# Patient Record
Sex: MALE | Race: WHITE | Employment: OTHER | URBAN - METROPOLITAN AREA
[De-identification: names, ages, dates, MRNs, and addresses within clinical notes are randomized per-mention and may not be internally consistent; named-entity substitution may affect disease eponyms.]

---

## 2018-11-02 LAB
CREATININE, URINE: NORMAL
MICROALBUMIN/CREAT 24H UR: 0.7 MG/G{CREAT}
MICROALBUMIN/CREAT UR-RTO: NORMAL

## 2018-12-31 ENCOUNTER — HOSPITAL ENCOUNTER (OUTPATIENT)
Dept: NUCLEAR MEDICINE | Age: 68
Discharge: HOME | End: 2019-01-02
Payer: COMMERCIAL

## 2018-12-31 DIAGNOSIS — R91.8 MULTIPLE LUNG NODULES ON CT: ICD-10-CM

## 2018-12-31 PROCEDURE — 78815 PET IMAGE W/CT SKULL-THIGH: CPT

## 2018-12-31 PROCEDURE — 3430000000 HC RX DIAGNOSTIC RADIOPHARMACEUTICAL: Performed by: FAMILY MEDICINE

## 2018-12-31 PROCEDURE — A9552 F18 FDG: HCPCS | Performed by: FAMILY MEDICINE

## 2018-12-31 RX ADMIN — FLUDEOXYGLUCOSE F 18 16.43 MILLICURIE: 200 INJECTION, SOLUTION INTRAVENOUS at 15:50

## 2019-01-02 RX ORDER — FLUDEOXYGLUCOSE F 18 200 MCI/ML
16.43 INJECTION, SOLUTION INTRAVENOUS
Status: COMPLETED | OUTPATIENT
Start: 2019-01-02 | End: 2018-12-31

## 2019-01-25 ENCOUNTER — TELEPHONE (OUTPATIENT)
Dept: ONCOLOGY | Age: 69
End: 2019-01-25

## 2019-01-29 ENCOUNTER — TELEPHONE (OUTPATIENT)
Dept: ONCOLOGY | Age: 69
End: 2019-01-29

## 2019-02-15 ENCOUNTER — INITIAL CONSULT (OUTPATIENT)
Dept: ONCOLOGY | Age: 69
End: 2019-02-15
Payer: COMMERCIAL

## 2019-02-15 VITALS
HEIGHT: 71 IN | HEART RATE: 64 BPM | WEIGHT: 227.25 LBS | TEMPERATURE: 97.8 F | DIASTOLIC BLOOD PRESSURE: 83 MMHG | SYSTOLIC BLOOD PRESSURE: 139 MMHG | BODY MASS INDEX: 31.81 KG/M2

## 2019-02-15 DIAGNOSIS — C34.91 SMALL CELL CARCINOMA OF LUNG, RIGHT (HCC): Primary | ICD-10-CM

## 2019-02-15 PROCEDURE — 99245 OFF/OP CONSLTJ NEW/EST HI 55: CPT | Performed by: INTERNAL MEDICINE

## 2019-02-15 PROCEDURE — 99201 HC NEW PT, E/M LEVEL 1: CPT | Performed by: INTERNAL MEDICINE

## 2019-02-15 RX ORDER — M-VIT,TX,IRON,MINS/CALC/FOLIC 27MG-0.4MG
1 TABLET ORAL DAILY
COMMUNITY
End: 2021-11-10 | Stop reason: ALTCHOICE

## 2019-02-15 RX ORDER — LISINOPRIL AND HYDROCHLOROTHIAZIDE 25; 20 MG/1; MG/1
1 TABLET ORAL DAILY
COMMUNITY
Start: 2018-11-27 | End: 2019-09-10 | Stop reason: ALTCHOICE

## 2019-02-15 RX ORDER — SIMVASTATIN 40 MG
40 TABLET ORAL DAILY
Status: ON HOLD | COMMUNITY
Start: 2018-11-27 | End: 2022-05-07 | Stop reason: HOSPADM

## 2019-02-15 RX ORDER — CELECOXIB 200 MG/1
200 CAPSULE ORAL 2 TIMES DAILY
Refills: 3 | COMMUNITY
Start: 2018-11-27 | End: 2020-07-01 | Stop reason: ALTCHOICE

## 2019-02-15 RX ORDER — ALLOPURINOL 100 MG/1
100 TABLET ORAL DAILY
COMMUNITY
Start: 2018-12-04

## 2019-02-20 ENCOUNTER — HOSPITAL ENCOUNTER (OUTPATIENT)
Dept: NUCLEAR MEDICINE | Age: 69
Discharge: HOME | End: 2019-02-22
Payer: COMMERCIAL

## 2019-02-20 DIAGNOSIS — C34.91 SMALL CELL CARCINOMA OF LUNG, RIGHT (HCC): ICD-10-CM

## 2019-02-20 PROCEDURE — 78306 BONE IMAGING WHOLE BODY: CPT

## 2019-02-20 PROCEDURE — A9503 TC99M MEDRONATE: HCPCS | Performed by: INTERNAL MEDICINE

## 2019-02-20 PROCEDURE — 3430000000 HC RX DIAGNOSTIC RADIOPHARMACEUTICAL: Performed by: INTERNAL MEDICINE

## 2019-02-20 RX ORDER — SODIUM CHLORIDE 0.9 % (FLUSH) 0.9 %
10 SYRINGE (ML) INJECTION PRN
Status: DISCONTINUED | OUTPATIENT
Start: 2019-02-20 | End: 2019-02-23 | Stop reason: HOSPADM

## 2019-02-20 RX ORDER — TC 99M MEDRONATE 20 MG/10ML
25 INJECTION, POWDER, LYOPHILIZED, FOR SOLUTION INTRAVENOUS
Status: COMPLETED | OUTPATIENT
Start: 2019-02-20 | End: 2019-02-20

## 2019-02-20 RX ADMIN — Medication 23.1 MILLICURIE: at 08:05

## 2019-02-22 ENCOUNTER — HOSPITAL ENCOUNTER (OUTPATIENT)
Dept: RADIATION ONCOLOGY | Age: 69
Discharge: HOME | End: 2019-02-22
Payer: COMMERCIAL

## 2019-02-22 ENCOUNTER — TELEPHONE (OUTPATIENT)
Dept: ONCOLOGY | Age: 69
End: 2019-02-22

## 2019-02-22 ENCOUNTER — OFFICE VISIT (OUTPATIENT)
Dept: ONCOLOGY | Age: 69
End: 2019-02-22
Payer: COMMERCIAL

## 2019-02-22 VITALS
BODY MASS INDEX: 32.04 KG/M2 | WEIGHT: 229.7 LBS | DIASTOLIC BLOOD PRESSURE: 83 MMHG | SYSTOLIC BLOOD PRESSURE: 131 MMHG | TEMPERATURE: 97.7 F | HEART RATE: 68 BPM

## 2019-02-22 DIAGNOSIS — C34.91 PRIMARY LUNG CANCER WITH METASTASIS FROM LUNG TO OTHER SITE, RIGHT (HCC): Primary | ICD-10-CM

## 2019-02-22 DIAGNOSIS — C34.91 PRIMARY LUNG CANCER WITH METASTASIS FROM LUNG TO OTHER SITE, RIGHT (HCC): ICD-10-CM

## 2019-02-22 PROCEDURE — 99211 OFF/OP EST MAY X REQ PHY/QHP: CPT | Performed by: INTERNAL MEDICINE

## 2019-02-22 PROCEDURE — 99213 OFFICE O/P EST LOW 20 MIN: CPT | Performed by: RADIOLOGY

## 2019-02-22 PROCEDURE — 99214 OFFICE O/P EST MOD 30 MIN: CPT | Performed by: INTERNAL MEDICINE

## 2019-02-22 RX ORDER — SODIUM CHLORIDE 9 MG/ML
INJECTION, SOLUTION INTRAVENOUS CONTINUOUS
Status: CANCELLED | OUTPATIENT
Start: 2019-02-22

## 2019-02-25 ENCOUNTER — TELEPHONE (OUTPATIENT)
Dept: ONCOLOGY | Age: 69
End: 2019-02-25

## 2019-02-25 ENCOUNTER — HOSPITAL ENCOUNTER (OUTPATIENT)
Dept: INTERVENTIONAL RADIOLOGY/VASCULAR | Age: 69
Discharge: HOME | End: 2019-02-27
Payer: COMMERCIAL

## 2019-02-25 VITALS
TEMPERATURE: 98.1 F | HEIGHT: 72 IN | HEART RATE: 81 BPM | RESPIRATION RATE: 18 BRPM | SYSTOLIC BLOOD PRESSURE: 174 MMHG | BODY MASS INDEX: 42.66 KG/M2 | WEIGHT: 315 LBS | OXYGEN SATURATION: 99 % | DIASTOLIC BLOOD PRESSURE: 92 MMHG

## 2019-02-25 DIAGNOSIS — C34.91 SQUAMOUS CELL CARCINOMA OF LUNG, STAGE II, RIGHT (HCC): ICD-10-CM

## 2019-02-25 LAB
INR BLD: 1.1
PARTIAL THROMBOPLASTIN TIME: 32.5 SEC (ref 24–36)
PLATELET # BLD: 227 K/UL (ref 150–450)
PROTHROMBIN TIME: 14 SEC (ref 11.8–14.6)

## 2019-02-25 PROCEDURE — 36561 INSERT TUNNELED CV CATH: CPT | Performed by: RADIOLOGY

## 2019-02-25 PROCEDURE — 77001 FLUOROGUIDE FOR VEIN DEVICE: CPT | Performed by: RADIOLOGY

## 2019-02-25 PROCEDURE — 7100000031 HC ASPR PHASE II RECOVERY - ADDTL 15 MIN

## 2019-02-25 PROCEDURE — 2709999900 IR PORT PLACEMENT > 5 YEARS

## 2019-02-25 PROCEDURE — 36415 COLL VENOUS BLD VENIPUNCTURE: CPT

## 2019-02-25 PROCEDURE — 7100000030 HC ASPR PHASE II RECOVERY - FIRST 15 MIN

## 2019-02-25 PROCEDURE — 6360000002 HC RX W HCPCS: Performed by: RADIOLOGY

## 2019-02-25 PROCEDURE — 85049 AUTOMATED PLATELET COUNT: CPT

## 2019-02-25 PROCEDURE — 2500000003 HC RX 250 WO HCPCS: Performed by: RADIOLOGY

## 2019-02-25 PROCEDURE — 2580000003 HC RX 258: Performed by: RADIOLOGY

## 2019-02-25 PROCEDURE — 76937 US GUIDE VASCULAR ACCESS: CPT | Performed by: RADIOLOGY

## 2019-02-25 PROCEDURE — 85730 THROMBOPLASTIN TIME PARTIAL: CPT

## 2019-02-25 PROCEDURE — 85610 PROTHROMBIN TIME: CPT

## 2019-02-25 RX ORDER — ACETAMINOPHEN 325 MG/1
650 TABLET ORAL EVERY 6 HOURS PRN
Status: ON HOLD | COMMUNITY
End: 2022-05-07 | Stop reason: HOSPADM

## 2019-02-25 RX ORDER — LIDOCAINE HYDROCHLORIDE 10 MG/ML
INJECTION, SOLUTION INFILTRATION; PERINEURAL
Status: COMPLETED | OUTPATIENT
Start: 2019-02-25 | End: 2019-02-25

## 2019-02-25 RX ORDER — ACETAMINOPHEN 325 MG/1
650 TABLET ORAL EVERY 4 HOURS PRN
Status: DISCONTINUED | OUTPATIENT
Start: 2019-02-25 | End: 2019-02-28 | Stop reason: HOSPADM

## 2019-02-25 RX ORDER — LIDOCAINE HYDROCHLORIDE AND EPINEPHRINE 10; 10 MG/ML; UG/ML
INJECTION, SOLUTION INFILTRATION; PERINEURAL
Status: COMPLETED | OUTPATIENT
Start: 2019-02-25 | End: 2019-02-25

## 2019-02-25 RX ORDER — SODIUM CHLORIDE 9 MG/ML
INJECTION, SOLUTION INTRAVENOUS CONTINUOUS
Status: DISCONTINUED | OUTPATIENT
Start: 2019-02-25 | End: 2019-02-28 | Stop reason: HOSPADM

## 2019-02-25 RX ADMIN — SODIUM CHLORIDE: 9 INJECTION, SOLUTION INTRAVENOUS at 09:52

## 2019-02-25 RX ADMIN — SODIUM CHLORIDE: 9 INJECTION, SOLUTION INTRAVENOUS at 09:12

## 2019-02-25 RX ADMIN — SODIUM CHLORIDE, PRESERVATIVE FREE 5 ML: 5 INJECTION INTRAVENOUS at 10:32

## 2019-02-25 RX ADMIN — LIDOCAINE HYDROCHLORIDE 2 ML: 10 INJECTION, SOLUTION INFILTRATION; PERINEURAL at 10:06

## 2019-02-25 RX ADMIN — LIDOCAINE HYDROCHLORIDE 2 ML: 10 INJECTION, SOLUTION INFILTRATION; PERINEURAL at 09:59

## 2019-02-25 RX ADMIN — LIDOCAINE HYDROCHLORIDE AND EPINEPHRINE 2 ML: 10; 10 INJECTION, SOLUTION INFILTRATION; PERINEURAL at 10:08

## 2019-02-25 RX ADMIN — CEFAZOLIN 1 G: 1 INJECTION, POWDER, FOR SOLUTION INTRAMUSCULAR; INTRAVENOUS at 10:15

## 2019-02-25 RX ADMIN — CEFAZOLIN 1 G: 1 INJECTION, POWDER, FOR SOLUTION INTRAMUSCULAR; INTRAVENOUS at 09:52

## 2019-02-25 ASSESSMENT — PAIN SCALES - GENERAL: PAINLEVEL_OUTOF10: 0

## 2019-02-27 ENCOUNTER — TELEPHONE (OUTPATIENT)
Dept: INFUSION THERAPY | Age: 69
End: 2019-02-27

## 2019-02-27 DIAGNOSIS — C34.91 PRIMARY LUNG CANCER WITH METASTASIS FROM LUNG TO OTHER SITE, RIGHT (HCC): Primary | ICD-10-CM

## 2019-02-28 DIAGNOSIS — C34.91 PRIMARY LUNG CANCER WITH METASTASIS FROM LUNG TO OTHER SITE, RIGHT (HCC): Primary | ICD-10-CM

## 2019-03-01 ENCOUNTER — HOSPITAL ENCOUNTER (OUTPATIENT)
Dept: RADIATION ONCOLOGY | Age: 69
Discharge: HOME | End: 2019-03-01
Attending: RADIOLOGY
Payer: COMMERCIAL

## 2019-03-01 ENCOUNTER — HOSPITAL ENCOUNTER (OUTPATIENT)
Age: 69
Discharge: HOME | End: 2019-03-01
Attending: RADIOLOGY
Payer: COMMERCIAL

## 2019-03-01 VITALS — HEIGHT: 71 IN | BODY MASS INDEX: 32.2 KG/M2 | WEIGHT: 230 LBS

## 2019-03-01 DIAGNOSIS — C34.91 PRIMARY LUNG CANCER WITH METASTASIS FROM LUNG TO OTHER SITE, RIGHT (HCC): Primary | ICD-10-CM

## 2019-03-01 LAB
BUN BLDV-MCNC: 24 MG/DL (ref 8–23)
CREAT SERPL-MCNC: 1.42 MG/DL (ref 0.7–1.2)
GFR AFRICAN AMERICAN: >60 ML/MIN
GFR NON-AFRICAN AMERICAN: 50 ML/MIN
GFR SERPL CREATININE-BSD FRML MDRD: ABNORMAL ML/MIN/{1.73_M2}
GFR SERPL CREATININE-BSD FRML MDRD: ABNORMAL ML/MIN/{1.73_M2}

## 2019-03-01 PROCEDURE — 77470 SPECIAL RADIATION TREATMENT: CPT | Performed by: RADIOLOGY

## 2019-03-01 PROCEDURE — 77290 THER RAD SIMULAJ FIELD CPLX: CPT | Performed by: RADIOLOGY

## 2019-03-01 PROCEDURE — 77334 RADIATION TREATMENT AID(S): CPT | Performed by: RADIOLOGY

## 2019-03-01 PROCEDURE — 84520 ASSAY OF UREA NITROGEN: CPT

## 2019-03-01 PROCEDURE — 36415 COLL VENOUS BLD VENIPUNCTURE: CPT

## 2019-03-01 PROCEDURE — 82565 ASSAY OF CREATININE: CPT

## 2019-03-05 ENCOUNTER — HOSPITAL ENCOUNTER (OUTPATIENT)
Dept: RADIATION ONCOLOGY | Age: 69
Discharge: HOME | End: 2019-03-05
Attending: RADIOLOGY
Payer: COMMERCIAL

## 2019-03-05 PROCEDURE — 77334 RADIATION TREATMENT AID(S): CPT | Performed by: RADIOLOGY

## 2019-03-05 PROCEDURE — 77295 3-D RADIOTHERAPY PLAN: CPT | Performed by: RADIOLOGY

## 2019-03-05 PROCEDURE — 77300 RADIATION THERAPY DOSE PLAN: CPT | Performed by: RADIOLOGY

## 2019-03-07 ENCOUNTER — TELEPHONE (OUTPATIENT)
Dept: INFUSION THERAPY | Age: 69
End: 2019-03-07

## 2019-03-07 RX ORDER — ONDANSETRON 8 MG/1
8 TABLET, ORALLY DISINTEGRATING ORAL 3 TIMES DAILY PRN
Qty: 90 TABLET | Refills: 3 | Status: SHIPPED | OUTPATIENT
Start: 2019-03-07 | End: 2019-09-10 | Stop reason: ALTCHOICE

## 2019-03-07 RX ORDER — LIDOCAINE AND PRILOCAINE 25; 25 MG/G; MG/G
CREAM TOPICAL
Qty: 30 G | Refills: 0 | Status: ON HOLD | OUTPATIENT
Start: 2019-03-07 | End: 2022-05-07 | Stop reason: HOSPADM

## 2019-03-08 ENCOUNTER — OFFICE VISIT (OUTPATIENT)
Dept: ONCOLOGY | Age: 69
End: 2019-03-08
Payer: COMMERCIAL

## 2019-03-08 DIAGNOSIS — C34.91 PRIMARY LUNG CANCER WITH METASTASIS FROM LUNG TO OTHER SITE, RIGHT (HCC): ICD-10-CM

## 2019-03-08 PROCEDURE — 99999 PR OFFICE/OUTPT VISIT,PROCEDURE ONLY: CPT | Performed by: INTERNAL MEDICINE

## 2019-03-08 PROCEDURE — 99214 OFFICE O/P EST MOD 30 MIN: CPT

## 2019-03-11 DIAGNOSIS — C34.91 PRIMARY LUNG CANCER WITH METASTASIS FROM LUNG TO OTHER SITE, RIGHT (HCC): Primary | ICD-10-CM

## 2019-03-12 ENCOUNTER — APPOINTMENT (OUTPATIENT)
Dept: RADIATION ONCOLOGY | Age: 69
End: 2019-03-12
Attending: RADIOLOGY
Payer: COMMERCIAL

## 2019-03-12 ENCOUNTER — HOSPITAL ENCOUNTER (OUTPATIENT)
Dept: INFUSION THERAPY | Age: 69
Discharge: HOME | End: 2019-03-12
Payer: COMMERCIAL

## 2019-03-12 ENCOUNTER — OFFICE VISIT (OUTPATIENT)
Dept: ONCOLOGY | Age: 69
End: 2019-03-12
Payer: COMMERCIAL

## 2019-03-12 ENCOUNTER — HOSPITAL ENCOUNTER (OUTPATIENT)
Dept: RADIATION ONCOLOGY | Age: 69
Discharge: HOME | End: 2019-03-12
Attending: RADIOLOGY
Payer: COMMERCIAL

## 2019-03-12 ENCOUNTER — TELEPHONE (OUTPATIENT)
Dept: ONCOLOGY | Age: 69
End: 2019-03-12

## 2019-03-12 VITALS
RESPIRATION RATE: 20 BRPM | DIASTOLIC BLOOD PRESSURE: 80 MMHG | WEIGHT: 227 LBS | TEMPERATURE: 97.4 F | BODY MASS INDEX: 31.66 KG/M2 | SYSTOLIC BLOOD PRESSURE: 152 MMHG | HEART RATE: 73 BPM

## 2019-03-12 VITALS
BODY MASS INDEX: 31.66 KG/M2 | DIASTOLIC BLOOD PRESSURE: 78 MMHG | HEART RATE: 66 BPM | TEMPERATURE: 97.4 F | WEIGHT: 227 LBS | SYSTOLIC BLOOD PRESSURE: 138 MMHG

## 2019-03-12 DIAGNOSIS — C34.91 PRIMARY LUNG CANCER WITH METASTASIS FROM LUNG TO OTHER SITE, RIGHT (HCC): Primary | ICD-10-CM

## 2019-03-12 LAB
ABSOLUTE EOS #: 0.2 K/UL (ref 0–0.4)
ABSOLUTE IMMATURE GRANULOCYTE: ABNORMAL K/UL (ref 0–0.3)
ABSOLUTE LYMPH #: 1.8 K/UL (ref 1–4.8)
ABSOLUTE MONO #: 1.1 K/UL (ref 0.1–1.2)
ALBUMIN SERPL-MCNC: 4.2 G/DL (ref 3.5–5.2)
ALBUMIN/GLOBULIN RATIO: 1.2 (ref 1–2.5)
ALP BLD-CCNC: 76 U/L (ref 40–129)
ALT SERPL-CCNC: 14 U/L (ref 5–41)
ANION GAP SERPL CALCULATED.3IONS-SCNC: 13 MMOL/L (ref 9–17)
AST SERPL-CCNC: 18 U/L
BASOPHILS # BLD: 1 % (ref 0–2)
BASOPHILS ABSOLUTE: 0.1 K/UL (ref 0–0.2)
BILIRUB SERPL-MCNC: 0.2 MG/DL (ref 0.3–1.2)
BUN BLDV-MCNC: 24 MG/DL (ref 8–23)
BUN/CREAT BLD: ABNORMAL (ref 9–20)
CALCIUM SERPL-MCNC: 9.3 MG/DL (ref 8.6–10.4)
CHLORIDE BLD-SCNC: 104 MMOL/L (ref 98–107)
CO2: 23 MMOL/L (ref 20–31)
CREAT SERPL-MCNC: 1.42 MG/DL (ref 0.7–1.2)
DIFFERENTIAL TYPE: ABNORMAL
EOSINOPHILS RELATIVE PERCENT: 3 % (ref 1–4)
GFR AFRICAN AMERICAN: >60 ML/MIN
GFR NON-AFRICAN AMERICAN: 50 ML/MIN
GFR SERPL CREATININE-BSD FRML MDRD: ABNORMAL ML/MIN/{1.73_M2}
GFR SERPL CREATININE-BSD FRML MDRD: ABNORMAL ML/MIN/{1.73_M2}
GLUCOSE BLD-MCNC: 115 MG/DL (ref 70–99)
HCT VFR BLD CALC: 38.1 % (ref 41–53)
HEMOGLOBIN: 13.2 G/DL (ref 13.5–17.5)
IMMATURE GRANULOCYTES: ABNORMAL %
LYMPHOCYTES # BLD: 24 % (ref 24–44)
MAGNESIUM: 2.1 MG/DL (ref 1.6–2.6)
MCH RBC QN AUTO: 33 PG (ref 26–34)
MCHC RBC AUTO-ENTMCNC: 34.6 G/DL (ref 31–37)
MCV RBC AUTO: 95.5 FL (ref 80–100)
MONOCYTES # BLD: 15 % (ref 2–11)
NRBC AUTOMATED: ABNORMAL PER 100 WBC
PDW BLD-RTO: 14 % (ref 12.5–15.4)
PLATELET # BLD: 272 K/UL (ref 140–450)
PLATELET ESTIMATE: ABNORMAL
PMV BLD AUTO: 7.9 FL (ref 6–12)
POTASSIUM SERPL-SCNC: 4.3 MMOL/L (ref 3.7–5.3)
RBC # BLD: 3.99 M/UL (ref 4.5–5.9)
RBC # BLD: ABNORMAL 10*6/UL
SEG NEUTROPHILS: 57 % (ref 36–66)
SEGMENTED NEUTROPHILS ABSOLUTE COUNT: 4.3 K/UL (ref 1.8–7.7)
SODIUM BLD-SCNC: 140 MMOL/L (ref 135–144)
TOTAL PROTEIN: 7.6 G/DL (ref 6.4–8.3)
WBC # BLD: 7.5 K/UL (ref 3.5–11)
WBC # BLD: ABNORMAL 10*3/UL

## 2019-03-12 PROCEDURE — 80053 COMPREHEN METABOLIC PANEL: CPT

## 2019-03-12 PROCEDURE — 96367 TX/PROPH/DG ADDL SEQ IV INF: CPT

## 2019-03-12 PROCEDURE — 85025 COMPLETE CBC W/AUTO DIFF WBC: CPT

## 2019-03-12 PROCEDURE — 96417 CHEMO IV INFUS EACH ADDL SEQ: CPT

## 2019-03-12 PROCEDURE — 96375 TX/PRO/DX INJ NEW DRUG ADDON: CPT

## 2019-03-12 PROCEDURE — 96413 CHEMO IV INFUSION 1 HR: CPT

## 2019-03-12 PROCEDURE — 99214 OFFICE O/P EST MOD 30 MIN: CPT | Performed by: INTERNAL MEDICINE

## 2019-03-12 PROCEDURE — 36415 COLL VENOUS BLD VENIPUNCTURE: CPT

## 2019-03-12 PROCEDURE — 77387 GUIDANCE FOR RADJ TX DLVR: CPT | Performed by: RADIOLOGY

## 2019-03-12 PROCEDURE — 2580000003 HC RX 258: Performed by: INTERNAL MEDICINE

## 2019-03-12 PROCEDURE — 6360000002 HC RX W HCPCS: Performed by: INTERNAL MEDICINE

## 2019-03-12 PROCEDURE — 96361 HYDRATE IV INFUSION ADD-ON: CPT

## 2019-03-12 PROCEDURE — 77412 RADIATION TX DELIVERY LVL 3: CPT | Performed by: RADIOLOGY

## 2019-03-12 PROCEDURE — 36591 DRAW BLOOD OFF VENOUS DEVICE: CPT

## 2019-03-12 PROCEDURE — 96368 THER/DIAG CONCURRENT INF: CPT

## 2019-03-12 PROCEDURE — 77280 THER RAD SIMULAJ FIELD SMPL: CPT | Performed by: RADIOLOGY

## 2019-03-12 PROCEDURE — 83735 ASSAY OF MAGNESIUM: CPT

## 2019-03-12 RX ORDER — SODIUM CHLORIDE 0.9 % (FLUSH) 0.9 %
10 SYRINGE (ML) INJECTION PRN
Status: DISCONTINUED | OUTPATIENT
Start: 2019-03-12 | End: 2019-03-13 | Stop reason: HOSPADM

## 2019-03-12 RX ORDER — MAGNESIUM SULFATE 1 G/100ML
1 INJECTION INTRAVENOUS ONCE
Status: COMPLETED | OUTPATIENT
Start: 2019-03-12 | End: 2019-03-12

## 2019-03-12 RX ORDER — SODIUM CHLORIDE 0.9 % (FLUSH) 0.9 %
10 SYRINGE (ML) INJECTION PRN
Status: CANCELLED | OUTPATIENT
Start: 2019-03-19

## 2019-03-12 RX ORDER — HEPARIN SODIUM (PORCINE) LOCK FLUSH IV SOLN 100 UNIT/ML 100 UNIT/ML
500 SOLUTION INTRAVENOUS PRN
Status: DISCONTINUED | OUTPATIENT
Start: 2019-03-12 | End: 2019-03-13 | Stop reason: HOSPADM

## 2019-03-12 RX ORDER — SODIUM CHLORIDE AND POTASSIUM CHLORIDE .9; .15 G/100ML; G/100ML
SOLUTION INTRAVENOUS ONCE
Status: COMPLETED | OUTPATIENT
Start: 2019-03-12 | End: 2019-03-12

## 2019-03-12 RX ORDER — METHYLPREDNISOLONE SODIUM SUCCINATE 125 MG/2ML
125 INJECTION, POWDER, LYOPHILIZED, FOR SOLUTION INTRAMUSCULAR; INTRAVENOUS ONCE
Status: CANCELLED | OUTPATIENT
Start: 2019-03-19 | End: 2019-03-19

## 2019-03-12 RX ORDER — METHYLPREDNISOLONE SODIUM SUCCINATE 125 MG/2ML
125 INJECTION, POWDER, LYOPHILIZED, FOR SOLUTION INTRAMUSCULAR; INTRAVENOUS ONCE
Status: CANCELLED | OUTPATIENT
Start: 2019-03-12 | End: 2019-03-12

## 2019-03-12 RX ORDER — SODIUM CHLORIDE 0.9 % (FLUSH) 0.9 %
5 SYRINGE (ML) INJECTION PRN
Status: CANCELLED | OUTPATIENT
Start: 2019-03-14

## 2019-03-12 RX ORDER — SODIUM CHLORIDE 0.9 % (FLUSH) 0.9 %
10 SYRINGE (ML) INJECTION PRN
Status: CANCELLED | OUTPATIENT
Start: 2019-03-14

## 2019-03-12 RX ORDER — DIPHENHYDRAMINE HYDROCHLORIDE 50 MG/ML
50 INJECTION INTRAMUSCULAR; INTRAVENOUS ONCE
Status: CANCELLED | OUTPATIENT
Start: 2019-03-13 | End: 2019-03-13

## 2019-03-12 RX ORDER — 0.9 % SODIUM CHLORIDE 0.9 %
10 VIAL (ML) INJECTION ONCE
Status: CANCELLED | OUTPATIENT
Start: 2019-03-13 | End: 2019-03-13

## 2019-03-12 RX ORDER — HEPARIN SODIUM (PORCINE) LOCK FLUSH IV SOLN 100 UNIT/ML 100 UNIT/ML
500 SOLUTION INTRAVENOUS PRN
Status: CANCELLED | OUTPATIENT
Start: 2019-03-14

## 2019-03-12 RX ORDER — SODIUM CHLORIDE 0.9 % (FLUSH) 0.9 %
5 SYRINGE (ML) INJECTION PRN
Status: CANCELLED | OUTPATIENT
Start: 2019-03-19

## 2019-03-12 RX ORDER — SODIUM CHLORIDE 0.9 % (FLUSH) 0.9 %
10 SYRINGE (ML) INJECTION PRN
Status: CANCELLED | OUTPATIENT
Start: 2019-03-13

## 2019-03-12 RX ORDER — SODIUM CHLORIDE 9 MG/ML
INJECTION, SOLUTION INTRAVENOUS ONCE
Status: CANCELLED | OUTPATIENT
Start: 2019-03-19

## 2019-03-12 RX ORDER — PALONOSETRON 0.05 MG/ML
0.25 INJECTION, SOLUTION INTRAVENOUS ONCE
Status: COMPLETED | OUTPATIENT
Start: 2019-03-12 | End: 2019-03-12

## 2019-03-12 RX ORDER — PALONOSETRON 0.05 MG/ML
0.25 INJECTION, SOLUTION INTRAVENOUS ONCE
Status: CANCELLED | OUTPATIENT
Start: 2019-03-19

## 2019-03-12 RX ORDER — DIPHENHYDRAMINE HYDROCHLORIDE 50 MG/ML
50 INJECTION INTRAMUSCULAR; INTRAVENOUS ONCE
Status: CANCELLED | OUTPATIENT
Start: 2019-03-14 | End: 2019-03-14

## 2019-03-12 RX ORDER — HEPARIN SODIUM (PORCINE) LOCK FLUSH IV SOLN 100 UNIT/ML 100 UNIT/ML
500 SOLUTION INTRAVENOUS PRN
Status: CANCELLED | OUTPATIENT
Start: 2019-03-19

## 2019-03-12 RX ORDER — SODIUM CHLORIDE 9 MG/ML
INJECTION, SOLUTION INTRAVENOUS ONCE
Status: CANCELLED | OUTPATIENT
Start: 2019-03-13 | End: 2019-03-13

## 2019-03-12 RX ORDER — DEXAMETHASONE SODIUM PHOSPHATE 10 MG/ML
10 INJECTION INTRAMUSCULAR; INTRAVENOUS ONCE
Status: COMPLETED | OUTPATIENT
Start: 2019-03-12 | End: 2019-03-12

## 2019-03-12 RX ORDER — HEPARIN SODIUM (PORCINE) LOCK FLUSH IV SOLN 100 UNIT/ML 100 UNIT/ML
500 SOLUTION INTRAVENOUS PRN
Status: CANCELLED | OUTPATIENT
Start: 2019-03-13

## 2019-03-12 RX ORDER — SODIUM CHLORIDE 9 MG/ML
INJECTION, SOLUTION INTRAVENOUS CONTINUOUS
Status: CANCELLED | OUTPATIENT
Start: 2019-03-19

## 2019-03-12 RX ORDER — SODIUM CHLORIDE 9 MG/ML
INJECTION, SOLUTION INTRAVENOUS ONCE
Status: DISCONTINUED | OUTPATIENT
Start: 2019-03-12 | End: 2019-03-13 | Stop reason: HOSPADM

## 2019-03-12 RX ORDER — DIPHENHYDRAMINE HYDROCHLORIDE 50 MG/ML
50 INJECTION INTRAMUSCULAR; INTRAVENOUS ONCE
Status: CANCELLED | OUTPATIENT
Start: 2019-03-12 | End: 2019-03-12

## 2019-03-12 RX ORDER — SODIUM CHLORIDE 0.9 % (FLUSH) 0.9 %
5 SYRINGE (ML) INJECTION PRN
Status: CANCELLED | OUTPATIENT
Start: 2019-03-13

## 2019-03-12 RX ORDER — SODIUM CHLORIDE 9 MG/ML
INJECTION, SOLUTION INTRAVENOUS ONCE
Status: CANCELLED | OUTPATIENT
Start: 2019-03-14 | End: 2019-03-14

## 2019-03-12 RX ORDER — DIPHENHYDRAMINE HYDROCHLORIDE 50 MG/ML
50 INJECTION INTRAMUSCULAR; INTRAVENOUS ONCE
Status: CANCELLED | OUTPATIENT
Start: 2019-03-19 | End: 2019-03-19

## 2019-03-12 RX ORDER — SODIUM CHLORIDE 9 MG/ML
INJECTION, SOLUTION INTRAVENOUS CONTINUOUS
Status: CANCELLED | OUTPATIENT
Start: 2019-03-13

## 2019-03-12 RX ORDER — SODIUM CHLORIDE 0.9 % (FLUSH) 0.9 %
5 SYRINGE (ML) INJECTION PRN
Status: CANCELLED | OUTPATIENT
Start: 2019-03-12

## 2019-03-12 RX ORDER — SODIUM CHLORIDE 9 MG/ML
INJECTION, SOLUTION INTRAVENOUS CONTINUOUS
Status: CANCELLED | OUTPATIENT
Start: 2019-03-12

## 2019-03-12 RX ORDER — 0.9 % SODIUM CHLORIDE 0.9 %
10 VIAL (ML) INJECTION ONCE
Status: CANCELLED | OUTPATIENT
Start: 2019-03-12 | End: 2019-03-12

## 2019-03-12 RX ORDER — SODIUM CHLORIDE AND POTASSIUM CHLORIDE .9; .15 G/100ML; G/100ML
SOLUTION INTRAVENOUS ONCE
Status: CANCELLED | OUTPATIENT
Start: 2019-03-19

## 2019-03-12 RX ORDER — METHYLPREDNISOLONE SODIUM SUCCINATE 125 MG/2ML
125 INJECTION, POWDER, LYOPHILIZED, FOR SOLUTION INTRAMUSCULAR; INTRAVENOUS ONCE
Status: CANCELLED | OUTPATIENT
Start: 2019-03-14 | End: 2019-03-14

## 2019-03-12 RX ORDER — 0.9 % SODIUM CHLORIDE 0.9 %
10 VIAL (ML) INJECTION ONCE
Status: CANCELLED | OUTPATIENT
Start: 2019-03-14 | End: 2019-03-14

## 2019-03-12 RX ORDER — SODIUM CHLORIDE 9 MG/ML
INJECTION, SOLUTION INTRAVENOUS CONTINUOUS
Status: CANCELLED | OUTPATIENT
Start: 2019-03-14

## 2019-03-12 RX ORDER — METHYLPREDNISOLONE SODIUM SUCCINATE 125 MG/2ML
125 INJECTION, POWDER, LYOPHILIZED, FOR SOLUTION INTRAMUSCULAR; INTRAVENOUS ONCE
Status: CANCELLED | OUTPATIENT
Start: 2019-03-13 | End: 2019-03-13

## 2019-03-12 RX ORDER — 0.9 % SODIUM CHLORIDE 0.9 %
10 VIAL (ML) INJECTION ONCE
Status: CANCELLED | OUTPATIENT
Start: 2019-03-19 | End: 2019-03-19

## 2019-03-12 RX ADMIN — MAGNESIUM SULFATE HEPTAHYDRATE 1 G: 1 INJECTION, SOLUTION INTRAVENOUS at 13:23

## 2019-03-12 RX ADMIN — PALONOSETRON HYDROCHLORIDE 0.25 MG: 0.25 INJECTION, SOLUTION INTRAVENOUS at 10:02

## 2019-03-12 RX ADMIN — Medication 10 ML: at 08:43

## 2019-03-12 RX ADMIN — MAGNESIUM SULFATE HEPTAHYDRATE 1 G: 1 INJECTION, SOLUTION INTRAVENOUS at 08:49

## 2019-03-12 RX ADMIN — SODIUM CHLORIDE: 9 INJECTION, SOLUTION INTRAVENOUS at 08:50

## 2019-03-12 RX ADMIN — Medication 10 MG: at 10:03

## 2019-03-12 RX ADMIN — SODIUM CHLORIDE 150 MG: 900 INJECTION, SOLUTION INTRAVENOUS at 10:11

## 2019-03-12 RX ADMIN — POTASSIUM CHLORIDE AND SODIUM CHLORIDE: 900; 150 INJECTION, SOLUTION INTRAVENOUS at 08:50

## 2019-03-12 RX ADMIN — Medication 500 UNITS: at 14:31

## 2019-03-12 RX ADMIN — Medication 10 ML: at 14:31

## 2019-03-12 RX ADMIN — CISPLATIN 80 MG: 1 INJECTION, SOLUTION INTRAVENOUS at 10:50

## 2019-03-12 RX ADMIN — ETOPOSIDE 172 MG: 20 INJECTION, SOLUTION, CONCENTRATE INTRAVENOUS at 12:00

## 2019-03-12 RX ADMIN — Medication 10 ML: at 08:44

## 2019-03-12 NOTE — PROGRESS NOTES
Pt here for C1D.1. Denies any complaints. Labs drawn from port and results reviewed. Creatinine 1.42. Pt seen by Dr. Ava Cai at chairside. Orders received to decrease cisplatin by 50% today and etoposide by 25% for today and next 2 days. Give remaining half of cisplatin on day 8. Pt was treated without incident and d/c'd in stable condition. Pt returns tomorrow for C1D2.

## 2019-03-12 NOTE — PLAN OF CARE
Problem: Safety:  Goal: Free from accidental physical injury  Description  Free from accidental physical injury  Outcome: Met This Shift     Problem: Safety:  Goal: Free from intentional harm  Description  Free from intentional harm  Outcome: Met This Shift

## 2019-03-13 ENCOUNTER — HOSPITAL ENCOUNTER (OUTPATIENT)
Dept: RADIATION ONCOLOGY | Age: 69
Discharge: HOME | End: 2019-03-13
Attending: RADIOLOGY
Payer: COMMERCIAL

## 2019-03-13 ENCOUNTER — HOSPITAL ENCOUNTER (OUTPATIENT)
Dept: INFUSION THERAPY | Age: 69
Discharge: HOME | End: 2019-03-13
Payer: COMMERCIAL

## 2019-03-13 ENCOUNTER — TELEPHONE (OUTPATIENT)
Dept: ONCOLOGY | Age: 69
End: 2019-03-13

## 2019-03-13 VITALS
TEMPERATURE: 97.4 F | SYSTOLIC BLOOD PRESSURE: 169 MMHG | HEART RATE: 88 BPM | RESPIRATION RATE: 18 BRPM | DIASTOLIC BLOOD PRESSURE: 79 MMHG

## 2019-03-13 DIAGNOSIS — C34.91 PRIMARY LUNG CANCER WITH METASTASIS FROM LUNG TO OTHER SITE, RIGHT (HCC): Primary | ICD-10-CM

## 2019-03-13 PROCEDURE — 6360000002 HC RX W HCPCS: Performed by: INTERNAL MEDICINE

## 2019-03-13 PROCEDURE — 77387 GUIDANCE FOR RADJ TX DLVR: CPT | Performed by: RADIOLOGY

## 2019-03-13 PROCEDURE — 77412 RADIATION TX DELIVERY LVL 3: CPT | Performed by: RADIOLOGY

## 2019-03-13 PROCEDURE — 96375 TX/PRO/DX INJ NEW DRUG ADDON: CPT

## 2019-03-13 PROCEDURE — 2580000003 HC RX 258: Performed by: INTERNAL MEDICINE

## 2019-03-13 PROCEDURE — 96413 CHEMO IV INFUSION 1 HR: CPT

## 2019-03-13 RX ORDER — HEPARIN SODIUM (PORCINE) LOCK FLUSH IV SOLN 100 UNIT/ML 100 UNIT/ML
500 SOLUTION INTRAVENOUS PRN
Status: DISCONTINUED | OUTPATIENT
Start: 2019-03-13 | End: 2019-03-14 | Stop reason: HOSPADM

## 2019-03-13 RX ORDER — DEXAMETHASONE SODIUM PHOSPHATE 10 MG/ML
10 INJECTION INTRAMUSCULAR; INTRAVENOUS ONCE
Status: COMPLETED | OUTPATIENT
Start: 2019-03-13 | End: 2019-03-13

## 2019-03-13 RX ORDER — SODIUM CHLORIDE 9 MG/ML
INJECTION, SOLUTION INTRAVENOUS ONCE
Status: COMPLETED | OUTPATIENT
Start: 2019-03-13 | End: 2019-03-13

## 2019-03-13 RX ORDER — SODIUM CHLORIDE 0.9 % (FLUSH) 0.9 %
10 SYRINGE (ML) INJECTION PRN
Status: DISCONTINUED | OUTPATIENT
Start: 2019-03-13 | End: 2019-03-14 | Stop reason: HOSPADM

## 2019-03-13 RX ADMIN — ETOPOSIDE 172 MG: 20 INJECTION, SOLUTION, CONCENTRATE INTRAVENOUS at 08:58

## 2019-03-13 RX ADMIN — Medication 10 ML: at 08:36

## 2019-03-13 RX ADMIN — Medication 10 ML: at 10:17

## 2019-03-13 RX ADMIN — Medication 10 ML: at 08:37

## 2019-03-13 RX ADMIN — SODIUM CHLORIDE: 9 INJECTION, SOLUTION INTRAVENOUS at 08:37

## 2019-03-13 RX ADMIN — SODIUM CHLORIDE, PRESERVATIVE FREE 500 UNITS: 5 INJECTION INTRAVENOUS at 10:17

## 2019-03-13 RX ADMIN — Medication 10 MG: at 08:39

## 2019-03-13 NOTE — PROGRESS NOTES
Pt here for C1D2. Denies any new complaints. Lab results reviewed. Pt was treated without incident and d/c'd in stable condition. Pt will return in 1 day for C1D3.

## 2019-03-14 ENCOUNTER — HOSPITAL ENCOUNTER (OUTPATIENT)
Dept: RADIATION ONCOLOGY | Age: 69
Discharge: HOME | End: 2019-03-14
Attending: RADIOLOGY
Payer: COMMERCIAL

## 2019-03-14 ENCOUNTER — HOSPITAL ENCOUNTER (OUTPATIENT)
Dept: INFUSION THERAPY | Age: 69
Discharge: HOME | End: 2019-03-14
Payer: COMMERCIAL

## 2019-03-14 VITALS
DIASTOLIC BLOOD PRESSURE: 69 MMHG | RESPIRATION RATE: 16 BRPM | TEMPERATURE: 97.5 F | HEART RATE: 85 BPM | SYSTOLIC BLOOD PRESSURE: 137 MMHG

## 2019-03-14 DIAGNOSIS — C34.91 PRIMARY LUNG CANCER WITH METASTASIS FROM LUNG TO OTHER SITE, RIGHT (HCC): Primary | ICD-10-CM

## 2019-03-14 PROCEDURE — 77387 GUIDANCE FOR RADJ TX DLVR: CPT | Performed by: RADIOLOGY

## 2019-03-14 PROCEDURE — 2580000003 HC RX 258: Performed by: INTERNAL MEDICINE

## 2019-03-14 PROCEDURE — 96375 TX/PRO/DX INJ NEW DRUG ADDON: CPT

## 2019-03-14 PROCEDURE — 6360000002 HC RX W HCPCS: Performed by: INTERNAL MEDICINE

## 2019-03-14 PROCEDURE — 77412 RADIATION TX DELIVERY LVL 3: CPT | Performed by: RADIOLOGY

## 2019-03-14 PROCEDURE — 96413 CHEMO IV INFUSION 1 HR: CPT

## 2019-03-14 RX ORDER — HEPARIN SODIUM (PORCINE) LOCK FLUSH IV SOLN 100 UNIT/ML 100 UNIT/ML
500 SOLUTION INTRAVENOUS PRN
Status: DISCONTINUED | OUTPATIENT
Start: 2019-03-14 | End: 2019-03-15 | Stop reason: HOSPADM

## 2019-03-14 RX ORDER — SODIUM CHLORIDE 0.9 % (FLUSH) 0.9 %
10 SYRINGE (ML) INJECTION PRN
Status: DISCONTINUED | OUTPATIENT
Start: 2019-03-14 | End: 2019-03-15 | Stop reason: HOSPADM

## 2019-03-14 RX ORDER — DEXAMETHASONE SODIUM PHOSPHATE 10 MG/ML
10 INJECTION, SOLUTION INTRAMUSCULAR; INTRAVENOUS ONCE
Status: COMPLETED | OUTPATIENT
Start: 2019-03-14 | End: 2019-03-14

## 2019-03-14 RX ORDER — SODIUM CHLORIDE 9 MG/ML
INJECTION, SOLUTION INTRAVENOUS ONCE
Status: COMPLETED | OUTPATIENT
Start: 2019-03-14 | End: 2019-03-14

## 2019-03-14 RX ADMIN — ETOPOSIDE 172 MG: 20 INJECTION, SOLUTION, CONCENTRATE INTRAVENOUS at 08:53

## 2019-03-14 RX ADMIN — SODIUM CHLORIDE: 9 INJECTION, SOLUTION INTRAVENOUS at 08:39

## 2019-03-14 RX ADMIN — Medication 10 ML: at 08:38

## 2019-03-14 RX ADMIN — DEXAMETHASONE SODIUM PHOSPHATE 10 MG: 10 INJECTION, SOLUTION INTRAMUSCULAR; INTRAVENOUS at 08:39

## 2019-03-14 RX ADMIN — Medication 500 UNITS: at 10:05

## 2019-03-14 RX ADMIN — Medication 10 ML: at 10:05

## 2019-03-14 RX ADMIN — Medication 10 ML: at 08:39

## 2019-03-14 NOTE — PLAN OF CARE
Problem: Safety:  Goal: Free from accidental physical injury  Description  Free from accidental physical injury  Outcome: Met This Shift

## 2019-03-14 NOTE — PROGRESS NOTES
Pt here for C1D.3. Denies any new complaints. Pt was treated without incident and d/c'd in stable condition. Pt will return on 3/19 for office visit & C1D8.

## 2019-03-15 ENCOUNTER — HOSPITAL ENCOUNTER (OUTPATIENT)
Dept: RADIATION ONCOLOGY | Age: 69
Discharge: HOME | End: 2019-03-15
Attending: RADIOLOGY
Payer: COMMERCIAL

## 2019-03-15 PROCEDURE — 77412 RADIATION TX DELIVERY LVL 3: CPT | Performed by: RADIOLOGY

## 2019-03-15 PROCEDURE — 77387 GUIDANCE FOR RADJ TX DLVR: CPT | Performed by: RADIOLOGY

## 2019-03-18 ENCOUNTER — HOSPITAL ENCOUNTER (OUTPATIENT)
Dept: RADIATION ONCOLOGY | Age: 69
Discharge: HOME | End: 2019-03-18
Attending: RADIOLOGY
Payer: COMMERCIAL

## 2019-03-18 VITALS
HEART RATE: 67 BPM | TEMPERATURE: 97.6 F | DIASTOLIC BLOOD PRESSURE: 69 MMHG | RESPIRATION RATE: 18 BRPM | WEIGHT: 227.8 LBS | OXYGEN SATURATION: 97 % | BODY MASS INDEX: 31.77 KG/M2 | SYSTOLIC BLOOD PRESSURE: 124 MMHG

## 2019-03-18 DIAGNOSIS — C34.91 PRIMARY LUNG CANCER WITH METASTASIS FROM LUNG TO OTHER SITE, RIGHT (HCC): Primary | ICD-10-CM

## 2019-03-18 PROCEDURE — 77336 RADIATION PHYSICS CONSULT: CPT | Performed by: RADIOLOGY

## 2019-03-18 PROCEDURE — 77387 GUIDANCE FOR RADJ TX DLVR: CPT | Performed by: RADIOLOGY

## 2019-03-18 PROCEDURE — 77412 RADIATION TX DELIVERY LVL 3: CPT | Performed by: RADIOLOGY

## 2019-03-19 ENCOUNTER — OFFICE VISIT (OUTPATIENT)
Dept: ONCOLOGY | Age: 69
End: 2019-03-19
Payer: COMMERCIAL

## 2019-03-19 ENCOUNTER — HOSPITAL ENCOUNTER (OUTPATIENT)
Dept: RADIATION ONCOLOGY | Age: 69
Discharge: HOME | End: 2019-03-19
Attending: RADIOLOGY
Payer: COMMERCIAL

## 2019-03-19 ENCOUNTER — HOSPITAL ENCOUNTER (OUTPATIENT)
Dept: INFUSION THERAPY | Age: 69
Discharge: HOME | End: 2019-03-19
Payer: COMMERCIAL

## 2019-03-19 VITALS
BODY MASS INDEX: 31.76 KG/M2 | TEMPERATURE: 97.7 F | DIASTOLIC BLOOD PRESSURE: 73 MMHG | HEART RATE: 73 BPM | WEIGHT: 227.7 LBS | SYSTOLIC BLOOD PRESSURE: 113 MMHG

## 2019-03-19 DIAGNOSIS — N18.30 STAGE 3 CHRONIC KIDNEY DISEASE (HCC): ICD-10-CM

## 2019-03-19 DIAGNOSIS — C34.91 SMALL CELL CARCINOMA OF LUNG, RIGHT (HCC): Primary | ICD-10-CM

## 2019-03-19 DIAGNOSIS — C34.91 PRIMARY LUNG CANCER WITH METASTASIS FROM LUNG TO OTHER SITE, RIGHT (HCC): Primary | ICD-10-CM

## 2019-03-19 LAB
ABSOLUTE EOS #: 0.1 K/UL (ref 0–0.4)
ABSOLUTE IMMATURE GRANULOCYTE: ABNORMAL K/UL (ref 0–0.3)
ABSOLUTE LYMPH #: 0.7 K/UL (ref 1–4.8)
ABSOLUTE MONO #: 0.1 K/UL (ref 0.1–1.2)
ALBUMIN SERPL-MCNC: 4 G/DL (ref 3.5–5.2)
ALBUMIN/GLOBULIN RATIO: 1.3 (ref 1–2.5)
ALP BLD-CCNC: 70 U/L (ref 40–129)
ALT SERPL-CCNC: 19 U/L (ref 5–41)
ANION GAP SERPL CALCULATED.3IONS-SCNC: 16 MMOL/L (ref 9–17)
AST SERPL-CCNC: 16 U/L
BASOPHILS # BLD: 1 % (ref 0–2)
BASOPHILS ABSOLUTE: 0 K/UL (ref 0–0.2)
BILIRUB SERPL-MCNC: 0.52 MG/DL (ref 0.3–1.2)
BUN BLDV-MCNC: 32 MG/DL (ref 8–23)
BUN/CREAT BLD: ABNORMAL (ref 9–20)
CALCIUM SERPL-MCNC: 9.3 MG/DL (ref 8.6–10.4)
CHLORIDE BLD-SCNC: 102 MMOL/L (ref 98–107)
CO2: 22 MMOL/L (ref 20–31)
CREAT SERPL-MCNC: 1.36 MG/DL (ref 0.7–1.2)
DIFFERENTIAL TYPE: ABNORMAL
EOSINOPHILS RELATIVE PERCENT: 3 % (ref 1–4)
GFR AFRICAN AMERICAN: >60 ML/MIN
GFR NON-AFRICAN AMERICAN: 52 ML/MIN
GFR SERPL CREATININE-BSD FRML MDRD: ABNORMAL ML/MIN/{1.73_M2}
GFR SERPL CREATININE-BSD FRML MDRD: ABNORMAL ML/MIN/{1.73_M2}
GLUCOSE BLD-MCNC: 119 MG/DL (ref 70–99)
HCT VFR BLD CALC: 37.2 % (ref 41–53)
HEMOGLOBIN: 12.7 G/DL (ref 13.5–17.5)
IMMATURE GRANULOCYTES: ABNORMAL %
LYMPHOCYTES # BLD: 16 % (ref 24–44)
MAGNESIUM: 1.9 MG/DL (ref 1.6–2.6)
MCH RBC QN AUTO: 32.8 PG (ref 26–34)
MCHC RBC AUTO-ENTMCNC: 34.1 G/DL (ref 31–37)
MCV RBC AUTO: 96.1 FL (ref 80–100)
MONOCYTES # BLD: 1 % (ref 2–11)
NRBC AUTOMATED: ABNORMAL PER 100 WBC
PDW BLD-RTO: 14 % (ref 12.5–15.4)
PLATELET # BLD: 200 K/UL (ref 140–450)
PLATELET ESTIMATE: ABNORMAL
PMV BLD AUTO: 8 FL (ref 6–12)
POTASSIUM SERPL-SCNC: 4.5 MMOL/L (ref 3.7–5.3)
RBC # BLD: 3.87 M/UL (ref 4.5–5.9)
RBC # BLD: ABNORMAL 10*6/UL
SEG NEUTROPHILS: 79 % (ref 36–66)
SEGMENTED NEUTROPHILS ABSOLUTE COUNT: 3.6 K/UL (ref 1.8–7.7)
SODIUM BLD-SCNC: 140 MMOL/L (ref 135–144)
TOTAL PROTEIN: 7.1 G/DL (ref 6.4–8.3)
WBC # BLD: 4.6 K/UL (ref 3.5–11)
WBC # BLD: ABNORMAL 10*3/UL

## 2019-03-19 PROCEDURE — 96367 TX/PROPH/DG ADDL SEQ IV INF: CPT

## 2019-03-19 PROCEDURE — 96368 THER/DIAG CONCURRENT INF: CPT

## 2019-03-19 PROCEDURE — 80053 COMPREHEN METABOLIC PANEL: CPT

## 2019-03-19 PROCEDURE — 6360000002 HC RX W HCPCS: Performed by: INTERNAL MEDICINE

## 2019-03-19 PROCEDURE — 77412 RADIATION TX DELIVERY LVL 3: CPT | Performed by: RADIOLOGY

## 2019-03-19 PROCEDURE — 2580000003 HC RX 258: Performed by: INTERNAL MEDICINE

## 2019-03-19 PROCEDURE — 77387 GUIDANCE FOR RADJ TX DLVR: CPT | Performed by: RADIOLOGY

## 2019-03-19 PROCEDURE — 96361 HYDRATE IV INFUSION ADD-ON: CPT

## 2019-03-19 PROCEDURE — 96413 CHEMO IV INFUSION 1 HR: CPT

## 2019-03-19 PROCEDURE — 36415 COLL VENOUS BLD VENIPUNCTURE: CPT

## 2019-03-19 PROCEDURE — 99214 OFFICE O/P EST MOD 30 MIN: CPT | Performed by: INTERNAL MEDICINE

## 2019-03-19 PROCEDURE — 96375 TX/PRO/DX INJ NEW DRUG ADDON: CPT

## 2019-03-19 PROCEDURE — 85025 COMPLETE CBC W/AUTO DIFF WBC: CPT

## 2019-03-19 PROCEDURE — 36591 DRAW BLOOD OFF VENOUS DEVICE: CPT

## 2019-03-19 PROCEDURE — 83735 ASSAY OF MAGNESIUM: CPT

## 2019-03-19 RX ORDER — MAGNESIUM SULFATE 1 G/100ML
1 INJECTION INTRAVENOUS ONCE
Status: COMPLETED | OUTPATIENT
Start: 2019-03-19 | End: 2019-03-19

## 2019-03-19 RX ORDER — SODIUM CHLORIDE 9 MG/ML
INJECTION, SOLUTION INTRAVENOUS ONCE
Status: COMPLETED | OUTPATIENT
Start: 2019-03-19 | End: 2019-03-19

## 2019-03-19 RX ORDER — HEPARIN SODIUM (PORCINE) LOCK FLUSH IV SOLN 100 UNIT/ML 100 UNIT/ML
500 SOLUTION INTRAVENOUS PRN
Status: DISCONTINUED | OUTPATIENT
Start: 2019-03-19 | End: 2019-03-20 | Stop reason: HOSPADM

## 2019-03-19 RX ORDER — PALONOSETRON 0.05 MG/ML
0.25 INJECTION, SOLUTION INTRAVENOUS ONCE
Status: COMPLETED | OUTPATIENT
Start: 2019-03-19 | End: 2019-03-19

## 2019-03-19 RX ORDER — DEXAMETHASONE SODIUM PHOSPHATE 10 MG/ML
10 INJECTION INTRAMUSCULAR; INTRAVENOUS ONCE
Status: COMPLETED | OUTPATIENT
Start: 2019-03-19 | End: 2019-03-19

## 2019-03-19 RX ORDER — SODIUM CHLORIDE 0.9 % (FLUSH) 0.9 %
10 SYRINGE (ML) INJECTION PRN
Status: DISCONTINUED | OUTPATIENT
Start: 2019-03-19 | End: 2019-03-20 | Stop reason: HOSPADM

## 2019-03-19 RX ORDER — SODIUM CHLORIDE AND POTASSIUM CHLORIDE .9; .15 G/100ML; G/100ML
SOLUTION INTRAVENOUS ONCE
Status: COMPLETED | OUTPATIENT
Start: 2019-03-19 | End: 2019-03-19

## 2019-03-19 RX ADMIN — Medication 10 MG: at 11:32

## 2019-03-19 RX ADMIN — MAGNESIUM SULFATE HEPTAHYDRATE 1 G: 1 INJECTION, SOLUTION INTRAVENOUS at 10:17

## 2019-03-19 RX ADMIN — POTASSIUM CHLORIDE AND SODIUM CHLORIDE: 900; 150 INJECTION, SOLUTION INTRAVENOUS at 10:17

## 2019-03-19 RX ADMIN — CISPLATIN: 1 INJECTION, SOLUTION INTRAVENOUS at 12:20

## 2019-03-19 RX ADMIN — Medication 10 ML: at 14:44

## 2019-03-19 RX ADMIN — SODIUM CHLORIDE: 9 INJECTION, SOLUTION INTRAVENOUS at 10:17

## 2019-03-19 RX ADMIN — SODIUM CHLORIDE 150 MG: 900 INJECTION, SOLUTION INTRAVENOUS at 11:44

## 2019-03-19 RX ADMIN — SODIUM CHLORIDE, PRESERVATIVE FREE 500 UNITS: 5 INJECTION INTRAVENOUS at 14:44

## 2019-03-19 RX ADMIN — MAGNESIUM SULFATE HEPTAHYDRATE 1 G: 1 INJECTION, SOLUTION INTRAVENOUS at 13:33

## 2019-03-19 RX ADMIN — PALONOSETRON 0.25 MG: 0.05 INJECTION, SOLUTION INTRAVENOUS at 11:26

## 2019-03-19 NOTE — PROGRESS NOTES
Pt here for C1D.8. Pt seen by Dr. Torito Mitchell prior to treatment. Lab results reviewed. Pt was treated without incident and d/c'd in stable condition. Pt will return on 3/26 for hydration.

## 2019-03-20 ENCOUNTER — HOSPITAL ENCOUNTER (OUTPATIENT)
Dept: RADIATION ONCOLOGY | Age: 69
Discharge: HOME | End: 2019-03-20
Attending: RADIOLOGY
Payer: COMMERCIAL

## 2019-03-20 PROCEDURE — 77412 RADIATION TX DELIVERY LVL 3: CPT | Performed by: RADIOLOGY

## 2019-03-20 PROCEDURE — 77387 GUIDANCE FOR RADJ TX DLVR: CPT | Performed by: RADIOLOGY

## 2019-03-21 ENCOUNTER — HOSPITAL ENCOUNTER (OUTPATIENT)
Dept: RADIATION ONCOLOGY | Age: 69
Discharge: HOME | End: 2019-03-21
Attending: RADIOLOGY
Payer: COMMERCIAL

## 2019-03-21 PROCEDURE — 77387 GUIDANCE FOR RADJ TX DLVR: CPT | Performed by: RADIOLOGY

## 2019-03-21 PROCEDURE — 77412 RADIATION TX DELIVERY LVL 3: CPT | Performed by: RADIOLOGY

## 2019-03-22 ENCOUNTER — HOSPITAL ENCOUNTER (OUTPATIENT)
Dept: RADIATION ONCOLOGY | Age: 69
Discharge: HOME | End: 2019-03-22
Attending: RADIOLOGY
Payer: COMMERCIAL

## 2019-03-22 PROCEDURE — 77412 RADIATION TX DELIVERY LVL 3: CPT | Performed by: RADIOLOGY

## 2019-03-22 PROCEDURE — 77387 GUIDANCE FOR RADJ TX DLVR: CPT | Performed by: RADIOLOGY

## 2019-03-25 ENCOUNTER — HOSPITAL ENCOUNTER (OUTPATIENT)
Dept: RADIATION ONCOLOGY | Age: 69
Discharge: HOME | End: 2019-03-25
Attending: RADIOLOGY
Payer: COMMERCIAL

## 2019-03-25 VITALS
SYSTOLIC BLOOD PRESSURE: 145 MMHG | WEIGHT: 227.6 LBS | BODY MASS INDEX: 31.74 KG/M2 | RESPIRATION RATE: 16 BRPM | HEART RATE: 82 BPM | DIASTOLIC BLOOD PRESSURE: 75 MMHG | TEMPERATURE: 97.6 F | OXYGEN SATURATION: 97 %

## 2019-03-25 DIAGNOSIS — C34.91 PRIMARY LUNG CANCER WITH METASTASIS FROM LUNG TO OTHER SITE, RIGHT (HCC): Primary | ICD-10-CM

## 2019-03-25 PROCEDURE — 77412 RADIATION TX DELIVERY LVL 3: CPT | Performed by: RADIOLOGY

## 2019-03-25 PROCEDURE — 77336 RADIATION PHYSICS CONSULT: CPT | Performed by: RADIOLOGY

## 2019-03-25 PROCEDURE — 77387 GUIDANCE FOR RADJ TX DLVR: CPT | Performed by: RADIOLOGY

## 2019-03-26 ENCOUNTER — TELEPHONE (OUTPATIENT)
Dept: RADIATION ONCOLOGY | Age: 69
End: 2019-03-26

## 2019-03-26 ENCOUNTER — HOSPITAL ENCOUNTER (OUTPATIENT)
Dept: RADIATION ONCOLOGY | Age: 69
Discharge: HOME | End: 2019-03-26
Attending: RADIOLOGY
Payer: COMMERCIAL

## 2019-03-26 ENCOUNTER — HOSPITAL ENCOUNTER (OUTPATIENT)
Dept: INFUSION THERAPY | Age: 69
Discharge: HOME | End: 2019-03-26
Payer: COMMERCIAL

## 2019-03-26 VITALS
RESPIRATION RATE: 17 BRPM | TEMPERATURE: 97 F | SYSTOLIC BLOOD PRESSURE: 113 MMHG | HEART RATE: 75 BPM | DIASTOLIC BLOOD PRESSURE: 77 MMHG

## 2019-03-26 DIAGNOSIS — C34.91 PRIMARY LUNG CANCER WITH METASTASIS FROM LUNG TO OTHER SITE, RIGHT (HCC): Primary | ICD-10-CM

## 2019-03-26 PROCEDURE — 96360 HYDRATION IV INFUSION INIT: CPT

## 2019-03-26 PROCEDURE — 77412 RADIATION TX DELIVERY LVL 3: CPT | Performed by: RADIOLOGY

## 2019-03-26 PROCEDURE — 96361 HYDRATE IV INFUSION ADD-ON: CPT

## 2019-03-26 PROCEDURE — 77387 GUIDANCE FOR RADJ TX DLVR: CPT | Performed by: RADIOLOGY

## 2019-03-26 PROCEDURE — 6360000002 HC RX W HCPCS: Performed by: INTERNAL MEDICINE

## 2019-03-26 PROCEDURE — 2580000003 HC RX 258: Performed by: INTERNAL MEDICINE

## 2019-03-26 RX ORDER — SODIUM CHLORIDE 0.9 % (FLUSH) 0.9 %
20 SYRINGE (ML) INJECTION PRN
Status: CANCELLED | OUTPATIENT
Start: 2019-03-26

## 2019-03-26 RX ORDER — SODIUM CHLORIDE 0.9 % (FLUSH) 0.9 %
10 SYRINGE (ML) INJECTION PRN
Status: CANCELLED | OUTPATIENT
Start: 2019-03-26

## 2019-03-26 RX ORDER — HEPARIN SODIUM (PORCINE) LOCK FLUSH IV SOLN 100 UNIT/ML 100 UNIT/ML
500 SOLUTION INTRAVENOUS PRN
Status: CANCELLED | OUTPATIENT
Start: 2019-03-26

## 2019-03-26 RX ORDER — 0.9 % SODIUM CHLORIDE 0.9 %
1000 INTRAVENOUS SOLUTION INTRAVENOUS ONCE
Status: COMPLETED | OUTPATIENT
Start: 2019-03-26 | End: 2019-03-26

## 2019-03-26 RX ORDER — SODIUM CHLORIDE 0.9 % (FLUSH) 0.9 %
10 SYRINGE (ML) INJECTION PRN
Status: DISCONTINUED | OUTPATIENT
Start: 2019-03-26 | End: 2019-03-27 | Stop reason: HOSPADM

## 2019-03-26 RX ORDER — HEPARIN SODIUM (PORCINE) LOCK FLUSH IV SOLN 100 UNIT/ML 100 UNIT/ML
500 SOLUTION INTRAVENOUS PRN
Status: DISCONTINUED | OUTPATIENT
Start: 2019-03-26 | End: 2019-03-27 | Stop reason: HOSPADM

## 2019-03-26 RX ADMIN — Medication 10 ML: at 08:50

## 2019-03-26 RX ADMIN — Medication 10 ML: at 10:52

## 2019-03-26 RX ADMIN — Medication 10 ML: at 08:49

## 2019-03-26 RX ADMIN — SODIUM CHLORIDE 1000 ML: 9 INJECTION, SOLUTION INTRAVENOUS at 08:52

## 2019-03-26 RX ADMIN — Medication 500 UNITS: at 10:52

## 2019-03-26 RX ADMIN — Medication 10 ML: at 08:48

## 2019-03-26 RX ADMIN — Medication 10 ML: at 08:46

## 2019-03-27 ENCOUNTER — HOSPITAL ENCOUNTER (OUTPATIENT)
Dept: RADIATION ONCOLOGY | Age: 69
Discharge: HOME | End: 2019-03-27
Attending: RADIOLOGY
Payer: COMMERCIAL

## 2019-03-27 ENCOUNTER — TELEPHONE (OUTPATIENT)
Dept: ONCOLOGY | Age: 69
End: 2019-03-27

## 2019-03-27 PROCEDURE — 77412 RADIATION TX DELIVERY LVL 3: CPT | Performed by: RADIOLOGY

## 2019-03-27 PROCEDURE — 77387 GUIDANCE FOR RADJ TX DLVR: CPT | Performed by: RADIOLOGY

## 2019-03-28 ENCOUNTER — HOSPITAL ENCOUNTER (OUTPATIENT)
Dept: RADIATION ONCOLOGY | Age: 69
Discharge: HOME | End: 2019-03-28
Attending: RADIOLOGY
Payer: COMMERCIAL

## 2019-03-28 PROCEDURE — 77387 GUIDANCE FOR RADJ TX DLVR: CPT | Performed by: RADIOLOGY

## 2019-03-28 PROCEDURE — 77412 RADIATION TX DELIVERY LVL 3: CPT | Performed by: RADIOLOGY

## 2019-03-29 ENCOUNTER — HOSPITAL ENCOUNTER (OUTPATIENT)
Dept: RADIATION ONCOLOGY | Age: 69
Discharge: HOME | End: 2019-03-29
Attending: RADIOLOGY
Payer: COMMERCIAL

## 2019-03-29 PROCEDURE — 77387 GUIDANCE FOR RADJ TX DLVR: CPT | Performed by: RADIOLOGY

## 2019-03-29 PROCEDURE — 77412 RADIATION TX DELIVERY LVL 3: CPT | Performed by: RADIOLOGY

## 2019-04-01 ENCOUNTER — HOSPITAL ENCOUNTER (OUTPATIENT)
Dept: RADIATION ONCOLOGY | Age: 69
Discharge: HOME | End: 2019-04-01
Attending: RADIOLOGY
Payer: COMMERCIAL

## 2019-04-01 VITALS
WEIGHT: 227 LBS | SYSTOLIC BLOOD PRESSURE: 126 MMHG | TEMPERATURE: 97.7 F | BODY MASS INDEX: 31.66 KG/M2 | HEART RATE: 76 BPM | RESPIRATION RATE: 16 BRPM | DIASTOLIC BLOOD PRESSURE: 77 MMHG | OXYGEN SATURATION: 97 %

## 2019-04-01 DIAGNOSIS — C34.91 PRIMARY LUNG CANCER WITH METASTASIS FROM LUNG TO OTHER SITE, RIGHT (HCC): Primary | ICD-10-CM

## 2019-04-01 PROCEDURE — 77387 GUIDANCE FOR RADJ TX DLVR: CPT | Performed by: RADIOLOGY

## 2019-04-01 PROCEDURE — 77412 RADIATION TX DELIVERY LVL 3: CPT | Performed by: RADIOLOGY

## 2019-04-01 PROCEDURE — 77336 RADIATION PHYSICS CONSULT: CPT | Performed by: RADIOLOGY

## 2019-04-01 NOTE — PROGRESS NOTES
Radha Doss M.D. Nalini Ribera. Olivia Irwin, Ph.D., M.D., Harriett Mills M.D. Franki Doran, Ph.D., M.DIMA. Nolan Troncoso M.D. Date of Service: 2019    Location:  [address] [place] [address]   [facility], [facility]   [phone]     RADIATION ONCOLOGY WEEKLY PROGRESS NOTE    Patient ID:   Yudi Avila  : 1950   MRN: [id number]    Diagnosis:  Cancer Staging  Primary lung cancer with metastasis from lung to other site, right [facility])  Staging form: Lung, AJCC 8th Edition  - Clinical stage from 2019: Stage IIIA (cT4, cN1, cM0) - Signed by Radha Doss MD on 2019  Staging comments: PET/CT scan 2019 revealed 1.2 cm right upper lobe mass SUV 2.6, 2 cm right lower lobe mass SUV 5.4, 1.4 cm right hilar lymph node SUV 7.8, 1.1 cm right adrenal nodule no uptake, negative bone. 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 MRI brain and negative 2019.        Radiation Treatment Information:   Actual Dose: 3000 cGy  Total Planned Dose: 6000 cGy  Treatment Site: Right lower lobe, right upper lobe, hilum    IMAGING:   CBCT    CHEMOTHERAPY UPDATE:   Treatment Summary   Plan Name OP Small Cell Lung: CISplatin/Etoposide +/- Radiation, 21-Day Cycle   Treatment goal Curative   Provider Nadine Phan MD   Status Active   Start Date 3/12/2019   End Date 2019 (Planned)   Treatment plan weight/height/BSA Weight type: Documented (effective on 2019), 104.2 kg (entered on 2019), 180.3 cm (entered on 2/15/2019), BSA 2.28 m2   Most recent weight/height/BSA Weight: Weight: 227 lb (103 kg)    Height: Height: 5' 11\" (1.803 m)    BSA: BSA (Calculated - sq m): 2.29 sq meters      Treatment on Clinical Trial? [This plan is not part of a research study]   Reason for stopping treatment [Plan is still active]   Treatment Plan Medications alteplase (CATHFLO) injection 2 mg, 2 mg, Intracatheter, ONCE, 1 of 5 cycles    palonosetron (ALOXI) injection 0.25 mg, 0.25 mg, Intravenous, ONCE, 1 of 5 cycles  Administration: 0.25 mg (3/12/2019), 0.25 mg (3/19/2019)    fosaprepitant (EMEND) 150 mg in sodium chloride 0.9 % 150 mL IVPB, 150 mg, Intravenous, ONCE, 1 of 5 cycles  Administration: 150 mg (3/12/2019), 150 mg (3/19/2019)    hydrocortisone sodium succinate PF (SOLU-CORTEF) injection 100 mg, 100 mg, Intravenous, ONCE, 1 of 5 cycles    CISplatin (PLATINOL) 80 mg in sodium chloride 0.9 % 500 mL chemo IVPB, 80 mg, Intravenous, ONCE, 1 of 5 cycles  Administration: 80 mg (3/12/2019),  (3/19/2019)    etoposide (VEPESID) 172 mg in sodium chloride 0.9 % 500 mL chemo IVPB, 75 mg/m2 = 172 mg (100 % of original dose 75 mg/m2), Intravenous, ONCE, 1 of 5 cycles  Dose modification: 75 mg/m2 (original dose 75 mg/m2, Cycle 1)  Administration: 172 mg (3/12/2019), 172 mg (3/13/2019), 172 mg (3/14/2019)    dexamethasone (DECADRON) injection 10 mg, 10 mg (100 % of original dose 10 mg), Intravenous, ONCE, 1 of 5 cycles  Dose modification: 10 mg (original dose 10 mg, Cycle 1)  Administration: 10 mg (3/12/2019), 10 mg (3/13/2019), 10 mg (3/14/2019), 10 mg (3/19/2019)    methylPREDNISolone sodium (SOLU-MEDROL) injection 125 mg, 125 mg, Intravenous, ONCE, 1 of 5 cycles    famotidine (PEPCID) injection 20 mg, 20 mg, Intravenous, ONCE, 1 of 5 cycles           SUBJECTIVE: Emiliana James  continues to tolerate radiation therapy well. He did have dysphagia which is well controlled with a Magic mouthwash. He denies any cough, wheezing or shortness of breath. He has no chest pains or palpitation. He continues to tolerating normal diet.     OBJECTIVE:     Labs:  WBC   Date Value Ref Range Status   03/19/2019 4.6 3.5 - 11.0 k/uL Final     Segs Absolute   Date Value Ref Range Status   03/19/2019 3.60 1.8 - 7.7 k/uL Final     Hemoglobin   Date Value Ref Range Status   03/19/2019 12.7 (L) 13.5 - 17.5 g/dL Final     Platelets   Date Value Ref Range Status   2019 200 140 - 450 k/uL Final     Medications:    Current Outpatient Medications:     lidocaine-prilocaine (EMLA) 2.5-2.5 % cream, Apply topically as needed. Apply a quarter size amount to port site 1 hour before chemotherapy. Cover with plastic wrap., Disp: 30 g, Rfl: 0    ondansetron (ZOFRAN-ODT) 8 MG TBDP disintegrating tablet, Place 1 tablet under the tongue 3 times daily as needed for Nausea or Vomiting, Disp: 90 tablet, Rfl: 3    acetaminophen (TYLENOL) 325 MG tablet, Take 650 mg by mouth every 6 hours as needed for Pain, Disp: , Rfl:     allopurinol (ZYLOPRIM) 100 MG tablet, Take 100 mg by mouth daily, Disp: , Rfl:     simvastatin (ZOCOR) 40 MG tablet, Take 40 mg by mouth daily, Disp: , Rfl:     celecoxib (CELEBREX) 200 MG capsule, Take 200 mg by mouth 2 times daily, Disp: , Rfl: 3    lisinopril-hydrochlorothiazide (PRINZIDE;ZESTORETIC) 20-25 MG per tablet, Take 1 tablet by mouth daily, Disp: , Rfl:     Multiple Vitamins-Minerals (THERAPEUTIC MULTIVITAMIN-MINERALS) tablet, Take 1 tablet by mouth daily, Disp: , Rfl:     Pain Plan:  Patient is satisfied with current level of pain control       Patient Currently in Pain: No         Immunizations/Smoking Status: There is no immunization history on file for this patient.     Social History     Tobacco Use   Smoking Status Current Every Day Smoker    Packs/day: 0.50    Types: Cigarettes   Smokeless Tobacco Current User   Tobacco Comment    also vapes     Ready to quit: Not Answered  Counseling given: Not Answered  Comment: also vapes      PHYSICAL FINDINGS:    CHAPERONE: Not Required    ECO Asymptomatic      Vital Signs:  /77   Pulse 76   Temp 97.7 °F (36.5 °C) (Oral)   Resp 16   Wt 227 lb (103 kg)   SpO2 97%   BMI 31.66 kg/m²   Wt Readings from Last 5 Encounters:   19 227 lb (103 kg)   19 227 lb 9.6 oz (103.2 kg)   19 227 lb 11.2 oz (103.3 kg)   19 227 lb 12.8 oz (103.3 kg)   19

## 2019-04-01 NOTE — PROGRESS NOTES
Nikolay Sanders  4/1/2019  Wt Readings from Last 3 Encounters:   04/01/19 227 lb (103 kg)   03/25/19 227 lb 9.6 oz (103.2 kg)   03/19/19 227 lb 11.2 oz (103.3 kg)     Body mass index is 31.66 kg/m². Treatment Area: lung     Patient was seen today for weekly visit. Dr. Sabrina Aguayo notified and examined patient. Continue treatment as planned.      Comfort Alteration  Fatigue: None    Ventilation Alterations  Cough: No  Hemoptysis: No  Mucus Color: none  Dyspnea: No  O2 Sat: 97%    Nutritional Alteration  Anorexia: No  Nausea: No   Vomiting: No     Skin Alteration   Sensation: intact     Radiation Dermatitis:  None noted    Mucous Membrane Alteration  Voice Changes/ Stridor/Larynx: no  Pharynx & Esophagus: intact    Elimination Alterations  Constipation: no  Diarrhea:  no      Emotional  Coping: effective      Injury, potential bleeding or infection: none noted        Lab Results   Component Value Date    WBC 4.6 03/19/2019     03/19/2019         /77   Pulse 76   Temp 97.7 °F (36.5 °C) (Oral)   Resp 16   Wt 227 lb (103 kg)   SpO2 97%   BMI 31.66 kg/m²             Wilbern Bullion

## 2019-04-02 ENCOUNTER — HOSPITAL ENCOUNTER (OUTPATIENT)
Dept: RADIATION ONCOLOGY | Age: 69
Discharge: HOME | End: 2019-04-02
Attending: RADIOLOGY
Payer: COMMERCIAL

## 2019-04-02 ENCOUNTER — HOSPITAL ENCOUNTER (OUTPATIENT)
Dept: INFUSION THERAPY | Age: 69
Discharge: HOME | End: 2019-04-02
Payer: COMMERCIAL

## 2019-04-02 VITALS
DIASTOLIC BLOOD PRESSURE: 71 MMHG | HEART RATE: 75 BPM | HEIGHT: 71 IN | BODY MASS INDEX: 31.92 KG/M2 | WEIGHT: 228 LBS | TEMPERATURE: 97.5 F | RESPIRATION RATE: 16 BRPM | SYSTOLIC BLOOD PRESSURE: 110 MMHG

## 2019-04-02 DIAGNOSIS — C34.91 PRIMARY LUNG CANCER WITH METASTASIS FROM LUNG TO OTHER SITE, RIGHT (HCC): Primary | ICD-10-CM

## 2019-04-02 LAB
ABSOLUTE EOS #: 0.1 K/UL (ref 0–0.4)
ABSOLUTE IMMATURE GRANULOCYTE: ABNORMAL K/UL (ref 0–0.3)
ABSOLUTE LYMPH #: 0.71 K/UL (ref 1–4.8)
ABSOLUTE MONO #: 0.85 K/UL (ref 0.1–1.2)
ALBUMIN SERPL-MCNC: 4 G/DL (ref 3.5–5.2)
ALBUMIN/GLOBULIN RATIO: 1.3 (ref 1–2.5)
ALP BLD-CCNC: 72 U/L (ref 40–129)
ALT SERPL-CCNC: 15 U/L (ref 5–41)
ANION GAP SERPL CALCULATED.3IONS-SCNC: 12 MMOL/L (ref 9–17)
AST SERPL-CCNC: 13 U/L
BASOPHILS # BLD: 1 % (ref 0–2)
BASOPHILS ABSOLUTE: 0.03 K/UL (ref 0–0.2)
BILIRUB SERPL-MCNC: 0.18 MG/DL (ref 0.3–1.2)
BUN BLDV-MCNC: 22 MG/DL (ref 8–23)
BUN/CREAT BLD: ABNORMAL (ref 9–20)
CALCIUM SERPL-MCNC: 9.1 MG/DL (ref 8.6–10.4)
CHLORIDE BLD-SCNC: 102 MMOL/L (ref 98–107)
CO2: 23 MMOL/L (ref 20–31)
CREAT SERPL-MCNC: 1.83 MG/DL (ref 0.7–1.2)
DIFFERENTIAL TYPE: ABNORMAL
EOSINOPHILS RELATIVE PERCENT: 3 % (ref 1–4)
GFR AFRICAN AMERICAN: 45 ML/MIN
GFR NON-AFRICAN AMERICAN: 37 ML/MIN
GFR SERPL CREATININE-BSD FRML MDRD: ABNORMAL ML/MIN/{1.73_M2}
GFR SERPL CREATININE-BSD FRML MDRD: ABNORMAL ML/MIN/{1.73_M2}
GLUCOSE BLD-MCNC: 128 MG/DL (ref 70–99)
HCT VFR BLD CALC: 34.5 % (ref 41–53)
HEMOGLOBIN: 11.8 G/DL (ref 13.5–17.5)
IMMATURE GRANULOCYTES: ABNORMAL %
LYMPHOCYTES # BLD: 21 % (ref 24–44)
MAGNESIUM: 2.2 MG/DL (ref 1.6–2.6)
MCH RBC QN AUTO: 33 PG (ref 26–34)
MCHC RBC AUTO-ENTMCNC: 34.2 G/DL (ref 31–37)
MCV RBC AUTO: 96.4 FL (ref 80–100)
MONOCYTES # BLD: 25 % (ref 2–11)
MORPHOLOGY: NORMAL
NRBC AUTOMATED: ABNORMAL PER 100 WBC
PDW BLD-RTO: 14.2 % (ref 12.5–15.4)
PLATELET # BLD: 305 K/UL (ref 140–450)
PLATELET ESTIMATE: ABNORMAL
PMV BLD AUTO: 7.4 FL (ref 6–12)
POTASSIUM SERPL-SCNC: 4.5 MMOL/L (ref 3.7–5.3)
RBC # BLD: 3.57 M/UL (ref 4.5–5.9)
RBC # BLD: ABNORMAL 10*6/UL
SEG NEUTROPHILS: 50 % (ref 36–66)
SEGMENTED NEUTROPHILS ABSOLUTE COUNT: 1.71 K/UL (ref 1.8–7.7)
SODIUM BLD-SCNC: 137 MMOL/L (ref 135–144)
TOTAL PROTEIN: 7.2 G/DL (ref 6.4–8.3)
WBC # BLD: 3.4 K/UL (ref 3.5–11)
WBC # BLD: ABNORMAL 10*3/UL

## 2019-04-02 PROCEDURE — 77412 RADIATION TX DELIVERY LVL 3: CPT | Performed by: RADIOLOGY

## 2019-04-02 PROCEDURE — 83735 ASSAY OF MAGNESIUM: CPT

## 2019-04-02 PROCEDURE — 77387 GUIDANCE FOR RADJ TX DLVR: CPT | Performed by: RADIOLOGY

## 2019-04-02 PROCEDURE — 96375 TX/PRO/DX INJ NEW DRUG ADDON: CPT

## 2019-04-02 PROCEDURE — 80053 COMPREHEN METABOLIC PANEL: CPT

## 2019-04-02 PROCEDURE — 85025 COMPLETE CBC W/AUTO DIFF WBC: CPT

## 2019-04-02 PROCEDURE — 96365 THER/PROPH/DIAG IV INF INIT: CPT

## 2019-04-02 PROCEDURE — 96361 HYDRATE IV INFUSION ADD-ON: CPT

## 2019-04-02 PROCEDURE — 96360 HYDRATION IV INFUSION INIT: CPT

## 2019-04-02 PROCEDURE — 96376 TX/PRO/DX INJ SAME DRUG ADON: CPT

## 2019-04-02 PROCEDURE — 2580000003 HC RX 258: Performed by: INTERNAL MEDICINE

## 2019-04-02 PROCEDURE — 6360000002 HC RX W HCPCS: Performed by: INTERNAL MEDICINE

## 2019-04-02 RX ORDER — MAGNESIUM SULFATE 1 G/100ML
1 INJECTION INTRAVENOUS ONCE
Status: CANCELLED | OUTPATIENT
Start: 2019-04-09

## 2019-04-02 RX ORDER — HEPARIN SODIUM (PORCINE) LOCK FLUSH IV SOLN 100 UNIT/ML 100 UNIT/ML
500 SOLUTION INTRAVENOUS PRN
Status: CANCELLED | OUTPATIENT
Start: 2019-04-09

## 2019-04-02 RX ORDER — MAGNESIUM SULFATE 1 G/100ML
1 INJECTION INTRAVENOUS ONCE
Status: COMPLETED | OUTPATIENT
Start: 2019-04-02 | End: 2019-04-02

## 2019-04-02 RX ORDER — SODIUM CHLORIDE 0.9 % (FLUSH) 0.9 %
10 SYRINGE (ML) INJECTION PRN
Status: DISCONTINUED | OUTPATIENT
Start: 2019-04-02 | End: 2019-04-03 | Stop reason: HOSPADM

## 2019-04-02 RX ORDER — SODIUM CHLORIDE 0.9 % (FLUSH) 0.9 %
10 SYRINGE (ML) INJECTION PRN
Status: CANCELLED | OUTPATIENT
Start: 2019-04-09

## 2019-04-02 RX ORDER — SODIUM CHLORIDE 0.9 % (FLUSH) 0.9 %
5 SYRINGE (ML) INJECTION PRN
Status: CANCELLED | OUTPATIENT
Start: 2019-04-09

## 2019-04-02 RX ORDER — SODIUM CHLORIDE AND POTASSIUM CHLORIDE .9; .15 G/100ML; G/100ML
SOLUTION INTRAVENOUS ONCE
Status: COMPLETED | OUTPATIENT
Start: 2019-04-02 | End: 2019-04-02

## 2019-04-02 RX ORDER — SODIUM CHLORIDE 9 MG/ML
INJECTION, SOLUTION INTRAVENOUS CONTINUOUS
Status: CANCELLED | OUTPATIENT
Start: 2019-04-09

## 2019-04-02 RX ORDER — DEXAMETHASONE SODIUM PHOSPHATE 10 MG/ML
10 INJECTION, SOLUTION INTRAMUSCULAR; INTRAVENOUS ONCE
Status: CANCELLED | OUTPATIENT
Start: 2019-04-09

## 2019-04-02 RX ORDER — PALONOSETRON 0.05 MG/ML
0.25 INJECTION, SOLUTION INTRAVENOUS ONCE
Status: CANCELLED | OUTPATIENT
Start: 2019-04-09

## 2019-04-02 RX ORDER — DIPHENHYDRAMINE HYDROCHLORIDE 50 MG/ML
50 INJECTION INTRAMUSCULAR; INTRAVENOUS ONCE
Status: CANCELLED | OUTPATIENT
Start: 2019-04-09

## 2019-04-02 RX ORDER — SODIUM CHLORIDE 9 MG/ML
INJECTION, SOLUTION INTRAVENOUS ONCE
Status: COMPLETED | OUTPATIENT
Start: 2019-04-02 | End: 2019-04-02

## 2019-04-02 RX ORDER — SODIUM CHLORIDE 0.9 % (FLUSH) 0.9 %
20 SYRINGE (ML) INJECTION PRN
Status: CANCELLED | OUTPATIENT
Start: 2019-04-02

## 2019-04-02 RX ORDER — SODIUM CHLORIDE 0.9 % (FLUSH) 0.9 %
10 SYRINGE (ML) INJECTION PRN
Status: CANCELLED | OUTPATIENT
Start: 2019-04-02

## 2019-04-02 RX ORDER — EPINEPHRINE 1 MG/ML
0.3 INJECTION, SOLUTION, CONCENTRATE INTRAVENOUS PRN
Status: CANCELLED | OUTPATIENT
Start: 2019-04-09

## 2019-04-02 RX ORDER — HEPARIN SODIUM (PORCINE) LOCK FLUSH IV SOLN 100 UNIT/ML 100 UNIT/ML
500 SOLUTION INTRAVENOUS PRN
Status: CANCELLED | OUTPATIENT
Start: 2019-04-02

## 2019-04-02 RX ORDER — 0.9 % SODIUM CHLORIDE 0.9 %
10 VIAL (ML) INJECTION ONCE
Status: CANCELLED | OUTPATIENT
Start: 2019-04-09

## 2019-04-02 RX ORDER — HEPARIN SODIUM (PORCINE) LOCK FLUSH IV SOLN 100 UNIT/ML 100 UNIT/ML
500 SOLUTION INTRAVENOUS PRN
Status: DISCONTINUED | OUTPATIENT
Start: 2019-04-02 | End: 2019-04-03 | Stop reason: HOSPADM

## 2019-04-02 RX ORDER — SODIUM CHLORIDE 9 MG/ML
INJECTION, SOLUTION INTRAVENOUS ONCE
Status: CANCELLED | OUTPATIENT
Start: 2019-04-09

## 2019-04-02 RX ORDER — SODIUM CHLORIDE AND POTASSIUM CHLORIDE .9; .15 G/100ML; G/100ML
SOLUTION INTRAVENOUS ONCE
Status: CANCELLED | OUTPATIENT
Start: 2019-04-09

## 2019-04-02 RX ORDER — METHYLPREDNISOLONE SODIUM SUCCINATE 125 MG/2ML
125 INJECTION, POWDER, LYOPHILIZED, FOR SOLUTION INTRAMUSCULAR; INTRAVENOUS ONCE
Status: CANCELLED | OUTPATIENT
Start: 2019-04-09

## 2019-04-02 RX ADMIN — SODIUM CHLORIDE: 9 INJECTION, SOLUTION INTRAVENOUS at 08:48

## 2019-04-02 RX ADMIN — MAGNESIUM SULFATE HEPTAHYDRATE 1 G: 1 INJECTION, SOLUTION INTRAVENOUS at 08:49

## 2019-04-02 RX ADMIN — Medication 10 ML: at 10:57

## 2019-04-02 RX ADMIN — POTASSIUM CHLORIDE AND SODIUM CHLORIDE: 900; 150 INJECTION, SOLUTION INTRAVENOUS at 08:49

## 2019-04-02 RX ADMIN — Medication 500 UNITS: at 10:57

## 2019-04-02 NOTE — PROGRESS NOTES
Pt here for C2D1 Cisplatin and Etoposide. Denies any new complaints. Labs drawn from port and results reviewed. Notified Dr Beverlee Nyhan (Dr Lynn Rascon on vacation) of crt 1.83, ordered to hold tx for 1 week and finish hydration that had already been started. Pt/wife updated on plan of care. Pt was treated without incident and d/c'd in stable condition. Pt will return on 4-9-19 for C2D1.

## 2019-04-03 ENCOUNTER — HOSPITAL ENCOUNTER (OUTPATIENT)
Dept: RADIATION ONCOLOGY | Age: 69
Discharge: HOME | End: 2019-04-03
Attending: RADIOLOGY
Payer: COMMERCIAL

## 2019-04-03 PROCEDURE — 77412 RADIATION TX DELIVERY LVL 3: CPT | Performed by: RADIOLOGY

## 2019-04-03 PROCEDURE — 77387 GUIDANCE FOR RADJ TX DLVR: CPT | Performed by: RADIOLOGY

## 2019-04-04 ENCOUNTER — HOSPITAL ENCOUNTER (OUTPATIENT)
Dept: RADIATION ONCOLOGY | Age: 69
Discharge: HOME | End: 2019-04-04
Attending: RADIOLOGY
Payer: COMMERCIAL

## 2019-04-04 PROCEDURE — 77387 GUIDANCE FOR RADJ TX DLVR: CPT | Performed by: RADIOLOGY

## 2019-04-04 PROCEDURE — 77412 RADIATION TX DELIVERY LVL 3: CPT | Performed by: RADIOLOGY

## 2019-04-05 ENCOUNTER — HOSPITAL ENCOUNTER (OUTPATIENT)
Dept: RADIATION ONCOLOGY | Age: 69
Discharge: HOME | End: 2019-04-05
Attending: RADIOLOGY
Payer: COMMERCIAL

## 2019-04-05 PROCEDURE — 77412 RADIATION TX DELIVERY LVL 3: CPT | Performed by: RADIOLOGY

## 2019-04-05 PROCEDURE — 77387 GUIDANCE FOR RADJ TX DLVR: CPT | Performed by: RADIOLOGY

## 2019-04-08 ENCOUNTER — HOSPITAL ENCOUNTER (OUTPATIENT)
Dept: RADIATION ONCOLOGY | Age: 69
Discharge: HOME | End: 2019-04-08
Attending: RADIOLOGY
Payer: COMMERCIAL

## 2019-04-08 VITALS
TEMPERATURE: 97.5 F | RESPIRATION RATE: 16 BRPM | OXYGEN SATURATION: 99 % | BODY MASS INDEX: 32.06 KG/M2 | WEIGHT: 229 LBS | DIASTOLIC BLOOD PRESSURE: 76 MMHG | HEART RATE: 66 BPM | SYSTOLIC BLOOD PRESSURE: 134 MMHG

## 2019-04-08 DIAGNOSIS — C34.91 PRIMARY LUNG CANCER WITH METASTASIS FROM LUNG TO OTHER SITE, RIGHT (HCC): Primary | ICD-10-CM

## 2019-04-08 PROCEDURE — 77336 RADIATION PHYSICS CONSULT: CPT | Performed by: RADIOLOGY

## 2019-04-08 PROCEDURE — 77412 RADIATION TX DELIVERY LVL 3: CPT | Performed by: RADIOLOGY

## 2019-04-08 PROCEDURE — 77387 GUIDANCE FOR RADJ TX DLVR: CPT | Performed by: RADIOLOGY

## 2019-04-08 NOTE — PROGRESS NOTES
Xiao Ching  4/8/2019  Wt Readings from Last 3 Encounters:   04/02/19 228 lb (103.4 kg)   04/01/19 227 lb (103 kg)   03/25/19 227 lb 9.6 oz (103.2 kg)     There is no height or weight on file to calculate BMI. Treatment Area: lung    Patient was seen today for weekly visit. Comfort Alteration  Fatigue: Mild    Ventilation Alterations  Cough: Yes  Hemoptysis: No  Mucus Color: none  Dyspnea: No  O2 Sat: 99%    Nutritional Alteration  Anorexia: No  Nausea: No   Vomiting: No     Skin Alteration   Sensation: intact    Radiation Dermatitis:  None noted    Mucous Membrane Alteration  Voice Changes/ Stridor/Larynx: no  Pharynx & Esophagus: intact    Elimination Alterations  Constipation: no  Diarrhea:  no      Emotional  Coping: effective      Injury, potential bleeding or infection: none noted      Lab Results   Component Value Date    WBC 3.4 (L) 04/02/2019     04/02/2019         There were no vitals taken for this visit. Some pain in throat and swallowing. Dr. Althea Guillen notified and examined patient. Continue treatment as planned.              Jenny Ross

## 2019-04-08 NOTE — PROGRESS NOTES
Sophia Yu M.D. [facility]. Simeon Sidhu, Ph.D., M.D., Justin Howell M.D. Noemy Sheriff, Ph.D., M.D. Padmini Luu M.D. Date of Service: 2019    Location:  [address] [place] [address] [facility] [address], [facility] Dereje Reyes   [phone]     RADIATION ONCOLOGY WEEKLY PROGRESS NOTE    Patient ID:   Annemarie Mcgee  : 1950   MRN: [id number]    Diagnosis:  Cancer Staging  Primary lung cancer with metastasis from lung to other site, right [facility])  Staging form: Lung, AJCC 8th Edition  - Clinical stage from 2019: Stage IIIA (cT4, cN1, cM0) - Signed by Sophia Yu MD on 2019  Staging comments: PET/CT scan 2019 revealed 1.2 cm right upper lobe mass SUV 2.6, 2 cm right lower lobe mass SUV 5.4, 1.4 cm right hilar lymph node SUV 7.8, 1.1 cm right adrenal nodule no uptake, negative bone. 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 MRI brain and negative 2019.        Radiation Treatment Information:   Actual Dose: 4000 cGy  Total Planned Dose: 6000 cGy  Treatment Site: Right lower lobe, right upper lobe, hilum    IMAGING:   CBCT    CHEMOTHERAPY UPDATE:   Treatment Summary   Plan Name OP Small Cell Lung: CISplatin/Etoposide +/- Radiation, 21-Day Cycle   Treatment goal Curative   Provider Blayne Bynum MD   Status Active   Start Date 3/12/2019   End Date 2019 (Planned)   Treatment plan weight/height/BSA Weight type: Documented (effective on 2019), 104.2 kg (entered on 2019), 180.3 cm (entered on 2/15/2019), BSA 2.28 m2   Most recent weight/height/BSA Weight: Weight: 229 lb (103.9 kg)    Height: Height: 5' 10.87\" (1.8 m)    BSA: BSA (Calculated - sq m): 2.27 sq meters      Treatment on Clinical Trial? [This plan is not part of a research study]   Reason for stopping treatment [Plan is still active]   Treatment Plan Medications alteplase (CATHFLO) injection 2 mg, 2 mg, Intracatheter, ONCE, 2 of 5 cycles    palonosetron (ALOXI) injection 0.25 mg, 0.25 mg, Intravenous, ONCE, 2 of 5 cycles  Administration: 0.25 mg (3/12/2019), 0.25 mg (3/19/2019)    fosaprepitant (EMEND) 150 mg in sodium chloride 0.9 % 150 mL IVPB, 150 mg, Intravenous, ONCE, 2 of 5 cycles  Administration: 150 mg (3/12/2019), 150 mg (3/19/2019)    hydrocortisone sodium succinate PF (SOLU-CORTEF) injection 100 mg, 100 mg, Intravenous, ONCE, 2 of 5 cycles    CISplatin (PLATINOL) 80 mg in sodium chloride 0.9 % 500 mL chemo IVPB, 80 mg, Intravenous, ONCE, 2 of 5 cycles  Administration: 80 mg (3/12/2019),  (3/19/2019)    etoposide (VEPESID) 172 mg in sodium chloride 0.9 % 500 mL chemo IVPB, 75 mg/m2 = 172 mg (100 % of original dose 75 mg/m2), Intravenous, ONCE, 2 of 5 cycles  Dose modification: 75 mg/m2 (original dose 75 mg/m2, Cycle 1)  Administration: 172 mg (3/12/2019), 172 mg (3/13/2019), 172 mg (3/14/2019)    dexamethasone (DECADRON) injection 10 mg, 10 mg (100 % of original dose 10 mg), Intravenous, ONCE, 2 of 5 cycles  Dose modification: 10 mg (original dose 10 mg, Cycle 1)  Administration: 10 mg (3/12/2019), 10 mg (3/13/2019), 10 mg (3/14/2019), 10 mg (3/19/2019)    methylPREDNISolone sodium (SOLU-MEDROL) injection 125 mg, 125 mg, Intravenous, ONCE, 2 of 5 cycles    famotidine (PEPCID) injection 20 mg, 20 mg, Intravenous, ONCE, 2 of 5 cycles       Treatment Summary   Plan Name PORT AND    Treatment goal [No plan goal]   Provider Boo Harper MD   Status Active   Start Date [No treatment day found]   End Date 4/2/2019   Treatment plan weight/height/BSA [Plan weight, height, and BSA not specified]   Most recent weight/height/BSA Weight: Weight: 229 lb (103.9 kg)    Height: Height: 5' 10.87\" (1.8 m)    BSA: BSA (Calculated - sq m): 2.27 sq meters      Treatment on Clinical Trial? [This plan is not part of a research study]   Reason for stopping treatment [Plan is still active]   Treatment Plan Medications alteplase (CATHFLO) injection 2 mg, 2 mg, Intracatheter, ONCE, 1 of 1 cycle           SUBJECTIVE: Isidoro Pulido  continues to tolerate radiation therapy well. The patient has a mild dysphagia which he says is not causing any issues. He denies any chest pains or palpitation. He continues to tolerating normal diet. He has no cough, wheezing or shortness of breath. OBJECTIVE:     Labs:  WBC   Date Value Ref Range Status   04/02/2019 3.4 (L) 3.5 - 11.0 k/uL Final     Segs Absolute   Date Value Ref Range Status   04/02/2019 1.71 (L) 1.8 - 7.7 k/uL Final     Hemoglobin   Date Value Ref Range Status   04/02/2019 11.8 (L) 13.5 - 17.5 g/dL Final     Platelets   Date Value Ref Range Status   04/02/2019 305 140 - 450 k/uL Final     Medications:    Current Outpatient Medications:     lidocaine-prilocaine (EMLA) 2.5-2.5 % cream, Apply topically as needed. Apply a quarter size amount to port site 1 hour before chemotherapy. Cover with plastic wrap., Disp: 30 g, Rfl: 0    ondansetron (ZOFRAN-ODT) 8 MG TBDP disintegrating tablet, Place 1 tablet under the tongue 3 times daily as needed for Nausea or Vomiting, Disp: 90 tablet, Rfl: 3    acetaminophen (TYLENOL) 325 MG tablet, Take 650 mg by mouth every 6 hours as needed for Pain, Disp: , Rfl:     allopurinol (ZYLOPRIM) 100 MG tablet, Take 100 mg by mouth daily, Disp: , Rfl:     simvastatin (ZOCOR) 40 MG tablet, Take 40 mg by mouth daily, Disp: , Rfl:     celecoxib (CELEBREX) 200 MG capsule, Take 200 mg by mouth 2 times daily, Disp: , Rfl: 3    lisinopril-hydrochlorothiazide (PRINZIDE;ZESTORETIC) 20-25 MG per tablet, Take 1 tablet by mouth daily, Disp: , Rfl:     Multiple Vitamins-Minerals (THERAPEUTIC MULTIVITAMIN-MINERALS) tablet, Take 1 tablet by mouth daily, Disp: , Rfl:     Pain Plan:  None needed       Patient Currently in Pain: Yes         Immunizations/Smoking Status: There is no immunization history on file for this patient.     Social History     Tobacco Use Smoking Status Current Every Day Smoker    Packs/day: 0.50    Types: Cigarettes   Smokeless Tobacco Current User   Tobacco Comment    also vapes     Ready to quit: Not Answered  Counseling given: Not Answered  Comment: also vapes      PHYSICAL FINDINGS:    CHAPERONE: Not Required    ECO Asymptomatic      Vital Signs:  /76   Pulse 66   Temp 97.5 °F (36.4 °C) (Oral)   Resp 16   Wt 229 lb (103.9 kg)   SpO2 99%   BMI 32.06 kg/m²   Wt Readings from Last 5 Encounters:   19 229 lb (103.9 kg)   19 228 lb (103.4 kg)   19 227 lb (103 kg)   19 227 lb 9.6 oz (103.2 kg)   19 227 lb 11.2 oz (103.3 kg)     The patient is in no acute distress, oriented in time, person and place. Mucous membrane is pink, moist and nonicteric. Oral cavity demonstrates no lesion. Examination of the skin the skin of the chest reveals mildly erythema and hyperpigmentation. The skin is intact. ASSESSMENT PLAN: The patient continues to tolerating radiation therapy well. He has mild dysphagia which according the patient is not causing an issue. He did advise the patient indicated that this patient was that we can: Magic mouthwash. We'll continue radiation therapy as planned. The patient will continue moisturizers on the skin is recommended. Electronically signed by Nolan Troncoso MD on 2019 at 8:50 [address] [facility]     Drugs Prescribed:  New Prescriptions    No medications on file       Other Orders Placed:  No orders of the defined types were placed in this encounter.

## 2019-04-09 ENCOUNTER — OFFICE VISIT (OUTPATIENT)
Dept: ONCOLOGY | Age: 69
End: 2019-04-09
Payer: COMMERCIAL

## 2019-04-09 ENCOUNTER — HOSPITAL ENCOUNTER (OUTPATIENT)
Dept: RADIATION ONCOLOGY | Age: 69
Discharge: HOME | End: 2019-04-09
Attending: RADIOLOGY
Payer: COMMERCIAL

## 2019-04-09 ENCOUNTER — HOSPITAL ENCOUNTER (OUTPATIENT)
Dept: INFUSION THERAPY | Age: 69
Discharge: HOME | End: 2019-04-09
Payer: COMMERCIAL

## 2019-04-09 VITALS
SYSTOLIC BLOOD PRESSURE: 130 MMHG | HEIGHT: 71 IN | BODY MASS INDEX: 31.75 KG/M2 | RESPIRATION RATE: 16 BRPM | DIASTOLIC BLOOD PRESSURE: 87 MMHG | WEIGHT: 226.8 LBS | TEMPERATURE: 98 F | HEART RATE: 94 BPM

## 2019-04-09 VITALS
TEMPERATURE: 98 F | SYSTOLIC BLOOD PRESSURE: 142 MMHG | HEART RATE: 94 BPM | BODY MASS INDEX: 31.64 KG/M2 | DIASTOLIC BLOOD PRESSURE: 94 MMHG | WEIGHT: 226.85 LBS | RESPIRATION RATE: 16 BRPM

## 2019-04-09 DIAGNOSIS — C34.91 SMALL CELL CARCINOMA OF LUNG, RIGHT (HCC): Primary | ICD-10-CM

## 2019-04-09 DIAGNOSIS — C34.91 PRIMARY LUNG CANCER WITH METASTASIS FROM LUNG TO OTHER SITE, RIGHT (HCC): Primary | ICD-10-CM

## 2019-04-09 LAB
ABSOLUTE EOS #: 0.1 K/UL (ref 0–0.4)
ABSOLUTE IMMATURE GRANULOCYTE: ABNORMAL K/UL (ref 0–0.3)
ABSOLUTE LYMPH #: 0.5 K/UL (ref 1–4.8)
ABSOLUTE MONO #: 0.9 K/UL (ref 0.1–1.2)
ALBUMIN SERPL-MCNC: 4.1 G/DL (ref 3.5–5.2)
ALBUMIN/GLOBULIN RATIO: 1.2 (ref 1–2.5)
ALP BLD-CCNC: 71 U/L (ref 40–129)
ALT SERPL-CCNC: 19 U/L (ref 5–41)
ANION GAP SERPL CALCULATED.3IONS-SCNC: 9 MMOL/L (ref 9–17)
AST SERPL-CCNC: 17 U/L
BASOPHILS # BLD: 0 % (ref 0–2)
BASOPHILS ABSOLUTE: 0 K/UL (ref 0–0.2)
BILIRUB SERPL-MCNC: 0.16 MG/DL (ref 0.3–1.2)
BUN BLDV-MCNC: 20 MG/DL (ref 8–23)
BUN/CREAT BLD: ABNORMAL (ref 9–20)
CALCIUM SERPL-MCNC: 9.4 MG/DL (ref 8.6–10.4)
CHLORIDE BLD-SCNC: 102 MMOL/L (ref 98–107)
CO2: 24 MMOL/L (ref 20–31)
CREAT SERPL-MCNC: 1.63 MG/DL (ref 0.7–1.2)
DIFFERENTIAL TYPE: ABNORMAL
EOSINOPHILS RELATIVE PERCENT: 2 % (ref 1–4)
GFR AFRICAN AMERICAN: 51 ML/MIN
GFR NON-AFRICAN AMERICAN: 42 ML/MIN
GFR SERPL CREATININE-BSD FRML MDRD: ABNORMAL ML/MIN/{1.73_M2}
GFR SERPL CREATININE-BSD FRML MDRD: ABNORMAL ML/MIN/{1.73_M2}
GLUCOSE BLD-MCNC: 136 MG/DL (ref 70–99)
HCT VFR BLD CALC: 35.5 % (ref 41–53)
HEMOGLOBIN: 12.1 G/DL (ref 13.5–17.5)
IMMATURE GRANULOCYTES: ABNORMAL %
LYMPHOCYTES # BLD: 12 % (ref 24–44)
MAGNESIUM: 2 MG/DL (ref 1.6–2.6)
MCH RBC QN AUTO: 32.8 PG (ref 26–34)
MCHC RBC AUTO-ENTMCNC: 34 G/DL (ref 31–37)
MCV RBC AUTO: 96.3 FL (ref 80–100)
MONOCYTES # BLD: 20 % (ref 2–11)
NRBC AUTOMATED: ABNORMAL PER 100 WBC
PDW BLD-RTO: 15.1 % (ref 12.5–15.4)
PLATELET # BLD: 246 K/UL (ref 140–450)
PLATELET ESTIMATE: ABNORMAL
PMV BLD AUTO: 7 FL (ref 6–12)
POTASSIUM SERPL-SCNC: 4.7 MMOL/L (ref 3.7–5.3)
RBC # BLD: 3.68 M/UL (ref 4.5–5.9)
RBC # BLD: ABNORMAL 10*6/UL
SEG NEUTROPHILS: 66 % (ref 36–66)
SEGMENTED NEUTROPHILS ABSOLUTE COUNT: 2.9 K/UL (ref 1.8–7.7)
SODIUM BLD-SCNC: 135 MMOL/L (ref 135–144)
TOTAL PROTEIN: 7.4 G/DL (ref 6.4–8.3)
WBC # BLD: 4.4 K/UL (ref 3.5–11)
WBC # BLD: ABNORMAL 10*3/UL

## 2019-04-09 PROCEDURE — 80053 COMPREHEN METABOLIC PANEL: CPT

## 2019-04-09 PROCEDURE — 77412 RADIATION TX DELIVERY LVL 3: CPT | Performed by: RADIOLOGY

## 2019-04-09 PROCEDURE — 36591 DRAW BLOOD OFF VENOUS DEVICE: CPT

## 2019-04-09 PROCEDURE — 77387 GUIDANCE FOR RADJ TX DLVR: CPT | Performed by: RADIOLOGY

## 2019-04-09 PROCEDURE — 96361 HYDRATE IV INFUSION ADD-ON: CPT

## 2019-04-09 PROCEDURE — 85025 COMPLETE CBC W/AUTO DIFF WBC: CPT

## 2019-04-09 PROCEDURE — 96360 HYDRATION IV INFUSION INIT: CPT

## 2019-04-09 PROCEDURE — 99214 OFFICE O/P EST MOD 30 MIN: CPT | Performed by: INTERNAL MEDICINE

## 2019-04-09 PROCEDURE — 83735 ASSAY OF MAGNESIUM: CPT

## 2019-04-09 PROCEDURE — 2580000003 HC RX 258: Performed by: INTERNAL MEDICINE

## 2019-04-09 PROCEDURE — 6360000002 HC RX W HCPCS: Performed by: INTERNAL MEDICINE

## 2019-04-09 RX ORDER — HYDROCODONE BITARTRATE AND ACETAMINOPHEN 5; 325 MG/1; MG/1
1 TABLET ORAL EVERY 6 HOURS PRN
Qty: 28 TABLET | Refills: 0 | Status: SHIPPED | OUTPATIENT
Start: 2019-04-09 | End: 2019-04-16

## 2019-04-09 RX ORDER — SODIUM CHLORIDE 0.9 % (FLUSH) 0.9 %
10 SYRINGE (ML) INJECTION PRN
Status: DISCONTINUED | OUTPATIENT
Start: 2019-04-09 | End: 2019-04-10 | Stop reason: HOSPADM

## 2019-04-09 RX ORDER — HEPARIN SODIUM (PORCINE) LOCK FLUSH IV SOLN 100 UNIT/ML 100 UNIT/ML
500 SOLUTION INTRAVENOUS PRN
Status: DISCONTINUED | OUTPATIENT
Start: 2019-04-09 | End: 2019-04-10 | Stop reason: HOSPADM

## 2019-04-09 RX ORDER — 0.9 % SODIUM CHLORIDE 0.9 %
1000 INTRAVENOUS SOLUTION INTRAVENOUS ONCE
Status: COMPLETED | OUTPATIENT
Start: 2019-04-09 | End: 2019-04-09

## 2019-04-09 RX ADMIN — Medication 10 ML: at 11:08

## 2019-04-09 RX ADMIN — Medication 500 UNITS: at 11:08

## 2019-04-09 RX ADMIN — SODIUM CHLORIDE 1000 ML: 9 INJECTION, SOLUTION INTRAVENOUS at 08:55

## 2019-04-09 NOTE — PROGRESS NOTES
Pt here for C.2D.1 Cisplatin/Etoposide. Pt seen by Dr. Kayode Agarwal today. Labs drawn from port and results reviewed. Creat=1.63. Orders rec'd to hold chemo today and give 1 L NS bolus over 2 hrs. New chemo orders written, Cisplatin d/c'd and Carbo ordered. Pt was treated without incident and d/c'd in stable condition. Pt will return on 4/16/19 to see Dr. Kayode Agarwal with possible tx to follow.

## 2019-04-09 NOTE — PROGRESS NOTES
Ayla Barba                                                                                                                  4/9/2019  MRN:   [id number]  YOB: 1950  PCP:                           Magdalena Lopes MD  Referring Physician: No ref. 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 CT scan was read as lung rads degree 4B. Subsequently she underwent biopsy of right upper lobe lung nodule on 1/16/19. 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 CT PET done showed abnormal FDG uptake in the right upper lobe nodule. There was also abnormal FDG uptake in the right lower lobe nodule. Additionally right hilar lymph node were also FDG avid. No bony lesion was reported. MRI brain for staging workup did not show any evidence of intracranial metastasis. Tip of the odontoid process was ill-defined and metastasis could not be ruled out. Clinically patient does give history of trauma to the neck area any years ago however denies any surgery history. Bone scan did not reveal any metastasis     Patient continues to smoke but has cut down quite a bit. He works full-time in a [employer]. Patient has good performance status, ECOG 0.   Patient's other medical problems include dyslipidemia and hypertension. He also has arthritis pain    Started patient on concurrent chemoradiation using cisplatin and etoposide with dose adjustment for renal dysfunction    Interim History:    Patient presents to the clinic for a follow-up visit and for toxicity check. Previously patient's 2nd  Cycle  Was postponed due to nephrotoxicity. Kidney function has improved but still not back to baseline. Patient denies any bleeding or fevers. Appetite is stable. Denies neuropathy. Denies weight loss. Continues to tolerate radiation therapy without unexpected a severe side effects. During this visit patient's allergy, social, medical, surgical history and medications were reviewed and updated. Past Medical History:   Past Medical History:   Diagnosis Date    DDD (degenerative disc disease), cervical     Gout     Heart murmur     hx. of when younger.  Hyperlipidemia     Hypertension     Lung cancer ([address]     right lung    MI [facility] infarction) [facility] [address]     Skin cancer     face    Wears glasses        Past Surgical History:     Past Surgical History:   Procedure Laterality Date    APPENDECTOMY      CARDIAC CATHETERIZATION      w/stent    CAROTID STENT PLACEMENT      COLONOSCOPY      FOOT SURGERY Right     2nd toe(bone spur).  SKIN BIOPSY      SKIN BIOPSY      face under lt. eye.     TONSILLECTOMY         Patient Family Social History:    Family History   Problem Relation Age of Onset    Cancer Father         lung cancer      Social History     Socioeconomic History    Marital status:      Spouse name: Not on file    Number of children: Not on file    Years of education: Not on file    Highest education level: Not on file   Occupational History    Not on file   Social Needs    Financial resource strain: Not on file    Food insecurity:     Worry: Not on file     Inability: Not on file    Transportation needs:     Medical: Not on file     Non-medical: Not on file   Tobacco Use    Smoking status: Current Every Day Smoker     Packs/day: 0.50     Types: Cigarettes    Smokeless tobacco: Current User    Tobacco comment: also vapes   Substance and Sexual Activity    Alcohol use: No    Drug use: Yes     Types: Marijuana    Sexual activity: Not on file   Lifestyle    Physical activity:     Days per week: Not on file     Minutes per session: Not on file    Stress: Not on file   Relationships    Social connections:     Talks on phone: Not on file     Gets together: Not on file     Attends Jain service: Not on file     Active member of club or organization: Not on file     Attends meetings of clubs or organizations: Not on file     Relationship status: Not on file    Intimate partner violence:     Fear of current or ex partner: Not on file     Emotionally abused: Not on file     Physically abused: Not on file     Forced sexual activity: Not on file   Other Topics Concern    Not on file   Social History Narrative    Not on file      Current Medications:     Current Outpatient Medications   Medication Sig Dispense Refill    HYDROcodone-acetaminophen (NORCO) 5-325 MG per tablet Take 1 tablet by mouth every 6 hours as needed for Pain for up to 7 days. Intended supply: 7 days. Take lowest dose possible to manage pain 28 tablet 0    Magic Mouthwash (MIRACLE MOUTHWASH) Swish and spit 5 mLs 4 times daily as needed for Irritation      lidocaine-prilocaine (EMLA) 2.5-2.5 % cream Apply topically as needed. Apply a quarter size amount to port site 1 hour before chemotherapy. Cover with plastic wrap.  30 g 0    ondansetron (ZOFRAN-ODT) 8 MG TBDP disintegrating tablet Place 1 tablet under the tongue 3 times daily as needed for Nausea or Vomiting 90 tablet 3    allopurinol (ZYLOPRIM) 100 MG tablet Take 100 mg by mouth daily      simvastatin (ZOCOR) 40 MG tablet Take 40 mg by mouth daily      celecoxib (CELEBREX) 200 MG capsule Take 200 mg by mouth 2 times daily  3  lisinopril-hydrochlorothiazide (PRINZIDE;ZESTORETIC) 20-25 MG per tablet Take 1 tablet by mouth daily      Multiple Vitamins-Minerals (THERAPEUTIC MULTIVITAMIN-MINERALS) tablet Take 1 tablet by mouth daily      acetaminophen (TYLENOL) 325 MG tablet Take 650 mg by mouth every 6 hours as needed for Pain       No current facility-administered medications for this visit. Facility-Administered Medications Ordered in Other Visits   Medication Dose Route Frequency Provider Last Rate Last Dose    sodium chloride flush 0.9 % injection 10 mL  10 mL Intravenous PRN Temitope Soto MD   10 mL at 04/09/19 1108    heparin flush 100 UNIT/ML injection 500 Units  500 Units Intercatheter PRN Temitope Soto MD   500 Units at 04/09/19 1108       Allergies:   Patient has no known allergies. Review of Systems:    Constitutional: No fever or chills. No night sweats, no weight loss . Positive fatigue  Eyes: No eye discharge, double vision, or eye pain   HEENT: negative for sore mouth, sore throat, hoarseness and voice change   Respiratory: negative for cough , sputum, dyspnea, wheezing, hemoptysis, chest pain   Cardiovascular: negative for chest pain, dyspnea, palpitations, orthopnea, PND   Gastrointestinal: negative for nausea, vomiting, diarrhea, constipation, abdominal pain, Dysphagia, hematemesis and hematochezia   Genitourinary: negative for frequency, dysuria, nocturia, urinary incontinence, and hematuria   Integument: negative for rash, skin lesions, bruises. Hematologic/Lymphatic: negative for easy bruising, bleeding, lymphadenopathy, or petechiae   Endocrine: negative for heat or cold intolerance,weight changes, change in bowel habits and hair loss   Musculoskeletal: negative for myalgias, arthralgias.   Positive for chronic back pain  Neurological: negative for headaches, dizziness, seizures, weakness, numbness        Physical Exam:    Vitals: BP (!) 142/94 (Site: Left Upper Arm, Position: Sitting, Cuff Size: lung.    IMAGING DATA:         Impression   1.  FDG avid irregular nodule in the right upper lobe.  Its morphologic   features are suspicious for neoplastic disease versus an inflammatory process.       2.  FDG avid peripheral opacity in the superior segment of the right lower   lobe with morphologic features that can be seen with infection or neoplastic   disease.       3.  FDG avid right hilar lymph nodes, equivocal for inflammatory versus   neoplastic etiology.       4.  Benign right adrenal adenoma.       In the absence of prior imaging for comparison, short-term contrast-enhanced   CT follow-up may be warranted to further delineate acute inflammatory disease   versus multifocal neoplastic disease.                   2018 December  [id number]    Impression:    Lung Rads Category 4B    There are 2 suspicious nodules in the right lung, one in the right apex and an even larger lesion in the superior segment of the right lower lobe.  PET CT scanning recommended for further characterization.  Please see above for details. This report contains a significant result and/or recommendation, which requires the attention of the licensed caregiver responsible for this patient. Therefore, I specifically designated this report be telephoned by the radiology department. Findings were instructed to be called to the clinical service on December 8, 2018 at 10:18 AM hours.        MR brain with and without contrast2/11/2019  [facility]  Result Narrative   Clinical history: Right lung cancer.  Evaluation for metastatic disease. TECHNIQUE:    Multiplanar multisequence imaging of the brain was performed before and after the intravenous administration of 20 mL MultiHance gadolinium contrast material.  There are no prior imaging studies of the brain for comparison.     FINDINGS:    On the T1-weighted sagittal images the tip of the odontoid is ill-defined.  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 toxicity check performed. Patient continues to tolerate radiation therapy without much side effects. Patient renal function is still elevated. I do not think patient can receive cisplatin at this point. Reviewed data regarding carboplatin substituted region cisplatin. We will postpone treatment for a week and proceed with carboplatin and etoposide. Reviewed potential side effects. Reviewed risk benefit profile   We will see patient back in office with cycle 2 day 1. NCCN guidelines were reviewed and discussed with the patient. The diagnosis and care plan were discussed with the patient in detail. I discussed the natural history of the disease, prognosis, risks and goals of therapy and answered all the patients questions to the best of my ability. Patient expressed understanding and was in agreement. Kingsley Hoyt        I spent more than 25 minutes examining, evaluating, reviewing data, counseling the patient and coordinating care. Greater than 50% of time was spent face-to-face with the patient  This note is created with the assistance of a speech recognition program.  While intending to generate a document that actually reflects the content of the visit, the document can still have some errors including those of syntax and sound a like substitutions which may escape proof reading. It such instances, actual meaning can be extrapolated by contextual diversion.

## 2019-04-10 ENCOUNTER — HOSPITAL ENCOUNTER (OUTPATIENT)
Dept: RADIATION ONCOLOGY | Age: 69
Discharge: HOME | End: 2019-04-10
Attending: RADIOLOGY
Payer: COMMERCIAL

## 2019-04-10 PROCEDURE — 77387 GUIDANCE FOR RADJ TX DLVR: CPT | Performed by: RADIOLOGY

## 2019-04-10 PROCEDURE — 77412 RADIATION TX DELIVERY LVL 3: CPT | Performed by: RADIOLOGY

## 2019-04-11 ENCOUNTER — HOSPITAL ENCOUNTER (OUTPATIENT)
Dept: RADIATION ONCOLOGY | Age: 69
Discharge: HOME | End: 2019-04-11
Attending: RADIOLOGY
Payer: COMMERCIAL

## 2019-04-11 PROCEDURE — 77387 GUIDANCE FOR RADJ TX DLVR: CPT | Performed by: RADIOLOGY

## 2019-04-11 PROCEDURE — 77412 RADIATION TX DELIVERY LVL 3: CPT | Performed by: RADIOLOGY

## 2019-04-12 ENCOUNTER — HOSPITAL ENCOUNTER (OUTPATIENT)
Dept: RADIATION ONCOLOGY | Age: 69
Discharge: HOME | End: 2019-04-12
Attending: RADIOLOGY
Payer: COMMERCIAL

## 2019-04-12 PROCEDURE — 77412 RADIATION TX DELIVERY LVL 3: CPT | Performed by: RADIOLOGY

## 2019-04-12 PROCEDURE — 77387 GUIDANCE FOR RADJ TX DLVR: CPT | Performed by: RADIOLOGY

## 2019-04-15 ENCOUNTER — HOSPITAL ENCOUNTER (OUTPATIENT)
Dept: RADIATION ONCOLOGY | Age: 69
Discharge: HOME | End: 2019-04-15
Attending: RADIOLOGY
Payer: COMMERCIAL

## 2019-04-15 ENCOUNTER — TELEPHONE (OUTPATIENT)
Dept: ONCOLOGY | Age: 69
End: 2019-04-15

## 2019-04-15 VITALS
RESPIRATION RATE: 16 BRPM | TEMPERATURE: 97.7 F | DIASTOLIC BLOOD PRESSURE: 83 MMHG | BODY MASS INDEX: 31.66 KG/M2 | WEIGHT: 227 LBS | HEART RATE: 87 BPM | SYSTOLIC BLOOD PRESSURE: 146 MMHG | OXYGEN SATURATION: 97 %

## 2019-04-15 PROCEDURE — 77336 RADIATION PHYSICS CONSULT: CPT | Performed by: RADIOLOGY

## 2019-04-15 PROCEDURE — 77387 GUIDANCE FOR RADJ TX DLVR: CPT | Performed by: RADIOLOGY

## 2019-04-15 PROCEDURE — 77412 RADIATION TX DELIVERY LVL 3: CPT | Performed by: RADIOLOGY

## 2019-04-15 NOTE — TELEPHONE ENCOUNTER
Chemo orders received, 71\"  Ht, 227lbs wt, and 2.27 bsa verified.    Dose of chemotherapy verified  Carboplatin AUC 5 D1  Etoposide 100mg/t3=989lu Day 1,2,3  21 day cycle  1 of 4 cycles  MJ

## 2019-04-15 NOTE — PROGRESS NOTES
Sophia Yu M.D. [facility]. Simeon Sidhu, Ph.D., M.D., Justin Howell M.D. Noemy Sheriff, Ph.D., M.D. Padmini Luu M.D. Date of Service: 4/15/2019    Location:  [address] [place] [address],   [address] [facility]   [phone]     RADIATION ONCOLOGY WEEKLY PROGRESS NOTE    Patient ID:   Annemarie Mcgee  : 1950   MRN: [id number]    Diagnosis:  Cancer Staging  Primary lung cancer with metastasis from lung to other site, right [facility])  Staging form: Lung, AJCC 8th Edition  - Clinical stage from 2019: Stage IIIA (cT4, cN1, cM0) - Signed by Sophia Yu MD on 2019  Staging comments: PET/CT scan 2019 revealed 1.2 cm right upper lobe mass SUV 2.6, 2 cm right lower lobe mass SUV 5.4, 1.4 cm right hilar lymph node SUV 7.8, 1.1 cm right adrenal nodule no uptake, negative bone. 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 MRI brain and negative 2019.        Radiation Treatment Information:   Actual Dose: 5,000 cGy  Total Planned Dose: 6,000 cGy  Treatment Site: right lower/upper lobe lung, hilar nodes    IMAGING:   CBCT    CHEMOTHERAPY UPDATE:   Treatment Summary   Plan Name OP Small Cell Lung: CISplatin/Etoposide +/- Radiation, 21-Day Cycle   Treatment goal Curative   Provider Blayne Bynum MD   Status Inactive   Start Date 3/12/2019   End Date 2019   Treatment plan weight/height/BSA Weight type: Documented (effective on 2019), 104.2 kg (entered on 2019), 180.3 cm (entered on 2/15/2019), BSA 2.28 m2   Most recent weight/height/BSA Weight: Weight: 227 lb (103 kg)    Height: Height: 5' 11\" (1.803 m)    BSA: BSA (Calculated - sq m): 2.27 sq meters      Treatment on Clinical Trial? [This plan is not part of a research study]   Reason for stopping treatment Not Tolerated   Treatment Plan Medications alteplase (CATHFLO) injection 2 mg, 2 mg, Intracatheter, ONCE, 2 of 5 cycles    palonosetron (ALOXI) injection 0.25 mg, 0.25 mg, Intravenous, ONCE, 2 of 5 cycles  Administration: 0.25 mg (3/12/2019), 0.25 mg (3/19/2019)    fosaprepitant (EMEND) 150 mg in sodium chloride 0.9 % 150 mL IVPB, 150 mg, Intravenous, ONCE, 2 of 5 cycles  Administration: 150 mg (3/12/2019), 150 mg (3/19/2019)    hydrocortisone sodium succinate PF (SOLU-CORTEF) injection 100 mg, 100 mg, Intravenous, ONCE, 2 of 5 cycles    CISplatin (PLATINOL) 80 mg in sodium chloride 0.9 % 500 mL chemo IVPB, 80 mg, Intravenous, ONCE, 2 of 5 cycles  Administration: 80 mg (3/12/2019),  (3/19/2019)    etoposide (VEPESID) 172 mg in sodium chloride 0.9 % 500 mL chemo IVPB, 75 mg/m2 = 172 mg (100 % of original dose 75 mg/m2), Intravenous, ONCE, 2 of 5 cycles  Dose modification: 75 mg/m2 (original dose 75 mg/m2, Cycle 1)  Administration: 172 mg (3/12/2019), 172 mg (3/13/2019), 172 mg (3/14/2019)    dexamethasone (DECADRON) injection 10 mg, 10 mg (100 % of original dose 10 mg), Intravenous, ONCE, 2 of 5 cycles  Dose modification: 10 mg (original dose 10 mg, Cycle 1)  Administration: 10 mg (3/12/2019), 10 mg (3/13/2019), 10 mg (3/14/2019), 10 mg (3/19/2019)    methylPREDNISolone sodium (SOLU-MEDROL) injection 125 mg, 125 mg, Intravenous, ONCE, 2 of 5 cycles    famotidine (PEPCID) injection 20 mg, 20 mg, Intravenous, ONCE, 2 of 5 cycles       Treatment Summary   Plan Name PORT AND    Treatment goal [No plan goal]   Provider Benton Brown MD   Status Active   Start Date [No treatment day found]   End Date 4/2/2019   Treatment plan weight/height/BSA [Plan weight, height, and BSA not specified]   Most recent weight/height/BSA Weight: Weight: 227 lb (103 kg)    Height: Height: 5' 11\" (1.803 m)    BSA: BSA (Calculated - sq m): 2.27 sq meters      Treatment on Clinical Trial? [This plan is not part of a research study]   Reason for stopping treatment [Plan is still active]   Treatment Plan Medications alteplase (CATHFLO) He is nearing completion of his external beam radiation and overall doing quite well, noting mild fatigue. Occasional constipation is relieved increase in his fluid consumption; he admits to occasionally smoking marijuana to improve his appetite. OBJECTIVE:     Labs:  WBC   Date Value Ref Range Status   04/09/2019 4.4 3.5 - 11.0 k/uL Final     Segs Absolute   Date Value Ref Range Status   04/09/2019 2.90 1.8 - 7.7 k/uL Final     Hemoglobin   Date Value Ref Range Status   04/09/2019 12.1 (L) 13.5 - 17.5 g/dL Final     Platelets   Date Value Ref Range Status   04/09/2019 246 140 - 450 k/uL Final   .Medications:    Current Outpatient Medications:     HYDROcodone-acetaminophen (NORCO) 5-325 MG per tablet, Take 1 tablet by mouth every 6 hours as needed for Pain for up to 7 days. Intended supply: 7 days. Take lowest dose possible to manage pain, Disp: 28 tablet, Rfl: 0    Magic Mouthwash (MIRACLE MOUTHWASH), Swish and spit 5 mLs 4 times daily as needed for Irritation, Disp: , Rfl:     lidocaine-prilocaine (EMLA) 2.5-2.5 % cream, Apply topically as needed. Apply a quarter size amount to port site 1 hour before chemotherapy.   Cover with plastic wrap., Disp: 30 g, Rfl: 0    ondansetron (ZOFRAN-ODT) 8 MG TBDP disintegrating tablet, Place 1 tablet under the tongue 3 times daily as needed for Nausea or Vomiting, Disp: 90 tablet, Rfl: 3    acetaminophen (TYLENOL) 325 MG tablet, Take 650 mg by mouth every 6 hours as needed for Pain, Disp: , Rfl:     allopurinol (ZYLOPRIM) 100 MG tablet, Take 100 mg by mouth daily, Disp: , Rfl:     simvastatin (ZOCOR) 40 MG tablet, Take 40 mg by mouth daily, Disp: , Rfl:     celecoxib (CELEBREX) 200 MG capsule, Take 200 mg by mouth 2 times daily, Disp: , Rfl: 3    lisinopril-hydrochlorothiazide (PRINZIDE;ZESTORETIC) 20-25 MG per tablet, Take 1 tablet by mouth daily, Disp: , Rfl:     Multiple Vitamins-Minerals (THERAPEUTIC MULTIVITAMIN-MINERALS) tablet, Take 1 tablet by mouth daily, Disp: , Rfl:     Pain Plan:  None needed       Patient Currently in Pain: Denies         Immunizations/Smoking Status: There is no immunization history on file for this patient. Social History     Tobacco Use   Smoking Status Current Every Day Smoker    Packs/day: 0.50    Types: Cigarettes   Smokeless Tobacco Current User   Tobacco Comment    also vapes     Ready to quit: Not Answered  Counseling given: Not Answered  Comment: also vapes      PHYSICAL FINDINGS:    CHAPERONE: Declined    ECO Asymptomatic      Vital Signs:  BP (!) 146/83   Pulse 87   Temp 97.7 °F (36.5 °C) (Oral)   Resp 16   Wt 227 lb (103 kg)   SpO2 97%   BMI 31.66 kg/m²   Wt Readings from Last 5 Encounters:   04/15/19 227 lb (103 kg)   19 226 lb 13.7 oz (102.9 kg)   19 226 lb 12.8 oz (102.9 kg)   19 229 lb (103.9 kg)   19 228 lb (103.4 kg)     There is no skin irritation within the radiation treatment portals, oral mucosa is pink and moist without visible lesion or thrush. His lungs are clear to auscultation, heart regular rate and rhythm, good skin turgor is present. Ambulates without unilateral weakness. ASSESSMENT PLAN:   Tolerating combined modality chemotherapy and radiation fairly well. A change in chemotherapy regimen is noted. We will continue to monitor for the potential acute toxicities of combined modality treatment including cytopenia, failure to thrive, esophagitis, mucositis. Treatment continues as prescribed. Electronically signed by Malia Craven MD on 4/15/2019 at 8:28 [address] [facility]     Drugs Prescribed:  New Prescriptions    No medications on file       Other Orders Placed:  No orders of the defined types were placed in this encounter.

## 2019-04-15 NOTE — PROGRESS NOTES
Sen Harris  4/15/2019  Wt Readings from Last 3 Encounters:   04/15/19 227 lb (103 kg)   04/09/19 226 lb 13.7 oz (102.9 kg)   04/09/19 226 lb 12.8 oz (102.9 kg)     Body mass index is 31.66 kg/m². Treatment Area:lung     Patient was seen today for weekly visit. Comfort Alteration  Fatigue: Mild    Ventilation Alterations  Cough: Yes  Hemoptysis: No  Mucus Color: none   Dyspnea: Yes  O2 Sat: 97%    Nutritional Alteration  Anorexia: No  Nausea: No   Vomiting: No     Skin Alteration   Sensation:intact     Radiation Dermatitis:  None     Mucous Membrane Alteration  Voice Changes/ Stridor/Larynx: no  Pharynx & Esophagus: \" something stuck in throat\" sensation     Elimination Alterations  Constipation: no  Diarrhea:  no      Emotional  Coping: effective      Injury, potential bleeding or infection: none         Lab Results   Component Value Date    WBC 4.4 04/09/2019     04/09/2019       Patient reports an increased cough but denies it being productive. He also stated that he has a sensation of something stuck in his throat at times. He is using MMW which is helping. Dr Lore Castillo notified and examined pt. No new orders received.      BP (!) 146/83   Pulse 87   Temp 97.7 °F (36.5 °C) (Oral)   Resp 16   Wt 227 lb (103 kg)   SpO2 97%   BMI 31.66 kg/m²             Danielle Mcclellan

## 2019-04-16 ENCOUNTER — OFFICE VISIT (OUTPATIENT)
Dept: ONCOLOGY | Age: 69
End: 2019-04-16
Payer: COMMERCIAL

## 2019-04-16 ENCOUNTER — HOSPITAL ENCOUNTER (OUTPATIENT)
Dept: INFUSION THERAPY | Age: 69
Discharge: HOME | End: 2019-04-16
Payer: COMMERCIAL

## 2019-04-16 ENCOUNTER — APPOINTMENT (OUTPATIENT)
Dept: INFUSION THERAPY | Age: 69
End: 2019-04-16
Payer: COMMERCIAL

## 2019-04-16 ENCOUNTER — HOSPITAL ENCOUNTER (OUTPATIENT)
Dept: RADIATION ONCOLOGY | Age: 69
Discharge: HOME | End: 2019-04-16
Attending: RADIOLOGY
Payer: COMMERCIAL

## 2019-04-16 VITALS
HEART RATE: 102 BPM | BODY MASS INDEX: 31.52 KG/M2 | SYSTOLIC BLOOD PRESSURE: 141 MMHG | TEMPERATURE: 97.9 F | DIASTOLIC BLOOD PRESSURE: 87 MMHG | WEIGHT: 226 LBS

## 2019-04-16 DIAGNOSIS — C34.91 PRIMARY LUNG CANCER WITH METASTASIS FROM LUNG TO OTHER SITE, RIGHT (HCC): Primary | ICD-10-CM

## 2019-04-16 LAB
ABSOLUTE EOS #: 0.2 K/UL (ref 0–0.4)
ABSOLUTE IMMATURE GRANULOCYTE: ABNORMAL K/UL (ref 0–0.3)
ABSOLUTE LYMPH #: 0.5 K/UL (ref 1–4.8)
ABSOLUTE MONO #: 0.9 K/UL (ref 0.1–1.2)
ALBUMIN SERPL-MCNC: 4.3 G/DL (ref 3.5–5.2)
ALBUMIN/GLOBULIN RATIO: 1.3 (ref 1–2.5)
ALP BLD-CCNC: 67 U/L (ref 40–129)
ALT SERPL-CCNC: 14 U/L (ref 5–41)
ANION GAP SERPL CALCULATED.3IONS-SCNC: 14 MMOL/L (ref 9–17)
AST SERPL-CCNC: 18 U/L
BASOPHILS # BLD: 1 % (ref 0–2)
BASOPHILS ABSOLUTE: 0.1 K/UL (ref 0–0.2)
BILIRUB SERPL-MCNC: 0.24 MG/DL (ref 0.3–1.2)
BUN BLDV-MCNC: 17 MG/DL (ref 8–23)
BUN/CREAT BLD: ABNORMAL (ref 9–20)
CALCIUM SERPL-MCNC: 9.4 MG/DL (ref 8.6–10.4)
CHLORIDE BLD-SCNC: 103 MMOL/L (ref 98–107)
CO2: 22 MMOL/L (ref 20–31)
CREAT SERPL-MCNC: 1.38 MG/DL (ref 0.7–1.2)
DIFFERENTIAL TYPE: ABNORMAL
EOSINOPHILS RELATIVE PERCENT: 4 % (ref 1–4)
GFR AFRICAN AMERICAN: >60 ML/MIN
GFR NON-AFRICAN AMERICAN: 51 ML/MIN
GFR SERPL CREATININE-BSD FRML MDRD: ABNORMAL ML/MIN/{1.73_M2}
GFR SERPL CREATININE-BSD FRML MDRD: ABNORMAL ML/MIN/{1.73_M2}
GLUCOSE BLD-MCNC: 120 MG/DL (ref 70–99)
HCT VFR BLD CALC: 35.5 % (ref 41–53)
HEMOGLOBIN: 12.1 G/DL (ref 13.5–17.5)
IMMATURE GRANULOCYTES: ABNORMAL %
LYMPHOCYTES # BLD: 9 % (ref 24–44)
MCH RBC QN AUTO: 33.3 PG (ref 26–34)
MCHC RBC AUTO-ENTMCNC: 34.2 G/DL (ref 31–37)
MCV RBC AUTO: 97.4 FL (ref 80–100)
MONOCYTES # BLD: 17 % (ref 2–11)
NRBC AUTOMATED: ABNORMAL PER 100 WBC
PDW BLD-RTO: 15.7 % (ref 12.5–15.4)
PLATELET # BLD: 182 K/UL (ref 140–450)
PLATELET ESTIMATE: ABNORMAL
PMV BLD AUTO: 8 FL (ref 6–12)
POTASSIUM SERPL-SCNC: 4.2 MMOL/L (ref 3.7–5.3)
RBC # BLD: 3.64 M/UL (ref 4.5–5.9)
RBC # BLD: ABNORMAL 10*6/UL
SEG NEUTROPHILS: 69 % (ref 36–66)
SEGMENTED NEUTROPHILS ABSOLUTE COUNT: 3.7 K/UL (ref 1.8–7.7)
SODIUM BLD-SCNC: 139 MMOL/L (ref 135–144)
TOTAL PROTEIN: 7.7 G/DL (ref 6.4–8.3)
WBC # BLD: 5.3 K/UL (ref 3.5–11)
WBC # BLD: ABNORMAL 10*3/UL

## 2019-04-16 PROCEDURE — 96375 TX/PRO/DX INJ NEW DRUG ADDON: CPT

## 2019-04-16 PROCEDURE — 2580000003 HC RX 258: Performed by: INTERNAL MEDICINE

## 2019-04-16 PROCEDURE — 96365 THER/PROPH/DIAG IV INF INIT: CPT

## 2019-04-16 PROCEDURE — 96367 TX/PROPH/DG ADDL SEQ IV INF: CPT

## 2019-04-16 PROCEDURE — 6360000002 HC RX W HCPCS: Performed by: INTERNAL MEDICINE

## 2019-04-16 PROCEDURE — 96417 CHEMO IV INFUS EACH ADDL SEQ: CPT

## 2019-04-16 PROCEDURE — 77412 RADIATION TX DELIVERY LVL 3: CPT | Performed by: RADIOLOGY

## 2019-04-16 PROCEDURE — 85025 COMPLETE CBC W/AUTO DIFF WBC: CPT

## 2019-04-16 PROCEDURE — 36591 DRAW BLOOD OFF VENOUS DEVICE: CPT

## 2019-04-16 PROCEDURE — 96413 CHEMO IV INFUSION 1 HR: CPT

## 2019-04-16 PROCEDURE — 77387 GUIDANCE FOR RADJ TX DLVR: CPT | Performed by: RADIOLOGY

## 2019-04-16 PROCEDURE — 80053 COMPREHEN METABOLIC PANEL: CPT

## 2019-04-16 PROCEDURE — 99214 OFFICE O/P EST MOD 30 MIN: CPT | Performed by: INTERNAL MEDICINE

## 2019-04-16 RX ORDER — SODIUM CHLORIDE 9 MG/ML
INJECTION, SOLUTION INTRAVENOUS ONCE
Status: COMPLETED | OUTPATIENT
Start: 2019-04-16 | End: 2019-04-16

## 2019-04-16 RX ORDER — METHYLPREDNISOLONE SODIUM SUCCINATE 125 MG/2ML
125 INJECTION, POWDER, LYOPHILIZED, FOR SOLUTION INTRAMUSCULAR; INTRAVENOUS ONCE
Status: CANCELLED | OUTPATIENT
Start: 2019-04-16

## 2019-04-16 RX ORDER — METHYLPREDNISOLONE SODIUM SUCCINATE 125 MG/2ML
125 INJECTION, POWDER, LYOPHILIZED, FOR SOLUTION INTRAMUSCULAR; INTRAVENOUS ONCE
Status: CANCELLED | OUTPATIENT
Start: 2019-04-17

## 2019-04-16 RX ORDER — SODIUM CHLORIDE 0.9 % (FLUSH) 0.9 %
5 SYRINGE (ML) INJECTION PRN
Status: CANCELLED | OUTPATIENT
Start: 2019-04-17

## 2019-04-16 RX ORDER — SODIUM CHLORIDE 0.9 % (FLUSH) 0.9 %
10 SYRINGE (ML) INJECTION PRN
Status: CANCELLED | OUTPATIENT
Start: 2019-04-17

## 2019-04-16 RX ORDER — SODIUM CHLORIDE 0.9 % (FLUSH) 0.9 %
10 SYRINGE (ML) INJECTION PRN
Status: DISCONTINUED | OUTPATIENT
Start: 2019-04-16 | End: 2019-04-17 | Stop reason: HOSPADM

## 2019-04-16 RX ORDER — SODIUM CHLORIDE 0.9 % (FLUSH) 0.9 %
5 SYRINGE (ML) INJECTION PRN
Status: CANCELLED | OUTPATIENT
Start: 2019-04-16

## 2019-04-16 RX ORDER — DEXAMETHASONE SODIUM PHOSPHATE 10 MG/ML
10 INJECTION INTRAMUSCULAR; INTRAVENOUS ONCE
Status: COMPLETED | OUTPATIENT
Start: 2019-04-16 | End: 2019-04-16

## 2019-04-16 RX ORDER — SODIUM CHLORIDE 9 MG/ML
INJECTION, SOLUTION INTRAVENOUS ONCE
Status: CANCELLED | OUTPATIENT
Start: 2019-04-16

## 2019-04-16 RX ORDER — 0.9 % SODIUM CHLORIDE 0.9 %
10 VIAL (ML) INJECTION ONCE
Status: CANCELLED | OUTPATIENT
Start: 2019-04-18

## 2019-04-16 RX ORDER — HEPARIN SODIUM (PORCINE) LOCK FLUSH IV SOLN 100 UNIT/ML 100 UNIT/ML
500 SOLUTION INTRAVENOUS PRN
Status: CANCELLED | OUTPATIENT
Start: 2019-04-18

## 2019-04-16 RX ORDER — SODIUM CHLORIDE 0.9 % (FLUSH) 0.9 %
10 SYRINGE (ML) INJECTION PRN
Status: CANCELLED | OUTPATIENT
Start: 2019-04-18

## 2019-04-16 RX ORDER — HEPARIN SODIUM (PORCINE) LOCK FLUSH IV SOLN 100 UNIT/ML 100 UNIT/ML
500 SOLUTION INTRAVENOUS PRN
Status: DISCONTINUED | OUTPATIENT
Start: 2019-04-16 | End: 2019-04-17 | Stop reason: HOSPADM

## 2019-04-16 RX ORDER — DIPHENHYDRAMINE HYDROCHLORIDE 50 MG/ML
50 INJECTION INTRAMUSCULAR; INTRAVENOUS ONCE
Status: CANCELLED | OUTPATIENT
Start: 2019-04-17

## 2019-04-16 RX ORDER — PALONOSETRON 0.05 MG/ML
0.25 INJECTION, SOLUTION INTRAVENOUS ONCE
Status: COMPLETED | OUTPATIENT
Start: 2019-04-16 | End: 2019-04-16

## 2019-04-16 RX ORDER — HEPARIN SODIUM (PORCINE) LOCK FLUSH IV SOLN 100 UNIT/ML 100 UNIT/ML
500 SOLUTION INTRAVENOUS PRN
Status: CANCELLED | OUTPATIENT
Start: 2019-04-17

## 2019-04-16 RX ORDER — METHYLPREDNISOLONE SODIUM SUCCINATE 125 MG/2ML
125 INJECTION, POWDER, LYOPHILIZED, FOR SOLUTION INTRAMUSCULAR; INTRAVENOUS ONCE
Status: CANCELLED | OUTPATIENT
Start: 2019-04-18

## 2019-04-16 RX ORDER — DIPHENHYDRAMINE HYDROCHLORIDE 50 MG/ML
50 INJECTION INTRAMUSCULAR; INTRAVENOUS ONCE
Status: CANCELLED | OUTPATIENT
Start: 2019-04-16

## 2019-04-16 RX ORDER — 0.9 % SODIUM CHLORIDE 0.9 %
10 VIAL (ML) INJECTION ONCE
Status: CANCELLED | OUTPATIENT
Start: 2019-04-16

## 2019-04-16 RX ORDER — SODIUM CHLORIDE 0.9 % (FLUSH) 0.9 %
5 SYRINGE (ML) INJECTION PRN
Status: CANCELLED | OUTPATIENT
Start: 2019-04-18

## 2019-04-16 RX ORDER — 0.9 % SODIUM CHLORIDE 0.9 %
10 VIAL (ML) INJECTION ONCE
Status: CANCELLED | OUTPATIENT
Start: 2019-04-17

## 2019-04-16 RX ORDER — SODIUM CHLORIDE 9 MG/ML
INJECTION, SOLUTION INTRAVENOUS CONTINUOUS
Status: CANCELLED | OUTPATIENT
Start: 2019-04-16

## 2019-04-16 RX ORDER — SODIUM CHLORIDE 9 MG/ML
INJECTION, SOLUTION INTRAVENOUS CONTINUOUS
Status: CANCELLED | OUTPATIENT
Start: 2019-04-18

## 2019-04-16 RX ORDER — SODIUM CHLORIDE 0.9 % (FLUSH) 0.9 %
10 SYRINGE (ML) INJECTION PRN
Status: CANCELLED | OUTPATIENT
Start: 2019-04-16

## 2019-04-16 RX ORDER — SODIUM CHLORIDE 9 MG/ML
INJECTION, SOLUTION INTRAVENOUS ONCE
Status: CANCELLED | OUTPATIENT
Start: 2019-04-17

## 2019-04-16 RX ORDER — PALONOSETRON 0.05 MG/ML
0.25 INJECTION, SOLUTION INTRAVENOUS ONCE
Status: CANCELLED | OUTPATIENT
Start: 2019-04-16

## 2019-04-16 RX ORDER — DIPHENHYDRAMINE HYDROCHLORIDE 50 MG/ML
50 INJECTION INTRAMUSCULAR; INTRAVENOUS ONCE
Status: CANCELLED | OUTPATIENT
Start: 2019-04-18

## 2019-04-16 RX ORDER — SODIUM CHLORIDE 9 MG/ML
INJECTION, SOLUTION INTRAVENOUS ONCE
Status: CANCELLED | OUTPATIENT
Start: 2019-04-18

## 2019-04-16 RX ORDER — SODIUM CHLORIDE 9 MG/ML
INJECTION, SOLUTION INTRAVENOUS CONTINUOUS
Status: CANCELLED | OUTPATIENT
Start: 2019-04-17

## 2019-04-16 RX ORDER — HEPARIN SODIUM (PORCINE) LOCK FLUSH IV SOLN 100 UNIT/ML 100 UNIT/ML
500 SOLUTION INTRAVENOUS PRN
Status: CANCELLED | OUTPATIENT
Start: 2019-04-16

## 2019-04-16 RX ADMIN — ETOPOSIDE 228 MG: 20 INJECTION, SOLUTION, CONCENTRATE INTRAVENOUS at 13:49

## 2019-04-16 RX ADMIN — PALONOSETRON HYDROCHLORIDE 0.25 MG: 0.25 INJECTION, SOLUTION INTRAVENOUS at 13:12

## 2019-04-16 RX ADMIN — CARBOPLATIN 500 MG: 10 INJECTION, SOLUTION INTRAVENOUS at 15:01

## 2019-04-16 RX ADMIN — SODIUM CHLORIDE: 9 INJECTION, SOLUTION INTRAVENOUS at 13:10

## 2019-04-16 RX ADMIN — Medication 10 ML: at 16:06

## 2019-04-16 RX ADMIN — Medication 10 ML: at 13:10

## 2019-04-16 RX ADMIN — SODIUM CHLORIDE, PRESERVATIVE FREE 500 UNITS: 5 INJECTION INTRAVENOUS at 16:06

## 2019-04-16 RX ADMIN — SODIUM CHLORIDE 150 MG: 900 INJECTION, SOLUTION INTRAVENOUS at 13:15

## 2019-04-16 RX ADMIN — Medication 10 MG: at 13:13

## 2019-04-16 NOTE — PROGRESS NOTES
Patient here for C1D1 Etoposide/Carboplatin. Labs drawn and reviewed. Patient received [facility] information on carboplatin and signed consent for treatment. Port left accessed for treatment tomorrow. Patient tolerated treatment well and was discharged home in stable condition.

## 2019-04-16 NOTE — PROGRESS NOTES
Sami Peters                                                                                                                  4/16/2019  MRN:   [id number]  YOB: 1950  PCP:                           Zina Morales MD  Referring Physician: No ref. 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 CT scan was read as lung rads degree 4B. Subsequently she underwent biopsy of right upper lobe lung nodule on 1/16/19. 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 CT PET done showed abnormal FDG uptake in the right upper lobe nodule. There was also abnormal FDG uptake in the right lower lobe nodule. Additionally right hilar lymph node were also FDG avid. No bony lesion was reported. MRI brain for staging workup did not show any evidence of intracranial metastasis. Tip of the odontoid process was ill-defined and metastasis could not be ruled out. Clinically patient does give history of trauma to the neck area any years ago however denies any surgery history. Bone scan did not reveal any metastasis     Patient continues to smoke but has cut down quite a bit. He works full-time in a [address] Patient has good performance status, ECOG 0. Patient's other medical problems include dyslipidemia and hypertension.   He also has arthritis pain    Started patient on concurrent chemoradiation using cisplatin and etoposide with dose adjustment for renal dysfunction. Chemotherapy changed to carboplatin and etoposide from cycle #2 due to renal dysfunction    Interim History:    Patient presents to the clinic for a follow-up visit and for toxicity check. Patient chemotherapy was held for 2 weeks previously due to renal dysfunction. Chemotherapy has been changed to carboplatin and etoposide. Patient overall is doing well. Denies nausea vomiting fever or chills. Appetite is good. Denies weight loss. Denies severe sore throat. So far patient has been tolerating treatment without unexpected or severe side effects. During this visit patient's allergy, social, medical, surgical history and medications were reviewed and updated. Past Medical History:   Past Medical History:   Diagnosis Date    DDD (degenerative disc disease), cervical     Gout     Heart murmur     hx. of when younger.  Hyperlipidemia     Hypertension     Lung [facility] ([facility] [address]     right lung    MI (myocardial infarction) ([facility] [address]     Skin cancer     face    Wears glasses        Past Surgical History:     Past Surgical History:   Procedure Laterality Date    APPENDECTOMY      CARDIAC CATHETERIZATION      w/stent    CAROTID STENT PLACEMENT      COLONOSCOPY      FOOT SURGERY Right     2nd toe(bone spur).  SKIN BIOPSY      SKIN BIOPSY      face under lt. eye.     TONSILLECTOMY         Patient Family Social History:    Family History   Problem Relation Age of Onset    Cancer Father         lung cancer      Social History     Socioeconomic History    Marital status:      Spouse name: Not on file    Number of children: Not on file    Years of education: Not on file    Highest education level: Not on file   Occupational History    Not on file   Social Needs    Financial resource strain: Not on file    Food insecurity:     Worry: Not on file     Inability: Not on file   [employer] needs:     Medical: Not on file     Non-medical: Not on file   Tobacco Use    Smoking status: Current Every Day Smoker     Packs/day: 0.50     Types: Cigarettes    Smokeless tobacco: Current User    Tobacco comment: also vapes   Substance and Sexual Activity    Alcohol use: No    Drug use: Yes     Types: Marijuana    Sexual activity: Not on file   Lifestyle    Physical activity:     Days per week: Not on file     Minutes per session: Not on file    Stress: Not on file   Relationships    Social connections:     Talks on phone: Not on file     Gets together: Not on file     Attends Taoism service: Not on file     Active member of club or organization: Not on file     Attends meetings of clubs or organizations: Not on file     Relationship status: Not on file    Intimate partner violence:     Fear of current or ex partner: Not on file     Emotionally abused: Not on file     Physically abused: Not on file     Forced sexual activity: Not on file   Other Topics Concern    Not on file   Social History Narrative    Not on file      Current Medications:     Current Outpatient Medications   Medication Sig Dispense Refill    Magic Mouthwash (MIRACLE MOUTHWASH) Swish and spit 5 mLs 4 times daily as needed for Irritation      lidocaine-prilocaine (EMLA) 2.5-2.5 % cream Apply topically as needed. Apply a quarter size amount to port site 1 hour before chemotherapy. Cover with plastic wrap.  30 g 0    ondansetron (ZOFRAN-ODT) 8 MG TBDP disintegrating tablet Place 1 tablet under the tongue 3 times daily as needed for Nausea or Vomiting 90 tablet 3    acetaminophen (TYLENOL) 325 MG tablet Take 650 mg by mouth every 6 hours as needed for Pain      allopurinol (ZYLOPRIM) 100 MG tablet Take 100 mg by mouth daily      simvastatin (ZOCOR) 40 MG tablet Take 40 mg by mouth daily      celecoxib (CELEBREX) 200 MG capsule Take 200 mg by mouth 2 times daily  3    lisinopril-hydrochlorothiazide (PRINZIDE;ZESTORETIC) 20-25 MG per tablet Take 1 tablet by mouth daily      Multiple Vitamins-Minerals (THERAPEUTIC MULTIVITAMIN-MINERALS) tablet Take 1 tablet by mouth daily      HYDROcodone-acetaminophen (NORCO) 5-325 MG per tablet Take 1 tablet by mouth every 6 hours as needed for Pain for up to 7 days. Intended supply: 7 days. Take lowest dose possible to manage pain 28 tablet 0     No current facility-administered medications for this visit. Facility-Administered Medications Ordered in Other Visits   Medication Dose Route Frequency Provider Last Rate Last Dose    0.9 % sodium chloride infusion   Intravenous Once [facility], MD 20 mL/hr at 04/16/19 1310      fosaprepitant (EMEND) 150 mg in sodium chloride 0.9 % 150 mL IVPB  150 mg Intravenous Once [facility],  mL/hr at 04/16/19 1315 150 mg at 04/16/19 1315    CARBOplatin (PARAPLATIN) 500 mg in sodium chloride 0.9 % 250 mL chemo IVPB  500 mg Intravenous Once [facility], MD        etoposide (VEPESID) 228 mg in sodium chloride 0.9 % 600 mL chemo IVPB  100 mg/m2 (Treatment Plan Recorded) Intravenous Once [facility], MD        heparin flush 100 UNIT/ML injection 500 Units  500 Units Intercatheter PRN [facility], MD        sodium chloride flush 0.9 % injection 10 mL  10 mL Intravenous PRN [facility], MD   10 mL at 04/16/19 1310       Allergies:   Patient has no known allergies. Review of Systems:    Constitutional: No fever or chills. No night sweats, no weight loss .   Positive fatigue  Eyes: No eye discharge, double vision, or eye pain   HEENT: negative for sore mouth, sore throat, hoarseness and voice change   Respiratory: negative for cough , sputum, dyspnea, wheezing, hemoptysis, chest pain   Cardiovascular: negative for chest pain, dyspnea, palpitations, orthopnea, PND   Gastrointestinal: negative for nausea, vomiting, diarrhea, constipation, abdominal pain, Dysphagia, hematemesis and hematochezia   Genitourinary: negative for frequency, dysuria, nocturia, urinary incontinence, and hematuria   Integument: negative for rash, skin lesions, bruises. Hematologic/Lymphatic: negative for easy bruising, bleeding, lymphadenopathy, or petechiae   Endocrine: negative for heat or cold intolerance,weight changes, change in bowel habits and hair loss   Musculoskeletal: negative for myalgias, arthralgias. Positive for chronic back pain  Neurological: negative for headaches, dizziness, seizures, weakness, numbness        Physical Exam:    Vitals: BP (!) 141/87   Pulse 102   Temp 97.9 °F (36.6 °C) (Oral)   Wt 226 lb (102.5 kg)   BMI 31.52 kg/m²   General appearance - well appearing, no in pain or distress  Mental status - AAO X3  Eyes - pupils equal and reactive, extraocular eye movements intact  Mouth - mucous membranes moist, pharynx normal without lesions  Neck - supple, no significant adenopathy  Lymphatics - no palpable lymphadenopathy, no hepatosplenomegaly  Chest - clear to auscultation, no wheezes, rales or rhonchi, symmetric air entry  Heart - normal rate, regular rhythm, normal S1, S2, no murmurs  Abdomen - soft, nontender, nondistended, no masses or organomegaly  Neurological - alert, oriented, normal speech, no focal findings or movement disorder noted  Extremities - peripheral pulses normal, no pedal edema, no clubbing or cyanosis  Skin - normal coloration and turgor, no rashes, no suspicious skin lesions noted       DATA:    Labs:       Surgical Pathology Consultation            Patient Name: Jessika Ch : 1950 (Age: 80) Gender: M Taken: 2019 Reported: 2019 Physician(s): Annika Padron M.D. ([phone]) Copy To: Mary Mckenzie MD Accession #: [id number] Med. Rec. #: [id number] Acct: # [de-identified]   Final Pathologic Diagnosis  Lung, right upper lobe, core biopsy:       Invasive carcinoma consisting of two components: small cell carcinoma and squamous cell carcinoma.     Comment: Immunohistochemical stains are performed on 1A and demonstrate that the component of small cells carcinoma is positive for AE1/AE3, TTF1, synaptophysin, and chromogranin A, and the component of squamous cell carcinoma is positive for AE1/AE3 and   P63.  All controls are adequate.              **Report Electronically Signed Out**  rg/1/18/2019 Gerard Barrientos MD        Interpretation performed at [facility], [address] [place] [address], License number: [id number]. Clinical History  R91.8.  R upper lobe of lung.      IMAGING DATA:         Impression   1.  FDG avid irregular nodule in the right upper lobe.  Its morphologic   features are suspicious for neoplastic disease versus an inflammatory process.       2.  FDG avid peripheral opacity in the superior segment of the right lower   lobe with morphologic features that can be seen with infection or neoplastic   disease.       3.  FDG avid right hilar lymph nodes, equivocal for inflammatory versus   neoplastic etiology.       4.  Benign right adrenal adenoma.       In the absence of prior imaging for comparison, short-term contrast-enhanced   CT follow-up may be warranted to further delineate acute inflammatory disease   versus multifocal neoplastic disease.                   2018 December  CT low dose lung nresruycv01/8/2018  [facility]    Impression:    Lung Rads Category 4B    There are 2 suspicious nodules in the right lung, one in the right apex and an even larger lesion in the superior segment of the right lower lobe.  PET CT scanning recommended for further characterization.  Please see above for details. This report contains a significant result and/or recommendation, which requires the attention of the licensed caregiver responsible for this patient. Therefore, I specifically designated this report be telephoned by the radiology department.     Findings were instructed to be called to the clinical service on December 8, 2018 at 10:18 AM hours.        MR brain with and without contrast2/11/2019  [employer]  Result Narrative   Clinical history: Right lung cancer.  Evaluation for metastatic disease. TECHNIQUE:    Multiplanar multisequence imaging of the brain was performed before and after the intravenous administration of 20 mL MultiHance gadolinium contrast material.  There are no prior imaging studies of the brain for comparison. FINDINGS:    On the T1-weighted sagittal images the tip of the odontoid is ill-defined.  The vertebral body cortices are well-defined elsewhere, however its tip of the dens they are not seen.  Findings are not as striking on the T1-weighted postgadolinium-enhanced coronal images however.  This is nonspecific and   can be seen with inflammatory spondyloarthropathy and degenerative change with bony sclerosis, however destructive process involving the odontoid is not excluded and follow-up is recommended in light of the history of lung cancer.  Mild age appropriate cortical volume loss is present.  There is   some patchy and confluent increased T2 signal within the periventricular and subcortical white matter which is nonspecific and likely reflects sequelae of microvascular ischemia in a patient of this age.  Ventricular system appears within normal limits.  Basal cisterns and fourth ventricle are   patent.  There is no intracranial mass nor mass effect.  There is no restricted diffusion to suggest acute or subacute infarct.  There are no signal abnormalities present to suggest parenchymal nor extra-axial hemorrhage.  There is no pathologic parenchymal nor leptomeningeal enhancement. IMPRESSION:    1.  The tip of the odontoid is ill-defined on the sagittal T1-weighted images.  This is nonspecific and may reflect degenerative arthropathy and bony sclerosis, however destructive lesion involving the odontoid is not excluded.  A reasonable course of follow-up would be plain films of the cervical   spine.  If this is equivocal, then CT of the cervical spine would be appropriate. 2.  Otherwise no evidence for intracranial metastatic disease.  No mass, hemorrhage, infarct, nor other acute finding  3.  age-appropriate cortical volume loss and nonspecific white matter lesions which likely reflects sequelae of microvascular ischemia in a patient of this age.      Nm Bone Scan Whole Body: 2/20/2019    1. No scintigraphic evidence of osseous metastatic disease. 2. Scattered osseous degenerative changes as above. 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 We went over potential side effects. Reviewed risk benefit profile. Patient is completing his radiation therapy next Monday. Continue to monitor cell counts. We will see patient back in office in 3 weeks with cycle #3. Labs are adequate for treatment. NCCN guidelines were reviewed and discussed with the patient. The diagnosis and care plan were discussed with the patient in detail. I discussed the natural history of the disease, prognosis, risks and goals of therapy and answered all the patients questions to the best of my ability. Patient expressed understanding and was in agreement. Luis Dos Santos        I spent more than 25 minutes examining, evaluating, reviewing data, counseling the patient and coordinating care. Greater than 50% of time was spent face-to-face with the patient  This note is created with the assistance of a speech recognition program.  While intending to generate a document that actually reflects the content of the visit, the document can still have some errors including those of syntax and sound a like substitutions which may escape proof reading.   It such instances, actual meaning can be extrapolated by contextual diversion.

## 2019-04-17 ENCOUNTER — HOSPITAL ENCOUNTER (OUTPATIENT)
Dept: RADIATION ONCOLOGY | Age: 69
Discharge: HOME | End: 2019-04-17
Attending: RADIOLOGY
Payer: COMMERCIAL

## 2019-04-17 ENCOUNTER — HOSPITAL ENCOUNTER (OUTPATIENT)
Dept: INFUSION THERAPY | Age: 69
Discharge: HOME | End: 2019-04-17
Payer: COMMERCIAL

## 2019-04-17 VITALS
TEMPERATURE: 97.6 F | DIASTOLIC BLOOD PRESSURE: 84 MMHG | HEART RATE: 93 BPM | RESPIRATION RATE: 18 BRPM | SYSTOLIC BLOOD PRESSURE: 162 MMHG

## 2019-04-17 DIAGNOSIS — C34.91 PRIMARY LUNG CANCER WITH METASTASIS FROM LUNG TO OTHER SITE, RIGHT (HCC): Primary | ICD-10-CM

## 2019-04-17 PROCEDURE — 77387 GUIDANCE FOR RADJ TX DLVR: CPT | Performed by: RADIOLOGY

## 2019-04-17 PROCEDURE — 77412 RADIATION TX DELIVERY LVL 3: CPT | Performed by: RADIOLOGY

## 2019-04-17 PROCEDURE — 6360000002 HC RX W HCPCS: Performed by: INTERNAL MEDICINE

## 2019-04-17 PROCEDURE — 96413 CHEMO IV INFUSION 1 HR: CPT

## 2019-04-17 PROCEDURE — 96375 TX/PRO/DX INJ NEW DRUG ADDON: CPT

## 2019-04-17 PROCEDURE — 2580000003 HC RX 258: Performed by: INTERNAL MEDICINE

## 2019-04-17 RX ORDER — SODIUM CHLORIDE 9 MG/ML
INJECTION, SOLUTION INTRAVENOUS ONCE
Status: COMPLETED | OUTPATIENT
Start: 2019-04-17 | End: 2019-04-17

## 2019-04-17 RX ORDER — SODIUM CHLORIDE 0.9 % (FLUSH) 0.9 %
10 SYRINGE (ML) INJECTION PRN
Status: DISCONTINUED | OUTPATIENT
Start: 2019-04-17 | End: 2019-04-18 | Stop reason: HOSPADM

## 2019-04-17 RX ORDER — DEXAMETHASONE SODIUM PHOSPHATE 10 MG/ML
10 INJECTION, SOLUTION INTRAMUSCULAR; INTRAVENOUS ONCE
Status: COMPLETED | OUTPATIENT
Start: 2019-04-17 | End: 2019-04-17

## 2019-04-17 RX ORDER — HEPARIN SODIUM (PORCINE) LOCK FLUSH IV SOLN 100 UNIT/ML 100 UNIT/ML
500 SOLUTION INTRAVENOUS PRN
Status: DISCONTINUED | OUTPATIENT
Start: 2019-04-17 | End: 2019-04-18 | Stop reason: HOSPADM

## 2019-04-17 RX ADMIN — ETOPOSIDE 228 MG: 20 INJECTION, SOLUTION, CONCENTRATE INTRAVENOUS at 11:00

## 2019-04-17 RX ADMIN — DEXAMETHASONE SODIUM PHOSPHATE 10 MG: 10 INJECTION, SOLUTION INTRAMUSCULAR; INTRAVENOUS at 10:41

## 2019-04-17 RX ADMIN — SODIUM CHLORIDE: 9 INJECTION, SOLUTION INTRAVENOUS at 10:39

## 2019-04-17 RX ADMIN — Medication 10 ML: at 10:38

## 2019-04-17 RX ADMIN — Medication 10 ML: at 12:09

## 2019-04-17 RX ADMIN — SODIUM CHLORIDE, PRESERVATIVE FREE 500 UNITS: 5 INJECTION INTRAVENOUS at 12:09

## 2019-04-17 NOTE — PROGRESS NOTES
Pt here for C1D2. Denies any new complaints. Labs results reviewed. Pt was treated without incident and d/c'd in stable condition. Pt will return tomorrow for D3.

## 2019-04-18 ENCOUNTER — HOSPITAL ENCOUNTER (OUTPATIENT)
Dept: RADIATION ONCOLOGY | Age: 69
Discharge: HOME | End: 2019-04-18
Attending: RADIOLOGY
Payer: COMMERCIAL

## 2019-04-18 ENCOUNTER — HOSPITAL ENCOUNTER (OUTPATIENT)
Dept: INFUSION THERAPY | Age: 69
Discharge: HOME | End: 2019-04-18
Payer: COMMERCIAL

## 2019-04-18 VITALS
TEMPERATURE: 97.7 F | DIASTOLIC BLOOD PRESSURE: 84 MMHG | HEART RATE: 70 BPM | SYSTOLIC BLOOD PRESSURE: 134 MMHG | RESPIRATION RATE: 16 BRPM

## 2019-04-18 DIAGNOSIS — C34.91 PRIMARY LUNG CANCER WITH METASTASIS FROM LUNG TO OTHER SITE, RIGHT (HCC): Primary | ICD-10-CM

## 2019-04-18 PROCEDURE — 96375 TX/PRO/DX INJ NEW DRUG ADDON: CPT | Performed by: NURSE PRACTITIONER

## 2019-04-18 PROCEDURE — 77412 RADIATION TX DELIVERY LVL 3: CPT | Performed by: RADIOLOGY

## 2019-04-18 PROCEDURE — 6360000002 HC RX W HCPCS: Performed by: INTERNAL MEDICINE

## 2019-04-18 PROCEDURE — 77387 GUIDANCE FOR RADJ TX DLVR: CPT | Performed by: RADIOLOGY

## 2019-04-18 PROCEDURE — 2580000003 HC RX 258: Performed by: INTERNAL MEDICINE

## 2019-04-18 PROCEDURE — 96413 CHEMO IV INFUSION 1 HR: CPT | Performed by: NURSE PRACTITIONER

## 2019-04-18 RX ORDER — HEPARIN SODIUM (PORCINE) LOCK FLUSH IV SOLN 100 UNIT/ML 100 UNIT/ML
500 SOLUTION INTRAVENOUS PRN
Status: DISCONTINUED | OUTPATIENT
Start: 2019-04-18 | End: 2019-04-19 | Stop reason: HOSPADM

## 2019-04-18 RX ORDER — SODIUM CHLORIDE 0.9 % (FLUSH) 0.9 %
10 SYRINGE (ML) INJECTION PRN
Status: DISCONTINUED | OUTPATIENT
Start: 2019-04-18 | End: 2019-04-19 | Stop reason: HOSPADM

## 2019-04-18 RX ORDER — DEXAMETHASONE SODIUM PHOSPHATE 10 MG/ML
10 INJECTION, SOLUTION INTRAMUSCULAR; INTRAVENOUS ONCE
Status: COMPLETED | OUTPATIENT
Start: 2019-04-18 | End: 2019-04-18

## 2019-04-18 RX ORDER — SODIUM CHLORIDE 9 MG/ML
INJECTION, SOLUTION INTRAVENOUS ONCE
Status: COMPLETED | OUTPATIENT
Start: 2019-04-18 | End: 2019-04-18

## 2019-04-18 RX ADMIN — DEXAMETHASONE SODIUM PHOSPHATE 10 MG: 10 INJECTION, SOLUTION INTRAMUSCULAR; INTRAVENOUS at 08:38

## 2019-04-18 RX ADMIN — SODIUM CHLORIDE, PRESERVATIVE FREE 500 UNITS: 5 INJECTION INTRAVENOUS at 09:58

## 2019-04-18 RX ADMIN — SODIUM CHLORIDE: 9 INJECTION, SOLUTION INTRAVENOUS at 08:35

## 2019-04-18 RX ADMIN — ETOPOSIDE 228 MG: 20 INJECTION, SOLUTION, CONCENTRATE INTRAVENOUS at 08:54

## 2019-04-18 RX ADMIN — Medication 10 ML: at 08:34

## 2019-04-18 RX ADMIN — Medication 10 ML: at 09:58

## 2019-04-18 NOTE — PROGRESS NOTES
Patient tolerated c1d3 well. He c/o of discomfort in his throat. He stated he has trouble swallowing d/t the discomfort in his throat from radiation. Radiation is almost complete. He has let rad onc know about his discomfort. Patient stable and ambulatory at d/c.

## 2019-04-19 ENCOUNTER — HOSPITAL ENCOUNTER (OUTPATIENT)
Dept: RADIATION ONCOLOGY | Age: 69
Discharge: HOME | End: 2019-04-19
Attending: RADIOLOGY
Payer: COMMERCIAL

## 2019-04-19 PROCEDURE — 77387 GUIDANCE FOR RADJ TX DLVR: CPT | Performed by: RADIOLOGY

## 2019-04-19 PROCEDURE — 77412 RADIATION TX DELIVERY LVL 3: CPT | Performed by: RADIOLOGY

## 2019-04-22 ENCOUNTER — HOSPITAL ENCOUNTER (OUTPATIENT)
Dept: RADIATION ONCOLOGY | Age: 69
Discharge: HOME | End: 2019-04-22
Attending: RADIOLOGY
Payer: COMMERCIAL

## 2019-04-22 VITALS
RESPIRATION RATE: 16 BRPM | BODY MASS INDEX: 31.21 KG/M2 | OXYGEN SATURATION: 99 % | HEART RATE: 83 BPM | TEMPERATURE: 97.9 F | DIASTOLIC BLOOD PRESSURE: 88 MMHG | SYSTOLIC BLOOD PRESSURE: 147 MMHG | WEIGHT: 223.8 LBS

## 2019-04-22 DIAGNOSIS — C34.91 PRIMARY LUNG CANCER WITH METASTASIS FROM LUNG TO OTHER SITE, RIGHT (HCC): Primary | ICD-10-CM

## 2019-04-22 PROCEDURE — 77336 RADIATION PHYSICS CONSULT: CPT | Performed by: RADIOLOGY

## 2019-04-22 PROCEDURE — 77412 RADIATION TX DELIVERY LVL 3: CPT | Performed by: RADIOLOGY

## 2019-04-22 PROCEDURE — 77387 GUIDANCE FOR RADJ TX DLVR: CPT | Performed by: RADIOLOGY

## 2019-04-22 ASSESSMENT — PAIN SCALES - GENERAL: PAINLEVEL_OUTOF10: 2

## 2019-04-22 ASSESSMENT — PAIN DESCRIPTION - LOCATION: LOCATION: THROAT

## 2019-04-22 ASSESSMENT — PAIN DESCRIPTION - DESCRIPTORS: DESCRIPTORS: DISCOMFORT

## 2019-04-22 NOTE — PROGRESS NOTES
Megan Harmon  4/22/2019  Wt Readings from Last 3 Encounters:   04/22/19 223 lb 12.8 oz (101.5 kg)   04/16/19 226 lb (102.5 kg)   04/15/19 227 lb (103 kg)     Body mass index is 31.21 kg/m². Treatment Area:lung     Patient was seen today for weekly visit. Comfort Alteration  Fatigue: Moderate    Ventilation Alterations  Cough: No  Hemoptysis: No  Mucus Color: clear   Dyspnea: Yes, with activity   O2 Sat: 99%    Nutritional Alteration  Anorexia: Yes  Nausea: No   Vomiting: No     Skin Alteration   Sensation:intact     Radiation Dermatitis:  None     Mucous Membrane Alteration  Voice Changes/ Stridor/Larynx: yes - voice changes   Pharynx & Esophagus:dysphagia     Elimination Alterations  Constipation: no  Diarrhea:  no      Emotional  Coping: effective      Injury, potential bleeding or infection: none         Lab Results   Component Value Date    WBC 5.3 04/16/2019     04/16/2019         BP (!) 147/88   Pulse 83   Temp 97.9 °F (36.6 °C) (Oral)   Resp 16   Wt 223 lb 12.8 oz (101.5 kg)   SpO2 99%   BMI 31.21 kg/m²       Patient here for last day of treatment. He reports dysphagia over the weekend. He is using Magic mouthwash. Dr Lisa Pratt notified and examined pt.        Vj Anders

## 2019-04-22 NOTE — PROGRESS NOTES
(CATHFLO) injection 2 mg, 2 mg, Intracatheter, ONCE, 1 of 1 cycle       Treatment Summary   Plan Name OP Small Cell Lung: CARBOplatin/Etoposide, 21-Day Cycle   Treatment goal Curative   Provider Vick Vera MD   Status Active   Start Date 4/16/2019   End Date 6/20/2019 (Planned)   Treatment plan weight/height/BSA Weight type: Documented (effective on 4/9/2019), 102.9 kg (entered on 4/9/2019), 180.3 cm (entered on 4/9/2019), BSA 2.27 m2   Most recent weight/height/BSA Weight: Weight: 223 lb 12.8 oz (101.5 kg)    Height: Height: 5' 11\" (1.803 m)    BSA: BSA (Calculated - sq m): 2.27 sq meters      Treatment on Clinical Trial? [This plan is not part of a research study]   Reason for stopping treatment [Plan is still active]   Treatment Plan Medications alteplase (CATHFLO) injection 2 mg, 2 mg, Intracatheter, ONCE, 1 of 4 cycles    palonosetron (ALOXI) injection 0.25 mg, 0.25 mg, Intravenous, ONCE, 1 of 4 cycles  Administration: 0.25 mg (4/16/2019)    fosaprepitant (EMEND) 150 mg in sodium chloride 0.9 % 150 mL IVPB, 150 mg, Intravenous, ONCE, 1 of 4 cycles  Administration: 150 mg (4/16/2019)    hydrocortisone sodium succinate PF (SOLU-CORTEF) injection 100 mg, 100 mg, Intravenous, ONCE, 1 of 4 cycles    CARBOplatin (PARAPLATIN) 500 mg in sodium chloride 0.9 % 250 mL chemo IVPB, 500 mg, Intravenous, ONCE, 1 of 4 cycles  Administration: 500 mg (4/16/2019)    etoposide (VEPESID) 228 mg in sodium chloride 0.9 % 600 mL chemo IVPB, 100 mg/m2 = 228 mg, Intravenous, ONCE, 1 of 4 cycles  Administration: 228 mg (4/16/2019), 228 mg (4/17/2019), 228 mg (4/18/2019)    dexamethasone (DECADRON) 12 mg in sodium chloride 0.9 % IVPB, 12 mg, Intravenous, ONCE, 1 of 4 cycles  Administration: 10 mg (4/17/2019), 10 mg (4/18/2019)    methylPREDNISolone sodium (SOLU-MEDROL) injection 125 mg, 125 mg, Intravenous, ONCE, 1 of 4 cycles    famotidine (PEPCID) injection 20 mg, 20 mg, Intravenous, ONCE, 1 of 4 cycles           SUBJECTIVE: Micaela Remy current level of pain control    Pain Level: 2  Patient Currently in Pain: Yes  Pain Assessment: 0-10      Immunizations/Smoking Status: There is no immunization history on file for this patient. Social History     Tobacco Use   Smoking Status Current Every Day Smoker    Packs/day: 0.50    Types: Cigarettes   Smokeless Tobacco Current User   Tobacco Comment    also vapes     Ready to quit: Not Answered  Counseling given: Not Answered  Comment: also vapes      PHYSICAL FINDINGS:    CHAPERONE: Not Required    ECO Asymptomatic      Vital Signs:  BP (!) 147/88   Pulse 83   Temp 97.9 °F (36.6 °C) (Oral)   Resp 16   Wt 223 lb 12.8 oz (101.5 kg)   SpO2 99%   BMI 31.21 kg/m²   Wt Readings from Last 5 Encounters:   19 223 lb 12.8 oz (101.5 kg)   19 226 lb (102.5 kg)   04/15/19 227 lb (103 kg)   19 226 lb 13.7 oz (102.9 kg)   19 226 lb 12.8 oz (102.9 kg)     The patient is in no acute distress, oriented in time, person and place. Mucous membrane is pink, moist and nonicteric. Pupils are equal and reactive to light. Chest is clear to auscultation without added breath sounds or wheezes. Oral cavity demonstrates no lesion. Exam showed the skin of the chest reveals mildly to moderate erythema and hyperpigmentation posteriorly. There is mild erythema and hyperpigmentation anteriorly. The skin is intact. ASSESSMENT PLAN: The patient tolerated radiation therapy well. He did have mild dysphagia which was well controlled with Magic mouthwash. He will follow-up with his medical oncologist on 2019. Jessica Field to the patient for a 3 month follow-up appointment. He will be a candidate for prophylactic cranial irradiation.     Electronically signed by Maru De La Garza MD on 2019 at 9:28 [address] [facility]     Drugs Prescribed:  New Prescriptions    No medications on file       Other Orders Placed:  No orders of the defined types were placed in this encounter.

## 2019-04-23 ENCOUNTER — APPOINTMENT (OUTPATIENT)
Dept: INFUSION THERAPY | Age: 69
End: 2019-04-23
Payer: COMMERCIAL

## 2019-05-07 ENCOUNTER — OFFICE VISIT (OUTPATIENT)
Dept: ONCOLOGY | Age: 69
End: 2019-05-07
Payer: COMMERCIAL

## 2019-05-07 ENCOUNTER — HOSPITAL ENCOUNTER (OUTPATIENT)
Dept: INFUSION THERAPY | Age: 69
Discharge: HOME | End: 2019-05-07
Payer: COMMERCIAL

## 2019-05-07 VITALS
BODY MASS INDEX: 31.07 KG/M2 | DIASTOLIC BLOOD PRESSURE: 67 MMHG | HEART RATE: 82 BPM | TEMPERATURE: 97.6 F | SYSTOLIC BLOOD PRESSURE: 116 MMHG | WEIGHT: 222.8 LBS

## 2019-05-07 VITALS
HEART RATE: 82 BPM | DIASTOLIC BLOOD PRESSURE: 67 MMHG | RESPIRATION RATE: 16 BRPM | SYSTOLIC BLOOD PRESSURE: 116 MMHG | TEMPERATURE: 97.6 F

## 2019-05-07 DIAGNOSIS — C34.91 PRIMARY LUNG CANCER WITH METASTASIS FROM LUNG TO OTHER SITE, RIGHT (HCC): Primary | ICD-10-CM

## 2019-05-07 LAB
ABSOLUTE EOS #: 0 K/UL (ref 0–0.4)
ABSOLUTE IMMATURE GRANULOCYTE: ABNORMAL K/UL (ref 0–0.3)
ABSOLUTE LYMPH #: 0.35 K/UL (ref 1–4.8)
ABSOLUTE MONO #: 0.58 K/UL (ref 0.1–0.8)
ALBUMIN SERPL-MCNC: 4 G/DL (ref 3.5–5.2)
ALBUMIN/GLOBULIN RATIO: 1.4 (ref 1–2.5)
ALP BLD-CCNC: 78 U/L (ref 40–129)
ALT SERPL-CCNC: 16 U/L (ref 5–41)
ANION GAP SERPL CALCULATED.3IONS-SCNC: 11 MMOL/L (ref 9–17)
AST SERPL-CCNC: 17 U/L
BASOPHILS # BLD: 0 % (ref 0–2)
BASOPHILS ABSOLUTE: 0 K/UL (ref 0–0.2)
BILIRUB SERPL-MCNC: 0.16 MG/DL (ref 0.3–1.2)
BUN BLDV-MCNC: 16 MG/DL (ref 8–23)
BUN/CREAT BLD: ABNORMAL (ref 9–20)
CALCIUM SERPL-MCNC: 9.3 MG/DL (ref 8.6–10.4)
CHLORIDE BLD-SCNC: 101 MMOL/L (ref 98–107)
CO2: 25 MMOL/L (ref 20–31)
CREAT SERPL-MCNC: 1.44 MG/DL (ref 0.7–1.2)
DIFFERENTIAL TYPE: ABNORMAL
EOSINOPHILS RELATIVE PERCENT: 0 % (ref 1–4)
GFR AFRICAN AMERICAN: 59 ML/MIN
GFR NON-AFRICAN AMERICAN: 49 ML/MIN
GFR SERPL CREATININE-BSD FRML MDRD: ABNORMAL ML/MIN/{1.73_M2}
GFR SERPL CREATININE-BSD FRML MDRD: ABNORMAL ML/MIN/{1.73_M2}
GLUCOSE BLD-MCNC: 118 MG/DL (ref 70–99)
HCT VFR BLD CALC: 32.1 % (ref 41–53)
HEMOGLOBIN: 10.9 G/DL (ref 13.5–17.5)
IMMATURE GRANULOCYTES: ABNORMAL %
LYMPHOCYTES # BLD: 11 % (ref 24–44)
MCH RBC QN AUTO: 33.7 PG (ref 26–34)
MCHC RBC AUTO-ENTMCNC: 34 G/DL (ref 31–37)
MCV RBC AUTO: 99.4 FL (ref 80–100)
METAMYELOCYTES ABSOLUTE COUNT: 0.1 K/UL
METAMYELOCYTES: 3 %
MONOCYTES # BLD: 18 % (ref 1–7)
MORPHOLOGY: ABNORMAL
MYELOCYTES ABSOLUTE COUNT: 0.03 K/UL
MYELOCYTES: 1 %
NRBC AUTOMATED: ABNORMAL PER 100 WBC
PDW BLD-RTO: 15 % (ref 12.5–15.4)
PLATELET # BLD: 230 K/UL (ref 140–450)
PLATELET ESTIMATE: ABNORMAL
PMV BLD AUTO: 9.1 FL (ref 8–14)
POTASSIUM SERPL-SCNC: 4.3 MMOL/L (ref 3.7–5.3)
RBC # BLD: 3.23 M/UL (ref 4.5–5.9)
RBC # BLD: ABNORMAL 10*6/UL
SEG NEUTROPHILS: 67 % (ref 36–66)
SEGMENTED NEUTROPHILS ABSOLUTE COUNT: 2.14 K/UL (ref 1.8–7.7)
SODIUM BLD-SCNC: 137 MMOL/L (ref 135–144)
TOTAL PROTEIN: 6.9 G/DL (ref 6.4–8.3)
WBC # BLD: 3.2 K/UL (ref 3.5–11)
WBC # BLD: ABNORMAL 10*3/UL

## 2019-05-07 PROCEDURE — 85025 COMPLETE CBC W/AUTO DIFF WBC: CPT

## 2019-05-07 PROCEDURE — 96417 CHEMO IV INFUS EACH ADDL SEQ: CPT

## 2019-05-07 PROCEDURE — 96375 TX/PRO/DX INJ NEW DRUG ADDON: CPT

## 2019-05-07 PROCEDURE — 36591 DRAW BLOOD OFF VENOUS DEVICE: CPT

## 2019-05-07 PROCEDURE — 2580000003 HC RX 258: Performed by: INTERNAL MEDICINE

## 2019-05-07 PROCEDURE — 96413 CHEMO IV INFUSION 1 HR: CPT

## 2019-05-07 PROCEDURE — 36415 COLL VENOUS BLD VENIPUNCTURE: CPT

## 2019-05-07 PROCEDURE — 80053 COMPREHEN METABOLIC PANEL: CPT

## 2019-05-07 PROCEDURE — 99215 OFFICE O/P EST HI 40 MIN: CPT | Performed by: INTERNAL MEDICINE

## 2019-05-07 PROCEDURE — 96367 TX/PROPH/DG ADDL SEQ IV INF: CPT

## 2019-05-07 PROCEDURE — 6360000002 HC RX W HCPCS: Performed by: INTERNAL MEDICINE

## 2019-05-07 PROCEDURE — 96366 THER/PROPH/DIAG IV INF ADDON: CPT

## 2019-05-07 RX ORDER — HEPARIN SODIUM (PORCINE) LOCK FLUSH IV SOLN 100 UNIT/ML 100 UNIT/ML
500 SOLUTION INTRAVENOUS PRN
Status: CANCELLED | OUTPATIENT
Start: 2019-05-09

## 2019-05-07 RX ORDER — 0.9 % SODIUM CHLORIDE 0.9 %
10 VIAL (ML) INJECTION ONCE
Status: CANCELLED | OUTPATIENT
Start: 2019-05-07

## 2019-05-07 RX ORDER — SODIUM CHLORIDE 0.9 % (FLUSH) 0.9 %
5 SYRINGE (ML) INJECTION PRN
Status: CANCELLED | OUTPATIENT
Start: 2019-05-08

## 2019-05-07 RX ORDER — SODIUM CHLORIDE 0.9 % (FLUSH) 0.9 %
5 SYRINGE (ML) INJECTION PRN
Status: CANCELLED | OUTPATIENT
Start: 2019-05-07

## 2019-05-07 RX ORDER — SODIUM CHLORIDE 9 MG/ML
INJECTION, SOLUTION INTRAVENOUS CONTINUOUS
Status: CANCELLED | OUTPATIENT
Start: 2019-05-08

## 2019-05-07 RX ORDER — DEXAMETHASONE SODIUM PHOSPHATE 10 MG/ML
10 INJECTION INTRAMUSCULAR; INTRAVENOUS ONCE
Status: COMPLETED | OUTPATIENT
Start: 2019-05-07 | End: 2019-05-07

## 2019-05-07 RX ORDER — METHYLPREDNISOLONE SODIUM SUCCINATE 125 MG/2ML
125 INJECTION, POWDER, LYOPHILIZED, FOR SOLUTION INTRAMUSCULAR; INTRAVENOUS ONCE
Status: CANCELLED | OUTPATIENT
Start: 2019-05-08

## 2019-05-07 RX ORDER — METHYLPREDNISOLONE SODIUM SUCCINATE 125 MG/2ML
125 INJECTION, POWDER, LYOPHILIZED, FOR SOLUTION INTRAMUSCULAR; INTRAVENOUS ONCE
Status: CANCELLED | OUTPATIENT
Start: 2019-05-09

## 2019-05-07 RX ORDER — SODIUM CHLORIDE 9 MG/ML
INJECTION, SOLUTION INTRAVENOUS ONCE
Status: COMPLETED | OUTPATIENT
Start: 2019-05-07 | End: 2019-05-07

## 2019-05-07 RX ORDER — HEPARIN SODIUM (PORCINE) LOCK FLUSH IV SOLN 100 UNIT/ML 100 UNIT/ML
500 SOLUTION INTRAVENOUS PRN
Status: DISCONTINUED | OUTPATIENT
Start: 2019-05-07 | End: 2019-05-08 | Stop reason: HOSPADM

## 2019-05-07 RX ORDER — SODIUM CHLORIDE 0.9 % (FLUSH) 0.9 %
10 SYRINGE (ML) INJECTION PRN
Status: DISCONTINUED | OUTPATIENT
Start: 2019-05-07 | End: 2019-05-08 | Stop reason: HOSPADM

## 2019-05-07 RX ORDER — 0.9 % SODIUM CHLORIDE 0.9 %
10 VIAL (ML) INJECTION ONCE
Status: CANCELLED | OUTPATIENT
Start: 2019-05-09

## 2019-05-07 RX ORDER — DIPHENHYDRAMINE HYDROCHLORIDE 50 MG/ML
50 INJECTION INTRAMUSCULAR; INTRAVENOUS ONCE
Status: CANCELLED | OUTPATIENT
Start: 2019-05-07

## 2019-05-07 RX ORDER — DIPHENHYDRAMINE HYDROCHLORIDE 50 MG/ML
50 INJECTION INTRAMUSCULAR; INTRAVENOUS ONCE
Status: CANCELLED | OUTPATIENT
Start: 2019-05-09

## 2019-05-07 RX ORDER — SODIUM CHLORIDE 0.9 % (FLUSH) 0.9 %
5 SYRINGE (ML) INJECTION PRN
Status: CANCELLED | OUTPATIENT
Start: 2019-05-09

## 2019-05-07 RX ORDER — SODIUM CHLORIDE 9 MG/ML
INJECTION, SOLUTION INTRAVENOUS ONCE
Status: CANCELLED | OUTPATIENT
Start: 2019-05-08

## 2019-05-07 RX ORDER — SODIUM CHLORIDE 9 MG/ML
INJECTION, SOLUTION INTRAVENOUS ONCE
Status: CANCELLED | OUTPATIENT
Start: 2019-05-09

## 2019-05-07 RX ORDER — SODIUM CHLORIDE 0.9 % (FLUSH) 0.9 %
10 SYRINGE (ML) INJECTION PRN
Status: CANCELLED | OUTPATIENT
Start: 2019-05-07

## 2019-05-07 RX ORDER — 0.9 % SODIUM CHLORIDE 0.9 %
10 VIAL (ML) INJECTION ONCE
Status: CANCELLED | OUTPATIENT
Start: 2019-05-08

## 2019-05-07 RX ORDER — SODIUM CHLORIDE 9 MG/ML
INJECTION, SOLUTION INTRAVENOUS CONTINUOUS
Status: CANCELLED | OUTPATIENT
Start: 2019-05-07

## 2019-05-07 RX ORDER — HEPARIN SODIUM (PORCINE) LOCK FLUSH IV SOLN 100 UNIT/ML 100 UNIT/ML
500 SOLUTION INTRAVENOUS PRN
Status: CANCELLED | OUTPATIENT
Start: 2019-05-08

## 2019-05-07 RX ORDER — SODIUM CHLORIDE 9 MG/ML
INJECTION, SOLUTION INTRAVENOUS CONTINUOUS
Status: CANCELLED | OUTPATIENT
Start: 2019-05-09

## 2019-05-07 RX ORDER — HEPARIN SODIUM (PORCINE) LOCK FLUSH IV SOLN 100 UNIT/ML 100 UNIT/ML
500 SOLUTION INTRAVENOUS PRN
Status: CANCELLED | OUTPATIENT
Start: 2019-05-07

## 2019-05-07 RX ORDER — METHYLPREDNISOLONE SODIUM SUCCINATE 125 MG/2ML
125 INJECTION, POWDER, LYOPHILIZED, FOR SOLUTION INTRAMUSCULAR; INTRAVENOUS ONCE
Status: CANCELLED | OUTPATIENT
Start: 2019-05-07

## 2019-05-07 RX ORDER — SODIUM CHLORIDE 0.9 % (FLUSH) 0.9 %
10 SYRINGE (ML) INJECTION PRN
Status: CANCELLED | OUTPATIENT
Start: 2019-05-09

## 2019-05-07 RX ORDER — PALONOSETRON 0.05 MG/ML
0.25 INJECTION, SOLUTION INTRAVENOUS ONCE
Status: COMPLETED | OUTPATIENT
Start: 2019-05-07 | End: 2019-05-07

## 2019-05-07 RX ORDER — DIPHENHYDRAMINE HYDROCHLORIDE 50 MG/ML
50 INJECTION INTRAMUSCULAR; INTRAVENOUS ONCE
Status: CANCELLED | OUTPATIENT
Start: 2019-05-08

## 2019-05-07 RX ORDER — SODIUM CHLORIDE 0.9 % (FLUSH) 0.9 %
10 SYRINGE (ML) INJECTION PRN
Status: CANCELLED | OUTPATIENT
Start: 2019-05-08

## 2019-05-07 RX ADMIN — SODIUM CHLORIDE: 9 INJECTION, SOLUTION INTRAVENOUS at 09:50

## 2019-05-07 RX ADMIN — Medication 10 ML: at 08:52

## 2019-05-07 RX ADMIN — Medication 10 ML: at 13:50

## 2019-05-07 RX ADMIN — CARBOPLATIN 480 MG: 10 INJECTION, SOLUTION INTRAVENOUS at 12:38

## 2019-05-07 RX ADMIN — PALONOSETRON HYDROCHLORIDE 0.25 MG: 0.25 INJECTION, SOLUTION INTRAVENOUS at 10:08

## 2019-05-07 RX ADMIN — ETOPOSIDE 228 MG: 20 INJECTION, SOLUTION, CONCENTRATE INTRAVENOUS at 11:24

## 2019-05-07 RX ADMIN — DEXAMETHASONE SODIUM PHOSPHATE 10 MG: 10 INJECTION INTRAMUSCULAR; INTRAVENOUS at 10:10

## 2019-05-07 RX ADMIN — SODIUM CHLORIDE 150 MG: 900 INJECTION, SOLUTION INTRAVENOUS at 10:15

## 2019-05-07 RX ADMIN — SODIUM CHLORIDE, PRESERVATIVE FREE 500 UNITS: 5 INJECTION INTRAVENOUS at 13:51

## 2019-05-07 RX ADMIN — Medication 10 ML: at 08:51

## 2019-05-07 ASSESSMENT — PAIN DESCRIPTION - LOCATION: LOCATION: THROAT

## 2019-05-07 ASSESSMENT — PAIN DESCRIPTION - PROGRESSION: CLINICAL_PROGRESSION: GRADUALLY WORSENING

## 2019-05-07 ASSESSMENT — PAIN DESCRIPTION - ONSET: ONSET: ON-GOING

## 2019-05-07 ASSESSMENT — PAIN DESCRIPTION - PAIN TYPE: TYPE: ACUTE PAIN

## 2019-05-07 ASSESSMENT — PAIN DESCRIPTION - DESCRIPTORS: DESCRIPTORS: SORE;BURNING

## 2019-05-07 ASSESSMENT — PAIN DESCRIPTION - ORIENTATION: ORIENTATION: RIGHT

## 2019-05-07 ASSESSMENT — PAIN SCALES - GENERAL: PAINLEVEL_OUTOF10: 8

## 2019-05-07 ASSESSMENT — PAIN DESCRIPTION - FREQUENCY: FREQUENCY: INTERMITTENT

## 2019-05-07 NOTE — PROGRESS NOTES
Pt here for C3D.1. Denies any new complaints. Dr at bedside. He states this is C3 due to having Cisplatin for C1. Labs drawn from port and results reviewed. Pt was treated without incident and d/c'd in stable condition.  Pt will return  5/8 for D2

## 2019-05-07 NOTE — PROGRESS NOTES
(PRINZIDE;ZESTORETIC) 20-25 MG per tablet Take 1 tablet by mouth daily      Multiple Vitamins-Minerals (THERAPEUTIC MULTIVITAMIN-MINERALS) tablet Take 1 tablet by mouth daily       No current facility-administered medications for this visit. Allergies:   Patient has no known allergies. Review of Systems:    Constitutional: No fever or chills. No night sweats, no weight loss . Positive fatigue  Eyes: No eye discharge, double vision, or eye pain   HEENT: negative for sore mouth, sore throat, hoarseness and voice change   Respiratory: negative for cough , sputum, dyspnea, wheezing, hemoptysis, chest pain   Cardiovascular: negative for chest pain, dyspnea, palpitations, orthopnea, PND   Gastrointestinal: negative for nausea, vomiting, diarrhea, constipation, abdominal pain, Dysphagia, hematemesis and hematochezia   Genitourinary: negative for frequency, dysuria, nocturia, urinary incontinence, and hematuria   Integument: negative for rash, skin lesions, bruises. Hematologic/Lymphatic: negative for easy bruising, bleeding, lymphadenopathy, or petechiae   Endocrine: negative for heat or cold intolerance,weight changes, change in bowel habits and hair loss   Musculoskeletal: negative for myalgias, arthralgias.   Positive for chronic back pain  Neurological: negative for headaches, dizziness, seizures, weakness, numbness        Physical Exam:    Vitals: /67   Pulse 82   Temp 97.6 °F (36.4 °C) (Oral)   Wt 222 lb 12.8 oz (101.1 kg)   BMI 31.07 kg/m²   General appearance - well appearing, no in pain or distress  Mental status - AAO X3  Eyes - pupils equal and reactive, extraocular eye movements intact  Mouth - mucous membranes moist, pharynx normal without lesions  Neck - supple, no significant adenopathy  Lymphatics - no palpable lymphadenopathy, no hepatosplenomegaly  Chest - clear to auscultation, no wheezes, rales or rhonchi, symmetric air entry  Heart - normal rate, regular rhythm, normal S1, S2, no murmurs  Abdomen - soft, nontender, nondistended, no masses or organomegaly  Neurological - alert, oriented, normal speech, no focal findings or movement disorder noted  Extremities - peripheral pulses normal, no pedal edema, no clubbing or cyanosis  Skin - normal coloration and turgor, no rashes, no suspicious skin lesions noted       DATA:    Labs:       Surgical Pathology Consultation            Patient Name: Yusra Garcia : 1950 (Age: 80) Gender: M Taken: 2019 Reported: 2019 Physician(s): Matheus Caor M.D. ([phone]) Copy To: Refugio Butts MD Accession #: [id number] Med. Rec. 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 Comment: Immunohistochemical stains are performed on 1A and demonstrate that the component of small cells carcinoma is positive for AE1/AE3, TTF1, synaptophysin, and chromogranin A, and the component of squamous cell carcinoma is positive for AE1/AE3 and   P63.  All controls are adequate.              **Report Electronically Signed Out**  rg/2019 Yesy Rodriguez MD        Interpretation performed at [address], License number: [id number].     Clinical History  R91.8.  R upper lobe of lung.      IMAGING DATA:         Impression   1.  FDG avid irregular nodule in the right upper lobe.  Its morphologic   features are suspicious for neoplastic disease versus an inflammatory process.       2.  FDG avid peripheral opacity in the superior segment of the right lower   lobe with morphologic features that can be seen with infection or neoplastic   disease.       3.  FDG avid right hilar lymph nodes, equivocal for inflammatory versus   neoplastic etiology.       4.  Benign right adrenal adenoma.       In the absence of prior imaging for comparison, short-term contrast-enhanced   CT follow-up may be warranted to further delineate acute inflammatory disease   versus multifocal neoplastic disease.                   2018 December  CT low dose lung [id number]/8/2018  [email or web address]    Impression:    Lung Rads Category 4B    There are 2 suspicious nodules in the right lung, one in the right apex and an even larger lesion in the superior segment of the right lower lobe.  PET CT scanning recommended for further characterization.  Please see above for details. This report contains a significant result and/or recommendation, which requires the attention of the licensed caregiver responsible for this patient. Therefore, I specifically designated this report be telephoned by the radiology department. Findings were instructed to be called to the clinical service on December 8, 2018 at 10:18 AM hours.        MR brain with and without contrast2/11/2019  [address]  Result Narrative   Clinical history: Right lung cancer.  Evaluation for metastatic disease. TECHNIQUE:    Multiplanar multisequence imaging of the brain was performed before and after the intravenous administration of 20 mL MultiHance gadolinium contrast material.  There are no prior imaging studies of the brain for comparison.     FINDINGS:    On the T1-weighted sagittal images the tip of the odontoid is ill-defined.  The vertebral body cortices are well-defined elsewhere, however its tip of the dens they are not seen.  Findings are not as striking on the T1-weighted postgadolinium-enhanced coronal images however.  This is nonspecific and   can be seen with inflammatory spondyloarthropathy and degenerative change with bony sclerosis, however destructive process involving the odontoid is not excluded and follow-up is recommended in light of the history of lung cancer.  Mild age appropriate cortical volume loss is present.  There is   some patchy and confluent increased T2 signal within the periventricular and subcortical white matter which is nonspecific and likely reflects sequelae of microvascular ischemia in a patient of this age.  Ventricular system appears within normal limits.  Basal cisterns and fourth ventricle are   patent.  There is no intracranial mass nor mass effect.  There is no restricted diffusion to suggest acute or subacute infarct.  There are no signal abnormalities present to suggest parenchymal nor extra-axial hemorrhage.  There is no pathologic parenchymal nor leptomeningeal enhancement. IMPRESSION:    1. The tip of the odontoid is ill-defined on the sagittal T1-weighted images.  This is nonspecific and may reflect degenerative arthropathy and bony sclerosis, however destructive lesion involving the odontoid is not excluded.  A reasonable course of follow-up would be plain films of the cervical   spine.  If this is equivocal, then CT of the cervical spine would be appropriate. 2.  Otherwise no evidence for intracranial metastatic disease.  No mass, hemorrhage, infarct, nor other acute finding  3.  age-appropriate cortical volume loss and nonspecific white matter lesions which likely reflects sequelae of microvascular ischemia in a patient of this age.      Nm Bone Scan Whole Body: 2/20/2019    1. No scintigraphic evidence of osseous metastatic disease. 2. Scattered osseous degenerative changes as above. 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 We will proceed with treatment using carboplatin and etoposide. Patient is scheduled for cycle #3 day #1 today  Toxicity check performed.   Patient is tolerating treatment without unexpected or severe side effects  Reiterated the

## 2019-05-08 ENCOUNTER — HOSPITAL ENCOUNTER (OUTPATIENT)
Dept: INFUSION THERAPY | Age: 69
Discharge: HOME | End: 2019-05-08
Payer: COMMERCIAL

## 2019-05-08 VITALS
DIASTOLIC BLOOD PRESSURE: 71 MMHG | SYSTOLIC BLOOD PRESSURE: 131 MMHG | RESPIRATION RATE: 16 BRPM | HEART RATE: 71 BPM | TEMPERATURE: 98 F

## 2019-05-08 DIAGNOSIS — C34.91 PRIMARY LUNG CANCER WITH METASTASIS FROM LUNG TO OTHER SITE, RIGHT (HCC): Primary | ICD-10-CM

## 2019-05-08 PROCEDURE — 2580000003 HC RX 258: Performed by: INTERNAL MEDICINE

## 2019-05-08 PROCEDURE — 96413 CHEMO IV INFUSION 1 HR: CPT

## 2019-05-08 PROCEDURE — 96375 TX/PRO/DX INJ NEW DRUG ADDON: CPT

## 2019-05-08 PROCEDURE — 6360000002 HC RX W HCPCS: Performed by: INTERNAL MEDICINE

## 2019-05-08 RX ORDER — SODIUM CHLORIDE 9 MG/ML
INJECTION, SOLUTION INTRAVENOUS ONCE
Status: COMPLETED | OUTPATIENT
Start: 2019-05-08 | End: 2019-05-08

## 2019-05-08 RX ORDER — SODIUM CHLORIDE 0.9 % (FLUSH) 0.9 %
10 SYRINGE (ML) INJECTION PRN
Status: DISCONTINUED | OUTPATIENT
Start: 2019-05-08 | End: 2019-05-09 | Stop reason: HOSPADM

## 2019-05-08 RX ORDER — DEXAMETHASONE SODIUM PHOSPHATE 10 MG/ML
10 INJECTION INTRAMUSCULAR; INTRAVENOUS ONCE
Status: COMPLETED | OUTPATIENT
Start: 2019-05-08 | End: 2019-05-08

## 2019-05-08 RX ORDER — HEPARIN SODIUM (PORCINE) LOCK FLUSH IV SOLN 100 UNIT/ML 100 UNIT/ML
500 SOLUTION INTRAVENOUS PRN
Status: DISCONTINUED | OUTPATIENT
Start: 2019-05-08 | End: 2019-05-09 | Stop reason: HOSPADM

## 2019-05-08 RX ADMIN — Medication 10 ML: at 09:19

## 2019-05-08 RX ADMIN — SODIUM CHLORIDE, PRESERVATIVE FREE 500 UNITS: 5 INJECTION INTRAVENOUS at 10:51

## 2019-05-08 RX ADMIN — DEXAMETHASONE SODIUM PHOSPHATE 10 MG: 10 INJECTION INTRAMUSCULAR; INTRAVENOUS at 09:29

## 2019-05-08 RX ADMIN — SODIUM CHLORIDE: 9 INJECTION, SOLUTION INTRAVENOUS at 09:19

## 2019-05-08 RX ADMIN — Medication 10 ML: at 10:50

## 2019-05-08 RX ADMIN — ETOPOSIDE 228 MG: 20 INJECTION, SOLUTION, CONCENTRATE INTRAVENOUS at 09:42

## 2019-05-08 ASSESSMENT — PAIN DESCRIPTION - FREQUENCY: FREQUENCY: INTERMITTENT

## 2019-05-08 ASSESSMENT — PAIN DESCRIPTION - ORIENTATION: ORIENTATION: RIGHT

## 2019-05-08 ASSESSMENT — PAIN DESCRIPTION - ONSET: ONSET: ON-GOING

## 2019-05-08 ASSESSMENT — PAIN DESCRIPTION - PAIN TYPE: TYPE: ACUTE PAIN

## 2019-05-08 ASSESSMENT — PAIN DESCRIPTION - PROGRESSION: CLINICAL_PROGRESSION: GRADUALLY IMPROVING

## 2019-05-08 ASSESSMENT — PAIN DESCRIPTION - LOCATION: LOCATION: THROAT

## 2019-05-08 ASSESSMENT — PAIN DESCRIPTION - DESCRIPTORS: DESCRIPTORS: SORE;BURNING

## 2019-05-08 ASSESSMENT — PAIN SCALES - GENERAL: PAINLEVEL_OUTOF10: 5

## 2019-05-08 NOTE — PROGRESS NOTES
Pt here for C.2D.2 -16. Denies any new complaints over night. Pt was treated without incident and d/c'd in stable condition. Pt will return tomorrow for C.2D.3 -16.

## 2019-05-08 NOTE — PLAN OF CARE
Problem: KNOWLEDGE DEFICIT  Goal: Patient/S.O. demonstrates understanding of disease process, treatment plan, medications, and discharge instructions.   5/8/2019 0935 by Aishwarya Jones, RN  Outcome: Met This Shift  5/8/2019 0935 by Aishwarya Jones, RN  Outcome: Met This Shift

## 2019-05-08 NOTE — PLAN OF CARE
Problem: Pain:  Goal: Pain level will decrease  Description  Pain level will decrease  Outcome: Met This Shift  Goal: Control of acute pain  Description  Control of acute pain  Outcome: Met This Shift  Goal: Control of chronic pain  Description  Control of chronic pain  Outcome: Met This Shift     Problem: KNOWLEDGE DEFICIT  Goal: Patient/S.O. demonstrates understanding of disease process, treatment plan, medications, and discharge instructions.   Outcome: Met This Shift

## 2019-05-09 ENCOUNTER — HOSPITAL ENCOUNTER (OUTPATIENT)
Dept: INFUSION THERAPY | Age: 69
Discharge: HOME | End: 2019-05-09
Payer: COMMERCIAL

## 2019-05-09 VITALS
SYSTOLIC BLOOD PRESSURE: 114 MMHG | HEART RATE: 78 BPM | DIASTOLIC BLOOD PRESSURE: 68 MMHG | RESPIRATION RATE: 16 BRPM | TEMPERATURE: 97.9 F

## 2019-05-09 DIAGNOSIS — C34.91 PRIMARY LUNG CANCER WITH METASTASIS FROM LUNG TO OTHER SITE, RIGHT (HCC): Primary | ICD-10-CM

## 2019-05-09 PROCEDURE — 2580000003 HC RX 258: Performed by: INTERNAL MEDICINE

## 2019-05-09 PROCEDURE — 96413 CHEMO IV INFUSION 1 HR: CPT

## 2019-05-09 PROCEDURE — 96375 TX/PRO/DX INJ NEW DRUG ADDON: CPT

## 2019-05-09 PROCEDURE — 6360000002 HC RX W HCPCS: Performed by: INTERNAL MEDICINE

## 2019-05-09 RX ORDER — SODIUM CHLORIDE 0.9 % (FLUSH) 0.9 %
10 SYRINGE (ML) INJECTION PRN
Status: DISCONTINUED | OUTPATIENT
Start: 2019-05-09 | End: 2019-05-10 | Stop reason: HOSPADM

## 2019-05-09 RX ORDER — HEPARIN SODIUM (PORCINE) LOCK FLUSH IV SOLN 100 UNIT/ML 100 UNIT/ML
500 SOLUTION INTRAVENOUS PRN
Status: DISCONTINUED | OUTPATIENT
Start: 2019-05-09 | End: 2019-05-10 | Stop reason: HOSPADM

## 2019-05-09 RX ORDER — DEXAMETHASONE SODIUM PHOSPHATE 10 MG/ML
10 INJECTION INTRAMUSCULAR; INTRAVENOUS ONCE
Status: COMPLETED | OUTPATIENT
Start: 2019-05-09 | End: 2019-05-09

## 2019-05-09 RX ORDER — SODIUM CHLORIDE 9 MG/ML
INJECTION, SOLUTION INTRAVENOUS ONCE
Status: COMPLETED | OUTPATIENT
Start: 2019-05-09 | End: 2019-05-09

## 2019-05-09 RX ADMIN — DEXAMETHASONE SODIUM PHOSPHATE 10 MG: 10 INJECTION INTRAMUSCULAR; INTRAVENOUS at 09:15

## 2019-05-09 RX ADMIN — Medication 10 ML: at 09:08

## 2019-05-09 RX ADMIN — Medication 10 ML: at 10:56

## 2019-05-09 RX ADMIN — Medication 500 UNITS: at 10:56

## 2019-05-09 RX ADMIN — Medication 10 ML: at 09:09

## 2019-05-09 RX ADMIN — ETOPOSIDE 228 MG: 20 INJECTION, SOLUTION, CONCENTRATE INTRAVENOUS at 09:31

## 2019-05-09 RX ADMIN — SODIUM CHLORIDE: 9 INJECTION, SOLUTION INTRAVENOUS at 09:09

## 2019-05-09 ASSESSMENT — PAIN DESCRIPTION - PROGRESSION: CLINICAL_PROGRESSION: GRADUALLY IMPROVING

## 2019-05-09 ASSESSMENT — PAIN DESCRIPTION - PAIN TYPE: TYPE: ACUTE PAIN

## 2019-05-09 ASSESSMENT — PAIN DESCRIPTION - ONSET: ONSET: ON-GOING

## 2019-05-09 ASSESSMENT — PAIN SCALES - GENERAL: PAINLEVEL_OUTOF10: 4

## 2019-05-09 ASSESSMENT — PAIN DESCRIPTION - FREQUENCY: FREQUENCY: INTERMITTENT

## 2019-05-09 ASSESSMENT — PAIN DESCRIPTION - ORIENTATION: ORIENTATION: RIGHT

## 2019-05-09 ASSESSMENT — PAIN DESCRIPTION - DESCRIPTORS: DESCRIPTORS: DISCOMFORT

## 2019-05-09 ASSESSMENT — PAIN DESCRIPTION - LOCATION: LOCATION: THROAT

## 2019-05-09 NOTE — PROGRESS NOTES
Pt here for C2D3. Denies any new complaints. Lab results reviewed. Pt was treated without incident and d/c'd in stable condition. Pt will return on 5/28 for OV and C3D1.

## 2019-05-23 ENCOUNTER — TELEPHONE (OUTPATIENT)
Dept: RADIATION ONCOLOGY | Age: 69
End: 2019-05-23

## 2019-05-23 NOTE — TELEPHONE ENCOUNTER
Name: Tiffanie Rose  MRN: [id number]  Date: 5/23/2019    Diagnosis: Cancer Staging  Primary lung cancer with metastasis from lung to other site, right [facility])  Staging form: Lung, AJCC 8th Edition  - Clinical stage from 1/16/2019: Stage IIIA (cT4, cN1, cM0) - Signed by Jj Wright MD on 2/28/2019  Staging comments: PET/CT scan 1/2/2019 revealed 1.2 cm right upper lobe mass SUV 2.6, 2 cm right lower lobe mass SUV 5.4, 1.4 cm right hilar lymph node SUV 7.8, 1.1 cm right adrenal nodule no uptake, negative bone. Right upper lobe biopsy 1/16/2019 positive for mixed small cell carcinoma and squamous cell carcinoma. MRI brain and negative 2/18/2019. Treatment Completion Date:     Subjective: pt states he is doing well. Throat has been better and he's not experiencing pain anymore. States he does have some SOB when doing activities and needs to rest more often than he used to.      Confirmed RO follow-up appointment:   [x]   Yes  []   No  []   N/A    Confirmed follow-up appointments with other referring physicians:   [x]   Yes  []   No  []   N/A    Instructions: encouraged patient to call if he has any questions or concerns  Reviewed and approved by:

## 2019-05-28 ENCOUNTER — OFFICE VISIT (OUTPATIENT)
Dept: ONCOLOGY | Age: 69
End: 2019-05-28
Payer: COMMERCIAL

## 2019-05-28 ENCOUNTER — HOSPITAL ENCOUNTER (OUTPATIENT)
Dept: INFUSION THERAPY | Age: 69
Discharge: HOME | End: 2019-05-28
Payer: COMMERCIAL

## 2019-05-28 VITALS
HEART RATE: 84 BPM | DIASTOLIC BLOOD PRESSURE: 72 MMHG | WEIGHT: 228.8 LBS | SYSTOLIC BLOOD PRESSURE: 110 MMHG | BODY MASS INDEX: 31.91 KG/M2 | TEMPERATURE: 97.7 F

## 2019-05-28 DIAGNOSIS — C34.91 SMALL CELL CARCINOMA OF LUNG, RIGHT (HCC): ICD-10-CM

## 2019-05-28 DIAGNOSIS — R53.1 ASTHENIA: ICD-10-CM

## 2019-05-28 DIAGNOSIS — C34.91 PRIMARY LUNG CANCER WITH METASTASIS FROM LUNG TO OTHER SITE, RIGHT (HCC): Primary | ICD-10-CM

## 2019-05-28 DIAGNOSIS — N18.30 STAGE 3 CHRONIC KIDNEY DISEASE (HCC): ICD-10-CM

## 2019-05-28 DIAGNOSIS — D64.9 ANEMIA, UNSPECIFIED TYPE: ICD-10-CM

## 2019-05-28 LAB
ABSOLUTE EOS #: 0.11 K/UL (ref 0–0.4)
ABSOLUTE IMMATURE GRANULOCYTE: ABNORMAL K/UL (ref 0–0.3)
ABSOLUTE LYMPH #: 0.61 K/UL (ref 1–4.8)
ABSOLUTE MONO #: 1.33 K/UL (ref 0.1–0.8)
ALBUMIN SERPL-MCNC: 3.9 G/DL (ref 3.5–5.2)
ALBUMIN/GLOBULIN RATIO: 1.3 (ref 1–2.5)
ALP BLD-CCNC: 68 U/L (ref 40–129)
ALT SERPL-CCNC: 16 U/L (ref 5–41)
ANION GAP SERPL CALCULATED.3IONS-SCNC: 12 MMOL/L (ref 9–17)
AST SERPL-CCNC: 16 U/L
BASOPHILS # BLD: 1 % (ref 0–2)
BASOPHILS ABSOLUTE: 0.04 K/UL (ref 0–0.2)
BILIRUB SERPL-MCNC: 0.15 MG/DL (ref 0.3–1.2)
BUN BLDV-MCNC: 15 MG/DL (ref 8–23)
BUN/CREAT BLD: ABNORMAL (ref 9–20)
CALCIUM SERPL-MCNC: 9.4 MG/DL (ref 8.6–10.4)
CHLORIDE BLD-SCNC: 103 MMOL/L (ref 98–107)
CO2: 23 MMOL/L (ref 20–31)
CREAT SERPL-MCNC: 1.4 MG/DL (ref 0.7–1.2)
DIFFERENTIAL TYPE: ABNORMAL
EOSINOPHILS RELATIVE PERCENT: 3 % (ref 1–4)
GFR AFRICAN AMERICAN: >60 ML/MIN
GFR NON-AFRICAN AMERICAN: 51 ML/MIN
GFR SERPL CREATININE-BSD FRML MDRD: ABNORMAL ML/MIN/{1.73_M2}
GFR SERPL CREATININE-BSD FRML MDRD: ABNORMAL ML/MIN/{1.73_M2}
GLUCOSE BLD-MCNC: 113 MG/DL (ref 70–99)
HCT VFR BLD CALC: 28.3 % (ref 41–53)
HEMOGLOBIN: 9.9 G/DL (ref 13.5–17.5)
IMMATURE GRANULOCYTES: ABNORMAL %
LYMPHOCYTES # BLD: 17 % (ref 24–44)
MCH RBC QN AUTO: 35.6 PG (ref 26–34)
MCHC RBC AUTO-ENTMCNC: 34.9 G/DL (ref 31–37)
MCV RBC AUTO: 101.9 FL (ref 80–100)
METAMYELOCYTES ABSOLUTE COUNT: 0.07 K/UL
METAMYELOCYTES: 2 %
MONOCYTES # BLD: 37 % (ref 1–7)
MORPHOLOGY: ABNORMAL
MORPHOLOGY: ABNORMAL
NRBC AUTOMATED: ABNORMAL PER 100 WBC
NUCLEATED RED BLOOD CELLS: 1 PER 100 WBC
PDW BLD-RTO: 19.1 % (ref 12.5–15.4)
PLATELET # BLD: 267 K/UL (ref 140–450)
PLATELET ESTIMATE: ABNORMAL
PMV BLD AUTO: 7 FL (ref 6–12)
POTASSIUM SERPL-SCNC: 4.1 MMOL/L (ref 3.7–5.3)
RBC # BLD: 2.78 M/UL (ref 4.5–5.9)
RBC # BLD: ABNORMAL 10*6/UL
SEG NEUTROPHILS: 40 % (ref 36–66)
SEGMENTED NEUTROPHILS ABSOLUTE COUNT: 1.44 K/UL (ref 1.8–7.7)
SODIUM BLD-SCNC: 138 MMOL/L (ref 135–144)
TOTAL PROTEIN: 6.9 G/DL (ref 6.4–8.3)
WBC # BLD: 3.6 K/UL (ref 3.5–11)
WBC # BLD: ABNORMAL 10*3/UL

## 2019-05-28 PROCEDURE — 96413 CHEMO IV INFUSION 1 HR: CPT

## 2019-05-28 PROCEDURE — 80053 COMPREHEN METABOLIC PANEL: CPT

## 2019-05-28 PROCEDURE — 36591 DRAW BLOOD OFF VENOUS DEVICE: CPT

## 2019-05-28 PROCEDURE — 2580000003 HC RX 258: Performed by: INTERNAL MEDICINE

## 2019-05-28 PROCEDURE — 96367 TX/PROPH/DG ADDL SEQ IV INF: CPT

## 2019-05-28 PROCEDURE — 4004F PT TOBACCO SCREEN RCVD TLK: CPT | Performed by: INTERNAL MEDICINE

## 2019-05-28 PROCEDURE — 96375 TX/PRO/DX INJ NEW DRUG ADDON: CPT

## 2019-05-28 PROCEDURE — 1123F ACP DISCUSS/DSCN MKR DOCD: CPT | Performed by: INTERNAL MEDICINE

## 2019-05-28 PROCEDURE — 4040F PNEUMOC VAC/ADMIN/RCVD: CPT | Performed by: INTERNAL MEDICINE

## 2019-05-28 PROCEDURE — 6360000002 HC RX W HCPCS: Performed by: INTERNAL MEDICINE

## 2019-05-28 PROCEDURE — G8417 CALC BMI ABV UP PARAM F/U: HCPCS | Performed by: INTERNAL MEDICINE

## 2019-05-28 PROCEDURE — G8427 DOCREV CUR MEDS BY ELIG CLIN: HCPCS | Performed by: INTERNAL MEDICINE

## 2019-05-28 PROCEDURE — 85025 COMPLETE CBC W/AUTO DIFF WBC: CPT

## 2019-05-28 PROCEDURE — 96417 CHEMO IV INFUS EACH ADDL SEQ: CPT

## 2019-05-28 PROCEDURE — 3017F COLORECTAL CA SCREEN DOC REV: CPT | Performed by: INTERNAL MEDICINE

## 2019-05-28 PROCEDURE — 99214 OFFICE O/P EST MOD 30 MIN: CPT | Performed by: INTERNAL MEDICINE

## 2019-05-28 RX ORDER — HEPARIN SODIUM (PORCINE) LOCK FLUSH IV SOLN 100 UNIT/ML 100 UNIT/ML
500 SOLUTION INTRAVENOUS PRN
Status: CANCELLED | OUTPATIENT
Start: 2019-05-28

## 2019-05-28 RX ORDER — SODIUM CHLORIDE 0.9 % (FLUSH) 0.9 %
5 SYRINGE (ML) INJECTION PRN
Status: CANCELLED | OUTPATIENT
Start: 2019-05-30

## 2019-05-28 RX ORDER — SODIUM CHLORIDE 9 MG/ML
INJECTION, SOLUTION INTRAVENOUS ONCE
Status: COMPLETED | OUTPATIENT
Start: 2019-05-28 | End: 2019-05-28

## 2019-05-28 RX ORDER — DEXAMETHASONE SODIUM PHOSPHATE 10 MG/ML
10 INJECTION, SOLUTION INTRAMUSCULAR; INTRAVENOUS ONCE
Status: COMPLETED | OUTPATIENT
Start: 2019-05-28 | End: 2019-05-28

## 2019-05-28 RX ORDER — SODIUM CHLORIDE 9 MG/ML
INJECTION, SOLUTION INTRAVENOUS ONCE
Status: CANCELLED | OUTPATIENT
Start: 2019-05-29

## 2019-05-28 RX ORDER — SODIUM CHLORIDE 0.9 % (FLUSH) 0.9 %
10 SYRINGE (ML) INJECTION PRN
Status: CANCELLED | OUTPATIENT
Start: 2019-05-30

## 2019-05-28 RX ORDER — 0.9 % SODIUM CHLORIDE 0.9 %
10 VIAL (ML) INJECTION ONCE
Status: CANCELLED | OUTPATIENT
Start: 2019-05-29

## 2019-05-28 RX ORDER — PALONOSETRON 0.05 MG/ML
0.25 INJECTION, SOLUTION INTRAVENOUS ONCE
Status: CANCELLED | OUTPATIENT
Start: 2019-05-28

## 2019-05-28 RX ORDER — METHYLPREDNISOLONE SODIUM SUCCINATE 125 MG/2ML
125 INJECTION, POWDER, LYOPHILIZED, FOR SOLUTION INTRAMUSCULAR; INTRAVENOUS ONCE
Status: CANCELLED | OUTPATIENT
Start: 2019-05-28

## 2019-05-28 RX ORDER — DIPHENHYDRAMINE HYDROCHLORIDE 50 MG/ML
50 INJECTION INTRAMUSCULAR; INTRAVENOUS ONCE
Status: CANCELLED | OUTPATIENT
Start: 2019-05-30

## 2019-05-28 RX ORDER — SODIUM CHLORIDE 0.9 % (FLUSH) 0.9 %
10 SYRINGE (ML) INJECTION PRN
Status: CANCELLED | OUTPATIENT
Start: 2019-05-29

## 2019-05-28 RX ORDER — DIPHENHYDRAMINE HYDROCHLORIDE 50 MG/ML
50 INJECTION INTRAMUSCULAR; INTRAVENOUS ONCE
Status: CANCELLED | OUTPATIENT
Start: 2019-05-29

## 2019-05-28 RX ORDER — SODIUM CHLORIDE 0.9 % (FLUSH) 0.9 %
5 SYRINGE (ML) INJECTION PRN
Status: CANCELLED | OUTPATIENT
Start: 2019-05-29

## 2019-05-28 RX ORDER — DIPHENHYDRAMINE HYDROCHLORIDE 50 MG/ML
50 INJECTION INTRAMUSCULAR; INTRAVENOUS ONCE
Status: CANCELLED | OUTPATIENT
Start: 2019-05-28

## 2019-05-28 RX ORDER — 0.9 % SODIUM CHLORIDE 0.9 %
10 VIAL (ML) INJECTION ONCE
Status: CANCELLED | OUTPATIENT
Start: 2019-05-28

## 2019-05-28 RX ORDER — HEPARIN SODIUM (PORCINE) LOCK FLUSH IV SOLN 100 UNIT/ML 100 UNIT/ML
500 SOLUTION INTRAVENOUS PRN
Status: CANCELLED | OUTPATIENT
Start: 2019-05-30

## 2019-05-28 RX ORDER — SODIUM CHLORIDE 0.9 % (FLUSH) 0.9 %
5 SYRINGE (ML) INJECTION PRN
Status: CANCELLED | OUTPATIENT
Start: 2019-05-28

## 2019-05-28 RX ORDER — SODIUM CHLORIDE 9 MG/ML
INJECTION, SOLUTION INTRAVENOUS ONCE
Status: CANCELLED | OUTPATIENT
Start: 2019-05-30

## 2019-05-28 RX ORDER — SODIUM CHLORIDE 9 MG/ML
INJECTION, SOLUTION INTRAVENOUS CONTINUOUS
Status: CANCELLED | OUTPATIENT
Start: 2019-05-29

## 2019-05-28 RX ORDER — 0.9 % SODIUM CHLORIDE 0.9 %
10 VIAL (ML) INJECTION ONCE
Status: CANCELLED | OUTPATIENT
Start: 2019-05-30

## 2019-05-28 RX ORDER — SODIUM CHLORIDE 9 MG/ML
INJECTION, SOLUTION INTRAVENOUS CONTINUOUS
Status: CANCELLED | OUTPATIENT
Start: 2019-05-30

## 2019-05-28 RX ORDER — SODIUM CHLORIDE 9 MG/ML
INJECTION, SOLUTION INTRAVENOUS CONTINUOUS
Status: CANCELLED | OUTPATIENT
Start: 2019-05-28

## 2019-05-28 RX ORDER — HEPARIN SODIUM (PORCINE) LOCK FLUSH IV SOLN 100 UNIT/ML 100 UNIT/ML
500 SOLUTION INTRAVENOUS PRN
Status: CANCELLED | OUTPATIENT
Start: 2019-05-29

## 2019-05-28 RX ORDER — SODIUM CHLORIDE 0.9 % (FLUSH) 0.9 %
10 SYRINGE (ML) INJECTION PRN
Status: DISCONTINUED | OUTPATIENT
Start: 2019-05-28 | End: 2019-05-29 | Stop reason: HOSPADM

## 2019-05-28 RX ORDER — METHYLPREDNISOLONE SODIUM SUCCINATE 125 MG/2ML
125 INJECTION, POWDER, LYOPHILIZED, FOR SOLUTION INTRAMUSCULAR; INTRAVENOUS ONCE
Status: CANCELLED | OUTPATIENT
Start: 2019-05-29

## 2019-05-28 RX ORDER — METHYLPREDNISOLONE SODIUM SUCCINATE 125 MG/2ML
125 INJECTION, POWDER, LYOPHILIZED, FOR SOLUTION INTRAMUSCULAR; INTRAVENOUS ONCE
Status: CANCELLED | OUTPATIENT
Start: 2019-05-30

## 2019-05-28 RX ORDER — HEPARIN SODIUM (PORCINE) LOCK FLUSH IV SOLN 100 UNIT/ML 100 UNIT/ML
500 SOLUTION INTRAVENOUS PRN
Status: DISCONTINUED | OUTPATIENT
Start: 2019-05-28 | End: 2019-05-29 | Stop reason: HOSPADM

## 2019-05-28 RX ORDER — SODIUM CHLORIDE 0.9 % (FLUSH) 0.9 %
10 SYRINGE (ML) INJECTION PRN
Status: CANCELLED | OUTPATIENT
Start: 2019-05-28

## 2019-05-28 RX ORDER — PALONOSETRON 0.05 MG/ML
0.25 INJECTION, SOLUTION INTRAVENOUS ONCE
Status: COMPLETED | OUTPATIENT
Start: 2019-05-28 | End: 2019-05-28

## 2019-05-28 RX ORDER — SODIUM CHLORIDE 9 MG/ML
INJECTION, SOLUTION INTRAVENOUS ONCE
Status: CANCELLED | OUTPATIENT
Start: 2019-05-28

## 2019-05-28 RX ADMIN — Medication 500 UNITS: at 13:21

## 2019-05-28 RX ADMIN — CARBOPLATIN 500 MG: 10 INJECTION, SOLUTION INTRAVENOUS at 12:09

## 2019-05-28 RX ADMIN — SODIUM CHLORIDE 150 MG: 900 INJECTION, SOLUTION INTRAVENOUS at 10:18

## 2019-05-28 RX ADMIN — PALONOSETRON HYDROCHLORIDE 0.25 MG: 0.25 INJECTION, SOLUTION INTRAVENOUS at 10:05

## 2019-05-28 RX ADMIN — DEXAMETHASONE SODIUM PHOSPHATE 10 MG: 10 INJECTION, SOLUTION INTRAMUSCULAR; INTRAVENOUS at 10:06

## 2019-05-28 RX ADMIN — SODIUM CHLORIDE: 9 INJECTION, SOLUTION INTRAVENOUS at 10:01

## 2019-05-28 RX ADMIN — ETOPOSIDE 228 MG: 20 INJECTION, SOLUTION, CONCENTRATE INTRAVENOUS at 10:59

## 2019-05-28 RX ADMIN — Medication 10 ML: at 13:21

## 2019-05-28 NOTE — PROGRESS NOTES
medical problems include dyslipidemia and hypertension. He also has arthritis pain    Started patient on concurrent chemoradiation using cisplatin and etoposide with dose adjustment for renal dysfunction. Chemotherapy changed to carboplatin and etoposide from cycle #2 due to renal dysfunction    Interim History:    Patient presents to the clinic for a follow-up visit and to discuss further treatment plan. Patient weight has been stable. Denies any complaints or interval event. Complains of fatigue. Denies neuropathy. Denies any fever or chills. Denies hospitalization or ER visit. He has been tolerating treatment without unexpected severe side effects. During this visit patient's allergy, social, medical, surgical history and medications were reviewed and updated. Past Medical History:   Past Medical History:   Diagnosis Date    DDD (degenerative disc disease), cervical     Gout     Heart murmur     hx. of when younger.  Hyperlipidemia     Hypertension     Lung [facility] ([facility]     right lung    MI (myocardial infarction) ([facility]     Skin cancer     face    Wears glasses        Past Surgical History:     Past Surgical History:   Procedure Laterality Date    APPENDECTOMY      CARDIAC CATHETERIZATION      w/stent    CAROTID STENT PLACEMENT      COLONOSCOPY      FOOT SURGERY Right     2nd toe(bone spur).  SKIN BIOPSY      SKIN BIOPSY      face under lt. eye.     TONSILLECTOMY         Patient Family Social History:    Family History   Problem Relation Age of Onset    Cancer Father         lung cancer      Social History     Socioeconomic History    Marital status:      Spouse name: Not on file    Number of children: Not on file    Years of education: Not on file    Highest education level: Not on file   Occupational History    Not on file   Social Needs    Financial resource strain: Not on file    Food insecurity:     Worry: Not on file     Inability: Not on file   Mandy Roberts Transportation needs:     Medical: Not on file     Non-medical: Not on file   Tobacco Use    Smoking status: Former Smoker     Packs/day: 0.50     Types: Cigarettes     Start date: 4/8/2019    Smokeless tobacco: Current User    Tobacco comment: also vapes   Substance and Sexual Activity    Alcohol use: No    Drug use: Yes     Types: Marijuana    Sexual activity: Not on file   Lifestyle    Physical activity:     Days per week: Not on file     Minutes per session: Not on file    Stress: Not on file   Relationships    Social connections:     Talks on phone: Not on file     Gets together: Not on file     Attends Sabianist service: Not on file     Active member of club or organization: Not on file     Attends meetings of clubs or organizations: Not on file     Relationship status: Not on file    Intimate partner violence:     Fear of current or ex partner: Not on file     Emotionally abused: Not on file     Physically abused: Not on file     Forced sexual activity: Not on file   Other Topics Concern    Not on file   Social History Narrative    Not on file      Current Medications:     Current Outpatient Medications   Medication Sig Dispense Refill    Magic Mouthwash (MIRACLE MOUTHWASH) Swish and spit 5 mLs 4 times daily as needed for Irritation      lidocaine-prilocaine (EMLA) 2.5-2.5 % cream Apply topically as needed. Apply a quarter size amount to port site 1 hour before chemotherapy. Cover with plastic wrap.  30 g 0    ondansetron (ZOFRAN-ODT) 8 MG TBDP disintegrating tablet Place 1 tablet under the tongue 3 times daily as needed for Nausea or Vomiting 90 tablet 3    acetaminophen (TYLENOL) 325 MG tablet Take 650 mg by mouth every 6 hours as needed for Pain      allopurinol (ZYLOPRIM) 100 MG tablet Take 100 mg by mouth daily      simvastatin (ZOCOR) 40 MG tablet Take 40 mg by mouth daily      celecoxib (CELEBREX) 200 MG capsule Take 200 mg by mouth 2 times daily  3    lisinopril-hydrochlorothiazide (PRINZIDE;ZESTORETIC) 20-25 MG per tablet Take 1 tablet by mouth daily      Multiple Vitamins-Minerals (THERAPEUTIC MULTIVITAMIN-MINERALS) tablet Take 1 tablet by mouth daily       No current facility-administered medications for this visit. Allergies:   Patient has no known allergies. Review of Systems:    Constitutional: No fever or chills. No night sweats, no weight loss . Positive fatigue  Eyes: No eye discharge, double vision, or eye pain   HEENT: negative for sore mouth, sore throat, hoarseness and voice change   Respiratory: negative for cough , sputum, dyspnea, wheezing, hemoptysis, chest pain   Cardiovascular: negative for chest pain, dyspnea, palpitations, orthopnea, PND   Gastrointestinal: negative for nausea, vomiting, diarrhea, constipation, abdominal pain, Dysphagia, hematemesis and hematochezia   Genitourinary: negative for frequency, dysuria, nocturia, urinary incontinence, and hematuria   Integument: negative for rash, skin lesions, bruises. Hematologic/Lymphatic: negative for easy bruising, bleeding, lymphadenopathy, or petechiae   Endocrine: negative for heat or cold intolerance,weight changes, change in bowel habits and hair loss   Musculoskeletal: negative for myalgias, arthralgias.   Positive for chronic back pain  Neurological: negative for headaches, dizziness, seizures, weakness, numbness        Physical Exam:    Vitals: /72   Pulse 84   Temp 97.7 °F (36.5 °C) (Oral)   Wt 228 lb 12.8 oz (103.8 kg)   BMI 31.91 kg/m²   General appearance - well appearing, no in pain or distress  Mental status - AAO X3  Eyes - pupils equal and reactive, extraocular eye movements intact  Mouth - mucous membranes moist, pharynx normal without lesions  Neck - supple, no significant adenopathy  Lymphatics - no palpable lymphadenopathy, no hepatosplenomegaly  Chest - clear to auscultation, no wheezes, rales or rhonchi, symmetric air entry  Heart - normal rate, regular rhythm, normal S1, S2, no murmurs  Abdomen - soft, nontender, nondistended, no masses or organomegaly  Neurological - alert, oriented, normal speech, no focal findings or movement disorder noted  Extremities - peripheral pulses normal, no pedal edema, no clubbing or cyanosis  Skin - normal coloration and turgor, no rashes, no suspicious skin lesions noted       DATA:    Labs:       Surgical Pathology Consultation            Patient Name: Neeru Smith : 1950 (Age: 80) Gender: M Taken: 2019 Reported: 2019 Physician(s): Lupe Barbour M.D. ([phone]) Copy To: Kolton Ferguson MD Accession #: [id number] Med. Rec. #: [id number] Acct: # [de-identified]   Final Pathologic Diagnosis  Lung, right upper lobe, core biopsy:       Invasive carcinoma consisting of two components: small cell carcinoma and squamous cell carcinoma. Comment: Immunohistochemical stains are performed on 1A and demonstrate that the component of small cells carcinoma is positive for AE1/AE3, TTF1, synaptophysin, and chromogranin A, and the component of squamous cell carcinoma is positive for AE1/AE3 and   P63.  All controls are adequate.              **Report Electronically Signed Out**  rg/2019 Opal Yang MD        Interpretation performed at [address], License number: [id number].     Clinical History  R91.8.  R upper lobe of lung.      IMAGING DATA:         Impression   1.  FDG avid irregular nodule in the right upper lobe.  Its morphologic   features are suspicious for neoplastic disease versus an inflammatory process.       2.  FDG avid peripheral opacity in the superior segment of the right lower   lobe with morphologic features that can be seen with infection or neoplastic   disease.       3.  FDG avid right hilar lymph nodes, equivocal for inflammatory versus   neoplastic etiology.       4.  Benign right adrenal adenoma.       In the absence of prior imaging for comparison, short-term contrast-enhanced   CT follow-up may be warranted to further delineate acute inflammatory disease   versus multifocal neoplastic disease.                   2018 December  CT low dose lung [id number]/8/2018  [facility]    Impression:    Lung Rads Category 4B    There are 2 suspicious nodules in the right lung, one in the right apex and an even larger lesion in the superior segment of the right lower lobe.  PET CT scanning recommended for further characterization.  Please see above for details. This report contains a significant result and/or recommendation, which requires the attention of the licensed caregiver responsible for this patient. Therefore, I specifically designated this report be telephoned by the radiology department. Findings were instructed to be called to the clinical service on December 8, 2018 at 10:18 AM hours.        MR brain with and without contrast2/11/2019  [address]  Result Narrative   Clinical history: Right lung cancer.  Evaluation for metastatic disease. TECHNIQUE:    Multiplanar multisequence imaging of the brain was performed before and after the intravenous administration of 20 mL MultiHance gadolinium contrast material.  There are no prior imaging studies of the brain for comparison.     FINDINGS:    On the T1-weighted sagittal images the tip of the odontoid is ill-defined.  The vertebral body cortices are well-defined elsewhere, however its tip of the dens they are not seen.  Findings are not as striking on the T1-weighted postgadolinium-enhanced coronal images however.  This is nonspecific and   can be seen with inflammatory spondyloarthropathy and degenerative change with bony sclerosis, however destructive process involving the odontoid is not excluded and follow-up is recommended in light of the history of lung cancer.  Mild age appropriate cortical volume loss is present.  There is   some patchy and confluent increased T2 signal within the periventricular and subcortical white matter which is nonspecific and likely reflects sequelae of microvascular ischemia in a patient of this age.  Ventricular system appears within normal limits.  Basal cisterns and fourth ventricle are   patent.  There is no intracranial mass nor mass effect.  There is no restricted diffusion to suggest acute or subacute infarct.  There are no signal abnormalities present to suggest parenchymal nor extra-axial hemorrhage.  There is no pathologic parenchymal nor leptomeningeal enhancement. IMPRESSION:    1. The tip of the odontoid is ill-defined on the sagittal T1-weighted images.  This is nonspecific and may reflect degenerative arthropathy and bony sclerosis, however destructive lesion involving the odontoid is not excluded.  A reasonable course of follow-up would be plain films of the cervical   spine.  If this is equivocal, then CT of the cervical spine would be appropriate. 2.  Otherwise no evidence for intracranial metastatic disease.  No mass, hemorrhage, infarct, nor other acute finding  3.  age-appropriate cortical volume loss and nonspecific white matter lesions which likely reflects sequelae of microvascular ischemia in a patient of this age.      Nm Bone Scan Whole Body: 2/20/2019    1. No scintigraphic evidence of osseous metastatic disease. 2. Scattered osseous degenerative changes as above. 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 Patient is tolerating treatment without unexpected survey side effects. Labs are adequate for treatment. We will proceed with treatment  Reiterated treatment plan.   We will obtain scans after conclusion of 4

## 2019-05-28 NOTE — PROGRESS NOTES
Pt here for C3D.1. Pt seen by Dr. Paras Griffiths prior to treatment. ANC 1.44 lab results reviewed with Dr. Paras Griffiths, order to proceed with treatment. Pt was treated without incident and d/c'd in stable condition. Pt will return in 1 day for C3D2.

## 2019-05-29 ENCOUNTER — HOSPITAL ENCOUNTER (OUTPATIENT)
Dept: INFUSION THERAPY | Age: 69
Discharge: HOME | End: 2019-05-29
Payer: COMMERCIAL

## 2019-05-29 VITALS
RESPIRATION RATE: 16 BRPM | HEART RATE: 85 BPM | TEMPERATURE: 97.8 F | SYSTOLIC BLOOD PRESSURE: 136 MMHG | DIASTOLIC BLOOD PRESSURE: 81 MMHG

## 2019-05-29 DIAGNOSIS — C34.91 PRIMARY LUNG CANCER WITH METASTASIS FROM LUNG TO OTHER SITE, RIGHT (HCC): Primary | ICD-10-CM

## 2019-05-29 PROCEDURE — 6360000002 HC RX W HCPCS: Performed by: INTERNAL MEDICINE

## 2019-05-29 PROCEDURE — 96375 TX/PRO/DX INJ NEW DRUG ADDON: CPT

## 2019-05-29 PROCEDURE — 96413 CHEMO IV INFUSION 1 HR: CPT

## 2019-05-29 PROCEDURE — 2580000003 HC RX 258: Performed by: INTERNAL MEDICINE

## 2019-05-29 RX ORDER — SODIUM CHLORIDE 0.9 % (FLUSH) 0.9 %
10 SYRINGE (ML) INJECTION PRN
Status: DISCONTINUED | OUTPATIENT
Start: 2019-05-29 | End: 2019-05-30 | Stop reason: HOSPADM

## 2019-05-29 RX ORDER — HEPARIN SODIUM (PORCINE) LOCK FLUSH IV SOLN 100 UNIT/ML 100 UNIT/ML
500 SOLUTION INTRAVENOUS PRN
Status: DISCONTINUED | OUTPATIENT
Start: 2019-05-29 | End: 2019-05-30 | Stop reason: HOSPADM

## 2019-05-29 RX ORDER — DEXAMETHASONE SODIUM PHOSPHATE 10 MG/ML
10 INJECTION INTRAMUSCULAR; INTRAVENOUS ONCE
Status: COMPLETED | OUTPATIENT
Start: 2019-05-29 | End: 2019-05-29

## 2019-05-29 RX ORDER — SODIUM CHLORIDE 9 MG/ML
INJECTION, SOLUTION INTRAVENOUS ONCE
Status: COMPLETED | OUTPATIENT
Start: 2019-05-29 | End: 2019-05-29

## 2019-05-29 RX ADMIN — Medication 500 UNITS: at 10:57

## 2019-05-29 RX ADMIN — DEXAMETHASONE SODIUM PHOSPHATE 10 MG: 10 INJECTION INTRAMUSCULAR; INTRAVENOUS at 09:25

## 2019-05-29 RX ADMIN — ETOPOSIDE 228 MG: 20 INJECTION, SOLUTION, CONCENTRATE INTRAVENOUS at 09:50

## 2019-05-29 RX ADMIN — Medication 10 ML: at 10:57

## 2019-05-29 RX ADMIN — SODIUM CHLORIDE: 9 INJECTION, SOLUTION INTRAVENOUS at 09:21

## 2019-05-29 RX ADMIN — Medication 10 ML: at 09:21

## 2019-05-29 NOTE — PROGRESS NOTES
Mao Jackson here for day 2 treatment, he denies any complaints   Port already accessed, flushes easily, good blood return   Pre med given   Chemotherapy over 1hr as started  Pt tolerated well and discharged home

## 2019-05-30 ENCOUNTER — HOSPITAL ENCOUNTER (OUTPATIENT)
Dept: INFUSION THERAPY | Age: 69
Discharge: HOME | End: 2019-05-30
Payer: COMMERCIAL

## 2019-05-30 VITALS — HEART RATE: 62 BPM | TEMPERATURE: 97.8 F | DIASTOLIC BLOOD PRESSURE: 71 MMHG | SYSTOLIC BLOOD PRESSURE: 119 MMHG

## 2019-05-30 DIAGNOSIS — C34.91 PRIMARY LUNG CANCER WITH METASTASIS FROM LUNG TO OTHER SITE, RIGHT (HCC): Primary | ICD-10-CM

## 2019-05-30 PROCEDURE — 2580000003 HC RX 258: Performed by: INTERNAL MEDICINE

## 2019-05-30 PROCEDURE — 96375 TX/PRO/DX INJ NEW DRUG ADDON: CPT

## 2019-05-30 PROCEDURE — 6360000002 HC RX W HCPCS: Performed by: INTERNAL MEDICINE

## 2019-05-30 PROCEDURE — 96413 CHEMO IV INFUSION 1 HR: CPT

## 2019-05-30 RX ORDER — DEXAMETHASONE SODIUM PHOSPHATE 10 MG/ML
10 INJECTION INTRAMUSCULAR; INTRAVENOUS ONCE
Status: COMPLETED | OUTPATIENT
Start: 2019-05-30 | End: 2019-05-30

## 2019-05-30 RX ORDER — HEPARIN SODIUM (PORCINE) LOCK FLUSH IV SOLN 100 UNIT/ML 100 UNIT/ML
500 SOLUTION INTRAVENOUS PRN
Status: DISCONTINUED | OUTPATIENT
Start: 2019-05-30 | End: 2019-05-31 | Stop reason: HOSPADM

## 2019-05-30 RX ORDER — SODIUM CHLORIDE 0.9 % (FLUSH) 0.9 %
10 SYRINGE (ML) INJECTION PRN
Status: DISCONTINUED | OUTPATIENT
Start: 2019-05-30 | End: 2019-05-31 | Stop reason: HOSPADM

## 2019-05-30 RX ORDER — SODIUM CHLORIDE 9 MG/ML
INJECTION, SOLUTION INTRAVENOUS ONCE
Status: COMPLETED | OUTPATIENT
Start: 2019-05-30 | End: 2019-05-30

## 2019-05-30 RX ADMIN — Medication 500 UNITS: at 10:46

## 2019-05-30 RX ADMIN — DEXAMETHASONE SODIUM PHOSPHATE 10 MG: 10 INJECTION INTRAMUSCULAR; INTRAVENOUS at 09:10

## 2019-05-30 RX ADMIN — Medication 10 ML: at 09:08

## 2019-05-30 RX ADMIN — Medication 10 ML: at 10:46

## 2019-05-30 RX ADMIN — SODIUM CHLORIDE: 9 INJECTION, SOLUTION INTRAVENOUS at 09:08

## 2019-05-30 RX ADMIN — ETOPOSIDE 228 MG: 20 INJECTION, SOLUTION, CONCENTRATE INTRAVENOUS at 09:37

## 2019-05-30 NOTE — PROGRESS NOTES
Pt here for C3D3. Denies any new complaints. Labs results reviewed. Pt was treated without incident and d/c'd in stable condition. Pt returns 6/18/19 for C4D1.

## 2019-06-03 ENCOUNTER — TELEPHONE (OUTPATIENT)
Dept: ONCOLOGY | Age: 69
End: 2019-06-03

## 2019-06-03 NOTE — TELEPHONE ENCOUNTER
Call received from patient stating he has increased fatigue and SOB with exertion. Patient stated he does not think he is drinking enough fluids. Patient offered to come into office for cbc w/diff draw today. Patient stated he prefers to increase water intake and stated he will call office tomorrow if he is not feeling any better. Patient denies fevers, chills, n/v/d and all other s/s at this time.  Jason Das

## 2019-06-10 ENCOUNTER — TELEPHONE (OUTPATIENT)
Dept: ONCOLOGY | Age: 69
End: 2019-06-10

## 2019-06-10 NOTE — TELEPHONE ENCOUNTER
Bel Lopez called and wanted to know if we had sent his paperwork yet. Was instructed that they would be sent on 6/7. Paperwork is still in Dr Aimnta govea unsigned. Bel Lopez upset because he will not receive his disability check. He then called and spoke to the people at [facility] and they will accept the unsigned this time but they still need him to sign the forms when he is here on Friday and to re-fax them. Sent the paperwork and received confirmation.

## 2019-06-14 ENCOUNTER — TELEPHONE (OUTPATIENT)
Dept: ONCOLOGY | Age: 69
End: 2019-06-14

## 2019-06-14 NOTE — TELEPHONE ENCOUNTER
[place] disability paperwork completed, signed and dated by Dr Niya Dowling and faxed to [employer] at [phone]. Confirmation fax and paperwork placed in pile to be scanned.  Jens Bynum

## 2019-06-18 ENCOUNTER — HOSPITAL ENCOUNTER (OUTPATIENT)
Dept: INFUSION THERAPY | Age: 69
Discharge: HOME | End: 2019-06-18
Payer: COMMERCIAL

## 2019-06-18 VITALS
HEIGHT: 71 IN | BODY MASS INDEX: 31.78 KG/M2 | HEART RATE: 86 BPM | TEMPERATURE: 97.7 F | SYSTOLIC BLOOD PRESSURE: 106 MMHG | RESPIRATION RATE: 17 BRPM | DIASTOLIC BLOOD PRESSURE: 70 MMHG | WEIGHT: 227 LBS

## 2019-06-18 DIAGNOSIS — C34.91 PRIMARY LUNG CANCER WITH METASTASIS FROM LUNG TO OTHER SITE, RIGHT (HCC): Primary | ICD-10-CM

## 2019-06-18 LAB
ABSOLUTE EOS #: 0.08 K/UL (ref 0–0.4)
ABSOLUTE IMMATURE GRANULOCYTE: ABNORMAL K/UL (ref 0–0.3)
ABSOLUTE LYMPH #: 0.62 K/UL (ref 1–4.8)
ABSOLUTE MONO #: 1.21 K/UL (ref 0.1–1.2)
ALBUMIN SERPL-MCNC: 4 G/DL (ref 3.5–5.2)
ALBUMIN/GLOBULIN RATIO: 1.3 (ref 1–2.5)
ALP BLD-CCNC: 79 U/L (ref 40–129)
ALT SERPL-CCNC: 14 U/L (ref 5–41)
ANION GAP SERPL CALCULATED.3IONS-SCNC: 12 MMOL/L (ref 9–17)
AST SERPL-CCNC: 15 U/L
BASOPHILS # BLD: 1 % (ref 0–2)
BASOPHILS ABSOLUTE: 0.04 K/UL (ref 0–0.2)
BILIRUB SERPL-MCNC: 0.22 MG/DL (ref 0.3–1.2)
BUN BLDV-MCNC: 19 MG/DL (ref 8–23)
BUN/CREAT BLD: ABNORMAL (ref 9–20)
CALCIUM SERPL-MCNC: 9.6 MG/DL (ref 8.6–10.4)
CHLORIDE BLD-SCNC: 101 MMOL/L (ref 98–107)
CO2: 22 MMOL/L (ref 20–31)
CREAT SERPL-MCNC: 1.57 MG/DL (ref 0.7–1.2)
DIFFERENTIAL TYPE: ABNORMAL
EOSINOPHILS RELATIVE PERCENT: 2 % (ref 1–4)
GFR AFRICAN AMERICAN: 54 ML/MIN
GFR NON-AFRICAN AMERICAN: 44 ML/MIN
GFR SERPL CREATININE-BSD FRML MDRD: ABNORMAL ML/MIN/{1.73_M2}
GFR SERPL CREATININE-BSD FRML MDRD: ABNORMAL ML/MIN/{1.73_M2}
GLUCOSE BLD-MCNC: 155 MG/DL (ref 70–99)
HCT VFR BLD CALC: 25.9 % (ref 41–53)
HEMOGLOBIN: 9.1 G/DL (ref 13.5–17.5)
IMMATURE GRANULOCYTES: ABNORMAL %
LYMPHOCYTES # BLD: 16 % (ref 24–44)
MCH RBC QN AUTO: 36.3 PG (ref 26–34)
MCHC RBC AUTO-ENTMCNC: 34.9 G/DL (ref 31–37)
MCV RBC AUTO: 103.8 FL (ref 80–100)
MONOCYTES # BLD: 31 % (ref 2–11)
MORPHOLOGY: ABNORMAL
NRBC AUTOMATED: ABNORMAL PER 100 WBC
PDW BLD-RTO: 20.1 % (ref 12.5–15.4)
PLATELET # BLD: 281 K/UL (ref 140–450)
PLATELET ESTIMATE: ABNORMAL
PMV BLD AUTO: 7.1 FL (ref 6–12)
POTASSIUM SERPL-SCNC: 4.3 MMOL/L (ref 3.7–5.3)
RBC # BLD: 2.5 M/UL (ref 4.5–5.9)
RBC # BLD: ABNORMAL 10*6/UL
SEG NEUTROPHILS: 50 % (ref 36–66)
SEGMENTED NEUTROPHILS ABSOLUTE COUNT: 1.95 K/UL (ref 1.8–7.7)
SODIUM BLD-SCNC: 135 MMOL/L (ref 135–144)
TOTAL PROTEIN: 7 G/DL (ref 6.4–8.3)
WBC # BLD: 3.9 K/UL (ref 3.5–11)
WBC # BLD: ABNORMAL 10*3/UL

## 2019-06-18 PROCEDURE — 96365 THER/PROPH/DIAG IV INF INIT: CPT

## 2019-06-18 PROCEDURE — 96413 CHEMO IV INFUSION 1 HR: CPT

## 2019-06-18 PROCEDURE — 36591 DRAW BLOOD OFF VENOUS DEVICE: CPT

## 2019-06-18 PROCEDURE — 96367 TX/PROPH/DG ADDL SEQ IV INF: CPT

## 2019-06-18 PROCEDURE — 85025 COMPLETE CBC W/AUTO DIFF WBC: CPT

## 2019-06-18 PROCEDURE — 2580000003 HC RX 258: Performed by: INTERNAL MEDICINE

## 2019-06-18 PROCEDURE — 6360000002 HC RX W HCPCS: Performed by: INTERNAL MEDICINE

## 2019-06-18 PROCEDURE — 96375 TX/PRO/DX INJ NEW DRUG ADDON: CPT

## 2019-06-18 PROCEDURE — 80053 COMPREHEN METABOLIC PANEL: CPT

## 2019-06-18 PROCEDURE — 96417 CHEMO IV INFUS EACH ADDL SEQ: CPT

## 2019-06-18 RX ORDER — DIPHENHYDRAMINE HYDROCHLORIDE 50 MG/ML
50 INJECTION INTRAMUSCULAR; INTRAVENOUS ONCE
Status: CANCELLED | OUTPATIENT
Start: 2019-06-18

## 2019-06-18 RX ORDER — EPINEPHRINE 1 MG/ML
0.3 INJECTION, SOLUTION, CONCENTRATE INTRAVENOUS PRN
Status: CANCELLED | OUTPATIENT
Start: 2019-06-20

## 2019-06-18 RX ORDER — SODIUM CHLORIDE 9 MG/ML
INJECTION, SOLUTION INTRAVENOUS ONCE
Status: COMPLETED | OUTPATIENT
Start: 2019-06-18 | End: 2019-06-18

## 2019-06-18 RX ORDER — PALONOSETRON 0.05 MG/ML
0.25 INJECTION, SOLUTION INTRAVENOUS ONCE
Status: COMPLETED | OUTPATIENT
Start: 2019-06-18 | End: 2019-06-18

## 2019-06-18 RX ORDER — SODIUM CHLORIDE 0.9 % (FLUSH) 0.9 %
5 SYRINGE (ML) INJECTION PRN
Status: CANCELLED | OUTPATIENT
Start: 2019-06-20

## 2019-06-18 RX ORDER — 0.9 % SODIUM CHLORIDE 0.9 %
10 VIAL (ML) INJECTION ONCE
Status: CANCELLED | OUTPATIENT
Start: 2019-06-18

## 2019-06-18 RX ORDER — EPINEPHRINE 1 MG/ML
0.3 INJECTION, SOLUTION, CONCENTRATE INTRAVENOUS PRN
Status: CANCELLED | OUTPATIENT
Start: 2019-06-19

## 2019-06-18 RX ORDER — HEPARIN SODIUM (PORCINE) LOCK FLUSH IV SOLN 100 UNIT/ML 100 UNIT/ML
500 SOLUTION INTRAVENOUS PRN
Status: DISCONTINUED | OUTPATIENT
Start: 2019-06-18 | End: 2019-06-19 | Stop reason: HOSPADM

## 2019-06-18 RX ORDER — SODIUM CHLORIDE 0.9 % (FLUSH) 0.9 %
5 SYRINGE (ML) INJECTION PRN
Status: CANCELLED | OUTPATIENT
Start: 2019-06-19

## 2019-06-18 RX ORDER — DIPHENHYDRAMINE HYDROCHLORIDE 50 MG/ML
50 INJECTION INTRAMUSCULAR; INTRAVENOUS ONCE
Status: CANCELLED | OUTPATIENT
Start: 2019-06-19

## 2019-06-18 RX ORDER — SODIUM CHLORIDE 9 MG/ML
INJECTION, SOLUTION INTRAVENOUS CONTINUOUS
Status: CANCELLED | OUTPATIENT
Start: 2019-06-20

## 2019-06-18 RX ORDER — SODIUM CHLORIDE 0.9 % (FLUSH) 0.9 %
5 SYRINGE (ML) INJECTION PRN
Status: CANCELLED | OUTPATIENT
Start: 2019-06-18

## 2019-06-18 RX ORDER — METHYLPREDNISOLONE SODIUM SUCCINATE 125 MG/2ML
125 INJECTION, POWDER, LYOPHILIZED, FOR SOLUTION INTRAMUSCULAR; INTRAVENOUS ONCE
Status: CANCELLED | OUTPATIENT
Start: 2019-06-19

## 2019-06-18 RX ORDER — 0.9 % SODIUM CHLORIDE 0.9 %
10 VIAL (ML) INJECTION ONCE
Status: CANCELLED | OUTPATIENT
Start: 2019-06-20

## 2019-06-18 RX ORDER — DEXAMETHASONE SODIUM PHOSPHATE 10 MG/ML
10 INJECTION, SOLUTION INTRAMUSCULAR; INTRAVENOUS ONCE
Status: CANCELLED | OUTPATIENT
Start: 2019-06-19

## 2019-06-18 RX ORDER — SODIUM CHLORIDE 0.9 % (FLUSH) 0.9 %
10 SYRINGE (ML) INJECTION PRN
Status: DISCONTINUED | OUTPATIENT
Start: 2019-06-18 | End: 2019-06-19 | Stop reason: HOSPADM

## 2019-06-18 RX ORDER — HEPARIN SODIUM (PORCINE) LOCK FLUSH IV SOLN 100 UNIT/ML 100 UNIT/ML
500 SOLUTION INTRAVENOUS PRN
Status: CANCELLED | OUTPATIENT
Start: 2019-06-20

## 2019-06-18 RX ORDER — 0.9 % SODIUM CHLORIDE 0.9 %
10 VIAL (ML) INJECTION ONCE
Status: CANCELLED | OUTPATIENT
Start: 2019-06-19

## 2019-06-18 RX ORDER — SODIUM CHLORIDE 9 MG/ML
INJECTION, SOLUTION INTRAVENOUS CONTINUOUS
Status: CANCELLED | OUTPATIENT
Start: 2019-06-19

## 2019-06-18 RX ORDER — SODIUM CHLORIDE 9 MG/ML
INJECTION, SOLUTION INTRAVENOUS ONCE
Status: CANCELLED | OUTPATIENT
Start: 2019-06-19

## 2019-06-18 RX ORDER — SODIUM CHLORIDE 0.9 % (FLUSH) 0.9 %
10 SYRINGE (ML) INJECTION PRN
Status: CANCELLED | OUTPATIENT
Start: 2019-06-20

## 2019-06-18 RX ORDER — HEPARIN SODIUM (PORCINE) LOCK FLUSH IV SOLN 100 UNIT/ML 100 UNIT/ML
500 SOLUTION INTRAVENOUS PRN
Status: CANCELLED | OUTPATIENT
Start: 2019-06-19

## 2019-06-18 RX ORDER — DEXAMETHASONE SODIUM PHOSPHATE 10 MG/ML
10 INJECTION INTRAMUSCULAR; INTRAVENOUS ONCE
Status: COMPLETED | OUTPATIENT
Start: 2019-06-18 | End: 2019-06-18

## 2019-06-18 RX ORDER — DEXAMETHASONE SODIUM PHOSPHATE 10 MG/ML
10 INJECTION, SOLUTION INTRAMUSCULAR; INTRAVENOUS ONCE
Status: CANCELLED | OUTPATIENT
Start: 2019-06-20

## 2019-06-18 RX ORDER — SODIUM CHLORIDE 0.9 % (FLUSH) 0.9 %
10 SYRINGE (ML) INJECTION PRN
Status: CANCELLED | OUTPATIENT
Start: 2019-06-19

## 2019-06-18 RX ORDER — METHYLPREDNISOLONE SODIUM SUCCINATE 125 MG/2ML
125 INJECTION, POWDER, LYOPHILIZED, FOR SOLUTION INTRAMUSCULAR; INTRAVENOUS ONCE
Status: CANCELLED | OUTPATIENT
Start: 2019-06-18

## 2019-06-18 RX ORDER — METHYLPREDNISOLONE SODIUM SUCCINATE 125 MG/2ML
125 INJECTION, POWDER, LYOPHILIZED, FOR SOLUTION INTRAMUSCULAR; INTRAVENOUS ONCE
Status: CANCELLED | OUTPATIENT
Start: 2019-06-20

## 2019-06-18 RX ORDER — DIPHENHYDRAMINE HYDROCHLORIDE 50 MG/ML
50 INJECTION INTRAMUSCULAR; INTRAVENOUS ONCE
Status: CANCELLED | OUTPATIENT
Start: 2019-06-20

## 2019-06-18 RX ORDER — EPINEPHRINE 1 MG/ML
0.3 INJECTION, SOLUTION, CONCENTRATE INTRAVENOUS PRN
Status: CANCELLED | OUTPATIENT
Start: 2019-06-18

## 2019-06-18 RX ORDER — SODIUM CHLORIDE 9 MG/ML
INJECTION, SOLUTION INTRAVENOUS ONCE
Status: CANCELLED | OUTPATIENT
Start: 2019-06-20

## 2019-06-18 RX ORDER — SODIUM CHLORIDE 9 MG/ML
INJECTION, SOLUTION INTRAVENOUS CONTINUOUS
Status: CANCELLED | OUTPATIENT
Start: 2019-06-18

## 2019-06-18 RX ADMIN — PALONOSETRON HYDROCHLORIDE 0.25 MG: 0.25 INJECTION, SOLUTION INTRAVENOUS at 11:22

## 2019-06-18 RX ADMIN — Medication 500 UNITS: at 14:59

## 2019-06-18 RX ADMIN — ETOPOSIDE 170 MG: 20 INJECTION, SOLUTION, CONCENTRATE INTRAVENOUS at 13:48

## 2019-06-18 RX ADMIN — Medication 10 ML: at 10:10

## 2019-06-18 RX ADMIN — Medication 10 ML: at 14:59

## 2019-06-18 RX ADMIN — SODIUM CHLORIDE 150 MG: 900 INJECTION, SOLUTION INTRAVENOUS at 12:21

## 2019-06-18 RX ADMIN — DEXAMETHASONE SODIUM PHOSPHATE 10 MG: 10 INJECTION INTRAMUSCULAR; INTRAVENOUS at 11:24

## 2019-06-18 RX ADMIN — CARBOPLATIN 460 MG: 10 INJECTION, SOLUTION INTRAVENOUS at 12:00

## 2019-06-18 RX ADMIN — SODIUM CHLORIDE: 9 INJECTION, SOLUTION INTRAVENOUS at 11:22

## 2019-06-18 NOTE — PROGRESS NOTES
Patient here for C4D1 Carbo/Etoposide. Labs reviewed. Patient left accessed for C4D2. Patient tolerated treatment well and was discharged home in stable condition.

## 2019-06-19 ENCOUNTER — HOSPITAL ENCOUNTER (OUTPATIENT)
Dept: INFUSION THERAPY | Age: 69
Discharge: HOME | End: 2019-06-19
Payer: COMMERCIAL

## 2019-06-19 VITALS
DIASTOLIC BLOOD PRESSURE: 79 MMHG | RESPIRATION RATE: 16 BRPM | TEMPERATURE: 98.1 F | HEART RATE: 96 BPM | SYSTOLIC BLOOD PRESSURE: 132 MMHG

## 2019-06-19 DIAGNOSIS — C34.91 PRIMARY LUNG CANCER WITH METASTASIS FROM LUNG TO OTHER SITE, RIGHT (HCC): Primary | ICD-10-CM

## 2019-06-19 PROCEDURE — 6360000002 HC RX W HCPCS: Performed by: INTERNAL MEDICINE

## 2019-06-19 PROCEDURE — 2580000003 HC RX 258: Performed by: INTERNAL MEDICINE

## 2019-06-19 PROCEDURE — 96375 TX/PRO/DX INJ NEW DRUG ADDON: CPT

## 2019-06-19 PROCEDURE — 96413 CHEMO IV INFUSION 1 HR: CPT

## 2019-06-19 RX ORDER — SODIUM CHLORIDE 9 MG/ML
INJECTION, SOLUTION INTRAVENOUS ONCE
Status: COMPLETED | OUTPATIENT
Start: 2019-06-19 | End: 2019-06-19

## 2019-06-19 RX ORDER — DEXAMETHASONE SODIUM PHOSPHATE 10 MG/ML
10 INJECTION INTRAMUSCULAR; INTRAVENOUS ONCE
Status: COMPLETED | OUTPATIENT
Start: 2019-06-19 | End: 2019-06-19

## 2019-06-19 RX ORDER — HEPARIN SODIUM (PORCINE) LOCK FLUSH IV SOLN 100 UNIT/ML 100 UNIT/ML
500 SOLUTION INTRAVENOUS PRN
Status: DISCONTINUED | OUTPATIENT
Start: 2019-06-19 | End: 2019-06-20 | Stop reason: HOSPADM

## 2019-06-19 RX ORDER — SODIUM CHLORIDE 0.9 % (FLUSH) 0.9 %
10 SYRINGE (ML) INJECTION PRN
Status: DISCONTINUED | OUTPATIENT
Start: 2019-06-19 | End: 2019-06-20 | Stop reason: HOSPADM

## 2019-06-19 RX ADMIN — DEXAMETHASONE SODIUM PHOSPHATE 10 MG: 10 INJECTION INTRAMUSCULAR; INTRAVENOUS at 10:12

## 2019-06-19 RX ADMIN — Medication 500 UNITS: at 11:42

## 2019-06-19 RX ADMIN — SODIUM CHLORIDE: 9 INJECTION, SOLUTION INTRAVENOUS at 10:12

## 2019-06-19 RX ADMIN — ETOPOSIDE 170 MG: 20 INJECTION, SOLUTION, CONCENTRATE INTRAVENOUS at 10:40

## 2019-06-19 RX ADMIN — Medication 10 ML: at 11:42

## 2019-06-19 NOTE — PROGRESS NOTES
Patient here for C4D2 Etoposide. Vitals stable. Patient tolerated infusion well and was discharged home in stable condition. Port left accessed for 6/20/19 C4D3 Etoposide.

## 2019-06-20 ENCOUNTER — HOSPITAL ENCOUNTER (OUTPATIENT)
Dept: INFUSION THERAPY | Age: 69
Discharge: HOME | End: 2019-06-20
Payer: COMMERCIAL

## 2019-06-20 VITALS
DIASTOLIC BLOOD PRESSURE: 72 MMHG | SYSTOLIC BLOOD PRESSURE: 112 MMHG | HEART RATE: 84 BPM | TEMPERATURE: 97.2 F | RESPIRATION RATE: 18 BRPM | OXYGEN SATURATION: 99 %

## 2019-06-20 DIAGNOSIS — C34.91 PRIMARY LUNG CANCER WITH METASTASIS FROM LUNG TO OTHER SITE, RIGHT (HCC): Primary | ICD-10-CM

## 2019-06-20 PROCEDURE — 2580000003 HC RX 258: Performed by: INTERNAL MEDICINE

## 2019-06-20 PROCEDURE — 6360000002 HC RX W HCPCS: Performed by: INTERNAL MEDICINE

## 2019-06-20 PROCEDURE — 96375 TX/PRO/DX INJ NEW DRUG ADDON: CPT

## 2019-06-20 PROCEDURE — 96413 CHEMO IV INFUSION 1 HR: CPT

## 2019-06-20 RX ORDER — SODIUM CHLORIDE 0.9 % (FLUSH) 0.9 %
10 SYRINGE (ML) INJECTION PRN
Status: DISCONTINUED | OUTPATIENT
Start: 2019-06-20 | End: 2019-06-21 | Stop reason: HOSPADM

## 2019-06-20 RX ORDER — DEXAMETHASONE SODIUM PHOSPHATE 10 MG/ML
10 INJECTION INTRAMUSCULAR; INTRAVENOUS ONCE
Status: COMPLETED | OUTPATIENT
Start: 2019-06-20 | End: 2019-06-20

## 2019-06-20 RX ORDER — SODIUM CHLORIDE 9 MG/ML
INJECTION, SOLUTION INTRAVENOUS ONCE
Status: COMPLETED | OUTPATIENT
Start: 2019-06-20 | End: 2019-06-20

## 2019-06-20 RX ORDER — HEPARIN SODIUM (PORCINE) LOCK FLUSH IV SOLN 100 UNIT/ML 100 UNIT/ML
500 SOLUTION INTRAVENOUS PRN
Status: DISCONTINUED | OUTPATIENT
Start: 2019-06-20 | End: 2019-06-21 | Stop reason: HOSPADM

## 2019-06-20 RX ADMIN — Medication 10 ML: at 11:51

## 2019-06-20 RX ADMIN — Medication 10 ML: at 10:15

## 2019-06-20 RX ADMIN — ETOPOSIDE 170 MG: 20 INJECTION, SOLUTION, CONCENTRATE INTRAVENOUS at 10:40

## 2019-06-20 RX ADMIN — Medication 500 UNITS: at 11:51

## 2019-06-20 RX ADMIN — DEXAMETHASONE SODIUM PHOSPHATE 10 MG: 10 INJECTION INTRAMUSCULAR; INTRAVENOUS at 10:17

## 2019-06-20 RX ADMIN — SODIUM CHLORIDE: 9 INJECTION, SOLUTION INTRAVENOUS at 10:15

## 2019-06-20 NOTE — PROGRESS NOTES
Pt here for day 3 etoposide. Tolerated treatment without incident. De accessed and re accessed or CT scan tomorrow. Discharged in stable condition.

## 2019-06-21 ENCOUNTER — HOSPITAL ENCOUNTER (OUTPATIENT)
Dept: MRI IMAGING | Age: 69
Discharge: HOME | End: 2019-06-23
Payer: COMMERCIAL

## 2019-06-21 ENCOUNTER — HOSPITAL ENCOUNTER (OUTPATIENT)
Dept: CT IMAGING | Age: 69
Discharge: HOME | End: 2019-06-23
Payer: COMMERCIAL

## 2019-06-21 ENCOUNTER — HOSPITAL ENCOUNTER (OUTPATIENT)
Dept: INFUSION THERAPY | Age: 69
Discharge: HOME | End: 2019-06-21
Payer: COMMERCIAL

## 2019-06-21 DIAGNOSIS — N18.30 STAGE 3 CHRONIC KIDNEY DISEASE (HCC): ICD-10-CM

## 2019-06-21 DIAGNOSIS — R53.1 ASTHENIA: ICD-10-CM

## 2019-06-21 DIAGNOSIS — D64.9 ANEMIA, UNSPECIFIED TYPE: ICD-10-CM

## 2019-06-21 DIAGNOSIS — C34.91 SMALL CELL CARCINOMA OF LUNG, RIGHT (HCC): ICD-10-CM

## 2019-06-21 DIAGNOSIS — C34.91 PRIMARY LUNG CANCER WITH METASTASIS FROM LUNG TO OTHER SITE, RIGHT (HCC): Primary | ICD-10-CM

## 2019-06-21 PROCEDURE — 74177 CT ABD & PELVIS W/CONTRAST: CPT

## 2019-06-21 PROCEDURE — A9579 GAD-BASE MR CONTRAST NOS,1ML: HCPCS | Performed by: INTERNAL MEDICINE

## 2019-06-21 PROCEDURE — 96523 IRRIG DRUG DELIVERY DEVICE: CPT

## 2019-06-21 PROCEDURE — 6360000002 HC RX W HCPCS: Performed by: INTERNAL MEDICINE

## 2019-06-21 PROCEDURE — 2580000003 HC RX 258: Performed by: INTERNAL MEDICINE

## 2019-06-21 PROCEDURE — 6360000004 HC RX CONTRAST MEDICATION: Performed by: INTERNAL MEDICINE

## 2019-06-21 PROCEDURE — 70553 MRI BRAIN STEM W/O & W/DYE: CPT

## 2019-06-21 RX ORDER — SODIUM CHLORIDE 0.9 % (FLUSH) 0.9 %
10 SYRINGE (ML) INJECTION PRN
Status: DISCONTINUED | OUTPATIENT
Start: 2019-06-21 | End: 2019-06-22 | Stop reason: HOSPADM

## 2019-06-21 RX ORDER — SODIUM CHLORIDE 0.9 % (FLUSH) 0.9 %
10 SYRINGE (ML) INJECTION PRN
Status: DISCONTINUED | OUTPATIENT
Start: 2019-06-21 | End: 2019-06-24 | Stop reason: HOSPADM

## 2019-06-21 RX ORDER — SODIUM CHLORIDE 0.9 % (FLUSH) 0.9 %
10 SYRINGE (ML) INJECTION PRN
Status: CANCELLED | OUTPATIENT
Start: 2019-06-21

## 2019-06-21 RX ORDER — SODIUM CHLORIDE 0.9 % (FLUSH) 0.9 %
20 SYRINGE (ML) INJECTION PRN
Status: CANCELLED | OUTPATIENT
Start: 2019-06-21

## 2019-06-21 RX ORDER — 0.9 % SODIUM CHLORIDE 0.9 %
80 INTRAVENOUS SOLUTION INTRAVENOUS ONCE
Status: COMPLETED | OUTPATIENT
Start: 2019-06-21 | End: 2019-06-21

## 2019-06-21 RX ORDER — HEPARIN SODIUM (PORCINE) LOCK FLUSH IV SOLN 100 UNIT/ML 100 UNIT/ML
500 SOLUTION INTRAVENOUS PRN
Status: DISCONTINUED | OUTPATIENT
Start: 2019-06-21 | End: 2019-06-22 | Stop reason: HOSPADM

## 2019-06-21 RX ORDER — HEPARIN SODIUM (PORCINE) LOCK FLUSH IV SOLN 100 UNIT/ML 100 UNIT/ML
500 SOLUTION INTRAVENOUS PRN
Status: CANCELLED | OUTPATIENT
Start: 2019-06-21

## 2019-06-21 RX ADMIN — IOVERSOL 75 ML: 741 INJECTION INTRA-ARTERIAL; INTRAVENOUS at 13:10

## 2019-06-21 RX ADMIN — IOHEXOL 50 ML: 240 INJECTION, SOLUTION INTRATHECAL; INTRAVASCULAR; INTRAVENOUS; ORAL at 13:10

## 2019-06-21 RX ADMIN — SODIUM CHLORIDE 80 ML: 9 INJECTION, SOLUTION INTRAVENOUS at 13:09

## 2019-06-21 RX ADMIN — Medication 10 ML: at 13:32

## 2019-06-21 RX ADMIN — Medication 500 UNITS: at 13:32

## 2019-06-21 RX ADMIN — GADOTERIDOL 20 ML: 279.3 INJECTION, SOLUTION INTRAVENOUS at 11:19

## 2019-06-21 RX ADMIN — Medication 10 ML: at 13:11

## 2019-06-25 ENCOUNTER — TELEPHONE (OUTPATIENT)
Dept: ONCOLOGY | Age: 69
End: 2019-06-25

## 2019-06-25 ENCOUNTER — OFFICE VISIT (OUTPATIENT)
Dept: ONCOLOGY | Age: 69
End: 2019-06-25
Payer: COMMERCIAL

## 2019-06-25 VITALS
DIASTOLIC BLOOD PRESSURE: 62 MMHG | SYSTOLIC BLOOD PRESSURE: 97 MMHG | BODY MASS INDEX: 31.78 KG/M2 | TEMPERATURE: 98.4 F | WEIGHT: 227 LBS | HEART RATE: 90 BPM

## 2019-06-25 DIAGNOSIS — C34.91 PRIMARY LUNG CANCER WITH METASTASIS FROM LUNG TO OTHER SITE, RIGHT (HCC): Primary | ICD-10-CM

## 2019-06-25 PROCEDURE — 4004F PT TOBACCO SCREEN RCVD TLK: CPT | Performed by: INTERNAL MEDICINE

## 2019-06-25 PROCEDURE — G8417 CALC BMI ABV UP PARAM F/U: HCPCS | Performed by: INTERNAL MEDICINE

## 2019-06-25 PROCEDURE — 4040F PNEUMOC VAC/ADMIN/RCVD: CPT | Performed by: INTERNAL MEDICINE

## 2019-06-25 PROCEDURE — 3017F COLORECTAL CA SCREEN DOC REV: CPT | Performed by: INTERNAL MEDICINE

## 2019-06-25 PROCEDURE — 99214 OFFICE O/P EST MOD 30 MIN: CPT | Performed by: INTERNAL MEDICINE

## 2019-06-25 PROCEDURE — 1123F ACP DISCUSS/DSCN MKR DOCD: CPT | Performed by: INTERNAL MEDICINE

## 2019-06-25 PROCEDURE — G8427 DOCREV CUR MEDS BY ELIG CLIN: HCPCS | Performed by: INTERNAL MEDICINE

## 2019-06-25 PROCEDURE — 99211 OFF/OP EST MAY X REQ PHY/QHP: CPT | Performed by: INTERNAL MEDICINE

## 2019-06-25 RX ORDER — HYDROCHLOROTHIAZIDE 25 MG/1
25 TABLET ORAL EVERY MORNING
Qty: 30 TABLET | Refills: 5 | Status: SHIPPED | OUTPATIENT
Start: 2019-06-25 | End: 2019-07-30 | Stop reason: ALTCHOICE

## 2019-06-25 NOTE — PROGRESS NOTES
Amairani Gay                                                                                                                  6/25/2019  MRN:   [id number]  YOB: 1950  PCP:                           Jere Augustine MD  Referring Physician: No ref. provider found  Treating Physician Name: Shaun Hubbard MD      Reason for visit:  Chief Complaint   Patient presents with    Follow-up     review status of disease    Other     Would like to talk about treatment and the next steps    Other     When do you think he can go back to work? 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 CT scan was read as lung rads degree 4B. Subsequently she underwent biopsy of right upper lobe lung nodule on 1/16/19. 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 CT PET done showed abnormal FDG uptake in the right upper lobe nodule. There was also abnormal FDG uptake in the right lower lobe nodule. Additionally right hilar lymph node were also FDG avid. No bony lesion was reported. MRI brain for staging workup did not show any evidence of intracranial metastasis. Tip of the odontoid process was ill-defined and metastasis could not be ruled out. Clinically patient does give history of trauma to the neck area any years ago however denies any surgery history. Bone scan did not reveal any metastasis     Patient continues to smoke but has cut down quite a bit.   He works full-time in a Food insecurity:     Worry: Not on file     Inability: Not on file    Transportation needs:     Medical: Not on file     Non-medical: Not on file   Tobacco Use    Smoking status: Former Smoker     Packs/day: 0.50     Types: Cigarettes     Start date: 4/8/2019    Smokeless tobacco: Current User    Tobacco comment: also vapes   Substance and Sexual Activity    Alcohol use: No    Drug use: Yes     Types: Marijuana    Sexual activity: Not on file   Lifestyle    Physical activity:     Days per week: Not on file     Minutes per session: Not on file    Stress: Not on file   Relationships    Social connections:     Talks on phone: Not on file     Gets together: Not on file     Attends Rastafarian service: Not on file     Active member of club or organization: Not on file     Attends meetings of clubs or organizations: Not on file     Relationship status: Not on file    Intimate partner violence:     Fear of current or ex partner: Not on file     Emotionally abused: Not on file     Physically abused: Not on file     Forced sexual activity: Not on file   Other Topics Concern    Not on file   Social History Narrative    Not on file      Current Medications:     Current Outpatient Medications   Medication Sig Dispense Refill    Magic Mouthwash (MIRACLE MOUTHWASH) Swish and spit 5 mLs 4 times daily as needed for Irritation      lidocaine-prilocaine (EMLA) 2.5-2.5 % cream Apply topically as needed. Apply a quarter size amount to port site 1 hour before chemotherapy. Cover with plastic wrap.  30 g 0    ondansetron (ZOFRAN-ODT) 8 MG TBDP disintegrating tablet Place 1 tablet under the tongue 3 times daily as needed for Nausea or Vomiting 90 tablet 3    acetaminophen (TYLENOL) 325 MG tablet Take 650 mg by mouth every 6 hours as needed for Pain      allopurinol (ZYLOPRIM) 100 MG tablet Take 100 mg by mouth daily      simvastatin (ZOCOR) 40 MG tablet Take 40 mg by mouth daily      celecoxib (CELEBREX) 200 MG capsule Take 200 mg by mouth 2 times daily  3    lisinopril-hydrochlorothiazide (PRINZIDE;ZESTORETIC) 20-25 MG per tablet Take 1 tablet by mouth daily      Multiple Vitamins-Minerals (THERAPEUTIC MULTIVITAMIN-MINERALS) tablet Take 1 tablet by mouth daily       No current facility-administered medications for this visit. Allergies:   Patient has no known allergies. Review of Systems:    Constitutional: No fever or chills. No night sweats, no weight loss. Positive fatigue  Eyes: No eye discharge, double vision, or eye pain   HEENT: negative for sore mouth, sore throat, hoarseness and voice change; +occasional pain with swallowing as noted above  Respiratory: negative for cough, sputum, wheezing, hemoptysis, chest pain; +exertional shortness of breath   Cardiovascular: negative for chest pain, palpitations, orthopnea, PND   Gastrointestinal: negative for nausea, vomiting, diarrhea, constipation, abdominal pain, Dysphagia, hematemesis and hematochezia   Genitourinary: negative for frequency, dysuria, nocturia, urinary incontinence, and hematuria   Integument: negative for rash, skin lesions, bruises. Hematologic/Lymphatic: negative for easy bruising, bleeding, lymphadenopathy, or petechiae   Endocrine: negative for heat or cold intolerance,weight changes, change in bowel habits and hair loss   Musculoskeletal: negative for myalgias, arthralgias.   Positive for chronic back pain  Neurological: negative for headaches, dizziness, seizures, weakness, numbness        Physical Exam:    Vitals: BP 97/62   Pulse 90   Temp 98.4 °F (36.9 °C) (Oral)   Wt 227 lb (103 kg)   BMI 31.78 kg/m²   General appearance - well appearing, no in pain or distress  Mental status - AAO X3  Eyes - pupils equal and reactive, extraocular eye movements intact  Mouth - mucous membranes moist, pharynx normal without lesions  Neck - supple, no significant adenopathy  Lymphatics - no palpable lymphadenopathy, no hepatosplenomegaly  Chest - clear to auscultation, no wheezes, rales or rhonchi, symmetric air entry  Heart - normal rate, regular rhythm, normal S1, S2, no murmurs  Abdomen - soft, nontender, nondistended, no masses or organomegaly  Neurological - alert, oriented, normal speech, no focal findings or movement disorder noted  Extremities - peripheral pulses normal, no pedal edema, no clubbing or cyanosis  Skin - normal coloration and turgor, no rashes, no suspicious skin lesions noted       DATA:    Labs:       Surgical Pathology Consultation            Patient Name: Jose Puente : 1950 (Age: 80) Gender: M Taken: 2019 Reported: 2019 Physician(s): Olga North M.D. ([phone]) Copy To: Kalyani Lo MD Accession #: [id number] [facility]. Rec. #: [id number] Acct: # [de-identified]   Final Pathologic Diagnosis  Lung, right upper lobe, core biopsy:       Invasive carcinoma consisting of two components: small cell carcinoma and squamous cell carcinoma. Comment: Immunohistochemical stains are performed on 1A and demonstrate that the component of small cells carcinoma is positive for AE1/AE3, TTF1, synaptophysin, and chromogranin A, and the component of squamous cell carcinoma is positive for AE1/AE3 and   P63.  All controls are adequate.              **Report Electronically Signed Out**  rg/2019 Guanako Norton MD        Interpretation performed at [facility], [address] [place] [address], License number: [id number].     Clinical History  R91.8.  R upper lobe of lung.      IMAGING DATA:         Impression   1.  FDG avid irregular nodule in the right upper lobe.  Its morphologic   features are suspicious for neoplastic disease versus an inflammatory process.       2.  FDG avid peripheral opacity in the superior segment of the right lower   lobe with morphologic features that can be seen with infection or neoplastic   disease.       3.  FDG avid right hilar lymph nodes, equivocal for inflammatory versus   neoplastic etiology.       4.  Benign right adrenal adenoma.       In the absence of prior imaging for comparison, short-term contrast-enhanced   CT follow-up may be warranted to further delineate acute inflammatory disease   versus multifocal neoplastic disease.              2018 December  CT low dose lung [id number]/8/2018  [facility]    Impression:    Lung Rads Category 4B    There are 2 suspicious nodules in the right lung, one in the right apex and an even larger lesion in the superior segment of the right lower lobe.  PET CT scanning recommended for further characterization.  Please see above for details. This report contains a significant result and/or recommendation, which requires the attention of the licensed caregiver responsible for this patient. Therefore, I specifically designated this report be telephoned by the radiology department. Findings were instructed to be called to the clinical service on December 8, 2018 at 10:18 AM hours.        MR brain with and without contrast2/11/2019  [address]  Result Narrative   Clinical history: Right lung cancer.  Evaluation for metastatic disease. TECHNIQUE:    Multiplanar multisequence imaging of the brain was performed before and after the intravenous administration of 20 mL MultiHance gadolinium contrast material.  There are no prior imaging studies of the brain for comparison.     FINDINGS:    On the T1-weighted sagittal images the tip of the odontoid is ill-defined.  The vertebral body cortices are well-defined elsewhere, however its tip of the dens they are not seen.  Findings are not as striking on the T1-weighted postgadolinium-enhanced coronal images however.  This is nonspecific and   can be seen with inflammatory spondyloarthropathy and degenerative change with bony sclerosis, however destructive process involving the odontoid is not excluded and follow-up is recommended in light of the history of lung cancer.  Mild age appropriate cortical volume loss is present.  There is   some patchy and confluent increased T2 signal within the periventricular and subcortical white matter which is nonspecific and likely reflects sequelae of microvascular ischemia in a patient of this age.  Ventricular system appears within normal limits.  Basal cisterns and fourth ventricle are   patent.  There is no intracranial mass nor mass effect.  There is no restricted diffusion to suggest acute or subacute infarct.  There are no signal abnormalities present to suggest parenchymal nor extra-axial hemorrhage.  There is no pathologic parenchymal nor leptomeningeal enhancement. IMPRESSION:    1. The tip of the odontoid is ill-defined on the sagittal T1-weighted images.  This is nonspecific and may reflect degenerative arthropathy and bony sclerosis, however destructive lesion involving the odontoid is not excluded.  A reasonable course of follow-up would be plain films of the cervical   spine.  If this is equivocal, then CT of the cervical spine would be appropriate. 2.  Otherwise no evidence for intracranial metastatic disease.  No mass, hemorrhage, infarct, nor other acute finding  3.  age-appropriate cortical volume loss and nonspecific white matter lesions which likely reflects sequelae of microvascular ischemia in a patient of this age.      Nm Bone Scan Whole Body: 2/20/2019    1. No scintigraphic evidence of osseous metastatic disease. 2. Scattered osseous degenerative changes as above.       06/21/2019 CT CHEST ABDOMEN PELVIS IMPRESSION:   Unchanged appearance of irregular 12 mm nodule in the right lung apex,   presumably neoplastic disease based on prior PET-CT imaging.       Previously identified nodule in the superior segment of the right lower lobe   is smaller, which may be due to treatment response for inflammatory process   or neoplastic disease.       No new airspace disease or evidence for distant metastatic disease.      06/21/2019 MRI

## 2019-06-25 NOTE — TELEPHONE ENCOUNTER
Patient called office and stated he wants to see Dr David Allred to go over scan results. Appt moved to today at 2:40pm per Archana, .  Andrey Parr

## 2019-07-01 ENCOUNTER — HOSPITAL ENCOUNTER (OUTPATIENT)
Dept: RADIATION ONCOLOGY | Age: 69
Discharge: HOME | End: 2019-07-01
Attending: RADIOLOGY
Payer: COMMERCIAL

## 2019-07-01 VITALS
TEMPERATURE: 98 F | WEIGHT: 223 LBS | SYSTOLIC BLOOD PRESSURE: 149 MMHG | BODY MASS INDEX: 31.22 KG/M2 | HEART RATE: 93 BPM | OXYGEN SATURATION: 98 % | DIASTOLIC BLOOD PRESSURE: 90 MMHG | RESPIRATION RATE: 18 BRPM

## 2019-07-01 PROCEDURE — 77334 RADIATION TREATMENT AID(S): CPT | Performed by: RADIOLOGY

## 2019-07-01 PROCEDURE — 99212 OFFICE O/P EST SF 10 MIN: CPT | Performed by: RADIOLOGY

## 2019-07-01 PROCEDURE — 77290 THER RAD SIMULAJ FIELD CPLX: CPT | Performed by: RADIOLOGY

## 2019-07-01 NOTE — PROGRESS NOTES
Morena Herrera M.D. Karlos Cooney. Fatuma Snyedr, Ph.D., M.D., Orquidea Wiley M.D. Arturo Avalos, Ph.D., M.DIMA. Merari Marshall M.D. Date of Service: 2019    Location:  [address] [place] [address],   [address] [facility] [place]   [phone]     RADIATION ONCOLOGY FOLLOW UP    Patient ID:   Maxim Seymour  : 1950   MRN: [id number]    DIAGNOSIS:  Cancer Staging  Primary lung cancer with metastasis from lung to other site, right [facility])  Staging form: Lung, AJCC 8th Edition  - Clinical stage from 2019: Stage IIIA (cT4, cN1, cM0) - Signed by Morena Herrera MD on 2019  Staging comments: PET/CT scan 2019 revealed 1.2 cm right upper lobe mass SUV 2.6, 2 cm right lower lobe mass SUV 5.4, 1.4 cm right hilar lymph node SUV 7.8, 1.1 cm right adrenal nodule no uptake, negative bone. 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 MRI brain and negative 2019. INTERVAL HISTORY:   Maxim Seymour is a 80 y.o. male with small cell lung carcinoma who underwent modality treatment with external beam radiation and concurrent chemotherapy. He completed a total dose to the primary tumor in the right upper lobe of his lung of 6000 cGy by 2019. Treatment was tolerated well. He returns today in routine follow-up to discuss prophylactic cranial irradiation. Following his radiation he has had intermittent shortness of breath that he feels is slowly improving. He also feels occasional difficulty swallowing certain foods; should this progress he will see ENT for evaluation and possible dilation of his esophagus in the event a stricture develops. Overall, an improved sense of well-being.     MEDICATIONS:    Current Outpatient Medications:     hydrochlorothiazide (HYDRODIURIL) 25 MG tablet, Take 1 tablet by mouth every morning, Disp: 30 tablet, Rfl: 5    Magic Mouthwash (MIRACLE MOUTHWASH), Swish

## 2019-07-02 ENCOUNTER — HOSPITAL ENCOUNTER (OUTPATIENT)
Dept: RADIATION ONCOLOGY | Age: 69
Discharge: HOME | End: 2019-07-02
Attending: RADIOLOGY
Payer: COMMERCIAL

## 2019-07-02 PROCEDURE — 77334 RADIATION TREATMENT AID(S): CPT | Performed by: RADIOLOGY

## 2019-07-02 PROCEDURE — 77307 TELETHX ISODOSE PLAN CPLX: CPT | Performed by: RADIOLOGY

## 2019-07-09 ENCOUNTER — APPOINTMENT (OUTPATIENT)
Dept: RADIATION ONCOLOGY | Age: 69
End: 2019-07-09
Attending: RADIOLOGY
Payer: COMMERCIAL

## 2019-07-09 ENCOUNTER — HOSPITAL ENCOUNTER (OUTPATIENT)
Dept: RADIATION ONCOLOGY | Age: 69
Discharge: HOME | End: 2019-07-09
Attending: RADIOLOGY
Payer: COMMERCIAL

## 2019-07-09 PROCEDURE — 77280 THER RAD SIMULAJ FIELD SMPL: CPT | Performed by: RADIOLOGY

## 2019-07-09 PROCEDURE — 77412 RADIATION TX DELIVERY LVL 3: CPT | Performed by: RADIOLOGY

## 2019-07-10 ENCOUNTER — HOSPITAL ENCOUNTER (OUTPATIENT)
Dept: RADIATION ONCOLOGY | Age: 69
Discharge: HOME | End: 2019-07-10
Attending: RADIOLOGY
Payer: COMMERCIAL

## 2019-07-10 PROCEDURE — 77412 RADIATION TX DELIVERY LVL 3: CPT | Performed by: RADIOLOGY

## 2019-07-11 ENCOUNTER — HOSPITAL ENCOUNTER (OUTPATIENT)
Dept: RADIATION ONCOLOGY | Age: 69
Discharge: HOME | End: 2019-07-11
Attending: RADIOLOGY
Payer: COMMERCIAL

## 2019-07-11 PROCEDURE — 77412 RADIATION TX DELIVERY LVL 3: CPT | Performed by: RADIOLOGY

## 2019-07-12 ENCOUNTER — APPOINTMENT (OUTPATIENT)
Dept: RADIATION ONCOLOGY | Age: 69
End: 2019-07-12
Attending: RADIOLOGY
Payer: COMMERCIAL

## 2019-07-12 PROCEDURE — 77412 RADIATION TX DELIVERY LVL 3: CPT | Performed by: RADIOLOGY

## 2019-07-15 ENCOUNTER — HOSPITAL ENCOUNTER (OUTPATIENT)
Dept: RADIATION ONCOLOGY | Age: 69
Discharge: HOME | End: 2019-07-15
Attending: RADIOLOGY
Payer: COMMERCIAL

## 2019-07-15 VITALS
OXYGEN SATURATION: 99 % | DIASTOLIC BLOOD PRESSURE: 80 MMHG | TEMPERATURE: 97.8 F | WEIGHT: 221.4 LBS | HEART RATE: 80 BPM | BODY MASS INDEX: 31 KG/M2 | RESPIRATION RATE: 14 BRPM | SYSTOLIC BLOOD PRESSURE: 127 MMHG

## 2019-07-15 PROCEDURE — 77412 RADIATION TX DELIVERY LVL 3: CPT | Performed by: RADIOLOGY

## 2019-07-15 PROCEDURE — 77336 RADIATION PHYSICS CONSULT: CPT | Performed by: RADIOLOGY

## 2019-07-15 ASSESSMENT — PAIN SCALES - GENERAL: PAINLEVEL_OUTOF10: 0

## 2019-07-15 NOTE — PROGRESS NOTES
Sofia Dorantes M.D. Herrera [facility]. Emery Cosme, Ph.D., M.D., Kyra Baptsite M.D. Alexandria Dan, Ph.D., M.D. Elmira Gan M.D. Date of Service: 7/15/2019    Location:  [address] [place] [address] [facility] [address], [facility] Dereje Reyes   [phone]     RADIATION ONCOLOGY WEEKLY PROGRESS NOTE    Patient ID:   Neli Elliott  : 1950   MRN: [id number]    Diagnosis:  Cancer Staging  Primary lung cancer with metastasis from lung to other site, right [facility])  Staging form: Lung, AJCC 8th Edition  - Clinical stage from 2019: Stage IIIA (cT4, cN1, cM0) - Signed by Sofia Dorantes MD on 2019  Staging comments: PET/CT scan 2019 revealed 1.2 cm right upper lobe mass SUV 2.6, 2 cm right lower lobe mass SUV 5.4, 1.4 cm right hilar lymph node SUV 7.8, 1.1 cm right adrenal nodule no uptake, negative bone. 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 MRI brain and negative 2019.        Radiation Treatment Information:   Actual Dose: 1,250 cGy  Total Planned Dose:  2,500 cGy  Treatment Site: whole brain, PCI    IMAGING:   Image match with port films    CHEMOTHERAPY UPDATE:   Treatment Summary   Plan Name OP Small Cell Lung: CISplatin/Etoposide +/- Radiation, 21-Day Cycle   Treatment goal Curative   Provider Maria Isabel Alarcon MD   Status Inactive   Start Date 3/12/2019   End Date 2019   Treatment plan weight/height/BSA Weight type: Documented (effective on 2019), 104.2 kg (entered on 2019), 180.3 cm (entered on 2/15/2019), BSA 2.28 m2   Most recent weight/height/BSA Weight: Weight: 221 lb 6.4 oz (100.4 kg)    Height: Height: 5' 10.87\" (1.8 m)    BSA: BSA (Calculated - sq m): 2.27 sq meters      Treatment on Clinical Trial? [This plan is not part of a research study]   Reason for stopping treatment Not Tolerated   Treatment Plan Medications alteplase (CATHFLO) injection 2 mg, 2 mg, Intracatheter, ONCE, 2 (6/18/2019), 170 mg (6/19/2019), 170 mg (6/20/2019)    dexamethasone (DECADRON) 12 mg in sodium chloride 0.9 % IVPB, 12 mg, Intravenous, ONCE, 4 of 4 cycles  Dose modification: 10 mg (original dose 10 mg, Cycle 2)  Administration: 10 mg (4/17/2019), 10 mg (4/18/2019), 10 mg (5/7/2019), 10 mg (5/8/2019), 10 mg (5/9/2019), 10 mg (5/28/2019), 10 mg (5/29/2019), 10 mg (5/30/2019), 10 mg (6/18/2019), 10 mg (6/19/2019), 10 mg (6/20/2019)    methylPREDNISolone sodium (SOLU-MEDROL) injection 125 mg, 125 mg, Intravenous, ONCE, 4 of 4 cycles    famotidine (PEPCID) injection 20 mg, 20 mg, Intravenous, ONCE, 4 of 4 cycles           SUBJECTIVE: Darling Lux completed his combined modality treatment for lung malignancy and is now long term through his prophylactic cranial irradiation. He is tolerating PCI therapy well with mild fatigue, is quite active at home. He has a cough producing clear sputum and feels winded with some activities. No change in bowel or bladder habits no difficulty swallowing no headaches or other regional complaint    OBJECTIVE:     Labs:  WBC   Date Value Ref Range Status   06/18/2019 3.9 3.5 - 11.0 k/uL Final     Segs Absolute   Date Value Ref Range Status   06/18/2019 1.95 1.8 - 7.7 k/uL Final     Hemoglobin   Date Value Ref Range Status   06/18/2019 9.1 (L) 13.5 - 17.5 g/dL Final     Platelets   Date Value Ref Range Status   06/18/2019 281 140 - 450 k/uL Final   Medications:    Current Outpatient Medications:     hydrochlorothiazide (HYDRODIURIL) 25 MG tablet, Take 1 tablet by mouth every morning, Disp: 30 tablet, Rfl: 5    Magic Mouthwash (MIRACLE MOUTHWASH), Swish and spit 5 mLs 4 times daily as needed for Irritation, Disp: , Rfl:     lidocaine-prilocaine (EMLA) 2.5-2.5 % cream, Apply topically as needed. Apply a quarter size amount to port site 1 hour before chemotherapy.   Cover with plastic wrap., Disp: 30 g, Rfl: 0    ondansetron (ZOFRAN-ODT) 8 MG TBDP disintegrating tablet, Place 1 tablet under the tongue 3 times daily as needed for Nausea or Vomiting, Disp: 90 tablet, Rfl: 3    acetaminophen (TYLENOL) 325 MG tablet, Take 650 mg by mouth every 6 hours as needed for Pain, Disp: , Rfl:     allopurinol (ZYLOPRIM) 100 MG tablet, Take 100 mg by mouth daily, Disp: , Rfl:     simvastatin (ZOCOR) 40 MG tablet, Take 40 mg by mouth daily, Disp: , Rfl:     celecoxib (CELEBREX) 200 MG capsule, Take 200 mg by mouth 2 times daily, Disp: , Rfl: 3    lisinopril-hydrochlorothiazide (PRINZIDE;ZESTORETIC) 20-25 MG per tablet, Take 1 tablet by mouth daily, Disp: , Rfl:     Multiple Vitamins-Minerals (THERAPEUTIC MULTIVITAMIN-MINERALS) tablet, Take 1 tablet by mouth daily, Disp: , Rfl:     Pain Plan:  None needed    Pain Level: 0  Patient Currently in Pain: Denies         Immunizations/Smoking Status: There is no immunization history on file for this patient. Social History     Tobacco Use   Smoking Status Former Smoker    Packs/day: 0.50    Types: Cigarettes    Start date: 2019   Smokeless Tobacco Current User   Tobacco Comment    also vapes     Ready to quit: Not Answered  Counseling given: Not Answered  Comment: also vapes      PHYSICAL FINDINGS:    CHAPERONE: Declined    ECO Asymptomatic      Vital Signs:  /80   Pulse 80   Temp 97.8 °F (36.6 °C) (Oral)   Resp 14   Wt 221 lb 6.4 oz (100.4 kg)   SpO2 99%   BMI 31.00 kg/m²   Wt Readings from Last 5 Encounters:   07/15/19 221 lb 6.4 oz (100.4 kg)   19 223 lb (101.2 kg)   19 227 lb (103 kg)   19 227 lb (103 kg)   19 228 lb 12.8 oz (103.8 kg)     Scalp irritation, oral mucosa pink and moist, no mucositis. Lungs are clear to auscultation, heart regular rate and rhythm. ASSESSMENT PLAN:   Tolerating PCI well continue as prescribed.     Electronically signed by Amelia Ramires MD on 7/15/2019 at 9:44 [address] [facility] [address]    Drugs Prescribed:  New Prescriptions    No medications on file Other Orders Placed:  No orders of the defined types were placed in this encounter.

## 2019-07-16 ENCOUNTER — HOSPITAL ENCOUNTER (OUTPATIENT)
Dept: RADIATION ONCOLOGY | Age: 69
Discharge: HOME | End: 2019-07-16
Attending: RADIOLOGY
Payer: COMMERCIAL

## 2019-07-16 PROCEDURE — 77417 THER RADIOLOGY PORT IMAGE(S): CPT | Performed by: RADIOLOGY

## 2019-07-16 PROCEDURE — 77412 RADIATION TX DELIVERY LVL 3: CPT | Performed by: RADIOLOGY

## 2019-07-17 ENCOUNTER — HOSPITAL ENCOUNTER (OUTPATIENT)
Dept: RADIATION ONCOLOGY | Age: 69
Discharge: HOME | End: 2019-07-17
Attending: RADIOLOGY
Payer: COMMERCIAL

## 2019-07-17 PROCEDURE — 77412 RADIATION TX DELIVERY LVL 3: CPT | Performed by: RADIOLOGY

## 2019-07-18 ENCOUNTER — HOSPITAL ENCOUNTER (OUTPATIENT)
Dept: RADIATION ONCOLOGY | Age: 69
Discharge: HOME | End: 2019-07-18
Attending: RADIOLOGY
Payer: COMMERCIAL

## 2019-07-18 PROCEDURE — 77412 RADIATION TX DELIVERY LVL 3: CPT | Performed by: RADIOLOGY

## 2019-07-19 ENCOUNTER — HOSPITAL ENCOUNTER (OUTPATIENT)
Dept: RADIATION ONCOLOGY | Age: 69
Discharge: HOME | End: 2019-07-19
Attending: RADIOLOGY
Payer: COMMERCIAL

## 2019-07-19 PROCEDURE — 77412 RADIATION TX DELIVERY LVL 3: CPT | Performed by: RADIOLOGY

## 2019-07-22 ENCOUNTER — HOSPITAL ENCOUNTER (OUTPATIENT)
Dept: RADIATION ONCOLOGY | Age: 69
Discharge: HOME | End: 2019-07-22
Attending: RADIOLOGY
Payer: COMMERCIAL

## 2019-07-22 VITALS
SYSTOLIC BLOOD PRESSURE: 126 MMHG | BODY MASS INDEX: 30.88 KG/M2 | TEMPERATURE: 97.8 F | HEART RATE: 88 BPM | OXYGEN SATURATION: 98 % | DIASTOLIC BLOOD PRESSURE: 81 MMHG | WEIGHT: 220.6 LBS | RESPIRATION RATE: 14 BRPM

## 2019-07-22 PROCEDURE — 77336 RADIATION PHYSICS CONSULT: CPT | Performed by: RADIOLOGY

## 2019-07-22 PROCEDURE — 77412 RADIATION TX DELIVERY LVL 3: CPT | Performed by: RADIOLOGY

## 2019-07-22 ASSESSMENT — PAIN DESCRIPTION - LOCATION: LOCATION: ANKLE

## 2019-07-22 ASSESSMENT — PAIN SCALES - GENERAL: PAINLEVEL_OUTOF10: 4

## 2019-07-22 NOTE — PROGRESS NOTES
(CATHFLO) injection 2 mg, 2 mg, Intracatheter, ONCE, 1 of 1 cycle       Treatment Summary   Plan Name OP Small Cell Lung: CARBOplatin/Etoposide, 21-Day Cycle   Treatment goal Curative   Provider Ana Mendenhall MD   Status Active   Start Date 4/16/2019   End Date 6/20/2019   Treatment plan weight/height/BSA Weight type: Documented (effective on 4/9/2019), 102.9 kg (entered on 4/9/2019), 180.3 cm (entered on 4/9/2019), BSA 2.27 m2   Most recent weight/height/BSA Weight: Weight: 220 lb 9.6 oz (100.1 kg)    Height: Height: 5' 10.87\" (1.8 m)    BSA: BSA (Calculated - sq m): 2.27 sq meters      Treatment on Clinical Trial? [This plan is not part of a research study]   Reason for stopping treatment [Plan is still active]   Treatment Plan Medications alteplase (CATHFLO) injection 2 mg, 2 mg, Intracatheter, ONCE, 4 of 4 cycles    palonosetron (ALOXI) injection 0.25 mg, 0.25 mg, Intravenous, ONCE, 4 of 4 cycles  Administration: 0.25 mg (4/16/2019), 0.25 mg (5/7/2019), 0.25 mg (5/28/2019), 0.25 mg (6/18/2019)    fosaprepitant (EMEND) 150 mg in sodium chloride 0.9 % 150 mL IVPB, 150 mg, Intravenous, ONCE, 4 of 4 cycles  Administration: 150 mg (4/16/2019), 150 mg (5/7/2019), 150 mg (5/28/2019), 150 mg (6/18/2019)    hydrocortisone sodium succinate PF (SOLU-CORTEF) injection 100 mg, 100 mg, Intravenous, ONCE, 4 of 4 cycles    CARBOplatin (PARAPLATIN) 500 mg in sodium chloride 0.9 % 250 mL chemo IVPB, 500 mg, Intravenous, ONCE, 4 of 4 cycles  Administration: 500 mg (4/16/2019), 480 mg (5/7/2019), 500 mg (5/28/2019), 460 mg (6/18/2019)    etoposide (VEPESID) 228 mg in sodium chloride 0.9 % 600 mL chemo IVPB, 100 mg/m2 = 228 mg, Intravenous, ONCE, 4 of 4 cycles  Dose modification: 75 mg/m2 (original dose 100 mg/m2, Cycle 4)  Administration: 228 mg (4/16/2019), 228 mg (4/17/2019), 228 mg (4/18/2019), 228 mg (5/7/2019), 228 mg (5/8/2019), 228 mg (5/9/2019), 228 mg (5/28/2019), 228 mg (5/29/2019), 228 mg (5/30/2019), 170 mg

## 2019-07-24 ENCOUNTER — APPOINTMENT (OUTPATIENT)
Dept: RADIATION ONCOLOGY | Age: 69
End: 2019-07-24
Attending: RADIOLOGY
Payer: COMMERCIAL

## 2019-07-30 ENCOUNTER — TELEPHONE (OUTPATIENT)
Dept: ONCOLOGY | Age: 69
End: 2019-07-30

## 2019-07-30 ENCOUNTER — HOSPITAL ENCOUNTER (OUTPATIENT)
Age: 69
Discharge: HOME | End: 2019-07-30
Payer: COMMERCIAL

## 2019-07-30 ENCOUNTER — OFFICE VISIT (OUTPATIENT)
Dept: ONCOLOGY | Age: 69
End: 2019-07-30
Payer: COMMERCIAL

## 2019-07-30 VITALS
DIASTOLIC BLOOD PRESSURE: 88 MMHG | SYSTOLIC BLOOD PRESSURE: 142 MMHG | TEMPERATURE: 97.6 F | BODY MASS INDEX: 30.93 KG/M2 | WEIGHT: 220.9 LBS | HEART RATE: 89 BPM

## 2019-07-30 DIAGNOSIS — C34.91 PRIMARY LUNG CANCER WITH METASTASIS FROM LUNG TO OTHER SITE, RIGHT (HCC): ICD-10-CM

## 2019-07-30 DIAGNOSIS — D64.9 ANEMIA, UNSPECIFIED TYPE: ICD-10-CM

## 2019-07-30 DIAGNOSIS — R53.1 ASTHENIA: ICD-10-CM

## 2019-07-30 DIAGNOSIS — N18.30 STAGE 3 CHRONIC KIDNEY DISEASE (HCC): ICD-10-CM

## 2019-07-30 DIAGNOSIS — C34.91 PRIMARY LUNG CANCER WITH METASTASIS FROM LUNG TO OTHER SITE, RIGHT (HCC): Primary | ICD-10-CM

## 2019-07-30 LAB
ABSOLUTE EOS #: 0.2 K/UL (ref 0–0.4)
ABSOLUTE IMMATURE GRANULOCYTE: ABNORMAL K/UL (ref 0–0.3)
ABSOLUTE LYMPH #: 0.6 K/UL (ref 1–4.8)
ABSOLUTE MONO #: 1.4 K/UL (ref 0.1–1.2)
ALBUMIN SERPL-MCNC: 4.2 G/DL (ref 3.5–5.2)
ALBUMIN/GLOBULIN RATIO: 1.2 (ref 1–2.5)
ALP BLD-CCNC: 98 U/L (ref 40–129)
ALT SERPL-CCNC: 9 U/L (ref 5–41)
ANION GAP SERPL CALCULATED.3IONS-SCNC: 9 MMOL/L (ref 9–17)
AST SERPL-CCNC: 14 U/L
BASOPHILS # BLD: 1 % (ref 0–2)
BASOPHILS ABSOLUTE: 0 K/UL (ref 0–0.2)
BILIRUB SERPL-MCNC: 0.34 MG/DL (ref 0.3–1.2)
BUN BLDV-MCNC: 13 MG/DL (ref 8–23)
BUN/CREAT BLD: ABNORMAL (ref 9–20)
CALCIUM SERPL-MCNC: 9.9 MG/DL (ref 8.6–10.4)
CHLORIDE BLD-SCNC: 104 MMOL/L (ref 98–107)
CO2: 25 MMOL/L (ref 20–31)
CREAT SERPL-MCNC: 1.17 MG/DL (ref 0.7–1.2)
DIFFERENTIAL TYPE: ABNORMAL
EOSINOPHILS RELATIVE PERCENT: 2 % (ref 1–4)
GFR AFRICAN AMERICAN: >60 ML/MIN
GFR NON-AFRICAN AMERICAN: >60 ML/MIN
GFR SERPL CREATININE-BSD FRML MDRD: ABNORMAL ML/MIN/{1.73_M2}
GFR SERPL CREATININE-BSD FRML MDRD: ABNORMAL ML/MIN/{1.73_M2}
GLUCOSE BLD-MCNC: 120 MG/DL (ref 70–99)
HCT VFR BLD CALC: 30 % (ref 41–53)
HEMOGLOBIN: 10.4 G/DL (ref 13.5–17.5)
IMMATURE GRANULOCYTES: ABNORMAL %
LYMPHOCYTES # BLD: 7 % (ref 24–44)
MCH RBC QN AUTO: 37.7 PG (ref 26–34)
MCHC RBC AUTO-ENTMCNC: 34.6 G/DL (ref 31–37)
MCV RBC AUTO: 108.8 FL (ref 80–100)
MONOCYTES # BLD: 16 % (ref 2–11)
NRBC AUTOMATED: ABNORMAL PER 100 WBC
PDW BLD-RTO: 16.7 % (ref 12.5–15.4)
PLATELET # BLD: 311 K/UL (ref 140–450)
PLATELET ESTIMATE: ABNORMAL
PMV BLD AUTO: 7.1 FL (ref 6–12)
POTASSIUM SERPL-SCNC: 4.5 MMOL/L (ref 3.7–5.3)
RBC # BLD: 2.76 M/UL (ref 4.5–5.9)
RBC # BLD: ABNORMAL 10*6/UL
SEG NEUTROPHILS: 74 % (ref 36–66)
SEGMENTED NEUTROPHILS ABSOLUTE COUNT: 6.4 K/UL (ref 1.8–7.7)
SODIUM BLD-SCNC: 138 MMOL/L (ref 135–144)
TOTAL PROTEIN: 7.8 G/DL (ref 6.4–8.3)
WBC # BLD: 8.6 K/UL (ref 3.5–11)
WBC # BLD: ABNORMAL 10*3/UL

## 2019-07-30 PROCEDURE — G8417 CALC BMI ABV UP PARAM F/U: HCPCS | Performed by: INTERNAL MEDICINE

## 2019-07-30 PROCEDURE — G8427 DOCREV CUR MEDS BY ELIG CLIN: HCPCS | Performed by: INTERNAL MEDICINE

## 2019-07-30 PROCEDURE — 1123F ACP DISCUSS/DSCN MKR DOCD: CPT | Performed by: INTERNAL MEDICINE

## 2019-07-30 PROCEDURE — 3017F COLORECTAL CA SCREEN DOC REV: CPT | Performed by: INTERNAL MEDICINE

## 2019-07-30 PROCEDURE — 85025 COMPLETE CBC W/AUTO DIFF WBC: CPT

## 2019-07-30 PROCEDURE — 99211 OFF/OP EST MAY X REQ PHY/QHP: CPT | Performed by: INTERNAL MEDICINE

## 2019-07-30 PROCEDURE — 4040F PNEUMOC VAC/ADMIN/RCVD: CPT | Performed by: INTERNAL MEDICINE

## 2019-07-30 PROCEDURE — 4004F PT TOBACCO SCREEN RCVD TLK: CPT | Performed by: INTERNAL MEDICINE

## 2019-07-30 PROCEDURE — 80053 COMPREHEN METABOLIC PANEL: CPT

## 2019-07-30 PROCEDURE — 36415 COLL VENOUS BLD VENIPUNCTURE: CPT

## 2019-07-30 PROCEDURE — 99215 OFFICE O/P EST HI 40 MIN: CPT | Performed by: INTERNAL MEDICINE

## 2019-07-30 RX ORDER — TRAMADOL HYDROCHLORIDE 50 MG/1
50 TABLET ORAL EVERY 6 HOURS PRN
Qty: 60 TABLET | Refills: 0 | Status: SHIPPED | OUTPATIENT
Start: 2019-07-30 | End: 2019-08-29

## 2019-07-30 RX ORDER — DEXAMETHASONE 2 MG/1
2 TABLET ORAL 2 TIMES DAILY WITH MEALS
Qty: 60 TABLET | Refills: 2 | Status: SHIPPED | OUTPATIENT
Start: 2019-07-30 | End: 2019-08-29

## 2019-07-30 RX ORDER — DEXAMETHASONE 4 MG/1
2 TABLET ORAL 2 TIMES DAILY WITH MEALS
Qty: 60 TABLET | Refills: 2 | Status: SHIPPED | OUTPATIENT
Start: 2019-07-30 | End: 2019-07-30 | Stop reason: SDUPTHER

## 2019-07-30 NOTE — PROGRESS NOTES
Amairani Holliday                                                                                                                  7/30/2019  MRN:   [id number]  YOB: 1950  PCP:                           Koleen Dubin, MD  Referring Physician: No ref. 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 CT scan was read as lung rads degree 4B. Subsequently she underwent biopsy of right upper lobe lung nodule on 1/16/19. 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 CT PET done showed abnormal FDG uptake in the right upper lobe nodule. There was also abnormal FDG uptake in the right lower lobe nodule. Additionally right hilar lymph node were also FDG avid. No bony lesion was reported. MRI brain for staging workup did not show any evidence of intracranial metastasis. Tip of the odontoid process was ill-defined and metastasis could not be ruled out. Clinically patient does give history of trauma to the neck area any years ago however denies any surgery history. Bone scan did not reveal any metastasis     Patient continues to smoke but has cut down quite a bit. He works full-time in a [employer] [employer] Patient has good performance status, ECOG 0. Patient's other medical problems include dyslipidemia and hypertension.   He also has Financial resource strain: Not on file    Food insecurity:     Worry: Not on file     Inability: Not on file    Transportation needs:     Medical: Not on file     Non-medical: Not on file   Tobacco Use    Smoking status: Former Smoker     Packs/day: 0.50     Types: Cigarettes     Start date: 4/8/2019    Smokeless tobacco: Current User    Tobacco comment: also vapes   Substance and Sexual Activity    Alcohol use: No    Drug use: Yes     Types: Marijuana    Sexual activity: Not on file   Lifestyle    Physical activity:     Days per week: Not on file     Minutes per session: Not on file    Stress: Not on file   Relationships    Social connections:     Talks on phone: Not on file     Gets together: Not on file     Attends Jain service: Not on file     Active member of club or organization: Not on file     Attends meetings of clubs or organizations: Not on file     Relationship status: Not on file    Intimate partner violence:     Fear of current or ex partner: Not on file     Emotionally abused: Not on file     Physically abused: Not on file     Forced sexual activity: Not on file   Other Topics Concern    Not on file   Social History Narrative    Not on file      Current Medications:     Current Outpatient Medications   Medication Sig Dispense Refill    Magic Mouthwash (MIRACLE MOUTHWASH) Swish and spit 5 mLs 4 times daily as needed for Irritation      lidocaine-prilocaine (EMLA) 2.5-2.5 % cream Apply topically as needed. Apply a quarter size amount to port site 1 hour before chemotherapy. Cover with plastic wrap.  30 g 0    ondansetron (ZOFRAN-ODT) 8 MG TBDP disintegrating tablet Place 1 tablet under the tongue 3 times daily as needed for Nausea or Vomiting 90 tablet 3    acetaminophen (TYLENOL) 325 MG tablet Take 650 mg by mouth every 6 hours as needed for Pain      allopurinol (ZYLOPRIM) 100 MG tablet Take 100 mg by mouth daily      celecoxib (CELEBREX) 200 MG capsule Take 200 mg by mouth 2

## 2019-08-05 DIAGNOSIS — C34.91 PRIMARY LUNG CANCER WITH METASTASIS FROM LUNG TO OTHER SITE, RIGHT (HCC): Primary | ICD-10-CM

## 2019-08-07 ENCOUNTER — TELEPHONE (OUTPATIENT)
Dept: ONCOLOGY | Age: 69
End: 2019-08-07

## 2019-08-13 ENCOUNTER — HOSPITAL ENCOUNTER (OUTPATIENT)
Dept: PHYSICAL THERAPY | Facility: CLINIC | Age: 69
Setting detail: THERAPIES SERIES
Discharge: HOME | End: 2019-08-13
Payer: COMMERCIAL

## 2019-08-13 PROCEDURE — 97110 THERAPEUTIC EXERCISES: CPT

## 2019-08-13 PROCEDURE — 97530 THERAPEUTIC ACTIVITIES: CPT

## 2019-08-13 PROCEDURE — 97161 PT EVAL LOW COMPLEX 20 MIN: CPT

## 2019-08-13 NOTE — CONSULTS
FUNCTION Normal Difficult Unable   Sitting [] [] []   Standing [] [] []   Ambulation [] [x] []   Groom/Dress [] [] []   Lift/Carry [] [x] []   Stairs [] [x] []   Bending [] [] []   OH reach [] [x] []   Sit to Stand  [] [] []   Comments:    Assessment:  Problems:    [x] ? Pain: residual pain from radiation-chest, esophagus, hands and feet  [x] ? ROM: cervical , B UE's  [x] ? Strength: shoulders, hips, core stability  [x] ? Function: dist sleep, difficulty walking, decr endurance w/ activity  [x] Other: LEFS-63/80, 78%  Tinetti-19/28    STG: (to be met in 12 treatments)  1. ? Pain: 2-3/10 w/ activity   2. ? ROM:Cervical and B UE's WNL  3. ? Strength: B shoulders, hips grossly 5/5, good core stability  4. ? Function: able to sleep and ambulate community distances w/ out incr fatigue  5. Independent with Home Exercise Programs  6. Demonstrate Knowledge of fall prevention  7. Incr tinetti score by 3+ points for improved balance and stability    Patient goals: get back to the old me       TEST INITIAL DATE RE-EVAL DATE     VISIT# DISCHARGE DATE: VISIT#         Fatigue          /10       /10          /10   PRESENT 5/10     WORST 4/10     AVERAGE 5/10           Pain          /10          /10          /10   PRESENT       3/10     WORST.     3/10     AVERAGE    3/10           Distress           /10          /10            /10   PRESENT  2/10     WORST  5/10     AVERAGE 2/10     Distress (week)           3  /10           /10              /10               6 MIN WALK Baseline/end of test Baseline/end of test Baseline/end of test   HR (bpm) 86/88     SpO2 98/98     dyspnea 2/3     fatigue 2/3.5     Distance (ft) 850 ft           Functional Test     LEFS-63/80, 78%        Rehab Potential:  [x] Good  [] Fair  [] Poor   Suggested Professional Referral:  [] No  [x] Yes: discussed w/ pt pelvic floor therapy option, wishes to hold off at this point  Barriers to Goal Achievement:  [x] No  [] Yes:  Domestic Concerns:  [] No  [] Moderate Complexity [] High Complexity       Treatment Charges: Mins Units   [x] Evaluation       [x]  Low       []  Moderate       []  High 20 1   []  Modalities     [x]  Ther Exercise 25 2   []  Manual Therapy     [x]  Ther Activities 15 1   []  Aquatics     []  Vasocompression     []  Other       TOTAL TREATMENT TIME:60    Time in: 5:30 pm     Time out: 6:40 pm    Electronically signed by: John Freitas PT        Physician Signature:________________________________Date:__________________  By signing above or cosigning this note, I have reviewed this plan of care and certify a need for medically necessary rehabilitation services.      *PLEASE SIGN ABOVE AND FAX BACK ALL PAGES*

## 2019-08-20 ENCOUNTER — TELEPHONE (OUTPATIENT)
Dept: RADIATION ONCOLOGY | Age: 69
End: 2019-08-20

## 2019-08-20 ENCOUNTER — HOSPITAL ENCOUNTER (OUTPATIENT)
Dept: PHYSICAL THERAPY | Facility: CLINIC | Age: 69
Setting detail: THERAPIES SERIES
Discharge: HOME | End: 2019-08-20
Payer: COMMERCIAL

## 2019-08-20 PROCEDURE — 97110 THERAPEUTIC EXERCISES: CPT

## 2019-08-20 NOTE — FLOWSHEET NOTE
[] [facility]) - [facility]  [address]  [phone]  F: [phone] [] [address]  [facility]   [address]  P: [phone]  F: [phone] [x] [address]  Therapy  [address] [facility]  P: [phone]  F: [phone] [] [address]  [facility]   [address]  [place]: [phone]  F: [phone]     Physical Therapy Daily Treatment Note    Date:  2019  Patient Name:  Bonnie Connolly    :  1950  MRN: [id number]  Physician: [address]: [facility]  Medical Diagnosis:   Primary lung cancer with metastasis from lung to other site, right      Rehab Codes: C34.91   Onset Date: 19 script                                  Next 's appt: 19  Visit# / total visits: 2/12  Cancels/No Shows: 0    Subjective:    Pain:  [x] Yes  [] No Location: bilat knees, feet Pain Rating: (0-10 scale) 4-5/10  Pain altered Tx:  [] No  [] Yes  Action:  Comments: Pt reports he has pain in his knees and feet as mentioned above. Reports he had trouble sleeping yesterday after showing a picture of a heaping pile of split wood done at home. States he fell asleep quickly then woke up after 2 hours while excruciating pain in his feet, calves and knees. Also reports it almost felt like a \"gout flare up\" but only lasted an hour. Exhibits frustration about not knowing his \"boundaries\" and what is \"taking it too far\".      Objective:    Modalities: prn  Exercises: Manual prn  Exercise Reps/ Time Weight/ Level Comments   Nu step 8' 2      HS, calf stretch 3x30''                Seated: HAILEE farris HT raises 15x ea       shld shrugs, post shld rolls 15x ea       Scap retraction 10x 5 sec     cerv rot stretch 3x 20 sec     UT stretch   3x 20 sec             Standing:         VERONICA Han

## 2019-08-20 NOTE — TELEPHONE ENCOUNTER
Name: James Angel  MRN: [id number]  Date: 8/20/2019    Diagnosis: Cancer Staging  Primary lung cancer with metastasis from lung to other site, right [facility])  Staging form: Lung, AJCC 8th Edition  - Clinical stage from 1/16/2019: Stage IIIA (cT4, cN1, cM0) - Signed by Tyson Vann MD on 2/28/2019  Staging comments: PET/CT scan 1/2/2019 revealed 1.2 cm right upper lobe mass SUV 2.6, 2 cm right lower lobe mass SUV 5.4, 1.4 cm right hilar lymph node SUV 7.8, 1.1 cm right adrenal nodule no uptake, negative bone. Right upper lobe biopsy 1/16/2019 positive for mixed small cell carcinoma and squamous cell carcinoma. MRI brain and negative 2/18/2019. Treatment Completion Date: 7/22/19    Subjective:  Per pt regarding RT he is doing well. No complaints or issues. Pt did not he has been having lower extremity numbness and tingling along with Left ankle swelling. Denies pain and redness. Pt stated he was going to call [place] about it. Confirmed RO follow-up appointment:   [x]   Yes  []   No  []   N/A    Confirmed follow-up appointments with other referring physicians:   [x]   Yes  []   No  []   N/A    Instructions: Advised pt to call [facility] regarding N/T and swelling. Or to go directly to ER if pain or redness begin. Pt verbalize understanding.      Reviewed and approved by:

## 2019-08-27 ENCOUNTER — HOSPITAL ENCOUNTER (OUTPATIENT)
Dept: PHYSICAL THERAPY | Facility: CLINIC | Age: 69
Setting detail: THERAPIES SERIES
Discharge: HOME | End: 2019-08-27
Payer: COMMERCIAL

## 2019-08-27 PROCEDURE — 97110 THERAPEUTIC EXERCISES: CPT

## 2019-08-27 NOTE — FLOWSHEET NOTE
[] [address]  [address]  [phone]  F: [phone] [] [address]  [facility] 36   [address]  P: [phone]  F: [phone] [x] [address]  Therapy  [address] [facility]  P: [phone]  F: [phone] [] [address]  [facility]   [address]  [place]: [phone]  F: [phone]     Physical Therapy Daily Treatment Note    Date:  2019  Patient Name:  Bonnie Connolly    :  1950  MRN: [id number]  Physician: [address]: [facility]  Medical Diagnosis:   Primary lung cancer with metastasis from lung to other site, right      Rehab Codes: C34.91   Onset Date: 19 script                                  Next 's appt: 19  Visit# / total visits: 3/12  Cancels/No Shows: 0    Subjective:    Pain:  [x] Yes  [] No Location: bilat knees, feet Pain Rating: (0-10 scale) 5/10  Pain altered Tx:  [] No  [] Yes  Action:  Comments: Pt arrives reporting his wrists, hands, feet, and ankles are sore. Relates it to him splitting wood yesterda. Pt reports he still isn't getting sleep.      Objective:    Modalities: prn  Exercises: Manual prn  Exercise Reps/ Time Weight/ Level Comments   Nu step 8' 4     HS, calf stretch 3x30''                Seated: march, LAQ, HT raises 15x ea    Held    shld shrugs, post shld rolls 15x ea       Scap retraction 15x 5 sec     cerv rot stretch 3x 20 sec     UT stretch   3x 20 sec             Standing:         Marches, HS Curls, Side Steps, Tight Rope 3L       Squats         Tandem Stance 3x30'' Bilat    Balance board 3x30'' Level 1    TG Squats/HR 15x Level 20          pec stretch  3x30''   Corner of room             Tricep Ext 15x Bberry    Shoulder Ext 15x Bberry    Rows 15x Bberry    IR/ER 15x ea [employer]

## 2019-09-03 ENCOUNTER — HOSPITAL ENCOUNTER (OUTPATIENT)
Dept: ULTRASOUND IMAGING | Age: 69
Discharge: HOME | End: 2019-09-05
Payer: COMMERCIAL

## 2019-09-03 ENCOUNTER — HOSPITAL ENCOUNTER (OUTPATIENT)
Dept: PHYSICAL THERAPY | Facility: CLINIC | Age: 69
Setting detail: THERAPIES SERIES
Discharge: HOME | End: 2019-09-03
Payer: COMMERCIAL

## 2019-09-03 ENCOUNTER — TELEPHONE (OUTPATIENT)
Dept: ONCOLOGY | Age: 69
End: 2019-09-03

## 2019-09-03 DIAGNOSIS — C34.91 PRIMARY LUNG CANCER WITH METASTASIS FROM LUNG TO OTHER SITE, RIGHT (HCC): Primary | ICD-10-CM

## 2019-09-03 DIAGNOSIS — N18.30 STAGE 3 CHRONIC KIDNEY DISEASE (HCC): ICD-10-CM

## 2019-09-03 DIAGNOSIS — C34.91 PRIMARY LUNG CANCER WITH METASTASIS FROM LUNG TO OTHER SITE, RIGHT (HCC): ICD-10-CM

## 2019-09-03 PROCEDURE — 97110 THERAPEUTIC EXERCISES: CPT

## 2019-09-03 PROCEDURE — 93971 EXTREMITY STUDY: CPT

## 2019-09-03 NOTE — TELEPHONE ENCOUNTER
Patient came into office stating he will not be able to return to work on 9/30/19. Patient stated he is still experiencing issues with swelling in LE's, stating left LE is worse than right. Patient stated that he is not able to be on his feet all day, or else the swelling worsens. Patient stated he is also experiencing neuropathy, which worsens when he is on his feet for an extended time. Patient requesting one more additional month to be off of work. Writer discussed above with Dr Don Soto and he stated to provide patient with a letter extending time of work until 11/1/19. Dr Don Soto also stated to order STAT doppler US of LLE to r/o blood clot. Writer spoke with Denise Yuen @ [address] [place] [address] and she stated that they can add patient onto schedule at 1:30pm today. Denise Yuen stated to contact [facility] [place] scheduling at [phone] to have them schedule patient. Writer called above number and spoke with Anna Monteiro. Anna Monteiro scheduled patient. Writer informed patient of above and he verbalized understanding. Writer completed letter, had Dr Don Soto sign letter and faxed letter to [phone] to ATTN: Manuel Cárdenas, HR dept.  Brennan Chavez

## 2019-09-06 ENCOUNTER — TELEPHONE (OUTPATIENT)
Dept: ONCOLOGY | Age: 69
End: 2019-09-06

## 2019-09-06 NOTE — TELEPHONE ENCOUNTER
Call received from [facility] radiology scheduling, stating they scheduled CT chest/abd/pelvis 9/9 and stated patient's port needs accessed at 1:30pm on 9/9 prior to scan. Noa, , to move up port access appt to 9/9 @ 1:30pm. Dr Melvi Iqbal stated to move patient's f/u appt to 9/10. Writer spoke with patient and appt scheduled for 9/10 @ 10:40am per [facility], .  Magaly Martinez

## 2019-09-06 NOTE — TELEPHONE ENCOUNTER
Writer received call from [facility] pre-cert, stating patient's CT scan of chest/abd/pelvis are approved and do not require pre-cert. Noa stated scheduling will call him to schedule CT scan. Writer instructed patient to call office once scans are scheduled to move f/u appt up. Patient communicated understanding.  Dread Montilla

## 2019-09-09 ENCOUNTER — HOSPITAL ENCOUNTER (OUTPATIENT)
Dept: CT IMAGING | Age: 69
Discharge: HOME | End: 2019-09-11
Payer: COMMERCIAL

## 2019-09-09 ENCOUNTER — HOSPITAL ENCOUNTER (OUTPATIENT)
Dept: INFUSION THERAPY | Age: 69
Discharge: HOME | End: 2019-09-09
Payer: COMMERCIAL

## 2019-09-09 DIAGNOSIS — C34.91 PRIMARY LUNG CANCER WITH METASTASIS FROM LUNG TO OTHER SITE, RIGHT (HCC): Primary | ICD-10-CM

## 2019-09-09 DIAGNOSIS — C34.91 PRIMARY LUNG CANCER WITH METASTASIS FROM LUNG TO OTHER SITE, RIGHT (HCC): ICD-10-CM

## 2019-09-09 LAB
ABSOLUTE EOS #: 0 K/UL (ref 0–0.4)
ABSOLUTE IMMATURE GRANULOCYTE: ABNORMAL K/UL (ref 0–0.3)
ABSOLUTE LYMPH #: 0.67 K/UL (ref 1–4.8)
ABSOLUTE MONO #: 0.27 K/UL (ref 0.1–0.8)
ALBUMIN SERPL-MCNC: 4.3 G/DL (ref 3.5–5.2)
ALBUMIN/GLOBULIN RATIO: 1.7 (ref 1–2.5)
ALP BLD-CCNC: 66 U/L (ref 40–129)
ALT SERPL-CCNC: 30 U/L (ref 5–41)
ANION GAP SERPL CALCULATED.3IONS-SCNC: 13 MMOL/L (ref 9–17)
AST SERPL-CCNC: 22 U/L
BASOPHILS # BLD: 0 % (ref 0–2)
BASOPHILS ABSOLUTE: 0 K/UL (ref 0–0.2)
BILIRUB SERPL-MCNC: 0.38 MG/DL (ref 0.3–1.2)
BUN BLDV-MCNC: 23 MG/DL (ref 8–23)
BUN/CREAT BLD: ABNORMAL (ref 9–20)
CALCIUM SERPL-MCNC: 9.7 MG/DL (ref 8.6–10.4)
CHLORIDE BLD-SCNC: 97 MMOL/L (ref 98–107)
CO2: 25 MMOL/L (ref 20–31)
CREAT SERPL-MCNC: 1.29 MG/DL (ref 0.7–1.2)
DIFFERENTIAL TYPE: ABNORMAL
EOSINOPHILS RELATIVE PERCENT: 0 % (ref 1–4)
GFR AFRICAN AMERICAN: >60 ML/MIN
GFR NON-AFRICAN AMERICAN: 55 ML/MIN
GFR SERPL CREATININE-BSD FRML MDRD: ABNORMAL ML/MIN/{1.73_M2}
GFR SERPL CREATININE-BSD FRML MDRD: ABNORMAL ML/MIN/{1.73_M2}
GLUCOSE BLD-MCNC: 100 MG/DL (ref 70–99)
HCT VFR BLD CALC: 37.5 % (ref 41–53)
HEMOGLOBIN: 12.4 G/DL (ref 13.5–17.5)
IMMATURE GRANULOCYTES: ABNORMAL %
LYMPHOCYTES # BLD: 10 % (ref 24–44)
MCH RBC QN AUTO: 36 PG (ref 26–34)
MCHC RBC AUTO-ENTMCNC: 33 G/DL (ref 31–37)
MCV RBC AUTO: 109 FL (ref 80–100)
MONOCYTES # BLD: 4 % (ref 1–7)
MORPHOLOGY: ABNORMAL
MORPHOLOGY: ABNORMAL
NRBC AUTOMATED: ABNORMAL PER 100 WBC
PDW BLD-RTO: 16.4 % (ref 12.5–15.4)
PLATELET # BLD: 161 K/UL (ref 140–450)
PLATELET ESTIMATE: ABNORMAL
PMV BLD AUTO: 7.8 FL (ref 6–12)
POTASSIUM SERPL-SCNC: 4.2 MMOL/L (ref 3.7–5.3)
RBC # BLD: 3.44 M/UL (ref 4.5–5.9)
RBC # BLD: ABNORMAL 10*6/UL
SEG NEUTROPHILS: 86 % (ref 36–66)
SEGMENTED NEUTROPHILS ABSOLUTE COUNT: 5.76 K/UL (ref 1.8–7.7)
SODIUM BLD-SCNC: 135 MMOL/L (ref 135–144)
TOTAL PROTEIN: 6.9 G/DL (ref 6.4–8.3)
WBC # BLD: 6.7 K/UL (ref 3.5–11)
WBC # BLD: ABNORMAL 10*3/UL

## 2019-09-09 PROCEDURE — 6360000004 HC RX CONTRAST MEDICATION: Performed by: INTERNAL MEDICINE

## 2019-09-09 PROCEDURE — 36591 DRAW BLOOD OFF VENOUS DEVICE: CPT

## 2019-09-09 PROCEDURE — 2580000003 HC RX 258: Performed by: INTERNAL MEDICINE

## 2019-09-09 PROCEDURE — 80053 COMPREHEN METABOLIC PANEL: CPT

## 2019-09-09 PROCEDURE — 6360000002 HC RX W HCPCS: Performed by: INTERNAL MEDICINE

## 2019-09-09 PROCEDURE — 85025 COMPLETE CBC W/AUTO DIFF WBC: CPT

## 2019-09-09 PROCEDURE — 71260 CT THORAX DX C+: CPT

## 2019-09-09 RX ORDER — SODIUM CHLORIDE 0.9 % (FLUSH) 0.9 %
10 SYRINGE (ML) INJECTION PRN
Status: DISCONTINUED | OUTPATIENT
Start: 2019-09-09 | End: 2019-09-10 | Stop reason: HOSPADM

## 2019-09-09 RX ORDER — HEPARIN SODIUM (PORCINE) LOCK FLUSH IV SOLN 100 UNIT/ML 100 UNIT/ML
500 SOLUTION INTRAVENOUS PRN
Status: DISCONTINUED | OUTPATIENT
Start: 2019-09-09 | End: 2019-09-10 | Stop reason: HOSPADM

## 2019-09-09 RX ORDER — SODIUM CHLORIDE 0.9 % (FLUSH) 0.9 %
10 SYRINGE (ML) INJECTION PRN
Status: CANCELLED | OUTPATIENT
Start: 2019-09-09

## 2019-09-09 RX ORDER — HEPARIN SODIUM (PORCINE) LOCK FLUSH IV SOLN 100 UNIT/ML 100 UNIT/ML
500 SOLUTION INTRAVENOUS PRN
Status: CANCELLED | OUTPATIENT
Start: 2019-09-09

## 2019-09-09 RX ORDER — 0.9 % SODIUM CHLORIDE 0.9 %
80 INTRAVENOUS SOLUTION INTRAVENOUS ONCE
Status: COMPLETED | OUTPATIENT
Start: 2019-09-09 | End: 2019-09-09

## 2019-09-09 RX ORDER — SODIUM CHLORIDE 0.9 % (FLUSH) 0.9 %
10 SYRINGE (ML) INJECTION PRN
Status: DISCONTINUED | OUTPATIENT
Start: 2019-09-09 | End: 2019-09-12 | Stop reason: HOSPADM

## 2019-09-09 RX ORDER — SODIUM CHLORIDE 0.9 % (FLUSH) 0.9 %
20 SYRINGE (ML) INJECTION PRN
Status: CANCELLED | OUTPATIENT
Start: 2019-09-09

## 2019-09-09 RX ADMIN — Medication 10 ML: at 15:05

## 2019-09-09 RX ADMIN — Medication 10 ML: at 13:32

## 2019-09-09 RX ADMIN — Medication 10 ML: at 15:23

## 2019-09-09 RX ADMIN — IOVERSOL 100 ML: 741 INJECTION INTRA-ARTERIAL; INTRAVENOUS at 15:05

## 2019-09-09 RX ADMIN — SODIUM CHLORIDE 80 ML: 9 INJECTION, SOLUTION INTRAVENOUS at 15:06

## 2019-09-09 RX ADMIN — IOHEXOL 50 ML: 240 INJECTION, SOLUTION INTRATHECAL; INTRAVASCULAR; INTRAVENOUS; ORAL at 15:05

## 2019-09-09 RX ADMIN — Medication 500 UNITS: at 15:23

## 2019-09-09 NOTE — PROGRESS NOTES
Pt here for port access for CT scan and labs. Labs drawn from port as directed. Pt to ER for CT scan and will return after for carrillo needle d/c.

## 2019-09-10 ENCOUNTER — OFFICE VISIT (OUTPATIENT)
Dept: ONCOLOGY | Age: 69
End: 2019-09-10
Payer: COMMERCIAL

## 2019-09-10 ENCOUNTER — TELEPHONE (OUTPATIENT)
Dept: ONCOLOGY | Age: 69
End: 2019-09-10

## 2019-09-10 ENCOUNTER — HOSPITAL ENCOUNTER (OUTPATIENT)
Dept: PHYSICAL THERAPY | Facility: CLINIC | Age: 69
Setting detail: THERAPIES SERIES
Discharge: HOME | End: 2019-09-10
Payer: COMMERCIAL

## 2019-09-10 VITALS
HEART RATE: 92 BPM | WEIGHT: 225.1 LBS | BODY MASS INDEX: 31.51 KG/M2 | TEMPERATURE: 98.1 F | SYSTOLIC BLOOD PRESSURE: 152 MMHG | DIASTOLIC BLOOD PRESSURE: 75 MMHG

## 2019-09-10 DIAGNOSIS — T45.1X5A CHEMOTHERAPY-INDUCED NEUROPATHY (HCC): ICD-10-CM

## 2019-09-10 DIAGNOSIS — C34.91 PRIMARY LUNG CANCER WITH METASTASIS FROM LUNG TO OTHER SITE, RIGHT (HCC): Primary | ICD-10-CM

## 2019-09-10 DIAGNOSIS — G62.0 CHEMOTHERAPY-INDUCED NEUROPATHY (HCC): ICD-10-CM

## 2019-09-10 PROCEDURE — 99211 OFF/OP EST MAY X REQ PHY/QHP: CPT | Performed by: INTERNAL MEDICINE

## 2019-09-10 PROCEDURE — 97110 THERAPEUTIC EXERCISES: CPT

## 2019-09-10 PROCEDURE — 99214 OFFICE O/P EST MOD 30 MIN: CPT | Performed by: INTERNAL MEDICINE

## 2019-09-10 RX ORDER — DEXAMETHASONE 2 MG/1
TABLET ORAL
Refills: 2 | COMMUNITY
Start: 2019-08-30 | End: 2019-11-19 | Stop reason: ALTCHOICE

## 2019-09-10 RX ORDER — HYDROCHLOROTHIAZIDE 25 MG/1
25 TABLET ORAL
COMMUNITY
Start: 2019-07-24 | End: 2020-02-19 | Stop reason: ALTCHOICE

## 2019-09-10 RX ORDER — DULOXETIN HYDROCHLORIDE 30 MG/1
30 CAPSULE, DELAYED RELEASE ORAL DAILY
Qty: 30 CAPSULE | Refills: 3 | Status: SHIPPED | OUTPATIENT
Start: 2019-09-10 | End: 2019-11-19 | Stop reason: ALTCHOICE

## 2019-09-10 NOTE — FLOWSHEET NOTE
[] [address]  [address]  [phone]  F: [phone] [] [address]  [facility]   [address]  P: [phone]  F: [phone] [x] [address]  Therapy  [facility]  P: [phone]  F: [phone] [] [address]  [facility]   [address]  [place]: [phone]  F: [phone]     Physical Therapy Daily Treatment Note    Date:  9/10/2019  Patient Name:  Neli Elliott    :  1950  MRN: [id number]  Physician: [address]: [facility]  Medical Diagnosis:   Primary lung cancer with metastasis from lung to other site, right      Rehab Codes: C34.91   Onset Date: 19 script                                  Next 's appt: 19  Visit# / total visits:   Cancels/No Shows: 0    Subjective:    Pain:  [] Yes  [x] No Location: bilat knees, feet Pain Rating: (0-10 scale) 0/10  Pain altered Tx:  [] No  [] Yes  Action:  Comments: Pt reports feeling stronger until he climbed a ladder to trim some tree branches  morning. Reports loss of sleep the past two nights and sore/swollen ankles.       Objective:    Modalities: prn  Exercises: Manual prn  Exercise Reps/ Time Weight/ Level Comments   Nu step 8' 4     HS, calf stretch 3x30''                Seated: march, LAQ, HT raises 15x ea  3#    shld shrugs, post shld rolls 15x ea       Scap retraction 15x 5 sec     cerv rot stretch 3x 20 sec     UT stretch   3x 20 sec            Standing:         Marches, HS Curls, Side Steps, Tight Rope 3L  3#     Squats 15x       Tandem Stance 3x30'' Bilat    Balance board 2' Level 2    TG Squats/HR 20x Level 20 Held 9/10         Doorway pec stretch  3x30''      Supine pec stretch 1'  Half foam roll            Tricep Ext 20x Bberry    Shoulder Ext 20x Rosalia Plane Rows 20x Bberry    IR/ER 20x ea Bberry          Other: Instructed in therex per log, proper posturing and activity modification prn. Issued HO for HEP.     Specific Instructions for next treatment::progress standing ex and UE activity as donnell      Treatment Charges: Mins Units   []  Modalities     [x]  Ther Exercise 45 3   []  Manual Therapy     []  Ther Activities     []  Aquatics     []  Vasocompression     []  Other     Total Treatment time 45 3       Assessment: [x] Progressing toward goals. [] No change. [x] Other: Added ankle wts to // bar ex and incr level on balance board. Added supine pec stretch on half foam roll w/ UE in \"touchdown\" position. Pt reports he feels \"alive\" after treatment this date and has visibly better posture. STG: (to be met in 12 treatments)  1. ? Pain: 2-3/10 w/ activity   2. ? ROM:Cervical and B UE's WNL  3. ? Strength: B shoulders, hips grossly 5/5, good core stability  4. ? Function: able to sleep and ambulate community distances w/ out incr fatigue  5. Independent with Home Exercise Programs  6. Demonstrate Knowledge of fall prevention  7. Incr tinetti score by 3+ points for improved balance and stability     Patient goals: get back to the old me    Pt. Education:  [x] Yes  [] No  [] Reviewed Prior HEP/Ed  Method of Education: [x] Verbal  [x] Demo  [x] Written  Comprehension of Education:  [x] Verbalizes understanding. [x] Demonstrates understanding. [] Needs review. [] Demonstrates/verbalizes HEP/Ed previously given. -Bharti green 8/27     Plan: [x] Continue per plan of care.    [] Other:      Time In: 0900          Time Out: 1000    Electronically signed by:  Carlos Eduardo Elmore PTA

## 2019-09-10 NOTE — PROGRESS NOTES
no significant adenopathy  Lymphatics - no palpable lymphadenopathy, no hepatosplenomegaly  Chest - clear to auscultation, no wheezes, rales or rhonchi, symmetric air entry  Heart - normal rate, regular rhythm, normal S1, S2, no murmurs  Abdomen - soft, nontender, nondistended, no masses or organomegaly  Neurological - alert, oriented, normal speech, no focal findings or movement disorder noted  Extremities -positive leg swelling  Skin - normal coloration and turgor, no rashes, no suspicious skin lesions noted       DATA:    Labs:       Surgical Pathology Consultation            Patient Name: Brooke Ware : 1950 (Age: 80) Gender: M Taken: 2019 Reported: 2019 Physician(s): Marni Vela M.D. ([phone]) Copy To: Byron Fletcher MD Accession #: [id number] [facility]. Rec. #: [id number] Acct: # [de-identified]   Final Pathologic Diagnosis  Lung, right upper lobe, core biopsy:       Invasive carcinoma consisting of two components: small cell carcinoma and squamous cell carcinoma. Comment: Immunohistochemical stains are performed on 1A and demonstrate that the component of small cells carcinoma is positive for AE1/AE3, TTF1, synaptophysin, and chromogranin A, and the component of squamous cell carcinoma is positive for AE1/AE3 and   P63.  All controls are adequate.              **Report Electronically Signed Out**  rg/2019 Malika Hood MD        Interpretation performed at [address], License number: [id number].     Clinical History  R91.8.  R upper lobe of lung.      IMAGING DATA:         Impression   1.  FDG avid irregular nodule in the right upper lobe.  Its morphologic   features are suspicious for neoplastic disease versus an inflammatory process.       2.  FDG avid peripheral opacity in the superior segment of the right lower   lobe with morphologic features that can be seen with infection or neoplastic   disease.       3.  FDG avid right hilar lymph nodes, equivocal for inflammatory versus   neoplastic etiology.       4.  Benign right adrenal adenoma.       In the absence of prior imaging for comparison, short-term contrast-enhanced   CT follow-up may be warranted to further delineate acute inflammatory disease   versus multifocal neoplastic disease.              2018 December  CT low dose lung [id number]/8/2018  [facility]    Impression:    Lung Rads Category 4B    There are 2 suspicious nodules in the right lung, one in the right apex and an even larger lesion in the superior segment of the right lower lobe.  PET CT scanning recommended for further characterization.  Please see above for details. This report contains a significant result and/or recommendation, which requires the attention of the licensed caregiver responsible for this patient. Therefore, I specifically designated this report be telephoned by the radiology department. Findings were instructed to be called to the clinical service on December 8, 2018 at 10:18 AM hours.        MR brain with and without contrast2/11/2019  [place]  Result Narrative   Clinical history: Right lung cancer.  Evaluation for metastatic disease. TECHNIQUE:    Multiplanar multisequence imaging of the brain was performed before and after the intravenous administration of 20 mL MultiHance gadolinium contrast material.  There are no prior imaging studies of the brain for comparison.     FINDINGS:    On the T1-weighted sagittal images the tip of the odontoid is ill-defined.  The vertebral body cortices are well-defined elsewhere, however its tip of the dens they are not seen.  Findings are not as striking on the T1-weighted postgadolinium-enhanced coronal images however.  This is nonspecific and   can be seen with inflammatory spondyloarthropathy and degenerative change with bony sclerosis, however destructive process involving the odontoid is not excluded and follow-up is recommended in light of the history of lung cancer.  Mild age appropriate cortical volume loss is present.  There is   some patchy and confluent increased T2 signal within the periventricular and subcortical white matter which is nonspecific and likely reflects sequelae of microvascular ischemia in a patient of this age.  Ventricular system appears within normal limits.  Basal cisterns and fourth ventricle are   patent.  There is no intracranial mass nor mass effect.  There is no restricted diffusion to suggest acute or subacute infarct.  There are no signal abnormalities present to suggest parenchymal nor extra-axial hemorrhage.  There is no pathologic parenchymal nor leptomeningeal enhancement. IMPRESSION:    1. The tip of the odontoid is ill-defined on the sagittal T1-weighted images.  This is nonspecific and may reflect degenerative arthropathy and bony sclerosis, however destructive lesion involving the odontoid is not excluded.  A reasonable course of follow-up would be plain films of the cervical   spine.  If this is equivocal, then CT of the cervical spine would be appropriate. 2.  Otherwise no evidence for intracranial metastatic disease.  No mass, hemorrhage, infarct, nor other acute finding  3.  age-appropriate cortical volume loss and nonspecific white matter lesions which likely reflects sequelae of microvascular ischemia in a patient of this age.      Nm Bone Scan Whole Body: 2/20/2019    1. No scintigraphic evidence of osseous metastatic disease.  2. Scattered osseous degenerative changes as above.       06/21/2019 CT CHEST ABDOMEN PELVIS IMPRESSION:   Unchanged appearance of irregular 12 mm nodule in the right lung apex,   presumably neoplastic disease based on prior PET-CT imaging.       Previously identified nodule in the superior segment of the right lower lobe   is smaller, which may be due to treatment response for inflammatory process   or neoplastic disease.       No new airspace disease

## 2019-09-17 ENCOUNTER — HOSPITAL ENCOUNTER (OUTPATIENT)
Dept: PHYSICAL THERAPY | Facility: CLINIC | Age: 69
Setting detail: THERAPIES SERIES
Discharge: HOME | End: 2019-09-17
Payer: COMMERCIAL

## 2019-09-17 PROCEDURE — 97110 THERAPEUTIC EXERCISES: CPT

## 2019-09-24 ENCOUNTER — HOSPITAL ENCOUNTER (OUTPATIENT)
Dept: PHYSICAL THERAPY | Facility: CLINIC | Age: 69
Setting detail: THERAPIES SERIES
Discharge: HOME | End: 2019-09-24
Payer: COMMERCIAL

## 2019-09-24 ENCOUNTER — TELEPHONE (OUTPATIENT)
Dept: ONCOLOGY | Age: 69
End: 2019-09-24

## 2019-09-24 PROCEDURE — 97140 MANUAL THERAPY 1/> REGIONS: CPT

## 2019-09-24 PROCEDURE — 97110 THERAPEUTIC EXERCISES: CPT

## 2019-09-24 NOTE — TELEPHONE ENCOUNTER
received referral from Triage Nurse stating patient had called earlier and wanted to speak with him.  called patient back. Srinivasa Nowak reports he had not heard back about information for insurance.  had previously mailed documents to patient.  requested patient's email address and sent docs via email.

## 2019-09-24 NOTE — FLOWSHEET NOTE
[] [facility]) - [facility]  [facility]  [address]  [phone]  F: [phone] [] [address]  [place] [address]   [address]  P: [phone]  F: [phone] [x] [address]  Therapy  [address] [facility]  P: [phone]  F: [phone] [] [address]  [facility]   [address]  [place]: [phone]  F: [phone]     Physical Therapy Daily Treatment Note    Date:  2019  Patient Name:  Tamie Jose    :  1950  MRN: [id number]  Physician: [address]: [facility]  Medical Diagnosis:   Primary lung cancer with metastasis from lung to other site, right      Rehab Codes: C34.91   Onset Date: 19 script                                  Next 's appt: 19  Visit# / total visits:   Cancels/No Shows: 0    Subjective:    Pain:  [] Yes  [x] No Location: bilat knees, feet Pain Rating: (0-10 scale) 0/10  Pain altered Tx:  [] No  [] Yes  Action:  Comments: Pt arrived stating he didn't take his new med this morning. States he is going to try to take it every other day and see if his symptoms of drowsiness and decr happiness change. Pt states he planned on making a call to his doctor that changed his med soon.      Objective:    Modalities: prn  Exercises: Manual prn  Exercise Reps/ Time Weight/ Level Comments    Nu step 8' 4   x   HS, calf stretch 3x30''    x              Seated: march, LAQ, HT raises 15x ea  3#     shld shrugs, post shld rolls 15x ea     x   Scap retraction 15x 5 sec   x   cerv rot stretch 3x 20 sec      UT stretch   3x 20 sec    Touchdowns 10x  Hands, elbows, head on wall x   Pec stretch 3x30''  Half foam    x   Standing:          Marches, HS Curls, Side Steps, Tight Rope 3L  3#  Outside // bars x   Squats 2x15     x   SLS 3x30'' Blue foam  x   Balance

## 2019-10-01 ENCOUNTER — APPOINTMENT (OUTPATIENT)
Dept: PHYSICAL THERAPY | Facility: CLINIC | Age: 69
End: 2019-10-01
Payer: COMMERCIAL

## 2019-10-08 ENCOUNTER — HOSPITAL ENCOUNTER (OUTPATIENT)
Dept: PHYSICAL THERAPY | Facility: CLINIC | Age: 69
Setting detail: THERAPIES SERIES
End: 2019-10-08
Payer: COMMERCIAL

## 2019-10-15 ENCOUNTER — APPOINTMENT (OUTPATIENT)
Dept: PHYSICAL THERAPY | Facility: CLINIC | Age: 69
End: 2019-10-15
Payer: COMMERCIAL

## 2019-10-22 ENCOUNTER — TELEPHONE (OUTPATIENT)
Dept: ONCOLOGY | Age: 69
End: 2019-10-22

## 2019-10-24 ENCOUNTER — APPOINTMENT (OUTPATIENT)
Dept: RADIATION ONCOLOGY | Age: 69
End: 2019-10-24
Attending: RADIOLOGY
Payer: COMMERCIAL

## 2019-10-25 ENCOUNTER — HOSPITAL ENCOUNTER (OUTPATIENT)
Age: 69
Discharge: HOME | End: 2019-10-25
Payer: COMMERCIAL

## 2019-10-25 ENCOUNTER — TELEPHONE (OUTPATIENT)
Dept: ONCOLOGY | Age: 69
End: 2019-10-25

## 2019-10-25 ENCOUNTER — OFFICE VISIT (OUTPATIENT)
Dept: ONCOLOGY | Age: 69
End: 2019-10-25
Payer: COMMERCIAL

## 2019-10-25 VITALS
HEART RATE: 89 BPM | DIASTOLIC BLOOD PRESSURE: 89 MMHG | TEMPERATURE: 98 F | WEIGHT: 231.9 LBS | SYSTOLIC BLOOD PRESSURE: 160 MMHG | BODY MASS INDEX: 32.47 KG/M2

## 2019-10-25 DIAGNOSIS — C34.91 PRIMARY LUNG CANCER WITH METASTASIS FROM LUNG TO OTHER SITE, RIGHT (HCC): Primary | ICD-10-CM

## 2019-10-25 DIAGNOSIS — C34.91 PRIMARY LUNG CANCER WITH METASTASIS FROM LUNG TO OTHER SITE, RIGHT (HCC): ICD-10-CM

## 2019-10-25 DIAGNOSIS — I15.9 SECONDARY HYPERTENSION: ICD-10-CM

## 2019-10-25 LAB
ABSOLUTE EOS #: 0 K/UL (ref 0–0.4)
ABSOLUTE IMMATURE GRANULOCYTE: ABNORMAL K/UL (ref 0–0.3)
ABSOLUTE LYMPH #: 0.21 K/UL (ref 1–4.8)
ABSOLUTE MONO #: 0.64 K/UL (ref 0.1–1.2)
ALBUMIN SERPL-MCNC: 4.2 G/DL (ref 3.5–5.2)
ALBUMIN/GLOBULIN RATIO: 1.4 (ref 1–2.5)
ALP BLD-CCNC: 76 U/L (ref 40–129)
ALT SERPL-CCNC: 37 U/L (ref 5–41)
ANION GAP SERPL CALCULATED.3IONS-SCNC: 13 MMOL/L (ref 9–17)
AST SERPL-CCNC: 20 U/L
BASOPHILS # BLD: 0 % (ref 0–2)
BASOPHILS ABSOLUTE: 0 K/UL (ref 0–0.2)
BILIRUB SERPL-MCNC: 0.34 MG/DL (ref 0.3–1.2)
BUN BLDV-MCNC: 24 MG/DL (ref 8–23)
BUN/CREAT BLD: ABNORMAL (ref 9–20)
CALCIUM SERPL-MCNC: 9.6 MG/DL (ref 8.6–10.4)
CHLORIDE BLD-SCNC: 98 MMOL/L (ref 98–107)
CO2: 25 MMOL/L (ref 20–31)
CREAT SERPL-MCNC: 1.21 MG/DL (ref 0.7–1.2)
DIFFERENTIAL TYPE: ABNORMAL
EOSINOPHILS RELATIVE PERCENT: 0 % (ref 1–4)
GFR AFRICAN AMERICAN: >60 ML/MIN
GFR NON-AFRICAN AMERICAN: 60 ML/MIN
GFR SERPL CREATININE-BSD FRML MDRD: ABNORMAL ML/MIN/{1.73_M2}
GFR SERPL CREATININE-BSD FRML MDRD: ABNORMAL ML/MIN/{1.73_M2}
GLUCOSE BLD-MCNC: 162 MG/DL (ref 70–99)
HCT VFR BLD CALC: 42 % (ref 41–53)
HEMOGLOBIN: 14.1 G/DL (ref 13.5–17.5)
IMMATURE GRANULOCYTES: ABNORMAL %
LACTATE DEHYDROGENASE: 522 U/L (ref 135–225)
LYMPHOCYTES # BLD: 3 % (ref 24–44)
MCH RBC QN AUTO: 35.1 PG (ref 26–34)
MCHC RBC AUTO-ENTMCNC: 33.5 G/DL (ref 31–37)
MCV RBC AUTO: 104.9 FL (ref 80–100)
MONOCYTES # BLD: 9 % (ref 2–11)
MORPHOLOGY: ABNORMAL
MORPHOLOGY: ABNORMAL
NRBC AUTOMATED: ABNORMAL PER 100 WBC
PDW BLD-RTO: 16.3 % (ref 12.5–15.4)
PLATELET # BLD: 144 K/UL (ref 140–450)
PLATELET ESTIMATE: ABNORMAL
PMV BLD AUTO: 7.2 FL (ref 6–12)
POTASSIUM SERPL-SCNC: 3.9 MMOL/L (ref 3.7–5.3)
RBC # BLD: 4.01 M/UL (ref 4.5–5.9)
RBC # BLD: ABNORMAL 10*6/UL
SEG NEUTROPHILS: 88 % (ref 36–66)
SEGMENTED NEUTROPHILS ABSOLUTE COUNT: 6.25 K/UL (ref 1.8–7.7)
SODIUM BLD-SCNC: 136 MMOL/L (ref 135–144)
TOTAL PROTEIN: 7.1 G/DL (ref 6.4–8.3)
WBC # BLD: 7.1 K/UL (ref 3.5–11)
WBC # BLD: ABNORMAL 10*3/UL

## 2019-10-25 PROCEDURE — 80053 COMPREHEN METABOLIC PANEL: CPT

## 2019-10-25 PROCEDURE — 83615 LACTATE (LD) (LDH) ENZYME: CPT

## 2019-10-25 PROCEDURE — 36415 COLL VENOUS BLD VENIPUNCTURE: CPT

## 2019-10-25 PROCEDURE — 99211 OFF/OP EST MAY X REQ PHY/QHP: CPT | Performed by: INTERNAL MEDICINE

## 2019-10-25 PROCEDURE — 99214 OFFICE O/P EST MOD 30 MIN: CPT | Performed by: INTERNAL MEDICINE

## 2019-10-25 PROCEDURE — 85025 COMPLETE CBC W/AUTO DIFF WBC: CPT

## 2019-10-29 ENCOUNTER — HOSPITAL ENCOUNTER (OUTPATIENT)
Dept: PHYSICAL THERAPY | Facility: CLINIC | Age: 69
Setting detail: THERAPIES SERIES
Discharge: HOME | End: 2019-10-29
Payer: COMMERCIAL

## 2019-10-29 PROCEDURE — 97110 THERAPEUTIC EXERCISES: CPT

## 2019-11-04 ENCOUNTER — TELEPHONE (OUTPATIENT)
Dept: ONCOLOGY | Age: 69
End: 2019-11-04

## 2019-11-05 ENCOUNTER — HOSPITAL ENCOUNTER (OUTPATIENT)
Dept: PHYSICAL THERAPY | Facility: CLINIC | Age: 69
Setting detail: THERAPIES SERIES
Discharge: HOME | End: 2019-11-05
Payer: COMMERCIAL

## 2019-11-12 ENCOUNTER — APPOINTMENT (OUTPATIENT)
Dept: PHYSICAL THERAPY | Facility: CLINIC | Age: 69
End: 2019-11-12
Payer: COMMERCIAL

## 2019-11-13 ENCOUNTER — TELEPHONE (OUTPATIENT)
Dept: ONCOLOGY | Age: 69
End: 2019-11-13

## 2019-11-13 ENCOUNTER — HOSPITAL ENCOUNTER (OUTPATIENT)
Dept: CT IMAGING | Age: 69
Discharge: HOME | End: 2019-11-15
Payer: COMMERCIAL

## 2019-11-13 ENCOUNTER — HOSPITAL ENCOUNTER (OUTPATIENT)
Dept: MRI IMAGING | Age: 69
Discharge: HOME | End: 2019-11-15
Payer: COMMERCIAL

## 2019-11-13 ENCOUNTER — HOSPITAL ENCOUNTER (OUTPATIENT)
Dept: INFUSION THERAPY | Age: 69
Discharge: HOME | End: 2019-11-13
Payer: COMMERCIAL

## 2019-11-13 VITALS — DIASTOLIC BLOOD PRESSURE: 87 MMHG | HEART RATE: 59 BPM | SYSTOLIC BLOOD PRESSURE: 132 MMHG

## 2019-11-13 DIAGNOSIS — C34.91 PRIMARY LUNG CANCER WITH METASTASIS FROM LUNG TO OTHER SITE, RIGHT (HCC): ICD-10-CM

## 2019-11-13 DIAGNOSIS — C34.91 PRIMARY LUNG CANCER WITH METASTASIS FROM LUNG TO OTHER SITE, RIGHT (HCC): Primary | ICD-10-CM

## 2019-11-13 LAB
ABSOLUTE EOS #: 0.5 K/UL (ref 0–0.4)
ABSOLUTE IMMATURE GRANULOCYTE: ABNORMAL K/UL (ref 0–0.3)
ABSOLUTE LYMPH #: 0.5 K/UL (ref 1–4.8)
ABSOLUTE MONO #: 2.02 K/UL (ref 0.1–0.8)
ALBUMIN SERPL-MCNC: 3.3 G/DL (ref 3.5–5.2)
ALBUMIN/GLOBULIN RATIO: 0.9 (ref 1–2.5)
ALP BLD-CCNC: 109 U/L (ref 40–129)
ALT SERPL-CCNC: 27 U/L (ref 5–41)
ANION GAP SERPL CALCULATED.3IONS-SCNC: 15 MMOL/L (ref 9–17)
AST SERPL-CCNC: 21 U/L
BASOPHILS # BLD: 0 % (ref 0–2)
BASOPHILS ABSOLUTE: 0 K/UL (ref 0–0.2)
BILIRUB SERPL-MCNC: 0.57 MG/DL (ref 0.3–1.2)
BUN BLDV-MCNC: 19 MG/DL (ref 8–23)
BUN/CREAT BLD: ABNORMAL (ref 9–20)
CALCIUM SERPL-MCNC: 9 MG/DL (ref 8.6–10.4)
CHLORIDE BLD-SCNC: 89 MMOL/L (ref 98–107)
CO2: 28 MMOL/L (ref 20–31)
CREAT SERPL-MCNC: 1.31 MG/DL (ref 0.7–1.2)
DIFFERENTIAL TYPE: ABNORMAL
EOSINOPHILS RELATIVE PERCENT: 7 % (ref 1–4)
GFR AFRICAN AMERICAN: >60 ML/MIN
GFR NON-AFRICAN AMERICAN: 54 ML/MIN
GFR SERPL CREATININE-BSD FRML MDRD: ABNORMAL ML/MIN/{1.73_M2}
GFR SERPL CREATININE-BSD FRML MDRD: ABNORMAL ML/MIN/{1.73_M2}
GLUCOSE BLD-MCNC: 138 MG/DL (ref 70–99)
HCT VFR BLD CALC: 35.8 % (ref 41–53)
HEMOGLOBIN: 12 G/DL (ref 13.5–17.5)
IMMATURE GRANULOCYTES: ABNORMAL %
LYMPHOCYTES # BLD: 7 % (ref 24–44)
MCH RBC QN AUTO: 34.2 PG (ref 26–34)
MCHC RBC AUTO-ENTMCNC: 33.6 G/DL (ref 31–37)
MCV RBC AUTO: 101.7 FL (ref 80–100)
MONOCYTES # BLD: 28 % (ref 1–7)
MORPHOLOGY: ABNORMAL
MORPHOLOGY: ABNORMAL
NRBC AUTOMATED: ABNORMAL PER 100 WBC
PDW BLD-RTO: 15.5 % (ref 12.5–15.4)
PLATELET # BLD: 277 K/UL (ref 140–450)
PLATELET ESTIMATE: ABNORMAL
PMV BLD AUTO: 7.5 FL (ref 6–12)
POTASSIUM SERPL-SCNC: 3.2 MMOL/L (ref 3.7–5.3)
RBC # BLD: 3.52 M/UL (ref 4.5–5.9)
RBC # BLD: ABNORMAL 10*6/UL
SEG NEUTROPHILS: 58 % (ref 36–66)
SEGMENTED NEUTROPHILS ABSOLUTE COUNT: 4.18 K/UL (ref 1.8–7.7)
SODIUM BLD-SCNC: 132 MMOL/L (ref 135–144)
TOTAL PROTEIN: 7.1 G/DL (ref 6.4–8.3)
WBC # BLD: 7.2 K/UL (ref 3.5–11)
WBC # BLD: ABNORMAL 10*3/UL

## 2019-11-13 PROCEDURE — 6360000004 HC RX CONTRAST MEDICATION: Performed by: INTERNAL MEDICINE

## 2019-11-13 PROCEDURE — 2580000003 HC RX 258: Performed by: INTERNAL MEDICINE

## 2019-11-13 PROCEDURE — 74177 CT ABD & PELVIS W/CONTRAST: CPT

## 2019-11-13 PROCEDURE — 80053 COMPREHEN METABOLIC PANEL: CPT

## 2019-11-13 PROCEDURE — 85025 COMPLETE CBC W/AUTO DIFF WBC: CPT

## 2019-11-13 PROCEDURE — 36591 DRAW BLOOD OFF VENOUS DEVICE: CPT

## 2019-11-13 PROCEDURE — 6360000002 HC RX W HCPCS: Performed by: INTERNAL MEDICINE

## 2019-11-13 PROCEDURE — 70553 MRI BRAIN STEM W/O & W/DYE: CPT

## 2019-11-13 PROCEDURE — A9579 GAD-BASE MR CONTRAST NOS,1ML: HCPCS | Performed by: INTERNAL MEDICINE

## 2019-11-13 RX ORDER — HEPARIN SODIUM (PORCINE) LOCK FLUSH IV SOLN 100 UNIT/ML 100 UNIT/ML
500 SOLUTION INTRAVENOUS PRN
Status: CANCELLED | OUTPATIENT
Start: 2019-11-13

## 2019-11-13 RX ORDER — 0.9 % SODIUM CHLORIDE 0.9 %
100 INTRAVENOUS SOLUTION INTRAVENOUS ONCE
Status: COMPLETED | OUTPATIENT
Start: 2019-11-13 | End: 2019-11-13

## 2019-11-13 RX ORDER — SODIUM CHLORIDE 0.9 % (FLUSH) 0.9 %
10 SYRINGE (ML) INJECTION ONCE
Status: COMPLETED | OUTPATIENT
Start: 2019-11-13 | End: 2019-11-13

## 2019-11-13 RX ORDER — SODIUM CHLORIDE 0.9 % (FLUSH) 0.9 %
10 SYRINGE (ML) INJECTION PRN
Status: DISCONTINUED | OUTPATIENT
Start: 2019-11-13 | End: 2019-11-14 | Stop reason: HOSPADM

## 2019-11-13 RX ORDER — SODIUM CHLORIDE 0.9 % (FLUSH) 0.9 %
20 SYRINGE (ML) INJECTION PRN
Status: CANCELLED | OUTPATIENT
Start: 2019-11-13

## 2019-11-13 RX ORDER — SODIUM CHLORIDE 0.9 % (FLUSH) 0.9 %
10 SYRINGE (ML) INJECTION PRN
Status: CANCELLED | OUTPATIENT
Start: 2019-11-13

## 2019-11-13 RX ORDER — HEPARIN SODIUM (PORCINE) LOCK FLUSH IV SOLN 100 UNIT/ML 100 UNIT/ML
500 SOLUTION INTRAVENOUS PRN
Status: DISCONTINUED | OUTPATIENT
Start: 2019-11-13 | End: 2019-11-14 | Stop reason: HOSPADM

## 2019-11-13 RX ADMIN — SODIUM CHLORIDE 100 ML: 9 INJECTION, SOLUTION INTRAVENOUS at 11:50

## 2019-11-13 RX ADMIN — Medication 10 ML: at 11:51

## 2019-11-13 RX ADMIN — GADOTERIDOL 20 ML: 279.3 INJECTION, SOLUTION INTRAVENOUS at 09:28

## 2019-11-13 RX ADMIN — Medication 10 ML: at 08:28

## 2019-11-13 RX ADMIN — IOVERSOL 100 ML: 741 INJECTION INTRA-ARTERIAL; INTRAVENOUS at 11:50

## 2019-11-13 RX ADMIN — IOHEXOL 50 ML: 240 INJECTION, SOLUTION INTRATHECAL; INTRAVASCULAR; INTRAVENOUS; ORAL at 11:50

## 2019-11-13 RX ADMIN — Medication 500 UNITS: at 12:08

## 2019-11-13 RX ADMIN — Medication 10 ML: at 12:08

## 2019-11-13 NOTE — PROGRESS NOTES
Pt here for port access for MRI and CT. Port accessed with POWER gripper needle and labs drawn. Pt sent to [facility] Radiology dept. Pt returned for port de-access. Port flushed and as ordered and needle removed. Pt d/c'd in stable condition and will return 11/19/19 for f/u with Dr Emely Peraza.

## 2019-11-19 ENCOUNTER — HOSPITAL ENCOUNTER (OUTPATIENT)
Dept: RADIATION ONCOLOGY | Age: 69
Discharge: HOME | End: 2019-11-19
Attending: RADIOLOGY
Payer: COMMERCIAL

## 2019-11-19 ENCOUNTER — TELEPHONE (OUTPATIENT)
Dept: ONCOLOGY | Age: 69
End: 2019-11-19

## 2019-11-19 ENCOUNTER — OFFICE VISIT (OUTPATIENT)
Dept: ONCOLOGY | Age: 69
End: 2019-11-19
Payer: COMMERCIAL

## 2019-11-19 ENCOUNTER — HOSPITAL ENCOUNTER (OUTPATIENT)
Dept: PHYSICAL THERAPY | Facility: CLINIC | Age: 69
Setting detail: THERAPIES SERIES
Discharge: HOME | End: 2019-11-19
Payer: COMMERCIAL

## 2019-11-19 VITALS
WEIGHT: 228.6 LBS | TEMPERATURE: 97.8 F | DIASTOLIC BLOOD PRESSURE: 77 MMHG | HEART RATE: 92 BPM | SYSTOLIC BLOOD PRESSURE: 114 MMHG | BODY MASS INDEX: 32 KG/M2

## 2019-11-19 VITALS
DIASTOLIC BLOOD PRESSURE: 77 MMHG | RESPIRATION RATE: 18 BRPM | TEMPERATURE: 97.8 F | WEIGHT: 228 LBS | SYSTOLIC BLOOD PRESSURE: 114 MMHG | HEART RATE: 92 BPM | BODY MASS INDEX: 31.92 KG/M2

## 2019-11-19 DIAGNOSIS — R35.89 POLYURIA: ICD-10-CM

## 2019-11-19 DIAGNOSIS — C34.91 PRIMARY LUNG CANCER WITH METASTASIS FROM LUNG TO OTHER SITE, RIGHT (HCC): Primary | ICD-10-CM

## 2019-11-19 PROCEDURE — 99211 OFF/OP EST MAY X REQ PHY/QHP: CPT | Performed by: RADIOLOGY

## 2019-11-19 PROCEDURE — 99214 OFFICE O/P EST MOD 30 MIN: CPT | Performed by: INTERNAL MEDICINE

## 2019-11-19 PROCEDURE — 97110 THERAPEUTIC EXERCISES: CPT

## 2019-11-19 PROCEDURE — 99211 OFF/OP EST MAY X REQ PHY/QHP: CPT | Performed by: INTERNAL MEDICINE

## 2019-11-19 RX ORDER — POTASSIUM CHLORIDE 750 MG/1
20 CAPSULE, EXTENDED RELEASE ORAL DAILY
Qty: 60 CAPSULE | Refills: 3 | Status: SHIPPED | OUTPATIENT
Start: 2019-11-19 | End: 2020-03-09

## 2019-11-19 RX ORDER — TAMSULOSIN HYDROCHLORIDE 0.4 MG/1
0.4 CAPSULE ORAL DAILY
Qty: 30 CAPSULE | Refills: 5 | Status: SHIPPED | OUTPATIENT
Start: 2019-11-19 | End: 2020-05-04

## 2019-11-26 ENCOUNTER — TELEPHONE (OUTPATIENT)
Dept: ONCOLOGY | Age: 69
End: 2019-11-26

## 2019-11-26 ENCOUNTER — APPOINTMENT (OUTPATIENT)
Dept: PHYSICAL THERAPY | Facility: CLINIC | Age: 69
End: 2019-11-26
Payer: COMMERCIAL

## 2019-11-26 DIAGNOSIS — C34.91 PRIMARY LUNG CANCER WITH METASTASIS FROM LUNG TO OTHER SITE, RIGHT (HCC): Primary | ICD-10-CM

## 2019-12-03 ENCOUNTER — HOSPITAL ENCOUNTER (OUTPATIENT)
Age: 69
Discharge: HOME | End: 2019-12-03
Payer: COMMERCIAL

## 2019-12-03 ENCOUNTER — HOSPITAL ENCOUNTER (OUTPATIENT)
Dept: PHYSICAL THERAPY | Facility: CLINIC | Age: 69
Setting detail: THERAPIES SERIES
Discharge: HOME | End: 2019-12-03
Payer: COMMERCIAL

## 2019-12-03 DIAGNOSIS — C34.91 PRIMARY LUNG CANCER WITH METASTASIS FROM LUNG TO OTHER SITE, RIGHT (HCC): ICD-10-CM

## 2019-12-03 LAB
-: ABNORMAL
AMORPHOUS: ABNORMAL
BACTERIA: ABNORMAL
BILIRUBIN URINE: ABNORMAL
CASTS UA: ABNORMAL /LPF
CASTS UA: ABNORMAL /LPF
COLOR: YELLOW
COMMENT UA: ABNORMAL
CRYSTALS, UA: ABNORMAL /HPF
EPITHELIAL CELLS UA: ABNORMAL /HPF (ref 0–5)
GLUCOSE URINE: NEGATIVE
KETONES, URINE: ABNORMAL
LEUKOCYTE ESTERASE, URINE: NEGATIVE
MUCUS: ABNORMAL
NITRITE, URINE: NEGATIVE
OTHER OBSERVATIONS UA: ABNORMAL
PH UA: 6.5 (ref 5–8)
PROTEIN UA: ABNORMAL
RBC UA: ABNORMAL /HPF (ref 0–2)
RENAL EPITHELIAL, UA: ABNORMAL /HPF
SPECIFIC GRAVITY UA: 1.02 (ref 1–1.03)
TRICHOMONAS: ABNORMAL
TURBIDITY: ABNORMAL
URINE HGB: NEGATIVE
UROBILINOGEN, URINE: NORMAL
WBC UA: ABNORMAL /HPF (ref 0–5)
YEAST: ABNORMAL

## 2019-12-03 PROCEDURE — 81001 URINALYSIS AUTO W/SCOPE: CPT

## 2019-12-03 PROCEDURE — 97140 MANUAL THERAPY 1/> REGIONS: CPT

## 2019-12-03 PROCEDURE — 97110 THERAPEUTIC EXERCISES: CPT

## 2019-12-10 ENCOUNTER — HOSPITAL ENCOUNTER (OUTPATIENT)
Dept: PHYSICAL THERAPY | Facility: CLINIC | Age: 69
Setting detail: THERAPIES SERIES
Discharge: HOME | End: 2019-12-10
Payer: COMMERCIAL

## 2019-12-10 PROCEDURE — 97110 THERAPEUTIC EXERCISES: CPT

## 2019-12-10 PROCEDURE — 97530 THERAPEUTIC ACTIVITIES: CPT

## 2019-12-11 DIAGNOSIS — C34.91 PRIMARY LUNG CANCER WITH METASTASIS FROM LUNG TO OTHER SITE, RIGHT (HCC): ICD-10-CM

## 2019-12-11 DIAGNOSIS — I15.9 SECONDARY HYPERTENSION: ICD-10-CM

## 2019-12-17 ENCOUNTER — HOSPITAL ENCOUNTER (OUTPATIENT)
Dept: PHYSICAL THERAPY | Facility: CLINIC | Age: 69
Setting detail: THERAPIES SERIES
Discharge: HOME | End: 2019-12-17
Payer: COMMERCIAL

## 2019-12-17 PROCEDURE — 97110 THERAPEUTIC EXERCISES: CPT

## 2019-12-17 PROCEDURE — 97112 NEUROMUSCULAR REEDUCATION: CPT

## 2019-12-24 ENCOUNTER — HOSPITAL ENCOUNTER (OUTPATIENT)
Dept: PHYSICAL THERAPY | Facility: CLINIC | Age: 69
Setting detail: THERAPIES SERIES
Discharge: HOME | End: 2019-12-24
Payer: COMMERCIAL

## 2019-12-24 PROCEDURE — 97530 THERAPEUTIC ACTIVITIES: CPT

## 2019-12-24 PROCEDURE — 97110 THERAPEUTIC EXERCISES: CPT

## 2019-12-31 ENCOUNTER — HOSPITAL ENCOUNTER (OUTPATIENT)
Dept: PHYSICAL THERAPY | Facility: CLINIC | Age: 69
Setting detail: THERAPIES SERIES
Discharge: HOME | End: 2019-12-31
Payer: COMMERCIAL

## 2019-12-31 PROCEDURE — 97112 NEUROMUSCULAR REEDUCATION: CPT

## 2019-12-31 PROCEDURE — 97110 THERAPEUTIC EXERCISES: CPT

## 2020-01-09 ENCOUNTER — TELEPHONE (OUTPATIENT)
Dept: ONCOLOGY | Age: 70
End: 2020-01-09

## 2020-01-09 ENCOUNTER — HOSPITAL ENCOUNTER (OUTPATIENT)
Dept: PHYSICAL THERAPY | Facility: CLINIC | Age: 70
Setting detail: THERAPIES SERIES
Discharge: HOME | End: 2020-01-09
Payer: COMMERCIAL

## 2020-01-09 PROCEDURE — 97112 NEUROMUSCULAR REEDUCATION: CPT

## 2020-01-09 PROCEDURE — 97110 THERAPEUTIC EXERCISES: CPT

## 2020-01-09 NOTE — FLOWSHEET NOTE
[] [address]  [address]  [phone]  F: [phone] [] [address]  [facility]   [address]  P: [phone]  F: [phone] [x] [address] &  Therapy  [address] [facility]  P: [phone]  F: [phone] [] [address]  [facility]   [address]  [place]: [phone]  F: [phone]     Physical Therapy Daily Treatment Note    Date:  2020  Patient Name:  Xochitl Martinez    :  1950  MRN: [id number]  Physician: [address]: [facility]  Medical Diagnosis:   Primary lung cancer with metastasis from lung to other site, right      Rehab Codes: C34.91   Onset Date: 19 script                                  Next 's appt: 19   Visit# / total visits:   Cancels/No Shows: 1/0    Subjective:    Pain:  [] Yes  [x] No Location: B knee, feet Pain Rating: (0-10 scale) 0/10  Pain altered Tx:  [] No  [] Yes  Action:  Comments: Arrives reporting no pain or recent falls. Also states he sees himself getting SOB when carrying wood in for his wood burning stove at home. He has to rest before going to get a second and third load. Also states he gets low back pain when doing the dishes after 10-15 minutes has to sit down and rest. Diet is reported the same a well as water intake.        Objective:     Modalities: prn  Exercises: Manual prn - DI bilat piriformis, hip flexor  Exercise Reps/ Time Weight/ Level Comments    Nu step 10' 7   x   HS, calf stretch 3x30''    x              Seated: March, LAQ 15x ea 2#     Post Shld Rolls 15x ea seated   x   Scap retraction 15x 5 sec   x   Cerv Rot Stretch 3x 20 sec      UT stretch   3x 20 sec    Touchdowns 10x  Hands, elbows, head on wall    Pec stretch 3x30''  Half foam       Standing:           1 set to fatigue, 6L 3# 1 set in place to fatigue, 1 dynamic to fatigue x   HS Curls, Side Steps, Tight Rope 3L 2#  Inside // bars    3 way hip 15x 2# bilateral x   Squats 2x15   Chair tap  x   SLS 3x30'' Blue foam     Balance board 2' Level 2     TG Squats/HR 2x10 Level 18     Step ups 2x15 6'', 3#     Tandem Stance 2x30'' EO, EC No UE support    SLS 2x30'' EO, EC No UE support x   Static standing 2x30'' EO, EC No UE support    6L Ball Toss   Side step, gait belt    Standing pertubations 30''x2 EC In // bars    Doorway pec stretch  3x30''       Supine pec stretch 3'  Pool noodle              Tricep Ext 20x Bberry     Shoulder Ext 20x Bberry     Rows 20x Bberry     IR/ER 20x ea Bberry            Other: MET to hal UT, STM     Specific Instructions for next treatment: Balance      Treatment Charges: Mins Units   []  Modalities     [x]  Ther Exercise 40 3   []  Manual Therapy     []  Ther Activities     []  Aquatics     []  Vasocompression     [x]  Other: NMR 15 1   Total Treatment time 55 4       Assessment: [x] Progressing toward goals. [] No change. [x] Other: Initiated treatment on NuStep followed by stretches. Cont seated shoulder rolls & scap retraction. Added squat to cone grab this date to educate patient on proper body mechanics to lift from an 8'' step. Cued patient to decr trunk lean and use hip and knee musculature w/ good carryover. Mild light-headedness reported - instructed pt to avoid valsalva during squat w/ poor carryover. Instructed patient to prop on LE inside cupboard under sink while doing dishes to encourage a posterior pelvic tilt and note if this relieves the present low back pain. Modified marching in place to cont until fatigue in place then added dynamic marches until SOB. Seated rest break needed in between variations of marches as well as water. Pt mildly SOB after dynamic marches. Ended treatment w/ SLS listed above - pt reports moderate fatigue at end of treatment.       STG: (to be met

## 2020-01-16 ENCOUNTER — HOSPITAL ENCOUNTER (OUTPATIENT)
Dept: PHYSICAL THERAPY | Facility: CLINIC | Age: 70
Setting detail: THERAPIES SERIES
Discharge: HOME | End: 2020-01-16
Payer: COMMERCIAL

## 2020-01-16 PROCEDURE — 97110 THERAPEUTIC EXERCISES: CPT

## 2020-01-16 PROCEDURE — 97112 NEUROMUSCULAR REEDUCATION: CPT

## 2020-01-23 ENCOUNTER — HOSPITAL ENCOUNTER (OUTPATIENT)
Dept: PHYSICAL THERAPY | Facility: CLINIC | Age: 70
Setting detail: THERAPIES SERIES
End: 2020-01-23
Payer: COMMERCIAL

## 2020-01-30 ENCOUNTER — HOSPITAL ENCOUNTER (OUTPATIENT)
Dept: PHYSICAL THERAPY | Facility: CLINIC | Age: 70
Setting detail: THERAPIES SERIES
Discharge: HOME | End: 2020-01-30
Payer: COMMERCIAL

## 2020-01-30 PROCEDURE — 97112 NEUROMUSCULAR REEDUCATION: CPT

## 2020-01-30 PROCEDURE — 97110 THERAPEUTIC EXERCISES: CPT

## 2020-01-30 PROCEDURE — 97140 MANUAL THERAPY 1/> REGIONS: CPT

## 2020-01-30 NOTE — FLOWSHEET NOTE
[] [facility] [facility]  [address]  [phone]  F: [phone] [] [address]  [facility] 36   [address]  P: [phone]  F: [phone] [x] [address]  Therapy  [address] [facility]  P: [phone]  F: [phone] [] [address]  [facility]   [address]  [place]: [phone]  F: [phone]     Physical Therapy Daily Treatment Note    Date:  2020  Patient Name:  Epifanio Guerin    :  1950  MRN: [id number]  Physician: Sumit N [facility] Street: [facility]  Medical Diagnosis: Primary lung cancer with metastasis from lung to other site, right      Rehab Codes: C34.91   Onset Date: 19 script                                  Next 's appt: 19   Visit# / total visits:   Cancels/No Shows: 1/0    Subjective:    Pain:  [] Yes  [x] No Location: B knee, feet Pain Rating: (0-10 scale) 0/10  Pain altered Tx:  [] No  [] Yes  Action:  Comments: Pt states he is starting to have L shoulder pain. States compliance w/ HEP. Also states he hasn't been using his wood burning stove at home as much anymore d/t incr exhaustion after hauling loads of wood into his home. Also states he has seen his lung doctor who has reminded him to stop smoking, and is offering oxygen in which the patient is deferring.        Objective:     Modalities: prn  Exercises: Manual prn - DI bilat piriformis, hip flexor  Exercise Reps/ Time Weight/ Level Comments    Nu step 10' 7   x   HS, calf stretch 3x30''    x              Seated: March, LAQ 15x ea 2#     Post Shld Rolls 15x ea standing   x   Scap retraction 15x 5 sec      Cerv Rot Stretch 3x 20 sec      UT stretch   3x 20 sec    Touchdowns 10x  Hands, elbows, head on wall    Pec stretch 3x30''  Half foam       Standing:           1 set points for improved balance and stability     Patient goals: get back to the old me    Pt. Education:  [x] Yes  [] No  [] Reviewed Prior HEP/Ed  Method of Education: [x] Verbal  [x] Demo  [x] Written  Comprehension of Education:  [x] Verbalizes understanding. [x] Demonstrates understanding. [] Needs review. [] Demonstrates/verbalizes HEP/Ed previously given. -March Conte [facility] 8/27     Plan: [x] Continue per plan of care.    [] Other:      Time In: 1000          Time Out: 3479    Electronically signed by:  Elba Vásquez PTA

## 2020-02-06 ENCOUNTER — HOSPITAL ENCOUNTER (OUTPATIENT)
Dept: PHYSICAL THERAPY | Facility: CLINIC | Age: 70
Setting detail: THERAPIES SERIES
Discharge: HOME | End: 2020-02-06
Payer: COMMERCIAL

## 2020-02-10 ENCOUNTER — HOSPITAL ENCOUNTER (OUTPATIENT)
Dept: CT IMAGING | Age: 70
Discharge: HOME | End: 2020-02-12
Payer: COMMERCIAL

## 2020-02-10 ENCOUNTER — HOSPITAL ENCOUNTER (OUTPATIENT)
Dept: INFUSION THERAPY | Age: 70
Discharge: HOME | End: 2020-02-10
Payer: COMMERCIAL

## 2020-02-10 ENCOUNTER — TELEPHONE (OUTPATIENT)
Dept: ONCOLOGY | Age: 70
End: 2020-02-10

## 2020-02-10 DIAGNOSIS — C34.91 PRIMARY LUNG CANCER WITH METASTASIS FROM LUNG TO OTHER SITE, RIGHT (HCC): Primary | ICD-10-CM

## 2020-02-10 LAB
ABSOLUTE EOS #: 0.1 K/UL (ref 0–0.4)
ABSOLUTE IMMATURE GRANULOCYTE: ABNORMAL K/UL (ref 0–0.3)
ABSOLUTE LYMPH #: 0.9 K/UL (ref 1–4.8)
ABSOLUTE MONO #: 0.9 K/UL (ref 0.1–1.2)
ALBUMIN SERPL-MCNC: 4.2 G/DL (ref 3.5–5.2)
ALBUMIN/GLOBULIN RATIO: 1.4 (ref 1–2.5)
ALP BLD-CCNC: 68 U/L (ref 40–129)
ALT SERPL-CCNC: 13 U/L (ref 5–41)
ANION GAP SERPL CALCULATED.3IONS-SCNC: 13 MMOL/L (ref 9–17)
AST SERPL-CCNC: 19 U/L
BASOPHILS # BLD: 1 % (ref 0–2)
BASOPHILS ABSOLUTE: 0 K/UL (ref 0–0.2)
BILIRUB SERPL-MCNC: 0.22 MG/DL (ref 0.3–1.2)
BUN BLDV-MCNC: 16 MG/DL (ref 8–23)
BUN/CREAT BLD: ABNORMAL (ref 9–20)
CALCIUM SERPL-MCNC: 9.8 MG/DL (ref 8.6–10.4)
CHLORIDE BLD-SCNC: 103 MMOL/L (ref 98–107)
CO2: 22 MMOL/L (ref 20–31)
CREAT SERPL-MCNC: 1.05 MG/DL (ref 0.7–1.2)
DIFFERENTIAL TYPE: ABNORMAL
EOSINOPHILS RELATIVE PERCENT: 2 % (ref 1–4)
GFR AFRICAN AMERICAN: >60 ML/MIN
GFR NON-AFRICAN AMERICAN: >60 ML/MIN
GFR SERPL CREATININE-BSD FRML MDRD: ABNORMAL ML/MIN/{1.73_M2}
GFR SERPL CREATININE-BSD FRML MDRD: ABNORMAL ML/MIN/{1.73_M2}
GLUCOSE BLD-MCNC: 171 MG/DL (ref 70–99)
HCT VFR BLD CALC: 37.8 % (ref 41–53)
HEMOGLOBIN: 12.5 G/DL (ref 13.5–17.5)
IMMATURE GRANULOCYTES: ABNORMAL %
LYMPHOCYTES # BLD: 16 % (ref 24–44)
MCH RBC QN AUTO: 33.2 PG (ref 26–34)
MCHC RBC AUTO-ENTMCNC: 33.1 G/DL (ref 31–37)
MCV RBC AUTO: 100.4 FL (ref 80–100)
MONOCYTES # BLD: 16 % (ref 2–11)
NRBC AUTOMATED: ABNORMAL PER 100 WBC
PDW BLD-RTO: 15.5 % (ref 12.5–15.4)
PLATELET # BLD: 251 K/UL (ref 140–450)
PLATELET ESTIMATE: ABNORMAL
PMV BLD AUTO: 7.5 FL (ref 6–12)
POTASSIUM SERPL-SCNC: 4.1 MMOL/L (ref 3.7–5.3)
RBC # BLD: 3.76 M/UL (ref 4.5–5.9)
RBC # BLD: ABNORMAL 10*6/UL
SEG NEUTROPHILS: 65 % (ref 36–66)
SEGMENTED NEUTROPHILS ABSOLUTE COUNT: 3.5 K/UL (ref 1.8–7.7)
SODIUM BLD-SCNC: 138 MMOL/L (ref 135–144)
TOTAL PROTEIN: 7.3 G/DL (ref 6.4–8.3)
WBC # BLD: 5.5 K/UL (ref 3.5–11)
WBC # BLD: ABNORMAL 10*3/UL

## 2020-02-10 PROCEDURE — 36591 DRAW BLOOD OFF VENOUS DEVICE: CPT

## 2020-02-10 PROCEDURE — 80053 COMPREHEN METABOLIC PANEL: CPT

## 2020-02-10 PROCEDURE — 85025 COMPLETE CBC W/AUTO DIFF WBC: CPT

## 2020-02-10 PROCEDURE — 6360000004 HC RX CONTRAST MEDICATION: Performed by: INTERNAL MEDICINE

## 2020-02-10 PROCEDURE — 2580000003 HC RX 258: Performed by: INTERNAL MEDICINE

## 2020-02-10 PROCEDURE — 6360000002 HC RX W HCPCS: Performed by: INTERNAL MEDICINE

## 2020-02-10 PROCEDURE — 74177 CT ABD & PELVIS W/CONTRAST: CPT

## 2020-02-10 RX ORDER — SODIUM CHLORIDE 0.9 % (FLUSH) 0.9 %
10 SYRINGE (ML) INJECTION PRN
Status: DISCONTINUED | OUTPATIENT
Start: 2020-02-10 | End: 2020-02-13 | Stop reason: HOSPADM

## 2020-02-10 RX ORDER — SODIUM CHLORIDE 0.9 % (FLUSH) 0.9 %
10 SYRINGE (ML) INJECTION PRN
Status: DISCONTINUED | OUTPATIENT
Start: 2020-02-10 | End: 2020-02-11 | Stop reason: HOSPADM

## 2020-02-10 RX ORDER — HEPARIN SODIUM (PORCINE) LOCK FLUSH IV SOLN 100 UNIT/ML 100 UNIT/ML
500 SOLUTION INTRAVENOUS PRN
Status: DISCONTINUED | OUTPATIENT
Start: 2020-02-10 | End: 2020-02-11 | Stop reason: HOSPADM

## 2020-02-10 RX ORDER — HEPARIN SODIUM (PORCINE) LOCK FLUSH IV SOLN 100 UNIT/ML 100 UNIT/ML
500 SOLUTION INTRAVENOUS PRN
Status: CANCELLED | OUTPATIENT
Start: 2020-02-10

## 2020-02-10 RX ORDER — SODIUM CHLORIDE 0.9 % (FLUSH) 0.9 %
10 SYRINGE (ML) INJECTION PRN
Status: CANCELLED | OUTPATIENT
Start: 2020-02-10

## 2020-02-10 RX ORDER — SODIUM CHLORIDE 0.9 % (FLUSH) 0.9 %
20 SYRINGE (ML) INJECTION PRN
Status: CANCELLED | OUTPATIENT
Start: 2020-02-10

## 2020-02-10 RX ORDER — 0.9 % SODIUM CHLORIDE 0.9 %
80 INTRAVENOUS SOLUTION INTRAVENOUS ONCE
Status: COMPLETED | OUTPATIENT
Start: 2020-02-10 | End: 2020-02-10

## 2020-02-10 RX ADMIN — HEPARIN 500 UNITS: 100 SYRINGE at 13:37

## 2020-02-10 RX ADMIN — Medication 10 ML: at 11:53

## 2020-02-10 RX ADMIN — SODIUM CHLORIDE 80 ML: 9 INJECTION, SOLUTION INTRAVENOUS at 13:21

## 2020-02-10 RX ADMIN — Medication 10 ML: at 13:37

## 2020-02-10 RX ADMIN — IOHEXOL 50 ML: 240 INJECTION, SOLUTION INTRATHECAL; INTRAVASCULAR; INTRAVENOUS; ORAL at 13:21

## 2020-02-10 RX ADMIN — IOVERSOL 100 ML: 741 INJECTION INTRA-ARTERIAL; INTRAVENOUS at 13:22

## 2020-02-10 RX ADMIN — Medication 10 ML: at 11:52

## 2020-02-10 RX ADMIN — Medication 10 ML: at 13:22

## 2020-02-10 NOTE — PROGRESS NOTES
Pt here for CT port access with lab draw. Pt was treated without incident and d/c'd in stable condition. Pt returns 2/19/20 for MD follow up.

## 2020-02-11 ENCOUNTER — HOSPITAL ENCOUNTER (OUTPATIENT)
Dept: PHYSICAL THERAPY | Facility: CLINIC | Age: 70
Setting detail: THERAPIES SERIES
Discharge: HOME | End: 2020-02-11
Payer: COMMERCIAL

## 2020-02-11 PROCEDURE — 97110 THERAPEUTIC EXERCISES: CPT

## 2020-02-11 PROCEDURE — 97530 THERAPEUTIC ACTIVITIES: CPT

## 2020-02-11 PROCEDURE — 97140 MANUAL THERAPY 1/> REGIONS: CPT

## 2020-02-11 NOTE — FLOWSHEET NOTE
[] [address]  [address]  [phone]  F: [phone] [] [address]  [facility] 36   [address]  P: [phone]  F: [phone] [x] [address]  Therapy  [address] [facility]  P: [phone]  F: [phone] [] [address]  [facility]   [address]  [place]: [phone]  F: [phone]     Physical Therapy Daily Treatment Note    Date:  2020  Patient Name:  Christie Borges    :  1950  MRN: [id number]  Physician: [address]: [facility]  Medical Diagnosis: Primary lung cancer with metastasis from lung to other site, right      Rehab Codes: C34.91   Onset Date: 19 script                                  Next 's appt: 2020   Visit# / total visits:   Cancels/No Shows: 2    Subjective:    Pain:  [] Yes  [x] No Location: B knee, feet Pain Rating: (0-10 scale) 0/10  Pain altered Tx:  [] No  [] Yes  Action:  Comments: Pt states he is feeling better overall, feeling stronger but cont's to have a day here and there w/ more fatigue but less frequent. Notes no recent falls, at least 3-4 weeks. No recent muscle spasms. Less dizziness. Cont's w/ fogginess and occ confusion. Cont's to feel SOB serge w/ carrying wood into house, takes breaks as needed. Lian Dutta Has been walking more, fatigues after 4-5 laps around yard and has been doing more household chores. Still no appetite, \"everything tastes like card board\" min improvement, drinking boost 1-2x day. Toenails are black and cracked but havent fallen off, min N&T in feet. Intermittent L shoulder pain w/ OH activity. Improved Sleep, still up every 2-3 hrs to go to bathroom.  Pt concerned he may not be able to return to work  2° prolonged standing w/ fast paced job including lifting, twisting and Education:  [x] Yes  [] No  [] Reviewed Prior HEP/Ed  Method of Education: [x] Verbal  [x] Demo  [x] Written  Comprehension of Education:  [x] Verbalizes understanding. [x] Demonstrates understanding. [] Needs review. [] Demonstrates/verbalizes HEP/Ed previously given. -Carlos Luna tband 8/27     Plan: [x] Continue per plan of care.    [x] Other: PN sent requesting additional visits      Time In: 1105          Time Out: 1225 pm    Electronically signed by:  Giana Wilson PT

## 2020-02-11 NOTE — PROGRESS NOTES
WORST. 3/10  5/10 3/10   AVERAGE    3/10  5/10 3/10            Distress           /10          /10    PRESENT  2/10  5/5 5/10   WORST  5/10  5/5 5/10   AVERAGE 2/10  5/5 5/10   Distress (week)           3  /10        5   /10 5/10                     6 MIN WALK Baseline/end of test Baseline/end of test    HR (bpm) 86/88  88/95 80/88   SpO2 98/98  98/96 97/97   dyspnea 2/3  2/4 2/3   fatigue 2/3.5  3/4-5 3/3   Distance (ft) 850 ft  425 ft 807.5 ft     Pt requested to stop test after 3 1/2 min 2° fatigue Completed full 6 min w/ steady pace            Functional Test       LEFS-63/80, 78%  tinetti-19/28  (19-23/28= moderate fall risk)  LEFS-33/64, 51%  Tinetti-20/28 (moderate fall risk) LEFS-40/64, 62%  Tinetti-23/28  (moderate fall risk)      Treatment to Date:  [x] Therapeutic Exercise   42738  [] Iontophoresis: 4 mg/mL Dexamethasone Sodium Phosphate  mAmin  68229   [x] Therapeutic Activity  95306 [] Vasopneumatic cold with compression  22757    [] Gait Training   05600 [] Ultrasound   25570   [] Neuromuscular Re-education  89254 [] Electrical Stimulation Unattended  49181   [x] Manual Therapy  44749 [] Electrical Stimulation Attended  70436   [x] Instruction in HEP  [] Dry Needling   [] Aquatic Therapy   16827 [x] Cold/hotpack    [] Massage   31599      [] Lumbar/Cervical Traction  [id number]       Patient Status:     [x] Continue per initial plan of care. [x] Additional visits necessary to further progress functional strength, improve balance and endurance and be able to go up/dwn steps w/o sig fatigue      [x] Other: Pt progressing well overall and puts forth good effort. Stressed importance of diaphragmatic breathing and expanding his lungs- maintains good SpO2 but breathes very shallow and feels SOB. Educated on importance of daily ex/activity and walking as donnell as well as activity modification prn. Emphasized proper nutrition/fuel for his body and to incr boost intake if needed as well as water. Requested Frequency/Duration: 1 times per week for up to 9 additional treatments. Electronically signed by Sean Patterson PT on 2/11/2020 at 11:11 AM      If you have any questions or concerns, please don't hesitate to call. Thank you for your referral.    Physician Signature:________________________________Date:__________________  By signing above or cosigning this note, I have reviewed this plan of care and certify a need for medically necessary rehabilitation services.      *PLEASE SIGN ABOVE AND FAX BACK ALL PAGES*

## 2020-02-13 ENCOUNTER — HOSPITAL ENCOUNTER (OUTPATIENT)
Dept: PHYSICAL THERAPY | Facility: CLINIC | Age: 70
Setting detail: THERAPIES SERIES
End: 2020-02-13
Payer: COMMERCIAL

## 2020-02-19 ENCOUNTER — OFFICE VISIT (OUTPATIENT)
Dept: ONCOLOGY | Age: 70
End: 2020-02-19
Payer: COMMERCIAL

## 2020-02-19 VITALS
HEART RATE: 90 BPM | TEMPERATURE: 99.7 F | WEIGHT: 222.2 LBS | SYSTOLIC BLOOD PRESSURE: 105 MMHG | DIASTOLIC BLOOD PRESSURE: 72 MMHG | BODY MASS INDEX: 31.11 KG/M2

## 2020-02-19 PROCEDURE — 99211 OFF/OP EST MAY X REQ PHY/QHP: CPT | Performed by: INTERNAL MEDICINE

## 2020-02-19 PROCEDURE — 99214 OFFICE O/P EST MOD 30 MIN: CPT | Performed by: INTERNAL MEDICINE

## 2020-02-19 NOTE — PROGRESS NOTES
Marilia Davis                                                                                                                  2/19/2020  MRN:   [id number]  YOB: 1950  PCP:                           Magdaleno Augustin MD  Referring Physician: No ref. 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 CT scan was read as lung rads degree 4B. Subsequently she underwent biopsy of right upper lobe lung nodule on 1/16/19. 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 CT PET done showed abnormal FDG uptake in the right upper lobe nodule. There was also abnormal FDG uptake in the right lower lobe nodule. Additionally right hilar lymph node were also FDG avid. No bony lesion was reported. MRI brain for staging workup did not show any evidence of intracranial metastasis. Tip of the odontoid process was ill-defined and metastasis could not be ruled out. Clinically patient does give history of trauma to the neck area any years ago however denies any surgery history. Bone scan did not reveal any metastasis     Patient continues to smoke but has cut down quite a bit. He works full-time in a [employer] Patient has good performance status, ECOG 0.   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 We discussed his CT which shows a very small area of enhancement in the left adrenal that is new, location is not likely to be biopsied and we will continue to monitor, no new or recurrent disease seen in the lungs. He continues to follow with pulmonology. Given concerning findings on the CT scan I believe the patient will need a closer follow-up. I will see patient back in office in 2 months with a repeat CT scan done prior to the visit. I do not believe patient is strong enough to go back to work additionally he is also having balance issues which will also be a big hindrance going back to work. [employer]        This note is created with the assistance of a speech recognition program.  While intending to generate a document that actually reflects the content of the visit, the document can still have some errors including those of syntax and sound a like substitutions which may escape proof reading. It such instances, actual meaning can be extrapolated by contextual diversion.

## 2020-02-20 ENCOUNTER — TELEPHONE (OUTPATIENT)
Dept: ONCOLOGY | Age: 70
End: 2020-02-20

## 2020-02-20 ENCOUNTER — HOSPITAL ENCOUNTER (OUTPATIENT)
Dept: PHYSICAL THERAPY | Facility: CLINIC | Age: 70
Setting detail: THERAPIES SERIES
Discharge: HOME | End: 2020-02-20
Payer: COMMERCIAL

## 2020-02-20 PROCEDURE — 97110 THERAPEUTIC EXERCISES: CPT

## 2020-02-20 PROCEDURE — 97112 NEUROMUSCULAR REEDUCATION: CPT

## 2020-02-20 PROCEDURE — 97530 THERAPEUTIC ACTIVITIES: CPT

## 2020-02-20 PROCEDURE — 97140 MANUAL THERAPY 1/> REGIONS: CPT

## 2020-02-20 NOTE — FLOWSHEET NOTE
[] [address] [facility]  [address]  [phone]  F: [phone] [] [address]  [facility] 36   [address]  P: [phone]  F: [phone] [x] [address]  Therapy  [address] [facility]  P: [phone]  F: [phone] [] [address]  [facility]   [address]  [place]: [phone]  F: [phone]     Physical Therapy Daily Treatment Note    Date:  2020  Patient Name:  Vickey Sanchez    :  1950  MRN: [id number]  Physician: [address]: [facility]  Medical Diagnosis: Primary lung cancer with metastasis from lung to other site, right      Rehab Codes: C34.91   Onset Date: 19 script                                  Next 's appt: 2020   Visit# / total visits:   Cancels/No Shows: 2/0    Subjective:    Pain:  [] Yes  [x] No Location: B knee, feet Pain Rating: (0-10 scale) 0/10  Pain altered Tx:  [] No  [] Yes  Action:  Comments: Pt states he is feeling \"pretty good\" with moderate shoulder pain that won't go away. 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 Concurrent chemo and radiation (40 days of radiation w/ 30 to chest and 10 to head, 15 days of chemo) completed ~ 2019.  Port L chest  Modalities: prn  Exercises: Manual prn - DI bilat piriformis, hip flexor  Exercise Reps/ Time Weight/ Level Comments    Nu step 10' 7      HS, calf stretch 3x30''                  Seated: March, LAQ 15x ea 2#     Post Shld Rolls 15x ea standing   x   Scap retraction 15x 5 sec   x   Cerv Rot Stretch 3x 20 sec      UT stretch   3x 20 sec x   Touchdowns 10x  Hands, elbows, head on wall    Pec stretch 3x30''  foam     Standing:

## 2020-02-20 NOTE — TELEPHONE ENCOUNTER
Dr Burke Carolina stated to provide a letter for patient to be off of work X2 months. Letter of extension for patient to be off of work until 4/29/20 faxed to ATTN: Mita Beard at [phone]. Confirmation fax received.  Nick Mills

## 2020-02-24 ENCOUNTER — TELEPHONE (OUTPATIENT)
Dept: ONCOLOGY | Age: 70
End: 2020-02-24

## 2020-02-27 ENCOUNTER — HOSPITAL ENCOUNTER (OUTPATIENT)
Dept: PHYSICAL THERAPY | Facility: CLINIC | Age: 70
Setting detail: THERAPIES SERIES
Discharge: HOME | End: 2020-02-27
Payer: COMMERCIAL

## 2020-03-05 ENCOUNTER — HOSPITAL ENCOUNTER (OUTPATIENT)
Dept: PHYSICAL THERAPY | Facility: CLINIC | Age: 70
Setting detail: THERAPIES SERIES
Discharge: HOME | End: 2020-03-05
Payer: COMMERCIAL

## 2020-03-05 PROCEDURE — 97110 THERAPEUTIC EXERCISES: CPT

## 2020-03-05 PROCEDURE — 97140 MANUAL THERAPY 1/> REGIONS: CPT

## 2020-03-05 PROCEDURE — 97530 THERAPEUTIC ACTIVITIES: CPT

## 2020-03-05 NOTE — FLOWSHEET NOTE
elbows, head on wall    Pec stretch 3x30''  foam     Standing:          Marches 3x30'' 3#, blue foam  x   HS Curls, Side Steps, Retro walking 2L   Outside // bars, gait belt, CGA    3 way hip 20x blue Bilateral, loop x   Squats 2x15   Chair tap     SLS 3x30'' bilat Blue foam EO, EC    Balance board 2' Level 2     TG Squats/HR 2x10 Level 18     Step ups 2x15 6'', 3#     Tandem Stance 2x30'' EO, EC No UE support    SLS 2x30'' EO, EC No UE support x   Static standing 2x30'' EO, EC No UE support    6L Ball Toss   Side step, gait belt    Standing pertubations 30''x2 EC In // bars    Doorway pec stretch  3x30''    x   Supine pec stretch 3'  Pool noodle    bilat wall slide   Thoracic ext x   STS 2x10    No UE support.  x   Tricep Ext 20x Bberry     Shoulder Ext 20x Bberry     Rows 20x Bberry     ER 20x Bberry     IR @ 90/90 2x10 Bberry     Horizontal Add 2x10 Bberry     Other: MET to bilat UT, STM  Grades I/II joint mobs to LUE, light manual distraction, PROM into end range  L pec minor release     Specific Instructions for next treatment: progress core and postural stability as well as scap stab and balance/endurance strengthening      Treatment Charges: Mins Units   []  Modalities     [x]  Ther Exercise 15 1   [x]  Manual Therapy 25 1   [x]  Ther Activities 10 1   []  Aquatics     []  Vasocompression     []  Other: NMR     Total Treatment time 50 3       Assessment: [x] Progressing toward goals. [] No change. [x] Other: Cont NuStep followed by STS and 3 way hip w/ TB loop in // bars. Manual as mentioned above w/ incr ROM in LUE noted post. Modified touchdown slides to touchdown slide holds in order to decr forward flexed posture. Pt reports decr pain in shoulder at end of treatment.      STG: (to be met in 12 treatments)  1. ? Pain: 2-3/10 w/ activity- MET   2. ? ROM:Cervical and B UE's WNL-progressing towards, L shoulder limited by pain  3. ? Strength: B shoulders, hips grossly 5/5, good core stability- progressing towards, MET for hip strength  4. ? Function: able to sleep and ambulate community distances w/ out incr fatigue- progressing towards  5. Independent with Home Exercise Programs-MET  6. Demonstrate Knowledge of fall prevention- MET, no recent falls  7. Incr tinetti score by 3+ points for improved balance and stability-MET 23/28 from 19/28 @ eval  *NEW GOALS LTG ( to be met in 18-30 treatments)  1. Stabilize L shoulder pain and ensure optimal management of pain during all ADL ( home, occupation,         community and recreation)  2. Control/mitigate side effects/late effects of Cancer treatment  3. Able to ascend descend steps w/o difficulty      Patient goals: get back to the old me    Pt. Education:  [x] Yes  [] No  [] Reviewed Prior HEP/Ed  Method of Education: [x] Verbal  [x] Demo  [x] Written  Comprehension of Education:  [x] Verbalizes understanding. [x] Demonstrates understanding. [] Needs review. [] Demonstrates/verbalizes HEP/Ed previously given. -Brigid green 8/27     Plan: [x] Continue per plan of care.    [x] Other: PN sent requesting additional visits      Time In: 1000          Time Out: 1100    Electronically signed by:  Claude NovemberMARV

## 2020-03-09 RX ORDER — POTASSIUM CHLORIDE 750 MG/1
CAPSULE, EXTENDED RELEASE ORAL
Qty: 60 CAPSULE | Refills: 3 | Status: ON HOLD | OUTPATIENT
Start: 2020-03-09 | End: 2020-05-16 | Stop reason: HOSPADM

## 2020-03-12 ENCOUNTER — HOSPITAL ENCOUNTER (OUTPATIENT)
Dept: PHYSICAL THERAPY | Facility: CLINIC | Age: 70
Setting detail: THERAPIES SERIES
Discharge: HOME | End: 2020-03-12
Payer: COMMERCIAL

## 2020-03-12 PROCEDURE — 97140 MANUAL THERAPY 1/> REGIONS: CPT

## 2020-03-12 PROCEDURE — 97530 THERAPEUTIC ACTIVITIES: CPT

## 2020-03-19 ENCOUNTER — HOSPITAL ENCOUNTER (OUTPATIENT)
Dept: PHYSICAL THERAPY | Facility: CLINIC | Age: 70
Setting detail: THERAPIES SERIES
Discharge: HOME | End: 2020-03-19
Payer: COMMERCIAL

## 2020-03-23 ENCOUNTER — TELEPHONE (OUTPATIENT)
Dept: INFUSION THERAPY | Age: 70
End: 2020-03-23

## 2020-03-26 ENCOUNTER — APPOINTMENT (OUTPATIENT)
Dept: PHYSICAL THERAPY | Facility: CLINIC | Age: 70
End: 2020-03-26
Payer: COMMERCIAL

## 2020-04-20 ENCOUNTER — HOSPITAL ENCOUNTER (OUTPATIENT)
Dept: PHYSICAL THERAPY | Facility: CLINIC | Age: 70
Setting detail: THERAPIES SERIES
Discharge: HOME | End: 2020-04-20
Payer: COMMERCIAL

## 2020-04-20 PROCEDURE — 97530 THERAPEUTIC ACTIVITIES: CPT

## 2020-04-20 PROCEDURE — 97110 THERAPEUTIC EXERCISES: CPT

## 2020-04-20 NOTE — VIRTUAL HEALTH
[] [address]  [address]  [phone]  F: [phone] [] [address]  [facility]   [address]  P: [phone]  F: [phone] [x] [address]  [facility]  [address] [facility]  P: [phone]  F: [phone] [] [address]  [facility]   [address]  [place]: [phone]  F: [phone]     Physical Therapy Daily Treatment Note    Date:  2020  Patient Name:  Gaye Jaramillo    :  1950  MRN: [id number]  Physician: [address]: [facility]  Medical Diagnosis: Primary lung cancer with metastasis from lung to other site, right      Rehab Codes: C34.91   Onset Date: 19 script                                  Next 's appt: 2020   Visit# / total visits:   Cancels/No Shows: 20    Subjective:    Pain:  [] Yes  [x] No Location: B knee, feet Pain Rating: (0-10 scale) 0/10  Pain altered Tx:  [] No  [] Yes  Action:  Comments: Pt reports he doesn't have any taste, everything still taste like cardboard except sugar/sweets. Didn't feel good yesterday but states he didn't eat or drink enough. Has been trying to drink 2 ensures a day. Cont's w/ L shoulder pain and very stiff, hard to put coat on/off, difficulty laying on it. Both big toe nails are painful, notes nail is sitting there and wont fall off. Occ numbness in pinkie finger L>R. Trying to use hands a lot and stay active but doesn't have a lot of energy. R hip feeling better, wife has been helping w/ stretching. Had an episode yesterday felt like he was going to pass out, wasn't dizzy, feels from pills and not eating much. No real SOB lately. Walking outside and trying to stay active in yard, mowing the grass today. Having CT scan Fri for kidney and brain.         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 Concurrent chemo and radiation (40 days of radiation w/ 30 to chest and 10 to head, 15 days of chemo) completed ~ July 2019.  Port L chest  Modalities: prn  Exercises: Manual prn - DI bilat piriformis, hip flexor  Exercise Reps/ Time Weight/ Level Comments    Nu step 10' 7      HS, calf stretch 3x30''              Supine Hip Flexor S 3x30''  OP applied x                     Seated: March, LAQ 15x ea 2#     Post Shld Rolls 15x ea standing   x   Scap retraction 15x 5 sec   x   Cerv Rot Stretch 3x 20 sec      UT stretch   3x 20 sec x   Touchdowns 10x  Hands, elbows, head on wall x   Pec stretch 3x30''  foam     Standing:          Marches 3x30'' 3#, blue foam     HS Curls, Side Steps, Retro walking 2L   Outside // bars, gait belt, CGA    3 way hip 20x blue Bilateral, loop    Squats 2x15   Chair tap     SLS 3x30'' bilat Blue foam EO, EC    Balance board 2' Level 2     TG Squats/HR 2x10 Level 18     Step ups 2x15 6'', 3#     Tandem Stance 2x30'' EO, EC No UE support    SLS 2x30'' EO, EC No UE support x   Static standing 2x30'' EO, EC No UE support    6L Ball Toss   Side step, gait belt    Standing pertubations 30''x2 EC In // bars    Doorway pec stretch  3x30''    x   Supine pec stretch 3'  Pool noodle    bilat wall slide   Thoracic ext x   STS 2x10    No UE support.  x   Tricep Ext 20x Bberry     Shoulder Ext 20x Bberry     Rows 20x Bberry     ER 20x Bberry     IR @ 90/90 2x10 Bberry     Horizontal Add 2x10 Bberry     Other: MET to bilat UT, STM, infraspinatus  Grades I/II joint mobs to L & RUE, light manual distraction, PROM into end range  R & L pec minor release     Specific Instructions for next treatment: progress core and postural stability as well as scap stab and balance/endurance strengthening      Treatment Charges: Mins Units   []  Modalities     [x]  Ther Exercise 10 1   [] understanding. [x] Demonstrates understanding. [] Needs review. [] Demonstrates/verbalizes HEP/Ed previously given. -Nathalie Anna tband 8/27     Plan: [x] Continue per plan of care. [] Other:      Time In: 0154 pm          Time Out: 1225 pm    Electronically signed by:  Eugene Hines is a 77 y.o. male being evaluated by a Virtual Visit (video visit) encounter to address concerns as mentioned above. A caregiver was present when appropriate. Due to this being a TeleHealth encounter (During [facility] public health emergency), evaluation of the following organ systems was limited: Vitals/Constitutional/EENT/Resp/CV/GI//MS/Neuro/Skin/Heme-Lymph-Imm. Pursuant to the emergency declaration under the [address] [place] [address], [address] [place] [address] authority and the [employer] Act, this Virtual Visit was conducted with patient's (and/or legal guardian's) consent, to reduce the patient's risk of exposure to COVID-19 and provide necessary medical care. The patient (and/or legal guardian) has also been advised to contact this office for worsening conditions or problems, and seek emergency medical treatment and/or call 911 if deemed necessary. Services were provided through a video synchronous discussion virtually to substitute for in-person clinic visit. Patient and provider were located at their individual homes. --So Victor PT on 4/20/2020 at 12:29 PM    An electronic signature was used to authenticate this note.

## 2020-04-24 ENCOUNTER — HOSPITAL ENCOUNTER (OUTPATIENT)
Dept: INFUSION THERAPY | Age: 70
Discharge: HOME | End: 2020-04-24
Payer: COMMERCIAL

## 2020-04-24 ENCOUNTER — HOSPITAL ENCOUNTER (OUTPATIENT)
Dept: CT IMAGING | Age: 70
Discharge: HOME | End: 2020-04-26
Payer: COMMERCIAL

## 2020-04-24 ENCOUNTER — HOSPITAL ENCOUNTER (OUTPATIENT)
Dept: MRI IMAGING | Age: 70
Discharge: HOME | End: 2020-04-26
Payer: COMMERCIAL

## 2020-04-24 DIAGNOSIS — C34.91 PRIMARY LUNG CANCER WITH METASTASIS FROM LUNG TO OTHER SITE, RIGHT (HCC): Primary | ICD-10-CM

## 2020-04-24 LAB
ALBUMIN SERPL-MCNC: 4.1 G/DL (ref 3.5–5.2)
ALBUMIN/GLOBULIN RATIO: 1.4 (ref 1–2.5)
ALP BLD-CCNC: 85 U/L (ref 40–129)
ALT SERPL-CCNC: 13 U/L (ref 5–41)
ANION GAP SERPL CALCULATED.3IONS-SCNC: 9 MMOL/L (ref 9–17)
AST SERPL-CCNC: 16 U/L
BILIRUB SERPL-MCNC: 0.31 MG/DL (ref 0.3–1.2)
BUN BLDV-MCNC: 16 MG/DL (ref 8–23)
BUN/CREAT BLD: ABNORMAL (ref 9–20)
CALCIUM SERPL-MCNC: 9.7 MG/DL (ref 8.6–10.4)
CHLORIDE BLD-SCNC: 101 MMOL/L (ref 98–107)
CO2: 25 MMOL/L (ref 20–31)
CREAT SERPL-MCNC: 1.13 MG/DL (ref 0.7–1.2)
GFR AFRICAN AMERICAN: >60 ML/MIN
GFR NON-AFRICAN AMERICAN: >60 ML/MIN
GFR SERPL CREATININE-BSD FRML MDRD: ABNORMAL ML/MIN/{1.73_M2}
GFR SERPL CREATININE-BSD FRML MDRD: ABNORMAL ML/MIN/{1.73_M2}
GLUCOSE BLD-MCNC: 100 MG/DL (ref 70–99)
POTASSIUM SERPL-SCNC: 4.6 MMOL/L (ref 3.7–5.3)
SODIUM BLD-SCNC: 135 MMOL/L (ref 135–144)
TOTAL PROTEIN: 7.1 G/DL (ref 6.4–8.3)

## 2020-04-24 PROCEDURE — 6360000002 HC RX W HCPCS: Performed by: INTERNAL MEDICINE

## 2020-04-24 PROCEDURE — 2580000003 HC RX 258: Performed by: INTERNAL MEDICINE

## 2020-04-24 PROCEDURE — 36591 DRAW BLOOD OFF VENOUS DEVICE: CPT

## 2020-04-24 PROCEDURE — 70553 MRI BRAIN STEM W/O & W/DYE: CPT

## 2020-04-24 PROCEDURE — 80053 COMPREHEN METABOLIC PANEL: CPT

## 2020-04-24 PROCEDURE — 6360000004 HC RX CONTRAST MEDICATION: Performed by: INTERNAL MEDICINE

## 2020-04-24 PROCEDURE — 74177 CT ABD & PELVIS W/CONTRAST: CPT

## 2020-04-24 PROCEDURE — A9579 GAD-BASE MR CONTRAST NOS,1ML: HCPCS | Performed by: INTERNAL MEDICINE

## 2020-04-24 RX ORDER — HEPARIN SODIUM (PORCINE) LOCK FLUSH IV SOLN 100 UNIT/ML 100 UNIT/ML
500 SOLUTION INTRAVENOUS PRN
Status: DISCONTINUED | OUTPATIENT
Start: 2020-04-24 | End: 2020-04-25 | Stop reason: HOSPADM

## 2020-04-24 RX ORDER — SODIUM CHLORIDE 0.9 % (FLUSH) 0.9 %
10 SYRINGE (ML) INJECTION PRN
Status: DISCONTINUED | OUTPATIENT
Start: 2020-04-24 | End: 2020-04-27 | Stop reason: HOSPADM

## 2020-04-24 RX ORDER — 0.9 % SODIUM CHLORIDE 0.9 %
80 INTRAVENOUS SOLUTION INTRAVENOUS ONCE
Status: COMPLETED | OUTPATIENT
Start: 2020-04-24 | End: 2020-04-24

## 2020-04-24 RX ORDER — SODIUM CHLORIDE 0.9 % (FLUSH) 0.9 %
10 SYRINGE (ML) INJECTION PRN
Status: DISCONTINUED | OUTPATIENT
Start: 2020-04-24 | End: 2020-04-25 | Stop reason: HOSPADM

## 2020-04-24 RX ADMIN — IOHEXOL 50 ML: 240 INJECTION, SOLUTION INTRATHECAL; INTRAVASCULAR; INTRAVENOUS; ORAL at 09:40

## 2020-04-24 RX ADMIN — Medication 500 UNITS: at 10:35

## 2020-04-24 RX ADMIN — SODIUM CHLORIDE 80 ML: 9 INJECTION, SOLUTION INTRAVENOUS at 09:43

## 2020-04-24 RX ADMIN — Medication 10 ML: at 09:42

## 2020-04-24 RX ADMIN — IOVERSOL 100 ML: 741 INJECTION INTRA-ARTERIAL; INTRAVENOUS at 09:34

## 2020-04-24 RX ADMIN — SODIUM CHLORIDE, PRESERVATIVE FREE 10 ML: 5 INJECTION INTRAVENOUS at 08:30

## 2020-04-24 RX ADMIN — GADOTERIDOL 20 ML: 279.3 INJECTION, SOLUTION INTRAVENOUS at 09:47

## 2020-04-24 RX ADMIN — SODIUM CHLORIDE, PRESERVATIVE FREE 10 ML: 5 INJECTION INTRAVENOUS at 10:35

## 2020-04-24 NOTE — PROGRESS NOTES
Pt here for lab draw and port access for CT scan. Pt to [place] [facility]/MRI for scan and returns afterward for de-access. Pt tolerated without incident and will return next wk to see Dr. Lynn Rascon for scan results.

## 2020-04-29 ENCOUNTER — TELEPHONE (OUTPATIENT)
Dept: ONCOLOGY | Age: 70
End: 2020-04-29

## 2020-04-29 ENCOUNTER — OFFICE VISIT (OUTPATIENT)
Dept: ONCOLOGY | Age: 70
End: 2020-04-29
Payer: COMMERCIAL

## 2020-04-29 ENCOUNTER — HOSPITAL ENCOUNTER (OUTPATIENT)
Dept: RADIATION ONCOLOGY | Age: 70
Discharge: HOME | End: 2020-04-29
Attending: RADIOLOGY
Payer: COMMERCIAL

## 2020-04-29 VITALS
BODY MASS INDEX: 30.86 KG/M2 | OXYGEN SATURATION: 96 % | RESPIRATION RATE: 16 BRPM | SYSTOLIC BLOOD PRESSURE: 116 MMHG | TEMPERATURE: 98 F | HEART RATE: 67 BPM | WEIGHT: 220.4 LBS | DIASTOLIC BLOOD PRESSURE: 76 MMHG

## 2020-04-29 VITALS
HEART RATE: 67 BPM | DIASTOLIC BLOOD PRESSURE: 76 MMHG | BODY MASS INDEX: 30.87 KG/M2 | SYSTOLIC BLOOD PRESSURE: 116 MMHG | TEMPERATURE: 98 F | WEIGHT: 220.5 LBS

## 2020-04-29 PROCEDURE — 99214 OFFICE O/P EST MOD 30 MIN: CPT | Performed by: INTERNAL MEDICINE

## 2020-04-29 PROCEDURE — 99211 OFF/OP EST MAY X REQ PHY/QHP: CPT | Performed by: RADIOLOGY

## 2020-04-29 NOTE — PROGRESS NOTES
Kleber Galeana                                                                                                                  4/29/2020  MRN:   [id number]  YOB: 1950  PCP:                           Flavia Prakash MD  Referring Physician: No ref. 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 Chemotherapy changed to carboplatin and etoposide due to renal insufficiency from cycle #2  PCI- 7/2019  Anticipating SRS to adrenal gland metastasis    Summary of Case/History:    Kleber Galeana a 64 y.o.male is a patient diagnosed with lung cancer with the component of small cell as well as squamous cell carcinoma    Patient has a significant history of tobacco dependence and underwent CT lung screening in December 2018. CT scan was read as lung rads degree 4B. Subsequently she underwent biopsy of right upper lobe lung nodule on 1/16/19. 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 CT PET done showed abnormal FDG uptake in the right upper lobe nodule. There was also abnormal FDG uptake in the right lower lobe nodule. Additionally right hilar lymph node were also FDG avid. No bony lesion was reported. MRI brain for staging workup did not show any evidence of intracranial metastasis. Tip of the odontoid process was ill-defined and metastasis could not be ruled out.   Clinically patient does give history of trauma MOUTH EVERY DAY 60 capsule 3    tamsulosin (FLOMAX) 0.4 MG capsule Take 1 capsule by mouth daily 30 capsule 5    Handicap Placard MISC by Does not apply route 1 each 0    acetaminophen (TYLENOL) 325 MG tablet Take 650 mg by mouth every 6 hours as needed for Pain      allopurinol (ZYLOPRIM) 100 MG tablet Take 100 mg by mouth daily      simvastatin (ZOCOR) 40 MG tablet Take 40 mg by mouth daily      celecoxib (CELEBREX) 200 MG capsule Take 200 mg by mouth 2 times daily  3    Multiple Vitamins-Minerals (THERAPEUTIC MULTIVITAMIN-MINERALS) tablet Take 1 tablet by mouth daily      lidocaine-prilocaine (EMLA) 2.5-2.5 % cream Apply topically as needed. Apply a quarter size amount to port site 1 hour before chemotherapy. Cover with plastic wrap. (Patient not taking: Reported on 2/19/2020) 30 g 0     No current facility-administered medications for this visit. Allergies:   Patient has no known allergies. Review of Systems:    Constitutional: No fever or chills. No night sweats, Positive fatigue and poor stamina - improved; Weight stable; facial swelling has resolved  Eyes: No eye discharge, double vision, or eye pain   HEENT: negative for sore mouth, sore throat, hoarseness and voice change;    Respiratory: negative for cough, sputum, wheezing, hemoptysis, chest pain; +exertional shortness of breath, unchanged +chest congestion in the morning  Cardiovascular: negative for chest pain, palpitations, orthopnea, PND   Gastrointestinal: negative for nausea, vomiting, diarrhea, constipation, abdominal pain, Dysphagia, hematemesis and hematochezia   Genitourinary: negative for frequency, dysuria, nocturia, urinary incontinence, and hematuria +urgency  Integument: Positive for easy bruising - stable  Hematologic/Lymphatic: negative for easy bleeding, lymphadenopathy, or petechiae +bruising -resolved  Endocrine: negative for heat or cold intolerance,weight changes, change in bowel habits and hair loss lytic or blastic lesion identified. 1.  Increasing size of left adrenal nodule, consistent with metastatic disease. No new site of disease identified. 2.  Post radiation changes in the right lung appears stable/improved. No identifiable nodule or mass in this region. Mri Brain W Wo Contrast    Result Date: 4/24/2020  EXAMINATION: MRI OF THE BRAIN WITHOUT AND WITH CONTRAST  4/24/2020 9:58 am TECHNIQUE: Multiplanar multisequence MRI of the head/brain was performed without and with the administration of intravenous contrast. COMPARISON: 11/13/2019 HISTORY: ORDERING SYSTEM PROVIDED HISTORY: Primary lung cancer with metastasis from lung to other site, right [facility] TECHNOLOGIST PROVIDED HISTORY: small cell cancer Reason for Exam: Primary lung cancer with metastasis from lung to other site, right Acuity: Unknown Type of Exam: Initial Additional signs and symptoms: headaches,  compare to prior FINDINGS: INTRACRANIAL STRUCTURES/VENTRICLES:  There is no acute infarct. No mass effect or midline shift. No evidence of an acute intracranial hemorrhage. The ventricles and sulci are normal in size and configuration. The sellar/suprasellar regions appear unremarkable. The normal signal voids within the major intracranial vessels appear maintained. No abnormal focus of enhancement is seen within the brain. Moderate high signal abnormality is identified within the periventricular white matter of both cerebral hemispheres likely representing chronic microvascular disease. There may be a component post therapeutic change. ORBITS: The visualized portion of the orbits demonstrate no acute abnormality. SINUSES: The visualized paranasal sinuses and mastoid air cells are well aerated. BONES/SOFT TISSUES: The bone marrow signal intensity appears normal. The soft tissues demonstrate no acute abnormality. No evidence of intracranial metastasis.  Stable moderate chronic microvascular disease within the periventricular white

## 2020-04-29 NOTE — TELEPHONE ENCOUNTER
Pt having CT c/a/p at [facility] on 6/16/2020, arrival time of 1030-pt to be at  for port draw (cbc, cmp) and port access for CT at 1000-pt to return to see Dr Abbi Perez on 6/24/2020.

## 2020-05-01 DIAGNOSIS — C79.72 MALIGNANT NEOPLASM METASTATIC TO LEFT ADRENAL GLAND (HCC): Primary | ICD-10-CM

## 2020-05-01 DIAGNOSIS — C34.91 PRIMARY SMALL CELL CARCINOMA OF RIGHT LUNG (HCC): ICD-10-CM

## 2020-05-01 NOTE — PROGRESS NOTES
[facility]            Radiation Oncology          [address] [facility]          [address]        Janay Benoitmp: [phone]        F: [phone]       [email or web address]         Date of Service: 2020     Location:  [address],   [address] [facility] [place]   [phone]        RADIATION ONCOLOGY FOLLOW UP NOTE    Patient ID:   Nia Mackey  : 1950   MRN: [id number]    DIAGNOSIS:  Cancer Staging  Primary lung cancer with metastasis from lung to other site, right [facility])  Staging form: Lung, AJCC 8th Edition  - Clinical stage from 2019: Stage IIIA (cT4, cN1, cM0) - Signed by Troy Hutchinson MD on 2019  Staging comments: PET/CT scan 2019 revealed 1.2 cm right upper lobe mass SUV 2.6, 2 cm right lower lobe mass SUV 5.4, 1.4 cm right hilar lymph node SUV 7.8, 1.1 cm right adrenal nodule no uptake, negative bone. 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 MRI brain and negative 2019.     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 He is doing well with no respiratory symptoms of SOB, hemoptysis, or chest pain. He does have some soreness in his left shoulder, and some neuropathy. He also has some bruising on his arms. He occasionally has a headache, but it resolves on its own without any other symptoms. He denies any vision changes, abdominal pain, bony pain, swelling, or bleeding. He occasionally has nausea in the morning, and otherwise is weight is stable.  He had recent MRI brain last week that was negative, and also CT of the chest abdomen and pelvis on 2020 that showed post radiation changes in the right radiation therapy would be indicated for this diagnosis. We also discussed that he has the option to not pursue our recommended treatment as well. I spoke with his medical oncologist, Dr Elon Crigler, who also agrees that given growing presentation of this lesion, it is likely metastatic and radiation via stereotactic body radiation therapy (SBRT) would be the most effective mode of controlling it. We reviewed the logistics of treatment planning, including a CT Simulation session, as well as 5 treatments over the cousre of approximately 2 weeks. With regards to radiation to the abdomen, I discussed the possible short-term side effects which included skin irritation (causing redness, dryness or peeling), hair loss in treated area, tiredness, low blood counts (causing infection or bleeding), abdominal pain, nausea/vomiting and diarrhea. Possible long-term side effects discussed included hyperpigmentation of the skin, damage to the bowels or intestines (resulting in bleeding or obstruction that may require surgery), damage to the kidneys (resulting in decreased function), damage to the liver (resulting in decreased function) and damage to the nerves (causing numbness, weakness or paralysis). Patient was advised on smoking cessation, as it can make toxicities worse during treatment and increase risk of other cancers. After ample time to review the patient's questions, patient will be scheduled for a fiducial marker placement by IR to allow for targeting during treatment. I will also also order an MRI of the abdomen after the fiducials are placed to localize the lesion. We will also need a CT simulation and treatment planning session after this is to complete his planning. Patient was in agreement with my recommendations. All questions were answered to his satisfaction. Patient was advised to contact us anytime should he have any questions or concerns.      Electronically signed by Lamar Soriano MD on 5/1/2020 at 9:55 AM        Medications Prescribed:   New Prescriptions    No medications on file       Orders:   Orders Placed This Encounter   Procedures    IR PROCEDURAL REQUEST       CC:  Patient Care Team:  Flavia Prakash MD as PCP - General (Family Medicine)  Demetrios Bamberger, MD as Consulting Physician (Hematology and Oncology)  Mike Perez RN as Registered Nurse

## 2020-05-04 RX ORDER — TAMSULOSIN HYDROCHLORIDE 0.4 MG/1
CAPSULE ORAL
Qty: 30 CAPSULE | Refills: 5 | Status: ON HOLD | OUTPATIENT
Start: 2020-05-04 | End: 2022-05-07 | Stop reason: HOSPADM

## 2020-05-04 RX ORDER — SODIUM CHLORIDE 9 MG/ML
INJECTION, SOLUTION INTRAVENOUS CONTINUOUS
Status: CANCELLED | OUTPATIENT
Start: 2020-05-04

## 2020-05-05 ENCOUNTER — HOSPITAL ENCOUNTER (OUTPATIENT)
Dept: GENERAL RADIOLOGY | Age: 70
Discharge: HOME | End: 2020-05-07
Payer: COMMERCIAL

## 2020-05-05 ENCOUNTER — HOSPITAL ENCOUNTER (OUTPATIENT)
Dept: CT IMAGING | Age: 70
Discharge: HOME | End: 2020-05-07
Payer: COMMERCIAL

## 2020-05-05 VITALS
RESPIRATION RATE: 14 BRPM | DIASTOLIC BLOOD PRESSURE: 61 MMHG | OXYGEN SATURATION: 97 % | SYSTOLIC BLOOD PRESSURE: 131 MMHG | HEART RATE: 61 BPM | TEMPERATURE: 97.3 F

## 2020-05-05 LAB
INR BLD: 1.1
PARTIAL THROMBOPLASTIN TIME: 36.9 SEC (ref 24–36)
PLATELET # BLD: 265 K/UL (ref 150–450)
PROTHROMBIN TIME: 14.5 SEC (ref 11.8–14.6)

## 2020-05-05 PROCEDURE — 2500000003 HC RX 250 WO HCPCS: Performed by: RADIOLOGY

## 2020-05-05 PROCEDURE — 77012 CT SCAN FOR NEEDLE BIOPSY: CPT

## 2020-05-05 PROCEDURE — 85049 AUTOMATED PLATELET COUNT: CPT

## 2020-05-05 PROCEDURE — 7100000030 HC ASPR PHASE II RECOVERY - FIRST 15 MIN

## 2020-05-05 PROCEDURE — 85730 THROMBOPLASTIN TIME PARTIAL: CPT

## 2020-05-05 PROCEDURE — 36415 COLL VENOUS BLD VENIPUNCTURE: CPT

## 2020-05-05 PROCEDURE — 85610 PROTHROMBIN TIME: CPT

## 2020-05-05 PROCEDURE — 2709999900 IR US GUIDED NEEDLE PLACEMENT

## 2020-05-05 PROCEDURE — 71045 X-RAY EXAM CHEST 1 VIEW: CPT

## 2020-05-05 PROCEDURE — 7100000031 HC ASPR PHASE II RECOVERY - ADDTL 15 MIN

## 2020-05-05 RX ORDER — SODIUM CHLORIDE 9 MG/ML
INJECTION, SOLUTION INTRAVENOUS CONTINUOUS
Status: DISCONTINUED | OUTPATIENT
Start: 2020-05-05 | End: 2020-05-08 | Stop reason: HOSPADM

## 2020-05-05 RX ORDER — ACETAMINOPHEN 325 MG/1
650 TABLET ORAL EVERY 4 HOURS PRN
Status: DISCONTINUED | OUTPATIENT
Start: 2020-05-05 | End: 2020-05-08 | Stop reason: HOSPADM

## 2020-05-05 RX ORDER — LIDOCAINE HYDROCHLORIDE 10 MG/ML
INJECTION, SOLUTION INFILTRATION; PERINEURAL
Status: COMPLETED | OUTPATIENT
Start: 2020-05-05 | End: 2020-05-05

## 2020-05-05 RX ADMIN — LIDOCAINE HYDROCHLORIDE 5 ML: 10 INJECTION, SOLUTION INFILTRATION; PERINEURAL at 11:23

## 2020-05-05 RX ADMIN — LIDOCAINE HYDROCHLORIDE 5 ML: 10 INJECTION, SOLUTION INFILTRATION; PERINEURAL at 11:17

## 2020-05-05 ASSESSMENT — PAIN - FUNCTIONAL ASSESSMENT: PAIN_FUNCTIONAL_ASSESSMENT: 0-10

## 2020-05-05 ASSESSMENT — PAIN SCALES - GENERAL: PAINLEVEL_OUTOF10: 0

## 2020-05-05 NOTE — H&P
Chronic   Type of Exam: Subsequent/Follow-up   Relevant Medical/Surgical History: HX of lung cancer, smoking, Sx: cardiac   cath, appendectomy, port insertion, colonoscopy/       FINDINGS:       Chest:       Mediastinum: The heart and great vessels are normal in size.  Calcified   atheromatous plaque and coronary calcifications are noted.  No pericardial   effusion.  No discrete lymphadenopathy.       Lungs/pleura: Scarring in the medial right lung again demonstrated consistent   with radiation therapy changes.  These findings appear less prominent in the   interval.  No discrete nodule or mass is delineated.  No new finding   identified in the left lung.  No effusion.  The central airway is patent.       Soft Tissues/Bones: No acute findings.  No discrete lytic or blastic lesion   identified.           Abdomen/Pelvis:       Organs: No focal liver lesion identified.  The gallbladder, pancreas, and   spleen appear unchanged.  The left adrenal nodule has increased in size, now   measuring 3 cm, previously 1.5 cm.  Subtle nodularity of the right adrenal   gland appears unchanged.       GI/Bowel: There is no bowel dilatation or wall thickening identified.       Pelvis: No acute findings.  Prostatomegaly.  The bladder is decompressed with   mild diffuse wall thickening, which may represent hypertrophy.       Peritoneum/Retroperitoneum: No free air or free fluid.  The aorta is normal   in caliber.  The visceral branches are patent.  Calcified atheromatous plaque   is present.  No lymphadenopathy.       Bones/Soft Tissues: Degenerative changes in the spine and hips.  No discrete   lytic or blastic lesion identified.           Impression   1.  Increasing size of left adrenal nodule, consistent with metastatic   disease.  No new site of disease identified.       2.  Post radiation changes in the right lung appears stable/improved.  No   identifiable nodule or mass in this region.           PAST MEDICAL HISTORY     Past THROAT:  Uvula midline, no erythema or exudate. Upper and lower dentures in place. NECK:  No stiffness, trachea central.  No palpable masses or L.N.                 CHEST:  Symmetrical and equal on expansion. HEART:  RRR S1 > S2. No audible murmurs or gallops. LUNGS:  Equal on expansion, expiratory wheeze to right lower lobes, Left lung CTA. ABDOMEN:  NABS x 4 quads. Soft on palpation. No localized tenderness. No guarding or rigidity. No palpable hepatosplenomegaly. LYMPHATICS:  No palpable cervical lymphadenopathy. LOCOMOTOR, BACK AND SPINE:  No tenderness or deformities. EXTREMITIES:  Symmetrical, no pretibial edema. No calf tenderness. No discoloration or ulcerations. NEUROLOGIC:  The patient is conscious, alert, oriented,Cranial nerve II-XII intact, taste and smell were not examined. No apparent focal sensory or motor deficits.              PROVISIONAL DIAGNOSES / SURGERY:      Left adrenal metastatic lesion    IR Fudicial marker placement     Patient Active Problem List    Diagnosis Date Noted    Primary lung cancer with metastasis from lung to other site, right ([facility] 02/22/2019           AZEB Rodriguez - CNP on 5/5/2020 at 10:12 AM

## 2020-05-05 NOTE — OP NOTE
Brief Postoperative Note    Lyle Allen  YOB: 1950  [id number]    Pre-operative Diagnosis: lung ca, L adrenal mass    Post-operative Diagnosis: Same    Procedure: Fiducial marker placement    Anesthesia: Local   Surgeons/Assistants: Zachery Alvarez MD     Estimated Blood Loss: minimal    Complications: none immediate    Specimens: were not obtained      Electronically signed by Zachery Alvarez MD on 5/5/2020 at 11:51 AM

## 2020-05-07 ENCOUNTER — HOSPITAL ENCOUNTER (OUTPATIENT)
Dept: MRI IMAGING | Age: 70
Discharge: HOME | DRG: 507 | End: 2020-05-09
Payer: COMMERCIAL

## 2020-05-07 ENCOUNTER — APPOINTMENT (OUTPATIENT)
Dept: CT IMAGING | Age: 70
DRG: 507 | End: 2020-05-07
Payer: COMMERCIAL

## 2020-05-07 ENCOUNTER — APPOINTMENT (OUTPATIENT)
Dept: GENERAL RADIOLOGY | Age: 70
DRG: 507 | End: 2020-05-07
Payer: COMMERCIAL

## 2020-05-07 ENCOUNTER — HOSPITAL ENCOUNTER (INPATIENT)
Age: 70
LOS: 14 days | Discharge: NURSING, REHAB OR LONG-TERM CARE | DRG: 507 | End: 2020-05-21
Attending: EMERGENCY MEDICINE
Payer: COMMERCIAL

## 2020-05-07 ENCOUNTER — HOSPITAL ENCOUNTER (OUTPATIENT)
Dept: INFUSION THERAPY | Age: 70
Discharge: HOME | DRG: 507 | End: 2020-05-07
Payer: COMMERCIAL

## 2020-05-07 DIAGNOSIS — C34.91 PRIMARY LUNG CANCER WITH METASTASIS FROM LUNG TO OTHER SITE, RIGHT (HCC): Primary | ICD-10-CM

## 2020-05-07 PROBLEM — E86.0 DEHYDRATION: Status: ACTIVE | Noted: 2020-05-07

## 2020-05-07 PROBLEM — A41.9 SEPSIS (HCC): Status: ACTIVE | Noted: 2020-05-07

## 2020-05-07 LAB
ABSOLUTE EOS #: 0 K/UL (ref 0–0.4)
ABSOLUTE IMMATURE GRANULOCYTE: ABNORMAL K/UL (ref 0–0.3)
ABSOLUTE LYMPH #: 0.16 K/UL (ref 1–4.8)
ABSOLUTE MONO #: 0.16 K/UL (ref 0.1–1.2)
ALBUMIN SERPL-MCNC: 4.1 G/DL (ref 3.5–5.2)
ALBUMIN/GLOBULIN RATIO: 1.4 (ref 1–2.5)
ALP BLD-CCNC: 88 U/L (ref 40–129)
ALT SERPL-CCNC: 12 U/L (ref 5–41)
AMPHETAMINE SCREEN URINE: NEGATIVE
ANION GAP SERPL CALCULATED.3IONS-SCNC: 15 MMOL/L (ref 9–17)
AST SERPL-CCNC: 17 U/L
BARBITURATE SCREEN URINE: NEGATIVE
BASOPHILS # BLD: 0 % (ref 0–2)
BASOPHILS ABSOLUTE: 0 K/UL (ref 0–0.2)
BENZODIAZEPINE SCREEN, URINE: NEGATIVE
BILIRUB SERPL-MCNC: 0.72 MG/DL (ref 0.3–1.2)
BILIRUBIN DIRECT: 0.25 MG/DL
BILIRUBIN URINE: NEGATIVE
BILIRUBIN, INDIRECT: 0.47 MG/DL (ref 0–1)
BNP INTERPRETATION: ABNORMAL
BUN BLDV-MCNC: 17 MG/DL (ref 8–23)
BUN/CREAT BLD: ABNORMAL (ref 9–20)
BUPRENORPHINE URINE: ABNORMAL
CALCIUM SERPL-MCNC: 9.4 MG/DL (ref 8.6–10.4)
CANNABINOID SCREEN URINE: POSITIVE
CHLORIDE BLD-SCNC: 99 MMOL/L (ref 98–107)
CO2: 20 MMOL/L (ref 20–31)
COCAINE METABOLITE, URINE: NEGATIVE
COLOR: YELLOW
COMMENT UA: NORMAL
CREAT SERPL-MCNC: 1.15 MG/DL (ref 0.7–1.2)
DIFFERENTIAL TYPE: ABNORMAL
EOSINOPHILS RELATIVE PERCENT: 0 % (ref 1–4)
FIO2: 21
GFR AFRICAN AMERICAN: >60 ML/MIN
GFR NON-AFRICAN AMERICAN: >60 ML/MIN
GFR SERPL CREATININE-BSD FRML MDRD: ABNORMAL ML/MIN/{1.73_M2}
GFR SERPL CREATININE-BSD FRML MDRD: ABNORMAL ML/MIN/{1.73_M2}
GLOBULIN: NORMAL G/DL (ref 1.5–3.8)
GLUCOSE BLD-MCNC: 116 MG/DL (ref 70–99)
GLUCOSE BLD-MCNC: 86 MG/DL (ref 75–110)
GLUCOSE BLD-MCNC: 88 MG/DL (ref 75–110)
GLUCOSE URINE: NEGATIVE
HCT VFR BLD CALC: 41.2 % (ref 41–53)
HEMOGLOBIN: 13.6 G/DL (ref 13.5–17.5)
IMMATURE GRANULOCYTES: ABNORMAL %
INR BLD: 1.2
KETONES, URINE: NEGATIVE
LACTIC ACID, WHOLE BLOOD: ABNORMAL MMOL/L (ref 0.7–2.1)
LACTIC ACID: 2.3 MMOL/L (ref 0.5–2.2)
LACTIC ACID: 2.5 MMOL/L (ref 0.5–2.2)
LACTIC ACID: 3.7 MMOL/L (ref 0.5–2.2)
LEUKOCYTE ESTERASE, URINE: NEGATIVE
LYMPHOCYTES # BLD: 2 % (ref 24–44)
MCH RBC QN AUTO: 32.3 PG (ref 26–34)
MCHC RBC AUTO-ENTMCNC: 33.1 G/DL (ref 31–37)
MCV RBC AUTO: 97.6 FL (ref 80–100)
MDMA URINE: ABNORMAL
METHADONE SCREEN, URINE: NEGATIVE
METHAMPHETAMINE, URINE: ABNORMAL
MONOCYTES # BLD: 2 % (ref 2–11)
MORPHOLOGY: NORMAL
MYOGLOBIN: 1242 NG/ML (ref 28–72)
NEGATIVE BASE EXCESS, ART: 10 (ref 0–2)
NITRITE, URINE: NEGATIVE
NRBC AUTOMATED: ABNORMAL PER 100 WBC
O2 DEVICE/FLOW/%: ABNORMAL
OPIATES, URINE: NEGATIVE
OXYCODONE SCREEN URINE: NEGATIVE
PARTIAL THROMBOPLASTIN TIME: 27.5 SEC (ref 21.3–31.3)
PATIENT TEMP: 98.6
PDW BLD-RTO: 14.9 % (ref 12.5–15.4)
PH UA: 7 (ref 5–8)
PHENCYCLIDINE, URINE: NEGATIVE
PLATELET # BLD: 252 K/UL (ref 140–450)
PLATELET ESTIMATE: ABNORMAL
PMV BLD AUTO: 7.9 FL (ref 6–12)
POC HCO3: 14.6 MMOL/L (ref 22–27)
POC O2 SATURATION: 93 %
POC PCO2 TEMP: ABNORMAL MM HG
POC PCO2: 22 MM HG (ref 32–45)
POC PH TEMP: ABNORMAL
POC PH: 7.43 (ref 7.35–7.45)
POC PO2 TEMP: ABNORMAL MM HG
POC PO2: 64 MM HG (ref 75–95)
POSITIVE BASE EXCESS, ART: ABNORMAL (ref 0–2)
POTASSIUM SERPL-SCNC: 4.6 MMOL/L (ref 3.7–5.3)
PRO-BNP: 3514 PG/ML
PROPOXYPHENE, URINE: ABNORMAL
PROTEIN UA: NEGATIVE
PROTHROMBIN TIME: 12 SEC (ref 9.4–12.6)
RBC # BLD: 4.22 M/UL (ref 4.5–5.9)
RBC # BLD: ABNORMAL 10*6/UL
SARS-COV-2, PCR: NORMAL
SARS-COV-2, RAPID: NOT DETECTED
SARS-COV-2: NORMAL
SEG NEUTROPHILS: 96 % (ref 36–66)
SEGMENTED NEUTROPHILS ABSOLUTE COUNT: 7.48 K/UL (ref 1.8–7.7)
SODIUM BLD-SCNC: 134 MMOL/L (ref 135–144)
SOURCE: NORMAL
SPECIFIC GRAVITY UA: 1.01 (ref 1–1.03)
TCO2 (CALC), ART: 15 MMOL/L (ref 23–28)
TEST INFORMATION: ABNORMAL
TOTAL PROTEIN: 7 G/DL (ref 6.4–8.3)
TRICYCLIC ANTIDEPRESSANTS, UR: ABNORMAL
TROPONIN INTERP: ABNORMAL
TROPONIN INTERP: NORMAL
TROPONIN T: ABNORMAL NG/ML
TROPONIN T: NORMAL NG/ML
TROPONIN, HIGH SENSITIVITY: 19 NG/L (ref 0–22)
TROPONIN, HIGH SENSITIVITY: 22 NG/L (ref 0–22)
TURBIDITY: CLEAR
URINE HGB: NEGATIVE
UROBILINOGEN, URINE: NORMAL
WBC # BLD: 7.8 K/UL (ref 3.5–11)
WBC # BLD: ABNORMAL 10*3/UL

## 2020-05-07 PROCEDURE — 96360 HYDRATION IV INFUSION INIT: CPT

## 2020-05-07 PROCEDURE — 2580000003 HC RX 258: Performed by: INTERNAL MEDICINE

## 2020-05-07 PROCEDURE — 87040 BLOOD CULTURE FOR BACTERIA: CPT

## 2020-05-07 PROCEDURE — 2580000003 HC RX 258: Performed by: NURSE PRACTITIONER

## 2020-05-07 PROCEDURE — 83874 ASSAY OF MYOGLOBIN: CPT

## 2020-05-07 PROCEDURE — 99285 EMERGENCY DEPT VISIT HI MDM: CPT

## 2020-05-07 PROCEDURE — 0R9L0ZZ DRAINAGE OF RIGHT ELBOW JOINT, OPEN APPROACH: ICD-10-PCS | Performed by: ORTHOPAEDIC SURGERY

## 2020-05-07 PROCEDURE — 6360000004 HC RX CONTRAST MEDICATION: Performed by: EMERGENCY MEDICINE

## 2020-05-07 PROCEDURE — 51798 US URINE CAPACITY MEASURE: CPT

## 2020-05-07 PROCEDURE — 70450 CT HEAD/BRAIN W/O DYE: CPT

## 2020-05-07 PROCEDURE — G0378 HOSPITAL OBSERVATION PER HR: HCPCS

## 2020-05-07 PROCEDURE — 80307 DRUG TEST PRSMV CHEM ANLYZR: CPT

## 2020-05-07 PROCEDURE — 85025 COMPLETE CBC W/AUTO DIFF WBC: CPT

## 2020-05-07 PROCEDURE — 85610 PROTHROMBIN TIME: CPT

## 2020-05-07 PROCEDURE — 36415 COLL VENOUS BLD VENIPUNCTURE: CPT

## 2020-05-07 PROCEDURE — 51702 INSERT TEMP BLADDER CATH: CPT

## 2020-05-07 PROCEDURE — 82947 ASSAY GLUCOSE BLOOD QUANT: CPT

## 2020-05-07 PROCEDURE — 85730 THROMBOPLASTIN TIME PARTIAL: CPT

## 2020-05-07 PROCEDURE — 6360000002 HC RX W HCPCS: Performed by: INTERNAL MEDICINE

## 2020-05-07 PROCEDURE — 82803 BLOOD GASES ANY COMBINATION: CPT

## 2020-05-07 PROCEDURE — 71260 CT THORAX DX C+: CPT

## 2020-05-07 PROCEDURE — 84484 ASSAY OF TROPONIN QUANT: CPT

## 2020-05-07 PROCEDURE — 96361 HYDRATE IV INFUSION ADD-ON: CPT

## 2020-05-07 PROCEDURE — 81003 URINALYSIS AUTO W/O SCOPE: CPT

## 2020-05-07 PROCEDURE — 93005 ELECTROCARDIOGRAM TRACING: CPT | Performed by: EMERGENCY MEDICINE

## 2020-05-07 PROCEDURE — 83605 ASSAY OF LACTIC ACID: CPT

## 2020-05-07 PROCEDURE — U0002 COVID-19 LAB TEST NON-CDC: HCPCS

## 2020-05-07 PROCEDURE — 80076 HEPATIC FUNCTION PANEL: CPT

## 2020-05-07 PROCEDURE — 83880 ASSAY OF NATRIURETIC PEPTIDE: CPT

## 2020-05-07 PROCEDURE — 2580000003 HC RX 258: Performed by: EMERGENCY MEDICINE

## 2020-05-07 PROCEDURE — 1210000000 HC MED SURG R&B

## 2020-05-07 PROCEDURE — 6370000000 HC RX 637 (ALT 250 FOR IP): Performed by: EMERGENCY MEDICINE

## 2020-05-07 PROCEDURE — 80048 BASIC METABOLIC PNL TOTAL CA: CPT

## 2020-05-07 PROCEDURE — 71045 X-RAY EXAM CHEST 1 VIEW: CPT

## 2020-05-07 PROCEDURE — 2500000003 HC RX 250 WO HCPCS: Performed by: NURSE PRACTITIONER

## 2020-05-07 PROCEDURE — 36600 WITHDRAWAL OF ARTERIAL BLOOD: CPT

## 2020-05-07 RX ORDER — HEPARIN SODIUM (PORCINE) LOCK FLUSH IV SOLN 100 UNIT/ML 100 UNIT/ML
500 SOLUTION INTRAVENOUS PRN
Status: CANCELLED | OUTPATIENT
Start: 2020-05-07

## 2020-05-07 RX ORDER — MAGNESIUM SULFATE 1 G/100ML
1 INJECTION INTRAVENOUS PRN
Status: DISCONTINUED | OUTPATIENT
Start: 2020-05-07 | End: 2020-05-21 | Stop reason: HOSPADM

## 2020-05-07 RX ORDER — HEPARIN SODIUM (PORCINE) LOCK FLUSH IV SOLN 100 UNIT/ML 100 UNIT/ML
500 SOLUTION INTRAVENOUS PRN
Status: DISCONTINUED | OUTPATIENT
Start: 2020-05-07 | End: 2020-05-08 | Stop reason: HOSPADM

## 2020-05-07 RX ORDER — ACETAMINOPHEN 325 MG/1
650 TABLET ORAL EVERY 6 HOURS PRN
Status: DISCONTINUED | OUTPATIENT
Start: 2020-05-07 | End: 2020-05-21 | Stop reason: HOSPADM

## 2020-05-07 RX ORDER — ATORVASTATIN CALCIUM 10 MG/1
20 TABLET, FILM COATED ORAL DAILY
Status: DISCONTINUED | OUTPATIENT
Start: 2020-05-07 | End: 2020-05-21 | Stop reason: HOSPADM

## 2020-05-07 RX ORDER — ACETAMINOPHEN 325 MG/1
650 TABLET ORAL ONCE
Status: COMPLETED | OUTPATIENT
Start: 2020-05-07 | End: 2020-05-07

## 2020-05-07 RX ORDER — SODIUM CHLORIDE 0.9 % (FLUSH) 0.9 %
20 SYRINGE (ML) INJECTION PRN
Status: CANCELLED | OUTPATIENT
Start: 2020-05-07

## 2020-05-07 RX ORDER — 0.9 % SODIUM CHLORIDE 0.9 %
1000 INTRAVENOUS SOLUTION INTRAVENOUS ONCE
Status: COMPLETED | OUTPATIENT
Start: 2020-05-07 | End: 2020-05-07

## 2020-05-07 RX ORDER — SODIUM CHLORIDE 0.9 % (FLUSH) 0.9 %
10 SYRINGE (ML) INJECTION PRN
Status: CANCELLED | OUTPATIENT
Start: 2020-05-07

## 2020-05-07 RX ORDER — NOREPINEPHRINE BITARTRATE 1 MG/ML
INJECTION, SOLUTION INTRAVENOUS
Status: DISCONTINUED
Start: 2020-05-07 | End: 2020-05-08

## 2020-05-07 RX ORDER — ONDANSETRON 2 MG/ML
4 INJECTION INTRAMUSCULAR; INTRAVENOUS EVERY 6 HOURS PRN
Status: DISCONTINUED | OUTPATIENT
Start: 2020-05-07 | End: 2020-05-21 | Stop reason: HOSPADM

## 2020-05-07 RX ORDER — SODIUM CHLORIDE 0.9 % (FLUSH) 0.9 %
10 SYRINGE (ML) INJECTION PRN
Status: DISCONTINUED | OUTPATIENT
Start: 2020-05-07 | End: 2020-05-21 | Stop reason: HOSPADM

## 2020-05-07 RX ORDER — TAMSULOSIN HYDROCHLORIDE 0.4 MG/1
0.4 CAPSULE ORAL DAILY
Status: DISCONTINUED | OUTPATIENT
Start: 2020-05-07 | End: 2020-05-21 | Stop reason: HOSPADM

## 2020-05-07 RX ORDER — ACETAMINOPHEN 650 MG/1
650 SUPPOSITORY RECTAL EVERY 6 HOURS PRN
Status: DISCONTINUED | OUTPATIENT
Start: 2020-05-07 | End: 2020-05-21 | Stop reason: HOSPADM

## 2020-05-07 RX ORDER — 0.9 % SODIUM CHLORIDE 0.9 %
500 INTRAVENOUS SOLUTION INTRAVENOUS ONCE
Status: COMPLETED | OUTPATIENT
Start: 2020-05-07 | End: 2020-05-07

## 2020-05-07 RX ORDER — POTASSIUM CHLORIDE 7.45 MG/ML
10 INJECTION INTRAVENOUS PRN
Status: DISCONTINUED | OUTPATIENT
Start: 2020-05-07 | End: 2020-05-21 | Stop reason: HOSPADM

## 2020-05-07 RX ORDER — POLYETHYLENE GLYCOL 3350 17 G/17G
17 POWDER, FOR SOLUTION ORAL DAILY PRN
Status: DISCONTINUED | OUTPATIENT
Start: 2020-05-07 | End: 2020-05-21 | Stop reason: HOSPADM

## 2020-05-07 RX ORDER — NICOTINE 21 MG/24HR
1 PATCH, TRANSDERMAL 24 HOURS TRANSDERMAL DAILY PRN
Status: DISCONTINUED | OUTPATIENT
Start: 2020-05-07 | End: 2020-05-21 | Stop reason: HOSPADM

## 2020-05-07 RX ORDER — SODIUM CHLORIDE 0.9 % (FLUSH) 0.9 %
10 SYRINGE (ML) INJECTION PRN
Status: DISCONTINUED | OUTPATIENT
Start: 2020-05-07 | End: 2020-05-08 | Stop reason: HOSPADM

## 2020-05-07 RX ORDER — SODIUM CHLORIDE 9 MG/ML
INJECTION, SOLUTION INTRAVENOUS CONTINUOUS
Status: DISCONTINUED | OUTPATIENT
Start: 2020-05-07 | End: 2020-05-13

## 2020-05-07 RX ORDER — 0.9 % SODIUM CHLORIDE 0.9 %
80 INTRAVENOUS SOLUTION INTRAVENOUS ONCE
Status: COMPLETED | OUTPATIENT
Start: 2020-05-07 | End: 2020-05-07

## 2020-05-07 RX ORDER — HYDRALAZINE HYDROCHLORIDE 20 MG/ML
10 INJECTION INTRAMUSCULAR; INTRAVENOUS EVERY 6 HOURS PRN
Status: DISCONTINUED | OUTPATIENT
Start: 2020-05-07 | End: 2020-05-21 | Stop reason: HOSPADM

## 2020-05-07 RX ORDER — SODIUM CHLORIDE 0.9 % (FLUSH) 0.9 %
10 SYRINGE (ML) INJECTION EVERY 12 HOURS SCHEDULED
Status: DISCONTINUED | OUTPATIENT
Start: 2020-05-07 | End: 2020-05-21 | Stop reason: HOSPADM

## 2020-05-07 RX ORDER — POTASSIUM CHLORIDE 20 MEQ/1
40 TABLET, EXTENDED RELEASE ORAL PRN
Status: DISCONTINUED | OUTPATIENT
Start: 2020-05-07 | End: 2020-05-21 | Stop reason: HOSPADM

## 2020-05-07 RX ADMIN — Medication 10 ML: at 09:37

## 2020-05-07 RX ADMIN — SODIUM CHLORIDE: 9 INJECTION, SOLUTION INTRAVENOUS at 18:46

## 2020-05-07 RX ADMIN — SODIUM CHLORIDE 500 ML: 9 INJECTION, SOLUTION INTRAVENOUS at 12:00

## 2020-05-07 RX ADMIN — Medication 2 MCG/MIN: at 20:14

## 2020-05-07 RX ADMIN — SODIUM CHLORIDE 1000 ML: 9 INJECTION, SOLUTION INTRAVENOUS at 14:26

## 2020-05-07 RX ADMIN — VANCOMYCIN HYDROCHLORIDE 1750 MG: 10 INJECTION, POWDER, LYOPHILIZED, FOR SOLUTION INTRAVENOUS at 21:18

## 2020-05-07 RX ADMIN — SODIUM CHLORIDE 80 ML: 9 INJECTION, SOLUTION INTRAVENOUS at 13:12

## 2020-05-07 RX ADMIN — ACETAMINOPHEN 650 MG: 325 TABLET ORAL at 17:02

## 2020-05-07 RX ADMIN — Medication 10 ML: at 13:12

## 2020-05-07 RX ADMIN — SODIUM CHLORIDE 1000 ML: 9 INJECTION, SOLUTION INTRAVENOUS at 17:03

## 2020-05-07 RX ADMIN — IOVERSOL 75 ML: 741 INJECTION INTRA-ARTERIAL; INTRAVENOUS at 13:12

## 2020-05-07 ASSESSMENT — PAIN SCALES - GENERAL
PAINLEVEL_OUTOF10: 0

## 2020-05-07 ASSESSMENT — PAIN DESCRIPTION - PAIN TYPE: TYPE: ACUTE PAIN

## 2020-05-07 NOTE — ED NOTES
Pt returns from [email or web address], nondistressed  Writer assists patient with using urinal  Placed back onto cardiac monitor    Call light placed within reach on cart  Will continue to monitor     Brad MARYAM Black  05/07/20 [id number]

## 2020-05-07 NOTE — ED NOTES
Dr. Delonte Rizvi paged through answering service, on call for Dr. Tad Griffin, requested per Stephenie Velazquez MD.     Houston Razo RN  05/07/20 [id number]

## 2020-05-07 NOTE — ED PROVIDER NOTES
[address]  eMERGENCY dEPARTMENT eNCOUnter      Pt Name: Belkis Anna  MRN: [id number]  [place] 1950  Date of evaluation: 5/7/2020      CHIEF COMPLAINT       Chief Complaint   Patient presents with    Fatigue    Dizziness    Cough         HISTORY OF PRESENT ILLNESS      The patient presents with generalized weakness. He seems a little confused. He cannot provide much of a history. The patient was supposed to get an MRI of his adrenal gland because of metastasis to the adrenals from lung cancer. He was unable to complete the test.  He had felt a little bit woozy at the MRI suite. They gave him orange juice to drink and a ed cracker. His blood sugar was 88. The patient says he is prediabetic. The patient decided to be seen I the ED, after initially refusing. The patient has a history of small cell lung cancer. He denies chest pain or shortness of breath. He denies nausea, vomiting, or diarrhea. He denies fever. He has had a cough however. He reports feeling generally fatigued but cannot describe it. He denies focal neurologic deficit. It is not clear that he has had chemotherapy to treat his cancer. He does have a port in his left anterior chest.      REVIEW OF SYSTEMS       ROS Limited due to patient presentation. 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 Patient reports feeling generally weak. PAST MEDICAL HISTORY    has a past medical history of DDD (degenerative disc disease), cervical, Gout, Heart murmur, Hyperlipidemia, Hypertension, Lung cancer ([address] MI (myocardial infarction) ([address] Skin cancer, and Wears glasses. SURGICAL HISTORY      has a past surgical history that includes Appendectomy; Tonsillectomy; skin biopsy; skin biopsy; Colonoscopy; Foot surgery (Right); and Cardiac catheterization.     CURRENT MEDICATIONS       Previous Medications    ACETAMINOPHEN (TYLENOL) 325 MG TABLET    Take 650 mg by mouth every 6 or rhonchi. Abdomen: soft, nontender with no pain to palpation. No pulsatile mass. Normal bowel sounds are noted. No rebound or guarding. Musculoskeletal exam shows no evidence of trauma. Normal distal pulses in all extremities. Skin: no rash or edema. Neurological exam reveals cranial nerves 2 through 12 grossly intact. Patient has equal  and normal deep tendon reflexes. Psychiatric: no hallucinations or suicidal ideation. Lymphatics.:  No lymphadenopathy. DIFFERENTIAL DIAGNOSIS/ MDM:     Mental status change, dehydration, ACS, CVA, pneumonia    DIAGNOSTIC RESULTS     EKG: All EKG's are interpreted by the Emergency Department Physician who either signs or Co-signs this chart in the absence of a cardiologist.    Sinus 113 with NSST change. No old to compare. Axis 67, , QRS 78, . RADIOLOGY:   I directly visualized the following  images and reviewed the radiologist interpretations:  CT Chest Pulmonary Embolism W Contrast   Preliminary Result   1. No evidence of central or large proximal segmental pulmonary embolus. Distal pulmonary arteries are limited by motion and bolus timing. 2.  Stable post-treatment changes in the right suprahilar/perihilar lung. No   acute intrathoracic process. 3.  Unchanged adrenal nodules with fiducial marker noted on the left. CT Head WO Contrast   Preliminary Result   No evidence of acute intracranial abnormality. CT Chest Pulmonary Embolism W Contrast (Preliminary result)   Result time 05/07/20 14:02:31   Preliminary result by Heena Larson DO (05/07/20 14:02:31)                Impression:    1.  No evidence of central or large proximal segmental pulmonary embolus. Distal pulmonary arteries are limited by motion and bolus timing. 2.  Stable post-treatment changes in the right suprahilar/perihilar lung.  No  acute intrathoracic process.     3.  Unchanged adrenal nodules with fiducial marker noted on the left.            Narrative:    EXAMINATION:  CTA OF THE CHEST 5/7/2020 12:26 pm    TECHNIQUE:  CTA of the chest was performed after the administration of intravenous  contrast.  Multiplanar reformatted images are provided for review.  MIP  images are provided for review. Dose modulation, iterative reconstruction,  and/or weight based adjustment of the mA/kV was utilized to reduce the  radiation dose to as low as reasonably achievable. COMPARISON:  CT chest dated 04/24/2020    HISTORY:  ORDERING SYSTEM PROVIDED HISTORY: Chest Pain  TECHNOLOGIST PROVIDED HISTORY:  Chest Pain  Reason for Exam: chest pain, cough  Acuity: Acute  Type of Exam: Initial    FINDINGS:  Pulmonary Arteries: Central and proximal segmental pulmonary arteries are  adequately opacified for evaluation.  Distal segmental and subsegmental  pulmonary arteries are limited by respiratory motion and bolus timing.  No  evidence of intraluminal filling defect to suggest pulmonary embolism.  Main  pulmonary artery is normal in caliber. Mediastinum: Left chest port is in place.  Heart is normal in size.  No  pericardial effusion.  Thoracic aorta is normal in caliber.  There is no  mediastinal or hilar lymphadenopathy. Lungs/pleura: There is chronic architectural distortion and opacity in the  right suprahilar and right perihilar lung which is unchanged.  There are no  new areas of airspace consolidation.  No pneumothorax or pleural effusion. Upper Abdomen: Bilateral adrenal nodules are unchanged.  The nodule in the  left adrenal gland measures up to 3.3 cm.  Fiducial marker has been placed  within the nodule.  There are no acute findings in the visualized upper  abdomen. Soft Tissues/Bones: No acute bone or soft tissue abnormality.                Preliminary result by Jeffy Davidson DO (05/07/20 13:59:53)                Impression:    1.  No evidence of central or large proximal segmental pulmonary embolus.   Distal pulmonary arteries are limited by motion and bolus timing.  2.  Stable  post treatment changes in the right suprahilar/perihilar lung.  No acute  intrathoracic process.  3.  Unchanged adrenal nodules with fiducial marker  noted on the left.                    CT Head WO Contrast (Preliminary result)   Result time 05/07/20 13:40:18   Preliminary result by Shanell Pinto MD (05/07/20 13:40:18)                Impression:    No evidence of acute intracranial abnormality. Narrative:    EXAMINATION:  CT OF THE HEAD WITHOUT CONTRAST  5/7/2020 12:24 pm    TECHNIQUE:  CT of the head was performed without the administration of intravenous  contrast. Dose modulation, iterative reconstruction, and/or weight based  adjustment of the mA/kV was utilized to reduce the radiation dose to as low  as reasonably achievable. COMPARISON:  None    HISTORY:  ORDERING SYSTEM PROVIDED HISTORY: confusion  TECHNOLOGIST PROVIDED HISTORY:    confusion  Reason for Exam: confusion, AMS started today  Acuity: Acute  Type of Exam: Initial    FINDINGS:  BRAIN/VENTRICLES: Mild motion/streak artifact is present on the examination. Ventricular system is within normal limits for patient age. Alverta Sober evidence of  mass effect or midline shift.  Prominence of sulci overlying convexities of  cerebral hemispheres and cerebellum consistent with atrophy.  Subtle abnormal  low-attenuation within periventricular/subcortical white matter is  nonspecific however likely on the basis of changes of mild chronic ischemic  leukoencephalopathy.  No other area of abnormal attenuation of brain  parenchyma is identified.  No abnormal extra-axial fluid collections are  identified. Clabe Duel is atherosclerotic calcification of distal internal  carotid and right vertebral arteries. ORBITS: The visualized portion of the orbits demonstrate no acute abnormality. SINUSES: The visualized paranasal sinuses and mastoid air cells demonstrate  no acute abnormality.     SOFT TISSUES/SKULL:  No acute abnormality of the visualized skull or soft  tissues.                Preliminary result by Laisha Billings MD (05/07/20 13:34:11)                Impression:    No evidence of acute intracranial abnormality.                      LABS:  Results for orders placed or performed during the hospital encounter of 05/07/20   Lactic Acid   Result Value Ref Range    Lactic Acid 2.5 (H) 0.5 - 2.2 mmol/L   CBC Auto Differential   Result Value Ref Range    WBC 7.8 3.5 - 11.0 k/uL    RBC 4.22 (L) 4.5 - 5.9 m/uL    Hemoglobin 13.6 13.5 - 17.5 g/dL    Hematocrit 41.2 41 - 53 %    MCV 97.6 80 - 100 fL    MCH 32.3 26 - 34 pg    MCHC 33.1 31 - 37 g/dL    RDW 14.9 12.5 - 15.4 %    Platelets 530 472 - 581 k/uL    MPV 7.9 6.0 - 12.0 fL    NRBC Automated NOT REPORTED per 100 WBC    Differential Type NOT REPORTED     Immature Granulocytes NOT REPORTED 0 %    Absolute Immature Granulocyte NOT REPORTED 0.00 - 0.30 k/uL    WBC Morphology NOT REPORTED     RBC Morphology NOT REPORTED     Platelet Estimate NOT REPORTED     Seg Neutrophils 96 (H) 36 - 66 %    Lymphocytes 2 (L) 24 - 44 %    Monocytes 2 2 - 11 %    Eosinophils % 0 (L) 1 - 4 %    Basophils 0 0 - 2 %    Segs Absolute 7.48 1.8 - 7.7 k/uL    Absolute Lymph # 0.16 (L) 1.0 - 4.8 k/uL    Absolute Mono # 0.16 0.1 - 1.2 k/uL    Absolute Eos # 0.00 0.0 - 0.4 k/uL    Basophils Absolute 0.00 0.0 - 0.2 k/uL    Morphology Normal    Basic Metabolic Panel   Result Value Ref Range    Glucose 116 (H) 70 - 99 mg/dL    BUN 17 8 - 23 mg/dL    CREATININE 1.15 0.70 - 1.20 mg/dL    Bun/Cre Ratio NOT REPORTED 9 - 20    Calcium 9.4 8.6 - 10.4 mg/dL    Sodium 134 (L) 135 - 144 mmol/L    Potassium 4.6 3.7 - 5.3 mmol/L    Chloride 99 98 - 107 mmol/L    CO2 20 20 - 31 mmol/L    Anion Gap 15 9 - 17 mmol/L    GFR Non-African American >60 >60 mL/min    GFR African American >60 >60 mL/min    GFR Comment          GFR Staging NOT REPORTED    Hepatic Function Panel   Result Value Ref Range    Alb 4.1 3.5 - 5.2 g/dL    Alkaline Phosphatase 88 40 - 129 U/L    ALT 12 5 - 41 U/L    AST 17 <40 U/L    Total Bilirubin 0.72 0.3 - 1.2 mg/dL    Bilirubin, Direct 0.25 <0.31 mg/dL    Bilirubin, Indirect 0.47 0.00 - 1.00 mg/dL    Total Protein 7.0 6.4 - 8.3 g/dL    Globulin NOT REPORTED 1.5 - 3.8 g/dL    Albumin/Globulin Ratio 1.4 1.0 - 2.5   Troponin   Result Value Ref Range    Troponin, High Sensitivity 19 0 - 22 ng/L    Troponin T NOT REPORTED <0.03 ng/mL    Troponin Interp NOT REPORTED    Protime-INR   Result Value Ref Range    Protime 12.0 9.4 - 12.6 sec    INR 1.2    APTT   Result Value Ref Range    PTT 27.5 21.3 - 31.3 sec   Urinalysis Reflex to Culture   Result Value Ref Range    Color, UA YELLOW YELLOW    Turbidity UA CLEAR CLEAR    Glucose, Ur NEGATIVE NEGATIVE    Bilirubin Urine NEGATIVE NEGATIVE    Ketones, Urine NEGATIVE NEGATIVE    Specific Gravity, UA 1.015 1.005 - 1.030    Urine Hgb NEGATIVE NEGATIVE    pH, UA 7.0 5.0 - 8.0    Protein, UA NEGATIVE NEGATIVE    Urobilinogen, Urine Normal Normal    Nitrite, Urine NEGATIVE NEGATIVE    Leukocyte Esterase, Urine NEGATIVE NEGATIVE    Urinalysis Comments       Microscopic exam not performed based on chemical results unless requested in original order. Urinalysis Comments          Urinalysis Comments       Utilizing a urinalysis as the only screening method to exclude a potential uropathogen can be unreliable in many patient populations. Rapid screening tests are less sensitive than culture and if UTI is a clinical possibility, culture should be considered despite a negative urinalysis. COVID-19   Result Value Ref Range    SARS-CoV-2          SARS-CoV-2, Rapid Not Detected Not Detected    Source . NASOPHARYNGEAL SWAB     SARS-CoV-2, PCR               EMERGENCY DEPARTMENT COURSE:   Vitals:    Vitals:    05/07/20 1515 05/07/20 1545 05/07/20 1600 05/07/20 1609   BP: 103/75 132/89 129/77 129/77   Pulse: 140 143 145 144   Resp:       Temp:       TempSrc:       SpO2:  90% (!)

## 2020-05-07 NOTE — PROGRESS NOTES
Pt here for port access for MRI. Pt did not return for port de-access, chart reviewed and noted pt in ER-see ER progress notes.

## 2020-05-07 NOTE — ED NOTES
Patient into er from outpatient MRI today with weakness/dizziness, fatigue, ongoing cough  Pt states that he drove himself to facility today for outpatient MRI testing; MRI tech reports that she had pt recently and today he appears more altered and off balance. MRI tech reports that they did not complete MRI test due to patients current condition. Patient appears slightly altered when speaking with him. Pt answering questions, following commands, speaking in full, clear and complete sentences without issues  Patient is A&OX3  Reports increased thirst and reports loss of taste \"for months\"    Pt states \"I feel out of sorts today\", denies chest pain  Reports SOB \"sometimes\" Pt has lung CA. Reports that he is currently not receiving tx's   Pt not able to give detailed information on current medical condition  Denies fevers, reports +chills currently  Reports +constipation which is not new in nature  Reports +nausea and \"sometimes\" loss of appetite. Placed on full cardiac monitor  EKG completed  IV established, blood labs collected    Patient is a&o x4   GCS=15     Nondistressed, VS STABLE  Patient updated on plan of care and processes  Denies  Pain or complaints  Reports smoking marijuana today for medical purposes and smokes 1/2 pack of ciggs/day     Writer notes port on L upper chest already accessed upon arrival to ER, pt reported \"next door\" accessed it earlier today. Single piece of tape holding port in place on L upper chest  Writer applied large transparent dressing over port. Writer did not access port nor draw blood from port.   Writer insert peripheral IV to RAC for ER visit      Daniel Pires RN  05/07/20 0255

## 2020-05-08 LAB
ABSOLUTE EOS #: 0 K/UL (ref 0–0.4)
ABSOLUTE IMMATURE GRANULOCYTE: 1.33 K/UL (ref 0–0.3)
ABSOLUTE LYMPH #: 0 K/UL (ref 1–4.8)
ABSOLUTE MONO #: 3 K/UL (ref 0.1–0.8)
ABSOLUTE RETIC #: 0.05 M/UL (ref 0.03–0.08)
ANION GAP SERPL CALCULATED.3IONS-SCNC: 16 MMOL/L (ref 9–17)
BASOPHILS # BLD: 0 % (ref 0–2)
BASOPHILS ABSOLUTE: 0 K/UL (ref 0–0.2)
BUN BLDV-MCNC: 21 MG/DL (ref 8–23)
BUN/CREAT BLD: ABNORMAL (ref 9–20)
C-REACTIVE PROTEIN: 135.4 MG/L (ref 0–5)
CALCIUM SERPL-MCNC: 8.3 MG/DL (ref 8.6–10.4)
CHLORIDE BLD-SCNC: 104 MMOL/L (ref 98–107)
CO2: 16 MMOL/L (ref 20–31)
CORTISOL COLLECTION INFO: ABNORMAL
CORTISOL: 24.5 UG/DL (ref 2.7–18.4)
CREAT SERPL-MCNC: 1.36 MG/DL (ref 0.7–1.2)
DIFFERENTIAL TYPE: ABNORMAL
EKG ATRIAL RATE: 113 BPM
EKG P AXIS: 69 DEGREES
EKG P-R INTERVAL: 174 MS
EKG Q-T INTERVAL: 306 MS
EKG QRS DURATION: 78 MS
EKG QTC CALCULATION (BAZETT): 419 MS
EKG R AXIS: 67 DEGREES
EKG T AXIS: 48 DEGREES
EKG VENTRICULAR RATE: 113 BPM
EOSINOPHILS RELATIVE PERCENT: 0 % (ref 1–4)
GFR AFRICAN AMERICAN: >60 ML/MIN
GFR NON-AFRICAN AMERICAN: 52 ML/MIN
GFR SERPL CREATININE-BSD FRML MDRD: ABNORMAL ML/MIN/{1.73_M2}
GFR SERPL CREATININE-BSD FRML MDRD: ABNORMAL ML/MIN/{1.73_M2}
GLUCOSE BLD-MCNC: 84 MG/DL (ref 70–99)
HCT VFR BLD CALC: 39.6 % (ref 40.7–50.3)
HCT VFR BLD CALC: 40.2 % (ref 40.7–50.3)
HEMOGLOBIN: 12.7 G/DL (ref 13–17)
HEMOGLOBIN: 13.6 G/DL (ref 13–17)
IMMATURE GRANULOCYTES: 4 %
IMMATURE RETIC FRACT: 9.3 % (ref 2.7–18.3)
INR BLD: 1.5
LACTIC ACID, SEPSIS WHOLE BLOOD: 2.5 MMOL/L (ref 0.5–1.9)
LACTIC ACID, SEPSIS: ABNORMAL MMOL/L (ref 0.5–1.9)
LACTIC ACID, WHOLE BLOOD: 2.7 MMOL/L (ref 0.7–2.1)
LYMPHOCYTES # BLD: 0 % (ref 24–44)
MCH RBC QN AUTO: 31.5 PG (ref 25.2–33.5)
MCH RBC QN AUTO: 32.9 PG (ref 25.2–33.5)
MCHC RBC AUTO-ENTMCNC: 32.1 G/DL (ref 28.4–34.8)
MCHC RBC AUTO-ENTMCNC: 33.8 G/DL (ref 28.4–34.8)
MCV RBC AUTO: 97.3 FL (ref 82.6–102.9)
MCV RBC AUTO: 98.3 FL (ref 82.6–102.9)
MONOCYTES # BLD: 9 % (ref 1–7)
MORPHOLOGY: NORMAL
MYOGLOBIN: 689 NG/ML (ref 28–72)
NRBC AUTOMATED: 0 PER 100 WBC
NRBC AUTOMATED: 0 PER 100 WBC
PDW BLD-RTO: 14.3 % (ref 11.8–14.4)
PDW BLD-RTO: 14.4 % (ref 11.8–14.4)
PLATELET # BLD: 173 K/UL (ref 138–453)
PLATELET # BLD: 178 K/UL (ref 138–453)
PLATELET ESTIMATE: ABNORMAL
PMV BLD AUTO: 9.2 FL (ref 8.1–13.5)
PMV BLD AUTO: 9.4 FL (ref 8.1–13.5)
POTASSIUM SERPL-SCNC: 3.7 MMOL/L (ref 3.7–5.3)
PROTHROMBIN TIME: 15.4 SEC (ref 9–12)
RBC # BLD: 4.03 M/UL (ref 4.21–5.77)
RBC # BLD: 4.13 M/UL (ref 4.21–5.77)
RBC # BLD: ABNORMAL 10*6/UL
RETIC %: 1.3 % (ref 0.5–1.9)
RETIC HEMOGLOBIN: 36.5 PG (ref 28.2–35.7)
SEG NEUTROPHILS: 87 % (ref 36–66)
SEGMENTED NEUTROPHILS ABSOLUTE COUNT: 28.97 K/UL (ref 1.8–7.7)
SODIUM BLD-SCNC: 136 MMOL/L (ref 135–144)
SURGICAL PATHOLOGY REPORT: NORMAL
TOTAL CK: 1021 U/L (ref 39–308)
TSH SERPL DL<=0.05 MIU/L-ACNC: 1.5 MIU/L (ref 0.3–5)
WBC # BLD: 27.6 K/UL (ref 3.5–11.3)
WBC # BLD: 33.3 K/UL (ref 3.5–11.3)
WBC # BLD: ABNORMAL 10*3/UL

## 2020-05-08 PROCEDURE — 83874 ASSAY OF MYOGLOBIN: CPT

## 2020-05-08 PROCEDURE — 6370000000 HC RX 637 (ALT 250 FOR IP): Performed by: NURSE PRACTITIONER

## 2020-05-08 PROCEDURE — 83605 ASSAY OF LACTIC ACID: CPT

## 2020-05-08 PROCEDURE — 80048 BASIC METABOLIC PNL TOTAL CA: CPT

## 2020-05-08 PROCEDURE — 97535 SELF CARE MNGMENT TRAINING: CPT

## 2020-05-08 PROCEDURE — 2500000003 HC RX 250 WO HCPCS: Performed by: STUDENT IN AN ORGANIZED HEALTH CARE EDUCATION/TRAINING PROGRAM

## 2020-05-08 PROCEDURE — 85610 PROTHROMBIN TIME: CPT

## 2020-05-08 PROCEDURE — 99291 CRITICAL CARE FIRST HOUR: CPT | Performed by: INTERNAL MEDICINE

## 2020-05-08 PROCEDURE — 93005 ELECTROCARDIOGRAM TRACING: CPT | Performed by: STUDENT IN AN ORGANIZED HEALTH CARE EDUCATION/TRAINING PROGRAM

## 2020-05-08 PROCEDURE — 36415 COLL VENOUS BLD VENIPUNCTURE: CPT

## 2020-05-08 PROCEDURE — 82533 TOTAL CORTISOL: CPT

## 2020-05-08 PROCEDURE — 6360000002 HC RX W HCPCS: Performed by: STUDENT IN AN ORGANIZED HEALTH CARE EDUCATION/TRAINING PROGRAM

## 2020-05-08 PROCEDURE — 97166 OT EVAL MOD COMPLEX 45 MIN: CPT

## 2020-05-08 PROCEDURE — 84484 ASSAY OF TROPONIN QUANT: CPT

## 2020-05-08 PROCEDURE — 2580000003 HC RX 258: Performed by: STUDENT IN AN ORGANIZED HEALTH CARE EDUCATION/TRAINING PROGRAM

## 2020-05-08 PROCEDURE — 6360000002 HC RX W HCPCS: Performed by: INTERNAL MEDICINE

## 2020-05-08 PROCEDURE — 2580000003 HC RX 258: Performed by: NURSE PRACTITIONER

## 2020-05-08 PROCEDURE — 6370000000 HC RX 637 (ALT 250 FOR IP): Performed by: STUDENT IN AN ORGANIZED HEALTH CARE EDUCATION/TRAINING PROGRAM

## 2020-05-08 PROCEDURE — 2000000000 HC ICU R&B

## 2020-05-08 PROCEDURE — 87070 CULTURE OTHR SPECIMN AEROBIC: CPT

## 2020-05-08 PROCEDURE — 6360000002 HC RX W HCPCS: Performed by: NURSE PRACTITIONER

## 2020-05-08 PROCEDURE — 97530 THERAPEUTIC ACTIVITIES: CPT

## 2020-05-08 PROCEDURE — 82550 ASSAY OF CK (CPK): CPT

## 2020-05-08 PROCEDURE — 85027 COMPLETE CBC AUTOMATED: CPT

## 2020-05-08 PROCEDURE — 84443 ASSAY THYROID STIM HORMONE: CPT

## 2020-05-08 PROCEDURE — 87205 SMEAR GRAM STAIN: CPT

## 2020-05-08 PROCEDURE — 85045 AUTOMATED RETICULOCYTE COUNT: CPT

## 2020-05-08 PROCEDURE — 86140 C-REACTIVE PROTEIN: CPT

## 2020-05-08 PROCEDURE — 85730 THROMBOPLASTIN TIME PARTIAL: CPT

## 2020-05-08 PROCEDURE — 85025 COMPLETE CBC W/AUTO DIFF WBC: CPT

## 2020-05-08 PROCEDURE — 2580000003 HC RX 258: Performed by: INTERNAL MEDICINE

## 2020-05-08 PROCEDURE — 97162 PT EVAL MOD COMPLEX 30 MIN: CPT

## 2020-05-08 RX ORDER — HEPARIN SODIUM 1000 [USP'U]/ML
4000 INJECTION, SOLUTION INTRAVENOUS; SUBCUTANEOUS ONCE
Status: COMPLETED | OUTPATIENT
Start: 2020-05-09 | End: 2020-05-08

## 2020-05-08 RX ORDER — MIDODRINE HYDROCHLORIDE 5 MG/1
5 TABLET ORAL
Status: DISCONTINUED | OUTPATIENT
Start: 2020-05-08 | End: 2020-05-09

## 2020-05-08 RX ORDER — HEPARIN SODIUM 1000 [USP'U]/ML
2000 INJECTION, SOLUTION INTRAVENOUS; SUBCUTANEOUS PRN
Status: DISCONTINUED | OUTPATIENT
Start: 2020-05-08 | End: 2020-05-09

## 2020-05-08 RX ORDER — HEPARIN SODIUM 10000 [USP'U]/100ML
9.85 INJECTION, SOLUTION INTRAVENOUS CONTINUOUS
Status: DISCONTINUED | OUTPATIENT
Start: 2020-05-09 | End: 2020-05-09

## 2020-05-08 RX ORDER — 0.9 % SODIUM CHLORIDE 0.9 %
500 INTRAVENOUS SOLUTION INTRAVENOUS ONCE
Status: COMPLETED | OUTPATIENT
Start: 2020-05-09 | End: 2020-05-09

## 2020-05-08 RX ORDER — HEPARIN SODIUM 1000 [USP'U]/ML
4000 INJECTION, SOLUTION INTRAVENOUS; SUBCUTANEOUS PRN
Status: DISCONTINUED | OUTPATIENT
Start: 2020-05-08 | End: 2020-05-09

## 2020-05-08 RX ADMIN — MIDODRINE HYDROCHLORIDE 5 MG: 5 TABLET ORAL at 12:08

## 2020-05-08 RX ADMIN — VANCOMYCIN HYDROCHLORIDE 1750 MG: 10 INJECTION, POWDER, LYOPHILIZED, FOR SOLUTION INTRAVENOUS at 15:01

## 2020-05-08 RX ADMIN — ENOXAPARIN SODIUM 40 MG: 40 INJECTION SUBCUTANEOUS at 09:59

## 2020-05-08 RX ADMIN — MIDODRINE HYDROCHLORIDE 5 MG: 5 TABLET ORAL at 16:36

## 2020-05-08 RX ADMIN — PIPERACILLIN AND TAZOBACTAM 3.38 G: 3; .375 INJECTION, POWDER, LYOPHILIZED, FOR SOLUTION INTRAVENOUS at 08:42

## 2020-05-08 RX ADMIN — SODIUM CHLORIDE: 9 INJECTION, SOLUTION INTRAVENOUS at 18:55

## 2020-05-08 RX ADMIN — MIDODRINE HYDROCHLORIDE 5 MG: 5 TABLET ORAL at 08:42

## 2020-05-08 RX ADMIN — ATORVASTATIN CALCIUM 20 MG: 10 TABLET, FILM COATED ORAL at 08:42

## 2020-05-08 RX ADMIN — SODIUM CHLORIDE, PRESERVATIVE FREE 10 ML: 5 INJECTION INTRAVENOUS at 20:39

## 2020-05-08 RX ADMIN — HEPARIN SODIUM AND DEXTROSE 10 UNITS/KG/HR: 10000; 5 INJECTION INTRAVENOUS at 23:45

## 2020-05-08 RX ADMIN — HEPARIN SODIUM 4000 UNITS: 1000 INJECTION INTRAVENOUS; SUBCUTANEOUS at 23:46

## 2020-05-08 RX ADMIN — TAMSULOSIN HYDROCHLORIDE 0.4 MG: 0.4 CAPSULE ORAL at 08:42

## 2020-05-08 RX ADMIN — PIPERACILLIN AND TAZOBACTAM 3.38 G: 3; .375 INJECTION, POWDER, LYOPHILIZED, FOR SOLUTION INTRAVENOUS at 16:19

## 2020-05-08 RX ADMIN — SODIUM CHLORIDE 500 ML: 9 INJECTION, SOLUTION INTRAVENOUS at 23:46

## 2020-05-08 RX ADMIN — PIPERACILLIN AND TAZOBACTAM 3.38 G: 3; .375 INJECTION, POWDER, LYOPHILIZED, FOR SOLUTION INTRAVENOUS at 20:39

## 2020-05-08 RX ADMIN — Medication 10 MCG/MIN: at 12:32

## 2020-05-08 ASSESSMENT — PAIN SCALES - GENERAL
PAINLEVEL_OUTOF10: 0

## 2020-05-08 NOTE — H&P
Critical Care - History and Physical Examination    Patient's name:  Loco Steward  Medical Record Number: [id number]  Patient's account/billing number: [de-identified]  Patient's YOB: 1950  Age: 77 y.o. Date of Admission: 5/7/2020 11:14 AM  Date of History and Physical Examination: 5/8/2020      Primary Care Physician: Rosmery Prabhakar MD  Attending Physician: Dr. Diamond Ganser    Code Status: Full Code    Chief complaint: Generalized fatigue     HISTORY OF PRESENT ILLNESS:   History was obtained from chart review and the patient. 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 The patient reports that he was scheduled to get an MRI of his abdomen on the day of presentation and the technician noticed that the patient appeared fatigued. He was instructed to report to the emergency department. In the emergency department, he denied any nausea/vomiting, diarrhea. Denied any fevers at home. The patient has a chronic baseline cough. The patient also has shortness of breath reports that it is at baseline. He does not wear oxygen at home. His lab work and [facility] ED showed mildly elevated lactic acid to 2.5. He had no leukocytosis. Electrolytes were within normal limits. His initial troponin was 19. The patient was initially admitted to [facility].  While admitted to [facility] ED, he was noted to have decreasing blood pressure to systolic in the 23X. Lactic acid increased to 3.7. He was given 3 L of fluid. He had a CT chest with contrast which showed no evidence of infection or PE. Urinalysis was negative. The patient was started on levo fed, vancomycin and Zosyn for suspected sepsis of unknown source. He was afebrile without leukocytosis during his admission.   Per EMS the patient was given 1 L bolus in route to the hospital.  Levo fed was decreased from 30 to 15. Patient was saturating well on nasal cannula. The patient was recently evaluated at [facility] for marker placement on left adrenal nodule on 5/5. He had a CT chest abdomen abdomen on 4/24, which showed increased left adrenal size compared to CT on 2/10. Past Medical History:        Diagnosis Date    DDD (degenerative disc disease), cervical     Gout     Heart murmur     hx. of when younger.  Hyperlipidemia     Hypertension     Lung [address] ([facility] [address]     right lung    MI (myocardial infarction) ([facility] [address]     Skin cancer     face    Wears glasses        Past Surgical History:        Procedure Laterality Date    APPENDECTOMY      CARDIAC CATHETERIZATION      w/stent    COLONOSCOPY      FOOT SURGERY Right     2nd toe(bone spur).  SKIN BIOPSY      SKIN BIOPSY      face under lt. eye.  TONSILLECTOMY         Allergies:    No Known Allergies      Home Meds:   Prior to Admission medications    Medication Sig Start Date End Date Taking? Authorizing Provider   tamsulosin (FLOMAX) 0.4 MG capsule TAKE 1 CAPSULE BY MOUTH EVERY DAY 5/4/20   Nitza Haynes MD   metoprolol tartrate (LOPRESSOR) 25 MG tablet TAKE 1 TABLET BY MOUTH TWICE A DAY 4/3/20   Nitza Haynes MD   potassium chloride (MICRO-K) 10 MEQ extended release capsule TAKE 2 CAPSULES BY MOUTH EVERY DAY 3/9/20   Nitza Haynes MD   Handicap Placard MISC by Does not apply route 7/30/19   Nitza Haynes MD   lidocaine-prilocaine (EMLA) 2.5-2.5 % cream Apply topically as needed. Apply a quarter size amount to port site 1 hour before chemotherapy. Cover with plastic wrap.  3/7/19   Guillermo Wharton MD   acetaminophen (TYLENOL) 325 MG tablet Take 650 mg by mouth every 6 hours as needed for Pain    Historical Provider, MD   allopurinol (ZYLOPRIM) 100 MG tablet Take 100 mg by mouth daily 12/4/18   Historical Provider, MD   simvastatin (ZOCOR) 40 MG tablet Take 40 mg by mouth daily 11/27/18 Positive (Details:  )  Urine Culture:                   [] None drawn      [] Negative             []  Positive (Details:  )  Sputum Culture:               [] None drawn       [] Negative             []  Positive (Details:  )   Endotracheal aspirate:     [] None drawn       [] Negative             []  Positive (Details:  )     -----------------------------------------------------------------  Radiological reports:     (See actual reports for details)      Recent Cardiac Catheterization summary:   (See actual reports for details)    Electrocardiogram:     Last Echocardiogram findings:   (See actual reports for details)       Assessment and Plan       Patient Active Problem List   Diagnosis    Primary lung cancer with metastasis from lung to other site, right [address] [address]    Dehydration    Hyperlipidemia    Hypertension    Lung cancer ([facility]    Sepsis ([facility] [address]         Additional assessment:          Plan:    1. Sepsis unknown etiology   -continue vancomycin and zosyn   -blood cultures from outlying facility pending   -UA unremarkable, urine culture pending   -CTPE and CXR showing no acute abnormalities, COVID swab negative  -continue Levophed, will start midodrine   -lactic acid trending down     2. SATISH  -trending Cr  -continue fluid mainetnence   -received 3 L NS bolus prior to arrival     3. Hx of small cell lung cancer w/ mets to L adrenal gland   -CTPE showing stable lung nodules  -sp chemotherapy and radiation therapy   -saturating well on 2 L NC    4. Hypertension    -holding home lopressor     5.  Hyperlipidemia   -will start home dose lipitor       Imer Villegas MD  Resident   [address] [facility]  5/8/2020, 3:55 AM

## 2020-05-08 NOTE — PLAN OF CARE
Problem: Falls - Risk of:  Goal: Will remain free from falls  Description: Will remain free from falls  5/8/2020 0630 by Justin Alfonso RN  Outcome: Ongoing  5/7/2020 2259 by Abhijit Edouard RN  Outcome: Ongoing  Goal: Absence of physical injury  Description: Absence of physical injury  5/8/2020 0630 by Justin Alfonso RN  Outcome: Ongoing  5/7/2020 2259 by Abhijit Edouard RN  Outcome: Ongoing     Problem: Falls - Risk of:  Goal: Absence of physical injury  Description: Absence of physical injury  5/8/2020 0630 by Justin Alfonso RN  Outcome: Ongoing  5/7/2020 2259 by Abhijit Edouard RN  Outcome: Ongoing     Problem: Gas Exchange - Impaired:  Goal: Levels of oxygenation will improve  Description: Levels of oxygenation will improve  5/8/2020 0630 by Justin Alfonso RN  Outcome: Ongoing  5/7/2020 2259 by Abhijit Edouard RN  Outcome: Ongoing     Problem: Infection, Septic Shock:  Goal: Will show no infection signs and symptoms  Description: Will show no infection signs and symptoms  5/8/2020 0630 by Justin Alfonso RN  Outcome: Ongoing  5/7/2020 2259 by Abhijit Edouard RN  Outcome: Ongoing     Problem: Fluid Volume:  Goal: Signs and symptoms of dehydration will decrease  Description: Signs and symptoms of dehydration will decrease  5/8/2020 0630 by Justin Alfonso RN  Outcome: Ongoing  5/7/2020 2259 by Abhijit Edouard RN  Outcome: Ongoing  Goal: Ability to achieve a balanced intake and output will improve  Description: Ability to achieve a balanced intake and output will improve  5/8/2020 0630 by Justin Alfonso RN  Outcome: Ongoing  5/7/2020 2259 by Abhijit Edouard RN  Outcome: Ongoing  Goal: Diagnostic test results will improve  Description: Diagnostic test results will improve  5/8/2020 0630 by Justin Alfonso RN  Outcome: Ongoing  5/7/2020 2259 by Abhijit Edouard RN  Outcome: Ongoing     Problem: Discharge Planning:  Goal: Participates in care planning  Description: Participates in care planning  Outcome: Ongoing  Goal: Discharged to appropriate level of care  Description: Discharged to appropriate level of care  Outcome: Ongoing     Problem: Anxiety/Stress:  Goal: Level of anxiety will decrease  Description: Level of anxiety will decrease  Outcome: Ongoing     Problem: Cardiac Output - Decreased:  Goal: Hemodynamic stability will improve  Description: Hemodynamic stability will improve  Outcome: Ongoing

## 2020-05-08 NOTE — PROGRESS NOTES
RN called and spoke with Isai Gillette (spouse) and gave her information including room 123-1 unit 1B MICU and the phone number to the unit and patient's nurse is Juan Almeida.

## 2020-05-08 NOTE — PROGRESS NOTES
RN obtained patient at shift change. See vital signs. RN messaged NP to call floor to update on patient's condition including vital signs, assessment, abnormal lab work, and patient unable to void with bladder scan volume of 749mL at 1956. New orders entered by NP and patient will need transferred to ICU. 2014 Levophed started. See MAR and Vital signs. 2046 RN asked via perfect serve if Lovenox should be held. NP states to hold. 2122 RN message NP to call unit for update. Update given including vital signs, labs and patient is maxed out on Levophed with SBP in 80-90's. NP states she spoke with access and they should call me with arrangements for bed and transport. 2126 RN called access and gave information need to set up transport. 2146 RN faxed all paperwork need to [facility]. 2156 RN asked NP to call with update. Update given via phone. 0 RN called access and obtained room and unit number to call report. 2207 RN spoke and gave Robin Perry RN report. 2244 RN called and Spoke with Eber Lanier at access and [facility] will be here within an hour. Tressa 0510 life here to  patient via stretcher in stable condition with vancomycin and levophed infusing.

## 2020-05-08 NOTE — PROGRESS NOTES
Previous Treatment: Not applicable  Family / Caregiver Present: No  Follows Commands: Within Functional Limits  General Comment  Comments: OT co-eval  Subjective  Subjective: RN and pt agreeable to PT.  Pt alert in bed upon arrival.   Pain Screening  Patient Currently in Pain: Denies  Vital Signs  Patient Currently in Pain: Denies  Pre Treatment Pain Screening  Intervention List: Patient able to continue with treatment    Orientation  Orientation  Orientation Level: Oriented to person  Social/Functional History  Social/Functional History  Lives With: Spouse(works, alone during day)  Type of Home: House  Home Layout: Two level, Able to Live on Main level with bedroom/bathroom  Home Access: Stairs to enter without rails  Entrance Stairs - Number of Steps: 1  Bathroom Shower/Tub: Tub/Shower unit  Bathroom Toilet: Handicap height  Bathroom Equipment: Grab bars in shower, Grab bars around toilet(no chair)  Bathroom Accessibility: Accessible  Home Equipment: [employer]  ADL Assistance: [address] [place] [address] Avenue: [address] [place] [address] Responsibilities: Yes(split)  Ambulation Assistance: Independent  Transfer Assistance: Independent  Active : Yes  Mode of Transportation: Truck, SUV  Additional Comments: amb at grocery  Cognition        Objective          AROM RLE (degrees)  RLE AROM: WFL  AROM LLE (degrees)  LLE AROM : WFL  Strength RLE  Strength RLE: WFL  Strength LLE  Strength LLE: WFL  Strength RUE  Strength RUE: WFL  Strength LUE  Strength LUE: WFL  Strength Other  Other: deconditioned, antigravity minimum     Sensation  Overall Sensation Status: WFL(Pt denies any numbness or tingling)  Bed mobility  Supine to Sit: Moderate assistance  Sit to Supine: (left in chair)  Scooting: Contact guard assistance  Transfers  Sit to Stand: Minimal Assistance  Stand to sit: Contact guard assistance  Ambulation  Ambulation?: Yes  Ambulation 1  Surface: level tile  Device: Rolling Walker  Assistance: Contact guard

## 2020-05-08 NOTE — CARE COORDINATION
Case Management Initial Discharge Plan  Alejandra Franz,             Met with:patient to discuss discharge plans. Information verified: address, contacts, phone number, , insurance Yes  Emergency Contact/Next of Kin name & number: Tali Sifuentes (wife) [phone]  PCP: Petey Quiles MD  Date of last visit: 2020    Insurance Provider: Maggi Walker    Discharge Planning    Living Arrangements:  Spouse/Significant Other   Support Systems:  Spouse/Significant Other    Home has 1 story  1 stairs to climb to get into front door  Location of bedroom/bathroom in home main    Patient able to perform ADL's:Independent    Current Services (outpatient & in home) none  DME equipment: cane  DME provider:       Potential Assistance Needed:  N/A    Patient agreeable to home care: No  [place] of choice provided:  n/a    Prior SNF/Rehab Placement and Facility: none  Agreeable to SNF/Rehab: No  [place] of choice provided: n/a   Evaluation: n/a    Expected Discharge date:     Patient expects to be discharged to:  Home  Follow Up Appointment: Best Day/ Time:      Transportation provider: wife  Transportation arrangements needed for discharge: No    Readmission Risk              Risk of Unplanned Readmission:        12             Does patient have a readmission risk score greater than 14?: No  If yes, follow-up appointment must be made within 7 days of discharge. Goals of Care: Independent ADL's      Discharge Plan: Home independent with wife.  [facility]. needs          Electronically signed by Haylee Bingham RN on 20 at 5:20 PM EDT

## 2020-05-08 NOTE — PROGRESS NOTES
Braces or Orthoses?: No  Position Activity Restriction  Other position/activity restrictions: up with assist, watch BP    Subjective   General  Patient assessed for rehabilitation services?: Yes  Family / Caregiver Present: No  Patient Currently in Pain: Denies    Oxygen Therapy  SpO2: 97 %    Social/Functional History  Social/Functional History  Lives With: Spouse(works, alone during day)  Type of Home: House  Home Layout: Two level, Able to Live on Main level with bedroom/bathroom  Home Access: Stairs to enter without rails  Entrance Stairs - Number of Steps: 1  Bathroom Shower/Tub: Tub/Shower unit  Bathroom Toilet: Handicap height  Bathroom Equipment: Grab bars in shower, Grab bars around toilet(no chair)  Bathroom Accessibility: Accessible  Home Equipment: [employer]  ADL Assistance: Independent  Homemaking Assistance: Independent  Homemaking Responsibilities: Yes(split)  Ambulation Assistance: Independent  Transfer Assistance: Independent  Active : Yes  Mode of Transportation: Truck, SUV  Additional Comments: amb at grocery    Objective   Vision: Impaired  Vision Exceptions: Wears glasses for reading  Hearing: Within functional limits    Orientation  Overall Orientation Status: Within Functional Limits  Observation/Palpation  Posture: Good  Balance  Sitting Balance: Contact guard assistance(seated EOB )  Standing Balance: Contact guard assistance(use of RW)  Standing Balance  Time: 2 min  Activity: sit <> stand transfer, functional mobility to chair   Comment: no LOB, increased SOB noted   Functional Mobility  Functional - Mobility Device: Rolling Walker  Activity: Other  Assist Level: Contact guard assistance  Functional Mobility Comments: use of RW, cues for hand and body placement on RW during session      ADL  Feeding: Independent  Grooming: Stand by assistance;Setup  UE Bathing: Minimal assistance;Setup  LE Bathing: Minimal assistance;Setup  UE Dressing: Minimal assistance;Setup(to manage gown )  LE

## 2020-05-09 LAB
ABSOLUTE EOS #: 0 K/UL (ref 0–0.4)
ABSOLUTE IMMATURE GRANULOCYTE: 0 K/UL (ref 0–0.3)
ABSOLUTE LYMPH #: 0.56 K/UL (ref 1–4.8)
ABSOLUTE MONO #: 1.12 K/UL (ref 0.1–0.8)
ANION GAP SERPL CALCULATED.3IONS-SCNC: 13 MMOL/L (ref 9–17)
BASOPHILS # BLD: 0 % (ref 0–2)
BASOPHILS ABSOLUTE: 0 K/UL (ref 0–0.2)
BUN BLDV-MCNC: 19 MG/DL (ref 8–23)
BUN/CREAT BLD: ABNORMAL (ref 9–20)
CALCIUM IONIZED: 1.07 MMOL/L (ref 1.13–1.33)
CALCIUM SERPL-MCNC: 8.3 MG/DL (ref 8.6–10.4)
CHLORIDE BLD-SCNC: 109 MMOL/L (ref 98–107)
CO2: 18 MMOL/L (ref 20–31)
CREAT SERPL-MCNC: 1.24 MG/DL (ref 0.7–1.2)
DIFFERENTIAL TYPE: ABNORMAL
EKG ATRIAL RATE: 105 BPM
EKG ATRIAL RATE: 129 BPM
EKG P AXIS: 34 DEGREES
EKG P-R INTERVAL: 196 MS
EKG Q-T INTERVAL: 344 MS
EKG Q-T INTERVAL: 348 MS
EKG QRS DURATION: 92 MS
EKG QRS DURATION: 94 MS
EKG QTC CALCULATION (BAZETT): 454 MS
EKG QTC CALCULATION (BAZETT): 513 MS
EKG R AXIS: 62 DEGREES
EKG R AXIS: 76 DEGREES
EKG T AXIS: -20 DEGREES
EKG T AXIS: 12 DEGREES
EKG VENTRICULAR RATE: 105 BPM
EKG VENTRICULAR RATE: 131 BPM
EOSINOPHILS RELATIVE PERCENT: 0 % (ref 1–4)
GFR AFRICAN AMERICAN: >60 ML/MIN
GFR NON-AFRICAN AMERICAN: 58 ML/MIN
GFR SERPL CREATININE-BSD FRML MDRD: ABNORMAL ML/MIN/{1.73_M2}
GFR SERPL CREATININE-BSD FRML MDRD: ABNORMAL ML/MIN/{1.73_M2}
GLUCOSE BLD-MCNC: 117 MG/DL (ref 70–99)
HCT VFR BLD CALC: 35.8 % (ref 40.7–50.3)
HCT VFR BLD CALC: 36.6 % (ref 40.7–50.3)
HEMOGLOBIN: 11.8 G/DL (ref 13–17)
HEMOGLOBIN: 12.2 G/DL (ref 13–17)
IMMATURE GRANULOCYTES: 0 %
LACTIC ACID, WHOLE BLOOD: 1.4 MMOL/L (ref 0.7–2.1)
LYMPHOCYTES # BLD: 2 % (ref 24–44)
MAGNESIUM: 1.9 MG/DL (ref 1.6–2.6)
MCH RBC QN AUTO: 32.3 PG (ref 25.2–33.5)
MCH RBC QN AUTO: 32.5 PG (ref 25.2–33.5)
MCHC RBC AUTO-ENTMCNC: 33 G/DL (ref 28.4–34.8)
MCHC RBC AUTO-ENTMCNC: 33.3 G/DL (ref 28.4–34.8)
MCV RBC AUTO: 96.8 FL (ref 82.6–102.9)
MCV RBC AUTO: 98.6 FL (ref 82.6–102.9)
MONOCYTES # BLD: 4 % (ref 1–7)
MORPHOLOGY: ABNORMAL
NRBC AUTOMATED: 0 PER 100 WBC
NRBC AUTOMATED: 0 PER 100 WBC
PARTIAL THROMBOPLASTIN TIME: 72.8 SEC (ref 20.5–30.5)
PDW BLD-RTO: 14.4 % (ref 11.8–14.4)
PDW BLD-RTO: 14.6 % (ref 11.8–14.4)
PLATELET # BLD: 138 K/UL (ref 138–453)
PLATELET # BLD: 138 K/UL (ref 138–453)
PLATELET ESTIMATE: ABNORMAL
PMV BLD AUTO: 9.8 FL (ref 8.1–13.5)
PMV BLD AUTO: 9.9 FL (ref 8.1–13.5)
POTASSIUM SERPL-SCNC: 3.8 MMOL/L (ref 3.7–5.3)
RBC # BLD: 3.63 M/UL (ref 4.21–5.77)
RBC # BLD: 3.78 M/UL (ref 4.21–5.77)
RBC # BLD: ABNORMAL 10*6/UL
SEG NEUTROPHILS: 94 % (ref 36–66)
SEGMENTED NEUTROPHILS ABSOLUTE COUNT: 26.32 K/UL (ref 1.8–7.7)
SODIUM BLD-SCNC: 140 MMOL/L (ref 135–144)
TROPONIN INTERP: ABNORMAL
TROPONIN INTERP: ABNORMAL
TROPONIN INTERP: NORMAL
TROPONIN T: ABNORMAL NG/ML
TROPONIN T: ABNORMAL NG/ML
TROPONIN T: NORMAL NG/ML
TROPONIN, HIGH SENSITIVITY: 20 NG/L (ref 0–22)
TROPONIN, HIGH SENSITIVITY: 25 NG/L (ref 0–22)
TROPONIN, HIGH SENSITIVITY: 27 NG/L (ref 0–22)
WBC # BLD: 28 K/UL (ref 3.5–11.3)
WBC # BLD: 29.4 K/UL (ref 3.5–11.3)
WBC # BLD: ABNORMAL 10*3/UL

## 2020-05-09 PROCEDURE — 2000000000 HC ICU R&B

## 2020-05-09 PROCEDURE — 2580000003 HC RX 258: Performed by: INTERNAL MEDICINE

## 2020-05-09 PROCEDURE — 82330 ASSAY OF CALCIUM: CPT

## 2020-05-09 PROCEDURE — 93010 ELECTROCARDIOGRAM REPORT: CPT | Performed by: INTERNAL MEDICINE

## 2020-05-09 PROCEDURE — 99291 CRITICAL CARE FIRST HOUR: CPT | Performed by: INTERNAL MEDICINE

## 2020-05-09 PROCEDURE — 80048 BASIC METABOLIC PNL TOTAL CA: CPT

## 2020-05-09 PROCEDURE — 83605 ASSAY OF LACTIC ACID: CPT

## 2020-05-09 PROCEDURE — 2700000000 HC OXYGEN THERAPY PER DAY

## 2020-05-09 PROCEDURE — 6370000000 HC RX 637 (ALT 250 FOR IP): Performed by: STUDENT IN AN ORGANIZED HEALTH CARE EDUCATION/TRAINING PROGRAM

## 2020-05-09 PROCEDURE — 2580000003 HC RX 258: Performed by: NURSE PRACTITIONER

## 2020-05-09 PROCEDURE — 6360000002 HC RX W HCPCS: Performed by: INTERNAL MEDICINE

## 2020-05-09 PROCEDURE — 6360000002 HC RX W HCPCS: Performed by: STUDENT IN AN ORGANIZED HEALTH CARE EDUCATION/TRAINING PROGRAM

## 2020-05-09 PROCEDURE — 6360000002 HC RX W HCPCS: Performed by: NURSE PRACTITIONER

## 2020-05-09 PROCEDURE — 2580000003 HC RX 258: Performed by: STUDENT IN AN ORGANIZED HEALTH CARE EDUCATION/TRAINING PROGRAM

## 2020-05-09 PROCEDURE — 83735 ASSAY OF MAGNESIUM: CPT

## 2020-05-09 PROCEDURE — 6370000000 HC RX 637 (ALT 250 FOR IP): Performed by: NURSE PRACTITIONER

## 2020-05-09 PROCEDURE — 84484 ASSAY OF TROPONIN QUANT: CPT

## 2020-05-09 PROCEDURE — 36415 COLL VENOUS BLD VENIPUNCTURE: CPT

## 2020-05-09 PROCEDURE — 85025 COMPLETE CBC W/AUTO DIFF WBC: CPT

## 2020-05-09 PROCEDURE — 93005 ELECTROCARDIOGRAM TRACING: CPT | Performed by: INTERNAL MEDICINE

## 2020-05-09 RX ORDER — MIDODRINE HYDROCHLORIDE 5 MG/1
10 TABLET ORAL
Status: DISCONTINUED | OUTPATIENT
Start: 2020-05-09 | End: 2020-05-14

## 2020-05-09 RX ORDER — MAGNESIUM SULFATE 1 G/100ML
1 INJECTION INTRAVENOUS ONCE
Status: COMPLETED | OUTPATIENT
Start: 2020-05-09 | End: 2020-05-09

## 2020-05-09 RX ADMIN — AMIODARONE HYDROCHLORIDE 150 MG: 50 INJECTION, SOLUTION INTRAVENOUS at 01:02

## 2020-05-09 RX ADMIN — AMIODARONE HYDROCHLORIDE 1 MG/MIN: 50 INJECTION, SOLUTION INTRAVENOUS at 01:32

## 2020-05-09 RX ADMIN — PIPERACILLIN AND TAZOBACTAM 3.38 G: 3; .375 INJECTION, POWDER, LYOPHILIZED, FOR SOLUTION INTRAVENOUS at 20:07

## 2020-05-09 RX ADMIN — ATORVASTATIN CALCIUM 20 MG: 10 TABLET, FILM COATED ORAL at 08:37

## 2020-05-09 RX ADMIN — APIXABAN 5 MG: 5 TABLET, FILM COATED ORAL at 20:07

## 2020-05-09 RX ADMIN — SODIUM CHLORIDE: 9 INJECTION, SOLUTION INTRAVENOUS at 12:35

## 2020-05-09 RX ADMIN — SODIUM CHLORIDE, PRESERVATIVE FREE 10 ML: 5 INJECTION INTRAVENOUS at 20:07

## 2020-05-09 RX ADMIN — VANCOMYCIN HYDROCHLORIDE 1750 MG: 10 INJECTION, POWDER, LYOPHILIZED, FOR SOLUTION INTRAVENOUS at 09:05

## 2020-05-09 RX ADMIN — MIDODRINE HYDROCHLORIDE 10 MG: 5 TABLET ORAL at 17:20

## 2020-05-09 RX ADMIN — TAMSULOSIN HYDROCHLORIDE 0.4 MG: 0.4 CAPSULE ORAL at 08:37

## 2020-05-09 RX ADMIN — MIDODRINE HYDROCHLORIDE 5 MG: 5 TABLET ORAL at 08:37

## 2020-05-09 RX ADMIN — MAGNESIUM SULFATE HEPTAHYDRATE 1 G: 1 INJECTION, SOLUTION INTRAVENOUS at 17:21

## 2020-05-09 RX ADMIN — PIPERACILLIN AND TAZOBACTAM 3.38 G: 3; .375 INJECTION, POWDER, LYOPHILIZED, FOR SOLUTION INTRAVENOUS at 04:45

## 2020-05-09 RX ADMIN — MIDODRINE HYDROCHLORIDE 10 MG: 5 TABLET ORAL at 12:35

## 2020-05-09 RX ADMIN — PIPERACILLIN AND TAZOBACTAM 3.38 G: 3; .375 INJECTION, POWDER, LYOPHILIZED, FOR SOLUTION INTRAVENOUS at 12:36

## 2020-05-09 ASSESSMENT — PAIN SCALES - GENERAL
PAINLEVEL_OUTOF10: 0

## 2020-05-09 NOTE — PROGRESS NOTES
(Date of Insertion:   )     URINE OUTPUT:            [x] Good   [] Low              [] Anuric    Review of Systems:  · Constitutional: Negative for Fever, chills. Positive for fatigue  · Eyes: Negative for visual changes, diplopia  · ENT: Negative for mouth sores, sore throat. · Cardiovascular: Negative for lightheadedness ,chest pain, palpitations   · Respiratory: Negative for Shortness of breath,cough or wheezing. · Gastrointestinal: Negative for nausea/vomiting, change in bowel habits, abdominal pain  · Genitourinary: Negative for change in bladder habits, dysuria, hematuria.   · Musculoskeletal: Negative for joint pain   · Neurological: Negative for headache, change in muscle strength numbness/tingling    OBJECTIVE:     VITAL SIGNS:  /60   Pulse 94   Temp 98.6 °F (37 °C) (Oral)   Resp 25   Ht 6' 0.01\" (1.829 m)   Wt 223 lb 6.4 oz (101.3 kg)   SpO2 95%   BMI 30.29 kg/m²   Tmax over 24 hours:  Temp (24hrs), Av.4 °F (36.9 °C), Min:98.2 °F (36.8 °C), Max:98.6 °F (37 °C)      Patient Vitals for the past 8 hrs:   BP Temp Temp src Pulse Resp SpO2 Weight   20 0816 -- -- -- -- 25 95 % --   20 0700 102/60 -- -- 94 21 97 % --   20 0645  -- -- 108 24 96 % --   20 0630 106/64 -- -- 104 16 97 % --   20 0615 108/73 -- -- 102 17 97 % --   20 0600 92/60 -- -- 109 25 97 % 223 lb 6.4 oz (101.3 kg)   20 0545  -- -- 104 21 97 % --   20 0530 89/64 -- -- 108 22 97 % --   20 0515 (!) 95/57 -- -- 112 22 97 % --   20 0500 96/61 -- -- 110 22 97 % --   20 0445 102/66 -- -- 103 25 97 % --   20 0430 104/62 -- -- 102 18 97 % --   20 0415 99/61 -- -- 108 22 97 % --   20 0400 (!) 114/59 98.6 °F (37 °C) Oral 111 23 98 % --   20 0345 (!) 95/59 -- -- 112 20 97 % --   20 0330 99/68 -- -- 109 22 98 % --   20 0315 98/62 -- -- 114 25 96 % --   20 0300 105/60 -- -- 111 19 98 % --   20 0245 (!) 94/57 -- -- 116 24 hospital problems. *    PLAN:     1. Sepsis unknown etiology   -continue vancomycin and zosyn   -blood cultures  Negative thus far  -UA unremarkable, urine culture pending   -CTPE and CXR showing no acute abnormalities, COVID swab negative  -Wean Levophed as tolerated, Increased midodrine to 10 TID   -lactic acid trending down      2. SATISH  -Cr improving  -continue fluid mainetnence      3. 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 Hypotension    -holding home lopressor.   - increased Midodrine to 10 TID      5. Hyperlipidemia   -will start home dose lipitor     6.  New onset A-fib w/RVR  - CHADVASc score of 2  - will start on Eliquis   - continue Amiodarone per Cardiology  - when blood pressure improve, will start BB       Mima Collet, MD  PGY-2, Internal medicine resident  [address] [place] [address]  5/9/2020 9:56 AM

## 2020-05-09 NOTE — PLAN OF CARE
Problem: Falls - Risk of:  Goal: Will remain free from falls  Description: Will remain free from falls  5/9/2020 0223 by Katelin Lucas RN  Outcome: Ongoing  5/8/2020 1936 by Ge Petit RN  Outcome: Ongoing  Goal: Absence of physical injury  Description: Absence of physical injury  5/9/2020 0223 by Katelin Lucas RN  Outcome: Ongoing  5/8/2020 1936 by Ge Petit RN  Outcome: Ongoing     Problem: Gas Exchange - Impaired:  Goal: Levels of oxygenation will improve  Description: Levels of oxygenation will improve  5/9/2020 0223 by Katelin Lucas RN  Outcome: Ongoing  5/8/2020 1936 by Ge Petit RN  Outcome: Ongoing     Problem: Infection, Septic Shock:  Goal: Will show no infection signs and symptoms  Description: Will show no infection signs and symptoms  5/9/2020 0223 by Katelin Lucas RN  Outcome: Ongoing  5/8/2020 1936 by Ge Petit RN  Outcome: Ongoing     Problem: Fluid Volume:  Goal: Signs and symptoms of dehydration will decrease  Description: Signs and symptoms of dehydration will decrease  5/9/2020 0223 by Katelin Lucas RN  Outcome: Ongoing  5/8/2020 1936 by Ge Petit RN  Outcome: Ongoing  Goal: Ability to achieve a balanced intake and output will improve  Description: Ability to achieve a balanced intake and output will improve  5/9/2020 0223 by Katelin Lucas RN  Outcome: Ongoing  5/8/2020 1936 by Ge Petit RN  Outcome: Ongoing  Goal: Diagnostic test results will improve  Description: Diagnostic test results will improve  5/9/2020 0223 by Katelin Lucas RN  Outcome: Ongoing  5/8/2020 1936 by Ge Petit RN  Outcome: Ongoing     Problem: Discharge Planning:  Goal: Participates in care planning  Description: Participates in care planning  5/9/2020 0223 by Katelin Lucas RN  Outcome: Ongoing  5/8/2020 1936 by Ge Petit RN  Outcome: Ongoing  Goal: Discharged to appropriate level of care  Description: Discharged to appropriate level of care  5/9/2020 0223 by Hernandez Beatty RN  Outcome: Ongoing  5/8/2020 1936 by Alaina Domínguez RN  Outcome: Ongoing     Problem: Anxiety/Stress:  Goal: Level of anxiety will decrease  Description: Level of anxiety will decrease  5/9/2020 0223 by Hernandez Beatty RN  Outcome: Ongoing  5/8/2020 1936 by Alaina Domínguez RN  Outcome: Ongoing     Problem: Cardiac Output - Decreased:  Goal: Hemodynamic stability will improve  Description: Hemodynamic stability will improve  5/9/2020 0223 by Hernandez Beatty RN  Outcome: Ongoing  5/8/2020 1936 by Alaina Domínguez RN  Outcome: Ongoing     Problem: Musculor/Skeletal Functional Status  Goal: Highest potential functional level  5/9/2020 0223 by Hernandez Beatty RN  Outcome: Ongoing  5/8/2020 1936 by Alaina Domínguez RN  Outcome: Ongoing     Problem:  Activity:  Goal: Ability to tolerate increased activity will improve  Description: Ability to tolerate increased activity will improve  Outcome: Ongoing  Goal: Ability to implement measures to reduce episodes of fatigue will improve  Description: Ability to implement measures to reduce episodes of fatigue will improve  Outcome: Ongoing     Problem: Coping:  Goal: Ability to cope will improve  Description: Ability to cope will improve  Outcome: Ongoing     Problem: Nutritional:  Goal: Nutritional status will improve  Description: Nutritional status will improve  Outcome: Ongoing     Problem: Safety:  Goal: Ability to remain free from injury will improve  Description: Ability to remain free from injury will improve  Outcome: Ongoing     Problem: Cardiac:  Goal: Ability to maintain an adequate cardiac output will improve  Description: Ability to maintain an adequate cardiac output will improve  Outcome: Ongoing  Goal: Complications related to the disease process, condition or treatment will be avoided or minimized  Description: Complications related to the disease process, condition or treatment will be avoided or minimized  Outcome: Ongoing     Electronically signed by Emily Cuba RN on 5/9/2020 at 2:24 AM

## 2020-05-09 NOTE — CONSULTS
headache  · Hematologic/Lymphatic: No abnormal bruising or bleeding      PHYSICAL EXAM:      /60   Pulse 94   Temp 98.6 °F (37 °C) (Oral)   Resp 21   Ht 6' 0.01\" (1.829 m)   Wt 223 lb 6.4 oz (101.3 kg)   SpO2 97%   BMI 30.29 kg/m²      Intake/Output Summary (Last 24 hours) at 5/9/2020 0706  Last data filed at 5/9/2020 0600  Gross per 24 hour   Intake 4896.5 ml   Output 4615 ml   Net 281.5 ml         Constitutional and General Appearance: Alert, cooperative, no distress and appears stated age  Respiratory:  · Normal excursion and expansion without use of accessory muscles  · Resp Auscultation: Good respiratory effort. No for increased work of breathing. · On auscultation: clear to auscultation bilaterally  Cardiovascular:  · Heart tones are crisp and normal. regular S1 and S2. No murmurs   · Jugular venous pulsation Normal  · Peripheral pulses are symmetrical and full   Abdomen:   · No masses or tenderness  · Bowel sounds present  Extremities:  ·  No Cyanosis or Clubbing  ·  Lower extremity edema:   ·  Skin: Warm and dry  Neurological:  · Alert and oriented. · Moves all extremities well    DATA:    Diagnostics:    EKG: atrial fibrillation. Labs:     CBC:   Recent Labs     05/09/20  0005 05/09/20  0600   WBC 29.4* 28.0*   HGB 12.2* 11.8*   HCT 36.6* 35.8*    138     BMP:   Recent Labs     05/07/20 1141 05/08/20  0419   * 136   K 4.6 3.7   CO2 20 16*   BUN 17 21   CREATININE 1.15 1.36*   LABGLOM >60 52*   GLUCOSE 116* 84     Pro-BNP:    Recent Labs     05/07/20 2111   PROBNP 3,514*     BNP: No results for input(s): BNP in the last 72 hours.   PT/INR:   Recent Labs     05/07/20 1141 05/08/20  0419   PROTIME 12.0 15.4*   INR 1.2 1.5     APTT:  Recent Labs     05/07/20 1141 05/09/20  0005   APTT 27.5 72.8*     CARDIAC ENZYMES:  Recent Labs     05/08/20  0210   CKTOTAL 1,021*     Recent Labs     05/07/20  1141 05/07/20  2111 05/09/20  0005   TROPONINT NOT REPORTED NOT REPORTED NOT REPORTED       FASTING LIPID PANEL:No results found for: HDL, LDLDIRECT, LDLCALC, TRIG  LIVER PROFILE:  Recent Labs     05/07/20  1141   AST 17   ALT 12   LABALBU 4.1         Patient's Active Problem List  Principal Problem:    Dehydration  Active Problems:    Primary lung cancer with metastasis from lung to other site, right ([facility]    Hyperlipidemia    Hypertension    Lung cancer ([facility]    Sepsis ([facility] [facility]  Resolved Problems:    * No resolved hospital problems. *        IMPRESSION:    1. Septic shock  New onset atrial fibrillation. [id number] Score: 2   History of small cell lung cancer  History of essential hypertension  History of hyperlipidemia  History of coronary artery disease s/p stent     RECOMMENDATIONS:    1. Continue amiodarone gtt. 2.  Recommend heparin GTT as the [address] is 2   3. Will obtain echocardiogram  4. Will start beta-blocker when blood pressure tolerates  5. Further recommendation to follow      Thank you for allowing us to participate in the care of Laura Olivares. If you have any questions or concerns, please do not hesitate to contact us. Discussed with patient and Nurse. Jonatan Krueger M.D. Fellow, [address]         Please note that part of this chart were generated using voice recognition  dictation software. Although every effort was made to ensure the accuracy of this automated transcription, some errors in transcription may have occurred. Attestation signed by      Attending Physician Statement:    I have discussed the care of  Laura Olivares , including pertinent history and exam findings, with the Cardiology fellow/resident. I have seen and examined the patient and the key elements of all parts of the encounter have been performed by me.  I agree with the assessment, plan and orders as documented by the fellow/resident, after I modified exam findings and plan of treatments, and the final version is my

## 2020-05-09 NOTE — PROGRESS NOTES
New onset afib rvr. Pt asymptomatic. EKG obtained. Dr. Evita Kong notified. Card consult.  See orders    Electronically signed by Alayna Bryant RN on 5/8/2020 at 11:40 PM

## 2020-05-10 PROBLEM — M10.9 GOUT: Status: ACTIVE | Noted: 2020-05-10

## 2020-05-10 PROBLEM — I48.91 ATRIAL FIBRILLATION WITH RVR (HCC): Status: ACTIVE | Noted: 2020-05-10

## 2020-05-10 LAB
ABSOLUTE EOS #: 0.24 K/UL (ref 0–0.4)
ABSOLUTE IMMATURE GRANULOCYTE: 0 K/UL (ref 0–0.3)
ABSOLUTE LYMPH #: 0.47 K/UL (ref 1–4.8)
ABSOLUTE MONO #: 0.94 K/UL (ref 0.1–0.8)
ANION GAP SERPL CALCULATED.3IONS-SCNC: 10 MMOL/L (ref 9–17)
BASOPHILS # BLD: 0 % (ref 0–2)
BASOPHILS ABSOLUTE: 0 K/UL (ref 0–0.2)
BUN BLDV-MCNC: 18 MG/DL (ref 8–23)
BUN/CREAT BLD: ABNORMAL (ref 9–20)
CALCIUM SERPL-MCNC: 8.3 MG/DL (ref 8.6–10.4)
CHLORIDE BLD-SCNC: 104 MMOL/L (ref 98–107)
CO2: 21 MMOL/L (ref 20–31)
CREAT SERPL-MCNC: 1.19 MG/DL (ref 0.7–1.2)
CULTURE: ABNORMAL
DIFFERENTIAL TYPE: ABNORMAL
DIRECT EXAM: ABNORMAL
EOSINOPHILS RELATIVE PERCENT: 1 % (ref 1–4)
FOLATE: 8.9 NG/ML
GFR AFRICAN AMERICAN: >60 ML/MIN
GFR NON-AFRICAN AMERICAN: >60 ML/MIN
GFR SERPL CREATININE-BSD FRML MDRD: ABNORMAL ML/MIN/{1.73_M2}
GFR SERPL CREATININE-BSD FRML MDRD: ABNORMAL ML/MIN/{1.73_M2}
GLUCOSE BLD-MCNC: 114 MG/DL (ref 70–99)
GLUCOSE BLD-MCNC: 120 MG/DL (ref 75–110)
HCT VFR BLD CALC: 33.2 % (ref 40.7–50.3)
HEMOGLOBIN: 11.2 G/DL (ref 13–17)
IMMATURE GRANULOCYTES: 0 %
LYMPHOCYTES # BLD: 2 % (ref 24–44)
Lab: ABNORMAL
MAGNESIUM: 2.1 MG/DL (ref 1.6–2.6)
MCH RBC QN AUTO: 33.7 PG (ref 25.2–33.5)
MCHC RBC AUTO-ENTMCNC: 33.7 G/DL (ref 28.4–34.8)
MCV RBC AUTO: 100 FL (ref 82.6–102.9)
MONOCYTES # BLD: 4 % (ref 1–7)
MORPHOLOGY: ABNORMAL
NRBC AUTOMATED: 0 PER 100 WBC
PDW BLD-RTO: 14.9 % (ref 11.8–14.4)
PHOSPHORUS: 2.3 MG/DL (ref 2.5–4.5)
PLATELET # BLD: 248 K/UL (ref 138–453)
PLATELET ESTIMATE: ABNORMAL
PMV BLD AUTO: 11.2 FL (ref 8.1–13.5)
POTASSIUM SERPL-SCNC: 4.3 MMOL/L (ref 3.7–5.3)
PROCALCITONIN: 14.89 NG/ML
RBC # BLD: 3.32 M/UL (ref 4.21–5.77)
RBC # BLD: ABNORMAL 10*6/UL
SEG NEUTROPHILS: 93 % (ref 36–66)
SEGMENTED NEUTROPHILS ABSOLUTE COUNT: 21.85 K/UL (ref 1.8–7.7)
SODIUM BLD-SCNC: 135 MMOL/L (ref 135–144)
SPECIMEN DESCRIPTION: ABNORMAL
VANCOMYCIN TROUGH DATE LAST DOSE: NORMAL
VANCOMYCIN TROUGH DOSE AMOUNT: NORMAL
VANCOMYCIN TROUGH TIME LAST DOSE: NORMAL
VANCOMYCIN TROUGH: 16 UG/ML (ref 10–20)
VITAMIN B-12: 1277 PG/ML (ref 232–1245)
WBC # BLD: 23.5 K/UL (ref 3.5–11.3)
WBC # BLD: ABNORMAL 10*3/UL

## 2020-05-10 PROCEDURE — 83735 ASSAY OF MAGNESIUM: CPT

## 2020-05-10 PROCEDURE — 6360000002 HC RX W HCPCS: Performed by: STUDENT IN AN ORGANIZED HEALTH CARE EDUCATION/TRAINING PROGRAM

## 2020-05-10 PROCEDURE — 82746 ASSAY OF FOLIC ACID SERUM: CPT

## 2020-05-10 PROCEDURE — 6360000002 HC RX W HCPCS: Performed by: INTERNAL MEDICINE

## 2020-05-10 PROCEDURE — 80048 BASIC METABOLIC PNL TOTAL CA: CPT

## 2020-05-10 PROCEDURE — 36415 COLL VENOUS BLD VENIPUNCTURE: CPT

## 2020-05-10 PROCEDURE — 84100 ASSAY OF PHOSPHORUS: CPT

## 2020-05-10 PROCEDURE — 2580000003 HC RX 258: Performed by: STUDENT IN AN ORGANIZED HEALTH CARE EDUCATION/TRAINING PROGRAM

## 2020-05-10 PROCEDURE — 2700000000 HC OXYGEN THERAPY PER DAY

## 2020-05-10 PROCEDURE — 6370000000 HC RX 637 (ALT 250 FOR IP): Performed by: STUDENT IN AN ORGANIZED HEALTH CARE EDUCATION/TRAINING PROGRAM

## 2020-05-10 PROCEDURE — 93005 ELECTROCARDIOGRAM TRACING: CPT | Performed by: STUDENT IN AN ORGANIZED HEALTH CARE EDUCATION/TRAINING PROGRAM

## 2020-05-10 PROCEDURE — 85025 COMPLETE CBC W/AUTO DIFF WBC: CPT

## 2020-05-10 PROCEDURE — 99291 CRITICAL CARE FIRST HOUR: CPT | Performed by: INTERNAL MEDICINE

## 2020-05-10 PROCEDURE — 6370000000 HC RX 637 (ALT 250 FOR IP): Performed by: NURSE PRACTITIONER

## 2020-05-10 PROCEDURE — 2580000003 HC RX 258: Performed by: INTERNAL MEDICINE

## 2020-05-10 PROCEDURE — 80202 ASSAY OF VANCOMYCIN: CPT

## 2020-05-10 PROCEDURE — 2500000003 HC RX 250 WO HCPCS: Performed by: STUDENT IN AN ORGANIZED HEALTH CARE EDUCATION/TRAINING PROGRAM

## 2020-05-10 PROCEDURE — 6360000002 HC RX W HCPCS: Performed by: NURSE PRACTITIONER

## 2020-05-10 PROCEDURE — 2580000003 HC RX 258: Performed by: NURSE PRACTITIONER

## 2020-05-10 PROCEDURE — 2060000000 HC ICU INTERMEDIATE R&B

## 2020-05-10 PROCEDURE — 82607 VITAMIN B-12: CPT

## 2020-05-10 PROCEDURE — 82947 ASSAY GLUCOSE BLOOD QUANT: CPT

## 2020-05-10 PROCEDURE — 84145 PROCALCITONIN (PCT): CPT

## 2020-05-10 RX ORDER — DIGOXIN 0.25 MG/ML
250 INJECTION INTRAMUSCULAR; INTRAVENOUS ONCE
Status: COMPLETED | OUTPATIENT
Start: 2020-05-10 | End: 2020-05-10

## 2020-05-10 RX ADMIN — SODIUM CHLORIDE, PRESERVATIVE FREE 10 ML: 5 INJECTION INTRAVENOUS at 08:05

## 2020-05-10 RX ADMIN — PIPERACILLIN AND TAZOBACTAM 3.38 G: 3; .375 INJECTION, POWDER, LYOPHILIZED, FOR SOLUTION INTRAVENOUS at 05:25

## 2020-05-10 RX ADMIN — SODIUM PHOSPHATE, MONOBASIC, MONOHYDRATE 20 MMOL: 276; 142 INJECTION, SOLUTION INTRAVENOUS at 15:51

## 2020-05-10 RX ADMIN — MIDODRINE HYDROCHLORIDE 10 MG: 5 TABLET ORAL at 08:00

## 2020-05-10 RX ADMIN — APIXABAN 5 MG: 5 TABLET, FILM COATED ORAL at 20:08

## 2020-05-10 RX ADMIN — VANCOMYCIN HYDROCHLORIDE 1750 MG: 10 INJECTION, POWDER, LYOPHILIZED, FOR SOLUTION INTRAVENOUS at 03:30

## 2020-05-10 RX ADMIN — PIPERACILLIN AND TAZOBACTAM 3.38 G: 3; .375 INJECTION, POWDER, LYOPHILIZED, FOR SOLUTION INTRAVENOUS at 12:27

## 2020-05-10 RX ADMIN — ATORVASTATIN CALCIUM 20 MG: 10 TABLET, FILM COATED ORAL at 08:00

## 2020-05-10 RX ADMIN — APIXABAN 5 MG: 5 TABLET, FILM COATED ORAL at 08:00

## 2020-05-10 RX ADMIN — VANCOMYCIN HYDROCHLORIDE 1750 MG: 10 INJECTION, POWDER, LYOPHILIZED, FOR SOLUTION INTRAVENOUS at 20:07

## 2020-05-10 RX ADMIN — DIGOXIN 250 MCG: 0.25 INJECTION INTRAMUSCULAR; INTRAVENOUS at 08:00

## 2020-05-10 RX ADMIN — TAMSULOSIN HYDROCHLORIDE 0.4 MG: 0.4 CAPSULE ORAL at 08:00

## 2020-05-10 RX ADMIN — SODIUM CHLORIDE: 9 INJECTION, SOLUTION INTRAVENOUS at 19:00

## 2020-05-10 RX ADMIN — PIPERACILLIN AND TAZOBACTAM 3.38 G: 3; .375 INJECTION, POWDER, LYOPHILIZED, FOR SOLUTION INTRAVENOUS at 20:07

## 2020-05-10 RX ADMIN — MIDODRINE HYDROCHLORIDE 10 MG: 5 TABLET ORAL at 12:27

## 2020-05-10 RX ADMIN — SODIUM CHLORIDE: 9 INJECTION, SOLUTION INTRAVENOUS at 05:25

## 2020-05-10 RX ADMIN — MIDODRINE HYDROCHLORIDE 10 MG: 5 TABLET ORAL at 17:17

## 2020-05-10 ASSESSMENT — PAIN SCALES - GENERAL
PAINLEVEL_OUTOF10: 0

## 2020-05-10 NOTE — PROGRESS NOTES
ICU PATIENT TRANSFER NOTE        Patient:  Jony Hurst  YOB: 1950    MRN: [id number]     Acct: [de-identified]     Admit date: 5/7/2020    Code Status:- Full code    Reason for ICU Admission:-   Septic shock    SUPPORT DEVICES: [] Ventilator [] BIPAP  [] Nasal Cannula [x] Room Air    Consultations:- [x] Cardiology [] Nephrology  [] Hemo onco  [] GI                               [] ID [] ENT  [] Rheum [] Endo   [x]Physiotherapy                                   NUTRITION:  [] NPO [] Tube Feeding [] TPN  [x] PO    Central Lines:- [x] No   [] Yes               Pt seen and Chart reviewed. 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 Patient was scheduled for a repeat MRI abdomen on the day of presentation when the technician noticed increased fatigue. The patient reports that he had increased chills, generalized weakness and fatigue and diffuse body ache. He denied any other symptoms including cough, SOB, fever, chest pain, abdominal pain, nausea, vomiting, diarrhea, altered mentation, seizure like activity or altered bowel or bladder habits. The patient was advised to come to the ED for further eval and management. He went to [facility] ED where the patient denied any other symptoms of nausea, vomiting, fever, diarrhea. He did report chronic cough and shortness of breath but is not on home oxygen.  Labs revealed mildly elevated LA of 2.5, no leukocytosis, electrolytes WNL, Cr 1.36. Trop 19. He was also noted to have dropping BP in SBP 70s following which he was transferred to [facility]. Per EMS, the patient was given 1L fluid bolus en route to the hospital. Lactic acid worsened to 3.7. He was given 3L of fluid bolus. CT chest with contrast was done which revealed no evidence of PE or infection. U/A negative. The patient was started on levophed, vancomycin and zosyn in view of suspected sepsis of unknown origin. Levophed was weaned down, and the patient was saturating well on nasal cannula. Prior CT eval of adrenal nodule  - 2/10 to 4/24 - increase in left adrenal size. During ICU stay, patient went into atrial fibrillation with RVR , Cardiology was consulted for the same , recommended amiodarone drip and eliquis for [facility]. Patient improved significantly and was weaned off pressors, on midodrine for BP optimization. Physical Exam:  Vitals: BP (!) 98/59   Pulse 87   Temp 98.2 °F (36.8 °C) (Oral)   Resp 23   Ht 6' 0.01\" (1.829 m)   Wt 223 lb 12.8 oz (101.5 kg)   SpO2 94%   BMI 30.35 kg/m²   24 hour intake/output:    Intake/Output Summary (Last 24 hours) at 5/10/2020 1332  Last data filed at 5/10/2020 1300  Gross per 24 hour   Intake 3813.09 ml   Output 4140 ml   Net -326.91 ml     Last 3 weights: Wt Readings from Last 3 Encounters:   05/10/20 223 lb 12.8 oz (101.5 kg)   04/29/20 220 lb 6.4 oz (100 kg)   04/29/20 220 lb 8 oz (100 kg)       General appearance: alert and cooperative with exam  HEENT: Head: Normocephalic, no lesions, without obvious abnormality.   Neck: no adenopathy, no carotid bruit, no JVD, supple, symmetrical, trachea midline and thyroid not enlarged, symmetric, no tenderness/mass/nodules  Lungs: Diminished breath sounds bilaterally, no wheezing, rales or rhonchi  Heart: regular rate and rhythm, S1, S2 normal, no murmur, click, rub or gallop  Abdomen: soft, non-tender; bowel sounds normal; no

## 2020-05-10 NOTE — PLAN OF CARE
Problem: Falls - Risk of:  Goal: Will remain free from falls  Description: Will remain free from falls  5/10/2020 0016 by Katelin Lucas RN  Outcome: Ongoing  5/9/2020 1302 by Vidal Abebe RN  Outcome: Ongoing  Goal: Absence of physical injury  Description: Absence of physical injury  5/10/2020 0016 by Katelin Lucas RN  Outcome: Ongoing  5/9/2020 1302 by Vidal Abebe RN  Outcome: Ongoing     Problem: Gas Exchange - Impaired:  Goal: Levels of oxygenation will improve  Description: Levels of oxygenation will improve  5/10/2020 0016 by Katelin Lucas RN  Outcome: Ongoing  5/9/2020 1302 by Vidal Abebe RN  Outcome: Ongoing     Problem: Infection, Septic Shock:  Goal: Will show no infection signs and symptoms  Description: Will show no infection signs and symptoms  5/10/2020 0016 by Katelin Lucas RN  Outcome: Ongoing  5/9/2020 1302 by Vidal Abebe RN  Outcome: Ongoing     Problem: Fluid Volume:  Goal: Signs and symptoms of dehydration will decrease  Description: Signs and symptoms of dehydration will decrease  5/10/2020 0016 by Katelin Lucas RN  Outcome: Ongoing  5/9/2020 1302 by Vidal Abebe RN  Outcome: Ongoing  Goal: Ability to achieve a balanced intake and output will improve  Description: Ability to achieve a balanced intake and output will improve  5/10/2020 0016 by Katelin Lucas RN  Outcome: Ongoing  5/9/2020 1302 by Vidal Abebe RN  Outcome: Ongoing  Goal: Diagnostic test results will improve  Description: Diagnostic test results will improve  5/10/2020 0016 by Katelin Lucas RN  Outcome: Ongoing  5/9/2020 1302 by Vidal Abebe RN  Outcome: Ongoing     Problem: Discharge Planning:  Goal: Participates in care planning  Description: Participates in care planning  Outcome: Ongoing  Goal: Discharged to appropriate level of care  Description: Discharged to appropriate level of care  Outcome: Ongoing     Problem: Anxiety/Stress:  Goal: Level of anxiety will decrease  Description: Level of anxiety will decrease  5/10/2020 0016 by Sal Hsu RN  Outcome: Ongoing  5/9/2020 1302 by Barbara Muller RN  Outcome: Ongoing     Problem: Cardiac Output - Decreased:  Goal: Hemodynamic stability will improve  Description: Hemodynamic stability will improve  5/10/2020 0016 by Sal Hsu RN  Outcome: Ongoing  5/9/2020 1302 by Barbara Muller RN  Outcome: Ongoing     Problem: Musculor/Skeletal Functional Status  Goal: Highest potential functional level  Outcome: Ongoing     Problem:  Activity:  Goal: Ability to tolerate increased activity will improve  Description: Ability to tolerate increased activity will improve  5/10/2020 0016 by Sal Hsu RN  Outcome: Ongoing  5/9/2020 1302 by Barbara Muller RN  Outcome: Ongoing  Goal: Ability to implement measures to reduce episodes of fatigue will improve  Description: Ability to implement measures to reduce episodes of fatigue will improve  5/10/2020 0016 by Sal Hsu RN  Outcome: Ongoing  5/9/2020 1302 by Barbara Muller RN  Outcome: Ongoing     Problem: Coping:  Goal: Ability to cope will improve  Description: Ability to cope will improve  5/10/2020 0016 by Sal Hsu RN  Outcome: Ongoing  5/9/2020 1302 by Barbara Muller RN  Outcome: Ongoing     Problem: Nutritional:  Goal: Nutritional status will improve  Description: Nutritional status will improve  5/10/2020 0016 by Sal Hsu RN  Outcome: Ongoing  5/9/2020 1302 by Barbara Muller RN  Outcome: Ongoing     Problem: Safety:  Goal: Ability to remain free from injury will improve  Description: Ability to remain free from injury will improve  5/10/2020 0016 by Sal Hsu RN  Outcome: Ongoing  5/9/2020 1302 by Barbara Muller RN  Outcome: Ongoing     Problem: Cardiac:  Goal: Ability to maintain an adequate cardiac output will improve  Description: Ability to maintain an adequate cardiac output will improve  5/10/2020 0016 by Sal Hsu RN  Outcome: Ongoing  5/9/2020 1302 by Rebeca Long RN  Outcome: Ongoing  Goal: Complications related to the disease process, condition or treatment will be avoided or minimized  Description: Complications related to the disease process, condition or treatment will be avoided or minimized  5/10/2020 0016 by Krista Apley, RN  Outcome: Ongoing  5/9/2020 1302 by Rebeca Long RN  Outcome: Ongoing    Electronically signed by Krista Apley, RN on 5/10/2020 at 12:17 AM

## 2020-05-10 NOTE — PROGRESS NOTES
I.V.(mL/kg) 314. 1(3.1) 620(6.1)  934.1(9.2)   Shift Total(mL/kg) 1004. 1(9.9) 620(6.1)  1624.1(16)   OUTPUT   Urine(mL/kg/hr) 1630(2) 300  1930   Shift Total(mL/kg) 1630(16.1) 300(3)  1930(19)   Weight (kg) 101.5 101.5 101.5 101.5     Wt Readings from Last 3 Encounters:   05/10/20 223 lb 12.8 oz (101.5 kg)   04/29/20 220 lb 6.4 oz (100 kg)   04/29/20 220 lb 8 oz (100 kg)     Body mass index is 30.35 kg/m². PHYSICAL EXAM:  Constitutional: Awake, alert. HEENT: PERRLA, EOMI, sclera clear, anicteric  Respiratory: clear to auscultation, no wheezes or rales and unlabored breathing. Cardiovascular: regular rate and rhythm, normal S1, S2, no murmur noted and 2+ pulses throughout s/p venous port  Abdomen: soft, nontender, nondistended, no masses or organomegaly  NEUROLOGIC: Awake, alert, oriented to name, place and time. Motor is 5 out of 5 bilaterally. Sensory is intact. Extremities:  peripheral pulses normal, no pedal edema,.       Any additional physical findings:      MEDICATIONS:  Scheduled Meds:   midodrine  10 mg Oral TID WC    apixaban  5 mg Oral BID    sodium chloride flush  10 mL Intravenous 2 times per day    [Held by provider] metoprolol tartrate  25 mg Oral BID    atorvastatin  20 mg Oral Daily    tamsulosin  0.4 mg Oral Daily    piperacillin-tazobactam  3.375 g Intravenous Q8H    vancomycin (VANCOCIN) intermittent dosing (placeholder)   Other RX Placeholder    vancomycin  1,750 mg Intravenous Q18H     Continuous Infusions:   amiodarone 0.5 mg/min (05/10/20 0525)    norepinephrine Stopped (05/09/20 2100)    sodium chloride 75 mL/hr at 05/10/20 0525     PRN Meds:   sodium chloride flush, 10 mL, PRN  sodium chloride flush, 10 mL, PRN  potassium chloride, 40 mEq, PRN    Or  potassium alternative oral replacement, 40 mEq, PRN    Or  potassium chloride, 10 mEq, PRN  magnesium sulfate, 1 g, PRN  acetaminophen, 650 mg, Q6H PRN    Or  acetaminophen, 650 mg, Q6H PRN  polyethylene glycol, 17 g,

## 2020-05-10 NOTE — PROGRESS NOTES
[facility]   Progress Note                    Date:   5/10/2020  Patient name:  Shaggy Prabhakar  Date of admission:  5/7/2020 11:14 AM  MRN:   [id number]  YOB: 1950  PCP:    Denise Marmolejo MD    Reason for Admission:  Dehydration [E86.0]  Sepsis ([address] [A41.9]    Subjective: He went into A. fib/flutter at 3 AM this morning  He is off pressors. On amiodarone 0.5 mg gtt. Heart rate in the 110s          Medications:   Scheduled Meds:   digoxin  250 mcg Intravenous Once    midodrine  10 mg Oral TID WC    apixaban  5 mg Oral BID    sodium chloride flush  10 mL Intravenous 2 times per day    [Held by provider] metoprolol tartrate  25 mg Oral BID    atorvastatin  20 mg Oral Daily    tamsulosin  0.4 mg Oral Daily    piperacillin-tazobactam  3.375 g Intravenous Q8H    vancomycin (VANCOCIN) intermittent dosing (placeholder)   Other RX Placeholder    vancomycin  1,750 mg Intravenous Q18H     Continuous Infusions:   amiodarone 0.5 mg/min (05/10/20 0525)    norepinephrine Stopped (05/09/20 2100)    sodium chloride 75 mL/hr at 05/10/20 0525     CBC:   Recent Labs     05/09/20  0005 05/09/20  0600 05/10/20  0348   WBC 29.4* 28.0* 23.5*   HGB 12.2* 11.8* 11.2*    138 248     BMP:    Recent Labs     05/08/20  0419 05/09/20  0600 05/10/20  0348    140 135   K 3.7 3.8 4.3    109* 104   CO2 16* 18* 21   BUN 21 19 18   CREATININE 1.36* 1.24* 1.19   GLUCOSE 84 117* 114*     Hepatic:   Recent Labs     05/07/20  1141   AST 17   ALT 12   BILITOT 0.72   ALKPHOS 88     Troponin: No results for input(s): TROPONINI in the last 72 hours. Recent Labs     05/09/20  0005 05/09/20  0600 05/09/20  1139   TROPONINT NOT REPORTED NOT REPORTED NOT REPORTED     BNP:   Recent Labs     05/07/20  2111   PROBNP 3,514*      No results for input(s): BNP in the last 72 hours. Lipids: No results for input(s): CHOL, HDL in the last 72 hours.     Invalid input(s): LDLCALCU  INR: Recent Labs     05/07/20  1141 05/08/20  0419   INR 1.2 1.5       Objective:   Vitals: BP 98/74   Pulse 103   Temp 98.6 °F (37 °C) (Oral)   Resp 15   Ht 6' 0.01\" (1.829 m)   Wt 223 lb 12.8 oz (101.5 kg)   SpO2 97%   BMI 30.35 kg/m²    Last Recorded Weight:  [unfilled]    Constitutional and General Appearance: alert, cooperative, no distress and appears stated age    Respiratory:  · Normal excursion and expansion without use of accessory muscles  · Resp Auscultation: Good respiratory effort. No for increased work of breathing. · On auscultation: clear to auscultation bilaterally  Cardiovascular:  · Irregular rate and rhythm  · No mumurs   Abdomen:   · No masses or tenderness  · Bowel sounds present  Extremities:  ·  No Cyanosis or Clubbing  ·  Lower extremity edema:   ·  Skin: Warm and dry  Neurological:  · Alert and oriented. · Moves all extremities well    Diagnostic Studies:       Patient Active Problem List:     Primary lung cancer with metastasis from lung to other site, right ([facility]     Dehydration     Hyperlipidemia     Hypertension     Lung cancer ([facility]     Sepsis ([facility]      Assessment / Acute Cardiac Problems:   1. Septic shock  2. New onset atrial fibrillation. [id number] Score: 2   3. History of small cell lung cancer  4. History of essential hypertension  5. History of hyperlipidemia  6. History of coronary artery disease s/p stents         Plan of Treatment:     1. Continue amiodarone gtt for one more day  2. Will give one dose of digoxin  3. Follow up echocardiogram  4. Further recommendations to follow. Dalila Cerna MD  Fellow, [address] [place] [address]    Attending Physician Statement  I have discussed the care of the patient, including pertinent history and exam findings, with the resident. I have seen and examined the patient and the key elements of all parts of the encounter have been performed by me.  I agree with the assessment, plan and orders as documented by the resident.   Pt converted to NSR will keep amiodarone drip over night   Yuliya Mcconnell MD

## 2020-05-10 NOTE — PLAN OF CARE
Problem: Falls - Risk of:  Goal: Will remain free from falls  Description: Will remain free from falls  Outcome: Met This Shift  Goal: Absence of physical injury  Description: Absence of physical injury  Outcome: Met This Shift     Problem: Gas Exchange - Impaired:  Goal: Levels of oxygenation will improve  Description: Levels of oxygenation will improve  Outcome: Met This Shift     Problem: Infection, Septic Shock:  Goal: Will show no infection signs and symptoms  Description: Will show no infection signs and symptoms  Outcome: Met This Shift     Problem: Fluid Volume:  Goal: Signs and symptoms of dehydration will decrease  Description: Signs and symptoms of dehydration will decrease  Outcome: Met This Shift  Goal: Ability to achieve a balanced intake and output will improve  Description: Ability to achieve a balanced intake and output will improve  Outcome: Met This Shift  Goal: Diagnostic test results will improve  Description: Diagnostic test results will improve  Outcome: Met This Shift     Problem: Discharge Planning:  Goal: Participates in care planning  Description: Participates in care planning  Outcome: Met This Shift  Goal: Discharged to appropriate level of care  Description: Discharged to appropriate level of care  Outcome: Met This Shift     Problem: Anxiety/Stress:  Goal: Level of anxiety will decrease  Description: Level of anxiety will decrease  Outcome: Met This Shift     Problem: Cardiac Output - Decreased:  Goal: Hemodynamic stability will improve  Description: Hemodynamic stability will improve  Outcome: Met This Shift     Problem: Musculor/Skeletal Functional Status  Goal: Highest potential functional level  Outcome: Met This Shift     Problem:  Activity:  Goal: Ability to tolerate increased activity will improve  Description: Ability to tolerate increased activity will improve  Outcome: Met This Shift  Goal: Ability to implement measures to reduce episodes of fatigue will

## 2020-05-11 ENCOUNTER — TELEPHONE (OUTPATIENT)
Dept: RADIATION ONCOLOGY | Age: 70
End: 2020-05-11

## 2020-05-11 LAB
ABSOLUTE EOS #: 0.11 K/UL (ref 0–0.44)
ABSOLUTE IMMATURE GRANULOCYTE: 0.09 K/UL (ref 0–0.3)
ABSOLUTE LYMPH #: 0.92 K/UL (ref 1.1–3.7)
ABSOLUTE MONO #: 1.47 K/UL (ref 0.1–1.2)
ANION GAP SERPL CALCULATED.3IONS-SCNC: 11 MMOL/L (ref 9–17)
BASOPHILS # BLD: 0 % (ref 0–2)
BASOPHILS ABSOLUTE: 0.05 K/UL (ref 0–0.2)
BUN BLDV-MCNC: 17 MG/DL (ref 8–23)
BUN/CREAT BLD: ABNORMAL (ref 9–20)
CALCIUM SERPL-MCNC: 8.9 MG/DL (ref 8.6–10.4)
CHLORIDE BLD-SCNC: 109 MMOL/L (ref 98–107)
CO2: 21 MMOL/L (ref 20–31)
CORTISOL COLLECTION INFO: NORMAL
CORTISOL: 13.1 UG/DL (ref 2.7–18.4)
CREAT SERPL-MCNC: 1.08 MG/DL (ref 0.7–1.2)
DIFFERENTIAL TYPE: ABNORMAL
EKG ATRIAL RATE: 288 BPM
EKG ATRIAL RATE: 95 BPM
EKG P AXIS: -91 DEGREES
EKG P AXIS: 42 DEGREES
EKG P-R INTERVAL: 208 MS
EKG Q-T INTERVAL: 348 MS
EKG Q-T INTERVAL: 382 MS
EKG QRS DURATION: 86 MS
EKG QRS DURATION: 96 MS
EKG QTC CALCULATION (BAZETT): 480 MS
EKG QTC CALCULATION (BAZETT): 487 MS
EKG R AXIS: 63 DEGREES
EKG R AXIS: 78 DEGREES
EKG T AXIS: 34 DEGREES
EKG T AXIS: 37 DEGREES
EKG VENTRICULAR RATE: 118 BPM
EKG VENTRICULAR RATE: 95 BPM
EOSINOPHILS RELATIVE PERCENT: 1 % (ref 1–4)
GFR AFRICAN AMERICAN: >60 ML/MIN
GFR NON-AFRICAN AMERICAN: >60 ML/MIN
GFR SERPL CREATININE-BSD FRML MDRD: ABNORMAL ML/MIN/{1.73_M2}
GFR SERPL CREATININE-BSD FRML MDRD: ABNORMAL ML/MIN/{1.73_M2}
GLUCOSE BLD-MCNC: 109 MG/DL (ref 70–99)
HCT VFR BLD CALC: 34.6 % (ref 40.7–50.3)
HEMOGLOBIN: 11.3 G/DL (ref 13–17)
IMMATURE GRANULOCYTES: 1 %
LV EF: 50 %
LVEF MODALITY: NORMAL
LYMPHOCYTES # BLD: 6 % (ref 24–43)
MCH RBC QN AUTO: 31.8 PG (ref 25.2–33.5)
MCHC RBC AUTO-ENTMCNC: 32.7 G/DL (ref 28.4–34.8)
MCV RBC AUTO: 97.5 FL (ref 82.6–102.9)
MONOCYTES # BLD: 9 % (ref 3–12)
MYOGLOBIN: 35 NG/ML (ref 28–72)
NRBC AUTOMATED: 0 PER 100 WBC
PATHOLOGIST REVIEW: NORMAL
PDW BLD-RTO: 14.5 % (ref 11.8–14.4)
PLATELET # BLD: 147 K/UL (ref 138–453)
PLATELET ESTIMATE: ABNORMAL
PMV BLD AUTO: 9.9 FL (ref 8.1–13.5)
POTASSIUM SERPL-SCNC: 3.8 MMOL/L (ref 3.7–5.3)
RBC # BLD: 3.55 M/UL (ref 4.21–5.77)
RBC # BLD: ABNORMAL 10*6/UL
SEG NEUTROPHILS: 84 % (ref 36–65)
SEGMENTED NEUTROPHILS ABSOLUTE COUNT: 13.92 K/UL (ref 1.5–8.1)
SODIUM BLD-SCNC: 141 MMOL/L (ref 135–144)
TOTAL CK: 38 U/L (ref 39–308)
WBC # BLD: 16.6 K/UL (ref 3.5–11.3)
WBC # BLD: ABNORMAL 10*3/UL

## 2020-05-11 PROCEDURE — 2580000003 HC RX 258: Performed by: INTERNAL MEDICINE

## 2020-05-11 PROCEDURE — 2060000000 HC ICU INTERMEDIATE R&B

## 2020-05-11 PROCEDURE — 93010 ELECTROCARDIOGRAM REPORT: CPT | Performed by: INTERNAL MEDICINE

## 2020-05-11 PROCEDURE — 2580000003 HC RX 258: Performed by: NURSE PRACTITIONER

## 2020-05-11 PROCEDURE — 93306 TTE W/DOPPLER COMPLETE: CPT

## 2020-05-11 PROCEDURE — 6370000000 HC RX 637 (ALT 250 FOR IP): Performed by: NURSE PRACTITIONER

## 2020-05-11 PROCEDURE — 36415 COLL VENOUS BLD VENIPUNCTURE: CPT

## 2020-05-11 PROCEDURE — 6360000002 HC RX W HCPCS: Performed by: NURSE PRACTITIONER

## 2020-05-11 PROCEDURE — 99233 SBSQ HOSP IP/OBS HIGH 50: CPT | Performed by: INTERNAL MEDICINE

## 2020-05-11 PROCEDURE — 80048 BASIC METABOLIC PNL TOTAL CA: CPT

## 2020-05-11 PROCEDURE — 6370000000 HC RX 637 (ALT 250 FOR IP): Performed by: STUDENT IN AN ORGANIZED HEALTH CARE EDUCATION/TRAINING PROGRAM

## 2020-05-11 PROCEDURE — 82550 ASSAY OF CK (CPK): CPT

## 2020-05-11 PROCEDURE — 82533 TOTAL CORTISOL: CPT

## 2020-05-11 PROCEDURE — 83874 ASSAY OF MYOGLOBIN: CPT

## 2020-05-11 PROCEDURE — 85025 COMPLETE CBC W/AUTO DIFF WBC: CPT

## 2020-05-11 PROCEDURE — 2580000003 HC RX 258: Performed by: STUDENT IN AN ORGANIZED HEALTH CARE EDUCATION/TRAINING PROGRAM

## 2020-05-11 PROCEDURE — 6360000002 HC RX W HCPCS: Performed by: INTERNAL MEDICINE

## 2020-05-11 PROCEDURE — 93971 EXTREMITY STUDY: CPT

## 2020-05-11 RX ORDER — OXYCODONE HYDROCHLORIDE AND ACETAMINOPHEN 5; 325 MG/1; MG/1
1 TABLET ORAL EVERY 6 HOURS PRN
Status: DISCONTINUED | OUTPATIENT
Start: 2020-05-11 | End: 2020-05-21 | Stop reason: HOSPADM

## 2020-05-11 RX ORDER — AMIODARONE HYDROCHLORIDE 200 MG/1
200 TABLET ORAL 2 TIMES DAILY
Status: DISCONTINUED | OUTPATIENT
Start: 2020-05-11 | End: 2020-05-21 | Stop reason: HOSPADM

## 2020-05-11 RX ADMIN — SODIUM CHLORIDE: 9 INJECTION, SOLUTION INTRAVENOUS at 18:00

## 2020-05-11 RX ADMIN — PIPERACILLIN AND TAZOBACTAM 3.38 G: 3; .375 INJECTION, POWDER, LYOPHILIZED, FOR SOLUTION INTRAVENOUS at 12:13

## 2020-05-11 RX ADMIN — APIXABAN 5 MG: 5 TABLET, FILM COATED ORAL at 08:23

## 2020-05-11 RX ADMIN — MIDODRINE HYDROCHLORIDE 10 MG: 5 TABLET ORAL at 10:53

## 2020-05-11 RX ADMIN — METOPROLOL TARTRATE 12.5 MG: 25 TABLET ORAL at 20:20

## 2020-05-11 RX ADMIN — PIPERACILLIN AND TAZOBACTAM 3.38 G: 3; .375 INJECTION, POWDER, LYOPHILIZED, FOR SOLUTION INTRAVENOUS at 20:19

## 2020-05-11 RX ADMIN — VANCOMYCIN HYDROCHLORIDE 1750 MG: 10 INJECTION, POWDER, LYOPHILIZED, FOR SOLUTION INTRAVENOUS at 17:31

## 2020-05-11 RX ADMIN — OXYCODONE HYDROCHLORIDE AND ACETAMINOPHEN 1 TABLET: 5; 325 TABLET ORAL at 14:31

## 2020-05-11 RX ADMIN — APIXABAN 5 MG: 5 TABLET, FILM COATED ORAL at 20:20

## 2020-05-11 RX ADMIN — AMIODARONE HYDROCHLORIDE 200 MG: 200 TABLET ORAL at 10:44

## 2020-05-11 RX ADMIN — OXYCODONE HYDROCHLORIDE AND ACETAMINOPHEN 1 TABLET: 5; 325 TABLET ORAL at 20:20

## 2020-05-11 RX ADMIN — AMIODARONE HYDROCHLORIDE 200 MG: 200 TABLET ORAL at 20:20

## 2020-05-11 RX ADMIN — TAMSULOSIN HYDROCHLORIDE 0.4 MG: 0.4 CAPSULE ORAL at 08:23

## 2020-05-11 RX ADMIN — SODIUM CHLORIDE, PRESERVATIVE FREE 10 ML: 5 INJECTION INTRAVENOUS at 20:19

## 2020-05-11 RX ADMIN — MIDODRINE HYDROCHLORIDE 10 MG: 5 TABLET ORAL at 08:23

## 2020-05-11 RX ADMIN — PIPERACILLIN AND TAZOBACTAM 3.38 G: 3; .375 INJECTION, POWDER, LYOPHILIZED, FOR SOLUTION INTRAVENOUS at 05:19

## 2020-05-11 RX ADMIN — ACETAMINOPHEN 650 MG: 325 TABLET ORAL at 02:08

## 2020-05-11 RX ADMIN — ATORVASTATIN CALCIUM 20 MG: 10 TABLET, FILM COATED ORAL at 08:23

## 2020-05-11 ASSESSMENT — PAIN SCALES - GENERAL
PAINLEVEL_OUTOF10: 9
PAINLEVEL_OUTOF10: 0
PAINLEVEL_OUTOF10: 9
PAINLEVEL_OUTOF10: 8
PAINLEVEL_OUTOF10: 9
PAINLEVEL_OUTOF10: 0
PAINLEVEL_OUTOF10: 0

## 2020-05-11 ASSESSMENT — PAIN DESCRIPTION - DESCRIPTORS
DESCRIPTORS: JABBING
DESCRIPTORS: ACHING;SORE

## 2020-05-11 ASSESSMENT — PAIN DESCRIPTION - ORIENTATION
ORIENTATION: RIGHT;LEFT
ORIENTATION: RIGHT;LEFT

## 2020-05-11 ASSESSMENT — PAIN DESCRIPTION - LOCATION
LOCATION: ARM
LOCATION: ARM;SHOULDER

## 2020-05-11 ASSESSMENT — PAIN DESCRIPTION - PAIN TYPE
TYPE: ACUTE PAIN
TYPE: ACUTE PAIN

## 2020-05-11 NOTE — PROGRESS NOTES
2250   I. V.(mL/kg) [id number])   [id number])   Shift Total(mL/kg) 8965(22.8)   4700(03.9)   OUTPUT   Urine(mL/kg/hr) 3175(4) 1500(1.9)  4675   Shift Total(mL/kg) 3175(32.1) 8037(81.8)  5343(70.8)   Weight (kg) 99 99 99 99     Wt Readings from Last 3 Encounters:   05/11/20 218 lb 4.1 oz (99 kg)   04/29/20 220 lb 6.4 oz (100 kg)   04/29/20 220 lb 8 oz (100 kg)     Body mass index is 29.59 kg/m². PHYSICAL EXAM:  Head and neck atraumatic, normocephalic    Lymph nodes-no cervical, supraclavicular lymphadenopathy    Neck-no JVP elevation    Lungs - AP diameter of chest increased. Thoracic expansion and diaphragmatic excursion diminished. BS diminished and expiratory phase prolonged. No dullness to percussion or tenderness to palpation. No bronchial breath sounds . CVS- S1, S2 regular. No S3 no S4, no murmurs    Abdomen-nontender, nondistended. Bowel sounds are present. No organomegaly    Lower extremity-no edema    Upper extremity-no edema    Neurological-grossly normal cranial nerves.   No overt motor deficit     Any additional physical findings:      MEDICATIONS:  Scheduled Meds:   amiodarone  200 mg Oral BID    metoprolol tartrate  12.5 mg Oral BID    midodrine  10 mg Oral TID WC    apixaban  5 mg Oral BID    sodium chloride flush  10 mL Intravenous 2 times per day    atorvastatin  20 mg Oral Daily    tamsulosin  0.4 mg Oral Daily    piperacillin-tazobactam  3.375 g Intravenous Q8H    vancomycin (VANCOCIN) intermittent dosing (placeholder)   Other RX Placeholder    vancomycin  1,750 mg Intravenous Q18H     Continuous Infusions:   norepinephrine Stopped (05/09/20 2100)    sodium chloride 75 mL/hr at 05/10/20 1900     PRN Meds:   oxyCODONE-acetaminophen, 1 tablet, Q6H PRN  sodium chloride flush, 10 mL, PRN  sodium chloride flush, 10 mL, PRN  potassium chloride, 40 mEq, PRN    Or  potassium alternative oral replacement, 40 mEq, PRN    Or  potassium chloride, 10 mEq, PRN  magnesium sulfate, 1 g, also have been related to relative  adrenal insufficiency in addition to Afib rvr ( imparled CO )      Electronically signed by Leidy Christianson MD on 5/11/2020 at 5:53 PM

## 2020-05-11 NOTE — PROGRESS NOTES
[facility]  Progress Note                   Date:   5/11/2020  Patient name: Fabricio Nettles  Date of admission:  5/7/2020 11:14 AM  MRN:   [id number]  YOB: 1950  PCP: Gelacio Chavez MD    Reason for Admission: Dehydration [E86.0]  Sepsis ([address] [A41.9]    Subjective:       Clinical Changes /Abnormalities: Patient seen and examined up in chair in room. Denies chest pain or SOB. SR on tele HR 80s. Left arm edema noted with IV infusing. Discussed with RN who will replace IV. Plans for LUE venous study and ECHO. Afib converted to NSR. Awaiting ECHO results. Review of Systems    Medications:   Scheduled Meds:   midodrine  10 mg Oral TID WC    apixaban  5 mg Oral BID    sodium chloride flush  10 mL Intravenous 2 times per day    [Held by provider] metoprolol tartrate  25 mg Oral BID    atorvastatin  20 mg Oral Daily    tamsulosin  0.4 mg Oral Daily    piperacillin-tazobactam  3.375 g Intravenous Q8H    vancomycin (VANCOCIN) intermittent dosing (placeholder)   Other RX Placeholder    vancomycin  1,750 mg Intravenous Q18H     Continuous Infusions:   amiodarone 0.5 mg/min (05/10/20 2025)    norepinephrine Stopped (05/09/20 2100)    sodium chloride 75 mL/hr at 05/10/20 1900     CBC:   Recent Labs     05/09/20  0600 05/10/20  0348 05/11/20  0502   WBC 28.0* 23.5* 16.6*   HGB 11.8* 11.2* 11.3*    248 147     BMP:    Recent Labs     05/09/20  0600 05/10/20  0348 05/11/20  0502    135 141   K 3.8 4.3 3.8   * 104 109*   CO2 18* 21 21   BUN 19 18 17   CREATININE 1.24* 1.19 1.08   GLUCOSE 117* 114* 109*     Hepatic:No results for input(s): AST, ALT, ALB, BILITOT, ALKPHOS in the last 72 hours. Troponin:   Recent Labs     05/09/20  0005 05/09/20  0600 05/09/20  1139   TROPHS 27* 25* 20     BNP: No results for input(s): BNP in the last 72 hours. Lipids: No results for input(s): CHOL, HDL in the last 72 hours.     Invalid input(s): LDLCALCU  INR: No results

## 2020-05-11 NOTE — PROGRESS NOTES
29.59 kg/m²     Temp (24hrs), Av.3 °F (36.8 °C), Min:97.6 °F (36.4 °C), Max:98.7 °F (37.1 °C)    In: 4807   Out: 4375 [Urine:4375]    Physical Exam:  General appearance: alert and cooperative with exam  HEENT: Head: Normocephalic, no lesions, without obvious abnormality.   Neck: no adenopathy, no carotid bruit, no JVD, supple, symmetrical, trachea midline and thyroid not enlarged, symmetric, no tenderness/mass/nodules  Lungs: Diminished breath sounds bilaterally, no wheezing, rales or rhonchi  Heart: regular rate and rhythm, S1, S2 normal, no murmur, click, rub or gallop  Abdomen: soft, non-tender; bowel sounds normal; no masses,  no organomegaly  Extremities: noted to have swelling of LUE, RUE normal, lower extremities bilaterally normal   Neurologic: Mental status: Alert, oriented, thought content appropriate    Medications:  Scheduled Medications:    midodrine  10 mg Oral TID WC    apixaban  5 mg Oral BID    sodium chloride flush  10 mL Intravenous 2 times per day    [Held by provider] metoprolol tartrate  25 mg Oral BID    atorvastatin  20 mg Oral Daily    tamsulosin  0.4 mg Oral Daily    piperacillin-tazobactam  3.375 g Intravenous Q8H    vancomycin (VANCOCIN) intermittent dosing (placeholder)   Other RX Placeholder    vancomycin  1,750 mg Intravenous Q18H     Continuous Infusions:    amiodarone 0.5 mg/min (05/10/20 2025)    norepinephrine Stopped (20 2100)    sodium chloride 75 mL/hr at 05/10/20 1900     PRN Medicationssodium chloride flush, 10 mL, PRN  sodium chloride flush, 10 mL, PRN  potassium chloride, 40 mEq, PRN    Or  potassium alternative oral replacement, 40 mEq, PRN    Or  potassium chloride, 10 mEq, PRN  magnesium sulfate, 1 g, PRN  acetaminophen, 650 mg, Q6H PRN    Or  acetaminophen, 650 mg, Q6H PRN  polyethylene glycol, 17 g, Daily PRN  nicotine, 1 patch, Daily PRN  ondansetron, 4 mg, Q6H PRN  hydrALAZINE, 10 mg, Q6H PRN        Diagnostic Labs:  CBC:   Recent Labs nodules with fiducial marker noted on the left. ASSESSMENT & PLAN     ASSESSMENT / PLAN:     1. Possible sepsis secondary to unknown etiology  - Continue vancomycin and zosyn, but no evidence of source of infection, procal elevated to 14.89, BC negative so far, U/A negative, CTPE CXR negative, COVID swab negative  - On midodrine 10 mg TID, optimize BP.  - Follow up lactic acid.      2. New onset atrial fibrillation with RVR, CHADsVasc 2  - Continue eliquis for anticoagulation, amiodarone gtt, received digoxin 1 dose 250 mcg   - When BP improves, will start the patient on BB.   - Follow up 2D ECHO.      3. 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     4. Hypotension, h/o hypertension  - On midodrine 10 mg TID, hold lopressor.     5. Hyperlipidemia  - Continue home dose lipitor     6. SATISH - improving  - Continue fluids, follow up BMP.     DVT prophylaxis - On eliquis  GI prophylaxis - not indicated      PT/OT - Consulted  Discharge planning - Case management on board for assistance with discharge planning.     Samantha Love MD  Internal Medicine Resident, PGY-1  [address] [facility];  [place], [place]  5/11/2020, 6:46 AM

## 2020-05-12 LAB
-: NORMAL
ABSOLUTE EOS #: 0.13 K/UL (ref 0–0.4)
ABSOLUTE IMMATURE GRANULOCYTE: 0.13 K/UL (ref 0–0.3)
ABSOLUTE LYMPH #: 0.88 K/UL (ref 1–4.8)
ABSOLUTE MONO #: 2.39 K/UL (ref 0.1–0.8)
ALLEN TEST: ABNORMAL
AMORPHOUS: NORMAL
ANION GAP SERPL CALCULATED.3IONS-SCNC: 12 MMOL/L (ref 9–17)
BACTERIA: NORMAL
BASOPHILS # BLD: 1 % (ref 0–2)
BASOPHILS ABSOLUTE: 0.13 K/UL (ref 0–0.2)
BILIRUBIN URINE: NEGATIVE
BUN BLDV-MCNC: 13 MG/DL (ref 8–23)
BUN/CREAT BLD: ABNORMAL (ref 9–20)
CALCIUM SERPL-MCNC: 8.7 MG/DL (ref 8.6–10.4)
CARBOXYHEMOGLOBIN: 1.3 % (ref 0–5)
CASTS UA: NORMAL /LPF (ref 0–8)
CHLORIDE BLD-SCNC: 103 MMOL/L (ref 98–107)
CO2: 18 MMOL/L (ref 20–31)
COLOR: YELLOW
COMMENT UA: ABNORMAL
CREAT SERPL-MCNC: 0.87 MG/DL (ref 0.7–1.2)
CRYSTALS, UA: NORMAL /HPF
DIFFERENTIAL TYPE: ABNORMAL
EOSINOPHILS RELATIVE PERCENT: 1 % (ref 1–4)
EPITHELIAL CELLS UA: NORMAL /HPF (ref 0–5)
FIO2: ABNORMAL
GFR AFRICAN AMERICAN: >60 ML/MIN
GFR NON-AFRICAN AMERICAN: >60 ML/MIN
GFR SERPL CREATININE-BSD FRML MDRD: ABNORMAL ML/MIN/{1.73_M2}
GFR SERPL CREATININE-BSD FRML MDRD: ABNORMAL ML/MIN/{1.73_M2}
GLUCOSE BLD-MCNC: 103 MG/DL (ref 70–99)
GLUCOSE URINE: NEGATIVE
HCO3 VENOUS: 22.1 MMOL/L (ref 24–30)
HCT VFR BLD CALC: 38.4 % (ref 40.7–50.3)
HEMOGLOBIN: 12.6 G/DL (ref 13–17)
IMMATURE GRANULOCYTES: 1 %
KETONES, URINE: NEGATIVE
LEUKOCYTE ESTERASE, URINE: NEGATIVE
LYMPHOCYTES # BLD: 7 % (ref 24–44)
MCH RBC QN AUTO: 32.3 PG (ref 25.2–33.5)
MCHC RBC AUTO-ENTMCNC: 32.8 G/DL (ref 28.4–34.8)
MCV RBC AUTO: 98.5 FL (ref 82.6–102.9)
METHEMOGLOBIN: ABNORMAL % (ref 0–1.5)
MODE: ABNORMAL
MONOCYTES # BLD: 19 % (ref 1–7)
MORPHOLOGY: ABNORMAL
MUCUS: NORMAL
NEGATIVE BASE EXCESS, VEN: 1.8 MMOL/L (ref 0–2)
NITRITE, URINE: NEGATIVE
NOTIFICATION TIME: ABNORMAL
NOTIFICATION: ABNORMAL
NRBC AUTOMATED: 0 PER 100 WBC
O2 DEVICE/FLOW/%: ABNORMAL
O2 SAT, VEN: 79.3 % (ref 60–85)
OTHER OBSERVATIONS UA: NORMAL
OXYHEMOGLOBIN: ABNORMAL % (ref 95–98)
PATIENT TEMP: 37
PCO2, VEN, TEMP ADJ: ABNORMAL MMHG (ref 39–55)
PCO2, VEN: 37 (ref 39–55)
PDW BLD-RTO: 14.7 % (ref 11.8–14.4)
PEEP/CPAP: ABNORMAL
PH UA: 6 (ref 5–8)
PH VENOUS: 7.39 (ref 7.32–7.42)
PH, VEN, TEMP ADJ: ABNORMAL (ref 7.32–7.42)
PLATELET # BLD: 148 K/UL (ref 138–453)
PLATELET ESTIMATE: ABNORMAL
PMV BLD AUTO: 9.8 FL (ref 8.1–13.5)
PO2, VEN, TEMP ADJ: ABNORMAL MMHG (ref 30–50)
PO2, VEN: 45.8 (ref 30–50)
POSITIVE BASE EXCESS, VEN: ABNORMAL MMOL/L (ref 0–2)
POTASSIUM SERPL-SCNC: 4.7 MMOL/L (ref 3.7–5.3)
PROTEIN UA: ABNORMAL
PSV: ABNORMAL
PT. POSITION: ABNORMAL
RBC # BLD: 3.9 M/UL (ref 4.21–5.77)
RBC # BLD: ABNORMAL 10*6/UL
RBC UA: NORMAL /HPF (ref 0–4)
RENAL EPITHELIAL, UA: NORMAL /HPF
RESPIRATORY RATE: ABNORMAL
SAMPLE SITE: ABNORMAL
SEG NEUTROPHILS: 71 % (ref 36–66)
SEGMENTED NEUTROPHILS ABSOLUTE COUNT: 8.94 K/UL (ref 1.8–7.7)
SET RATE: ABNORMAL
SODIUM BLD-SCNC: 133 MMOL/L (ref 135–144)
SPECIFIC GRAVITY UA: 1.01 (ref 1–1.03)
TEXT FOR RESPIRATORY: ABNORMAL
TOTAL HB: ABNORMAL G/DL (ref 12–16)
TOTAL RATE: ABNORMAL
TRICHOMONAS: NORMAL
TURBIDITY: CLEAR
URINE HGB: ABNORMAL
UROBILINOGEN, URINE: NORMAL
VT: ABNORMAL
WBC # BLD: 12.6 K/UL (ref 3.5–11.3)
WBC # BLD: ABNORMAL 10*3/UL
WBC UA: NORMAL /HPF (ref 0–5)
YEAST: NORMAL

## 2020-05-12 PROCEDURE — 2060000000 HC ICU INTERMEDIATE R&B

## 2020-05-12 PROCEDURE — 87040 BLOOD CULTURE FOR BACTERIA: CPT

## 2020-05-12 PROCEDURE — 97530 THERAPEUTIC ACTIVITIES: CPT

## 2020-05-12 PROCEDURE — 82805 BLOOD GASES W/O2 SATURATION: CPT

## 2020-05-12 PROCEDURE — 85025 COMPLETE CBC W/AUTO DIFF WBC: CPT

## 2020-05-12 PROCEDURE — 6370000000 HC RX 637 (ALT 250 FOR IP): Performed by: NURSE PRACTITIONER

## 2020-05-12 PROCEDURE — 6370000000 HC RX 637 (ALT 250 FOR IP): Performed by: STUDENT IN AN ORGANIZED HEALTH CARE EDUCATION/TRAINING PROGRAM

## 2020-05-12 PROCEDURE — 6360000002 HC RX W HCPCS: Performed by: STUDENT IN AN ORGANIZED HEALTH CARE EDUCATION/TRAINING PROGRAM

## 2020-05-12 PROCEDURE — 2580000003 HC RX 258: Performed by: NURSE PRACTITIONER

## 2020-05-12 PROCEDURE — 6360000002 HC RX W HCPCS: Performed by: NURSE PRACTITIONER

## 2020-05-12 PROCEDURE — 81001 URINALYSIS AUTO W/SCOPE: CPT

## 2020-05-12 PROCEDURE — 99232 SBSQ HOSP IP/OBS MODERATE 35: CPT | Performed by: INTERNAL MEDICINE

## 2020-05-12 PROCEDURE — 97110 THERAPEUTIC EXERCISES: CPT

## 2020-05-12 PROCEDURE — 99233 SBSQ HOSP IP/OBS HIGH 50: CPT | Performed by: INTERNAL MEDICINE

## 2020-05-12 PROCEDURE — 36415 COLL VENOUS BLD VENIPUNCTURE: CPT

## 2020-05-12 PROCEDURE — 80048 BASIC METABOLIC PNL TOTAL CA: CPT

## 2020-05-12 PROCEDURE — 2580000003 HC RX 258: Performed by: STUDENT IN AN ORGANIZED HEALTH CARE EDUCATION/TRAINING PROGRAM

## 2020-05-12 RX ORDER — IPRATROPIUM BROMIDE AND ALBUTEROL SULFATE 2.5; .5 MG/3ML; MG/3ML
1 SOLUTION RESPIRATORY (INHALATION) EVERY 6 HOURS PRN
Status: DISCONTINUED | OUTPATIENT
Start: 2020-05-12 | End: 2020-05-21 | Stop reason: HOSPADM

## 2020-05-12 RX ORDER — ALLOPURINOL 100 MG/1
100 TABLET ORAL DAILY
Status: DISCONTINUED | OUTPATIENT
Start: 2020-05-12 | End: 2020-05-21

## 2020-05-12 RX ORDER — PREDNISONE 20 MG/1
40 TABLET ORAL DAILY
Status: DISPENSED | OUTPATIENT
Start: 2020-05-12 | End: 2020-05-17

## 2020-05-12 RX ADMIN — TAMSULOSIN HYDROCHLORIDE 0.4 MG: 0.4 CAPSULE ORAL at 10:45

## 2020-05-12 RX ADMIN — MIDODRINE HYDROCHLORIDE 10 MG: 5 TABLET ORAL at 18:27

## 2020-05-12 RX ADMIN — PREDNISONE 40 MG: 20 TABLET ORAL at 10:46

## 2020-05-12 RX ADMIN — ALLOPURINOL 100 MG: 100 TABLET ORAL at 10:45

## 2020-05-12 RX ADMIN — AMIODARONE HYDROCHLORIDE 200 MG: 200 TABLET ORAL at 10:45

## 2020-05-12 RX ADMIN — APIXABAN 5 MG: 5 TABLET, FILM COATED ORAL at 10:45

## 2020-05-12 RX ADMIN — SODIUM CHLORIDE, PRESERVATIVE FREE 10 ML: 5 INJECTION INTRAVENOUS at 10:46

## 2020-05-12 RX ADMIN — MIDODRINE HYDROCHLORIDE 10 MG: 5 TABLET ORAL at 10:46

## 2020-05-12 RX ADMIN — METOPROLOL TARTRATE 12.5 MG: 25 TABLET ORAL at 10:45

## 2020-05-12 RX ADMIN — PIPERACILLIN AND TAZOBACTAM 3.38 G: 3; .375 INJECTION, POWDER, LYOPHILIZED, FOR SOLUTION INTRAVENOUS at 20:47

## 2020-05-12 RX ADMIN — METOPROLOL TARTRATE 12.5 MG: 25 TABLET ORAL at 20:47

## 2020-05-12 RX ADMIN — APIXABAN 5 MG: 5 TABLET, FILM COATED ORAL at 20:47

## 2020-05-12 RX ADMIN — SODIUM CHLORIDE, PRESERVATIVE FREE 10 ML: 5 INJECTION INTRAVENOUS at 20:48

## 2020-05-12 RX ADMIN — AMIODARONE HYDROCHLORIDE 200 MG: 200 TABLET ORAL at 20:47

## 2020-05-12 RX ADMIN — OXYCODONE HYDROCHLORIDE AND ACETAMINOPHEN 1 TABLET: 5; 325 TABLET ORAL at 04:12

## 2020-05-12 RX ADMIN — ATORVASTATIN CALCIUM 20 MG: 10 TABLET, FILM COATED ORAL at 10:45

## 2020-05-12 RX ADMIN — PIPERACILLIN AND TAZOBACTAM 3.38 G: 3; .375 INJECTION, POWDER, LYOPHILIZED, FOR SOLUTION INTRAVENOUS at 12:26

## 2020-05-12 RX ADMIN — PIPERACILLIN AND TAZOBACTAM 3.38 G: 3; .375 INJECTION, POWDER, LYOPHILIZED, FOR SOLUTION INTRAVENOUS at 04:11

## 2020-05-12 RX ADMIN — OXYCODONE HYDROCHLORIDE AND ACETAMINOPHEN 1 TABLET: 5; 325 TABLET ORAL at 10:46

## 2020-05-12 RX ADMIN — OXYCODONE HYDROCHLORIDE AND ACETAMINOPHEN 1 TABLET: 5; 325 TABLET ORAL at 18:27

## 2020-05-12 ASSESSMENT — PAIN DESCRIPTION - LOCATION
LOCATION: ARM
LOCATION: SHOULDER;KNEE

## 2020-05-12 ASSESSMENT — PAIN SCALES - GENERAL
PAINLEVEL_OUTOF10: 8
PAINLEVEL_OUTOF10: 0
PAINLEVEL_OUTOF10: 9
PAINLEVEL_OUTOF10: 6
PAINLEVEL_OUTOF10: 6
PAINLEVEL_OUTOF10: 0

## 2020-05-12 ASSESSMENT — PAIN DESCRIPTION - ORIENTATION: ORIENTATION: RIGHT;LEFT

## 2020-05-12 ASSESSMENT — PAIN DESCRIPTION - DESCRIPTORS
DESCRIPTORS: ACHING;SORE
DESCRIPTORS: ACHING

## 2020-05-12 ASSESSMENT — PAIN DESCRIPTION - PAIN TYPE
TYPE: ACUTE PAIN
TYPE: ACUTE PAIN

## 2020-05-12 NOTE — CARE COORDINATION
Called wife Aury Graf. [phone], we discussed the need for a transition plan, as original plan was to return home. At this time patient is using the [address] [place] [address] Street steady get up. She states that she will not be able to care for him if he cannot help himself. She has Lupus and RA. She is agreeable with placement. She does not know of anyplace that he could go. I told her that I would call her later today and discuss some choices. She is concerned as she has not heard from a physician as to what is the cause of his problem.

## 2020-05-12 NOTE — PROGRESS NOTES
I.V.(mL/kg) 582. 5(5.8)   582. 5(5.8)   Shift Total(mL/kg) 1062. 5(10.6)   1062. 5(10.6)   OUTPUT   Urine(mL/kg/hr) 1400(1.8)   1400   Shift Total(mL/kg) 1400(14)   1400(14)   Weight (kg) 100 100 100 100     Wt Readings from Last 3 Encounters:   05/12/20 220 lb 7.4 oz (100 kg)   04/29/20 220 lb 6.4 oz (100 kg)   04/29/20 220 lb 8 oz (100 kg)     Body mass index is 29.89 kg/m². PHYSICAL EXAM:  Head and neck atraumatic, normocephalic    Lymph nodes-no cervical, supraclavicular lymphadenopathy    Neck-no JVP elevation    Lungs - AP diameter of chest increased. Thoracic expansion and diaphragmatic excursion diminished. BS diminished and expiratory phase prolonged. No dullness to percussion or tenderness to palpation. No bronchial breath sounds . CVS- S1, S2 regular. No S3 no S4, no murmurs    Abdomen-nontender, nondistended. Bowel sounds are present. No organomegaly    Lower extremity-no edema    Upper extremity-no edema    Neurological-grossly normal cranial nerves.   No overt motor deficit     Any additional physical findings:      MEDICATIONS:  Scheduled Meds:   allopurinol  100 mg Oral Daily    predniSONE  40 mg Oral Daily    amiodarone  200 mg Oral BID    metoprolol tartrate  12.5 mg Oral BID    midodrine  10 mg Oral TID WC    apixaban  5 mg Oral BID    sodium chloride flush  10 mL Intravenous 2 times per day    atorvastatin  20 mg Oral Daily    tamsulosin  0.4 mg Oral Daily    piperacillin-tazobactam  3.375 g Intravenous Q8H     Continuous Infusions:   sodium chloride 75 mL/hr at 05/11/20 1800     PRN Meds:   oxyCODONE-acetaminophen, 1 tablet, Q6H PRN  sodium chloride flush, 10 mL, PRN  sodium chloride flush, 10 mL, PRN  potassium chloride, 40 mEq, PRN    Or  potassium alternative oral replacement, 40 mEq, PRN    Or  potassium chloride, 10 mEq, PRN  magnesium sulfate, 1 g, PRN  acetaminophen, 650 mg, Q6H PRN    Or  acetaminophen, 650 mg, Q6H PRN  polyethylene glycol, 17 g, Daily PRN  nicotine, 1 patch, Daily PRN  ondansetron, 4 mg, Q6H PRN  hydrALAZINE, 10 mg, Q6H PRN        SUPPORT DEVICES: [] Ventilator [] BIPAP  [x] Nasal Cannula [] Room Air    VENT SETTINGS (Comprehensive) (if applicable):  Vent Information  SpO2: 95 %  Additional Respiratory  Assessments  Pulse: 95  Resp: (!) 36  SpO2: 95 %  Oral Care: Other (Comment)(refused)    ABGs:     Lab Results   Component Value Date    VOR3ERK 15 05/07/2020    FIO2 21.0 05/07/2020     Lactic Acid:   Lab Results   Component Value Date    LACTA 2.3 05/07/2020    LACTA 3.7 05/07/2020    LACTA 2.5 05/07/2020       DATA:  Complete Blood Count:   Recent Labs     05/10/20  0348 05/11/20  0502 05/12/20  0550   WBC 23.5* 16.6* 12.6*   HGB 11.2* 11.3* 12.6*   .0 97.5 98.5    147 148   RBC 3.32* 3.55* 3.90*   HCT 33.2* 34.6* 38.4*   MCH 33.7* 31.8 32.3   MCHC 33.7 32.7 32.8   RDW 14.9* 14.5* 14.7*   MPV 11.2 9.9 9.8        PT/INR:    Lab Results   Component Value Date    PROTIME 15.4 05/08/2020    INR 1.5 05/08/2020     PTT:    Lab Results   Component Value Date    APTT 72.8 05/09/2020       Basal Metabolic Profile:   Recent Labs     05/10/20  0348 05/11/20  0502 05/12/20  0550    141 133*   K 4.3 3.8 4.7   BUN 18 17 13   CREATININE 1.19 1.08 0.87    109* 103   CO2 21 21 18*      Magnesium:   Lab Results   Component Value Date    MG 2.1 05/10/2020    MG 1.9 05/09/2020    MG 2.0 04/09/2019     Phosphorus:   Lab Results   Component Value Date    PHOS 2.3 05/10/2020     S. Calcium:  Recent Labs     05/12/20  0550   CALCIUM 8.7     S. Ionized Calcium:No results for input(s): IONCA in the last 72 hours.       Urinalysis:   Lab Results   Component Value Date    NITRU NEGATIVE 05/07/2020    COLORU YELLOW 05/07/2020    PHUR 7.0 05/07/2020    WBCUA 0 TO 2 12/03/2019    RBCUA 2 TO 5 12/03/2019    MUCUS 3+ 12/03/2019    TRICHOMONAS NOT REPORTED 12/03/2019    YEAST NOT REPORTED 12/03/2019    BACTERIA FEW 12/03/2019    SPECGRAV 1.015 signed by Romel Jansen MD on 5/12/2020 at 10:56 AM

## 2020-05-12 NOTE — CONSULTS
Physical Medicine & Rehabilitation    CHART REVIEW      Admitting Physician: Eveline Vega MD    Primary Care Provider: Greta Flores MD     Reason for Consult:  Acute Inpatient Rehabilitation    Chief Complaint: Fatigue    History of Present Illness:  Referring Provider is requesting an evaluation for appropriate placement upon discharge from acute care. Mr. Laura Olivares is a 77 y.o. male who was admitted to [place] [address] on 5/7/2020 with Fatigue; Dizziness; and Cough    70-year-old male with history of small cell lung cancer, left adrenal metastasis status post chemo/radiation, hypertension, [facility]lipidemia [facility]. Patient has chronic baseline cough. Patient transferred [facility] due to low blood pressure, increased lactic acid CT chest showed no evidence of PE. Note he has had a CT chest/abdomen on 4/24 which showed increased left adrenal size    Internal medicine-possible sepsis of unknown etiology completed Zosyn and vancomycin no source of infection COVID team-not detected on Midrin to optimize blood pressure, new onset A. fib on Eliquis and amiodarone, history of metastatic small cell lung cancer with mets adrenal gland acute on chronic gout allopurinol and prednisone    Cardiology- Echo ejection fraction 50% continue beta-blocker and ProAmatine    Pulm- radiation fibrosis right lung continue Acapella, possible therapy vest    Venous Dopplers upper extremity left 5/11   No evidence of superficial or deep venous thrombosis in the left lower   extremity.       5/7/2020 CT chest pulmonary embolism  Impression:        1.  No evidence of central or large proximal segmental pulmonary embolus. Distal pulmonary arteries are limited by motion and bolus timing. 2.  Stable post-treatment changes in the right suprahilar/perihilar lung.  No  acute intrathoracic process.     3.  Unchanged adrenal nodules with fiducial marker noted on the status: Not on file    Intimate partner violence     Fear of current or ex partner: Not on file     Emotionally abused: Not on file     Physically abused: Not on file     Forced sexual activity: Not on file   Other Topics Concern    Not on file   Social History Narrative    Not on file     Social/Functional History  Lives With: Spouse(works, alone during day)  Type of Home: House  Home Layout: Two level, Able to Live on Main level with bedroom/bathroom  Home Access: Stairs to enter without rails  Entrance Stairs - Number of Steps: 1  Bathroom Shower/Tub: Tub/Shower unit  Bathroom Toilet: Handicap height  Bathroom Equipment: Grab bars in shower, Grab bars around toilet(no chair)  Bathroom Accessibility: Accessible  Home Equipment: ODEC  ADL Assistance: [facility] [address]: [address] [place] [address] Responsibilities: Yes(split)  Ambulation Assistance: Independent  Transfer Assistance: Independent  Active : Yes  Mode of Transportation: Truck, SUV  Additional Comments: amb at grocery    Family History:       Problem Relation Age of Onset    Cancer Father         lung cancer           /72   Pulse 95   Temp 98.3 °F (36.8 °C) (Oral)   Resp 22   Ht 6' 0.01\" (1.829 m)   Wt 220 lb 7.4 oz (100 kg)   SpO2 95%   BMI 29.89 kg/m²       Diagnostics:  CBC   Lab Results   Component Value Date    WBC 12.6 05/12/2020    RBC 3.90 05/12/2020    HGB 12.6 05/12/2020    HCT 38.4 05/12/2020    MCV 98.5 05/12/2020    RDW 14.7 05/12/2020     05/12/2020     BMP    Lab Results   Component Value Date     05/12/2020    K 4.7 05/12/2020     05/12/2020    CO2 18 05/12/2020    BUN 13 05/12/2020     Uric Acid  No components found for: URIC  VITAMIN B12 No components found for: B12  PT/INR  No results found for: PTINR    Radiology:     Impression: Mr. Vinay Reno is a 77 y.o.  male with a history of Sepsis ([facility] [address]    1. Deconditioning of question etiology  2.  Metastatic small cell lung cancer

## 2020-05-12 NOTE — DISCHARGE INSTR - COC
Wt 220 lb 7.4 oz (100 kg)   SpO2 95%   BMI 29.89 kg/m²     Last documented pain score (0-10 scale): Pain Level: 6  Last Weight:   Wt Readings from Last 1 Encounters:   05/12/20 220 lb 7.4 oz (100 kg)     Mental Status:  oriented and alert    IV Access:  - None    Nursing Mobility/ADLs:  Walking   Dependent  Transfer  Dependent  Bathing  Dependent  Dressing  Dependent  Toileting  Dependent  Feeding  Assisted  Med Admin  Assisted  Med Delivery   whole    Wound Care Documentation and Therapy:        Elimination:  Continence:   · Bowel: Yes  · Bladder: Yes  Urinary Catheter: None   Colostomy/Ileostomy/Ileal Conduit: No       Date of Last BM: 5/21/2020    Intake/Output Summary (Last 24 hours) at 5/12/2020 1257  Last data filed at 5/12/2020 0405  Gross per 24 hour   Intake 5199.85 ml   Output 2500 ml   Net 2699.85 ml     I/O last 3 completed shifts: In: 5199.9 [P.O.:1450; I.V.:3749.9]  Out: 3300 [Urine:3300]    Safety Concerns: At Risk for Falls    Impairments/Disabilities:      None    Nutrition Therapy:  Current Nutrition Therapy:   - Oral Diet:  General    Routes of Feeding: Oral  Liquids: No Restrictions  Daily Fluid Restriction: no  Last Modified Barium Swallow with Video (Video Swallowing Test): not done    Treatments at the Time of Hospital Discharge:   Respiratory Treatments: none  Oxygen Therapy:  is not on home oxygen therapy. Ventilator:    - No ventilator support    Rehab Therapies: Physical Therapy and Occupational Therapy  Weight Bearing Status/Restrictions: No weight bearing restirctions  Other Medical Equipment (for information only, NOT a DME order):  walker  Other Treatments: ORTHOPEDIC INSTRUCTIONS:  -Weight bearing status: activity as tolerated  Dressings:  -Keep dressings clean, dry, and intact. You can remove dressings after 3-5 days if no signs of drainage. Ok to shower at that time.   General:  -Ice (20 minutes on and off 1 hour) and elevate (above heart) as needed for

## 2020-05-12 NOTE — PLAN OF CARE
planning  5/12/2020 0610 by Leryo Carvajal RN  Outcome: Ongoing  5/11/2020 1858 by Pierce Tomlin RN  Outcome: Ongoing  Goal: Discharged to appropriate level of care  Description: Discharged to appropriate level of care  5/12/2020 0610 by Leroy Carvajal RN  Outcome: Ongoing  5/11/2020 1858 by Pierce Tomlin RN  Outcome: Ongoing     Problem: Anxiety/Stress:  Goal: Level of anxiety will decrease  Description: Level of anxiety will decrease  5/12/2020 0610 by Leroy Carvajal RN  Outcome: Ongoing  Note: Pt remains anxious regarding his BUE pain and healthcare course of treatment. Active listening provided, pt talked at length regarding his worries over healing. Support provided. 5/11/2020 1858 by Pierce Tomlin RN  Outcome: Ongoing     Problem: Cardiac Output - Decreased:  Goal: Hemodynamic stability will improve  Description: Hemodynamic stability will improve  5/12/2020 0610 by Leroy Carvajal RN  Outcome: Ongoing  5/11/2020 1858 by Pierce Tomlin RN  Outcome: Ongoing     Problem: Musculor/Skeletal Functional Status  Goal: Highest potential functional level  5/12/2020 0610 by Leroy Carvajal RN  Outcome: Ongoing  5/11/2020 1858 by Pierce Tomlin RN  Outcome: Ongoing     Problem:  Activity:  Goal: Ability to tolerate increased activity will improve  Description: Ability to tolerate increased activity will improve  5/12/2020 0610 by Leroy Carvajal RN  Outcome: Ongoing  5/11/2020 1858 by Pierce Tomlin RN  Outcome: Ongoing  Goal: Ability to implement measures to reduce episodes of fatigue will improve  Description: Ability to implement measures to reduce episodes of fatigue will improve  5/12/2020 0610 by Leroy Carvajal RN  Outcome: Ongoing  5/11/2020 1858 by Pierce Tomlin RN  Outcome: Ongoing     Problem: Coping:  Goal: Ability to cope will improve  Description: Ability to cope will improve  5/12/2020 0610 by Leroy Carvajal RN  Outcome: Ongoing  5/11/2020 1858 by Pierce Tomlin RN  Outcome: Ongoing     Problem: Nutritional:  Goal:

## 2020-05-12 NOTE — PROGRESS NOTES
No  Subjective  Subjective: Pt in bed; wanting to get up to chair. Reports 8/10 pain in R shoulder and L knee due to gout and arthritis. General Comment  Comments: Pt assisted to chair with al steady; maxi sandy sling under pt. Pain Screening  Patient Currently in Pain: Yes  Pain Assessment  Pain Level: 8  Pain Type: Acute pain  Pain Location: Shoulder;Knee  Pain Descriptors: Aching  Vital Signs  Patient Currently in Pain: Yes       Orientation  Orientation  Overall Orientation Status: Within Functional Limits  Cognition      Objective   Bed mobility  Supine to Sit: Moderate assistance(cues for sequencing)  Scooting: Moderate assistance  Transfers  Sit to Stand: Moderate Assistance;2 Person Assistance(completed from elevated bed once with RW and once with al steady)  Stand to sit: Moderate Assistance  Bed to Chair: Dependent/Total(al steady)  Ambulation  Ambulation?: No(unsafe to attempt due to poor standing tolerance)     Balance  Posture: Fair  Sitting - Static: Fair(pt sat EOB 8 minutes for core strengthening; required CGA/SBA)  Sitting - Dynamic: Fair;-  Standing - Static: Fair;-(pt stood at [employer] for 30'' with modA; flexed posture and flexed knees; pt stood again at [facility] [employer] for 30''; max VCs to improve posture)       Exercises:  Seated LE exercise program: Long Arc Quads, hip abduction/adduction, heel/toe raises, and marches.  Reps: 10x each, AAROM LLE due to knee pain    Goals  Short term goals  Time Frame for Short term goals: 14 visits  Short term goal 1: Pt will be Elia with bed mobility  Short term goal 2: Pt will be Elia transfers  Short term goal 3: Pt will be Elia amb 175' RW  Short term goal 4: Pt will navigate 2 steps Elia    Plan    Plan  Times per week: 5-6x/wk  Current Treatment Recommendations: Strengthening, Balance Training, Functional Mobility Training, Endurance Training, Transfer Training, Gait Training, Stair training, Home Exercise Program, Safety Education & Training, Patient/Caregiver Education & Training, Equipment Evaluation, Education, & procurement  Safety Devices  Type of devices:  All fall risk precautions in place, Left in chair, Call light within reach, Chair alarm in place, Gait belt, Patient at risk for falls, Nurse notified  Restraints  Initially in place: No     Therapy Time   Individual Concurrent Group Co-treatment   Time In 0821         Time Out 0905         Minutes 44         Timed Code Treatment Minutes: [address], PTA

## 2020-05-13 ENCOUNTER — APPOINTMENT (OUTPATIENT)
Dept: GENERAL RADIOLOGY | Age: 70
DRG: 507 | End: 2020-05-13
Payer: COMMERCIAL

## 2020-05-13 LAB
ABSOLUTE EOS #: 0 K/UL (ref 0–0.4)
ABSOLUTE EOS #: 0 K/UL (ref 0–0.4)
ABSOLUTE IMMATURE GRANULOCYTE: 0 K/UL (ref 0–0.3)
ABSOLUTE IMMATURE GRANULOCYTE: 0.13 K/UL (ref 0–0.3)
ABSOLUTE LYMPH #: 0.83 K/UL (ref 1–4.8)
ABSOLUTE LYMPH #: 1.34 K/UL (ref 1–4.8)
ABSOLUTE MONO #: 3.75 K/UL (ref 0.1–0.8)
ABSOLUTE MONO #: 3.89 K/UL (ref 0.1–0.8)
ANION GAP SERPL CALCULATED.3IONS-SCNC: 11 MMOL/L (ref 9–17)
BASOPHILS # BLD: 0 % (ref 0–2)
BASOPHILS # BLD: 0 % (ref 0–2)
BASOPHILS ABSOLUTE: 0 K/UL (ref 0–0.2)
BASOPHILS ABSOLUTE: 0 K/UL (ref 0–0.2)
BUN BLDV-MCNC: 24 MG/DL (ref 8–23)
BUN/CREAT BLD: ABNORMAL (ref 9–20)
C-REACTIVE PROTEIN: 210.5 MG/L (ref 0–5)
CALCIUM SERPL-MCNC: 8.7 MG/DL (ref 8.6–10.4)
CHLORIDE BLD-SCNC: 106 MMOL/L (ref 98–107)
CO2: 22 MMOL/L (ref 20–31)
CREAT SERPL-MCNC: 0.97 MG/DL (ref 0.7–1.2)
CULTURE: NORMAL
DIFFERENTIAL TYPE: ABNORMAL
DIFFERENTIAL TYPE: ABNORMAL
DIRECT EXAM: NORMAL
DIRECT EXAM: NORMAL
EOSINOPHILS RELATIVE PERCENT: 0 % (ref 1–4)
EOSINOPHILS RELATIVE PERCENT: 0 % (ref 1–4)
GFR AFRICAN AMERICAN: >60 ML/MIN
GFR NON-AFRICAN AMERICAN: >60 ML/MIN
GFR SERPL CREATININE-BSD FRML MDRD: ABNORMAL ML/MIN/{1.73_M2}
GFR SERPL CREATININE-BSD FRML MDRD: ABNORMAL ML/MIN/{1.73_M2}
GLUCOSE BLD-MCNC: 108 MG/DL (ref 70–99)
HCT VFR BLD CALC: 35 % (ref 40.7–50.3)
HCT VFR BLD CALC: 35.6 % (ref 40.7–50.3)
HEMOGLOBIN: 11.4 G/DL (ref 13–17)
HEMOGLOBIN: 11.7 G/DL (ref 13–17)
IMMATURE GRANULOCYTES: 0 %
IMMATURE GRANULOCYTES: 1 %
LYMPHOCYTES # BLD: 10 % (ref 24–44)
LYMPHOCYTES # BLD: 6 % (ref 24–44)
Lab: NORMAL
MCH RBC QN AUTO: 31 PG (ref 25.2–33.5)
MCH RBC QN AUTO: 32 PG (ref 25.2–33.5)
MCHC RBC AUTO-ENTMCNC: 32.6 G/DL (ref 28.4–34.8)
MCHC RBC AUTO-ENTMCNC: 32.9 G/DL (ref 28.4–34.8)
MCV RBC AUTO: 95.1 FL (ref 82.6–102.9)
MCV RBC AUTO: 97.3 FL (ref 82.6–102.9)
MONOCYTES # BLD: 27 % (ref 1–7)
MONOCYTES # BLD: 29 % (ref 1–7)
MORPHOLOGY: ABNORMAL
NRBC AUTOMATED: 0 PER 100 WBC
NRBC AUTOMATED: 0 PER 100 WBC
PDW BLD-RTO: 14.6 % (ref 11.8–14.4)
PDW BLD-RTO: 14.6 % (ref 11.8–14.4)
PLATELET # BLD: 188 K/UL (ref 138–453)
PLATELET # BLD: 206 K/UL (ref 138–453)
PLATELET ESTIMATE: ABNORMAL
PLATELET ESTIMATE: ABNORMAL
PMV BLD AUTO: 10 FL (ref 8.1–13.5)
PMV BLD AUTO: 10.3 FL (ref 8.1–13.5)
POTASSIUM SERPL-SCNC: 4 MMOL/L (ref 3.7–5.3)
RBC # BLD: 3.66 M/UL (ref 4.21–5.77)
RBC # BLD: 3.68 M/UL (ref 4.21–5.77)
RBC # BLD: ABNORMAL 10*6/UL
RBC # BLD: ABNORMAL 10*6/UL
SEDIMENTATION RATE, ERYTHROCYTE: 103 MM (ref 0–10)
SEG NEUTROPHILS: 60 % (ref 36–66)
SEG NEUTROPHILS: 67 % (ref 36–66)
SEGMENTED NEUTROPHILS ABSOLUTE COUNT: 8.04 K/UL (ref 1.8–7.7)
SEGMENTED NEUTROPHILS ABSOLUTE COUNT: 9.32 K/UL (ref 1.8–7.7)
SODIUM BLD-SCNC: 139 MMOL/L (ref 135–144)
SPECIMEN DESCRIPTION: NORMAL
WBC # BLD: 13.4 K/UL (ref 3.5–11.3)
WBC # BLD: 13.9 K/UL (ref 3.5–11.3)
WBC # BLD: ABNORMAL 10*3/UL
WBC # BLD: ABNORMAL 10*3/UL

## 2020-05-13 PROCEDURE — 6370000000 HC RX 637 (ALT 250 FOR IP): Performed by: STUDENT IN AN ORGANIZED HEALTH CARE EDUCATION/TRAINING PROGRAM

## 2020-05-13 PROCEDURE — 0S9D3ZZ DRAINAGE OF LEFT KNEE JOINT, PERCUTANEOUS APPROACH: ICD-10-PCS | Performed by: ORTHOPAEDIC SURGERY

## 2020-05-13 PROCEDURE — 89051 BODY FLUID CELL COUNT: CPT

## 2020-05-13 PROCEDURE — 99253 IP/OBS CNSLTJ NEW/EST LOW 45: CPT | Performed by: ORTHOPAEDIC SURGERY

## 2020-05-13 PROCEDURE — 85025 COMPLETE CBC W/AUTO DIFF WBC: CPT

## 2020-05-13 PROCEDURE — 2500000003 HC RX 250 WO HCPCS: Performed by: STUDENT IN AN ORGANIZED HEALTH CARE EDUCATION/TRAINING PROGRAM

## 2020-05-13 PROCEDURE — 36415 COLL VENOUS BLD VENIPUNCTURE: CPT

## 2020-05-13 PROCEDURE — 1200000000 HC SEMI PRIVATE

## 2020-05-13 PROCEDURE — 6370000000 HC RX 637 (ALT 250 FOR IP): Performed by: NURSE PRACTITIONER

## 2020-05-13 PROCEDURE — 97530 THERAPEUTIC ACTIVITIES: CPT

## 2020-05-13 PROCEDURE — 0R9J3ZX DRAINAGE OF RIGHT SHOULDER JOINT, PERCUTANEOUS APPROACH, DIAGNOSTIC: ICD-10-PCS | Performed by: ORTHOPAEDIC SURGERY

## 2020-05-13 PROCEDURE — 87070 CULTURE OTHR SPECIMN AEROBIC: CPT

## 2020-05-13 PROCEDURE — 73030 X-RAY EXAM OF SHOULDER: CPT

## 2020-05-13 PROCEDURE — 97110 THERAPEUTIC EXERCISES: CPT

## 2020-05-13 PROCEDURE — 85651 RBC SED RATE NONAUTOMATED: CPT

## 2020-05-13 PROCEDURE — 87075 CULTR BACTERIA EXCEPT BLOOD: CPT

## 2020-05-13 PROCEDURE — 80048 BASIC METABOLIC PNL TOTAL CA: CPT

## 2020-05-13 PROCEDURE — 87205 SMEAR GRAM STAIN: CPT

## 2020-05-13 PROCEDURE — 99232 SBSQ HOSP IP/OBS MODERATE 35: CPT | Performed by: INTERNAL MEDICINE

## 2020-05-13 PROCEDURE — 6360000002 HC RX W HCPCS: Performed by: STUDENT IN AN ORGANIZED HEALTH CARE EDUCATION/TRAINING PROGRAM

## 2020-05-13 PROCEDURE — 89060 EXAM SYNOVIAL FLUID CRYSTALS: CPT

## 2020-05-13 PROCEDURE — 73562 X-RAY EXAM OF KNEE 3: CPT

## 2020-05-13 PROCEDURE — 86140 C-REACTIVE PROTEIN: CPT

## 2020-05-13 PROCEDURE — 0R9L3ZX DRAINAGE OF RIGHT ELBOW JOINT, PERCUTANEOUS APPROACH, DIAGNOSTIC: ICD-10-PCS | Performed by: ORTHOPAEDIC SURGERY

## 2020-05-13 PROCEDURE — 99233 SBSQ HOSP IP/OBS HIGH 50: CPT | Performed by: INTERNAL MEDICINE

## 2020-05-13 PROCEDURE — 73080 X-RAY EXAM OF ELBOW: CPT

## 2020-05-13 PROCEDURE — 2580000003 HC RX 258: Performed by: NURSE PRACTITIONER

## 2020-05-13 RX ORDER — LIDOCAINE HYDROCHLORIDE 10 MG/ML
5 INJECTION, SOLUTION INFILTRATION; PERINEURAL ONCE
Status: COMPLETED | OUTPATIENT
Start: 2020-05-13 | End: 2020-05-13

## 2020-05-13 RX ORDER — FENTANYL CITRATE 50 UG/ML
25 INJECTION, SOLUTION INTRAMUSCULAR; INTRAVENOUS ONCE
Status: COMPLETED | OUTPATIENT
Start: 2020-05-13 | End: 2020-05-13

## 2020-05-13 RX ADMIN — APIXABAN 5 MG: 5 TABLET, FILM COATED ORAL at 08:52

## 2020-05-13 RX ADMIN — SODIUM CHLORIDE, PRESERVATIVE FREE 10 ML: 5 INJECTION INTRAVENOUS at 08:51

## 2020-05-13 RX ADMIN — METOPROLOL TARTRATE 12.5 MG: 25 TABLET ORAL at 08:51

## 2020-05-13 RX ADMIN — ATORVASTATIN CALCIUM 20 MG: 10 TABLET, FILM COATED ORAL at 08:52

## 2020-05-13 RX ADMIN — AMIODARONE HYDROCHLORIDE 200 MG: 200 TABLET ORAL at 08:52

## 2020-05-13 RX ADMIN — OXYCODONE HYDROCHLORIDE AND ACETAMINOPHEN 1 TABLET: 5; 325 TABLET ORAL at 14:15

## 2020-05-13 RX ADMIN — OXYCODONE HYDROCHLORIDE AND ACETAMINOPHEN 1 TABLET: 5; 325 TABLET ORAL at 06:26

## 2020-05-13 RX ADMIN — AMIODARONE HYDROCHLORIDE 200 MG: 200 TABLET ORAL at 21:28

## 2020-05-13 RX ADMIN — METOPROLOL TARTRATE 12.5 MG: 25 TABLET ORAL at 21:28

## 2020-05-13 RX ADMIN — OXYCODONE HYDROCHLORIDE AND ACETAMINOPHEN 1 TABLET: 5; 325 TABLET ORAL at 21:28

## 2020-05-13 RX ADMIN — PREDNISONE 40 MG: 20 TABLET ORAL at 08:52

## 2020-05-13 RX ADMIN — FENTANYL CITRATE 25 MCG: 50 INJECTION, SOLUTION INTRAMUSCULAR; INTRAVENOUS at 18:00

## 2020-05-13 RX ADMIN — ALLOPURINOL 100 MG: 100 TABLET ORAL at 08:51

## 2020-05-13 RX ADMIN — TAMSULOSIN HYDROCHLORIDE 0.4 MG: 0.4 CAPSULE ORAL at 08:51

## 2020-05-13 RX ADMIN — APIXABAN 5 MG: 5 TABLET, FILM COATED ORAL at 21:28

## 2020-05-13 RX ADMIN — LIDOCAINE HYDROCHLORIDE 5 ML: 10 INJECTION, SOLUTION INFILTRATION; PERINEURAL at 19:15

## 2020-05-13 ASSESSMENT — PAIN SCALES - GENERAL
PAINLEVEL_OUTOF10: 8
PAINLEVEL_OUTOF10: 10
PAINLEVEL_OUTOF10: 6
PAINLEVEL_OUTOF10: 5
PAINLEVEL_OUTOF10: 5
PAINLEVEL_OUTOF10: 7
PAINLEVEL_OUTOF10: 5
PAINLEVEL_OUTOF10: 5

## 2020-05-13 ASSESSMENT — PAIN DESCRIPTION - ORIENTATION
ORIENTATION: RIGHT;LEFT
ORIENTATION: RIGHT;LEFT

## 2020-05-13 ASSESSMENT — PAIN DESCRIPTION - LOCATION
LOCATION: ELBOW;KNEE;SHOULDER
LOCATION: KNEE;ELBOW;SHOULDER
LOCATION: KNEE;SHOULDER

## 2020-05-13 ASSESSMENT — PAIN DESCRIPTION - PAIN TYPE
TYPE: CHRONIC PAIN

## 2020-05-13 ASSESSMENT — PAIN DESCRIPTION - DESCRIPTORS
DESCRIPTORS: ACHING
DESCRIPTORS: ACHING

## 2020-05-13 NOTE — CARE COORDINATION
Sudarshan Malhotra of [facility] is out of network. Spoke with Iris at [facility] and faxed the face sheet over. Autumn is going to check and call writer when patient information reviewed.

## 2020-05-13 NOTE — PROGRESS NOTES
Orthopedic Doctor Ingris Bird at bedside discussed plan of care with patient and aspirated 12ml of fluid from right elbow joint. Sent specimen for ordered lab work up.

## 2020-05-13 NOTE — PROGRESS NOTES
revealed mildly elevated LA of 2.5, no leukocytosis, electrolytes WNL, Cr 1.36. Trop 19. He was also noted to have dropping BP in SBP 70s following which he was transferred to [address] [place] [address]. Per EMS, the patient was given 1L fluid bolus en route to the hospital. Lactic acid worsened to 3.7. He was given 3L of fluid bolus. CT chest with contrast was done which revealed no evidence of PE or infection. U/A negative. The patient was started on levophed, vancomycin and zosyn in view of suspected sepsis of unknown origin. Levophed was weaned down, and the patient was saturating well on nasal cannula.      Prior CT eval of adrenal nodule  - 2/10 to  - increase in left adrenal size. During ICU stay, patient went into atrial fibrillation with RVR , Cardiology was consulted for the same , recommended amiodarone drip and eliquis for [facility]. Patient improved significantly and was weaned off pressors, on midodrine for BP optimization.        OBJECTIVE     Vital Signs:  /74   Pulse 79   Temp 97.9 °F (36.6 °C) (Oral)   Resp 18   Ht 6' 0.01\" (1.829 m)   Wt 220 lb 3.2 oz (99.9 kg)   SpO2 97%   BMI 29.86 kg/m²     Temp (24hrs), Av.8 °F (36.6 °C), Min:97.5 °F (36.4 °C), Max:98.2 °F (36.8 °C)    In:    Out: 1800 [Urine:1800]    Physical Exam:  General appearance: alert and cooperative with exam  HEENT: Head: Normocephalic, no lesions, without obvious abnormality.   Neck: no adenopathy, no carotid bruit, no JVD, supple, symmetrical, trachea midline and thyroid not enlarged, symmetric, no tenderness/mass/nodules  Lungs: Diminished breath sounds bilaterally, no wheezing, rales or rhonchi  Heart: regular rate and rhythm, S1, S2 normal, no murmur, click, rub or gallop  Abdomen: soft, non-tender; bowel sounds normal; no masses,  no organomegaly  Extremities: noted to have swelling of LUE, RUE normal, lower extremities bilaterally normal   Neurologic: Mental status: Alert, oriented, thought content appropriate    Medications:  Scheduled Medications:    allopurinol  100 mg Oral Daily    predniSONE  40 mg Oral Daily    amiodarone  200 mg Oral BID    metoprolol tartrate  12.5 mg Oral BID    midodrine  10 mg Oral TID WC    apixaban  5 mg Oral BID    sodium chloride flush  10 mL Intravenous 2 times per day    atorvastatin  20 mg Oral Daily    tamsulosin  0.4 mg Oral Daily     Continuous Infusions:     PRN Medicationsmiconazole, , TID PRN  ipratropium-albuterol, 1 ampule, Q6H PRN  oxyCODONE-acetaminophen, 1 tablet, Q6H PRN  sodium chloride flush, 10 mL, PRN  sodium chloride flush, 10 mL, PRN  potassium chloride, 40 mEq, PRN    Or  potassium alternative oral replacement, 40 mEq, PRN    Or  potassium chloride, 10 mEq, PRN  magnesium sulfate, 1 g, PRN  acetaminophen, 650 mg, Q6H PRN    Or  acetaminophen, 650 mg, Q6H PRN  polyethylene glycol, 17 g, Daily PRN  nicotine, 1 patch, Daily PRN  ondansetron, 4 mg, Q6H PRN  hydrALAZINE, 10 mg, Q6H PRN        Diagnostic Labs:  CBC:   Recent Labs     05/11/20  0502 05/12/20  0550 05/13/20  0543   WBC 16.6* 12.6* 13.4*   RBC 3.55* 3.90* 3.68*   HGB 11.3* 12.6* 11.4*   HCT 34.6* 38.4* 35.0*   MCV 97.5 98.5 95.1   RDW 14.5* 14.7* 14.6*    148 188     BMP:   Recent Labs     05/11/20  0502 05/12/20  0550 05/13/20  0543    133* 139   K 3.8 4.7 4.0   * 103 106   CO2 21 18* 22   BUN 17 13 24*   CREATININE 1.08 0.87 0.97     BNP: No results for input(s): BNP in the last 72 hours. PT/INR: No results for input(s): PROTIME, INR in the last 72 hours. APTT:   No results for input(s): APTT in the last 72 hours. CARDIAC ENZYMES: No results for input(s): CKMB, CKMBINDEX, TROPONINI in the last 72 hours. Invalid input(s): CKTOTAL;3  FASTING LIPID PANEL:No results found for: CHOL, HDL, TRIG  LIVER PROFILE: No results for input(s): AST, ALT, ALB, BILIDIR, BILITOT, ALKPHOS in the last 72 hours.    MICROBIOLOGY:   Lab Results   Component Value Date/Time

## 2020-05-13 NOTE — PROGRESS NOTES
PRN  polyethylene glycol, 17 g, Daily PRN  nicotine, 1 patch, Daily PRN  ondansetron, 4 mg, Q6H PRN  hydrALAZINE, 10 mg, Q6H PRN        SUPPORT DEVICES: [] Ventilator [] BIPAP  [] Nasal Cannula [x] Room Air    VENT SETTINGS (Comprehensive) (if applicable):  Vent Information  SpO2: 97 %  Additional Respiratory  Assessments  Pulse: 72  Resp: 22  SpO2: 97 %  Oral Care: Other (Comment)(refused)    ABGs:     Lab Results   Component Value Date    OBE2JZQ 15 05/07/2020    FIO2 UNKNOWN 05/12/2020     Lactic Acid:   Lab Results   Component Value Date    LACTA 2.3 05/07/2020    LACTA 3.7 05/07/2020    LACTA 2.5 05/07/2020       DATA:  Complete Blood Count:   Recent Labs     05/11/20  0502 05/12/20  0550 05/13/20  0543   WBC 16.6* 12.6* 13.4*   HGB 11.3* 12.6* 11.4*   MCV 97.5 98.5 95.1    148 188   RBC 3.55* 3.90* 3.68*   HCT 34.6* 38.4* 35.0*   MCH 31.8 32.3 31.0   MCHC 32.7 32.8 32.6   RDW 14.5* 14.7* 14.6*   MPV 9.9 9.8 10.0        PT/INR:    Lab Results   Component Value Date    PROTIME 15.4 05/08/2020    INR 1.5 05/08/2020     PTT:    Lab Results   Component Value Date    APTT 72.8 05/09/2020       Basal Metabolic Profile:   Recent Labs     05/11/20  0502 05/12/20  0550 05/13/20  0543    133* 139   K 3.8 4.7 4.0   BUN 17 13 24*   CREATININE 1.08 0.87 0.97   * 103 106   CO2 21 18* 22      Magnesium:   Lab Results   Component Value Date    MG 2.1 05/10/2020    MG 1.9 05/09/2020    MG 2.0 04/09/2019     Phosphorus:   Lab Results   Component Value Date    PHOS 2.3 05/10/2020     S. Calcium:  Recent Labs     05/13/20  0543   CALCIUM 8.7     S. Ionized Calcium:No results for input(s): IONCA in the last 72 hours.       Urinalysis:   Lab Results   Component Value Date    NITRU NEGATIVE 05/12/2020    COLORU YELLOW 05/12/2020    PHUR 6.0 05/12/2020    WBCUA None 05/12/2020    RBCUA 2 TO 5 05/12/2020    MUCUS NOT REPORTED 05/12/2020    TRICHOMONAS NOT REPORTED 05/12/2020    YEAST NOT REPORTED 05/12/2020 BACTERIA NOT REPORTED 05/12/2020    SPECGRAV 1.011 05/12/2020    LEUKOCYTESUR NEGATIVE 05/12/2020    UROBILINOGEN Normal 05/12/2020    BILIRUBINUR NEGATIVE 05/12/2020    GLUCOSEU NEGATIVE 05/12/2020    KETUA NEGATIVE 05/12/2020    AMORPHOUS NOT REPORTED 05/12/2020       CARDIAC ENZYMES: No results for input(s): CKMB, CKMBINDEX, TROPONINI in the last 72 hours. Invalid input(s): CKTOTAL;3  BNP: No results for input(s): BNP in the last 72 hours. LFTS  No results for input(s): ALKPHOS, ALT, AST, BILITOT, BILIDIR, LABALBU in the last 72 hours. AMYLASE/LIPASE/AMMONIA  No results for input(s): AMYLASE, LIPASE, AMMONIA in the last 72 hours. Last 3 Blood Glucose:   Recent Labs     05/11/20  0502 05/12/20  0550 05/13/20  0543   GLUCOSE 109* 103* 108*      HgBA1c:  No results found for: LABA1C      TSH:    Lab Results   Component Value Date    TSH 1.50 05/08/2020     ANEMIA STUDIES  Recent Labs     05/10/20  2141   [id number] 1277*   FOLATE 8.9         Cultures during this admission:     Blood cultures:                 [] None drawn      [x] Negative             []  Positive (Details:  )  Urine Culture:                   [x] None drawn      [] Negative             []  Positive (Details:  )  Sputum Culture:               [] None drawn       [] Negative             [x]  Positive (Details: gram positive rods, many yeast)     ASSESSMENT:     Principal Problem:    Sepsis ([facility] [address]  Active Problems:    Primary lung cancer with metastasis from lung to other site, right ([facility] [address]    Dehydration    Hyperlipidemia    Hypertension    Lung cancer ([facility] [address]    Atrial fibrillation with RVR ([facility] [address]    Gout  Resolved Problems:    * No resolved hospital problems. 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  Left knee aspiration         Electronically

## 2020-05-13 NOTE — CONSULTS
Supple  Chest: Non labored breathing, b/l clavicles without TTP, crepitus, step off, or deformity  Cardiovascular: Regular rate, no dependent edema, distal pulses 2+  Respiratory: Chest symmetric, no accessory muscle use, normal respirations    RUE: Skin is intact on visual inspection. Moderate edema to the right elbow. Significant tenderness to palpation along the medial aspect of the elbow. Mild tenderness to palpation along the entire right shoulder. No palpable bony step-offs or deformities. Shoulder AROM to 30 degrees with minimal pain. Shoulder PROM to 90 abduction. Full AROM without pain wrist & fingers. Compartments soft and compressible. Ulnar/Median/AIN/PIN motor intact. Median/Radial/Ulnar nerve SILT. Hand and fingers warm and well-perfused w/ BCR; radial pulse 2+. Right elbow AROM 60-90 deg and painful with examination, PROM 50-100deg with pain. LUE: No ecchymosis or deformities. Mild superficial blistering to the volar forearm. Non tender to palpation of the elbow wrist or hand. Mild tenderness to palpation of the left shoulder. Full AROM without pain shoulder/elbow/wrist/fingers. Compartments soft and compressible. Ulnar/Median/AIN/PIN motor intact. Median/Radial/Ulnar nerve SILT. Hand and fingers warm and well-perfused w/ BCR; radial pulse 2+. RLE: No ecchymosis or deformities. Skin intact visual inspection. Non tender to palpation along the length of the extremity. Compartments soft and compressible. EHL/FHL/TA/GSC gross motor intact. Sural, saphenous, superificial/deep peroneal, and plantar nerve distribution SILT. Foot and toes warm and well-perfused w/ BCR; DP pulses 2+. -log roll. LLE: Palpable effusion noted to left knee. Left knee area with mild warmth in comparison to the contralateral extremity. No significant erythema to left knee. Skin was intact on visual expection. Significant tenderness to palpation of the left knee globally. Compartments soft and compressible.  (5/8) --> 210 (5/13)  -Trend CRP Q48H. Repeat next CRP 5/15.   -We will follow-up labs, cultures, & further imaging  -Medicine team primary  -Pain control per primary  -DVT: Okay from orthopedic perspective  -Ice and elevation for pain/swelling  -We will discuss case with the attending  -Please page Ortho with any questions or concerns      Paris Barroso,   PGY-1 Orthopedic Surgery  5:58 PM 5/13/2020       Procedure Note: Risks, benefits, and alternatives have been discussed regarding arthrocentesis of the joint effusion. Patient agreed to move forward with the proposed procedure. After sterile prep of the superolateral aspect of the left knee we proceeded to insert an 18 gauge needle into the joint, aspirating 63cc of straw-colored fluid. Joint fluids at this point were sent to lab for analysis. Patient tolerated the procedure well and expressed interval improvement in symptoms. All questions and concerns were addressed at this point. Procedure Note: Risks, benefits, and alternatives have been discussed regarding arthrocentesis of the joint effusion. Patient agreed to move forward with the proposed procedure. After sterile prep of the lateral right elbow we injected 7cc of 1% lidocaine without subcutaneously. We then proceeded to aspirate the joint by inserting an 18 gauge needle into the joint, aspirating 12cc of cloudy yellow fluid. Joint fluids at this point were sent to lab for analysis. Patient tolerated the procedure well and expressed interval improvement in symptoms. All questions and concerns were addressed at this point. PGY-3 Addendum  Patient seen and examined. Agree with above as stated by Dr. Becky Herring. Patient with 3-4 day history of left knee, right elbow, and bilateral shoulder pain. Admitted for septicemia of unknown origin. Blood cultures on 5/7 and 5/12 have thus far been negative. History of small cell lung cancer diagnosed in January 2019.  He has received multiple doses

## 2020-05-13 NOTE — PROGRESS NOTES
[facility]    Progress Note    5/13/2020    2:49 PM    Name:   Leyla Lyons  MRN:     [id number]     Kimjon[place]onlyside:      [de-identified]   Room:   [address] [place] [address]  IP Day:  6  Admit Date:  5/7/2020 11:14 AM    PCP:   Randi Arango MD  Code Status:  Full Code    Subjective:     C/C:   Chief Complaint   Patient presents with    Fatigue    Dizziness    Cough   sepsis and hypotension   Interval History Status: significantly improved. Progressive clinical recovery. 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 Patient was scheduled for a repeat MRI abdomen on the day of presentation when the technician noticed increased fatigue. The patient reports that he had increased chills, generalized weakness and fatigue and diffuse body ache. He denied any other symptoms including cough, SOB, fever, chest pain, abdominal pain, nausea, vomiting, diarrhea, altered mentation, seizure like activity or altered bowel or bladder habits.     The patient was advised to come to the ED for further eval and management. He went to [facility] ED where the patient denied any other symptoms of nausea, vomiting, fever, diarrhea. He did report chronic cough and shortness of breath but is not on home oxygen.  Labs revealed mildly elevated LA of 2.5, no

## 2020-05-14 ENCOUNTER — ANESTHESIA EVENT (OUTPATIENT)
Dept: OPERATING ROOM | Age: 70
DRG: 507 | End: 2020-05-14
Payer: COMMERCIAL

## 2020-05-14 ENCOUNTER — ANESTHESIA (OUTPATIENT)
Dept: OPERATING ROOM | Age: 70
DRG: 507 | End: 2020-05-14
Payer: COMMERCIAL

## 2020-05-14 VITALS — OXYGEN SATURATION: 97 % | TEMPERATURE: 97.4 F | SYSTOLIC BLOOD PRESSURE: 145 MMHG | DIASTOLIC BLOOD PRESSURE: 98 MMHG

## 2020-05-14 LAB
ABSOLUTE EOS #: 0 K/UL (ref 0–0.4)
ABSOLUTE IMMATURE GRANULOCYTE: 0 K/UL (ref 0–0.3)
ABSOLUTE LYMPH #: 0.57 K/UL (ref 1–4.8)
ABSOLUTE MONO #: 1.48 K/UL (ref 0.1–0.8)
ANION GAP SERPL CALCULATED.3IONS-SCNC: 10 MMOL/L (ref 9–17)
BASOPHILS # BLD: 0 % (ref 0–2)
BASOPHILS ABSOLUTE: 0 K/UL (ref 0–0.2)
BUN BLDV-MCNC: 30 MG/DL (ref 8–23)
BUN/CREAT BLD: ABNORMAL (ref 9–20)
CALCIUM SERPL-MCNC: 9.3 MG/DL (ref 8.6–10.4)
CHLORIDE BLD-SCNC: 102 MMOL/L (ref 98–107)
CO2: 23 MMOL/L (ref 20–31)
CREAT SERPL-MCNC: 0.87 MG/DL (ref 0.7–1.2)
CRYSTALS, FLUID: POSITIVE
CRYSTALS, FLUID: POSITIVE
DIFFERENTIAL TYPE: ABNORMAL
EOSINOPHILS RELATIVE PERCENT: 0 % (ref 1–4)
GFR AFRICAN AMERICAN: >60 ML/MIN
GFR NON-AFRICAN AMERICAN: >60 ML/MIN
GFR SERPL CREATININE-BSD FRML MDRD: ABNORMAL ML/MIN/{1.73_M2}
GFR SERPL CREATININE-BSD FRML MDRD: ABNORMAL ML/MIN/{1.73_M2}
GLUCOSE BLD-MCNC: 124 MG/DL (ref 70–99)
HCT VFR BLD CALC: 35.4 % (ref 40.7–50.3)
HEMOGLOBIN: 11.7 G/DL (ref 13–17)
IMMATURE GRANULOCYTES: 0 %
LYMPHOCYTES # BLD: 5 % (ref 24–44)
MCH RBC QN AUTO: 32 PG (ref 25.2–33.5)
MCHC RBC AUTO-ENTMCNC: 33.1 G/DL (ref 28.4–34.8)
MCV RBC AUTO: 96.7 FL (ref 82.6–102.9)
MONOCYTES # BLD: 13 % (ref 1–7)
MORPHOLOGY: ABNORMAL
NRBC AUTOMATED: 0 PER 100 WBC
PDW BLD-RTO: 14.6 % (ref 11.8–14.4)
PLATELET # BLD: 257 K/UL (ref 138–453)
PLATELET ESTIMATE: ABNORMAL
PMV BLD AUTO: 10.3 FL (ref 8.1–13.5)
POTASSIUM SERPL-SCNC: 4.9 MMOL/L (ref 3.7–5.3)
RBC # BLD: 3.66 M/UL (ref 4.21–5.77)
RBC # BLD: ABNORMAL 10*6/UL
SARS-COV-2, PCR: NORMAL
SARS-COV-2, RAPID: NORMAL
SARS-COV-2: NOT DETECTED
SEG NEUTROPHILS: 82 % (ref 36–66)
SEGMENTED NEUTROPHILS ABSOLUTE COUNT: 9.35 K/UL (ref 1.8–7.7)
SODIUM BLD-SCNC: 135 MMOL/L (ref 135–144)
SOURCE: NORMAL
SPECIMEN TYPE: ABNORMAL
SPECIMEN TYPE: ABNORMAL
WBC # BLD: 11.4 K/UL (ref 3.5–11.3)
WBC # BLD: ABNORMAL 10*3/UL

## 2020-05-14 PROCEDURE — 6360000002 HC RX W HCPCS: Performed by: ANESTHESIOLOGY

## 2020-05-14 PROCEDURE — 3700000001 HC ADD 15 MINUTES (ANESTHESIA): Performed by: ORTHOPAEDIC SURGERY

## 2020-05-14 PROCEDURE — 20610 DRAIN/INJ JOINT/BURSA W/O US: CPT | Performed by: ORTHOPAEDIC SURGERY

## 2020-05-14 PROCEDURE — 2500000003 HC RX 250 WO HCPCS: Performed by: SPECIALIST

## 2020-05-14 PROCEDURE — U0003 INFECTIOUS AGENT DETECTION BY NUCLEIC ACID (DNA OR RNA); SEVERE ACUTE RESPIRATORY SYNDROME CORONAVIRUS 2 (SARS-COV-2) (CORONAVIRUS DISEASE [COVID-19]), AMPLIFIED PROBE TECHNIQUE, MAKING USE OF HIGH THROUGHPUT TECHNOLOGIES AS DESCRIBED BY CMS-2020-01-R: HCPCS

## 2020-05-14 PROCEDURE — 87075 CULTR BACTERIA EXCEPT BLOOD: CPT

## 2020-05-14 PROCEDURE — 2580000003 HC RX 258: Performed by: NURSE PRACTITIONER

## 2020-05-14 PROCEDURE — 24000 ARTHRT ELBW EXPL DRG/RMVL FB: CPT | Performed by: ORTHOPAEDIC SURGERY

## 2020-05-14 PROCEDURE — 89060 EXAM SYNOVIAL FLUID CRYSTALS: CPT

## 2020-05-14 PROCEDURE — 85025 COMPLETE CBC W/AUTO DIFF WBC: CPT

## 2020-05-14 PROCEDURE — 0S9D3ZZ DRAINAGE OF LEFT KNEE JOINT, PERCUTANEOUS APPROACH: ICD-10-PCS | Performed by: ORTHOPAEDIC SURGERY

## 2020-05-14 PROCEDURE — 87205 SMEAR GRAM STAIN: CPT

## 2020-05-14 PROCEDURE — 2709999900 HC NON-CHARGEABLE SUPPLY: Performed by: ORTHOPAEDIC SURGERY

## 2020-05-14 PROCEDURE — 99232 SBSQ HOSP IP/OBS MODERATE 35: CPT | Performed by: INTERNAL MEDICINE

## 2020-05-14 PROCEDURE — 2580000003 HC RX 258: Performed by: ORTHOPAEDIC SURGERY

## 2020-05-14 PROCEDURE — 2500000003 HC RX 250 WO HCPCS: Performed by: STUDENT IN AN ORGANIZED HEALTH CARE EDUCATION/TRAINING PROGRAM

## 2020-05-14 PROCEDURE — 6360000002 HC RX W HCPCS: Performed by: SPECIALIST

## 2020-05-14 PROCEDURE — 6370000000 HC RX 637 (ALT 250 FOR IP): Performed by: STUDENT IN AN ORGANIZED HEALTH CARE EDUCATION/TRAINING PROGRAM

## 2020-05-14 PROCEDURE — 97110 THERAPEUTIC EXERCISES: CPT

## 2020-05-14 PROCEDURE — 1200000000 HC SEMI PRIVATE

## 2020-05-14 PROCEDURE — 3600000004 HC SURGERY LEVEL 4 BASE: Performed by: ORTHOPAEDIC SURGERY

## 2020-05-14 PROCEDURE — 87070 CULTURE OTHR SPECIMN AEROBIC: CPT

## 2020-05-14 PROCEDURE — 7100000000 HC PACU RECOVERY - FIRST 15 MIN: Performed by: ORTHOPAEDIC SURGERY

## 2020-05-14 PROCEDURE — 7100000001 HC PACU RECOVERY - ADDTL 15 MIN: Performed by: ORTHOPAEDIC SURGERY

## 2020-05-14 PROCEDURE — 2580000003 HC RX 258: Performed by: STUDENT IN AN ORGANIZED HEALTH CARE EDUCATION/TRAINING PROGRAM

## 2020-05-14 PROCEDURE — 2580000003 HC RX 258: Performed by: ANESTHESIOLOGY

## 2020-05-14 PROCEDURE — 3700000000 HC ANESTHESIA ATTENDED CARE: Performed by: ORTHOPAEDIC SURGERY

## 2020-05-14 PROCEDURE — 89051 BODY FLUID CELL COUNT: CPT

## 2020-05-14 PROCEDURE — 36415 COLL VENOUS BLD VENIPUNCTURE: CPT

## 2020-05-14 PROCEDURE — 80048 BASIC METABOLIC PNL TOTAL CA: CPT

## 2020-05-14 PROCEDURE — 3600000014 HC SURGERY LEVEL 4 ADDTL 15MIN: Performed by: ORTHOPAEDIC SURGERY

## 2020-05-14 RX ORDER — LIDOCAINE HYDROCHLORIDE 10 MG/ML
INJECTION, SOLUTION EPIDURAL; INFILTRATION; INTRACAUDAL; PERINEURAL PRN
Status: DISCONTINUED | OUTPATIENT
Start: 2020-05-14 | End: 2020-05-14 | Stop reason: SDUPTHER

## 2020-05-14 RX ORDER — FENTANYL CITRATE 50 UG/ML
50 INJECTION, SOLUTION INTRAMUSCULAR; INTRAVENOUS EVERY 5 MIN PRN
Status: DISCONTINUED | OUTPATIENT
Start: 2020-05-14 | End: 2020-05-14 | Stop reason: HOSPADM

## 2020-05-14 RX ORDER — FENTANYL CITRATE 50 UG/ML
25 INJECTION, SOLUTION INTRAMUSCULAR; INTRAVENOUS EVERY 5 MIN PRN
Status: DISCONTINUED | OUTPATIENT
Start: 2020-05-14 | End: 2020-05-14 | Stop reason: HOSPADM

## 2020-05-14 RX ORDER — PROPOFOL 10 MG/ML
INJECTION, EMULSION INTRAVENOUS PRN
Status: DISCONTINUED | OUTPATIENT
Start: 2020-05-14 | End: 2020-05-14 | Stop reason: SDUPTHER

## 2020-05-14 RX ORDER — SODIUM CHLORIDE 0.9 % (FLUSH) 0.9 %
10 SYRINGE (ML) INJECTION PRN
Status: DISCONTINUED | OUTPATIENT
Start: 2020-05-14 | End: 2020-05-14 | Stop reason: HOSPADM

## 2020-05-14 RX ORDER — SODIUM CHLORIDE 0.9 % (FLUSH) 0.9 %
10 SYRINGE (ML) INJECTION EVERY 12 HOURS SCHEDULED
Status: DISCONTINUED | OUTPATIENT
Start: 2020-05-14 | End: 2020-05-14 | Stop reason: HOSPADM

## 2020-05-14 RX ORDER — SODIUM CHLORIDE, SODIUM LACTATE, POTASSIUM CHLORIDE, CALCIUM CHLORIDE 600; 310; 30; 20 MG/100ML; MG/100ML; MG/100ML; MG/100ML
INJECTION, SOLUTION INTRAVENOUS CONTINUOUS
Status: DISCONTINUED | OUTPATIENT
Start: 2020-05-14 | End: 2020-05-14

## 2020-05-14 RX ORDER — MIDAZOLAM HYDROCHLORIDE 1 MG/ML
1 INJECTION INTRAMUSCULAR; INTRAVENOUS EVERY 10 MIN PRN
Status: DISCONTINUED | OUTPATIENT
Start: 2020-05-14 | End: 2020-05-14 | Stop reason: HOSPADM

## 2020-05-14 RX ORDER — FENTANYL CITRATE 50 UG/ML
INJECTION, SOLUTION INTRAMUSCULAR; INTRAVENOUS PRN
Status: DISCONTINUED | OUTPATIENT
Start: 2020-05-14 | End: 2020-05-14 | Stop reason: SDUPTHER

## 2020-05-14 RX ORDER — LIDOCAINE HYDROCHLORIDE 10 MG/ML
1 INJECTION, SOLUTION EPIDURAL; INFILTRATION; INTRACAUDAL; PERINEURAL
Status: DISCONTINUED | OUTPATIENT
Start: 2020-05-14 | End: 2020-05-14 | Stop reason: HOSPADM

## 2020-05-14 RX ORDER — GLYCOPYRROLATE 1 MG/5 ML
SYRINGE (ML) INTRAVENOUS PRN
Status: DISCONTINUED | OUTPATIENT
Start: 2020-05-14 | End: 2020-05-14 | Stop reason: SDUPTHER

## 2020-05-14 RX ORDER — NEOSTIGMINE METHYLSULFATE 5 MG/5 ML
SYRINGE (ML) INTRAVENOUS PRN
Status: DISCONTINUED | OUTPATIENT
Start: 2020-05-14 | End: 2020-05-14 | Stop reason: SDUPTHER

## 2020-05-14 RX ORDER — MAGNESIUM HYDROXIDE 1200 MG/15ML
LIQUID ORAL CONTINUOUS PRN
Status: COMPLETED | OUTPATIENT
Start: 2020-05-14 | End: 2020-05-14

## 2020-05-14 RX ORDER — ROCURONIUM BROMIDE 10 MG/ML
INJECTION, SOLUTION INTRAVENOUS PRN
Status: DISCONTINUED | OUTPATIENT
Start: 2020-05-14 | End: 2020-05-14 | Stop reason: SDUPTHER

## 2020-05-14 RX ORDER — ONDANSETRON 2 MG/ML
INJECTION INTRAMUSCULAR; INTRAVENOUS PRN
Status: DISCONTINUED | OUTPATIENT
Start: 2020-05-14 | End: 2020-05-14 | Stop reason: SDUPTHER

## 2020-05-14 RX ADMIN — AMIODARONE HYDROCHLORIDE 200 MG: 200 TABLET ORAL at 21:43

## 2020-05-14 RX ADMIN — FENTANYL CITRATE 50 MCG: 50 INJECTION, SOLUTION INTRAMUSCULAR; INTRAVENOUS at 13:42

## 2020-05-14 RX ADMIN — SODIUM CHLORIDE, PRESERVATIVE FREE 10 ML: 5 INJECTION INTRAVENOUS at 03:45

## 2020-05-14 RX ADMIN — ANTI-FUNGAL POWDER MICONAZOLE NITRATE TALC FREE: 1.42 POWDER TOPICAL at 09:00

## 2020-05-14 RX ADMIN — SODIUM CHLORIDE, POTASSIUM CHLORIDE, SODIUM LACTATE AND CALCIUM CHLORIDE: 600; 310; 30; 20 INJECTION, SOLUTION INTRAVENOUS at 13:00

## 2020-05-14 RX ADMIN — OXYCODONE HYDROCHLORIDE AND ACETAMINOPHEN 1 TABLET: 5; 325 TABLET ORAL at 03:45

## 2020-05-14 RX ADMIN — OXYCODONE HYDROCHLORIDE AND ACETAMINOPHEN 1 TABLET: 5; 325 TABLET ORAL at 22:29

## 2020-05-14 RX ADMIN — SODIUM CHLORIDE, PRESERVATIVE FREE 10 ML: 5 INJECTION INTRAVENOUS at 21:43

## 2020-05-14 RX ADMIN — HYDROMORPHONE HYDROCHLORIDE 0.5 MG: 1 INJECTION, SOLUTION INTRAMUSCULAR; INTRAVENOUS; SUBCUTANEOUS at 15:29

## 2020-05-14 RX ADMIN — FENTANYL CITRATE 50 MCG: 50 INJECTION, SOLUTION INTRAMUSCULAR; INTRAVENOUS at 14:58

## 2020-05-14 RX ADMIN — LIDOCAINE HYDROCHLORIDE 50 MG: 10 INJECTION, SOLUTION EPIDURAL; INFILTRATION; INTRACAUDAL; PERINEURAL at 13:42

## 2020-05-14 RX ADMIN — ONDANSETRON 4 MG: 2 INJECTION, SOLUTION INTRAMUSCULAR; INTRAVENOUS at 14:35

## 2020-05-14 RX ADMIN — METOPROLOL TARTRATE 12.5 MG: 25 TABLET ORAL at 21:44

## 2020-05-14 RX ADMIN — PROPOFOL 170 MG: 10 INJECTION, EMULSION INTRAVENOUS at 13:42

## 2020-05-14 RX ADMIN — FENTANYL CITRATE 100 MCG: 50 INJECTION, SOLUTION INTRAMUSCULAR; INTRAVENOUS at 13:39

## 2020-05-14 RX ADMIN — ROCURONIUM BROMIDE 50 MG: 10 INJECTION INTRAVENOUS at 13:42

## 2020-05-14 RX ADMIN — FENTANYL CITRATE 50 MCG: 50 INJECTION, SOLUTION INTRAMUSCULAR; INTRAVENOUS at 14:43

## 2020-05-14 RX ADMIN — OXYCODONE HYDROCHLORIDE AND ACETAMINOPHEN 1 TABLET: 5; 325 TABLET ORAL at 16:27

## 2020-05-14 RX ADMIN — Medication 4 MG: at 14:51

## 2020-05-14 RX ADMIN — CEFAZOLIN 2 G: 10 INJECTION, POWDER, FOR SOLUTION INTRAVENOUS at 13:52

## 2020-05-14 RX ADMIN — Medication 0.6 MG: at 14:51

## 2020-05-14 ASSESSMENT — PULMONARY FUNCTION TESTS
PIF_VALUE: 20
PIF_VALUE: 20
PIF_VALUE: 0
PIF_VALUE: 14
PIF_VALUE: 20
PIF_VALUE: 18
PIF_VALUE: 20
PIF_VALUE: 0
PIF_VALUE: 20
PIF_VALUE: 0
PIF_VALUE: 8
PIF_VALUE: 9
PIF_VALUE: 19
PIF_VALUE: 20
PIF_VALUE: 38
PIF_VALUE: 20
PIF_VALUE: 19
PIF_VALUE: 19
PIF_VALUE: 3
PIF_VALUE: 20
PIF_VALUE: 14
PIF_VALUE: 0
PIF_VALUE: 20
PIF_VALUE: 2
PIF_VALUE: 14
PIF_VALUE: 18
PIF_VALUE: 20
PIF_VALUE: 14
PIF_VALUE: 20
PIF_VALUE: 0
PIF_VALUE: 20
PIF_VALUE: 18
PIF_VALUE: 14
PIF_VALUE: 1
PIF_VALUE: 13
PIF_VALUE: 20
PIF_VALUE: 19
PIF_VALUE: 20
PIF_VALUE: 19
PIF_VALUE: 21
PIF_VALUE: 18
PIF_VALUE: 20
PIF_VALUE: 2
PIF_VALUE: 18
PIF_VALUE: 19
PIF_VALUE: 1
PIF_VALUE: 20
PIF_VALUE: 18
PIF_VALUE: 20
PIF_VALUE: 19
PIF_VALUE: 20
PIF_VALUE: 14
PIF_VALUE: 20
PIF_VALUE: 20
PIF_VALUE: 14
PIF_VALUE: 18
PIF_VALUE: 1
PIF_VALUE: 20
PIF_VALUE: 13
PIF_VALUE: 20
PIF_VALUE: 20
PIF_VALUE: 0
PIF_VALUE: 14
PIF_VALUE: 20
PIF_VALUE: 18
PIF_VALUE: 18
PIF_VALUE: 20
PIF_VALUE: 18
PIF_VALUE: 18
PIF_VALUE: 20
PIF_VALUE: 14
PIF_VALUE: 18
PIF_VALUE: 14
PIF_VALUE: 20
PIF_VALUE: 1
PIF_VALUE: 20
PIF_VALUE: 19
PIF_VALUE: 20

## 2020-05-14 ASSESSMENT — PAIN SCALES - GENERAL
PAINLEVEL_OUTOF10: 8
PAINLEVEL_OUTOF10: 8
PAINLEVEL_OUTOF10: 7
PAINLEVEL_OUTOF10: 3
PAINLEVEL_OUTOF10: 2
PAINLEVEL_OUTOF10: 8

## 2020-05-14 NOTE — ANESTHESIA PRE PROCEDURE
intermittent dosing (placeholder)   Other RX Esteban Ramos MD        vancomycin (VANCOCIN) 1500 mg in dextrose 5 % 250 mL IVPB  1,500 mg Intravenous Q12H Vidhya De La Torre MD        miconazole (MICOTIN) 2 % powder   Topical TID PRN Vidhya De La Torre MD        ceFAZolin (ANCEF) 2 g in dextrose 5 % 50 mL IVPB  2 g Intravenous [facility], [facility]        allopurinol (ZYLOPRIM) tablet 100 mg  100 mg Oral Daily Vidhya De La Torre MD   100 mg at 05/13/20 0851    predniSONE (DELTASONE) tablet 40 mg  40 mg Oral Daily Vidhya De La Torre MD   40 mg at 05/13/20 0852    ipratropium-albuterol (DUONEB) nebulizer solution 1 ampule  1 ampule Inhalation Q6H PRN Riley Wall MD        amiodarone (CORDARONE) tablet 200 mg  200 mg Oral BID AZEB Flanagan - NP   200 mg at 05/13/20 2128    metoprolol tartrate (LOPRESSOR) tablet 12.5 mg  12.5 mg Oral BID AZEB Flanagan - NP   12.5 mg at 05/13/20 2128    oxyCODONE-acetaminophen (PERCOCET) 5-325 MG per tablet 1 tablet  1 tablet Oral Q6H PRN Vidhya De La Torre MD   1 tablet at 05/14/20 0345    [Held by provider] apixaban (ELIQUIS) tablet 5 mg  5 mg Oral BID Ashwin Oconnell MD   5 mg at 05/13/20 2128    sodium chloride flush 0.9 % injection 10 mL  10 mL Intravenous PRN Indy Rosen MD   10 mL at 05/07/20 1312    sodium chloride flush 0.9 % injection 10 mL  10 mL Intravenous 2 times per day Z[place]phia Number, APRN - CNP   10 mL at 05/14/20 0345    sodium chloride flush 0.9 % injection 10 mL  10 mL Intravenous PRN Zilphia Number, APRN - CNP        potassium chloride (KLOR-CON M) extended release tablet 40 mEq  40 mEq Oral PRN Zilphia Number, APRN - CNP        Or    potassium bicarb-citric acid (EFFER-K) effervescent tablet 40 mEq  40 mEq Oral PRN Zilphia Number, APRN - CNP        Or    potassium chloride 10 mEq/100 mL IVPB (Peripheral Line)  10 mEq Intravenous PRN Zilphia Number, APRN - CNP        magnesium sulfate 1 g in dextrose 5% 100 mL IVPB

## 2020-05-14 NOTE — PROGRESS NOTES
enlarged, symmetric, no tenderness/mass/nodules  Lungs: Diminished breath sounds bilaterally, no wheezing, rales or rhonchi  Heart: regular rate and rhythm, S1, S2 normal, no murmur, click, rub or gallop  Abdomen: soft, non-tender; bowel sounds normal; no masses,  no organomegaly  Extremities: noted to have swelling of LUE, RUE normal, lower extremities bilaterally normal   Neurologic: Mental status: Alert, oriented, thought content appropriate    Medications:  Scheduled Medications:    ceFAZolin  2 g Intravenous On Call to OR    allopurinol  100 mg Oral Daily    predniSONE  40 mg Oral Daily    amiodarone  200 mg Oral BID    metoprolol tartrate  12.5 mg Oral BID    [Held by provider] apixaban  5 mg Oral BID    sodium chloride flush  10 mL Intravenous 2 times per day    atorvastatin  20 mg Oral Daily    tamsulosin  0.4 mg Oral Daily     Continuous Infusions:     PRN Medicationsmiconazole, , TID PRN  ipratropium-albuterol, 1 ampule, Q6H PRN  oxyCODONE-acetaminophen, 1 tablet, Q6H PRN  sodium chloride flush, 10 mL, PRN  sodium chloride flush, 10 mL, PRN  potassium chloride, 40 mEq, PRN    Or  potassium alternative oral replacement, 40 mEq, PRN    Or  potassium chloride, 10 mEq, PRN  magnesium sulfate, 1 g, PRN  acetaminophen, 650 mg, Q6H PRN    Or  acetaminophen, 650 mg, Q6H PRN  polyethylene glycol, 17 g, Daily PRN  nicotine, 1 patch, Daily PRN  ondansetron, 4 mg, Q6H PRN  hydrALAZINE, 10 mg, Q6H PRN        Diagnostic Labs:  CBC:   Recent Labs     05/13/20  0543 05/13/20  1843 05/14/20  0447   WBC 13.4* 13.9* 11.4*   RBC 3.68* 3.66* 3.66*   HGB 11.4* 11.7* 11.7*   HCT 35.0* 35.6* 35.4*   MCV 95.1 97.3 96.7   RDW 14.6* 14.6* 14.6*    206 257     BMP:   Recent Labs     05/12/20  0550 05/13/20  0543 05/14/20  0447   * 139 135   K 4.7 4.0 4.9    106 102   CO2 18* 22 23   BUN 13 24* 30*   CREATININE 0.87 0.97 0.87     BNP: No results for input(s): BNP in the last 72 hours.   PT/INR: No results

## 2020-05-14 NOTE — BRIEF OP NOTE
Brief Postoperative Note      Patient: Yulissa Balderas  YOB: 1950  MRN: [id number]    Date of Procedure: 5/14/2020    Pre-Op Diagnosis: Right elbow septic arthritis with left knee effusion secondary to acute gouty attack    Post-Op Diagnosis: Same       Procedure(s):  1. RIGHT ELBOW ARTHROTOMY WITH IRRIGATION AND DEBRIDEMENT  2. LEFT KNEE JOINT ASPIRATION    Surgeon(s):  Janette Galindo DO    Assistant:  Resident: Beny Zendejas DO; Ann Marie Erickson DO    Anesthesia: General    Estimated Blood Loss (mL): 10mL    TT: 41min RUE    Fluids: 400cc crystalloids    Complications: None    Specimens:   ID Type Source Tests Collected by Time Destination   1 : LEFT KNEE ASPIRATION Body Fluid Leg BODY FLUID CELL COUNT, CULTURE, ANAEROBIC AND AEROBIC Janette Galindo DO 5/14/2020 1351    2 : RIGHT ELBOW SWAB Swab Elbow BODY FLUID CELL COUNT WITH DIFFERENTIAL, BODY FLUID CRYSTAL, CULTURE, ANAEROBIC AND AEROBIC Janette Galindo DO 5/14/2020 1421        Implants:  * No implants in log *      Drains:   [REMOVED] Urethral Catheter Straight-tip; Non-latex 16 fr (Removed)   $ Urethral catheter insertion $ Not inserted for procedure 5/7/2020  8:00 PM   Catheter Indications Acute urinary retention/obstruction 5/8/2020  8:00 PM   Site Assessment No urethral drainage 5/8/2020  8:00 PM   Urine Color Yellow 5/8/2020  8:00 PM   Urine Appearance Clear 5/8/2020  8:00 PM   Output (mL) 110 mL 5/8/2020 10:00 PM       [REMOVED] External Urinary Catheter (Removed)   Catheter changed  No 5/12/2020  4:05 AM   Urine Color Yellow 5/12/2020  8:00 AM   Urine Appearance Clear 5/12/2020  8:00 AM   Urine Odor Other (Comment) 5/12/2020  8:00 AM   Output (mL) 1400 mL 5/12/2020  3:28 AM   Placement Initiated 5/9/2020  4:00 AM   Skin Assessment No Injury 5/12/2020  8:00 AM       Findings: Right elbow purulent joint effusion secondary to septic arthritis. Significant gouty and arthritic changes noted within the right elbow joint.  31 cc of blood tinged synovial aspirate from left knee sent for crystals, cultures and gram stain. See op note for details.     Electronically signed by Ankush Pulido DO on 5/14/2020 at 3:01 PM

## 2020-05-14 NOTE — FLOWSHEET NOTE
DATE: 2020  NAME: Nash Fiore  MRN: [id number]   : 1950    Discharge Recommendations: Continue to Assess (pending progress)     Subjective: Pt in bed; agreeable to bed exercises only; adamantly refusing to sit EOB. States he is going for a \"procedure in an hour\"   Pain: 3/10 at rest: 8/10 L knee with ROM  Patient follows: All Commands      Therapeutic exercises: Total Knee Arthroplasty Exercise Program: Quad sets, glut sets, ankle pumps, heel slides, hip abd straight leg raise. Reps: 10x each, AAROM LLE, muscle guarding noted with L knee ROM- max VCs for breathing technique  bilateral gastrocnemius stretching 2 reps x 30 seconds  IS x 10 reps, pt achieved ~1000 cc's    Goals  Short Term Goals  Short term goal 1: Pt will be Elia with bed mobility  Short term goal 2: Pt will be Elia transfers  Short term goal 3: Pt will be Elia amb 175' RW  Short term goal 4: Pt will navigate 2 steps Elia       Plan: Progress functional mobility as medically appropriate.    Time In: 1116 am  Time Out: 1133 am  Time Coded Minutes (treatment minutes): 17  Rehab Potential: fair  Treatments/week: 5-6x    Jorie Gitelman Mawhorter, PTA

## 2020-05-14 NOTE — PROGRESS NOTES
(!) 185/94 96.8 °F (36 °C) Temporal 105 18 95 %   05/14/20 1231 133/66 97.2 °F (36.2 °C) Temporal 85 16 95 %         Intake/Output Summary (Last 24 hours) at 5/14/2020 1603  Last data filed at 5/14/2020 1457  Gross per 24 hour   Intake 400 ml   Output 1160 ml   Net -760 ml     Date 05/14/20 0000 - 05/14/20 2359   Shift 2592-6630 5902-4682 8903-4212 24 Hour Total   INTAKE   I.V.(mL/kg)  400(4.1)  400(4.1)   Shift Total(mL/kg)  400(4.1)  400(4.1)   OUTPUT   Urine(mL/kg/hr) 600(0.8) 200(0.3)  800   Blood(mL/kg)  10(0.1)  10(0.1)   Shift Total(mL/kg) 600(6.1) 210(2.1)  810(8.3)   Weight (kg) 98 98 98 98     Wt Readings from Last 3 Encounters:   05/14/20 216 lb 0.8 oz (98 kg)   04/29/20 220 lb 6.4 oz (100 kg)   04/29/20 220 lb 8 oz (100 kg)     Body mass index is 29.3 kg/m². PHYSICAL EXAM:  Head and neck atraumatic, normocephalic    Lymph nodes-no cervical, supraclavicular lymphadenopathy    Neck-no JVP elevation    Lungs - AP diameter of chest increased. Thoracic expansion and diaphragmatic excursion diminished. BS diminished and expiratory phase prolonged. No dullness to percussion or tenderness to palpation. No bronchial breath sounds . CVS- S1, S2 regular. No S3 no S4, no murmurs    Abdomen-nontender, nondistended. Bowel sounds are present. No organomegaly    Lower extremity-no edema    Upper extremity-no edema    Neurological-grossly normal cranial nerves.   No overt motor deficit     Left knee swelling and tenderness  Any additional physical findings:      MEDICATIONS:  Scheduled Meds:   vancomycin (VANCOCIN) intermittent dosing (placeholder)   Other RX Placeholder    vancomycin  1,500 mg Intravenous Q12H    ceFAZolin  2 g Intravenous Q8H    allopurinol  100 mg Oral Daily    predniSONE  40 mg Oral Daily    amiodarone  200 mg Oral BID    metoprolol tartrate  12.5 mg Oral BID    [Held by provider] apixaban  5 mg Oral BID    sodium chloride flush  10 mL Intravenous 2 times per day   

## 2020-05-14 NOTE — PROGRESS NOTES
50 % by heart Model. Technically difficult, cannot comment on specific wall motion. Aortic valve is mildly sclerotic but opens well. No significant valvular regurgitation or stenosis seen.       Objective:   Vitals: /69   Pulse 82   Temp 98.2 °F (36.8 °C) (Oral)   Resp 18   Ht 6' 0.01\" (1.829 m)   Wt 216 lb 0.8 oz (98 kg)   SpO2 98%   BMI 29.30 kg/m²   General appearance: alert and cooperative with exam  HEENT: Head: Normocephalic, no lesions, without obvious abnormality. Neck:no JVD, trachea midline, no adenopathy  Lungs: +wheeze noted. Heart: Regular rate and rhythm, s1/s2 auscultated, no murmurs  Abdomen: soft, non-tender, bowel sounds active  Extremities: Left arm edema noted -DVT per vascular dopplers. Neurologic: not done        Assessment / Acute Cardiac Problems:   1. Preop Risk  2. Septic shock- resolved  2. New onset atrial fibrillation. [id number] Score: 2 - back to NSR  3. History of small cell lung cancer  4. History of essential hypertension  5. History of hyperlipidemia  6. History of coronary artery disease s/p stents   7. Gout with plan for I and D. Plan of Treatment:   1. Atrial Fib converted to NSR. Continue PO amiodarone, BB, and Eliquis. [id number] Score: 2     2. Hypertension stable. Continue BB and continue proamatine  3. Reviewed echo LVEF 50%    4. Intermediate risk for I and D from cardiac standpoint based on LVEF and functional status prior to admission. Can hold Eliquis for 2 days prior, resume as soon as able. Can follow up in 2 weeks post discharge. Discussed with patient and nursing. Thank you for allowing me to participate in the care of this patient, please do not hesitate to call if you have any questions. Michelle France DO, [facility] [place], [facility]  [email or web address]  [phone]

## 2020-05-14 NOTE — PROGRESS NOTES
216 lb (98 kg)  · Admission Body Wt: 223 lb (101.2 kg)  · % Weight Change:  ,  220-231 lbs over 1 yr per EMR  · Ideal Body Wt: 178 lb (80.7 kg), % Ideal Body 125% adm/ideal  · BMI Classification: BMI 25.0 - 29.9 Overweight    Nutrition Interventions:   Continue NPO(Restart diet as able; suggest ensure enlive supplements BID as able )  Continued Inpatient Monitoring, Education Not Indicated    Nutrition Evaluation:   · Evaluation: Goals set   · Goals: Restart diet within 24-72 hrs     · Monitoring: Nutrition Progression, I&O, Skin Integrity, Weight, Pertinent Labs, Monitor Bowel Function      Electronically signed by Janet Alvarez RD, LD on 5/14/20 at 10:41 AM EDT    Contact Number: [phone]

## 2020-05-14 NOTE — PLAN OF CARE
Patient remained free of falls and injury, patient's oxygen saturations WNL on room air. Patient remained afebrile, with stable vital signs.   Scheduled for OR today for washouts

## 2020-05-15 LAB
ABSOLUTE EOS #: 0 K/UL (ref 0–0.44)
ABSOLUTE IMMATURE GRANULOCYTE: 0.13 K/UL (ref 0–0.3)
ABSOLUTE LYMPH #: 0.66 K/UL (ref 1.1–3.7)
ABSOLUTE MONO #: 2.62 K/UL (ref 0.1–1.2)
ANION GAP SERPL CALCULATED.3IONS-SCNC: 11 MMOL/L (ref 9–17)
APPEARANCE FLUID: NORMAL
APPEARANCE FLUID: NORMAL
BASO FLUID: NORMAL %
BASO FLUID: NORMAL %
BASOPHILS # BLD: 0 % (ref 0–2)
BASOPHILS ABSOLUTE: 0 K/UL (ref 0–0.2)
BUN BLDV-MCNC: 30 MG/DL (ref 8–23)
BUN/CREAT BLD: ABNORMAL (ref 9–20)
CALCIUM SERPL-MCNC: 8.3 MG/DL (ref 8.6–10.4)
CHLORIDE BLD-SCNC: 101 MMOL/L (ref 98–107)
CO2: 22 MMOL/L (ref 20–31)
COLOR FLUID: NORMAL
COLOR FLUID: NORMAL
CREAT SERPL-MCNC: 1.06 MG/DL (ref 0.7–1.2)
CRYSTALS, FLUID: POSITIVE
DIFFERENTIAL TYPE: ABNORMAL
EOSINOPHIL FLUID: NORMAL %
EOSINOPHIL FLUID: NORMAL %
EOSINOPHILS RELATIVE PERCENT: 0 % (ref 1–4)
FLUID DIFF COMMENT: NORMAL
FLUID DIFF COMMENT: NORMAL
GFR AFRICAN AMERICAN: >60 ML/MIN
GFR NON-AFRICAN AMERICAN: >60 ML/MIN
GFR SERPL CREATININE-BSD FRML MDRD: ABNORMAL ML/MIN/{1.73_M2}
GFR SERPL CREATININE-BSD FRML MDRD: ABNORMAL ML/MIN/{1.73_M2}
GLUCOSE BLD-MCNC: 108 MG/DL (ref 70–99)
HCT VFR BLD CALC: 35 % (ref 40.7–50.3)
HEMOGLOBIN: 11.3 G/DL (ref 13–17)
IMMATURE GRANULOCYTES: 1 %
LYMPHOCYTES # BLD: 5 % (ref 24–43)
LYMPHOCYTES, BODY FLUID: 1 %
LYMPHOCYTES, BODY FLUID: 1 %
MCH RBC QN AUTO: 31.8 PG (ref 25.2–33.5)
MCHC RBC AUTO-ENTMCNC: 32.3 G/DL (ref 28.4–34.8)
MCV RBC AUTO: 98.6 FL (ref 82.6–102.9)
MONOCYTE, FLUID: NORMAL %
MONOCYTE, FLUID: NORMAL %
MONOCYTES # BLD: 20 % (ref 3–12)
MORPHOLOGY: NORMAL
NEUTROPHIL, FLUID: 94 %
NEUTROPHIL, FLUID: 96 %
NRBC AUTOMATED: 0 PER 100 WBC
OTHER CELLS FLUID: NORMAL %
OTHER CELLS FLUID: NORMAL %
PDW BLD-RTO: 14.7 % (ref 11.8–14.4)
PLATELET # BLD: 310 K/UL (ref 138–453)
PLATELET ESTIMATE: ABNORMAL
PMV BLD AUTO: 9.6 FL (ref 8.1–13.5)
POTASSIUM SERPL-SCNC: 4.3 MMOL/L (ref 3.7–5.3)
RBC # BLD: 3.55 M/UL (ref 4.21–5.77)
RBC # BLD: ABNORMAL 10*6/UL
RBC FLUID: <3000 /MM3
RBC FLUID: NORMAL /MM3
SEG NEUTROPHILS: 74 % (ref 36–65)
SEGMENTED NEUTROPHILS ABSOLUTE COUNT: 9.69 K/UL (ref 1.5–8.1)
SODIUM BLD-SCNC: 134 MMOL/L (ref 135–144)
SPECIMEN TYPE: ABNORMAL
SPECIMEN TYPE: NORMAL
SPECIMEN TYPE: NORMAL
WBC # BLD: 13.1 K/UL (ref 3.5–11.3)
WBC # BLD: ABNORMAL 10*3/UL
WBC FLUID: NORMAL /MM3
WBC FLUID: NORMAL /MM3

## 2020-05-15 PROCEDURE — 85025 COMPLETE CBC W/AUTO DIFF WBC: CPT

## 2020-05-15 PROCEDURE — 99254 IP/OBS CNSLTJ NEW/EST MOD 60: CPT | Performed by: INTERNAL MEDICINE

## 2020-05-15 PROCEDURE — 6360000002 HC RX W HCPCS: Performed by: STUDENT IN AN ORGANIZED HEALTH CARE EDUCATION/TRAINING PROGRAM

## 2020-05-15 PROCEDURE — 97110 THERAPEUTIC EXERCISES: CPT

## 2020-05-15 PROCEDURE — 97530 THERAPEUTIC ACTIVITIES: CPT

## 2020-05-15 PROCEDURE — 80048 BASIC METABOLIC PNL TOTAL CA: CPT

## 2020-05-15 PROCEDURE — 6370000000 HC RX 637 (ALT 250 FOR IP): Performed by: STUDENT IN AN ORGANIZED HEALTH CARE EDUCATION/TRAINING PROGRAM

## 2020-05-15 PROCEDURE — 97535 SELF CARE MNGMENT TRAINING: CPT

## 2020-05-15 PROCEDURE — 99232 SBSQ HOSP IP/OBS MODERATE 35: CPT | Performed by: INTERNAL MEDICINE

## 2020-05-15 PROCEDURE — 36415 COLL VENOUS BLD VENIPUNCTURE: CPT

## 2020-05-15 PROCEDURE — 1200000000 HC SEMI PRIVATE

## 2020-05-15 PROCEDURE — 2580000003 HC RX 258: Performed by: STUDENT IN AN ORGANIZED HEALTH CARE EDUCATION/TRAINING PROGRAM

## 2020-05-15 RX ADMIN — VANCOMYCIN HYDROCHLORIDE 1500 MG: 10 INJECTION, POWDER, LYOPHILIZED, FOR SOLUTION INTRAVENOUS at 14:20

## 2020-05-15 RX ADMIN — ATORVASTATIN CALCIUM 20 MG: 10 TABLET, FILM COATED ORAL at 08:57

## 2020-05-15 RX ADMIN — DEXTROSE MONOHYDRATE 2 G: 50 INJECTION, SOLUTION INTRAVENOUS at 13:12

## 2020-05-15 RX ADMIN — PREDNISONE 40 MG: 20 TABLET ORAL at 08:57

## 2020-05-15 RX ADMIN — TAMSULOSIN HYDROCHLORIDE 0.4 MG: 0.4 CAPSULE ORAL at 08:57

## 2020-05-15 RX ADMIN — DEXTROSE MONOHYDRATE 2 G: 50 INJECTION, SOLUTION INTRAVENOUS at 04:34

## 2020-05-15 RX ADMIN — METOPROLOL TARTRATE 12.5 MG: 25 TABLET ORAL at 08:58

## 2020-05-15 RX ADMIN — VANCOMYCIN HYDROCHLORIDE 1500 MG: 10 INJECTION, POWDER, LYOPHILIZED, FOR SOLUTION INTRAVENOUS at 00:33

## 2020-05-15 RX ADMIN — METOPROLOL TARTRATE 12.5 MG: 25 TABLET ORAL at 20:09

## 2020-05-15 RX ADMIN — OXYCODONE HYDROCHLORIDE AND ACETAMINOPHEN 1 TABLET: 5; 325 TABLET ORAL at 04:33

## 2020-05-15 RX ADMIN — AMIODARONE HYDROCHLORIDE 200 MG: 200 TABLET ORAL at 08:58

## 2020-05-15 RX ADMIN — OXYCODONE HYDROCHLORIDE AND ACETAMINOPHEN 1 TABLET: 5; 325 TABLET ORAL at 10:36

## 2020-05-15 RX ADMIN — OXYCODONE HYDROCHLORIDE AND ACETAMINOPHEN 1 TABLET: 5; 325 TABLET ORAL at 18:20

## 2020-05-15 RX ADMIN — AMIODARONE HYDROCHLORIDE 200 MG: 200 TABLET ORAL at 20:08

## 2020-05-15 RX ADMIN — ALLOPURINOL 100 MG: 100 TABLET ORAL at 08:58

## 2020-05-15 ASSESSMENT — PAIN DESCRIPTION - FREQUENCY: FREQUENCY: CONTINUOUS

## 2020-05-15 ASSESSMENT — PAIN DESCRIPTION - LOCATION
LOCATION: SHOULDER;ELBOW;KNEE
LOCATION: SHOULDER;ELBOW;KNEE

## 2020-05-15 ASSESSMENT — PAIN DESCRIPTION - PAIN TYPE
TYPE: ACUTE PAIN
TYPE: ACUTE PAIN

## 2020-05-15 ASSESSMENT — PAIN SCALES - GENERAL
PAINLEVEL_OUTOF10: 7
PAINLEVEL_OUTOF10: 6
PAINLEVEL_OUTOF10: 8
PAINLEVEL_OUTOF10: 6
PAINLEVEL_OUTOF10: 8

## 2020-05-15 ASSESSMENT — PAIN DESCRIPTION - ORIENTATION: ORIENTATION: OTHER (COMMENT)

## 2020-05-15 ASSESSMENT — PAIN DESCRIPTION - DESCRIPTORS: DESCRIPTORS: CONSTANT;STABBING

## 2020-05-15 NOTE — PLAN OF CARE
Problem: Falls - Risk of:  Goal: Will remain free from falls  Description: Will remain free from falls  5/15/2020 1629 by Charmayne Sickles, RN  Outcome: Met This Shift  5/15/2020 0421 by José Bell RN  Outcome: Ongoing  Goal: Absence of physical injury  Description: Absence of physical injury  5/15/2020 1629 by Charmayne Sickles, RN  Outcome: Met This Shift  5/15/2020 0421 by José Bell RN  Outcome: Ongoing     Problem: Gas Exchange - Impaired:  Goal: Levels of oxygenation will improve  Description: Levels of oxygenation will improve  5/15/2020 1629 by Charmayne Sickles, RN  Outcome: Met This Shift  5/15/2020 0421 by José Bell RN  Outcome: Ongoing     Problem: Infection, Septic Shock:  Goal: Will show no infection signs and symptoms  Description: Will show no infection signs and symptoms  5/15/2020 1629 by Charmayne Sickles, RN  Outcome: Met This Shift  5/15/2020 0421 by José Bell RN  Outcome: Ongoing     Problem: Fluid Volume:  Goal: Signs and symptoms of dehydration will decrease  Description: Signs and symptoms of dehydration will decrease  5/15/2020 1629 by Charmayne Sickles, RN  Outcome: Met This Shift  5/15/2020 0421 by José Bell RN  Outcome: Ongoing  Goal: Ability to achieve a balanced intake and output will improve  Description: Ability to achieve a balanced intake and output will improve  5/15/2020 1629 by Charmayne Sickles, RN  Outcome: Met This Shift  5/15/2020 0421 by José Bell RN  Outcome: Ongoing  Goal: Diagnostic test results will improve  Description: Diagnostic test results will improve  5/15/2020 1629 by Charmayne Sickles, RN  Outcome: Met This Shift  5/15/2020 0421 by José Bell RN  Outcome: Ongoing     Problem: Cardiac Output - Decreased:  Goal: Hemodynamic stability will improve  Description: Hemodynamic stability will improve  5/15/2020 1629 by Charmayne Sickles, RN  Outcome: Met This Shift  5/15/2020 0421 by José Bell RN  Outcome: Ongoing     Problem: Musculor/Skeletal Functional Status  Goal: Highest potential functional level  5/15/2020 1629 by Obdulia Harris RN  Outcome: Met This Shift  5/15/2020 0421 by Catherine Sampson RN  Outcome: Ongoing     Problem:  Activity:  Goal: Ability to tolerate increased activity will improve  Description: Ability to tolerate increased activity will improve  5/15/2020 1629 by Obdulia Harris RN  Outcome: Met This Shift  5/15/2020 0421 by Catherine Sampson RN  Outcome: Ongoing  Goal: Ability to implement measures to reduce episodes of fatigue will improve  Description: Ability to implement measures to reduce episodes of fatigue will improve  5/15/2020 1629 by Obdulia Harris RN  Outcome: Met This Shift  5/15/2020 0421 by Catherine Sampson RN  Outcome: Ongoing     Problem: Coping:  Goal: Ability to cope will improve  Description: Ability to cope will improve  5/15/2020 1629 by Obdulia Harris RN  Outcome: Met This Shift  5/15/2020 0421 by Catherine Sampson RN  Outcome: Ongoing     Problem: Nutritional:  Goal: Nutritional status will improve  Description: Nutritional status will improve  5/15/2020 1629 by Obdulia Harris RN  Outcome: Met This Shift  5/15/2020 0421 by Catherine Sampson RN  Outcome: Ongoing     Problem: Safety:  Goal: Ability to remain free from injury will improve  Description: Ability to remain free from injury will improve  5/15/2020 1629 by Obdulia Harris RN  Outcome: Met This Shift  5/15/2020 0421 by Catherine Sampson RN  Outcome: Ongoing     Problem: Cardiac:  Goal: Ability to maintain an adequate cardiac output will improve  Description: Ability to maintain an adequate cardiac output will improve  5/15/2020 1629 by Obdulia Harris RN  Outcome: Met This Shift  5/15/2020 0421 by Catherine Sampson RN  Outcome: Ongoing  Goal: Complications related to the disease process, condition or treatment will be avoided or minimized  Description: Complications related to the disease process, condition or treatment will be avoided or minimized  5/15/2020 1629 by Obdulia Harris RN  Outcome: Met This Shift  5/15/2020 0421 by River Mayorga RN  Outcome: Ongoing     Problem: Nutrition  Goal: Optimal nutrition therapy  5/15/2020 1629 by Heriberto Paul RN  Outcome: Met This Shift  5/15/2020 0421 by River Mayorga RN  Outcome: Ongoing     Problem: Discharge Planning:  Goal: Participates in care planning  Description: Participates in care planning  5/15/2020 1629 by Heriberto Paul RN  Outcome: Ongoing  5/15/2020 0421 by River Mayorga RN  Outcome: Ongoing  Goal: Discharged to appropriate level of care  Description: Discharged to appropriate level of care  5/15/2020 1629 by Heriberto Paul RN  Outcome: Ongoing  5/15/2020 0421 by River Mayorga RN  Outcome: Ongoing     Problem: Anxiety/Stress:  Goal: Level of anxiety will decrease  Description: Level of anxiety will decrease  5/15/2020 1629 by Heriberto Paul RN  Outcome: Ongoing  5/15/2020 0421 by River Mayorga RN  Outcome: Ongoing     Problem: Pain:  Goal: Pain level will decrease  Description: Pain level will decrease  5/15/2020 1629 by Heriberto Paul RN  Outcome: Ongoing  5/15/2020 0421 by River Mayorga RN  Outcome: Ongoing  Goal: Control of acute pain  Description: Control of acute pain  5/15/2020 1629 by Heriberto Paul RN  Outcome: Ongoing  5/15/2020 0421 by River Mayorga RN  Outcome: Ongoing  Goal: Control of chronic pain  Description: Control of chronic pain  5/15/2020 1629 by Heriberto Paul RN  Outcome: Ongoing  5/15/2020 0421 by River Mayorga RN  Outcome: Ongoing

## 2020-05-15 NOTE — CARE COORDINATION
Call to [facility] central intake, still waiting to see if precert received.   No answer, message left req call back

## 2020-05-15 NOTE — PROGRESS NOTES
40 mEq, PRN    Or  potassium chloride, 10 mEq, PRN  magnesium sulfate, 1 g, PRN  acetaminophen, 650 mg, Q6H PRN    Or  acetaminophen, 650 mg, Q6H PRN  polyethylene glycol, 17 g, Daily PRN  nicotine, 1 patch, Daily PRN  ondansetron, 4 mg, Q6H PRN  hydrALAZINE, 10 mg, Q6H PRN        SUPPORT DEVICES: [] Ventilator [] BIPAP  [] Nasal Cannula [x] Room Air    VENT SETTINGS (Comprehensive) (if applicable):  Vent Information  SpO2: 97 %  Additional Respiratory  Assessments  Pulse: 79  Resp: 18  SpO2: 97 %  Oral Care: Other (Comment)(refused)    ABGs:     Lab Results   Component Value Date    JXR5QMK 15 05/07/2020    FIO2 UNKNOWN 05/12/2020     Lactic Acid:   Lab Results   Component Value Date    LACTA 2.3 05/07/2020    LACTA 3.7 05/07/2020    LACTA 2.5 05/07/2020       DATA:  Complete Blood Count:   Recent Labs     05/13/20  1843 05/14/20  0447 05/15/20  0606   WBC 13.9* 11.4* 13.1*   HGB 11.7* 11.7* 11.3*   MCV 97.3 96.7 98.6    257 310   RBC 3.66* 3.66* 3.55*   HCT 35.6* 35.4* 35.0*   MCH 32.0 32.0 31.8   MCHC 32.9 33.1 32.3   RDW 14.6* 14.6* 14.7*   MPV 10.3 10.3 9.6        PT/INR:    Lab Results   Component Value Date    PROTIME 15.4 05/08/2020    INR 1.5 05/08/2020     PTT:    Lab Results   Component Value Date    APTT 72.8 05/09/2020       Basal Metabolic Profile:   Recent Labs     05/13/20  0543 05/14/20  0447 05/15/20  0606    135 134*   K 4.0 4.9 4.3   BUN 24* 30* 30*   CREATININE 0.97 0.87 1.06    102 101   CO2 22 23 22      Magnesium:   Lab Results   Component Value Date    MG 2.1 05/10/2020    MG 1.9 05/09/2020    MG 2.0 04/09/2019     Phosphorus:   Lab Results   Component Value Date    PHOS 2.3 05/10/2020     S. Calcium:  Recent Labs     05/15/20  0606   CALCIUM 8.3*     S. Ionized Calcium:No results for input(s): IONCA in the last 72 hours.       Urinalysis:   Lab Results   Component Value Date    NITRU NEGATIVE 05/12/2020    COLORU YELLOW 05/12/2020    PHUR 6.0 05/12/2020    WBCUA None radiation fibrosis rt lung  Left knee effusion secondary to acute gout repeat cultures sent in OR negative   Right elbow septic arthritis post I&D and debridement 5/14/20   PLAN:     Agree with id consult   Continue antibiotics   Gout tt   Cont bronchodilator   Will sign off   Electronically signed by Sherly Nation MD on 5/15/2020 at 12:16 PM

## 2020-05-15 NOTE — PROGRESS NOTES
Orthopedic Progress Note    Patient:  Osei Chris  YOB: 1950     77 y.o. male    Subjective:  Patient seen and examined at bedside. Patient reports that he has had some mild improvement in right elbow pain. Left knee remains quite painful   No issue overnight per nursing. Denies fever, HA, CP, SOB, N/V, dysuria, numbness/tingling. Objective:   Vitals:    05/15/20 0433   BP: 107/65   Pulse: 83   Resp: 18   Temp: 98.7 °F (37.1 °C)   SpO2: 97%     Gen: NAD, cooperative     RUE: Dressings to right elbow are c/d/i. Pain with attempted ROM of elbow. Tolerates ROM approximately 70-90. Compartments soft and compressible. 2+ rad pulse. Median/Radial/Ulnar/AIN/PIN motor intact. Median/Radial/Ulnar nerve SILT. LLE: Skin intact. Minimal effusion to left knee. No erythema to the left knee. Skin was intact. Significant TTP of the left knee globally. Compartments soft and compressible. EHL/FHL 5/5.  GCS 4/5 and TA 4/5.  Superificial/deep peroneal, sural/saphenous and plantar nerve distribution SILT. Foot and toes warm and well-perfused w/ BCR; DP pulses 2+. -log roll. Unable to perform AROM due to pain. Severe pain with PROM of knee. Recent Labs     05/14/20  0447   WBC 11.4*   HGB 11.7*   HCT 35.4*         K 4.9   BUN 30*   CREATININE 0.87   GLUCOSE 124*      Meds: Ancef, Vanc   See rec for complete list    Impression/plan: 77 y.o. male being seen for right elbow septic arthritis secondary to gout exacerbation and left knee gout exacerbation      -Right elbow intra-op cultures are NGTD  -L knee aspiration 34k PMN 95% Culture NGTD  -Recommend initiating gout treatment  -Will follow up CRP tomorrow   -Abx: Ancef/Vanco managed per primary  -WB status: WBAT, AAT to RUE  -Maintain dressings do not remove. Notify if dressings saturate  -Pain control and medical management per primary   -DVT ppx: Managed per primary.  Ok to resume from ortho perspective  -Ice/Elevate

## 2020-05-15 NOTE — PROGRESS NOTES
appearance: alert and cooperative with exam  HEENT: Head: Normocephalic, no lesions, without obvious abnormality. Neck:no JVD, trachea midline, no adenopathy  Lungs: Clear to auscultation  Heart: Regular rate and rhythm, s1/s2 auscultated, no murmurs. SR   Abdomen: soft, non-tender, bowel sounds active  Extremities: no edema  Neurologic: not done        Assessment / Acute Cardiac Problems:   1. Preop cardiac clearance. 2. A fib converted to NSR  [id number] Score: 2  3. Hypertension  4. H/o CAD s/p stents    Patient Active Problem List:     Primary lung cancer with metastasis from lung to other site, right ([address]     Dehydration     Hyperlipidemia     Hypertension     Lung cancer ([facility]     Sepsis ([facility]     Atrial fibrillation with RVR ([facility]     Gout      Plan of Treatment:   1. A fib converted to NSR. Continue PO amiodarone, BB and resume eliquis as soon as able. 2. Hypertension stable continue BB  3. Follow up outpatient in clinic 2 weeks post discharge.     Electronically signed by AZEB Hernandez NP on 5/15/2020 at 12:24 PM  [address]  [phone]

## 2020-05-15 NOTE — CONSULTS
Note:  · Labs, medications, radiologic studies were reviewed with personal review of films  · Large amounts of data were reviewed  · Discussed with nursing Staff, Discharge planner  · Infection Control and Prevention measures reviewed  · All prior entries were reviewed  · Administer medications as ordered  · Prognosis: Good  · Discharge planning reviewed  · Follow up as outpatient. Thank you for allowing us to participate in the care of this patient. Please call with questions.     Iraj Hennessy MD  Pager: [phone] - Office: [phone]

## 2020-05-16 LAB
ABSOLUTE EOS #: 0 K/UL (ref 0–0.44)
ABSOLUTE IMMATURE GRANULOCYTE: 0.25 K/UL (ref 0–0.3)
ABSOLUTE LYMPH #: 0.87 K/UL (ref 1.1–3.7)
ABSOLUTE MONO #: 2.23 K/UL (ref 0.1–1.2)
ANION GAP SERPL CALCULATED.3IONS-SCNC: 15 MMOL/L (ref 9–17)
BASOPHILS # BLD: 0 % (ref 0–2)
BASOPHILS ABSOLUTE: 0 K/UL (ref 0–0.2)
BUN BLDV-MCNC: 30 MG/DL (ref 8–23)
BUN/CREAT BLD: ABNORMAL (ref 9–20)
C-REACTIVE PROTEIN: 227.1 MG/L (ref 0–5)
CALCIUM SERPL-MCNC: 9.2 MG/DL (ref 8.6–10.4)
CHLORIDE BLD-SCNC: 102 MMOL/L (ref 98–107)
CO2: 19 MMOL/L (ref 20–31)
CREAT SERPL-MCNC: 0.87 MG/DL (ref 0.7–1.2)
DIFFERENTIAL TYPE: ABNORMAL
EOSINOPHILS RELATIVE PERCENT: 0 % (ref 1–4)
GFR AFRICAN AMERICAN: >60 ML/MIN
GFR NON-AFRICAN AMERICAN: >60 ML/MIN
GFR SERPL CREATININE-BSD FRML MDRD: ABNORMAL ML/MIN/{1.73_M2}
GFR SERPL CREATININE-BSD FRML MDRD: ABNORMAL ML/MIN/{1.73_M2}
GLUCOSE BLD-MCNC: 89 MG/DL (ref 70–99)
HCT VFR BLD CALC: 35.5 % (ref 40.7–50.3)
HEMOGLOBIN: 11.4 G/DL (ref 13–17)
IMMATURE GRANULOCYTES: 2 %
LYMPHOCYTES # BLD: 7 % (ref 24–43)
MCH RBC QN AUTO: 31.7 PG (ref 25.2–33.5)
MCHC RBC AUTO-ENTMCNC: 32.1 G/DL (ref 28.4–34.8)
MCV RBC AUTO: 98.6 FL (ref 82.6–102.9)
MONOCYTES # BLD: 18 % (ref 3–12)
MORPHOLOGY: ABNORMAL
NRBC AUTOMATED: 0 PER 100 WBC
OSMOLALITY URINE: 459 MOSM/KG (ref 80–1300)
PDW BLD-RTO: 14.6 % (ref 11.8–14.4)
PLATELET # BLD: 351 K/UL (ref 138–453)
PLATELET ESTIMATE: ABNORMAL
PMV BLD AUTO: 9.4 FL (ref 8.1–13.5)
POTASSIUM SERPL-SCNC: 4.2 MMOL/L (ref 3.7–5.3)
RBC # BLD: 3.6 M/UL (ref 4.21–5.77)
RBC # BLD: ABNORMAL 10*6/UL
SEG NEUTROPHILS: 73 % (ref 36–65)
SEGMENTED NEUTROPHILS ABSOLUTE COUNT: 9.05 K/UL (ref 1.5–8.1)
SERUM OSMOLALITY: 291 MOSM/KG (ref 275–295)
SODIUM BLD-SCNC: 136 MMOL/L (ref 135–144)
SODIUM,UR: 41 MMOL/L
WBC # BLD: 12.4 K/UL (ref 3.5–11.3)
WBC # BLD: ABNORMAL 10*3/UL

## 2020-05-16 PROCEDURE — 6370000000 HC RX 637 (ALT 250 FOR IP): Performed by: STUDENT IN AN ORGANIZED HEALTH CARE EDUCATION/TRAINING PROGRAM

## 2020-05-16 PROCEDURE — 83930 ASSAY OF BLOOD OSMOLALITY: CPT

## 2020-05-16 PROCEDURE — 36415 COLL VENOUS BLD VENIPUNCTURE: CPT

## 2020-05-16 PROCEDURE — 85025 COMPLETE CBC W/AUTO DIFF WBC: CPT

## 2020-05-16 PROCEDURE — 86140 C-REACTIVE PROTEIN: CPT

## 2020-05-16 PROCEDURE — 99232 SBSQ HOSP IP/OBS MODERATE 35: CPT | Performed by: INTERNAL MEDICINE

## 2020-05-16 PROCEDURE — 97535 SELF CARE MNGMENT TRAINING: CPT

## 2020-05-16 PROCEDURE — 2580000003 HC RX 258: Performed by: STUDENT IN AN ORGANIZED HEALTH CARE EDUCATION/TRAINING PROGRAM

## 2020-05-16 PROCEDURE — 97530 THERAPEUTIC ACTIVITIES: CPT

## 2020-05-16 PROCEDURE — 84300 ASSAY OF URINE SODIUM: CPT

## 2020-05-16 PROCEDURE — 1200000000 HC SEMI PRIVATE

## 2020-05-16 PROCEDURE — 80048 BASIC METABOLIC PNL TOTAL CA: CPT

## 2020-05-16 PROCEDURE — 83935 ASSAY OF URINE OSMOLALITY: CPT

## 2020-05-16 PROCEDURE — 97110 THERAPEUTIC EXERCISES: CPT

## 2020-05-16 RX ORDER — AMIODARONE HYDROCHLORIDE 200 MG/1
200 TABLET ORAL 2 TIMES DAILY
Qty: 30 TABLET | Refills: 3 | Status: SHIPPED | OUTPATIENT
Start: 2020-05-16 | End: 2020-08-25 | Stop reason: ALTCHOICE

## 2020-05-16 RX ADMIN — AMIODARONE HYDROCHLORIDE 200 MG: 200 TABLET ORAL at 20:28

## 2020-05-16 RX ADMIN — TAMSULOSIN HYDROCHLORIDE 0.4 MG: 0.4 CAPSULE ORAL at 09:32

## 2020-05-16 RX ADMIN — ALLOPURINOL 100 MG: 100 TABLET ORAL at 09:33

## 2020-05-16 RX ADMIN — PREDNISONE 40 MG: 20 TABLET ORAL at 09:32

## 2020-05-16 RX ADMIN — OXYCODONE HYDROCHLORIDE AND ACETAMINOPHEN 1 TABLET: 5; 325 TABLET ORAL at 07:18

## 2020-05-16 RX ADMIN — OXYCODONE HYDROCHLORIDE AND ACETAMINOPHEN 1 TABLET: 5; 325 TABLET ORAL at 01:11

## 2020-05-16 RX ADMIN — METOPROLOL TARTRATE 12.5 MG: 25 TABLET ORAL at 09:32

## 2020-05-16 RX ADMIN — SODIUM CHLORIDE, PRESERVATIVE FREE 10 ML: 5 INJECTION INTRAVENOUS at 20:29

## 2020-05-16 RX ADMIN — SODIUM CHLORIDE, PRESERVATIVE FREE 10 ML: 5 INJECTION INTRAVENOUS at 09:32

## 2020-05-16 RX ADMIN — METOPROLOL TARTRATE 12.5 MG: 25 TABLET ORAL at 20:28

## 2020-05-16 RX ADMIN — OXYCODONE HYDROCHLORIDE AND ACETAMINOPHEN 1 TABLET: 5; 325 TABLET ORAL at 14:11

## 2020-05-16 RX ADMIN — AMIODARONE HYDROCHLORIDE 200 MG: 200 TABLET ORAL at 09:33

## 2020-05-16 RX ADMIN — ATORVASTATIN CALCIUM 20 MG: 10 TABLET, FILM COATED ORAL at 09:33

## 2020-05-16 RX ADMIN — APIXABAN 5 MG: 5 TABLET, FILM COATED ORAL at 09:33

## 2020-05-16 RX ADMIN — APIXABAN 5 MG: 5 TABLET, FILM COATED ORAL at 20:27

## 2020-05-16 RX ADMIN — OXYCODONE HYDROCHLORIDE AND ACETAMINOPHEN 1 TABLET: 5; 325 TABLET ORAL at 20:28

## 2020-05-16 RX ADMIN — APIXABAN 5 MG: 5 TABLET, FILM COATED ORAL at 01:11

## 2020-05-16 ASSESSMENT — PAIN DESCRIPTION - DESCRIPTORS
DESCRIPTORS: ACHING;DISCOMFORT;SORE;SHARP
DESCRIPTORS: CONSTANT;DISCOMFORT

## 2020-05-16 ASSESSMENT — PAIN DESCRIPTION - FREQUENCY
FREQUENCY: INTERMITTENT
FREQUENCY: CONTINUOUS

## 2020-05-16 ASSESSMENT — PAIN SCALES - GENERAL
PAINLEVEL_OUTOF10: 9
PAINLEVEL_OUTOF10: 8

## 2020-05-16 ASSESSMENT — PAIN DESCRIPTION - LOCATION
LOCATION: ELBOW;KNEE;SHOULDER
LOCATION: ELBOW;KNEE

## 2020-05-16 ASSESSMENT — PAIN DESCRIPTION - ORIENTATION
ORIENTATION: OTHER (COMMENT)
ORIENTATION: RIGHT;LEFT

## 2020-05-16 ASSESSMENT — PAIN SCALES - WONG BAKER: WONGBAKER_NUMERICALRESPONSE: 8

## 2020-05-16 ASSESSMENT — PAIN DESCRIPTION - PAIN TYPE
TYPE: ACUTE PAIN
TYPE: ACUTE PAIN

## 2020-05-16 NOTE — PROGRESS NOTES
Location: Elbow;Knee  Pain Orientation: Right;Left(R elbow; L knee.)  Pain Descriptors: Aching;Discomfort;Sore;Sharp  Pain Frequency: Intermittent  Non-Pharmaceutical Pain Intervention(s): Therapeutic presence;Repositioned; Rest  Vital Signs  Patient Currently in Pain: Yes   Orientation  Orientation  Overall Orientation Status: Within Functional Limits  Objective    ADL  Grooming: Setup; Increased time to complete;Contact guard assistance(Oral care/denture care seated in chair.)  UE Bathing: Setup; Increased time to complete;Contact guard assistance(Pt CGA for chest. Mod A for LUE and under R arm seated in chair. Max A for back.)  LE Bathing: Setup; Increased time to complete;Contact guard assistance(Hips to knees seated in chair.)  UE Dressing: Minimal assistance(To manage gown.)  LE Dressing: Maximum assistance  Toileting: Setup; Increased time to complete;Contact guard assistance(Pericare seated in chair.)  Additional Comments: Pt completed ADL care seated in chair, surgical soap used appropriately. Pt able to use urinal seated in chair supervision. Functional Mobility  Functional Mobility Comments: Pt declined standing this day d/t pain/discomfort from just finishing with PT.   Cognition  Overall Cognitive Status: [facility]    Plan   Plan  Times per week: 3-5x/wk   Current Treatment Recommendations: Functional Mobility Training, Endurance Training, Safety Education & Training, Patient/Caregiver Education & Training, Equipment Evaluation, Education, & procurement, Self-Care / ADL  Goals  Short term goals  Time Frame for Short term goals: by discharge, pt will  Short term goal 1: demo I in UE ADL activities  Short term goal 2: demo mod I for LE ADL activities with AD as needed  Short term goal 3: demo I in functional transfers/ mobility with LRD to assist with ADL/ functional activities   Short term goal 4: demo increased activity tolerance of 30+ min to assist with ADL/ functional activities   Short term goal 5: demo understanding and I use of ECWS, fall prevention and proper pursed lipped breathing tech during functional activites   Patient Goals   Patient goals : to go home        Therapy Time   Individual Concurrent Group Co-treatment   Time In 1430         Time Out 1529         Minutes 59         Time coded treatment minutes: 61     Pt seated in chair upon therapist arrival. Pleasant and agreeable to therapy. See above for LOF for all tasks. Pt retired to seated in chair at end of session with chair alarm activated and call light within reach.     CARLITOS Watson/LISA

## 2020-05-16 NOTE — PROGRESS NOTES
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 ID will sign off    Medical Decision Making/Summary/Discussion:5/16/2020     ·   Infection Control Recommendations   · [place] Precautions    Antimicrobial Stewardship Recommendations     · Discontinuation of therapy  Coordination of Outpatient Care:   · Estimated Length of IV antimicrobials:5-15-20  · Patient will need Midline Catheter Insertion: No  · Patient will need PICC line Insertion:No  · Patient will need: Home IV , Armando,  SNF: TBD  · Patient will need outpatient wound care:No    Chief complaint/reason for consultation:   Concern with septic arthritis    History of Present Illness:   Marleni Jimenez is a 77y.o.-year-old  male who was initially admitted on 5/7/2020. Patient seen at the request of Mata Holley. INITIAL HISTORY:    Patient with Hx of Small cell Ca of lung. Had completed chemo in July 2019 and radiation in August 2019. Unfortunately, on follow up found to have Lt adrenal metastasis. He was at Cancer center getting markers for the adrenal mass when he decompensated with fevers, hypotension. Treated with fluids and antibiotics. At [facility] found to have painful arthritis of Rt Elbow and Lt knee and swollen Lt hallux. Gave history of prior gout in the toes. Had taken medications in the past but stopped once he felt better. Underwent Irrigation and debridement of Rt elbow joint on 5-14-20 and Lt knee joint aspiration 5-14-20.  Fluid from both areas shows uric acid crystals. No growth on cultures. On exam no signs of infection. Plan: D/C antibiotics  Rx for gout. Patient advised he will need that on a long term basis. CURRENT EVALUATION :    Afebrile  VS stable    Feels better but still with Lt knee effusion  On treatment for gout  Also on Rx for hyponatremia      Labs, X rays reviewed: 5/16/2020    BUN:30  Cr:1.06    WBC:13.1  Hb:11.3  Plat: 310    Cultures:  Urine:  ·   Blood:  · 5-12-20: No growth   Sputum :  · 5-8-20: Normal abdifatah  Wound:  · 5-13-20: Synovial fluid: No growth   · 5-13-20: Rt Synovial fluid: No growth   · 5-14-20 Elbow swab: No growth  · 5-14-20 Lt knee: No growth  ·     Discussed with patient, RN, family. I have personally reviewed the past medical history, past surgical history, medications, social history, and family history, and I have updated the database accordingly. Past Medical History:     Past Medical History:   Diagnosis Date    DDD (degenerative disc disease), cervical     Gout     Heart murmur     hx. of when younger.  Hyperlipidemia     Hypertension     Lung cancer ([address]     right lung    MI (myocardial infarction) ([address]     Skin cancer     face    Wears glasses        Past Surgical  History:     Past Surgical History:   Procedure Laterality Date    APPENDECTOMY      CARDIAC CATHETERIZATION      w/stent    COLONOSCOPY      CYST INCISION AND DRAINAGE Right 05/14/2020    rt elbow I and D and left knee aspiration with cultures    FOOT SURGERY Right     2nd toe(bone spur).  FOREARM SURGERY Right 5/14/2020    RIGHT ELBOW IRRIGATION AND DEBRIDEMENT performed by Zoraida Daigle DO at [address] [address] Left 5/14/2020    KNEE ASPIRATION WITH CULTURES SENT performed by Zoraida Daigle DO at [address] [place] [address]      face under lt. eye.     TONSILLECTOMY         Medications:      apixaban  5 mg Oral BID    allopurinol  100 mg Oral Daily    predniSONE  40 mg Oral

## 2020-05-16 NOTE — PLAN OF CARE
appropriate level of care  5/16/2020 0424 by José Bell RN  Outcome: Ongoing  5/15/2020 1629 by Charmayne Sickles, RN  Outcome: Ongoing     Problem: Anxiety/Stress:  Goal: Level of anxiety will decrease  Description: Level of anxiety will decrease  5/16/2020 0424 by José Bell RN  Outcome: Ongoing  5/15/2020 1629 by Charmayne Sickles, RN  Outcome: Ongoing     Problem: Cardiac Output - Decreased:  Goal: Hemodynamic stability will improve  Description: Hemodynamic stability will improve  5/16/2020 0424 by José Bell RN  Outcome: Ongoing  5/15/2020 1629 by Charmayne Sickles, RN  Outcome: Met This Shift     Problem: Musculor/Skeletal Functional Status  Goal: Highest potential functional level  5/16/2020 0424 by José Bell RN  Outcome: Ongoing  5/15/2020 1629 by Charmayne Sickles, RN  Outcome: Met This Shift     Problem:  Activity:  Goal: Ability to tolerate increased activity will improve  Description: Ability to tolerate increased activity will improve  5/16/2020 0424 by José Bell RN  Outcome: Ongoing  5/15/2020 1629 by Charmayne Sickles, RN  Outcome: Met This Shift  Goal: Ability to implement measures to reduce episodes of fatigue will improve  Description: Ability to implement measures to reduce episodes of fatigue will improve  5/16/2020 0424 by José Bell RN  Outcome: Ongoing  5/15/2020 1629 by Charmayne Sickles, RN  Outcome: Met This Shift     Problem: Coping:  Goal: Ability to cope will improve  Description: Ability to cope will improve  5/16/2020 0424 by José Bell RN  Outcome: Ongoing  5/15/2020 1629 by Charmayne Sickles, RN  Outcome: Met This Shift     Problem: Nutritional:  Goal: Nutritional status will improve  Description: Nutritional status will improve  5/16/2020 0424 by José Bell RN  Outcome: Ongoing  5/15/2020 1629 by Charmayne Sickles, RN  Outcome: Met This Shift     Problem: Safety:  Goal: Ability to remain free from injury will improve  Description: Ability to remain free from injury will improve  5/16/2020

## 2020-05-16 NOTE — PROGRESS NOTES
[facility]  Internal Medicine Teaching Residency Program  Inpatient Daily Progress Note  ______________________________________________________________________________    Patient: Shruti Sherwood  YOB: 1950   [id number]    Acct: [de-identified]     Room: [facility]  Admit date: 5/7/2020  Today's date: 05/16/20  Number of days in the hospital: 9    SUBJECTIVE   Admitting Diagnosis: Sepsis ([address]  CC: Fatigue    Patient was seen and examined at bedside, continues to c/o left knee and right elbow pain and soreness, plan to work with PT/OT today. Hemodynamically stable. Labs and imaging reviewed, Na 123, will follow up stat Na check, fluid restriction and salt tablets accordingly. . ID was consulted, recommended monitoring off antibiotics. 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 Patient was scheduled for a repeat MRI abdomen on the day of presentation when the technician noticed increased fatigue. The patient reports that he had increased chills, generalized weakness and fatigue and diffuse body ache. He denied any other symptoms including cough, SOB, fever, chest pain, abdominal pain, nausea, vomiting, diarrhea, altered mentation, seizure like activity or altered bowel or bladder habits.     The patient was advised to come to the ED for further eval and management.  He went to [facility] ED where the patient denied any other symptoms of chronic gout: Allopurinol 100 mg daily (home dose) and prednisone 40 mg daily  PMNR on board. S/p irrigation debridement of right elbow joint with joint aspiration of left knee - no concern for septic arthritis, vancomycin discontinued. DVT prophylaxis - On eliquis  GI prophylaxis - not indicated      PT/OT - Consulted  Discharge planning - Case management on board for assistance with discharge planning, plan to work with PT and OT today.     Willam Brandon MD  Internal Medicine Resident, PGY-1  [address], [place]  5/16/2020, 9:55 AM  Attending Physician Statement For Internal Medicine  I have discussed the care of Jony Hurst, including pertinent history and exam findings,  with the Resident. I have seen and examined the patient with the resident and reviewed the key elements of all parts of the encounter have been performed by me. I agree with the assessment, plan and orders as documented by the resident.     Steffen Villanueva MD, MRCTESS Woo, FACP   5/16/2020 10:23 AM  Internal Medicine and Nephrology

## 2020-05-16 NOTE — PROGRESS NOTES
Safety Education & Training, Patient/Caregiver Education & Training, Equipment Evaluation, Education, & procurement  Safety Devices  Type of devices:  All fall risk precautions in place, Call light within reach, Patient at risk for falls, Nurse notified, Left in chair, Chair alarm in place  Restraints  Initially in place: No     Therapy Time   Individual Concurrent Group Co-treatment   Time In 1323         Time Out 1402         Minutes 39         Timed Code Treatment Minutes: [address] [facility]

## 2020-05-17 LAB
ABSOLUTE EOS #: 0 K/UL (ref 0–0.44)
ABSOLUTE IMMATURE GRANULOCYTE: 0.11 K/UL (ref 0–0.3)
ABSOLUTE LYMPH #: 0.97 K/UL (ref 1.1–3.7)
ABSOLUTE MONO #: 1.51 K/UL (ref 0.1–1.2)
ANION GAP SERPL CALCULATED.3IONS-SCNC: 14 MMOL/L (ref 9–17)
BASOPHILS # BLD: 0 % (ref 0–2)
BASOPHILS ABSOLUTE: 0 K/UL (ref 0–0.2)
BUN BLDV-MCNC: 33 MG/DL (ref 8–23)
BUN/CREAT BLD: ABNORMAL (ref 9–20)
CALCIUM SERPL-MCNC: 9 MG/DL (ref 8.6–10.4)
CHLORIDE BLD-SCNC: 98 MMOL/L (ref 98–107)
CO2: 22 MMOL/L (ref 20–31)
CREAT SERPL-MCNC: 0.93 MG/DL (ref 0.7–1.2)
DIFFERENTIAL TYPE: ABNORMAL
EOSINOPHILS RELATIVE PERCENT: 0 % (ref 1–4)
GFR AFRICAN AMERICAN: >60 ML/MIN
GFR NON-AFRICAN AMERICAN: >60 ML/MIN
GFR SERPL CREATININE-BSD FRML MDRD: ABNORMAL ML/MIN/{1.73_M2}
GFR SERPL CREATININE-BSD FRML MDRD: ABNORMAL ML/MIN/{1.73_M2}
GLUCOSE BLD-MCNC: 104 MG/DL (ref 70–99)
HCT VFR BLD CALC: 36 % (ref 40.7–50.3)
HEMOGLOBIN: 11.5 G/DL (ref 13–17)
IMMATURE GRANULOCYTES: 1 %
LYMPHOCYTES # BLD: 9 % (ref 24–43)
MCH RBC QN AUTO: 31.8 PG (ref 25.2–33.5)
MCHC RBC AUTO-ENTMCNC: 31.9 G/DL (ref 28.4–34.8)
MCV RBC AUTO: 99.4 FL (ref 82.6–102.9)
MONOCYTES # BLD: 14 % (ref 3–12)
MORPHOLOGY: ABNORMAL
NRBC AUTOMATED: 0 PER 100 WBC
PDW BLD-RTO: 14.6 % (ref 11.8–14.4)
PLATELET # BLD: 406 K/UL (ref 138–453)
PLATELET ESTIMATE: ABNORMAL
PMV BLD AUTO: 9.3 FL (ref 8.1–13.5)
POTASSIUM SERPL-SCNC: 4.3 MMOL/L (ref 3.7–5.3)
RBC # BLD: 3.62 M/UL (ref 4.21–5.77)
RBC # BLD: ABNORMAL 10*6/UL
SEG NEUTROPHILS: 76 % (ref 36–65)
SEGMENTED NEUTROPHILS ABSOLUTE COUNT: 8.21 K/UL (ref 1.5–8.1)
SODIUM BLD-SCNC: 134 MMOL/L (ref 135–144)
WBC # BLD: 10.8 K/UL (ref 3.5–11.3)
WBC # BLD: ABNORMAL 10*3/UL

## 2020-05-17 PROCEDURE — 36415 COLL VENOUS BLD VENIPUNCTURE: CPT

## 2020-05-17 PROCEDURE — 80048 BASIC METABOLIC PNL TOTAL CA: CPT

## 2020-05-17 PROCEDURE — 2580000003 HC RX 258: Performed by: STUDENT IN AN ORGANIZED HEALTH CARE EDUCATION/TRAINING PROGRAM

## 2020-05-17 PROCEDURE — 1200000000 HC SEMI PRIVATE

## 2020-05-17 PROCEDURE — 85025 COMPLETE CBC W/AUTO DIFF WBC: CPT

## 2020-05-17 PROCEDURE — 6370000000 HC RX 637 (ALT 250 FOR IP): Performed by: STUDENT IN AN ORGANIZED HEALTH CARE EDUCATION/TRAINING PROGRAM

## 2020-05-17 RX ADMIN — SODIUM CHLORIDE, PRESERVATIVE FREE 10 ML: 5 INJECTION INTRAVENOUS at 20:36

## 2020-05-17 RX ADMIN — METOPROLOL TARTRATE 12.5 MG: 25 TABLET ORAL at 20:36

## 2020-05-17 RX ADMIN — ALLOPURINOL 100 MG: 100 TABLET ORAL at 09:46

## 2020-05-17 RX ADMIN — SODIUM CHLORIDE, PRESERVATIVE FREE 10 ML: 5 INJECTION INTRAVENOUS at 10:06

## 2020-05-17 RX ADMIN — AMIODARONE HYDROCHLORIDE 200 MG: 200 TABLET ORAL at 09:46

## 2020-05-17 RX ADMIN — TAMSULOSIN HYDROCHLORIDE 0.4 MG: 0.4 CAPSULE ORAL at 09:46

## 2020-05-17 RX ADMIN — AMIODARONE HYDROCHLORIDE 200 MG: 200 TABLET ORAL at 20:36

## 2020-05-17 RX ADMIN — OXYCODONE HYDROCHLORIDE AND ACETAMINOPHEN 1 TABLET: 5; 325 TABLET ORAL at 22:21

## 2020-05-17 RX ADMIN — APIXABAN 5 MG: 5 TABLET, FILM COATED ORAL at 20:36

## 2020-05-17 RX ADMIN — OXYCODONE HYDROCHLORIDE AND ACETAMINOPHEN 1 TABLET: 5; 325 TABLET ORAL at 02:34

## 2020-05-17 RX ADMIN — OXYCODONE HYDROCHLORIDE AND ACETAMINOPHEN 1 TABLET: 5; 325 TABLET ORAL at 09:59

## 2020-05-17 RX ADMIN — OXYCODONE HYDROCHLORIDE AND ACETAMINOPHEN 1 TABLET: 5; 325 TABLET ORAL at 16:24

## 2020-05-17 RX ADMIN — APIXABAN 5 MG: 5 TABLET, FILM COATED ORAL at 09:46

## 2020-05-17 RX ADMIN — ATORVASTATIN CALCIUM 20 MG: 10 TABLET, FILM COATED ORAL at 09:46

## 2020-05-17 RX ADMIN — METOPROLOL TARTRATE 12.5 MG: 25 TABLET ORAL at 09:46

## 2020-05-17 ASSESSMENT — PAIN SCALES - GENERAL
PAINLEVEL_OUTOF10: 6
PAINLEVEL_OUTOF10: 8
PAINLEVEL_OUTOF10: 6
PAINLEVEL_OUTOF10: 7
PAINLEVEL_OUTOF10: 8

## 2020-05-17 ASSESSMENT — PAIN DESCRIPTION - DESCRIPTORS: DESCRIPTORS: ACHING;DISCOMFORT

## 2020-05-17 ASSESSMENT — PAIN DESCRIPTION - LOCATION: LOCATION: ELBOW;KNEE;SHOULDER

## 2020-05-17 ASSESSMENT — PAIN DESCRIPTION - PAIN TYPE: TYPE: ACUTE PAIN

## 2020-05-17 ASSESSMENT — PAIN DESCRIPTION - FREQUENCY: FREQUENCY: CONTINUOUS

## 2020-05-17 ASSESSMENT — PAIN DESCRIPTION - ORIENTATION: ORIENTATION: RIGHT;LEFT

## 2020-05-17 NOTE — PLAN OF CARE
Problem: Falls - Risk of:  Goal: Will remain free from falls  Description: Will remain free from falls  5/17/2020 0524 by King Dale RN  Outcome: Ongoing  5/16/2020 1701 by Taya Samayoa RN  Outcome: Met This Shift  Goal: Absence of physical injury  Description: Absence of physical injury  5/17/2020 0524 by King Dale RN  Outcome: Ongoing  5/16/2020 1701 by Taya Samayoa RN  Outcome: Met This Shift     Problem: Gas Exchange - Impaired:  Goal: Levels of oxygenation will improve  Description: Levels of oxygenation will improve  Outcome: Ongoing     Problem: Infection, Septic Shock:  Goal: Will show no infection signs and symptoms  Description: Will show no infection signs and symptoms  5/17/2020 0524 by King Dale RN  Outcome: Ongoing  5/16/2020 1701 by Taya Samayoa RN  Outcome: Met This Shift     Problem: Fluid Volume:  Goal: Signs and symptoms of dehydration will decrease  Description: Signs and symptoms of dehydration will decrease  Outcome: Ongoing  Goal: Ability to achieve a balanced intake and output will improve  Description: Ability to achieve a balanced intake and output will improve  Outcome: Ongoing  Goal: Diagnostic test results will improve  Description: Diagnostic test results will improve  Outcome: Ongoing     Problem: Discharge Planning:  Goal: Participates in care planning  Description: Participates in care planning  Outcome: Ongoing  Goal: Discharged to appropriate level of care  Description: Discharged to appropriate level of care  Outcome: Ongoing     Problem: Anxiety/Stress:  Goal: Level of anxiety will decrease  Description: Level of anxiety will decrease  5/17/2020 0524 by King Dale RN  Outcome: Ongoing  5/16/2020 1701 by Taya Samayoa RN  Outcome: Met This Shift     Problem: Cardiac Output - Decreased:  Goal: Hemodynamic stability will improve  Description: Hemodynamic stability will improve  Outcome: Ongoing     Problem: Musculor/Skeletal Functional

## 2020-05-17 NOTE — PLAN OF CARE
Problem: Falls - Risk of:  Goal: Will remain free from falls  Description: Will remain free from falls  5/17/2020 1808 by Elijah Reyes RN  Outcome: Met This Shift  5/17/2020 0524 by Georgina Capps RN  Outcome: Ongoing  Goal: Absence of physical injury  Description: Absence of physical injury  5/17/2020 1808 by Elijah Reyes RN  Outcome: Met This Shift  5/17/2020 0524 by Georgina Capps RN  Outcome: Ongoing     Problem: Anxiety/Stress:  Goal: Level of anxiety will decrease  Description: Level of anxiety will decrease  5/17/2020 1808 by Elijah Reyes RN  Outcome: Met This Shift  5/17/2020 0524 by Georgina Capps RN  Outcome: Ongoing     Problem: Safety:  Goal: Ability to remain free from injury will improve  Description: Ability to remain free from injury will improve  5/17/2020 1808 by Elijah Reyes RN  Outcome: Met This Shift  5/17/2020 0524 by Georgina Capps RN  Outcome: Ongoing

## 2020-05-17 NOTE — PROGRESS NOTES
[facility]  Internal Medicine Teaching Residency Program  Inpatient Daily Progress Note  ______________________________________________________________________________    Patient: Marilyn Shock  YOB: 1950   HTO:[id number]    Acct: [de-identified]     Room: 9724/1367-99  Admit date: 5/7/2020  Today's date: 05/17/20  Number of days in the hospital: 10    SUBJECTIVE   Admitting Diagnosis: Sepsis ([facility]  CC: Fatigue    Patient seen and examined in his room. Patient mentioned that his right elbow pain and left knee pain has gone down. Afebrile and hemodynamically stable. Labs reviewed: Sodium 134. Patient pending placement. 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 Patient was scheduled for a repeat MRI abdomen on the day of presentation when the technician noticed increased fatigue. The patient reports that he had increased chills, generalized weakness and fatigue and diffuse body ache. He denied any other symptoms including cough, SOB, fever, chest pain, abdominal pain, nausea, vomiting, diarrhea, altered mentation, seizure like activity or altered bowel or bladder habits.     The patient was advised to come to the ED for further eval and management. He went to [facility] ED where the patient denied any other symptoms of nausea, vomiting, fever, diarrhea. He did report chronic cough and shortness of breath but is not on home oxygen.  Labs

## 2020-05-18 LAB
ABSOLUTE EOS #: 0 K/UL (ref 0–0.4)
ABSOLUTE IMMATURE GRANULOCYTE: 0.32 K/UL (ref 0–0.3)
ABSOLUTE LYMPH #: 0.64 K/UL (ref 1–4.8)
ABSOLUTE MONO #: 2.24 K/UL (ref 0.1–0.8)
ANION GAP SERPL CALCULATED.3IONS-SCNC: 11 MMOL/L (ref 9–17)
ANION GAP SERPL CALCULATED.3IONS-SCNC: 14 MMOL/L (ref 9–17)
APPEARANCE FLUID: NORMAL
BASO FLUID: NORMAL %
BASOPHILS # BLD: 0 % (ref 0–2)
BASOPHILS ABSOLUTE: 0 K/UL (ref 0–0.2)
BUN BLDV-MCNC: 30 MG/DL (ref 8–23)
BUN BLDV-MCNC: 30 MG/DL (ref 8–23)
BUN/CREAT BLD: ABNORMAL (ref 9–20)
BUN/CREAT BLD: ABNORMAL (ref 9–20)
CALCIUM SERPL-MCNC: 8.8 MG/DL (ref 8.6–10.4)
CALCIUM SERPL-MCNC: 9 MG/DL (ref 8.6–10.4)
CHLORIDE BLD-SCNC: 96 MMOL/L (ref 98–107)
CHLORIDE BLD-SCNC: 98 MMOL/L (ref 98–107)
CO2: 21 MMOL/L (ref 20–31)
CO2: 21 MMOL/L (ref 20–31)
COLOR FLUID: NORMAL
CREAT SERPL-MCNC: 0.97 MG/DL (ref 0.7–1.2)
CREAT SERPL-MCNC: 1.02 MG/DL (ref 0.7–1.2)
CULTURE: ABNORMAL
CULTURE: ABNORMAL
CULTURE: NORMAL
DIFFERENTIAL TYPE: ABNORMAL
DIRECT EXAM: ABNORMAL
EOSINOPHIL FLUID: NORMAL %
EOSINOPHILS RELATIVE PERCENT: 0 % (ref 1–4)
FLUID DIFF COMMENT: NORMAL
GFR AFRICAN AMERICAN: >60 ML/MIN
GFR AFRICAN AMERICAN: >60 ML/MIN
GFR NON-AFRICAN AMERICAN: >60 ML/MIN
GFR NON-AFRICAN AMERICAN: >60 ML/MIN
GFR SERPL CREATININE-BSD FRML MDRD: ABNORMAL ML/MIN/{1.73_M2}
GLUCOSE BLD-MCNC: 113 MG/DL (ref 70–99)
GLUCOSE BLD-MCNC: 131 MG/DL (ref 70–99)
HCT VFR BLD CALC: 35.3 % (ref 40.7–50.3)
HCT VFR BLD CALC: 37 % (ref 40.7–50.3)
HEMOGLOBIN: 11.7 G/DL (ref 13–17)
HEMOGLOBIN: 11.8 G/DL (ref 13–17)
IMMATURE GRANULOCYTES: 2 %
INTERVENTION: NORMAL
LYMPHOCYTES # BLD: 4 % (ref 24–44)
LYMPHOCYTES, BODY FLUID: 2 %
Lab: ABNORMAL
Lab: ABNORMAL
Lab: NORMAL
MCH RBC QN AUTO: 31.6 PG (ref 25.2–33.5)
MCH RBC QN AUTO: 32.2 PG (ref 25.2–33.5)
MCHC RBC AUTO-ENTMCNC: 31.9 G/DL (ref 28.4–34.8)
MCHC RBC AUTO-ENTMCNC: 33.1 G/DL (ref 28.4–34.8)
MCV RBC AUTO: 97.2 FL (ref 82.6–102.9)
MCV RBC AUTO: 99.2 FL (ref 82.6–102.9)
MONOCYTE, FLUID: NORMAL %
MONOCYTES # BLD: 14 % (ref 1–7)
MORPHOLOGY: ABNORMAL
NEUTROPHIL, FLUID: 95 %
NRBC AUTOMATED: 0 PER 100 WBC
NRBC AUTOMATED: 0 PER 100 WBC
OTHER CELLS FLUID: NORMAL %
PDW BLD-RTO: 14.5 % (ref 11.8–14.4)
PDW BLD-RTO: 14.6 % (ref 11.8–14.4)
PLATELET # BLD: 466 K/UL (ref 138–453)
PLATELET # BLD: 506 K/UL (ref 138–453)
PLATELET ESTIMATE: ABNORMAL
PMV BLD AUTO: 9.2 FL (ref 8.1–13.5)
PMV BLD AUTO: 9.4 FL (ref 8.1–13.5)
POTASSIUM SERPL-SCNC: 4.4 MMOL/L (ref 3.7–5.3)
POTASSIUM SERPL-SCNC: 4.6 MMOL/L (ref 3.7–5.3)
RBC # BLD: 3.63 M/UL (ref 4.21–5.77)
RBC # BLD: 3.73 M/UL (ref 4.21–5.77)
RBC # BLD: ABNORMAL 10*6/UL
RBC FLUID: NORMAL /MM3
SEG NEUTROPHILS: 80 % (ref 36–66)
SEGMENTED NEUTROPHILS ABSOLUTE COUNT: 12.8 K/UL (ref 1.8–7.7)
SODIUM BLD-SCNC: 128 MMOL/L (ref 135–144)
SODIUM BLD-SCNC: 133 MMOL/L (ref 135–144)
SPECIMEN DESCRIPTION: ABNORMAL
SPECIMEN DESCRIPTION: ABNORMAL
SPECIMEN DESCRIPTION: NORMAL
SPECIMEN TYPE: NORMAL
WBC # BLD: 16 K/UL (ref 3.5–11.3)
WBC # BLD: 19 K/UL (ref 3.5–11.3)
WBC # BLD: ABNORMAL 10*3/UL
WBC FLUID: NORMAL /MM3

## 2020-05-18 PROCEDURE — 6370000000 HC RX 637 (ALT 250 FOR IP): Performed by: STUDENT IN AN ORGANIZED HEALTH CARE EDUCATION/TRAINING PROGRAM

## 2020-05-18 PROCEDURE — 1200000000 HC SEMI PRIVATE

## 2020-05-18 PROCEDURE — 97535 SELF CARE MNGMENT TRAINING: CPT

## 2020-05-18 PROCEDURE — 97530 THERAPEUTIC ACTIVITIES: CPT

## 2020-05-18 PROCEDURE — 2580000003 HC RX 258: Performed by: STUDENT IN AN ORGANIZED HEALTH CARE EDUCATION/TRAINING PROGRAM

## 2020-05-18 PROCEDURE — 99232 SBSQ HOSP IP/OBS MODERATE 35: CPT | Performed by: INTERNAL MEDICINE

## 2020-05-18 PROCEDURE — 85027 COMPLETE CBC AUTOMATED: CPT

## 2020-05-18 PROCEDURE — 80048 BASIC METABOLIC PNL TOTAL CA: CPT

## 2020-05-18 PROCEDURE — 36415 COLL VENOUS BLD VENIPUNCTURE: CPT

## 2020-05-18 PROCEDURE — 85025 COMPLETE CBC W/AUTO DIFF WBC: CPT

## 2020-05-18 RX ADMIN — APIXABAN 5 MG: 5 TABLET, FILM COATED ORAL at 21:34

## 2020-05-18 RX ADMIN — SODIUM CHLORIDE, PRESERVATIVE FREE 10 ML: 5 INJECTION INTRAVENOUS at 09:13

## 2020-05-18 RX ADMIN — AMIODARONE HYDROCHLORIDE 200 MG: 200 TABLET ORAL at 21:34

## 2020-05-18 RX ADMIN — METOPROLOL TARTRATE 12.5 MG: 25 TABLET ORAL at 21:35

## 2020-05-18 RX ADMIN — SODIUM CHLORIDE, PRESERVATIVE FREE 10 ML: 5 INJECTION INTRAVENOUS at 21:35

## 2020-05-18 RX ADMIN — OXYCODONE HYDROCHLORIDE AND ACETAMINOPHEN 1 TABLET: 5; 325 TABLET ORAL at 06:12

## 2020-05-18 RX ADMIN — METOPROLOL TARTRATE 12.5 MG: 25 TABLET ORAL at 09:04

## 2020-05-18 RX ADMIN — ATORVASTATIN CALCIUM 20 MG: 10 TABLET, FILM COATED ORAL at 09:05

## 2020-05-18 RX ADMIN — OXYCODONE HYDROCHLORIDE AND ACETAMINOPHEN 1 TABLET: 5; 325 TABLET ORAL at 14:58

## 2020-05-18 RX ADMIN — TAMSULOSIN HYDROCHLORIDE 0.4 MG: 0.4 CAPSULE ORAL at 09:05

## 2020-05-18 RX ADMIN — APIXABAN 5 MG: 5 TABLET, FILM COATED ORAL at 09:05

## 2020-05-18 RX ADMIN — AMIODARONE HYDROCHLORIDE 200 MG: 200 TABLET ORAL at 09:05

## 2020-05-18 RX ADMIN — ALLOPURINOL 100 MG: 100 TABLET ORAL at 12:33

## 2020-05-18 RX ADMIN — OXYCODONE HYDROCHLORIDE AND ACETAMINOPHEN 1 TABLET: 5; 325 TABLET ORAL at 21:35

## 2020-05-18 ASSESSMENT — PAIN DESCRIPTION - LOCATION
LOCATION: SHOULDER;KNEE
LOCATION: SHOULDER;KNEE
LOCATION: KNEE;ELBOW

## 2020-05-18 ASSESSMENT — PAIN SCALES - GENERAL
PAINLEVEL_OUTOF10: 7
PAINLEVEL_OUTOF10: 8
PAINLEVEL_OUTOF10: 7
PAINLEVEL_OUTOF10: 7
PAINLEVEL_OUTOF10: 6
PAINLEVEL_OUTOF10: 6

## 2020-05-18 ASSESSMENT — PAIN DESCRIPTION - FREQUENCY
FREQUENCY: CONTINUOUS

## 2020-05-18 ASSESSMENT — PAIN DESCRIPTION - PAIN TYPE
TYPE: ACUTE PAIN

## 2020-05-18 ASSESSMENT — PAIN DESCRIPTION - ORIENTATION
ORIENTATION: RIGHT;LEFT
ORIENTATION: LEFT
ORIENTATION: LEFT

## 2020-05-18 ASSESSMENT — PAIN DESCRIPTION - DESCRIPTORS
DESCRIPTORS: ACHING
DESCRIPTORS: ACHING
DESCRIPTORS: ACHING;CONSTANT

## 2020-05-18 NOTE — PROGRESS NOTES
assist for safety  Current Treatment Recommendations: Strengthening, Balance Training, Functional Mobility Training, Endurance Training, Transfer Training, Gait Training, Stair training, Home Exercise Program, Safety Education & Training, Patient/Caregiver Education & Training, Equipment Evaluation, Education, & procurement  Safety Devices  Type of devices:  All fall risk precautions in place, Call light within reach, Gait belt, Patient at risk for falls, Left in bed, Nurse notified  Restraints  Initially in place: No     Therapy Time   Individual Concurrent Group Co-treatment   Time In 0109         Time Out 0136         Minutes 27         Timed Code Treatment Minutes: 10 Minutes(co-treat with URBINA due to level of assist required for mobility)       Donel Pa, PTA

## 2020-05-18 NOTE — PROGRESS NOTES
[facility]  Internal Medicine Teaching Residency Program  Inpatient Daily Progress Note  ______________________________________________________________________________    Patient: Jony Hurst  YOB: 1950   [facility]:[id number]    Acct: [de-identified]     Room: 8986/6923-49  Admit date: 5/7/2020  Today's date: 05/18/20  Number of days in the hospital: 11    SUBJECTIVE   Admitting Diagnosis: Sepsis ([address]  CC: Fatigue    Patient seen and examined in his room. He mentioned that his right elbow pain and left knee pain decreased from yesterday. Afebrile and hemodynamically stable. WBC trending up 16  Sodium 128 decreased from 134 yesterday. We will repeat BMP and CBC today. pending placement. 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 Patient was scheduled for a repeat MRI abdomen on the day of presentation when the technician noticed increased fatigue. The patient reports that he had increased chills, generalized weakness and fatigue and diffuse body ache. He denied any other symptoms including cough, SOB, fever, chest pain, abdominal pain, nausea, vomiting, diarrhea, altered mentation, seizure like activity or altered bowel or bladder habits.     The patient was advised to come to the ED for further eval and management. He went to [facility] ED where the patient denied any other symptoms of nausea, vomiting, fever, diarrhea.  He did report chronic

## 2020-05-18 NOTE — PROGRESS NOTES
Continuous  Vital Signs  Patient Currently in Pain: Yes   Orientation  Orientation  Overall Orientation Status: Within Functional Limits  Objective    Pt in bed upon arrival using the bed pan. Pt demo bed mob, demo transfers using the al stedy, and retired to bed to use the bed pan. ADL  Toileting: Setup;Maximum assistance; Increased time to complete(put on bed pan 2 x for BM)   Balance  Sitting Balance: Stand by assistance(seated on EOB)  Standing Balance: Minimal assistance(/ mod A + 2 w/ al stedy)  Bed mobility  Rolling to Left: Maximum assistance  Rolling to Right: Maximum assistance  Supine to Sit: Maximum assistance;2 Person assistance  Sit to Supine: Maximum assistance;2 Person assistance  Scooting: Maximal assistance  Comment: bed elevated to assist in standing, increased time to complete bed mob  Transfers  Sit to stand:  Moderate assistance;2 Person assistance  Stand to sit: Minimal assistance;2 Person assistance  Attendance  Participation: Active participation       Plan   Plan  Times per week: 3-5x/wk   Current Treatment Recommendations: Functional Mobility Training, Endurance Training, Safety Education & Training, Patient/Caregiver Education & Training, Equipment Evaluation, Education, & procurement, Self-Care / ADL    Goals  Short term goals  Time Frame for Short term goals: by discharge, pt will  Short term goal 1: demo I in UE ADL activities  Short term goal 2: demo mod I for LE ADL activities with AD as needed  Short term goal 3: demo I in functional transfers/ mobility with LRD to assist with ADL/ functional activities   Short term goal 4: demo increased activity tolerance of 30+ min to assist with ADL/ functional activities   Short term goal 5: demo understanding and I use of ECWS, fall prevention and proper pursed lipped breathing tech during functional activites   Patient Goals   Patient goals : to go home        Therapy Time   Individual Concurrent Group Co-treatment   Time In       13:09 Time Out       1336   Minutes       27       Time code min: 17 min d/t co-tx    CARLITOS Bass/LISA

## 2020-05-18 NOTE — PROGRESS NOTES
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 Medical Decision Making/Summary/Discussion:5/18/2020     ·   Infection Control Recommendations   · [place] Precautions    Antimicrobial Stewardship Recommendations     · Discontinuation of therapy  Coordination of Outpatient Care:   · Estimated Length of IV antimicrobials:5-15-20  · Patient will need Midline Catheter Insertion: No  · Patient will need PICC line Insertion:No  · Patient will need: Home IV , [place],  SNF: TBD  · Patient will need outpatient wound care:No    Chief complaint/reason for consultation:   Concern with septic arthritis    History of Present Illness:   Ike Grajeda is a 77y.o.-year-old  male who was initially admitted on 5/7/2020. Patient seen at the request of Adwoa Lauren. INITIAL HISTORY:    Patient with Hx of Small cell Ca of lung. Had completed chemo in July 2019 and radiation in August 2019. Unfortunately, on follow up found to have Lt adrenal metastasis. He was at Cancer center getting markers for the adrenal mass when he decompensated with fevers, hypotension. Treated with fluids and antibiotics. At [facility] found to have painful arthritis of Rt Elbow and Lt knee and swollen Lt hallux. Gave history of prior gout in the toes. Had taken medications in the past but stopped once he felt better.     Underwent Irrigation and debridement of Rt elbow joint on 5-14-20 and Lt knee joint aspiration 5-14-20. Fluid from both areas shows uric acid crystals. No growth on cultures. On exam no signs of infection. Plan: D/C antibiotics  Rx for gout. Patient advised he will need that on a long term basis. CURRENT EVALUATION :    Afebrile  VS stable    Feels better but still with Lt knee effusion  On treatment for gout    Rt elbow has improved markedly  Lt knee has residual effusion. Improved overall but needs more antiinflammatory treatment, such as steroids or NSAIDs  Suggest you also consider withdrawing additional fluid from the knee joint. No signs of infection. No antimicrobials necessary  . Labs, X rays reviewed: 5/18/2020    BUN:30  Cr:1.06    WBC:13.1  Hb:11.3  Plat: 310    Cultures:  Urine:  ·   Blood:  · 5-12-20: No growth   Sputum :  · 5-8-20: Normal abdifatah  Wound:  · 5-13-20: Synovial fluid: No growth   · 5-13-20: Rt Synovial fluid: No growth   · 5-14-20 Elbow swab: No growth  · 5-14-20 Lt knee: No growth  ·     Discussed with patient, RN, family. I have personally reviewed the past medical history, past surgical history, medications, social history, and family history, and I have updated the database accordingly. Past Medical History:     Past Medical History:   Diagnosis Date    DDD (degenerative disc disease), cervical     Gout     Heart murmur     hx. of when younger.  Hyperlipidemia     Hypertension     Lung cancer ([address]     right lung    MI [facility] infarction) [address] [address]     Skin cancer     face    Wears glasses        Past Surgical  History:     Past Surgical History:   Procedure Laterality Date    APPENDECTOMY      CARDIAC CATHETERIZATION      w/stent    COLONOSCOPY      CYST INCISION AND DRAINAGE Right 05/14/2020    rt elbow I and D and left knee aspiration with cultures    FOOT SURGERY Right     2nd toe(bone spur).     FOREARM SURGERY Right 5/14/2020    RIGHT ELBOW IRRIGATION AND DEBRIDEMENT Topics Concern    Not on file   Social History Narrative    Not on file       Family History:     Family History   Problem Relation Age of Onset    Cancer Father         lung cancer        Allergies:   Patient has no known allergies. Review of Systems:   Constitutional: No fevers or chills. No systemic complaints  Head: No headaches  Eyes: No double vision or blurry vision. No conjunctival inflammation. ENT: No sore throat or runny nose. . No hearing loss, tinnitus or vertigo. Cardiovascular: No chest pain or palpitations. No shortness of breath. No THOMPSON  Lung: No shortness of breath or cough. No sputum production  Abdomen: No nausea, vomiting, diarrhea, or abdominal pain. Pako Seller No cramps. Genitourinary: No increased urinary frequency, or dysuria. No hematuria. No suprapubic or CVA pain  Musculoskeletal: Rt elbow pain. Lt knee joint effusion. Hematologic: No bleeding or bruising. Neurologic: No headache, weakness, numbness, or tingling. Integument: No rash, no ulcers. Psychiatric: No depression. Endocrine: No polyuria, no polydipsia, no polyphagia. Physical Examination :     No data found. General Appearance: Awake, alert, and in no apparent distress  Head:  Normocephalic, no trauma  Eyes: Pupils equal, round, reactive to light and accommodation; extraocular movements intact; sclera anicteric; conjunctivae pink. No embolic phenomena. ENT: Oropharynx clear, without erythema, exudate, or thrush. No tenderness of sinuses. Mouth/throat: mucosa pink and moist. No lesions. Dentition in good repair. Neck:Supple, without lymphadenopathy. Thyroid normal, No bruits. Pulmonary/Chest: Clear to auscultation, without wheezes, rales, or rhonchi. No dullness to percussion. Cardiovascular: Regular rate and rhythm without murmurs, rubs, or gallops. Abdomen: Soft, non tender. Bowel sounds normal. No organomegaly  All four Extremities: Rt elbow incision, swelling. Lt knee residual effusion.  Lt hallux swelling. Neurologic: No gross sensory or motor deficits. Skin: Warm and dry with good turgor. Signs of peripheral arterial insufficiency. Lt forearm with 2 small ulcerations. No open wounds. Medical Decision Making -Laboratory:   I have independently reviewed/ordered the following labs:    CBC with Differential:   Recent Labs     05/17/20  0548 05/18/20  0619 05/18/20  1037   WBC 10.8 16.0* 19.0*   HGB 11.5* 11.8* 11.7*   HCT 36.0* 37.0* 35.3*    466* 506*   LYMPHOPCT 9* 4*  --    MONOPCT 14* 14*  --      BMP:   Recent Labs     05/18/20  0619 05/18/20  1037   * 133*   K 4.4 4.6   CL 96* 98   CO2 21 21   BUN 30* 30*   CREATININE 1.02 0.97     Hepatic Function Panel: No results for input(s): PROT, LABALBU, BILIDIR, IBILI, BILITOT, ALKPHOS, ALT, AST in the last 72 hours. No results for input(s): RPR in the last 72 hours. No results for input(s): HIV in the last 72 hours. No results for input(s): BC in the last 72 hours. Lab Results   Component Value Date    MUCUS NOT REPORTED 05/12/2020    RBC 3.63 05/18/2020    TRICHOMONAS NOT REPORTED 05/12/2020    WBC 19.0 05/18/2020    YEAST NOT REPORTED 05/12/2020    TURBIDITY CLEAR 05/12/2020     Lab Results   Component Value Date    CREATININE 0.97 05/18/2020    GLUCOSE 131 05/18/2020       Medical Decision Making-Imaging:       Medical Decision Rwpaib-Ujidkwew-Ozyxg:     Results for Deuce Navarro \"YENI\" (MRN [id number]) as of 5/15/2020 16:14   Ref.  Range 5/13/2020 19:16 5/13/2020 19:17 5/14/2020 15:47   Appearance, Fluid Unknown NOT REPORTED NOT REPORTED NOT REPORTED   Basos, Fluid Latest Ref Range: 0 % NOT REPORTED NOT REPORTED NOT REPORTED   Color, Fluid Unknown NOT REPORTED NOT REPORTED NOT REPORTED   Eos, Fluid Latest Ref Range: 0 % NOT REPORTED NOT REPORTED NOT REPORTED   RBC, Fluid Latest Units: /mm3 <3,000 18,000 23,000   Lymphocytes, Body Fluid Latest Units: % 1 1 2   Monocyte Count, Fluid Latest Units: % NOT REPORTED NOT REPORTED NOT REPORTED   Neutrophil Count, Fluid Latest Units: % 96 94 95   WBC, Fluid Latest Units: /mm3 31,705 77,640 34,040   Crystals, Fluid Latest Ref Range: NEGATIVE  POSITIVE (A) POSITIVE (A) POSITIVE (A)   Other Cells, Fluid Latest Units: % MONOCYTES MONOCYTES PENDING     Medical Decision Making-Other:     Note:  · Labs, medications, radiologic studies were reviewed with personal review of films  · Large amounts of data were reviewed  · Discussed with nursing Staff, Discharge planner  · Infection Control and Prevention measures reviewed  · All prior entries were reviewed  · Administer medications as ordered  · Prognosis: Good  · Discharge planning reviewed  · Follow up as outpatient. Thank you for allowing us to participate in the care of this patient. Please call with questions.     Drew Dang MD  Pager: [phone] - Office: [phone]

## 2020-05-18 NOTE — OP NOTE
order to support his right upper extremity. A  nonsterile tourniquet was placed on the right arm. The extremity was  then isolated, scrubbed with Hibiclens followed by alcohol, and prepped  and draped in the usual sterile fashion. A time-out was performed that  included all involved parties in correctly identifying the patient,  planned procedure, and operative site. After everyone agreed, we  continued. Please note that a separate dedicated time-out was performed  prior to left knee aspiration. We palpated the lateral epicondyle and  the radial head. We made an incision in-line with the structures. Using sharp dissection, Bovie was used for hemostasis. The deep  interval used was that of a Gunner Diver approach in order to decrease the  likelihood of LUCL injury during the exposure. The capsule was  identified and incised anterior to the midline of the joint capsule. There was a return of purulent material that was consistent in  appearance with a septic arthritis diagnosis. This material was then  swabbed with aerobic and anaerobic cultures. We were able to visualize  the radial head and extended our capsulotomy distally, again staying  within the anterior half of the radial head to avoid ligamentous injury. Once we felt we had adequate exposure through the joint to allow  thorough irrigation, we irrigated the joint with 3 L of normal saline  solution via Cysto tubing. On gross examination, there were noted to be  preexisting osteoarthritic changes of the radiocapitellar joint. The  patient's elbow was examined and found to have no ligamentous  instability. We again examined the joint and felt some minimal deposit  consistent with a history of gout. We did not identify any loose  cartilage or foreign material.  We then closed the capsular layer with 0  Monocryl suture. The fascial layer was closed also with 0 Monocryl, and  the skin was approximated using a running subcuticular 3-0 Monocryl.    The arm was cleaned and dried. The tourniquet was deflated and found no  significant bleeding. The arm was then dressed with Xeroform, 4 x 4's,  sterile Webril, and an Ace bandage. The patient was then extubated and  transported to the recovery room by the anesthesia providers without  complication, where he was found to be in stable condition. POSTOPERATIVE PLAN:  The patient will receive 23 hours of prophylactic  antibiotics per institutional protocol. We will follow up the final  results of the left knee aspirate as well as the right knee  intraarticular swabs in order to tailor his antibiotic therapy; this  will be managed by the infectious disease team.  We will also continue  to observe him clinically to assure that symptoms do not worsen in  either joint.         Blayne Julian    D: 05/17/2020 18:20:07       T: 05/17/2020 18:27:43     [email or web address]  Job#: [id number]     Doc#: [id number]    CC:

## 2020-05-18 NOTE — PLAN OF CARE
tolerate increased activity will improve  Description: Ability to tolerate increased activity will improve  Outcome: Ongoing  Goal: Ability to implement measures to reduce episodes of fatigue will improve  Description: Ability to implement measures to reduce episodes of fatigue will improve  Outcome: Ongoing     Problem: Coping:  Goal: Ability to cope will improve  Description: Ability to cope will improve  Outcome: Ongoing     Problem: Nutritional:  Goal: Nutritional status will improve  Description: Nutritional status will improve  Outcome: Ongoing     Problem: Safety:  Goal: Ability to remain free from injury will improve  Description: Ability to remain free from injury will improve  5/18/2020 0542 by Cyn Escobar RN  Outcome: Ongoing  5/17/2020 1808 by Ana Mojica RN  Outcome: Met This Shift     Problem: Cardiac:  Goal: Ability to maintain an adequate cardiac output will improve  Description: Ability to maintain an adequate cardiac output will improve  Outcome: Ongoing  Goal: Complications related to the disease process, condition or treatment will be avoided or minimized  Description: Complications related to the disease process, condition or treatment will be avoided or minimized  Outcome: Ongoing     Problem: Pain:  Goal: Pain level will decrease  Description: Pain level will decrease  Outcome: Ongoing  Goal: Control of acute pain  Description: Control of acute pain  Outcome: Ongoing  Goal: Control of chronic pain  Description: Control of chronic pain  Outcome: Ongoing     Problem: Nutrition  Goal: Optimal nutrition therapy  Outcome: Ongoing

## 2020-05-19 LAB
ABSOLUTE EOS #: 0 K/UL (ref 0–0.4)
ABSOLUTE IMMATURE GRANULOCYTE: 0 K/UL (ref 0–0.3)
ABSOLUTE LYMPH #: 0.63 K/UL (ref 1–4.8)
ABSOLUTE MONO #: 1.26 K/UL (ref 0.1–0.8)
ANION GAP SERPL CALCULATED.3IONS-SCNC: 13 MMOL/L (ref 9–17)
BASOPHILS # BLD: 0 % (ref 0–2)
BASOPHILS ABSOLUTE: 0 K/UL (ref 0–0.2)
BUN BLDV-MCNC: 30 MG/DL (ref 8–23)
BUN/CREAT BLD: ABNORMAL (ref 9–20)
CALCIUM SERPL-MCNC: 8.6 MG/DL (ref 8.6–10.4)
CHLORIDE BLD-SCNC: 95 MMOL/L (ref 98–107)
CO2: 21 MMOL/L (ref 20–31)
CREAT SERPL-MCNC: 1 MG/DL (ref 0.7–1.2)
CULTURE: ABNORMAL
CULTURE: ABNORMAL
DIFFERENTIAL TYPE: ABNORMAL
DIRECT EXAM: ABNORMAL
EOSINOPHILS RELATIVE PERCENT: 0 % (ref 1–4)
GFR AFRICAN AMERICAN: >60 ML/MIN
GFR NON-AFRICAN AMERICAN: >60 ML/MIN
GFR SERPL CREATININE-BSD FRML MDRD: ABNORMAL ML/MIN/{1.73_M2}
GFR SERPL CREATININE-BSD FRML MDRD: ABNORMAL ML/MIN/{1.73_M2}
GLUCOSE BLD-MCNC: 134 MG/DL (ref 70–99)
HCT VFR BLD CALC: 35.7 % (ref 40.7–50.3)
HEMOGLOBIN: 11.8 G/DL (ref 13–17)
IMMATURE GRANULOCYTES: 0 %
LYMPHOCYTES # BLD: 2 % (ref 24–44)
Lab: ABNORMAL
Lab: ABNORMAL
MCH RBC QN AUTO: 32.2 PG (ref 25.2–33.5)
MCHC RBC AUTO-ENTMCNC: 33.1 G/DL (ref 28.4–34.8)
MCV RBC AUTO: 97.3 FL (ref 82.6–102.9)
MONOCYTES # BLD: 4 % (ref 1–7)
MORPHOLOGY: NORMAL
NRBC AUTOMATED: 0 PER 100 WBC
PDW BLD-RTO: 14.3 % (ref 11.8–14.4)
PLATELET # BLD: 508 K/UL (ref 138–453)
PLATELET ESTIMATE: ABNORMAL
PMV BLD AUTO: 9.4 FL (ref 8.1–13.5)
POTASSIUM SERPL-SCNC: 4.5 MMOL/L (ref 3.7–5.3)
RBC # BLD: 3.67 M/UL (ref 4.21–5.77)
RBC # BLD: ABNORMAL 10*6/UL
SEG NEUTROPHILS: 94 % (ref 36–66)
SEGMENTED NEUTROPHILS ABSOLUTE COUNT: 29.71 K/UL (ref 1.8–7.7)
SODIUM BLD-SCNC: 129 MMOL/L (ref 135–144)
SPECIMEN DESCRIPTION: ABNORMAL
SPECIMEN DESCRIPTION: ABNORMAL
WBC # BLD: 31.6 K/UL (ref 3.5–11.3)
WBC # BLD: ABNORMAL 10*3/UL

## 2020-05-19 PROCEDURE — 6370000000 HC RX 637 (ALT 250 FOR IP): Performed by: STUDENT IN AN ORGANIZED HEALTH CARE EDUCATION/TRAINING PROGRAM

## 2020-05-19 PROCEDURE — 2580000003 HC RX 258: Performed by: STUDENT IN AN ORGANIZED HEALTH CARE EDUCATION/TRAINING PROGRAM

## 2020-05-19 PROCEDURE — 85025 COMPLETE CBC W/AUTO DIFF WBC: CPT

## 2020-05-19 PROCEDURE — 99232 SBSQ HOSP IP/OBS MODERATE 35: CPT | Performed by: INTERNAL MEDICINE

## 2020-05-19 PROCEDURE — 97530 THERAPEUTIC ACTIVITIES: CPT

## 2020-05-19 PROCEDURE — 97110 THERAPEUTIC EXERCISES: CPT

## 2020-05-19 PROCEDURE — 36415 COLL VENOUS BLD VENIPUNCTURE: CPT

## 2020-05-19 PROCEDURE — 1200000000 HC SEMI PRIVATE

## 2020-05-19 PROCEDURE — 87040 BLOOD CULTURE FOR BACTERIA: CPT

## 2020-05-19 PROCEDURE — 80048 BASIC METABOLIC PNL TOTAL CA: CPT

## 2020-05-19 PROCEDURE — 97535 SELF CARE MNGMENT TRAINING: CPT

## 2020-05-19 RX ORDER — SODIUM CHLORIDE 1000 MG
2 TABLET, SOLUBLE MISCELLANEOUS 2 TIMES DAILY WITH MEALS
Status: COMPLETED | OUTPATIENT
Start: 2020-05-19 | End: 2020-05-19

## 2020-05-19 RX ADMIN — TAMSULOSIN HYDROCHLORIDE 0.4 MG: 0.4 CAPSULE ORAL at 08:38

## 2020-05-19 RX ADMIN — SODIUM CHLORIDE TAB 1 GM 2 G: 1 TAB at 17:38

## 2020-05-19 RX ADMIN — OXYCODONE HYDROCHLORIDE AND ACETAMINOPHEN 1 TABLET: 5; 325 TABLET ORAL at 19:24

## 2020-05-19 RX ADMIN — SODIUM CHLORIDE TAB 1 GM 2 G: 1 TAB at 12:08

## 2020-05-19 RX ADMIN — OXYCODONE HYDROCHLORIDE AND ACETAMINOPHEN 1 TABLET: 5; 325 TABLET ORAL at 07:10

## 2020-05-19 RX ADMIN — SODIUM CHLORIDE, PRESERVATIVE FREE 10 ML: 5 INJECTION INTRAVENOUS at 08:39

## 2020-05-19 RX ADMIN — METOPROLOL TARTRATE 12.5 MG: 25 TABLET ORAL at 20:14

## 2020-05-19 RX ADMIN — METOPROLOL TARTRATE 12.5 MG: 25 TABLET ORAL at 08:39

## 2020-05-19 RX ADMIN — AMIODARONE HYDROCHLORIDE 200 MG: 200 TABLET ORAL at 20:14

## 2020-05-19 RX ADMIN — AMIODARONE HYDROCHLORIDE 200 MG: 200 TABLET ORAL at 08:39

## 2020-05-19 RX ADMIN — APIXABAN 5 MG: 5 TABLET, FILM COATED ORAL at 20:14

## 2020-05-19 RX ADMIN — ATORVASTATIN CALCIUM 20 MG: 10 TABLET, FILM COATED ORAL at 08:38

## 2020-05-19 RX ADMIN — APIXABAN 5 MG: 5 TABLET, FILM COATED ORAL at 08:39

## 2020-05-19 RX ADMIN — ALLOPURINOL 100 MG: 100 TABLET ORAL at 08:39

## 2020-05-19 ASSESSMENT — PAIN DESCRIPTION - PAIN TYPE
TYPE: ACUTE PAIN

## 2020-05-19 ASSESSMENT — PAIN DESCRIPTION - DESCRIPTORS
DESCRIPTORS: ACHING

## 2020-05-19 ASSESSMENT — PAIN DESCRIPTION - LOCATION
LOCATION: SHOULDER;KNEE
LOCATION: SHOULDER;KNEE

## 2020-05-19 ASSESSMENT — PAIN - FUNCTIONAL ASSESSMENT
PAIN_FUNCTIONAL_ASSESSMENT: PREVENTS OR INTERFERES SOME ACTIVE ACTIVITIES AND ADLS
PAIN_FUNCTIONAL_ASSESSMENT: PREVENTS OR INTERFERES WITH ALL ACTIVE AND SOME PASSIVE ACTIVITIES

## 2020-05-19 ASSESSMENT — PAIN DESCRIPTION - FREQUENCY
FREQUENCY: CONTINUOUS
FREQUENCY: CONTINUOUS

## 2020-05-19 ASSESSMENT — PAIN SCALES - GENERAL
PAINLEVEL_OUTOF10: 7
PAINLEVEL_OUTOF10: 9
PAINLEVEL_OUTOF10: 7
PAINLEVEL_OUTOF10: 6
PAINLEVEL_OUTOF10: 6

## 2020-05-19 ASSESSMENT — PAIN DESCRIPTION - ORIENTATION
ORIENTATION: LEFT;RIGHT
ORIENTATION: LEFT

## 2020-05-19 NOTE — PLAN OF CARE
care  5/19/2020 1353 by Wendy Lopez RN  Outcome: Ongoing  5/19/2020 0242 by Hoang Low RN  Outcome: Ongoing     Problem: Anxiety/Stress:  Goal: Level of anxiety will decrease  Description: Level of anxiety will decrease  5/19/2020 1353 by Wendy Lopez RN  Outcome: Ongoing  5/19/2020 0242 by Hoang Low RN  Outcome: Ongoing     Problem: Cardiac Output - Decreased:  Goal: Hemodynamic stability will improve  Description: Hemodynamic stability will improve  5/19/2020 1353 by Wendy Lopez RN  Outcome: Ongoing  5/19/2020 0242 by Hoang Low RN  Outcome: Ongoing     Problem: Musculor/Skeletal Functional Status  Goal: Highest potential functional level  5/19/2020 1353 by Wendy Lopez RN  Outcome: Ongoing  5/19/2020 0242 by Hoang Low RN  Outcome: Ongoing     Problem:  Activity:  Goal: Ability to tolerate increased activity will improve  Description: Ability to tolerate increased activity will improve  5/19/2020 1353 by Wendy Lopez RN  Outcome: Ongoing  5/19/2020 0242 by Hoang Low RN  Outcome: Ongoing  Goal: Ability to implement measures to reduce episodes of fatigue will improve  Description: Ability to implement measures to reduce episodes of fatigue will improve  5/19/2020 1353 by Wendy Lopez RN  Outcome: Ongoing  5/19/2020 0242 by Hoang Low RN  Outcome: Ongoing     Problem: Coping:  Goal: Ability to cope will improve  Description: Ability to cope will improve  5/19/2020 1353 by Wendy Lopez RN  Outcome: Ongoing  5/19/2020 0242 by Hoang Low RN  Outcome: Ongoing     Problem: Nutritional:  Goal: Nutritional status will improve  Description: Nutritional status will improve  5/19/2020 1353 by Wendy Lopez RN  Outcome: Ongoing  5/19/2020 0242 by Hoang Low RN  Outcome: Ongoing     Problem: Safety:  Goal: Ability to remain free from injury will improve  Description: Ability to remain free from injury will improve  5/19/2020 1353 by Wendy Lopez RN  Outcome: Ongoing  5/19/2020 0242 by Ko Conti RN  Outcome: Ongoing     Problem: Cardiac:  Goal: Ability to maintain an adequate cardiac output will improve  Description: Ability to maintain an adequate cardiac output will improve  5/19/2020 1353 by Raisa Duckworth RN  Outcome: Ongoing  5/19/2020 0242 by Ko Conti RN  Outcome: Ongoing  Goal: Complications related to the disease process, condition or treatment will be avoided or minimized  Description: Complications related to the disease process, condition or treatment will be avoided or minimized  5/19/2020 1353 by Raisa Duckworth RN  Outcome: Ongoing  5/19/2020 0242 by Ko Conti RN  Outcome: Ongoing     Problem: Pain:  Goal: Pain level will decrease  Description: Pain level will decrease  5/19/2020 1353 by Raisa Duckworth RN  Outcome: Ongoing  5/19/2020 0242 by Ko Conti RN  Outcome: Ongoing  Goal: Control of acute pain  Description: Control of acute pain  5/19/2020 1353 by Raisa Duckworth RN  Outcome: Ongoing  5/19/2020 0242 by Ko Conti RN  Outcome: Ongoing  Goal: Control of chronic pain  Description: Control of chronic pain  5/19/2020 1353 by Raisa Duckworth RN  Outcome: Ongoing  5/19/2020 0242 by Ko Conti RN  Outcome: Ongoing     Problem: Nutrition  Goal: Optimal nutrition therapy  5/19/2020 1353 by Raisa Duckworth RN  Outcome: Ongoing  5/19/2020 0242 by Ko Conti RN  Outcome: Ongoing

## 2020-05-19 NOTE — PROGRESS NOTES
Occupational Therapy  Facility/Department: [facility]  Daily Treatment Note  NAME: Ike Grajeda  : 1950  MRN: [id number]    Date of Service: 2020    Discharge Recommendations:  Patient would benefit from continued therapy after discharge   Ed on OT services, transfer safety, bed mob, simple grooming, orientation review- good return       Assessment   Performance deficits / Impairments: Decreased functional mobility ; Decreased ADL status; Decreased strength;Decreased ROM; Decreased endurance;Decreased balance;Decreased high-level IADLs  Prognosis: Good  REQUIRES OT FOLLOW UP: Yes  Activity Tolerance  Activity Tolerance: Patient Tolerated treatment well  Safety Devices  Safety Devices in place: Yes  Type of devices: All fall risk precautions in place; Bed alarm in place;Call light within reach; Left in bed;Nurse notified         Patient Diagnosis(es): The primary encounter diagnosis was Dehydration. Diagnoses of Elevated lactic acid level and Confusion were also pertinent to this visit. has a past medical history of DDD (degenerative disc disease), cervical, Gout, Heart murmur, Hyperlipidemia, Hypertension, Lung cancer ([facility] [address] MI (myocardial infarction) ([facility] [address] Skin cancer, and Wears glasses. has a past surgical history that includes Appendectomy; Tonsillectomy; skin biopsy; skin biopsy; Colonoscopy; Foot surgery (Right); Cardiac catheterization; cyst incision and drainage (Right, 2020); Forearm surgery (Right, 2020); and knee surgery (Left, 2020).     Restrictions  Restrictions/Precautions  Restrictions/Precautions: General Precautions, Fall Risk  Required Braces or Orthoses?: No  Position Activity Restriction  Other position/activity restrictions: up with assist  Subjective   General  Patient assessed for rehabilitation services?: Yes  Family / Caregiver Present: No  Pain Assessment  Pain Assessment: 0-10  Pain Level: 6  Pain Type: Acute pain  Pain Location:

## 2020-05-19 NOTE — PROGRESS NOTES
debridement of Rt elbow joint on 5-14-20 and Lt knee joint aspiration 5-14-20. Fluid from both areas shows uric acid crystals. No growth on cultures. On exam no signs of infection. Plan: D/C antibiotics  Rx for gout. Patient advised he will need that on a long term basis. CURRENT EVALUATION :    Afebrile  VS stable    Feels better but still with Lt knee effusion  On treatment for gout    Rt elbow has improved markedly  Lt knee has residual effusion. Improved overall but needs more antiinflammatory treatment, such as steroids or NSAIDs  Suggest you also consider withdrawing additional fluid from the knee joint. No signs of infection. No antimicrobials necessary  WBC elevation likely from prior steroid effect  . Labs, X rays reviewed: 5/19/2020    BUN:30  Cr:1.06    WBC:13.1-->31.6  Hb:11.3  Plat: 310    Cultures:  Urine:  ·   Blood:  · 5-12-20: No growth   Sputum :  · 5-8-20: Normal abdifatah  Wound:  · 5-13-20: Synovial fluid: No growth   · 5-13-20: Rt Synovial fluid: No growth   · 5-14-20 Elbow swab: No growth  · 5-14-20 Lt knee: No growth  ·     Discussed with patient, RN, family. I have personally reviewed the past medical history, past surgical history, medications, social history, and family history, and I have updated the database accordingly. Past Medical History:     Past Medical History:   Diagnosis Date    DDD (degenerative disc disease), cervical     Gout     Heart murmur     hx. of when younger.  Hyperlipidemia     Hypertension     Lung cancer ([address]     right lung    MI (myocardial infarction) ([address]     Skin cancer     face    Wears glasses        Past Surgical  History:     Past Surgical History:   Procedure Laterality Date    APPENDECTOMY      CARDIAC CATHETERIZATION      w/stent    COLONOSCOPY      CYST INCISION AND DRAINAGE Right 05/14/2020    rt elbow I and D and left knee aspiration with cultures    FOOT SURGERY Right     2nd toe(bone spur).     FOREARM SURGERY Right Abdomen: Soft, non tender. Bowel sounds normal. No organomegaly  All four Extremities: Rt elbow incision, swelling. Lt knee residual effusion. Lt hallux swelling. Neurologic: No gross sensory or motor deficits. Skin: Warm and dry with good turgor. Signs of peripheral arterial insufficiency. Lt forearm with 2 small ulcerations. No open wounds. Medical Decision Making -Laboratory:   I have independently reviewed/ordered the following labs:    CBC with Differential:   Recent Labs     05/18/20  0619 05/18/20  1037 05/19/20  0548   WBC 16.0* 19.0* 31.6*   HGB 11.8* 11.7* 11.8*   HCT 37.0* 35.3* 35.7*   * 506* 508*   LYMPHOPCT 4*  --  2*   MONOPCT 14*  --  4     BMP:   Recent Labs     05/18/20  1037 05/19/20  0548   * 129*   K 4.6 4.5   CL 98 95*   CO2 21 21   BUN 30* 30*   CREATININE 0.97 1.00     Hepatic Function Panel: No results for input(s): PROT, LABALBU, BILIDIR, IBILI, BILITOT, ALKPHOS, ALT, AST in the last 72 hours. No results for input(s): RPR in the last 72 hours. No results for input(s): HIV in the last 72 hours. No results for input(s): BC in the last 72 hours. Lab Results   Component Value Date    MUCUS NOT REPORTED 05/12/2020    RBC 3.67 05/19/2020    TRICHOMONAS NOT REPORTED 05/12/2020    WBC 31.6 05/19/2020    YEAST NOT REPORTED 05/12/2020    TURBIDITY CLEAR 05/12/2020     Lab Results   Component Value Date    CREATININE 1.00 05/19/2020    GLUCOSE 134 05/19/2020       Medical Decision Making-Imaging:       Medical Decision [id number]:     Results for Beryle Ape \"YENI\" (MRN [id number]) as of 5/15/2020 16:14   Ref.  Range 5/13/2020 19:16 5/13/2020 19:17 5/14/2020 15:47   Appearance, Fluid Unknown NOT REPORTED NOT REPORTED NOT REPORTED   Basos, Fluid Latest Ref Range: 0 % NOT REPORTED NOT REPORTED NOT REPORTED   Color, Fluid Unknown NOT REPORTED NOT REPORTED NOT REPORTED   Eos, Fluid Latest Ref Range: 0 % NOT REPORTED NOT REPORTED NOT REPORTED   RBC, Fluid Latest

## 2020-05-19 NOTE — PROGRESS NOTES
sounds bilaterally, no wheezing, rales or rhonchi  Heart: regular rate and rhythm, S1, S2 normal, no murmur, click, rub or gallop  Abdomen: soft, non-tender; bowel sounds normal; no masses,  no organomegaly  Extremities: noted to have swelling of LUE, RUE normal, lower extremities bilaterally normal   Neurologic: Mental status: Alert, oriented, thought content appropriate    Medications:  Scheduled Medications:    sodium chloride  2 g Oral BID WC    apixaban  5 mg Oral BID    allopurinol  100 mg Oral Daily    amiodarone  200 mg Oral BID    metoprolol tartrate  12.5 mg Oral BID    sodium chloride flush  10 mL Intravenous 2 times per day    atorvastatin  20 mg Oral Daily    tamsulosin  0.4 mg Oral Daily     Continuous Infusions:     PRN Medicationsmiconazole, , TID PRN  ipratropium-albuterol, 1 ampule, Q6H PRN  oxyCODONE-acetaminophen, 1 tablet, Q6H PRN  sodium chloride flush, 10 mL, PRN  sodium chloride flush, 10 mL, PRN  potassium chloride, 40 mEq, PRN    Or  potassium alternative oral replacement, 40 mEq, PRN    Or  potassium chloride, 10 mEq, PRN  magnesium sulfate, 1 g, PRN  acetaminophen, 650 mg, Q6H PRN    Or  acetaminophen, 650 mg, Q6H PRN  polyethylene glycol, 17 g, Daily PRN  nicotine, 1 patch, Daily PRN  ondansetron, 4 mg, Q6H PRN  hydrALAZINE, 10 mg, Q6H PRN        Diagnostic Labs:  CBC:   Recent Labs     05/18/20  0619 05/18/20  1037 05/19/20  0548   WBC 16.0* 19.0* 31.6*   RBC 3.73* 3.63* 3.67*   HGB 11.8* 11.7* 11.8*   HCT 37.0* 35.3* 35.7*   MCV 99.2 97.2 97.3   RDW 14.6* 14.5* 14.3   * 506* 508*     BMP:   Recent Labs     05/18/20  0619 05/18/20  1037 05/19/20  0548   * 133* 129*   K 4.4 4.6 4.5   CL 96* 98 95*   CO2 21 21 21   BUN 30* 30* 30*   CREATININE 1.02 0.97 1.00     BNP: No results for input(s): BNP in the last 72 hours. PT/INR: No results for input(s): PROTIME, INR in the last 72 hours. APTT:   No results for input(s): APTT in the last 72 hours.   CARDIAC ENZYMES: No

## 2020-05-19 NOTE — ADT AUTH CERT
s/o chemotherapy and radiation therapy.       4. Hypotension, h/o hypertension   - Lopressor 12.5 mg twice daily       5. Hyperlipidemia   - Continue home dose lipitor       6. SATISH -resolved   -  follow up BMP.       7.  Acute on chronic gout: Allopurinol 100 mg daily (home dose) and completed prednisone. PMNR on board. S/p irrigation debridement of right elbow joint with joint aspiration of left knee - no concern for septic arthritis, vancomycin discontinued.        DVT prophylaxis - On eliquis   GI prophylaxis - not indicated        PT/OT - Consulted   Discharge planning - Case management on board for assistance with discharge planning, plan to work with PT and OT today. 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           Medical Decision Making/Summary/Discussion:5/18/2020           Infection Control Recommendations   [place] Precautions            CURRENT EVALUATION :       Afebrile   VS stable       Feels better but still with Lt knee effusion   On treatment for gout       Rt elbow has improved markedly   Lt knee has residual effusion. Improved overall but needs more antiinflammatory treatment, such as steroids or NSAIDs   Suggest you also consider withdrawing additional fluid from the knee joint.       No signs of infection.    No antimicrobials necessary      Scheduled Meds:   sodium chloride, 2 g, Oral, BID WC   apixaban, 5 mg, Oral, BID   allopurinol, 100 mg, Oral, Unfortunately, on follow up found to have Lt adrenal metastasis.       He was at Cancer center getting markers for the adrenal mass when he decompensated with fevers, hypotension. Treated with fluids and antibiotics.        At [facility] found to have painful arthritis of Rt Elbow and Lt knee and swollen Lt hallux. Gave history of prior gout in the toes. Had taken medications in the past but stopped once he felt better.       Underwent Irrigation and debridement of Rt elbow joint on 5-14-20 and Lt knee joint aspiration 5-14-20. Fluid from both areas shows uric acid crystals. No growth on cultures.       On exam no signs of infection.        Plan: D/C antibiotics   Rx for gout. Patient advised he will need that on a long term basis         CARDIO NOTE      /72   Pulse 82   Temp 97.9 °F (36.6 °C) (Oral)   Resp 20   Ht 6' 0.01\" (1.829 m)   Wt 222 lb 10.6 oz (101 kg)   SpO2 99 ASSESSMENT / PLAN:        1. Possible sepsis secondary to unknown etiology   - Completed vancomycin for 5 days.  Patient completed 5 days of Zosyn.  But no evidence of source of infection, BC negative so far, U/A negative, CTPE CXR negative, COVID swab negative       2. New onset atrial fibrillation with RVR, CHADsVasc 2   - Continue eliquis for anticoagulation, amiodarone switched to p.o. 200 twice daily, received digoxin 1 dose 250 mcg    - Lopressor 12.5 mg twice daily.   - Echo reviewed       3. 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       4. Hypotension, h/o hypertension   - Lopressor 12.5 mg twice daily       5. Hyperlipidemia   - Continue home dose lipitor       6. SATISH -resolved   -  follow up BMP.       7.  Acute on chronic gout: Allopurinol 100 mg daily (home dose) and prednisone 40 mg daily   PMNR on board.     S/p irrigation debridement of right elbow joint with joint aspiration of left knee - no concern for septic arthritis, vancomycin discontinued.        DVT prophylaxis - On eliquis   GI prophylaxis - not indicated        PT/OT - Consulted   Discharge planning - Case management on board for assistance with discharge planning, plan to work with PT and OT today.       05/18/20 0752  98.5 (36.9)  18  92  119/75 -  -  -  - 97  None (Room    20:18:50  99.5 (37.5)  18  110  104/77 -  -  -  - 96  None (Room air       Scheduled Meds:   sodium chloride, 2 g, Oral, BID WC   apixaban, 5 mg, Oral, BID   allopurinol, 100 mg, Oral, Daily   amiodarone, 200 mg, Oral, BID   metoprolol tartrate, 12.5 mg, Oral, BID   atorvastatin, 20 mg, Oral, Daily   tamsulosin, 0.4 mg, Oral, Daily      PERCOCET 5-325MG 1 TAB Q 6 HR X 4 DOSES            5/16/2020 05:45   Sodium: 136   Potassium: 4.2   Chloride: 102   CO2: 19 (L)   BUN: 30 (H)   Creatinine: 0.87      Anion Gap: 15   GFR Non-: >60   GFR African American: >60   Glucose: 89   Calcium: 9.2      CRP: 227.1 (H)   WBC: 12.4 (H)   RBC: 3.60 (L)   Hemoglobin Quant: 11.4 (L)   Hematocrit: 35.5 (L)   Platelet Count: 709                Septic Arthritis - Care Day 1 (5/14/2020) by Maryellen Rodríguez RN         Review Entered Review Status   5/19/2020 15:55 Completed       Criteria Review      Care Day: 1 Care Date: 5/14/2020 Level of Care:    Guideline Day 1    Level Of Care    (X) Floor [C]    5/19/2020 3:55 PM EDT by Milton Crorea [facility]    Clinical Status    (X) * Clinical Indications met [D]    5/19/2020 3:55 PM EDT by Yelitza Villar      POSTOPERATIVE DIAGNOSES:  1.  Right elbow septic arthritis and gout. PROCEDURES:  1.  Right elbow arthrotomy and debridement secondary to septic  arthritis; CPT 41070.     (X) Probable fever, joint pain, and swelling    Activity    (X) Bed rest    Routes    (X) Oral or IV fluids    5/19/2020 3:55 PM EDT by Yelitza Villar      LRS @ 125 ML/HR    (X) Diet as tolerated    Interventions    (X) WBC, ESR, C-reactive protein    (X) Blood -Abx OCTOR   -WB status: WBAT   -Pain control PO/IV Medication. Attempt to Wean IV medications. -DVT ppx: Please hold chemical AC today for surgery.   -Ice/Elevate extremities for pain/swelling   -Please page DO ortho with any questions            INTERNAL MED NOTE      /69   Pulse 82   Temp 98.2 °F (36.8 °C) (Oral)   Resp 18   Ht 6' 0.01\" (1.829 m)   Wt 216 lb 0.8 oz (98 kg)   SpO2 98%       ·   ceFAZolin    2 g   Intravenous   On Call to OR   ·   allopurinol    100 mg   Oral   Daily   ·   predniSONE    40 mg   Oral   Daily   ·   amiodarone    200 mg   Oral   BID   ·   metoprolol tartrate    12.5 mg   Oral   BID   ·   [Held by provider] apixaban    5 mg   Oral   BID      atorvastatin    20 mg   Oral   Daily   ·   tamsulosin    0.4 mg   Oral   Daily      ASSESSMENT / PLAN:        1. Possible sepsis secondary to unknown etiology   -Completed vancomycin for 5 days.  Patient completed 5 days of Zosyn.  But no evidence of source of infection, procal elevated to 14.89, BC negative so far, U/A negative, CTPE CXR negative, COVID swab negative   - On midodrine 10 mg TID, optimize BP.       2. New onset atrial fibrillation with RVR, CHADsVasc 2   - Continue eliquis-on hold for anticoagulation, amiodarone switched to p.o. 200 twice daily, received digoxin 1 dose 250 mcg    -Lopressor 12.5 mg twice daily.   -Echo reviewed       3. 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       4. Hypotension, h/o hypertension   - On midodrine 10 mg TID, hold lopressor.       5. Hyperlipidemia   - Continue home dose lipitor       6.  SATISH -resolved   -  follow up BMP.       7.  Acute on chronic gout: Allopurinol 100 mg daily (home dose) and prednisone 40 mg daily   PMNR on board.  S/p drainage of synovial fluid from right elbow and left knee.  Synovial fluid analysis of right elbow suggestive of septic arthritis and is positive for

## 2020-05-20 ENCOUNTER — APPOINTMENT (OUTPATIENT)
Dept: GENERAL RADIOLOGY | Age: 70
DRG: 507 | End: 2020-05-20
Payer: COMMERCIAL

## 2020-05-20 LAB
ABSOLUTE EOS #: 0 K/UL (ref 0–0.4)
ABSOLUTE IMMATURE GRANULOCYTE: 0 K/UL (ref 0–0.3)
ABSOLUTE LYMPH #: 1.06 K/UL (ref 1–4.8)
ABSOLUTE MONO #: 2.38 K/UL (ref 0.1–0.8)
ANION GAP SERPL CALCULATED.3IONS-SCNC: 15 MMOL/L (ref 9–17)
BASOPHILS # BLD: 0 % (ref 0–2)
BASOPHILS ABSOLUTE: 0 K/UL (ref 0–0.2)
BUN BLDV-MCNC: 34 MG/DL (ref 8–23)
BUN/CREAT BLD: ABNORMAL (ref 9–20)
CALCIUM SERPL-MCNC: 8.3 MG/DL (ref 8.6–10.4)
CHLORIDE BLD-SCNC: 94 MMOL/L (ref 98–107)
CO2: 21 MMOL/L (ref 20–31)
CREAT SERPL-MCNC: 0.96 MG/DL (ref 0.7–1.2)
DIFFERENTIAL TYPE: ABNORMAL
EKG ATRIAL RATE: 103 BPM
EKG P AXIS: 33 DEGREES
EKG P-R INTERVAL: 172 MS
EKG Q-T INTERVAL: 360 MS
EKG QRS DURATION: 90 MS
EKG QTC CALCULATION (BAZETT): 471 MS
EKG R AXIS: 62 DEGREES
EKG T AXIS: 35 DEGREES
EKG VENTRICULAR RATE: 103 BPM
EOSINOPHILS RELATIVE PERCENT: 0 % (ref 1–4)
GFR AFRICAN AMERICAN: >60 ML/MIN
GFR NON-AFRICAN AMERICAN: >60 ML/MIN
GFR SERPL CREATININE-BSD FRML MDRD: ABNORMAL ML/MIN/{1.73_M2}
GFR SERPL CREATININE-BSD FRML MDRD: ABNORMAL ML/MIN/{1.73_M2}
GLUCOSE BLD-MCNC: 110 MG/DL (ref 70–99)
HCT VFR BLD CALC: 34.5 % (ref 40.7–50.3)
HEMOGLOBIN: 11.1 G/DL (ref 13–17)
IMMATURE GRANULOCYTES: 0 %
LYMPHOCYTES # BLD: 4 % (ref 24–44)
MCH RBC QN AUTO: 31.4 PG (ref 25.2–33.5)
MCHC RBC AUTO-ENTMCNC: 32.2 G/DL (ref 28.4–34.8)
MCV RBC AUTO: 97.7 FL (ref 82.6–102.9)
MONOCYTES # BLD: 9 % (ref 1–7)
MORPHOLOGY: ABNORMAL
NRBC AUTOMATED: 0 PER 100 WBC
PDW BLD-RTO: 14.5 % (ref 11.8–14.4)
PLATELET # BLD: 417 K/UL (ref 138–453)
PLATELET ESTIMATE: ABNORMAL
PMV BLD AUTO: 10 FL (ref 8.1–13.5)
POTASSIUM SERPL-SCNC: 4.3 MMOL/L (ref 3.7–5.3)
RBC # BLD: 3.53 M/UL (ref 4.21–5.77)
RBC # BLD: ABNORMAL 10*6/UL
SEG NEUTROPHILS: 87 % (ref 36–66)
SEGMENTED NEUTROPHILS ABSOLUTE COUNT: 22.96 K/UL (ref 1.8–7.7)
SERUM OSMOLALITY: 278 MOSM/KG (ref 275–295)
SODIUM BLD-SCNC: 129 MMOL/L (ref 135–144)
SODIUM BLD-SCNC: 130 MMOL/L (ref 135–144)
WBC # BLD: 26.4 K/UL (ref 3.5–11.3)
WBC # BLD: ABNORMAL 10*3/UL

## 2020-05-20 PROCEDURE — 80048 BASIC METABOLIC PNL TOTAL CA: CPT

## 2020-05-20 PROCEDURE — 97110 THERAPEUTIC EXERCISES: CPT

## 2020-05-20 PROCEDURE — 83930 ASSAY OF BLOOD OSMOLALITY: CPT

## 2020-05-20 PROCEDURE — 97530 THERAPEUTIC ACTIVITIES: CPT

## 2020-05-20 PROCEDURE — 6370000000 HC RX 637 (ALT 250 FOR IP): Performed by: STUDENT IN AN ORGANIZED HEALTH CARE EDUCATION/TRAINING PROGRAM

## 2020-05-20 PROCEDURE — 2580000003 HC RX 258: Performed by: STUDENT IN AN ORGANIZED HEALTH CARE EDUCATION/TRAINING PROGRAM

## 2020-05-20 PROCEDURE — 6360000002 HC RX W HCPCS: Performed by: STUDENT IN AN ORGANIZED HEALTH CARE EDUCATION/TRAINING PROGRAM

## 2020-05-20 PROCEDURE — 99232 SBSQ HOSP IP/OBS MODERATE 35: CPT | Performed by: INTERNAL MEDICINE

## 2020-05-20 PROCEDURE — 71045 X-RAY EXAM CHEST 1 VIEW: CPT

## 2020-05-20 PROCEDURE — 1200000000 HC SEMI PRIVATE

## 2020-05-20 PROCEDURE — 93005 ELECTROCARDIOGRAM TRACING: CPT | Performed by: STUDENT IN AN ORGANIZED HEALTH CARE EDUCATION/TRAINING PROGRAM

## 2020-05-20 PROCEDURE — 85025 COMPLETE CBC W/AUTO DIFF WBC: CPT

## 2020-05-20 PROCEDURE — 84295 ASSAY OF SERUM SODIUM: CPT

## 2020-05-20 PROCEDURE — 36415 COLL VENOUS BLD VENIPUNCTURE: CPT

## 2020-05-20 PROCEDURE — 93010 ELECTROCARDIOGRAM REPORT: CPT | Performed by: INTERNAL MEDICINE

## 2020-05-20 RX ORDER — SODIUM CHLORIDE 1000 MG
2 TABLET, SOLUBLE MISCELLANEOUS 2 TIMES DAILY WITH MEALS
Status: COMPLETED | OUTPATIENT
Start: 2020-05-20 | End: 2020-05-20

## 2020-05-20 RX ORDER — 0.9 % SODIUM CHLORIDE 0.9 %
500 INTRAVENOUS SOLUTION INTRAVENOUS ONCE
Status: COMPLETED | OUTPATIENT
Start: 2020-05-20 | End: 2020-05-20

## 2020-05-20 RX ADMIN — ALLOPURINOL 100 MG: 100 TABLET ORAL at 08:56

## 2020-05-20 RX ADMIN — ATORVASTATIN CALCIUM 20 MG: 10 TABLET, FILM COATED ORAL at 21:06

## 2020-05-20 RX ADMIN — OXYCODONE HYDROCHLORIDE AND ACETAMINOPHEN 1 TABLET: 5; 325 TABLET ORAL at 16:53

## 2020-05-20 RX ADMIN — OXYCODONE HYDROCHLORIDE AND ACETAMINOPHEN 1 TABLET: 5; 325 TABLET ORAL at 22:44

## 2020-05-20 RX ADMIN — APIXABAN 5 MG: 5 TABLET, FILM COATED ORAL at 08:56

## 2020-05-20 RX ADMIN — SODIUM CHLORIDE TAB 1 GM 2 G: 1 TAB at 16:51

## 2020-05-20 RX ADMIN — SODIUM CHLORIDE TAB 1 GM 2 G: 1 TAB at 10:33

## 2020-05-20 RX ADMIN — SODIUM CHLORIDE, PRESERVATIVE FREE 10 ML: 5 INJECTION INTRAVENOUS at 21:07

## 2020-05-20 RX ADMIN — AMIODARONE HYDROCHLORIDE 200 MG: 200 TABLET ORAL at 21:06

## 2020-05-20 RX ADMIN — SODIUM CHLORIDE, PRESERVATIVE FREE 10 ML: 5 INJECTION INTRAVENOUS at 09:00

## 2020-05-20 RX ADMIN — AMIODARONE HYDROCHLORIDE 200 MG: 200 TABLET ORAL at 08:56

## 2020-05-20 RX ADMIN — OXYCODONE HYDROCHLORIDE AND ACETAMINOPHEN 1 TABLET: 5; 325 TABLET ORAL at 10:33

## 2020-05-20 RX ADMIN — SODIUM CHLORIDE 500 ML: 9 INJECTION, SOLUTION INTRAVENOUS at 12:00

## 2020-05-20 RX ADMIN — HYDROCORTISONE SODIUM SUCCINATE 50 MG: 100 INJECTION, POWDER, FOR SOLUTION INTRAMUSCULAR; INTRAVENOUS at 15:23

## 2020-05-20 RX ADMIN — TAMSULOSIN HYDROCHLORIDE 0.4 MG: 0.4 CAPSULE ORAL at 08:56

## 2020-05-20 RX ADMIN — HYDROCORTISONE SODIUM SUCCINATE 50 MG: 100 INJECTION, POWDER, FOR SOLUTION INTRAMUSCULAR; INTRAVENOUS at 22:46

## 2020-05-20 RX ADMIN — APIXABAN 5 MG: 5 TABLET, FILM COATED ORAL at 21:06

## 2020-05-20 RX ADMIN — METOPROLOL TARTRATE 12.5 MG: 25 TABLET ORAL at 21:06

## 2020-05-20 ASSESSMENT — PAIN SCALES - GENERAL
PAINLEVEL_OUTOF10: 8
PAINLEVEL_OUTOF10: 8
PAINLEVEL_OUTOF10: 7

## 2020-05-20 NOTE — PROGRESS NOTES
Occupational Therapy  Facility/Department: [facility]  Daily Treatment Note  NAME: Osei Chris  : 1950  MRN: [id number]    Date of Service: 2020    Discharge Recommendations:  Patient would benefit from continued therapy after discharge  OT Equipment Recommendations  Other: CTA pending progress     Assessment   Performance deficits / Impairments: Decreased functional mobility ; Decreased ADL status; Decreased strength;Decreased ROM; Decreased endurance;Decreased balance;Decreased high-level IADLs  Assessment: Pt would benefit from continued acute care and post acute care OT to address moderate deficits in ADL/ functional activities, endurance and functional transfers/ mobility following admission. Pt required max A for LE ADL activities and mod A for bed mobility. Prognosis: Good  Decision Making: Medium Complexity  OT Education: OT Role;Plan of Care;Transfer Training  Patient Education: OT POC, importance of participation in therapy, use of incentive spirometer, EC/WS - pt verbalized understanding. REQUIRES OT FOLLOW UP: Yes  Activity Tolerance  Activity Tolerance: Patient limited by pain  Safety Devices  Safety Devices in place: Yes  Type of devices: All fall risk precautions in place; Bed alarm in place;Call light within reach; Left in bed;Nurse notified  Restraints  Initially in place: No       Patient Diagnosis(es): The primary encounter diagnosis was Dehydration. Diagnoses of Elevated lactic acid level and Confusion were also pertinent to this visit. has a past medical history of DDD (degenerative disc disease), cervical, Gout, Heart murmur, Hyperlipidemia, Hypertension, Lung cancer ([address] MI (myocardial infarction) ([address] Skin cancer, and Wears glasses. has a past surgical history that includes Appendectomy; Tonsillectomy; skin biopsy; skin biopsy; Colonoscopy; Foot surgery (Right); Cardiac catheterization; cyst incision and drainage (Right, 2020);  Forearm surgery Treatment Minutes: 38 Minutes   See above for LOF. RN reports patient is medically stable for therapy treatment this date. Chart reviewed prior to treatment and patient is agreeable for therapy. All lines intact and patient positioned comfortably at end of treatment. All patient needs addressed prior to ending therapy session.         Ann Marie Uribe, OTR/L

## 2020-05-20 NOTE — PROGRESS NOTES
APTT in the last 72 hours. CARDIAC ENZYMES: No results for input(s): CKMB, CKMBINDEX, TROPONINI in the last 72 hours. Invalid input(s): CKTOTAL;3  FASTING LIPID PANEL:No results found for: CHOL, HDL, TRIG  LIVER PROFILE: No results for input(s): AST, ALT, ALB, BILIDIR, BILITOT, ALKPHOS in the last 72 hours. MICROBIOLOGY:   Lab Results   Component Value Date/Time    CULTURE NO GROWTH 15 HOURS 05/19/2020 12:01 PM    CULTURE NO GROWTH 15 HOURS 05/19/2020 12:01 PM       Imaging:    Ct Head Wo Contrast    Result Date: 5/7/2020  No evidence of acute intracranial abnormality. Ct Guided Needle Placement    Result Date: 5/5/2020  Successful CT guided fiducial marker placement in the left adrenal gland nodule for planned SBRT. Xr Chest Portable    Result Date: 5/7/2020  No new abnormality. Stable neoplastic and post treatment changes in the right hilum and right upper lung better characterized on the CT earlier today. Xr Chest Portable    Result Date: 5/5/2020  No evidence of pneumothorax status post left adrenal gland fiducial marker placement. Ct Chest Pulmonary Embolism W Contrast    Result Date: 5/7/2020  1. No evidence of central or large proximal segmental pulmonary embolus. Distal pulmonary arteries are limited by motion and bolus timing. 2.  Stable post-treatment changes in the right suprahilar/perihilar lung. No acute intrathoracic process. 3.  Unchanged adrenal nodules with fiducial marker noted on the left. ASSESSMENT & PLAN     ASSESSMENT / PLAN:     1. Acute on chronic gout: Allopurinol 100 mg daily (home dose) and completed prednisone. PMNR on board. S/p irrigation debridement of right elbow joint with joint aspiration of left knee - no concern for septic arthritis, vancomycin discontinued. - Restart steroids solucortef 50 mg q8.     2. Initial concern for Possible sepsis secondary to unknown etiology  - Completed vancomycin for 5 days. Patient completed 5 days of Zosyn.   But no evidence of source of infection, BC negative so far, U/A negative, CTPE CXR negative, COVID swab negative. WBC trending up. Obtain infectious disease recommendation.     3. New onset atrial fibrillation with RVR, CHADsVasc 2  - Continue eliquis for anticoagulation, amiodarone  p.o. 200 twice daily  - Lopressor 12.5 mg twice daily.  - Echo reviewed.      4. Hyponatremia, likely secondary to SIADH  - Continue fluid restriction, salt tablets 2 gm BID for 2 doses. - Sodium checks q6.     5. 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     6. Hypotension, h/o hypertension  - Lopressor 12.5 mg twice daily     7. Hyperlipidemia  - Continue home dose lipitor     8. SATISH -resolved  -  follow up BMP. DVT prophylaxis - On eliquis  GI prophylaxis - not indicated      PT/OT - Consulted  Discharge planning - Case management on board for assistance with discharge planning, plan to work with PT and OT today.     Girish Aguiar MD  Internal Medicine Resident, PGY-1  [facility]; [place], [place]  5/20/2020, 8:18 AM  Attending Physician Statement For Internal Medicine  I have discussed the care of Gena Pandey, including pertinent history and exam findings,  with the Resident. I have seen and examined the patient with the resident and reviewed the key elements of all parts of the encounter have been performed by me. I agree with the assessment, plan and orders as documented by the resident.     Rober Yeung MD, MRCP Sheron Sorenson, FACP   5/20/2020 5:19 PM  Internal Medicine and Nephrology

## 2020-05-20 NOTE — PROGRESS NOTES
· Oral Diet intake: (50%)  · Oral Nutrition Supplement (ONS) Orders: Standard High Calorie Oral Supplement  · ONS intake: (100%)  · Anthropometric Measures:  · Ht: 6' 0.01\" (182.9 cm)   · Current Body Wt: 222 lb (100.7 kg)  · Admission Body Wt: 223 lb (101.2 kg)  · Usual Body Wt: 222 lb (100.7 kg)(per pt )  · % Weight Change:  ,  220-231 lbs over 1 yr per EMR  · Ideal Body Wt: 178 lb (80.7 kg), % Ideal Body 125% adm/ideal  · BMI Classification: BMI 25.0 - 29.9 Overweight    Nutrition Interventions:   Continue current diet, Continue current ONS  Continued Inpatient Monitoring, Education Not Indicated    Nutrition Evaluation:   · Evaluation: Goal achieved   · Goals: Restart diet within 24-72 hrs     · Monitoring: Nutrition Progression, Meal Intake, Supplement Intake, Diet Tolerance, Skin Integrity, Wound Healing, I&O, Weight, Pertinent Labs, Monitor Bowel Function      Electronically signed by Justina Cruz RD, LD on 5/20/20 at 2:09 PM EDT    Contact Number: [phone]

## 2020-05-20 NOTE — PLAN OF CARE
Ongoing     Problem: Coping:  Goal: Ability to cope will improve  Description: Ability to cope will improve  Outcome: Ongoing     Problem: Nutritional:  Goal: Nutritional status will improve  Description: Nutritional status will improve  Outcome: Ongoing     Problem: Safety:  Goal: Ability to remain free from injury will improve  Description: Ability to remain free from injury will improve  Outcome: Ongoing     Problem: Cardiac:  Goal: Ability to maintain an adequate cardiac output will improve  Description: Ability to maintain an adequate cardiac output will improve  Outcome: Ongoing  Goal: Complications related to the disease process, condition or treatment will be avoided or minimized  Description: Complications related to the disease process, condition or treatment will be avoided or minimized  Outcome: Ongoing     Problem: Pain:  Goal: Pain level will decrease  Description: Pain level will decrease  Outcome: Ongoing  Goal: Control of acute pain  Description: Control of acute pain  Outcome: Ongoing  Goal: Control of chronic pain  Description: Control of chronic pain  Outcome: Ongoing     Problem: Nutrition  Goal: Optimal nutrition therapy  Outcome: Ongoing

## 2020-05-20 NOTE — ADT AUTH CERT
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           Medical Decision Making/Summary/Discussion:5/18/2020           Infection Control Recommendations   [place] Precautions            CURRENT EVALUATION :       Afebrile   VS stable       Feels better but still with Lt knee effusion   On treatment for gout       Rt elbow has improved markedly   Lt knee has residual effusion. Improved overall but needs more antiinflammatory treatment, such as steroids or NSAIDs   Suggest you also consider withdrawing additional fluid from the knee joint.       No signs of infection.    No antimicrobials necessary      Scheduled Meds:   sodium chloride, 2 g, Oral, BID WC   apixaban, 5 mg, Oral, BID   allopurinol, 100 mg, Oral, Daily   amiodarone, 200 mg, Oral, BID   metoprolol tartrate, 12.5 mg, Oral, BID   atorvastatin, 20 mg, Oral, Daily   tamsulosin, 0.4 mg, Oral, Daily      PERCOCET 5-325MG 1 TAB Q 6 HR X 4 DOSES      05/18/20 0752  98.5 (36.9)  18  92  119/75 -  -  -  - 97  None (Room air)    05/18/20 20:18:50  99.5 (37.5)  18  110  104/77 -  -  -  - 96  None (Room air       5/18/2020 06:19   Sodium: 128 (L)   Potassium: 4.4   Chloride: 96 (L)   CO2: 21   BUN: 30 (H)   Creatinine: 1.02      Anion Gap: 11   GFR Non-: >60   GFR African American: >60   Glucose: 113 (H)   Calcium: 8.8      WBC: 16.0 (H)   RBC: 3.73 (L)   Hemoglobin Quant: 11.8 (L)   Hematocrit: 37.0 (L)   MCV: 99.2   MCH: 31.6 RUE   -Maintain dressings do not remove. Notify if dressings saturate   -Pain control and medical management per primary    -DVT ppx: Managed per primary. Ok to resume from ortho perspective   -Ice/Elevate extremities for pain/swelling   -Please page DO ortho with any questions      INTERNAL MED          Patient underwent right elbow irrigation and debridement ( nodule and left knee joint aspiration 32 cc of blood tensile nodule aspirate obtained from left knee.       ASSESSMENT / PLAN:        1. Possible sepsis secondary to unknown etiology   -Completed vancomycin for 5 days.  Patient completed 5 days of Zosyn.  But no evidence of source of infection, procal elevated to 14.89, BC negative so far, U/A negative, CTPE CXR negative, COVID swab negative       2. New onset atrial fibrillation with RVR, CHADsVasc 2   - Continue eliquis-on hold for anticoagulation, amiodarone switched to p.o. 200 twice daily, received digoxin 1 dose 250 mcg    -Lopressor 12.5 mg twice daily.   -Echo reviewed       3. 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       4. Hypotension, h/o hypertension   --Lopressor 12.5 mg twice daily       5. Hyperlipidemia   - Continue home dose lipitor       6.  SATISH -resolved   -  follow up BMP.       7.  Acute on chronic gout: Allopurinol 100 mg daily (home dose) and prednisone 40 mg daily   PMNR on board.  S/p drainage of synovial fluid from right elbow and left knee.  Synovial fluid analysis of right elbow suggestive of septic arthritis and is positive for uric acid crystals.  Synovial fluid analysis of left knee suggestive of inflammatory arthritis and is positive for uric acid crystals.  Patient will have IND done to patient will be started on vancomycin from today day at noon pending cardiology clearance.  Will consult infectious disease for septic arthritis of right elbow       S/p irrigation debridement of warm and well-perfused w/ BCR; radial pulse 2+.  Right elbow AROM 60-90 deg and painful with examination, PROM 30-100deg with pain.       LLE: Mild left knee effusion.  Left knee area with mild warmth.  No significant erythema to left knee.  Skin was intact. Significant TTP of the left knee globally. Compartments soft and compressible. EHL/FHL 5/5.  GCS 4/5 and TA 4/5.  Superificial/deep peroneal, sural/saphenous and plantar nerve distribution SILT. Foot and toes warm and well-perfused w/ BCR; DP pulses 2+. -log roll.  Left knee AROM 10-30 deg with pain and PROM 5-50 with pain.       Impression/plan: 69 y. o. male being seen for right elbow septic arthritis and left knee pain acute gouty attack vs. septic arthritis       -To OR today for formal I&D of the right elbow and potential to the left knee   -Right elbow aspirate: Cx pending, crystals pending although were noted on conversation with lab last night, cell count 77,640   -Left knee aspirate: Cx pending, crystals pending, cell count 31,705   -.5, , WBC 13.9   -COVID results pending   -Appreciate surgical risk assessment and cardiac clearance note for OR today   -Surgical consent was obtained and placed in chart. Operative extremities marked. -[email or web address]   -Abx OCTOR   -WB status: WBAT   -Pain control PO/IV Medication. Attempt to Wean IV medications.     -DVT ppx: Please hold chemical AC today for surgery.   -Ice/Elevate extremities for pain/swelling   -Please page DO ortho with any questions            INTERNAL MED NOTE      /69   Pulse 82   Temp 98.2 °F (36.8 °C) (Oral)   Resp 18   Ht 6' 0.01\" (1.829 m)   Wt 216 lb 0.8 oz (98 kg)   SpO2 98%       ·   ceFAZolin    2 g   Intravenous   On Call to OR   ·   allopurinol    100 mg   Oral   Daily   ·   predniSONE    40 mg   Oral   Daily   ·   amiodarone    200 mg   Oral   BID   ·   metoprolol tartrate    12.5 mg   Oral   BID   ·   [Held by provider] apixaban    5 mg   Oral   BID      atorvastatin

## 2020-05-20 NOTE — PLAN OF CARE
Problem: Nutrition  Goal: Optimal nutrition therapy  5/20/2020 1415 by Britt Jean-Baptiste RD, LD  Outcome: Ongoing  Note: Nutrition Problem: Increased nutrient needs  Intervention: Food and/or Nutrient Delivery: Continue current diet, Continue current ONS  Nutritional Goals: Restart diet within 24-72 hrs

## 2020-05-20 NOTE — PROGRESS NOTES
Packs/day: 0.50     Types: Cigarettes     Start date: 4/8/2019    Smokeless tobacco: Former User   Substance and Sexual Activity    Alcohol use: No    Drug use: Yes     Frequency: 7.0 times per week     Types: Marijuana    Sexual activity: Not on file   Lifestyle    Physical activity     Days per week: Not on file     Minutes per session: Not on file    Stress: Not on file   Relationships    Social connections     Talks on phone: Not on file     Gets together: Not on file     Attends Quaker service: Not on file     Active member of club or organization: Not on file     Attends meetings of clubs or organizations: Not on file     Relationship status: Not on file    Intimate partner violence     Fear of current or ex partner: Not on file     Emotionally abused: Not on file     Physically abused: Not on file     Forced sexual activity: Not on file   Other Topics Concern    Not on file   Social History Narrative    Not on file       Family History:     Family History   Problem Relation Age of Onset    Cancer Father         lung cancer        Allergies:   Patient has no known allergies. Review of Systems:   Constitutional: No fevers or chills. No systemic complaints  Head: No headaches  Eyes: No double vision or blurry vision. No conjunctival inflammation. ENT: No sore throat or runny nose. . No hearing loss, tinnitus or vertigo. Cardiovascular: No chest pain or palpitations. No shortness of breath. No THOMPSON  Lung: No shortness of breath or cough. No sputum production  Abdomen: No nausea, vomiting, diarrhea, or abdominal pain. Ladarius Darrius No cramps. Genitourinary: No increased urinary frequency, or dysuria. No hematuria. No suprapubic or CVA pain  Musculoskeletal: Rt elbow pain. Lt knee joint effusion. Hematologic: No bleeding or bruising. Neurologic: No headache, weakness, numbness, or tingling. Integument: No rash, no ulcers. Psychiatric: No depression.    Endocrine: No polyuria, no polydipsia, no

## 2020-05-21 ENCOUNTER — APPOINTMENT (OUTPATIENT)
Dept: GENERAL RADIOLOGY | Age: 70
DRG: 507 | End: 2020-05-21
Payer: COMMERCIAL

## 2020-05-21 VITALS
DIASTOLIC BLOOD PRESSURE: 65 MMHG | SYSTOLIC BLOOD PRESSURE: 122 MMHG | TEMPERATURE: 98 F | HEIGHT: 72 IN | HEART RATE: 85 BPM | BODY MASS INDEX: 30.16 KG/M2 | OXYGEN SATURATION: 96 % | WEIGHT: 222.66 LBS | RESPIRATION RATE: 18 BRPM

## 2020-05-21 LAB
ABSOLUTE EOS #: 0 K/UL (ref 0–0.4)
ABSOLUTE IMMATURE GRANULOCYTE: 0 K/UL (ref 0–0.3)
ABSOLUTE LYMPH #: 0.59 K/UL (ref 1–4.8)
ABSOLUTE MONO #: 1.37 K/UL (ref 0.1–0.8)
ANION GAP SERPL CALCULATED.3IONS-SCNC: 15 MMOL/L (ref 9–17)
BASOPHILS # BLD: 0 % (ref 0–2)
BASOPHILS ABSOLUTE: 0 K/UL (ref 0–0.2)
BUN BLDV-MCNC: 39 MG/DL (ref 8–23)
BUN/CREAT BLD: ABNORMAL (ref 9–20)
C-REACTIVE PROTEIN: 306.2 MG/L (ref 0–5)
CALCIUM SERPL-MCNC: 8.5 MG/DL (ref 8.6–10.4)
CHLORIDE BLD-SCNC: 98 MMOL/L (ref 98–107)
CO2: 21 MMOL/L (ref 20–31)
CREAT SERPL-MCNC: 0.92 MG/DL (ref 0.7–1.2)
DIFFERENTIAL TYPE: ABNORMAL
EOSINOPHILS RELATIVE PERCENT: 0 % (ref 1–4)
GFR AFRICAN AMERICAN: >60 ML/MIN
GFR NON-AFRICAN AMERICAN: >60 ML/MIN
GFR SERPL CREATININE-BSD FRML MDRD: ABNORMAL ML/MIN/{1.73_M2}
GFR SERPL CREATININE-BSD FRML MDRD: ABNORMAL ML/MIN/{1.73_M2}
GLUCOSE BLD-MCNC: 116 MG/DL (ref 70–99)
HCT VFR BLD CALC: 31.3 % (ref 40.7–50.3)
HEMOGLOBIN: 10 G/DL (ref 13–17)
IMMATURE GRANULOCYTES: 0 %
LYMPHOCYTES # BLD: 3 % (ref 24–44)
MCH RBC QN AUTO: 31.8 PG (ref 25.2–33.5)
MCHC RBC AUTO-ENTMCNC: 31.9 G/DL (ref 28.4–34.8)
MCV RBC AUTO: 99.7 FL (ref 82.6–102.9)
MONOCYTES # BLD: 7 % (ref 1–7)
MORPHOLOGY: NORMAL
NRBC AUTOMATED: 0 PER 100 WBC
PDW BLD-RTO: 14.3 % (ref 11.8–14.4)
PLATELET # BLD: 446 K/UL (ref 138–453)
PLATELET ESTIMATE: ABNORMAL
PMV BLD AUTO: 9.9 FL (ref 8.1–13.5)
POTASSIUM SERPL-SCNC: 4.3 MMOL/L (ref 3.7–5.3)
RBC # BLD: 3.14 M/UL (ref 4.21–5.77)
RBC # BLD: ABNORMAL 10*6/UL
SEG NEUTROPHILS: 90 % (ref 36–66)
SEGMENTED NEUTROPHILS ABSOLUTE COUNT: 17.54 K/UL (ref 1.8–7.7)
SODIUM BLD-SCNC: 127 MMOL/L (ref 135–144)
SODIUM BLD-SCNC: 134 MMOL/L (ref 135–144)
WBC # BLD: 19.5 K/UL (ref 3.5–11.3)
WBC # BLD: ABNORMAL 10*3/UL

## 2020-05-21 PROCEDURE — 99232 SBSQ HOSP IP/OBS MODERATE 35: CPT | Performed by: INTERNAL MEDICINE

## 2020-05-21 PROCEDURE — 85025 COMPLETE CBC W/AUTO DIFF WBC: CPT

## 2020-05-21 PROCEDURE — 6370000000 HC RX 637 (ALT 250 FOR IP): Performed by: STUDENT IN AN ORGANIZED HEALTH CARE EDUCATION/TRAINING PROGRAM

## 2020-05-21 PROCEDURE — 97530 THERAPEUTIC ACTIVITIES: CPT

## 2020-05-21 PROCEDURE — 84295 ASSAY OF SERUM SODIUM: CPT

## 2020-05-21 PROCEDURE — 74018 RADEX ABDOMEN 1 VIEW: CPT

## 2020-05-21 PROCEDURE — 86140 C-REACTIVE PROTEIN: CPT

## 2020-05-21 PROCEDURE — 2580000003 HC RX 258: Performed by: STUDENT IN AN ORGANIZED HEALTH CARE EDUCATION/TRAINING PROGRAM

## 2020-05-21 PROCEDURE — 6360000002 HC RX W HCPCS: Performed by: STUDENT IN AN ORGANIZED HEALTH CARE EDUCATION/TRAINING PROGRAM

## 2020-05-21 PROCEDURE — 6360000002 HC RX W HCPCS: Performed by: INTERNAL MEDICINE

## 2020-05-21 PROCEDURE — 36415 COLL VENOUS BLD VENIPUNCTURE: CPT

## 2020-05-21 PROCEDURE — 97535 SELF CARE MNGMENT TRAINING: CPT

## 2020-05-21 PROCEDURE — 80048 BASIC METABOLIC PNL TOTAL CA: CPT

## 2020-05-21 PROCEDURE — 97110 THERAPEUTIC EXERCISES: CPT

## 2020-05-21 RX ORDER — ALLOPURINOL 300 MG/1
300 TABLET ORAL DAILY
Status: DISCONTINUED | OUTPATIENT
Start: 2020-05-22 | End: 2020-05-21 | Stop reason: HOSPADM

## 2020-05-21 RX ORDER — OXYCODONE HYDROCHLORIDE AND ACETAMINOPHEN 5; 325 MG/1; MG/1
1 TABLET ORAL EVERY 8 HOURS PRN
Qty: 9 TABLET | Refills: 0 | Status: SHIPPED | OUTPATIENT
Start: 2020-05-21 | End: 2020-05-24

## 2020-05-21 RX ORDER — HEPARIN SODIUM (PORCINE) LOCK FLUSH IV SOLN 100 UNIT/ML 100 UNIT/ML
500 SOLUTION INTRAVENOUS ONCE
Status: COMPLETED | OUTPATIENT
Start: 2020-05-21 | End: 2020-05-21

## 2020-05-21 RX ORDER — PREDNISONE 20 MG/1
TABLET ORAL
Qty: 15 TABLET | Refills: 0 | Status: SHIPPED | OUTPATIENT
Start: 2020-05-21 | End: 2020-06-02

## 2020-05-21 RX ADMIN — OXYCODONE HYDROCHLORIDE AND ACETAMINOPHEN 1 TABLET: 5; 325 TABLET ORAL at 12:43

## 2020-05-21 RX ADMIN — HYDROCORTISONE SODIUM SUCCINATE 50 MG: 100 INJECTION, POWDER, FOR SOLUTION INTRAMUSCULAR; INTRAVENOUS at 12:43

## 2020-05-21 RX ADMIN — APIXABAN 5 MG: 5 TABLET, FILM COATED ORAL at 09:46

## 2020-05-21 RX ADMIN — OXYCODONE HYDROCHLORIDE AND ACETAMINOPHEN 1 TABLET: 5; 325 TABLET ORAL at 19:00

## 2020-05-21 RX ADMIN — ATORVASTATIN CALCIUM 20 MG: 10 TABLET, FILM COATED ORAL at 09:46

## 2020-05-21 RX ADMIN — METOPROLOL TARTRATE 12.5 MG: 25 TABLET ORAL at 09:46

## 2020-05-21 RX ADMIN — HEPARIN 500 UNITS: 100 SYRINGE at 14:54

## 2020-05-21 RX ADMIN — SODIUM CHLORIDE, PRESERVATIVE FREE 10 ML: 5 INJECTION INTRAVENOUS at 09:47

## 2020-05-21 RX ADMIN — ALLOPURINOL 100 MG: 100 TABLET ORAL at 09:46

## 2020-05-21 RX ADMIN — TAMSULOSIN HYDROCHLORIDE 0.4 MG: 0.4 CAPSULE ORAL at 09:46

## 2020-05-21 RX ADMIN — AMIODARONE HYDROCHLORIDE 200 MG: 200 TABLET ORAL at 09:46

## 2020-05-21 RX ADMIN — HYDROCORTISONE SODIUM SUCCINATE 50 MG: 100 INJECTION, POWDER, FOR SOLUTION INTRAMUSCULAR; INTRAVENOUS at 02:30

## 2020-05-21 RX ADMIN — OXYCODONE HYDROCHLORIDE AND ACETAMINOPHEN 1 TABLET: 5; 325 TABLET ORAL at 07:13

## 2020-05-21 ASSESSMENT — PAIN SCALES - GENERAL
PAINLEVEL_OUTOF10: 6
PAINLEVEL_OUTOF10: 7
PAINLEVEL_OUTOF10: 8
PAINLEVEL_OUTOF10: 7

## 2020-05-21 ASSESSMENT — PAIN DESCRIPTION - DESCRIPTORS: DESCRIPTORS: ACHING

## 2020-05-21 ASSESSMENT — PAIN DESCRIPTION - PAIN TYPE: TYPE: ACUTE PAIN

## 2020-05-21 NOTE — PROGRESS NOTES
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 No current evidence of infection. · Leukocytosis- reactive and steroid effect  · Tachycardia from A flutter with variable block  · Hyponatremia    Recommendations:   · Monitor off antibiotics  · Allopurinol   · May need a longer course of NSAIDs to complete control of Lt knee inflammation     Medical Decision Making/Summary/Discussion:5/21/2020     · Patient with Hx of Small cell Ca of lung. Had completed chemo in July 2019 and radiation in August 2019. · Unfortunately, on follow up found to have Lt adrenal metastasis  · Admitted with painful arthritis of Rt Elbow and Lt knee and swollen Lt hallux secondary to gout   · Improved after irrigation Rt elbow joint and aspiration Lt knee  · Leucocytosis secondary to acute gout plus steroid effect. · Problems with tachycardia secondary  To A flutter with variable block. Is on amiodarone. · BP fluctuation secondary to cardiac medications  · Hyponatremia. · No apparent infection.   Infection Control Recommendations   · [place] Precautions    Antimicrobial Stewardship Recommendations     · Discontinuation of therapy  Coordination of Outpatient Care:   · Estimated Length of IV antimicrobials:5-15-20  · Patient will need Midline Catheter Insertion: No  · Patient will need PICC line Insertion:No  · [facility],  SNF: TBD  · Patient will need outpatient wound care:No    Chief complaint/reason for consultation:   Concern with septic arthritis    History of Present Illness:   Yulissa Balderas is a 77y.o.-year-old Current Every Day Smoker     Packs/day: 0.50     Types: Cigarettes     Start date: 4/8/2019    Smokeless tobacco: Former User   Substance and Sexual Activity    Alcohol use: No    Drug use: Yes     Frequency: 7.0 times per week     Types: Marijuana    Sexual activity: Not on file   Lifestyle    Physical activity     Days per week: Not on file     Minutes per session: Not on file    Stress: Not on file   Relationships    Social connections     Talks on phone: Not on file     Gets together: Not on file     Attends Mormonism service: Not on file     Active member of club or organization: Not on file     Attends meetings of clubs or organizations: Not on file     Relationship status: Not on file    Intimate partner violence     Fear of current or ex partner: Not on file     Emotionally abused: Not on file     Physically abused: Not on file     Forced sexual activity: Not on file   Other Topics Concern    Not on file   Social History Narrative    Not on file       Family History:     Family History   Problem Relation Age of Onset    Cancer Father         lung cancer        Allergies:   Patient has no known allergies. Review of Systems:   Constitutional: No fevers or chills. No systemic complaints  Head: No headaches  Eyes: No double vision or blurry vision. No conjunctival inflammation. ENT: No sore throat or runny nose. . No hearing loss, tinnitus or vertigo. Cardiovascular: No chest pain or palpitations. No shortness of breath. No THOMPSON  Lung: No shortness of breath or cough. No sputum production  Abdomen: No nausea, vomiting, diarrhea, or abdominal pain. Michele Evelyn No cramps. Genitourinary: No increased urinary frequency, or dysuria. No hematuria. No suprapubic or CVA pain  Musculoskeletal: Rt elbow pain. Lt knee joint effusion. Hematologic: No bleeding or bruising. Neurologic: No headache, weakness, numbness, or tingling. Integument: No rash, no ulcers. Psychiatric: No depression.    Endocrine: No polyuria, no polydipsia, no polyphagia. Physical Examination :     Patient Vitals for the past 8 hrs:   BP Temp Temp src Pulse Resp SpO2   05/21/20 0824 122/65 98 °F (36.7 °C) Oral 85 18 96 %     General Appearance: Awake, alert, and in no apparent distress  Head:  Normocephalic, no trauma  Eyes: Pupils equal, round, reactive to light and accommodation; extraocular movements intact; sclera anicteric; conjunctivae pink. No embolic phenomena. ENT: Oropharynx clear, without erythema, exudate, or thrush. No tenderness of sinuses. Mouth/throat: mucosa pink and moist. No lesions. Dentition in good repair. Neck:Supple, without lymphadenopathy. Thyroid normal, No bruits. Pulmonary/Chest: Clear to auscultation, without wheezes, rales, or rhonchi. No dullness to percussion. Cardiovascular: Regular rate and rhythm without murmurs, rubs, or gallops. Abdomen: Soft, non tender. Bowel sounds normal. No organomegaly  All four Extremities: Rt elbow incision, swelling. Lt knee residual effusion. Lt hallux swelling. Neurologic: No gross sensory or motor deficits. Skin: Warm and dry with good turgor. Signs of peripheral arterial insufficiency. Lt forearm with 2 small ulcerations. No open wounds. Medical Decision Making -Laboratory:   I have independently reviewed/ordered the following labs:    CBC with Differential:   Recent Labs     05/20/20  0554 05/21/20  0528   WBC 26.4* 19.5*   HGB 11.1* 10.0*   HCT 34.5* 31.3*    446   LYMPHOPCT 4* 3*   MONOPCT 9* 7     BMP:   Recent Labs     05/20/20  0554  05/21/20  0018 05/21/20  0528   *   < > 127* 134*   K 4.3  --   --  4.3   CL 94*  --   --  98   CO2 21  --   --  21   BUN 34*  --   --  39*   CREATININE 0.96  --   --  0.92    < > = values in this interval not displayed. Hepatic Function Panel: No results for input(s): PROT, LABALBU, BILIDIR, IBILI, BILITOT, ALKPHOS, ALT, AST in the last 72 hours. No results for input(s): RPR in the last 72 hours.   No results for

## 2020-05-21 NOTE — PROGRESS NOTES
nausea, vomiting, fever, diarrhea. He did report chronic cough and shortness of breath but is not on home oxygen. Labs revealed mildly elevated LA of 2.5, no leukocytosis, electrolytes WNL, Cr 1.36. Trop 19. He was also noted to have dropping BP in SBP 70s following which he was transferred to [address] [place] [address]. Per EMS, the patient was given 1L fluid bolus en route to the hospital. Lactic acid worsened to 3.7. He was given 3L of fluid bolus. CT chest with contrast was done which revealed no evidence of PE or infection. U/A negative. The patient was started on levophed, vancomycin and zosyn in view of suspected sepsis of unknown origin. Levophed was weaned down, and the patient was saturating well on nasal cannula.      Prior CT eval of adrenal nodule  - 2/10 to  - increase in left adrenal size. During ICU stay, patient went into atrial fibrillation with RVR , Cardiology was consulted for the same , recommended amiodarone drip and eliquis for [facility]. Patient improved significantly and was weaned off pressors, on midodrine for BP optimization.        OBJECTIVE     Vital Signs:  /63   Pulse 78   Temp 97.2 °F (36.2 °C) (Oral)   Resp 18   Ht 6' 0.01\" (1.829 m)   Wt 222 lb 10.6 oz (101 kg)   SpO2 98%   BMI 30.19 kg/m²     Temp (24hrs), Av.7 °F (36.5 °C), Min:97.2 °F (36.2 °C), Max:98.3 °F (36.8 °C)    In: 250   Out: 1000 [Urine:1000]    Physical Exam:  General appearance: alert and cooperative with exam  HEENT: Head: Normocephalic, no lesions, without obvious abnormality.   Neck: no adenopathy, no carotid bruit, no JVD, supple, symmetrical, trachea midline and thyroid not enlarged, symmetric, no tenderness/mass/nodules  Lungs: Diminished breath sounds bilaterally, no wheezing, rales or rhonchi  Heart: regular rate and rhythm, S1, S2 normal, no murmur, click, rub or gallop  Abdomen: soft, non-tender; bowel sounds normal; no masses,  no organomegaly  Extremities: noted to have swelling of LUE, RUE normal, lower extremities bilaterally normal   Neurologic: Mental status: Alert, oriented, thought content appropriate    Medications:  Scheduled Medications:    hydrocortisone sodium succinate PF  50 mg Intravenous Q8H    apixaban  5 mg Oral BID    allopurinol  100 mg Oral Daily    amiodarone  200 mg Oral BID    metoprolol tartrate  12.5 mg Oral BID    sodium chloride flush  10 mL Intravenous 2 times per day    atorvastatin  20 mg Oral Daily    tamsulosin  0.4 mg Oral Daily     Continuous Infusions:     PRN Medicationsmiconazole, , TID PRN  ipratropium-albuterol, 1 ampule, Q6H PRN  oxyCODONE-acetaminophen, 1 tablet, Q6H PRN  sodium chloride flush, 10 mL, PRN  sodium chloride flush, 10 mL, PRN  potassium chloride, 40 mEq, PRN    Or  potassium alternative oral replacement, 40 mEq, PRN    Or  potassium chloride, 10 mEq, PRN  magnesium sulfate, 1 g, PRN  acetaminophen, 650 mg, Q6H PRN    Or  acetaminophen, 650 mg, Q6H PRN  polyethylene glycol, 17 g, Daily PRN  nicotine, 1 patch, Daily PRN  ondansetron, 4 mg, Q6H PRN  hydrALAZINE, 10 mg, Q6H PRN        Diagnostic Labs:  CBC:   Recent Labs     05/19/20  0548 05/20/20  0554 05/21/20  0528   WBC 31.6* 26.4* 19.5*   RBC 3.67* 3.53* 3.14*   HGB 11.8* 11.1* 10.0*   HCT 35.7* 34.5* 31.3*   MCV 97.3 97.7 99.7   RDW 14.3 14.5* 14.3   * 417 446     BMP:   Recent Labs     05/19/20  0548 05/20/20  0554 05/20/20  1720 05/21/20  0018 05/21/20  0528   * 130* 129* 127* 134*   K 4.5 4.3  --   --  4.3   CL 95* 94*  --   --  98   CO2 21 21  --   --  21   BUN 30* 34*  --   --  39*   CREATININE 1.00 0.96  --   --  0.92     BNP: No results for input(s): BNP in the last 72 hours. PT/INR: No results for input(s): PROTIME, INR in the last 72 hours. APTT:   No results for input(s): APTT in the last 72 hours. CARDIAC ENZYMES: No results for input(s): CKMB, CKMBINDEX, TROPONINI in the last 72 hours.     Invalid input(s): CKTOTAL;3  FASTING LIPID PANEL:No

## 2020-05-21 NOTE — CARE COORDINATION
Discharge [facility] Case Management Department  Written by: Jade Luna RN    Patient Name: Amaris Elise  Attending Provider: Corin Kidd MD  Admit Date: 2020 11:14 AM  MRN: [id number]  Account: [de-identified]                     : 1950  Discharge Date: 2020      Disposition: [facility]    Jade Luna RN

## 2020-05-25 LAB
CULTURE: NORMAL
CULTURE: NORMAL
Lab: NORMAL
Lab: NORMAL
SPECIMEN DESCRIPTION: NORMAL
SPECIMEN DESCRIPTION: NORMAL

## 2020-05-26 ENCOUNTER — TELEPHONE (OUTPATIENT)
Dept: RADIATION ONCOLOGY | Age: 70
End: 2020-05-26

## 2020-05-26 NOTE — TELEPHONE ENCOUNTER
Pt to be reschedule hospitalized for prior schedule appointment on 5/11/20. Pt was discharged to [facility]. I called and spoke with Yolanda Olson in regards to be pt. Poppy to call us back to see if transportation can be arranged from facility to cancer center.

## 2020-06-02 NOTE — DISCHARGE SUMMARY
[address]     Department of Internal Medicine - Staff Internal Medicine Teaching Service    INPATIENT DISCHARGE SUMMARY      Patient Identification:  Harlon Landau is a 77 y.o. male. :  1950  MRN: [id number]     Acct: [de-identified]   PCP: Suzi Brandt MD  Admit Date:  2020  Discharge date and time: 2020  7:02 PM   Attending Provider: No att. providers found                                     [address] Problem Lists:  Principal Problem:    Sepsis ([address]  Active Problems:    Primary lung cancer with metastasis from lung to other site, right ([address]    Dehydration    Hyperlipidemia    Hypertension    Lung cancer ([address]    Atrial fibrillation with RVR ([address]    Gout    Pyogenic arthritis of right elbow ([address]  Resolved Problems:    * No resolved hospital problems. *      HOSPITAL STAY     Brief Inpatient course:   Harlon Landau is a 77 y.o. male who was admitted for the management of Sepsis [facility]), presented to the emergency department with increased fatigue. 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 Patient was scheduled for a repeat MRI abdomen on the day of presentation when the technician noticed increased fatigue. The patient reports that he had increased chills, generalized weakness and fatigue and diffuse body ache. The patient was advised to come to the ED for further eval and management.   . Labs revealed mildly elevated LA of 2.5, no leukocytosis, electrolytes WNL, Cr 1.36. Trop 19. He was also noted to have dropping BP in SBP 70s following which he was transferred to [address] [place] [address]. Per EMS, the patient was given 1L fluid bolus en route to the hospital. Lactic acid worsened to 3.7. He was given 3L of fluid bolus. CT chest with contrast was done which revealed no evidence of PE or infection. U/A negative. The patient was started on levophed, vancomycin and zosyn in view of suspected sepsis of unknown origin. Levophed was weaned down, and the patient was saturating well on nasal cannula.      Prior CT eval of adrenal nodule  - 2/10 to 4/24 - increase in left adrenal size.        During ICU stay, patient went into atrial fibrillation with RVR , Cardiology was consulted for the same , recommended amiodarone drip and eliquis for [facility]. He was later switched to Lopressor 12.5 mg twice daily and continued on Eliquis and amiodarone 200 mg twice daily. Patient improved significantly and was weaned off pressors, on midodrine for BP optimization.  Upon transfer to the floor, patient continued to have right elbow and left knee pain, found to have acute on chronic gout. He underwent IND of the right elbow joint with joint aspiration of the left knee which revealed no concern for septic arthritis and antibiotics were discontinued. He was treated with short course of steroids and was started on allopurinol 100 mg daily, PMNR was consulted for the same. Sepsis was ruled out.   Patient improved clinically and was medically stable upon discharge.     Procedures/ Significant Interventions:    S/p I&D of right elbow 5/14/2020  S/p left knee joint aspiration 5/14/2020    Consults:     Consults:     Final Specialist Recommendations/Findings:   IP CONSULT TO CASE MANAGEMENT  IP CONSULT TO PHYSICAL MEDICINE REHAB  IP CONSULT TO ORTHOPEDIC SURGERY  IP CONSULT TO CARDIOLOGY  IP CONSULT TO INFECTIOUS DISEASES  IP CONSULT TO INFECTIOUS DISEASES      Any medications       potassium chloride (MICRO-K) 10 MEQ extended release capsule Comments:   Reason for Stopping:               Activity: activity as tolerated    Diet: cardiac diet    Follow-up:    Racquel Hill MD  [address]  [address]  [address] [facility] [address] [phone]    Schedule an appointment as soon as possible for a visit in 2 weeks  Post hopsitalization follow up for acute gout flare, discharged on PO tapering dose of steroids and NSAIDS. Follow up BMP and CBC. Kristen Santana, APRN - CNP  [address] [facility]  [phone]    On 6/5/2020  at 1:15 with Piedad Dominique [facility]  f/u [id number] [id number] [phone]    Schedule an appointment as soon as possible for a visit in 1 week  Please follow up for small and squamous cell lung cancer with adrenal metastasis, plan for SBRT. Marquis Max MD  [address] [place] [address] 36. [phone]    Schedule an appointment as soon as possible for a visit in 1 week  Please follow up ASAP for scheduled SBRT for adrenal metastasis from lung cancer    Lew Pittman DO  [address]  [phone]    In 1 week  Please follow up for post hospitalization follow up for acute gout flare. Patient Instructions: ORTHOPEDIC INSTRUCTIONS:  -Weight bearing status: activity as tolerated  Dressings:  -Keep dressings clean, dry, and intact. You can remove dressings after 3-5 days if no signs of drainage. Ok to shower at that time. General:  -Ice (20 minutes on and off 1 hour) and elevate (above heart) as needed for swelling/pain.  -Drink plenty of fluids.  -Call the office or come to Emergency Room if signs of infection appear (hot, swollen, red, draining pus, fever).   -Take medications including narcotics and antibiotics as prescribed.  -Wean off narcotics (percocet/norco) as soon as possible.  -No alcoholic beverages or

## 2020-06-09 PROBLEM — E86.0 DEHYDRATION: Status: RESOLVED | Noted: 2020-05-07 | Resolved: 2020-06-09

## 2020-06-22 ENCOUNTER — TELEPHONE (OUTPATIENT)
Dept: RADIATION ONCOLOGY | Age: 70
End: 2020-06-22

## 2020-06-22 ENCOUNTER — TELEPHONE (OUTPATIENT)
Dept: ONCOLOGY | Age: 70
End: 2020-06-22

## 2020-06-22 NOTE — TELEPHONE ENCOUNTER
Patient called office questioning when his f/u when Dr Lety Agosto was. Writer spoke with patient and informed him that he needs to get his CT chest/abd/pelvis rescheduled before f/u on 7/1. Writer also requested Noa, , to assist patient in scheduling. Writer informed patient that f/u will have to be rescheduled if scans are not done. Patient communicated understanding. Chaya rad onc , stated she will inform Kenji Eastman rad onc RN, that patient is out of [facility] and needs MRI abd scheduled (per Dr Srinivasa Finn orders). Rad onc to follow MRI abd.  Baljeet Wolfe

## 2020-06-26 ENCOUNTER — HOSPITAL ENCOUNTER (OUTPATIENT)
Dept: CT IMAGING | Age: 70
Discharge: HOME | End: 2020-06-28
Payer: COMMERCIAL

## 2020-06-26 ENCOUNTER — HOSPITAL ENCOUNTER (OUTPATIENT)
Dept: INFUSION THERAPY | Age: 70
Discharge: HOME | End: 2020-06-26
Payer: COMMERCIAL

## 2020-06-26 DIAGNOSIS — C34.91 SMALL CELL CARCINOMA OF LUNG, RIGHT (HCC): ICD-10-CM

## 2020-06-26 DIAGNOSIS — C34.91 PRIMARY LUNG CANCER WITH METASTASIS FROM LUNG TO OTHER SITE, RIGHT (HCC): ICD-10-CM

## 2020-06-26 DIAGNOSIS — G62.0 CHEMOTHERAPY-INDUCED NEUROPATHY (HCC): ICD-10-CM

## 2020-06-26 DIAGNOSIS — R53.1 ASTHENIA: ICD-10-CM

## 2020-06-26 DIAGNOSIS — T45.1X5A CHEMOTHERAPY-INDUCED NEUROPATHY (HCC): ICD-10-CM

## 2020-06-26 DIAGNOSIS — C79.70 MALIGNANT NEOPLASM METASTATIC TO ADRENAL GLAND, UNSPECIFIED LATERALITY (HCC): Primary | ICD-10-CM

## 2020-06-26 LAB
ABSOLUTE EOS #: 0.3 K/UL (ref 0–0.4)
ABSOLUTE IMMATURE GRANULOCYTE: ABNORMAL K/UL (ref 0–0.3)
ABSOLUTE LYMPH #: 0.7 K/UL (ref 1–4.8)
ABSOLUTE MONO #: 0.9 K/UL (ref 0.1–1.2)
ALBUMIN SERPL-MCNC: 3.7 G/DL (ref 3.5–5.2)
ALBUMIN/GLOBULIN RATIO: 1.2 (ref 1–2.5)
ALP BLD-CCNC: 113 U/L (ref 40–129)
ALT SERPL-CCNC: 15 U/L (ref 5–41)
ANION GAP SERPL CALCULATED.3IONS-SCNC: 12 MMOL/L (ref 9–17)
AST SERPL-CCNC: 19 U/L
BASOPHILS # BLD: 1 % (ref 0–2)
BASOPHILS ABSOLUTE: 0 K/UL (ref 0–0.2)
BILIRUB SERPL-MCNC: 0.44 MG/DL (ref 0.3–1.2)
BUN BLDV-MCNC: 12 MG/DL (ref 8–23)
BUN/CREAT BLD: ABNORMAL (ref 9–20)
CALCIUM SERPL-MCNC: 9 MG/DL (ref 8.6–10.4)
CHLORIDE BLD-SCNC: 99 MMOL/L (ref 98–107)
CO2: 27 MMOL/L (ref 20–31)
CREAT SERPL-MCNC: 1.28 MG/DL (ref 0.7–1.2)
DIFFERENTIAL TYPE: ABNORMAL
EOSINOPHILS RELATIVE PERCENT: 6 % (ref 1–4)
GFR AFRICAN AMERICAN: >60 ML/MIN
GFR NON-AFRICAN AMERICAN: 56 ML/MIN
GFR SERPL CREATININE-BSD FRML MDRD: ABNORMAL ML/MIN/{1.73_M2}
GFR SERPL CREATININE-BSD FRML MDRD: ABNORMAL ML/MIN/{1.73_M2}
GLUCOSE BLD-MCNC: 105 MG/DL (ref 70–99)
HCT VFR BLD CALC: 33 % (ref 41–53)
HEMOGLOBIN: 10.8 G/DL (ref 13.5–17.5)
IMMATURE GRANULOCYTES: ABNORMAL %
LYMPHOCYTES # BLD: 12 % (ref 24–44)
MCH RBC QN AUTO: 31.7 PG (ref 26–34)
MCHC RBC AUTO-ENTMCNC: 32.9 G/DL (ref 31–37)
MCV RBC AUTO: 96.3 FL (ref 80–100)
MONOCYTES # BLD: 15 % (ref 2–11)
NRBC AUTOMATED: ABNORMAL PER 100 WBC
PDW BLD-RTO: 17.1 % (ref 12.5–15.4)
PLATELET # BLD: 294 K/UL (ref 140–450)
PLATELET ESTIMATE: ABNORMAL
PMV BLD AUTO: 7 FL (ref 6–12)
POTASSIUM SERPL-SCNC: 4 MMOL/L (ref 3.7–5.3)
RBC # BLD: 3.42 M/UL (ref 4.5–5.9)
RBC # BLD: ABNORMAL 10*6/UL
SEG NEUTROPHILS: 66 % (ref 36–66)
SEGMENTED NEUTROPHILS ABSOLUTE COUNT: 3.7 K/UL (ref 1.8–7.7)
SODIUM BLD-SCNC: 138 MMOL/L (ref 135–144)
TOTAL PROTEIN: 6.7 G/DL (ref 6.4–8.3)
WBC # BLD: 5.6 K/UL (ref 3.5–11)
WBC # BLD: ABNORMAL 10*3/UL

## 2020-06-26 PROCEDURE — 2580000003 HC RX 258: Performed by: INTERNAL MEDICINE

## 2020-06-26 PROCEDURE — 80053 COMPREHEN METABOLIC PANEL: CPT

## 2020-06-26 PROCEDURE — 36591 DRAW BLOOD OFF VENOUS DEVICE: CPT

## 2020-06-26 PROCEDURE — 74177 CT ABD & PELVIS W/CONTRAST: CPT

## 2020-06-26 PROCEDURE — 96523 IRRIG DRUG DELIVERY DEVICE: CPT | Performed by: NURSE PRACTITIONER

## 2020-06-26 PROCEDURE — 85025 COMPLETE CBC W/AUTO DIFF WBC: CPT

## 2020-06-26 PROCEDURE — 6360000004 HC RX CONTRAST MEDICATION: Performed by: INTERNAL MEDICINE

## 2020-06-26 PROCEDURE — 6360000002 HC RX W HCPCS: Performed by: INTERNAL MEDICINE

## 2020-06-26 RX ORDER — HEPARIN SODIUM (PORCINE) LOCK FLUSH IV SOLN 100 UNIT/ML 100 UNIT/ML
500 SOLUTION INTRAVENOUS PRN
Status: DISCONTINUED | OUTPATIENT
Start: 2020-06-26 | End: 2020-06-27 | Stop reason: HOSPADM

## 2020-06-26 RX ORDER — SODIUM CHLORIDE 0.9 % (FLUSH) 0.9 %
10 SYRINGE (ML) INJECTION PRN
Status: DISCONTINUED | OUTPATIENT
Start: 2020-06-26 | End: 2020-06-27 | Stop reason: HOSPADM

## 2020-06-26 RX ORDER — 0.9 % SODIUM CHLORIDE 0.9 %
80 INTRAVENOUS SOLUTION INTRAVENOUS ONCE
Status: COMPLETED | OUTPATIENT
Start: 2020-06-26 | End: 2020-06-26

## 2020-06-26 RX ORDER — SODIUM CHLORIDE 0.9 % (FLUSH) 0.9 %
10 SYRINGE (ML) INJECTION PRN
Status: DISCONTINUED | OUTPATIENT
Start: 2020-06-26 | End: 2020-06-29 | Stop reason: HOSPADM

## 2020-06-26 RX ADMIN — IOHEXOL 50 ML: 240 INJECTION, SOLUTION INTRATHECAL; INTRAVASCULAR; INTRAVENOUS; ORAL at 11:14

## 2020-06-26 RX ADMIN — SODIUM CHLORIDE 80 ML: 9 INJECTION, SOLUTION INTRAVENOUS at 11:18

## 2020-06-26 RX ADMIN — Medication 10 ML: at 11:30

## 2020-06-26 RX ADMIN — HEPARIN 500 UNITS: 100 SYRINGE at 11:31

## 2020-06-26 RX ADMIN — IOVERSOL 100 ML: 741 INJECTION INTRA-ARTERIAL; INTRAVENOUS at 11:18

## 2020-06-26 RX ADMIN — Medication 10 ML: at 09:18

## 2020-06-26 RX ADMIN — Medication 10 ML: at 09:15

## 2020-06-26 RX ADMIN — Medication 10 ML: at 11:18

## 2020-06-26 NOTE — PROGRESS NOTES
PATIENT ARRIVED FOR PORT ACCESS. DENIED ANY PROBLEMS. Port was accessed without difficulty, labs drawn. Patient sent to CT. Patient returned from CT, port flushed and de accessed without incident.

## 2020-07-01 ENCOUNTER — HOSPITAL ENCOUNTER (OUTPATIENT)
Dept: INFUSION THERAPY | Age: 70
Discharge: HOME | End: 2020-07-01
Payer: COMMERCIAL

## 2020-07-01 ENCOUNTER — OFFICE VISIT (OUTPATIENT)
Dept: ONCOLOGY | Age: 70
End: 2020-07-01
Payer: COMMERCIAL

## 2020-07-01 ENCOUNTER — HOSPITAL ENCOUNTER (OUTPATIENT)
Dept: MRI IMAGING | Age: 70
Discharge: HOME | End: 2020-07-03
Payer: COMMERCIAL

## 2020-07-01 ENCOUNTER — HOSPITAL ENCOUNTER (OUTPATIENT)
Dept: RADIATION ONCOLOGY | Age: 70
Discharge: HOME | End: 2020-07-01
Attending: RADIOLOGY
Payer: COMMERCIAL

## 2020-07-01 ENCOUNTER — TELEPHONE (OUTPATIENT)
Dept: ONCOLOGY | Age: 70
End: 2020-07-01

## 2020-07-01 VITALS
DIASTOLIC BLOOD PRESSURE: 83 MMHG | WEIGHT: 208.1 LBS | BODY MASS INDEX: 28.22 KG/M2 | SYSTOLIC BLOOD PRESSURE: 123 MMHG | HEART RATE: 85 BPM | TEMPERATURE: 98.1 F

## 2020-07-01 DIAGNOSIS — C34.91 PRIMARY LUNG CANCER WITH METASTASIS FROM LUNG TO OTHER SITE, RIGHT (HCC): Primary | ICD-10-CM

## 2020-07-01 PROCEDURE — 1123F ACP DISCUSS/DSCN MKR DOCD: CPT | Performed by: INTERNAL MEDICINE

## 2020-07-01 PROCEDURE — 99211 OFF/OP EST MAY X REQ PHY/QHP: CPT | Performed by: INTERNAL MEDICINE

## 2020-07-01 PROCEDURE — 77334 RADIATION TREATMENT AID(S): CPT | Performed by: RADIOLOGY

## 2020-07-01 PROCEDURE — 2580000003 HC RX 258: Performed by: INTERNAL MEDICINE

## 2020-07-01 PROCEDURE — 74183 MRI ABD W/O CNTR FLWD CNTR: CPT

## 2020-07-01 PROCEDURE — 4004F PT TOBACCO SCREEN RCVD TLK: CPT | Performed by: INTERNAL MEDICINE

## 2020-07-01 PROCEDURE — 6360000004 HC RX CONTRAST MEDICATION: Performed by: RADIOLOGY

## 2020-07-01 PROCEDURE — 99214 OFFICE O/P EST MOD 30 MIN: CPT | Performed by: INTERNAL MEDICINE

## 2020-07-01 PROCEDURE — G8417 CALC BMI ABV UP PARAM F/U: HCPCS | Performed by: INTERNAL MEDICINE

## 2020-07-01 PROCEDURE — 77263 THER RADIOLOGY TX PLNG CPLX: CPT | Performed by: RADIOLOGY

## 2020-07-01 PROCEDURE — 2580000003 HC RX 258: Performed by: RADIOLOGY

## 2020-07-01 PROCEDURE — 4040F PNEUMOC VAC/ADMIN/RCVD: CPT | Performed by: INTERNAL MEDICINE

## 2020-07-01 PROCEDURE — 6360000002 HC RX W HCPCS: Performed by: INTERNAL MEDICINE

## 2020-07-01 PROCEDURE — G8427 DOCREV CUR MEDS BY ELIG CLIN: HCPCS | Performed by: INTERNAL MEDICINE

## 2020-07-01 PROCEDURE — A9579 GAD-BASE MR CONTRAST NOS,1ML: HCPCS | Performed by: RADIOLOGY

## 2020-07-01 PROCEDURE — 3017F COLORECTAL CA SCREEN DOC REV: CPT | Performed by: INTERNAL MEDICINE

## 2020-07-01 RX ORDER — HEPARIN SODIUM (PORCINE) LOCK FLUSH IV SOLN 100 UNIT/ML 100 UNIT/ML
500 SOLUTION INTRAVENOUS PRN
Status: DISCONTINUED | OUTPATIENT
Start: 2020-07-01 | End: 2020-07-02 | Stop reason: HOSPADM

## 2020-07-01 RX ORDER — MELATONIN 5 MG
5 TABLET,CHEWABLE ORAL NIGHTLY
COMMUNITY
End: 2021-11-10 | Stop reason: ALTCHOICE

## 2020-07-01 RX ORDER — SODIUM CHLORIDE 0.9 % (FLUSH) 0.9 %
10 SYRINGE (ML) INJECTION ONCE
Status: COMPLETED | OUTPATIENT
Start: 2020-07-01 | End: 2020-07-01

## 2020-07-01 RX ORDER — TRAMADOL HYDROCHLORIDE 50 MG/1
50 TABLET ORAL EVERY 6 HOURS PRN
COMMUNITY
Start: 2017-11-28 | End: 2020-07-20 | Stop reason: ALTCHOICE

## 2020-07-01 RX ORDER — SODIUM CHLORIDE 0.9 % (FLUSH) 0.9 %
20 SYRINGE (ML) INJECTION PRN
Status: CANCELLED | OUTPATIENT
Start: 2020-07-01

## 2020-07-01 RX ORDER — POTASSIUM CHLORIDE 750 MG/1
20 CAPSULE, EXTENDED RELEASE ORAL DAILY
Status: ON HOLD | COMMUNITY
Start: 2019-11-19 | End: 2022-05-07 | Stop reason: HOSPADM

## 2020-07-01 RX ORDER — IPRATROPIUM BROMIDE AND ALBUTEROL SULFATE 2.5; .5 MG/3ML; MG/3ML
3 SOLUTION RESPIRATORY (INHALATION) 4 TIMES DAILY
Status: ON HOLD | COMMUNITY
End: 2022-05-07 | Stop reason: HOSPADM

## 2020-07-01 RX ORDER — MELOXICAM 7.5 MG/1
7.5 TABLET ORAL DAILY
COMMUNITY
End: 2020-07-20 | Stop reason: ALTCHOICE

## 2020-07-01 RX ORDER — SODIUM CHLORIDE 0.9 % (FLUSH) 0.9 %
10 SYRINGE (ML) INJECTION PRN
Status: CANCELLED | OUTPATIENT
Start: 2020-07-01

## 2020-07-01 RX ORDER — FERROUS FUMARATE 324(106)MG
324 TABLET ORAL DAILY
Status: ON HOLD | COMMUNITY
End: 2022-05-07 | Stop reason: HOSPADM

## 2020-07-01 RX ORDER — SODIUM CHLORIDE 0.9 % (FLUSH) 0.9 %
10 SYRINGE (ML) INJECTION PRN
Status: DISCONTINUED | OUTPATIENT
Start: 2020-07-01 | End: 2020-07-02 | Stop reason: HOSPADM

## 2020-07-01 RX ORDER — 0.9 % SODIUM CHLORIDE 0.9 %
100 INTRAVENOUS SOLUTION INTRAVENOUS ONCE
Status: COMPLETED | OUTPATIENT
Start: 2020-07-01 | End: 2020-07-01

## 2020-07-01 RX ORDER — HEPARIN SODIUM (PORCINE) LOCK FLUSH IV SOLN 100 UNIT/ML 100 UNIT/ML
500 SOLUTION INTRAVENOUS PRN
Status: CANCELLED | OUTPATIENT
Start: 2020-07-01

## 2020-07-01 RX ADMIN — Medication 10 ML: at 11:36

## 2020-07-01 RX ADMIN — SODIUM CHLORIDE 100 ML: 9 INJECTION, SOLUTION INTRAVENOUS at 10:41

## 2020-07-01 RX ADMIN — Medication 10 ML: at 10:42

## 2020-07-01 RX ADMIN — Medication 10 ML: at 09:23

## 2020-07-01 RX ADMIN — GADOTERIDOL 20 ML: 279.3 INJECTION, SOLUTION INTRAVENOUS at 10:41

## 2020-07-01 RX ADMIN — HEPARIN 500 UNITS: 100 SYRINGE at 11:36

## 2020-07-01 NOTE — TELEPHONE ENCOUNTER
Pt having MRI brain/[facility] c[facility]a/[facility] on 11/6/[facility]-arrive at 1100-pt coming to [facility] prior to appt for port draw/access for CT. Pt to return on 11/11/2020 to see Dr Cherylene Burdock.   Pt also being referred to Dr [id number] faxed to [phone], confirmation rec'd-office to contact pt for appt-[place] location per pt request.

## 2020-07-01 NOTE — PROGRESS NOTES
Pt here for port access for MRI. Pt returns after MRI for port de-access. D/c'd in stable condition to [place] for office visit with Dr. Amalia Stein.

## 2020-07-10 ENCOUNTER — HOSPITAL ENCOUNTER (OUTPATIENT)
Dept: RADIATION ONCOLOGY | Age: 70
Discharge: HOME | End: 2020-07-10
Attending: RADIOLOGY
Payer: COMMERCIAL

## 2020-07-10 PROCEDURE — 99999 PR OFFICE/OUTPT VISIT,PROCEDURE ONLY: CPT | Performed by: RADIOLOGY

## 2020-07-10 PROCEDURE — 77300 RADIATION THERAPY DOSE PLAN: CPT | Performed by: RADIOLOGY

## 2020-07-10 PROCEDURE — 77338 DESIGN MLC DEVICE FOR IMRT: CPT | Performed by: RADIOLOGY

## 2020-07-10 PROCEDURE — 77293 RESPIRATOR MOTION MGMT SIMUL: CPT | Performed by: RADIOLOGY

## 2020-07-10 PROCEDURE — 77301 RADIOTHERAPY DOSE PLAN IMRT: CPT | Performed by: RADIOLOGY

## 2020-07-15 ENCOUNTER — APPOINTMENT (OUTPATIENT)
Dept: RADIATION ONCOLOGY | Age: 70
End: 2020-07-15
Attending: RADIOLOGY
Payer: COMMERCIAL

## 2020-07-17 ENCOUNTER — APPOINTMENT (OUTPATIENT)
Dept: RADIATION ONCOLOGY | Age: 70
End: 2020-07-17
Attending: RADIOLOGY
Payer: COMMERCIAL

## 2020-07-19 NOTE — PROGRESS NOTES
Chris Mora                                                                                                                  7/1/2020  MRN:   [id number]  YOB: 1950  PCP:                           Wendi Milligan MD  Referring Physician: No ref. provider found  Treating Physician Name: Marybel Melo MD      Reason for visit:  Chief Complaint   Patient presents with    Follow-up     review status of disease    Follow-Up from Hospital     Patient was in [facility] for an infection    Reviewed results of lab work-up and imaging studies. 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 Chemotherapy changed to carboplatin and etoposide due to renal insufficiency from cycle #2  PCI- 7/2019  Anticipating SRS to adrenal gland metastasis    Summary of Case/History:    Chris Mora a 72 y.o.male is a patient diagnosed with lung cancer with the component of small cell as well as squamous cell carcinoma    Patient has a significant history of tobacco dependence and underwent CT lung screening in December 2018. CT scan was read as lung rads degree 4B. Subsequently she underwent biopsy of right upper lobe lung nodule on 1/16/19. 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 CT PET done showed abnormal FDG uptake in the right upper lobe nodule. There was also abnormal FDG uptake in the right lower lobe nodule. Additionally right hilar lymph node were also FDG avid. No bony lesion was reported. MRI brain for staging workup did not show any evidence of intracranial metastasis. Tip of the odontoid process was ill-defined and metastasis could not be ruled out.   Clinically patient does give history of trauma to the neck area any years ago however denies any surgery history. Bone scan did not reveal any metastasis     Patient continues to smoke but has cut down quite a bit. He works full-time in a [employer]. Patient has good performance status, ECOG 0. Patient's other medical problems include dyslipidemia and hypertension. He also has arthritis pain    Started patient on concurrent chemoradiation using cisplatin and etoposide with dose adjustment for renal dysfunction. Chemotherapy changed to carboplatin and etoposide from cycle #2 due to renal dysfunction    Received PCI in July 2019. Interim History:    Patient presents to the clinic for a follow-up visit and to discuss results of his lab work-up and other relevant data. Patient is now recovering. CT scans show worsening of adrenal gland metastasis however there is no other evidence of disease progression. Patient also received an MRI of the abdomen today. He has seen oncology and anticipating to start radiation therapy with plans. Patient does complain of occasional nausea. Weight is stable. Still struggles with balance. Denies fever chills. Patient does not feel that he can go back to work. During this visit patient's allergy, social, medical, surgical history and medications were reviewed and updated. Past Medical History:   Past Medical History:   Diagnosis Date    DDD (degenerative disc disease), cervical     Gout     Heart murmur     hx. of when younger.  Hyperlipidemia     Hypertension     Lung cancer ([address]     right lung    MI (myocardial infarction) ([address]     Skin cancer     face    Wears glasses        Past Surgical History:     Past Surgical History:   Procedure Laterality Date    APPENDECTOMY      CARDIAC CATHETERIZATION      w/stent    COLONOSCOPY      CYST INCISION AND DRAINAGE Right 05/14/2020    rt elbow I and D and left knee aspiration with cultures    FOOT SURGERY Right     2nd toe(bone spur).     FOREARM SURGERY Right 5/14/2020 Sig Dispense Refill    ipratropium-albuterol (DUONEB) 0.5-2.5 (3) MG/3ML SOLN nebulizer solution Inhale 3 mLs into the lungs 4 times daily      Melatonin 5 MG CHEW Take 5 mg by mouth nightly      meloxicam (MOBIC) 7.5 MG tablet Take 7.5 mg by mouth daily      potassium chloride (MICRO-K) 10 MEQ extended release capsule Take 20 mEq by mouth      traMADol (ULTRAM) 50 MG tablet Take 50 mg by mouth every 6 hours as needed.  apixaban (ELIQUIS) 5 MG TABS tablet Take 5 mg by mouth 2 times daily      Ferrous Fumarate (FERROCITE) 324 (106 Fe) MG TABS Take by mouth      apixaban (ELIQUIS) 5 MG TABS tablet Take 1 tablet by mouth 2 times daily 60 tablet 1    metoprolol tartrate (LOPRESSOR) 25 MG tablet Take 0.5 tablets by mouth 2 times daily 60 tablet 3    amiodarone (CORDARONE) 200 MG tablet Take 1 tablet by mouth 2 times daily 30 tablet 3    tamsulosin (FLOMAX) 0.4 MG capsule TAKE 1 CAPSULE BY MOUTH EVERY DAY 30 capsule 5    Handicap Placard MISC by Does not apply route 1 each 0    lidocaine-prilocaine (EMLA) 2.5-2.5 % cream Apply topically as needed. Apply a quarter size amount to port site 1 hour before chemotherapy. Cover with plastic wrap. 30 g 0    acetaminophen (TYLENOL) 325 MG tablet Take 650 mg by mouth every 6 hours as needed for Pain      allopurinol (ZYLOPRIM) 100 MG tablet Take 100 mg by mouth daily      simvastatin (ZOCOR) 40 MG tablet Take 40 mg by mouth daily      Multiple Vitamins-Minerals (THERAPEUTIC MULTIVITAMIN-MINERALS) tablet Take 1 tablet by mouth daily       No current facility-administered medications for this visit. Allergies:   Patient has no known allergies. Review of Systems:    Constitutional: No fever or chills. No night sweats, positive fatigue. Stable weight  Eyes: No eye discharge, double vision, or eye pain   HEENT: negative for sore mouth, sore throat, hoarseness and voice change;    Respiratory: negative for cough, sputum, wheezing, hemoptysis, chest pain; +exertional shortness of breath  Cardiovascular: negative for chest pain, palpitations, orthopnea, PND   Gastrointestinal: negative for nausea, vomiting, diarrhea, constipation, abdominal pain, Dysphagia, hematemesis and hematochezia   Genitourinary: negative for frequency, dysuria, nocturia, urinary incontinence, and hematuria +urgency  Integument: Positive for easy bruising - stable  Hematologic/Lymphatic: negative for easy bleeding, lymphadenopathy, or petechiae +bruising -resolved  Endocrine: negative for heat or cold intolerance,weight changes, change in bowel habits and hair loss   Musculoskeletal: Positive joint pain. +left shoulder pain - unchnaged  Neurological: negative for headaches, dizziness, seizures, weakness; + poor balance; +neuropathy in left hand from shoulder        Physical Exam:    Vitals: /83   Pulse 85   Temp 98.1 °F (36.7 °C) (Oral)   Wt 208 lb 1.6 oz (94.4 kg)   BMI 28.22 kg/m²   General appearance -patient not in acute distress  Mental status - AAO X3  Eyes - pupils equal and reactive, extraocular eye movements intact  Mouth - mucous membranes moist, pharynx normal without lesions  Neck - supple, no significant adenopathy  Lymphatics - no palpable lymphadenopathy, no hepatosplenomegaly  Chest - clear to auscultation, rales or rhonchi, symmetric air entry +wheezing  Heart - normal rate, regular rhythm, normal S1, S2, no murmurs  Abdomen - soft, nontender, nondistended, no masses or organomegaly  Neurological - alert, oriented, normal speech, no focal findings or movement disorder noted  Extremities -+1 lower extremity pitting edema.   Skin - normal coloration and turgor, no rashes, no suspicious skin lesions noted       DATA:    Labs:   Lab Results   Component Value Date    WBC 5.6 06/26/2020    HGB 10.8 (L) 06/26/2020    HCT 33.0 (L) 06/26/2020    MCV 96.3 06/26/2020     06/26/2020       Chemistry        Component Value Date/Time     06/26/2020 0915    K 4.0 06/26/2020 0915    CL 99 06/26/2020 0915    CO2 27 06/26/2020 0915    BUN 12 06/26/2020 0915    CREATININE 1.28 (H) 06/26/2020 0915        Component Value Date/Time    CALCIUM 9.0 06/26/2020 0915    ALKPHOS 113 06/26/2020 0915    AST 19 06/26/2020 0915    ALT 15 06/26/2020 0915    BILITOT 0.44 06/26/2020 0915        Mri Abdomen W Wo Contrast    Result Date: 7/1/2020  EXAMINATION: MRI OF THE ABDOMEN WITHOUT AND WITH CONTRAST, 5/7/2020 10:16 am TECHNIQUE: Multiplanar multisequence MRI of the abdomen was performed without and with the administration of intravenous contrast. COMPARISON: CT 06/26/2020 and 04/28/2020. HISTORY: ORDERING SYSTEM PROVIDED HISTORY: Malignant neoplasm metastatic to left adrenal gland [facility]) TECHNOLOGIST PROVIDED HISTORY: Reason for Exam: MALIGNANT NEOPLASM METASTATIC TO LEFT ADRENAL GLAND, X 1 MO Acuity: Chronic Type of Exam: Ongoing FINDINGS: Imaging centered on the kidneys and adrenal glands. The visualized lung bases reveal no signal abnormality. The partially visualized liver and spleen reveal no signal abnormality. The gallbladder and biliary tree appear within normal limits. No signal abnormality identified in the pancreas. Bilateral renal cysts are noted. Left adrenal mass measures 4.0 x 2.6 cm. Small right adrenal nodule measuring 1 cm demonstrates signal loss consistent with a benign adenoma. The visualized bowel is normal in caliber. No ascites. No lymphadenopathy. The aorta is normal in caliber. The visceral branches are patent. The visualized osseous structures reveal no focal signal abnormality. 1.  Redemonstration of left adrenal metastasis measuring up to 4 cm. 2.  1 cm benign right adrenal adenoma. 3.  No new site of disease identified in the abdomen.      Ct Chest Abdomen Pelvis W Contrast    Result Date: 6/26/2020  EXAMINATION: CT OF THE CHEST, ABDOMEN, AND PELVIS WITH CONTRAST, 6/26/2020 9:59 am TECHNIQUE: CT of the chest, abdomen and pelvis was performed with the administration of intravenous contrast. Multiplanar reformatted images are provided for review. Dose modulation, iterative reconstruction, and/or weight based adjustment of the mA/kV was utilized to reduce the radiation dose to as low as reasonably achievable. COMPARISON: April 24, 2020. PET/CT dated December 31, 2018. HISTORY: ORDERING SYSTEM PROVIDED HISTORY: Malignant neoplasm metastatic to adrenal gland, unspecified laterality ([address] TECHNOLOGIST PROVIDED HISTORY: Reason for Exam: Hx of small cell lung cancer with mets to adrenal gland. Acuity: Chronic Type of Exam: Subsequent/Follow-up FINDINGS: Chest: Mediastinum: Left chest wall MediPort in place. The heart is normal in size without a pericardial effusion. Severe coronary artery atherosclerosis. The aorta, arch branches, and main pulmonary artery are normal in course and caliber. No saddle embolus. Subcentimeter mediastinal lymph nodes are identified. No pathologically enlarged lymph nodes in the mediastinum or hilar regions. Lungs/pleura: Stable appearance of band-like scarring with bronchiectasis involving the right upper lobe and superior segment of the right lower lobe. Findings are in a paramediastinal distribution. Elsewhere, mild emphysema. No dense consolidation or pleural effusion. The central airways are patent. Diffuse bronchial wall thickening. Bronchiectasis is seen in the areas of fibrosis. Soft Tissues/Bones: Mild gynecomastia. No acute or aggressive osseous lesion. Degenerative changes of the spine are seen. Abdomen/Pelvis: Organs: The liver, gallbladder, spleen, and pancreas demonstrate no acute abnormality. Fiducial marker is again identified in the left adrenal gland which has increased in size, now measuring 4.1 x 2.6 cm, previously 2.9 x 2.4 cm. Right adrenal nodule is stable, measuring 14 mm. Finding has been present since PET/CT dated December 2018 and likely represents a benign adenoma.  The bilateral kidneys are symmetric balance and patient's nature of work I do not believe he will be able to go back to work. Sherley Perez        This note is created with the assistance of a speech recognition program.  While intending to generate a document that actually reflects the content of the visit, the document can still have some errors including those of syntax and sound a like substitutions which may escape proof reading. It such instances, actual meaning can be extrapolated by contextual diversion.

## 2020-07-20 ENCOUNTER — OFFICE VISIT (OUTPATIENT)
Dept: UROLOGY | Age: 70
End: 2020-07-20
Payer: COMMERCIAL

## 2020-07-20 ENCOUNTER — HOSPITAL ENCOUNTER (OUTPATIENT)
Dept: RADIATION ONCOLOGY | Age: 70
Discharge: HOME | End: 2020-07-20
Attending: RADIOLOGY
Payer: COMMERCIAL

## 2020-07-20 VITALS — TEMPERATURE: 97.9 F

## 2020-07-20 PROCEDURE — 51798 US URINE CAPACITY MEASURE: CPT | Performed by: UROLOGY

## 2020-07-20 PROCEDURE — 77280 THER RAD SIMULAJ FIELD SMPL: CPT | Performed by: RADIOLOGY

## 2020-07-20 PROCEDURE — 77373 STRTCTC BDY RAD THER TX DLVR: CPT | Performed by: RADIOLOGY

## 2020-07-20 PROCEDURE — 99204 OFFICE O/P NEW MOD 45 MIN: CPT | Performed by: UROLOGY

## 2020-07-20 ASSESSMENT — ENCOUNTER SYMPTOMS
SHORTNESS OF BREATH: 0
CONSTIPATION: 0
COUGH: 0
NAUSEA: 0
EYE PAIN: 0
VOMITING: 0
BACK PAIN: 0
ABDOMINAL PAIN: 0
EYE REDNESS: 0
WHEEZING: 0
DIARRHEA: 0

## 2020-07-20 NOTE — PROGRESS NOTES
[facility]  [facility] UROLOGY SPECIALISTS - [address]  Dept: [phone]  Dept Fax: [phone] [facility] Urology Office Note - New patient    Patient:  Mily Barrientos  YOB: 1950  Date: 7/20/2020    The patient is a 72 y.o. male who presents todayfor evaluation of the following problems:   Chief Complaint   Patient presents with    New Patient     enlarged kidney     referred by Rocio Hernández MD.      HPI  Hydronephrosis:  Patient is here today for hydronephrosis which was first noted a month(s) ago. Location: bilateral, Severity: moderate    Flank pain? No   Abdominal pain? none  Pt has weak stream.  Has occasional difficulty emptying. Has been on flomax. pvr today 81 cc. Pt has h/o lung cancer. Hydro was seen on surveillance CT. (Patient's old records have been requested, reviewed and summarized in today's note.)    Summary of old records: N/A    Additional History: N/A    Procedures Today: N/A    Last several PSA's:  No results found for: PSA  Last total testosterone:  No results found for: TESTOSTERONE  Urinalysis today:  No results found for this visit on 07/20/20.     AUA Symptom Score (7/20/2020):  INCOMPLETE EMPTYING: How often have you had the sensation of not emptying your bladder?: Not at all  FREQUENCY: How often do you have to urinate less than every two hours?: Not at all  INTERMITTENCY: How often have you found you stopped and started again several times when you urinated?: Not at all  URGENCY: How often have you found it difficult to postpone urination?: Not at all  WEAK STREAM: How often have you had a weak urinary stream?: Not at all  STRAINING: How often have you had to strain to start  urination?: Not at all  NOCTURIA: How many times did you typically get up at night to uriniate?: 1 Time  TOTAL I-PSS SCORE[de-identified] 1  How would you feel if you were to spend the rest of your life with your urinary condition?: Mostly Satisfied    Last BUN and creatinine:  Lab Results   Component Value Date    BUN 12 06/26/2020     Lab Results   Component Value Date    CREATININE 1.28 (H) 06/26/2020       Additional Lab/Culture results: none    Imaging Reviewed during this Office Visit: ct bilater hydro  (results were independently reviewed by physician and radiology report verified)    PAST MEDICAL, FAMILY AND SOCIAL HISTORY:  Past Medical History:   Diagnosis Date    DDD (degenerative disc disease), cervical     Gout     Heart murmur     hx. of when younger.  Hyperlipidemia     Hypertension     [facility] [address] [address]     right lung    MI (myocardial infarction) ([facility] [address]     Skin cancer     face    Wears glasses      Past Surgical History:   Procedure Laterality Date    APPENDECTOMY      CARDIAC CATHETERIZATION      w/stent    COLONOSCOPY      CYST INCISION AND DRAINAGE Right 05/14/2020    rt elbow I and D and left knee aspiration with cultures    FOOT SURGERY Right     2nd toe(bone spur).  FOREARM SURGERY Right 5/14/2020    RIGHT ELBOW IRRIGATION AND DEBRIDEMENT performed by Bobo Castanon DO at [place] [id number] Left 5/14/2020    KNEE ASPIRATION WITH CULTURES SENT performed by Bobo Castanon DO at [address] [place] [address] under lt. eye.     TONSILLECTOMY       Family History   Problem Relation Age of Onset    Cancer Father         lung cancer     Outpatient Medications Marked as Taking for the 7/20/20 encounter (Office Visit) with Yobany Crowe MD   Medication Sig Dispense Refill    ipratropium-albuterol (DUONEB) 0.5-2.5 (3) MG/3ML SOLN nebulizer solution Inhale 3 mLs into the lungs 4 times daily      Melatonin 5 MG CHEW Take 5 mg by mouth nightly      potassium chloride (MICRO-K) 10 MEQ extended release capsule Take 20 mEq by mouth      apixaban (ELIQUIS) 5 MG TABS tablet Take 5 mg by mouth 2 times daily      Ferrous Fumarate (FERROCITE) 324 (106 Fe) MG TABS Take by mouth      apixaban (ELIQUIS) 5 MG TABS tablet Take 1 tablet by mouth 2 times daily 60 tablet 1    metoprolol tartrate (LOPRESSOR) 25 MG tablet Take 0.5 tablets by mouth 2 times daily 60 tablet 3    amiodarone (CORDARONE) 200 MG tablet Take 1 tablet by mouth 2 times daily 30 tablet 3    tamsulosin (FLOMAX) 0.4 MG capsule TAKE 1 CAPSULE BY MOUTH EVERY DAY 30 capsule 5    Handicap Placard MISC by Does not apply route 1 each 0    lidocaine-prilocaine (EMLA) 2.5-2.5 % cream Apply topically as needed. Apply a quarter size amount to port site 1 hour before chemotherapy. Cover with plastic wrap. 30 g 0    acetaminophen (TYLENOL) 325 MG tablet Take 650 mg by mouth every 6 hours as needed for Pain      allopurinol (ZYLOPRIM) 100 MG tablet Take 100 mg by mouth daily      simvastatin (ZOCOR) 40 MG tablet Take 40 mg by mouth daily      Multiple Vitamins-Minerals (THERAPEUTIC MULTIVITAMIN-MINERALS) tablet Take 1 tablet by mouth daily          Patient has no known allergies. Social History     Tobacco Use   Smoking Status Current Every Day Smoker    Packs/day: 0.50    Types: Cigarettes    Start date: 4/8/2019   Smokeless Tobacco Former User      (If patient a smoker, smoking cessation counseling offered)   Social History     Substance and Sexual Activity   Alcohol Use No       REVIEW OF SYSTEMS:  Review of Systems    Physical Exam:    This a 72 y.o. male   Vitals:    07/20/20 1118   Temp: 97.9 °F (36.6 °C)     There is no height or weight on file to calculate BMI. Physical Exam  Constitutional: Patient in no acute distress. Neuro: Alert and oriented to person, place and time.   Psych: Mood normal, affect normal  Skin: No rash noted  HEENT: Head: Normocephalic and atraumatic  Conjunctivae and EOM are normal. Pupils are equal, round  Nose: Normal  Right External Ear: Normal; Left External Ear: Normal  Mouth: Mucosa Moist  Neck: Supple  Lungs:Respiratory effort is normal  Cardiovascular: Warm & Pink  Abdomen: Soft, non-tender, non-distendedwith no CVA,  No flank tenderness,  Orhepatosplenomegaly   Lymphatics: No palpable lymphadenopathy. Bladder non-tender and not distended. Musculoskeletal: Normal gait and station  Penis normal and circumcised  Urethral meatus normal  Scrotal exam normal  Testicles normal bilaterally  Epididymis normal bilaterally        Assessment and Plan      1. Incomplete emptying of bladder    2. Bilateral hydronephrosis    3. BPH with obstruction/lower urinary tract symptoms           Plan:  Renal u/s  F/u one week bladder scan       Prescriptions Ordered:  No orders of the defined types were placed in this encounter. Orders Placed:  Orders Placed This Encounter   Procedures   Missy Chanel MD    Agree with the ROS entered by the MA.

## 2020-07-20 NOTE — PROGRESS NOTES
Review of Systems   Constitutional: Negative for appetite change, chills and fever. Eyes: Negative for pain, redness and visual disturbance. Respiratory: Negative for cough, shortness of breath and wheezing. Cardiovascular: Negative for chest pain and leg swelling. Gastrointestinal: Negative for abdominal pain, constipation, diarrhea, nausea and vomiting. Genitourinary: Positive for penile pain (tip of penis when urinating). Negative for difficulty urinating, dysuria, flank pain, frequency, hematuria and urgency. Musculoskeletal: Negative for back pain, joint swelling and myalgias. Skin: Negative for rash and wound. Neurological: Negative for dizziness, tremors and numbness. Hematological: Does not bruise/bleed easily.

## 2020-07-22 ENCOUNTER — HOSPITAL ENCOUNTER (OUTPATIENT)
Dept: RADIATION ONCOLOGY | Age: 70
Discharge: HOME | End: 2020-07-22
Attending: RADIOLOGY
Payer: COMMERCIAL

## 2020-07-22 VITALS
BODY MASS INDEX: 27.53 KG/M2 | HEART RATE: 59 BPM | RESPIRATION RATE: 14 BRPM | SYSTOLIC BLOOD PRESSURE: 62 MMHG | OXYGEN SATURATION: 97 % | WEIGHT: 203 LBS | TEMPERATURE: 97.6 F | DIASTOLIC BLOOD PRESSURE: 37 MMHG

## 2020-07-22 LAB
ABSOLUTE EOS #: 0.5 K/UL (ref 0–0.4)
ABSOLUTE IMMATURE GRANULOCYTE: ABNORMAL K/UL (ref 0–0.3)
ABSOLUTE LYMPH #: 0.8 K/UL (ref 1–4.8)
ABSOLUTE MONO #: 1 K/UL (ref 0.1–1.2)
ALBUMIN SERPL-MCNC: 3.9 G/DL (ref 3.5–5.2)
ALBUMIN/GLOBULIN RATIO: 1.4 (ref 1–2.5)
ALP BLD-CCNC: 74 U/L (ref 40–129)
ALT SERPL-CCNC: 14 U/L (ref 5–41)
ANION GAP SERPL CALCULATED.3IONS-SCNC: 11 MMOL/L (ref 9–17)
AST SERPL-CCNC: 17 U/L
BASOPHILS # BLD: 1 % (ref 0–2)
BASOPHILS ABSOLUTE: 0 K/UL (ref 0–0.2)
BILIRUB SERPL-MCNC: 0.2 MG/DL (ref 0.3–1.2)
BUN BLDV-MCNC: 17 MG/DL (ref 8–23)
BUN/CREAT BLD: ABNORMAL (ref 9–20)
CALCIUM SERPL-MCNC: 9.6 MG/DL (ref 8.6–10.4)
CHLORIDE BLD-SCNC: 103 MMOL/L (ref 98–107)
CO2: 25 MMOL/L (ref 20–31)
CREAT SERPL-MCNC: 1.27 MG/DL (ref 0.7–1.2)
DIFFERENTIAL TYPE: ABNORMAL
EOSINOPHILS RELATIVE PERCENT: 10 % (ref 1–4)
GFR AFRICAN AMERICAN: >60 ML/MIN
GFR NON-AFRICAN AMERICAN: 56 ML/MIN
GFR SERPL CREATININE-BSD FRML MDRD: ABNORMAL ML/MIN/{1.73_M2}
GFR SERPL CREATININE-BSD FRML MDRD: ABNORMAL ML/MIN/{1.73_M2}
GLUCOSE BLD-MCNC: 113 MG/DL (ref 70–99)
HCT VFR BLD CALC: 34.6 % (ref 41–53)
HEMOGLOBIN: 11.6 G/DL (ref 13.5–17.5)
IMMATURE GRANULOCYTES: ABNORMAL %
LYMPHOCYTES # BLD: 16 % (ref 24–44)
MCH RBC QN AUTO: 32.9 PG (ref 26–34)
MCHC RBC AUTO-ENTMCNC: 33.5 G/DL (ref 31–37)
MCV RBC AUTO: 98.3 FL (ref 80–100)
MONOCYTES # BLD: 19 % (ref 2–11)
NRBC AUTOMATED: ABNORMAL PER 100 WBC
PDW BLD-RTO: 17.9 % (ref 12.5–15.4)
PLATELET # BLD: 266 K/UL (ref 140–450)
PLATELET ESTIMATE: ABNORMAL
PMV BLD AUTO: 7 FL (ref 6–12)
POTASSIUM SERPL-SCNC: 4.9 MMOL/L (ref 3.7–5.3)
RBC # BLD: 3.52 M/UL (ref 4.5–5.9)
RBC # BLD: ABNORMAL 10*6/UL
SEG NEUTROPHILS: 54 % (ref 36–66)
SEGMENTED NEUTROPHILS ABSOLUTE COUNT: 2.8 K/UL (ref 1.8–7.7)
SODIUM BLD-SCNC: 139 MMOL/L (ref 135–144)
TOTAL PROTEIN: 6.6 G/DL (ref 6.4–8.3)
WBC # BLD: 5.1 K/UL (ref 3.5–11)
WBC # BLD: ABNORMAL 10*3/UL

## 2020-07-22 PROCEDURE — 85025 COMPLETE CBC W/AUTO DIFF WBC: CPT

## 2020-07-22 PROCEDURE — 36415 COLL VENOUS BLD VENIPUNCTURE: CPT

## 2020-07-22 PROCEDURE — 80053 COMPREHEN METABOLIC PANEL: CPT

## 2020-07-22 PROCEDURE — 77373 STRTCTC BDY RAD THER TX DLVR: CPT | Performed by: RADIOLOGY

## 2020-07-22 NOTE — PROGRESS NOTES
0    lidocaine-prilocaine (EMLA) 2.5-2.5 % cream, Apply topically as needed. Apply a quarter size amount to port site 1 hour before chemotherapy. Cover with plastic wrap., Disp: 30 g, Rfl: 0    acetaminophen (TYLENOL) 325 MG tablet, Take 650 mg by mouth every 6 hours as needed for Pain, Disp: , Rfl:     allopurinol (ZYLOPRIM) 100 MG tablet, Take 100 mg by mouth daily, Disp: , Rfl:     simvastatin (ZOCOR) 40 MG tablet, Take 40 mg by mouth daily, Disp: , Rfl:     Multiple Vitamins-Minerals (THERAPEUTIC MULTIVITAMIN-MINERALS) tablet, Take 1 tablet by mouth daily, Disp: , Rfl:       ASSESSMENT PLAN:      Treatment setup and plan reviewed. Port images/CBCT images reviewed. Appropriate laboratory work was reviewed. Treatment side effects and toxicities reviewed with the patient, and appropriate management was advised. Will continue radiation treatment as planned, and recommend patient contact us if they have any questions or concerns. Patient has hypotensive orthostatics. We encourage patient to get IV hydration and blood work today to assess his electrolytes and kidney function. Patient however declined. He was agreeable to get blood work today and stated that he may be willing to get hydration on Friday when he comes back for his next treatment. We encourage patient to increase his oral intake as it may cause permanent damage to his kidneys as well as causing risk of falling which could result in injury.     Electronically signed by Adan Cedeno MD on 7/22/2020 at 5:44 PM      Drugs Prescribed:  New Prescriptions    No medications on file       Other Orders Placed:  Orders Placed This Encounter   Procedures    CBC With Auto Differential    Comprehensive Metabolic Panel

## 2020-07-23 ENCOUNTER — HOSPITAL ENCOUNTER (OUTPATIENT)
Dept: ULTRASOUND IMAGING | Age: 70
Discharge: HOME | End: 2020-07-25
Payer: COMMERCIAL

## 2020-07-23 PROCEDURE — 76770 US EXAM ABDO BACK WALL COMP: CPT

## 2020-07-24 ENCOUNTER — HOSPITAL ENCOUNTER (OUTPATIENT)
Dept: RADIATION ONCOLOGY | Age: 70
Discharge: HOME | End: 2020-07-24
Attending: RADIOLOGY
Payer: COMMERCIAL

## 2020-07-24 PROCEDURE — 77373 STRTCTC BDY RAD THER TX DLVR: CPT | Performed by: RADIOLOGY

## 2020-07-24 PROCEDURE — 77336 RADIATION PHYSICS CONSULT: CPT | Performed by: RADIOLOGY

## 2020-07-27 ENCOUNTER — HOSPITAL ENCOUNTER (OUTPATIENT)
Dept: RADIATION ONCOLOGY | Age: 70
Discharge: HOME | End: 2020-07-27
Attending: RADIOLOGY
Payer: COMMERCIAL

## 2020-07-27 ENCOUNTER — OFFICE VISIT (OUTPATIENT)
Dept: UROLOGY | Age: 70
End: 2020-07-27
Payer: COMMERCIAL

## 2020-07-27 VITALS — TEMPERATURE: 97.8 F

## 2020-07-27 PROCEDURE — 99214 OFFICE O/P EST MOD 30 MIN: CPT | Performed by: UROLOGY

## 2020-07-27 PROCEDURE — 51798 US URINE CAPACITY MEASURE: CPT | Performed by: UROLOGY

## 2020-07-27 PROCEDURE — 77373 STRTCTC BDY RAD THER TX DLVR: CPT | Performed by: RADIOLOGY

## 2020-07-27 RX ORDER — FOLIC ACID/VIT B COMPLEX AND C 0.8 MG
TABLET ORAL
Status: ON HOLD | COMMUNITY
Start: 2020-07-21 | End: 2022-05-07 | Stop reason: HOSPADM

## 2020-07-27 RX ORDER — TRAZODONE HYDROCHLORIDE 100 MG/1
100 TABLET ORAL NIGHTLY
Status: ON HOLD | COMMUNITY
Start: 2020-07-20 | End: 2022-05-07 | Stop reason: HOSPADM

## 2020-07-27 ASSESSMENT — ENCOUNTER SYMPTOMS
COUGH: 0
ABDOMINAL PAIN: 0
EYE PAIN: 0
EYE REDNESS: 0
SHORTNESS OF BREATH: 0
DIARRHEA: 0
NAUSEA: 0
BACK PAIN: 0
VOMITING: 0
CONSTIPATION: 1
WHEEZING: 0

## 2020-07-27 NOTE — PROGRESS NOTES
[facility]  [facility] UROLOGY SPECIALISTS - [facility]  [address] [place] [address] Road [address]  Dept: [address] [facility] Urology Office Note - Established    Patient:  Hima Owusu  YOB: 1950  Date: 7/27/2020    The patient is a 72 y.o. male who presents todayfor evaluation of the following problems:   Chief Complaint   Patient presents with    Benign Prostatic Hypertrophy     1 week with PVR and renal US        HPI  BPH/LUTS:  Patient is here today for lower urinary tract symptoms which started a few year(s) ago. Recently the urinary symptoms are: show no change  Current medical Rx for BPH/LUTS: tamsulosin  Lower urinary tract symptoms: nocturia, 1-2 times per night    Pt has had improvement in LUTS with tamsulosin  pvr 195cc      Summary of old records: N/A    Additional History: N/A    Procedures Today: N/A    Urinalysis today:  No results found for this visit on 07/27/20.   Last several PSA's:  No results found for: PSA  Last total testosterone:  No results found for: TESTOSTERONE    AUA Symptom Score (7/27/2020):  INCOMPLETE EMPTYING: How often have you had the sensation of not emptying your bladder?: Not at all  FREQUENCY: How often do you have to urinate less than every two hours?: Not at all  INTERMITTENCY: How often have you found you stopped and started again several times when you urinated?: Not at all  URGENCY: How often have you found it difficult to postpone urination?: Not at all  WEAK STREAM: How often have you had a weak urinary stream?: Less than Half the time  STRAINING: How often have you had to strain to start  urination?: Not at all  NOCTURIA: How many times did you typically get up at night to uriniate?: 1 Time  TOTAL I-PSS SCORE[de-identified] 3  How would you feel if you were to spend the rest of your life with your urinary condition?: Mostly Satisfied    Last BUN and creatinine:  Lab Results   Component Value Date    BUN 17 07/22/2020     Lab Results Component Value Date    CREATININE 1.27 (H) 07/22/2020       Additional Lab/Culture results: none    Imaging Reviewed during this Office Visit: renal u/s - hydro resolved  (results were independently reviewed by physician and radiology report verified)    PAST MEDICAL, FAMILY AND SOCIAL HISTORY UPDATE:  Past Medical History:   Diagnosis Date    DDD (degenerative disc disease), cervical     Gout     Heart murmur     hx. of when younger.  Hyperlipidemia     Hypertension     Lung [facility] [address] [address]     right lung    MI (myocardial infarction) ([facility] [address]     Skin cancer     face    Wears glasses      Past Surgical History:   Procedure Laterality Date    APPENDECTOMY      CARDIAC CATHETERIZATION      w/stent    COLONOSCOPY      CYST INCISION AND DRAINAGE Right 05/14/2020    rt elbow I and D and left knee aspiration with cultures    FOOT SURGERY Right     2nd toe(bone spur).  FOREARM SURGERY Right 5/14/2020    RIGHT ELBOW IRRIGATION AND DEBRIDEMENT performed by Irena Landeros DO at [facility] Left 5/14/2020    KNEE ASPIRATION WITH CULTURES SENT performed by Irena Landeros DO at [address] [place] [address] under lt. eye.  TONSILLECTOMY       Family History   Problem Relation Age of Onset   [facility] Cancer Father         lung cancer     Outpatient Medications Marked as Taking for the 7/27/20 encounter (Office Visit) with Radha Montes MD   Medication Sig Dispense Refill    B Complex-C-Folic Acid (ANGELICA-HUGO) TABS TAKE 1 TABLET BY MOUTH DAILY.  HEMATINIC VIT MINERALS      traZODone (DESYREL) 100 MG tablet TAKE 1 TABLET BY MOUTH EVERY DAY AT NIGHT      ipratropium-albuterol (DUONEB) 0.5-2.5 (3) MG/3ML SOLN nebulizer solution Inhale 3 mLs into the lungs 4 times daily      Melatonin 5 MG CHEW Take 5 mg by mouth nightly      potassium chloride (MICRO-K) 10 MEQ extended release capsule Take 20 mEq by mouth      apixaban (ELIQUIS) 5 MG TABS tablet Take 5 mg by mouth 2 times Normal; Left External Ear: Normal  Mouth: Mucosa Moist  Neck: Supple  Lungs: Respiratory effort is normal  Cardiovascular: Warm & Pink  Abdomen: Soft, non-tender, non-distended with no CVA,  No flank tenderness,  Or hepatosplenomegaly   Lymphatics: No palpablelymphadenopathy.  :    Assessment and Plan      1. Incomplete emptying of bladder    2. Bilateral hydronephrosis    3. BPH with obstruction/lower urinary tract symptoms           Plan:   F/u 3 mo bladder scan      Return in about 3 months (around 10/27/2020) for bladder scan. Prescriptions Ordered:  No orders of the defined types were placed in this encounter. Orders Placed:  Orders Placed This Encounter   Procedures   Vignesh Moss MD    Agree with the ROS entered by the MA.

## 2020-07-29 ENCOUNTER — HOSPITAL ENCOUNTER (OUTPATIENT)
Dept: INFUSION THERAPY | Age: 70
Discharge: HOME | End: 2020-07-29
Payer: COMMERCIAL

## 2020-07-29 ENCOUNTER — APPOINTMENT (OUTPATIENT)
Dept: RADIATION ONCOLOGY | Age: 70
End: 2020-07-29
Attending: RADIOLOGY
Payer: COMMERCIAL

## 2020-07-29 ENCOUNTER — HOSPITAL ENCOUNTER (OUTPATIENT)
Dept: RADIATION ONCOLOGY | Age: 70
Discharge: HOME | End: 2020-07-29
Attending: RADIOLOGY
Payer: COMMERCIAL

## 2020-07-29 DIAGNOSIS — C34.91 PRIMARY LUNG CANCER WITH METASTASIS FROM LUNG TO OTHER SITE, RIGHT (HCC): Primary | ICD-10-CM

## 2020-07-29 PROCEDURE — 77373 STRTCTC BDY RAD THER TX DLVR: CPT | Performed by: RADIOLOGY

## 2020-07-29 PROCEDURE — 6360000002 HC RX W HCPCS: Performed by: INTERNAL MEDICINE

## 2020-07-29 PROCEDURE — 2580000003 HC RX 258: Performed by: INTERNAL MEDICINE

## 2020-07-29 PROCEDURE — 96523 IRRIG DRUG DELIVERY DEVICE: CPT | Performed by: NURSE PRACTITIONER

## 2020-07-29 PROCEDURE — 77435 SBRT MANAGEMENT: CPT | Performed by: RADIOLOGY

## 2020-07-29 RX ORDER — SODIUM CHLORIDE 0.9 % (FLUSH) 0.9 %
10 SYRINGE (ML) INJECTION PRN
Status: CANCELLED | OUTPATIENT
Start: 2020-07-29

## 2020-07-29 RX ORDER — HEPARIN SODIUM (PORCINE) LOCK FLUSH IV SOLN 100 UNIT/ML 100 UNIT/ML
500 SOLUTION INTRAVENOUS PRN
Status: CANCELLED | OUTPATIENT
Start: 2020-07-29

## 2020-07-29 RX ORDER — SODIUM CHLORIDE 0.9 % (FLUSH) 0.9 %
10 SYRINGE (ML) INJECTION PRN
Status: DISCONTINUED | OUTPATIENT
Start: 2020-07-29 | End: 2020-07-30 | Stop reason: HOSPADM

## 2020-07-29 RX ORDER — HEPARIN SODIUM (PORCINE) LOCK FLUSH IV SOLN 100 UNIT/ML 100 UNIT/ML
500 SOLUTION INTRAVENOUS PRN
Status: DISCONTINUED | OUTPATIENT
Start: 2020-07-29 | End: 2020-07-30 | Stop reason: HOSPADM

## 2020-07-29 RX ORDER — SODIUM CHLORIDE 0.9 % (FLUSH) 0.9 %
20 SYRINGE (ML) INJECTION PRN
Status: CANCELLED | OUTPATIENT
Start: 2020-07-29

## 2020-07-29 RX ADMIN — Medication 10 ML: at 14:13

## 2020-07-29 RX ADMIN — HEPARIN 500 UNITS: 100 SYRINGE at 14:13

## 2020-07-29 RX ADMIN — Medication 10 ML: at 14:09

## 2020-07-30 NOTE — PROGRESS NOTES
[facility]            Radiation Oncology          [address] [facility]          [address]        Sujata Canalesifer: [phone]        F: [phone]       [employer]         Dear Dr Eric Cooley:    Thank you for referring Cal Cox to me for evaluation and treatment. Below is a summary of the patient's recently completed radiation course. If you have questions, please do not hesitate to call me. I look forward to following this patient along with you. Sincerely,  Electronically signed by Samuel Bernard MD on 20 at 9:39 AM EDT      CC: Patient Care Team:  You Bradley MD as PCP - General (Family Medicine)  Jose Cochran MD as Consulting Physician (Hematology and Oncology)  Gregoria French RN as Registered Nurse  ------------------------------------------------------------------------------------------------------------------------------------------------------------------------------------------        Date of Service: 2020     Location:  [facility] Kirill Winkler, [place]   [phone]        RADIATION ONCOLOGY END OF TREATMENT SUMMARY:    Patient ID:   Cal Cox  : 1950   MRN: [id number]    DIAGNOSIS:  Cancer Staging  Primary lung cancer with metastasis from lung to other site, [facility])  Staging form: Lung, AJCC 8th Edition  - Clinical stage from 2019: Stage IIIA (cT4, cN1, cM0) - Signed by Gerard Young MD on 2019  Staging comments: PET/CT scan 2019 revealed 1.2 cm right upper lobe mass SUV 2.6, 2 cm right lower lobe mass SUV 5.4, 1.4 cm right hilar lymph node SUV 7.8, 1.1 cm right adrenal nodule no uptake, negative bone. 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 MRI brain and negative 2019.        TREATMENT DETAILS:    Treatment Technique: SBRT  Fraction Technique: Every other day      Treatment Summary:  Radiation Oncology - Course: 3 Protocol:    Treatment Site Current Dose Modality From To Elapsed Days Fx. L Adrenal  3,500 cGy x06 [facility]  7/20/2020 7/29/2020   9  5                  Concurrent Chemotherapy: NA    CLINICAL COURSE:    Patient completed the treatment as prescribed and tolerated treatment as expected. Patient developed decrease appetite and was encouraged to increase his hydration and nutrition. Patient will come back in 1 month for a follow up visit and to assess treatment toxicity and response. Patient was advised to continue close follow up with medical oncology as well. Patient does have our contact information in case they have any questions or concerns in the interim.     Electronically signed by Vangie Montesinos MD on 7/30/2020 at 9:39 AM

## 2020-08-25 ENCOUNTER — HOSPITAL ENCOUNTER (OUTPATIENT)
Dept: INFUSION THERAPY | Age: 70
Discharge: HOME | End: 2020-08-25
Payer: COMMERCIAL

## 2020-08-25 ENCOUNTER — HOSPITAL ENCOUNTER (OUTPATIENT)
Dept: RADIATION ONCOLOGY | Age: 70
Discharge: HOME | End: 2020-08-25
Attending: RADIOLOGY
Payer: COMMERCIAL

## 2020-08-25 VITALS
HEART RATE: 72 BPM | DIASTOLIC BLOOD PRESSURE: 80 MMHG | TEMPERATURE: 97.8 F | BODY MASS INDEX: 26.17 KG/M2 | WEIGHT: 193 LBS | SYSTOLIC BLOOD PRESSURE: 148 MMHG

## 2020-08-25 DIAGNOSIS — C34.91 PRIMARY LUNG CANCER WITH METASTASIS FROM LUNG TO OTHER SITE, RIGHT (HCC): Primary | ICD-10-CM

## 2020-08-25 PROCEDURE — 2580000003 HC RX 258: Performed by: INTERNAL MEDICINE

## 2020-08-25 PROCEDURE — 99212 OFFICE O/P EST SF 10 MIN: CPT | Performed by: RADIOLOGY

## 2020-08-25 PROCEDURE — 96523 IRRIG DRUG DELIVERY DEVICE: CPT

## 2020-08-25 PROCEDURE — 6360000002 HC RX W HCPCS: Performed by: INTERNAL MEDICINE

## 2020-08-25 RX ORDER — SODIUM CHLORIDE 0.9 % (FLUSH) 0.9 %
20 SYRINGE (ML) INJECTION PRN
Status: DISCONTINUED | OUTPATIENT
Start: 2020-08-25 | End: 2020-08-26 | Stop reason: HOSPADM

## 2020-08-25 RX ORDER — SODIUM CHLORIDE 0.9 % (FLUSH) 0.9 %
10 SYRINGE (ML) INJECTION PRN
Status: CANCELLED | OUTPATIENT
Start: 2020-08-25

## 2020-08-25 RX ORDER — DOCUSATE SODIUM 100 MG/1
100 CAPSULE, LIQUID FILLED ORAL 2 TIMES DAILY
COMMUNITY

## 2020-08-25 RX ORDER — HEPARIN SODIUM (PORCINE) LOCK FLUSH IV SOLN 100 UNIT/ML 100 UNIT/ML
500 SOLUTION INTRAVENOUS PRN
Status: DISCONTINUED | OUTPATIENT
Start: 2020-08-25 | End: 2020-08-26 | Stop reason: HOSPADM

## 2020-08-25 RX ORDER — SODIUM CHLORIDE 0.9 % (FLUSH) 0.9 %
20 SYRINGE (ML) INJECTION PRN
Status: CANCELLED | OUTPATIENT
Start: 2020-08-25

## 2020-08-25 RX ORDER — SODIUM CHLORIDE 0.9 % (FLUSH) 0.9 %
10 SYRINGE (ML) INJECTION PRN
Status: DISCONTINUED | OUTPATIENT
Start: 2020-08-25 | End: 2020-08-26 | Stop reason: HOSPADM

## 2020-08-25 RX ORDER — HEPARIN SODIUM (PORCINE) LOCK FLUSH IV SOLN 100 UNIT/ML 100 UNIT/ML
500 SOLUTION INTRAVENOUS PRN
Status: CANCELLED | OUTPATIENT
Start: 2020-08-25

## 2020-08-25 RX ADMIN — HEPARIN 500 UNITS: 100 SYRINGE at 13:26

## 2020-08-25 RX ADMIN — Medication 10 ML: at 13:26

## 2020-08-25 RX ADMIN — Medication 10 ML: at 13:25

## 2020-08-25 ASSESSMENT — PAIN DESCRIPTION - PAIN TYPE: TYPE: ACUTE PAIN

## 2020-08-25 ASSESSMENT — PAIN DESCRIPTION - LOCATION: LOCATION: PENIS;RECTUM

## 2020-08-25 NOTE — PROGRESS NOTES
Sherman Lints  8/25/2020  2:17 PM      Vitals:    08/25/20 1400   BP: (!) 148/80   Pulse: 72   Temp: 97.8 °F (36.6 °C)    : Patient Currently in Pain: Yes             Wt Readings from Last 1 Encounters:   08/25/20 193 lb (87.5 kg)                Current Outpatient Medications:     docusate sodium (COLACE) 100 MG capsule, Take 100 mg by mouth 2 times daily, Disp: , Rfl:     B Complex-C-Folic Acid (ANGELICA-HUGO) TABS, TAKE 1 TABLET BY MOUTH DAILY. HEMATINIC VIT MINERALS, Disp: , Rfl:     traZODone (DESYREL) 100 MG tablet, TAKE 1 TABLET BY MOUTH EVERY DAY AT NIGHT, Disp: , Rfl:     ipratropium-albuterol (DUONEB) 0.5-2.5 (3) MG/3ML SOLN nebulizer solution, Inhale 3 mLs into the lungs 4 times daily, Disp: , Rfl:     Melatonin 5 MG CHEW, Take 5 mg by mouth nightly, Disp: , Rfl:     potassium chloride (MICRO-K) 10 MEQ extended release capsule, Take 20 mEq by mouth, Disp: , Rfl:     apixaban (ELIQUIS) 5 MG TABS tablet, Take 5 mg by mouth 2 times daily, Disp: , Rfl:     Ferrous Fumarate (FERROCITE) 324 (106 Fe) MG TABS, Take by mouth, Disp: , Rfl:     apixaban (ELIQUIS) 5 MG TABS tablet, Take 1 tablet by mouth 2 times daily, Disp: 60 tablet, Rfl: 1    metoprolol tartrate (LOPRESSOR) 25 MG tablet, Take 0.5 tablets by mouth 2 times daily, Disp: 60 tablet, Rfl: 3    tamsulosin (FLOMAX) 0.4 MG capsule, TAKE 1 CAPSULE BY MOUTH EVERY DAY, Disp: 30 capsule, Rfl: 5    Handicap Placard MISC, by Does not apply route, Disp: 1 each, Rfl: 0    lidocaine-prilocaine (EMLA) 2.5-2.5 % cream, Apply topically as needed. Apply a quarter size amount to port site 1 hour before chemotherapy.   Cover with plastic wrap., Disp: 30 g, Rfl: 0    acetaminophen (TYLENOL) 325 MG tablet, Take 650 mg by mouth every 6 hours as needed for Pain, Disp: , Rfl:     allopurinol (ZYLOPRIM) 100 MG tablet, Take 100 mg by mouth daily, Disp: , Rfl:     simvastatin (ZOCOR) 40 MG tablet, Take 40 mg by mouth daily, Disp: , Rfl:     Multiple Vitamins-Minerals (THERAPEUTIC MULTIVITAMIN-MINERALS) tablet, Take 1 tablet by mouth daily, Disp: , Rfl:   No current facility-administered medications for this encounter. Facility-Administered Medications Ordered in Other Encounters:     sodium chloride flush 0.9 % injection 10 mL, 10 mL, Intravenous, PRAustyn FLORES MD, 10 mL at 08/25/20 1326    sodium chloride flush 0.9 % injection 20 mL, 20 mL, Intravenous, PRAustyn FLORES MD    heparin flush 100 UNIT/ML injection 500 Units, 500 Units, Intercatheter, PRAustyn FLORES MD, 500 Units at 08/25/20 1326    Immunizations:    Influenza status:    [x]   Current   []   Patient declined    Pneumococcal status:  [x]   Current  []   Patient declined    Smoking Status:    [x] Smoker - PPD:0.5  Smoking cessation education: Provided []   Declined [x]    [] Nonsmoker - Quit Date:               [] Never a smoker           Cancer Screening:  Colonoscopy [] Current [x] Not current   [] Not current, but scheduled   [] NA  Mammogram [] Current [x] Not current   [] Not current, but scheduled   [] NA  Prostate [] Current [x] Not current   [] Not current, but scheduled   [] NA  PAP/Pelvic [] Current [] Not current   [] Not current, but scheduled   [x] NA  Skin  [] Current  [] Not current   [] Not current, but scheduled   [x] NA    Hormone:  Lupron []   Last dose given:           Next dose due:   Eligard []   Last dose given:           Next dose due:   Aromatase Inhibitors []   Medication name:   N/A:  [x]                    FALLS RISK SCREEN  Instructions:  Assess the patient and enter the appropriate indicators that are present for fall risk identification. Total the numbers entered and assign a fall risk score from Table 2.  Reassess patient at a minimum every 12 weeks or with status change. Assessment   Date  8/25/2020     1. Mental Ability: confusion/cognitively impaired 0     2. Elimination Issues: incontinence, frequency 0       3.   Ambulatory: use of assistive devices (walker, cane, off-loading devices),        attached to equipment (IV pole, oxygen) 2     4. Sensory Limitations: dizziness, vertigo, impaired vision 3     5. Age less than 65        0     6. Age 72 or greater 1     7. Medication: diuretics, strong analgesics, hypnotics, sedatives,        antihypertensive agents 3   8. Falls:  recent history of falls within the last 3 months (not to include slipping or        tripping) 0   TOTAL 9    If score of 4 or greater was education given? Yes           TABLE 2   Risk Score Risk Level Plan of Care   0-3 Little or  No Risk 1. Provide assistance as indicated for ambulation activities  2. Reorient confused/cognitively impaired patient  3. Chair/bed in low position, stretcher/bed with siderails up except when performing patient care activities  5. Educate patient/family/caregiver on falls prevention  6.  Reassess in 12 weeks or with any noted change in patient condition which places them at a risk for a fall   4-6 Moderate Risk 1. Provide assistance as indicated for ambulation activities  2. Reorient confused/cognitively impaired patient  3. Chair/bed in low position, stretcher/bed with siderails up except when performing patient care activities  4. Educate patient/family/caregiver on falls prevention     7 or   Higher High Risk 1. Place patient in easily observable treatment room  2. Patient attended at all times by family member or staff  3. Provide assistance as indicated for ambulation activities  4. Reorient confused/cognitively impaired patient  5. Chair/bed in low position, stretcher/bed with siderails up except when performing patient care activities  6. Educate patient/family/caregiver on falls prevention         PLAN: Patient is seen today in follow up. Dr SHAFFER [facility] notified and examined pt. PT to f/u in 3 month per Dr SHAFFER [facility].          Trinidad Onofre

## 2020-08-26 NOTE — PROGRESS NOTES
bleeding. MEDICATIONS:    Current Outpatient Medications:     docusate sodium (COLACE) 100 MG capsule, Take 100 mg by mouth 2 times daily, Disp: , Rfl:     B Complex-C-Folic Acid (ANGELICA-HUGO) TABS, TAKE 1 TABLET BY MOUTH DAILY. HEMATINIC VIT MINERALS, Disp: , Rfl:     traZODone (DESYREL) 100 MG tablet, TAKE 1 TABLET BY MOUTH EVERY DAY AT NIGHT, Disp: , Rfl:     ipratropium-albuterol (DUONEB) 0.5-2.5 (3) MG/3ML SOLN nebulizer solution, Inhale 3 mLs into the lungs 4 times daily, Disp: , Rfl:     Melatonin 5 MG CHEW, Take 5 mg by mouth nightly, Disp: , Rfl:     potassium chloride (MICRO-K) 10 MEQ extended release capsule, Take 20 mEq by mouth, Disp: , Rfl:     apixaban (ELIQUIS) 5 MG TABS tablet, Take 5 mg by mouth 2 times daily, Disp: , Rfl:     Ferrous Fumarate (FERROCITE) 324 (106 Fe) MG TABS, Take by mouth, Disp: , Rfl:     apixaban (ELIQUIS) 5 MG TABS tablet, Take 1 tablet by mouth 2 times daily, Disp: 60 tablet, Rfl: 1    metoprolol tartrate (LOPRESSOR) 25 MG tablet, Take 0.5 tablets by mouth 2 times daily, Disp: 60 tablet, Rfl: 3    tamsulosin (FLOMAX) 0.4 MG capsule, TAKE 1 CAPSULE BY MOUTH EVERY DAY, Disp: 30 capsule, Rfl: 5    Handicap Placard MISC, by Does not apply route, Disp: 1 each, Rfl: 0    lidocaine-prilocaine (EMLA) 2.5-2.5 % cream, Apply topically as needed. Apply a quarter size amount to port site 1 hour before chemotherapy.   Cover with plastic wrap., Disp: 30 g, Rfl: 0    acetaminophen (TYLENOL) 325 MG tablet, Take 650 mg by mouth every 6 hours as needed for Pain, Disp: , Rfl:     allopurinol (ZYLOPRIM) 100 MG tablet, Take 100 mg by mouth daily, Disp: , Rfl:     simvastatin (ZOCOR) 40 MG tablet, Take 40 mg by mouth daily, Disp: , Rfl:     Multiple Vitamins-Minerals (THERAPEUTIC MULTIVITAMIN-MINERALS) tablet, Take 1 tablet by mouth daily, Disp: , Rfl:     ALLERGIES:  No Known Allergies      REVIEW OF SYSTEMS:    A full 14 point review of systems was performed and assessed and found to be negative except as noted above. PHYSICAL EXAMINATION:    CHAPERONE: Not Required    ECO Symptomatic but completely ambulatory    VITAL SIGNS: BP (!) 148/80   Pulse 72   Temp 97.8 °F (36.6 °C)   Wt 193 lb (87.5 kg)   BMI 26.17 kg/m²   GENERAL:  General appearance is that of a well-nourished, well-developed in no apparent distress. HEENT: Normocephalic, atraumatic, EOMI, moist mucosa, no erythema. NECK:  No adenopathy or a palpable thyroid mass, trachea is midline. LYMPHATICS: No cervical, supraclavicular adenopathy. HEART:  Regular rate and rhythm, S1, S2, no murmurs. LUNGS:  Clear to auscultation bilaterally with no wheezing or crackles. ABDOMEN:  Soft, nontender, non distended, and no hepatosplenomegaly. EXTREMITIES:  No clubbing, cyanosis, or edema. No calf tenderness. MSK:  No CVA or spinal tenderness. NEUROLOGICAL: No focal deficits. CN II-XII intact. Strength and sensation intact bilaterally. SKIN: No erythema or desquamation. LABS:  WBC   Date Value Ref Range Status   2020 5.1 3.5 - 11.0 k/uL Final     Segs Absolute   Date Value Ref Range Status   2020 2.80 1.8 - 7.7 k/uL Final     Hemoglobin   Date Value Ref Range Status   2020 11.6 (L) 13.5 - 17.5 g/dL Final     Platelets   Date Value Ref Range Status   2020 266 140 - 450 k/uL Final       IMAGING:   None      ASSESSMENT AND PLAN:  Berto Bautista is a 72 y.o. male with a Cancer Staging  Primary lung cancer with metastasis from lung to other site, right [facility])  Staging form: Lung, AJCC 8th Edition  - Clinical stage from 2019: Stage IIIA (cT4, cN1, cM0) - Signed by Charissa Real MD on 2019  Staging comments: PET/CT scan 2019 revealed 1.2 cm right upper lobe mass SUV 2.6, 2 cm right lower lobe mass SUV 5.4, 1.4 cm right hilar lymph node SUV 7.8, 1.1 cm right adrenal nodule no uptake, negative bone.  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 MRI brain and negative 2/18/2019.   -s/p chemoRT R lung 60Gy 4/22/19  -s/p PCI 25Gy 7/22/19  -s/p L adrenal SBRT 35Gy 7/29/20     Overall patient is doing well and recovering from his radiation treatments without significant toxicities. We do encourage patient to continue working on his nutrition and hydration. We encourage patient to continue follow-up with his urologist regarding his dysuria symptoms and to maintain good hydration. Patient is scheduled to also see his primary care physician as well who encouraged him to discuss the symptoms with them as he does have a history of incomplete emptying and hydronephrosis. Patient otherwise is scheduled for repeat scans of the chest abdomen pelvis as well as brain in November and will be encouraged to follow-up with us after that to review the results. Patient is recommended to come back in 3 months for another follow up visit and exam, or can call to come see us sooner if symptoms change. Patient was in agreement with my recommendations. All questions were answered to their satisfaction. Patient was advised to contact us anytime should they have any questions or concerns. Electronically signed by Vanesa Rivera MD on 8/26/2020 at 2:07 PM        Medications Prescribed:   New Prescriptions    No medications on file       Orders: No orders of the defined types were placed in this encounter.       CC:  Patient Care Team:  Fiorella Benedict MD as PCP - General (Family Medicine)  Bebe Gordon MD as Consulting Physician (Hematology and Oncology)  Deven Wilson RN as Registered Nurse

## 2020-08-27 ENCOUNTER — TELEPHONE (OUTPATIENT)
Dept: ONCOLOGY | Age: 70
End: 2020-08-27

## 2020-08-27 NOTE — TELEPHONE ENCOUNTER
Letter of extension from work completed, signed by Dr Thuy Sow and faxed to ATTN: Sherrie Sawyer at [phone]. Confirmation fax and letter placed in pile to be scanned.  Bry Celestin

## 2020-10-06 ENCOUNTER — HOSPITAL ENCOUNTER (OUTPATIENT)
Dept: INFUSION THERAPY | Age: 70
Discharge: HOME | End: 2020-10-06
Payer: COMMERCIAL

## 2020-10-06 DIAGNOSIS — C34.91 PRIMARY LUNG CANCER WITH METASTASIS FROM LUNG TO OTHER SITE, RIGHT (HCC): Primary | ICD-10-CM

## 2020-10-06 PROCEDURE — 96523 IRRIG DRUG DELIVERY DEVICE: CPT

## 2020-10-06 PROCEDURE — 2580000003 HC RX 258: Performed by: INTERNAL MEDICINE

## 2020-10-06 PROCEDURE — 6360000002 HC RX W HCPCS: Performed by: INTERNAL MEDICINE

## 2020-10-06 RX ORDER — HEPARIN SODIUM (PORCINE) LOCK FLUSH IV SOLN 100 UNIT/ML 100 UNIT/ML
500 SOLUTION INTRAVENOUS PRN
Status: CANCELLED | OUTPATIENT
Start: 2020-10-06

## 2020-10-06 RX ORDER — HEPARIN SODIUM (PORCINE) LOCK FLUSH IV SOLN 100 UNIT/ML 100 UNIT/ML
500 SOLUTION INTRAVENOUS PRN
Status: DISCONTINUED | OUTPATIENT
Start: 2020-10-06 | End: 2020-10-07 | Stop reason: HOSPADM

## 2020-10-06 RX ORDER — SODIUM CHLORIDE 0.9 % (FLUSH) 0.9 %
10 SYRINGE (ML) INJECTION PRN
Status: DISCONTINUED | OUTPATIENT
Start: 2020-10-06 | End: 2020-10-07 | Stop reason: HOSPADM

## 2020-10-06 RX ORDER — SODIUM CHLORIDE 0.9 % (FLUSH) 0.9 %
10 SYRINGE (ML) INJECTION PRN
Status: CANCELLED | OUTPATIENT
Start: 2020-10-06

## 2020-10-06 RX ORDER — SODIUM CHLORIDE 0.9 % (FLUSH) 0.9 %
20 SYRINGE (ML) INJECTION PRN
Status: CANCELLED | OUTPATIENT
Start: 2020-10-06

## 2020-10-06 RX ADMIN — Medication 10 ML: at 10:39

## 2020-10-06 RX ADMIN — HEPARIN 500 UNITS: 100 SYRINGE at 10:39

## 2020-10-06 NOTE — PROGRESS NOTES
Port flushed per orders. Pt d/c'd in stable condition. Returns 11/6/20 for labs/port access for scans.

## 2020-10-26 ENCOUNTER — OFFICE VISIT (OUTPATIENT)
Dept: UROLOGY | Age: 70
End: 2020-10-26
Payer: COMMERCIAL

## 2020-10-26 VITALS — TEMPERATURE: 97.6 F | SYSTOLIC BLOOD PRESSURE: 101 MMHG | DIASTOLIC BLOOD PRESSURE: 76 MMHG | HEART RATE: 67 BPM

## 2020-10-26 PROCEDURE — 99214 OFFICE O/P EST MOD 30 MIN: CPT | Performed by: UROLOGY

## 2020-10-26 PROCEDURE — 51798 US URINE CAPACITY MEASURE: CPT | Performed by: UROLOGY

## 2020-10-26 ASSESSMENT — ENCOUNTER SYMPTOMS
SHORTNESS OF BREATH: 0
VOMITING: 0
CONSTIPATION: 0
BACK PAIN: 0
NAUSEA: 0
WHEEZING: 0
EYE PAIN: 0
EYE REDNESS: 0
COUGH: 0
ABDOMINAL PAIN: 0
DIARRHEA: 0

## 2020-10-26 NOTE — PROGRESS NOTES
[address] [address]  Dept: [address] [facility] Urology Office Note - Established    Patient:  Jer Devlin  YOB: 1950  Date: 10/26/2020    The patient is a 72 y.o. male who presents todayfor evaluation of the following problems:   Chief Complaint   Patient presents with    3 Month Follow-Up       HPI  BPH/LUTS:  Patient is here today for lower urinary tract symptoms which started a few year(s) ago. Recently the urinary symptoms are: are improving  Current medical Rx for BPH/LUTS: tamsulosin  Lower urinary tract symptoms: nocturia, 2  times per night      Summary of old records: N/A    Additional History: N/A    Procedures Today: N/A    Urinalysis today:  No results found for this visit on 10/26/20.   Last several PSA's:  No results found for: PSA  Last total testosterone:  No results found for: TESTOSTERONE    AUA Symptom Score (10/26/2020):  INCOMPLETE EMPTYING: How often have you had the sensation of not emptying your bladder?: Not at all  FREQUENCY: How often do you have to urinate less than every two hours?: Not at all  INTERMITTENCY: How often have you found you stopped and started again several times when you urinated?: Not at all  URGENCY: How often have you found it difficult to postpone urination?: Not at all  WEAK STREAM: How often have you had a weak urinary stream?: Not at all  STRAINING: How often have you had to strain to start  urination?: Not at all  NOCTURIA: How many times did you typically get up at night to uriniate?: 1 Time  TOTAL I-PSS SCORE[de-identified] 1  How would you feel if you were to spend the rest of your life with your urinary condition?: Pleased    Last BUN and creatinine:  Lab Results   Component Value Date    BUN 17 07/22/2020     Lab Results   Component Value Date    CREATININE 1.27 (H) 07/22/2020       Additional Lab/Culture results: none    Imaging Reviewed during this Office Visit: none  (results were independently reviewed by physician and radiology report verified)    PAST MEDICAL, FAMILY AND SOCIAL HISTORY UPDATE:  Past Medical History:   Diagnosis Date    DDD (degenerative disc disease), cervical     Gout     Heart murmur     hx. of when younger.  Hyperlipidemia     Hypertension     Lung cancer ([facility] [address]     right lung    MI (myocardial infarction) ([facility] [address]     Skin cancer     face    Wears glasses      Past Surgical History:   Procedure Laterality Date    APPENDECTOMY      CARDIAC CATHETERIZATION      w/stent    COLONOSCOPY      CYST INCISION AND DRAINAGE Right 05/14/2020    rt elbow I and D and left knee aspiration with cultures    FOOT SURGERY Right     2nd toe(bone spur).  FOREARM SURGERY Right 5/14/2020    RIGHT ELBOW IRRIGATION AND DEBRIDEMENT performed by Beka Travis DO at [place] [id number] Left 5/14/2020    KNEE ASPIRATION WITH CULTURES SENT performed by Beka Travis DO at [address] [place] [address] under lt. eye.  TONSILLECTOMY       Family History   Problem Relation Age of Onset    Cancer Father         lung cancer     Outpatient Medications Marked as Taking for the 10/26/20 encounter (Office Visit) with Sebastien Rizo MD   Medication Sig Dispense Refill    docusate sodium (COLACE) 100 MG capsule Take 100 mg by mouth 2 times daily      B Complex-C-Folic Acid (ANGELICA-HUGO) TABS TAKE 1 TABLET BY MOUTH DAILY.  HEMATINIC VIT MINERALS      traZODone (DESYREL) 100 MG tablet TAKE 1 TABLET BY MOUTH EVERY DAY AT NIGHT      ipratropium-albuterol (DUONEB) 0.5-2.5 (3) MG/3ML SOLN nebulizer solution Inhale 3 mLs into the lungs 4 times daily      Melatonin 5 MG CHEW Take 5 mg by mouth nightly      potassium chloride (MICRO-K) 10 MEQ extended release capsule Take 20 mEq by mouth      apixaban (ELIQUIS) 5 MG TABS tablet Take 5 mg by mouth 2 times daily      Ferrous Fumarate (FERROCITE) 324 (106 Fe) MG TABS Take by mouth  apixaban (ELIQUIS) 5 MG TABS tablet Take 1 tablet by mouth 2 times daily 60 tablet 1    metoprolol tartrate (LOPRESSOR) 25 MG tablet Take 0.5 tablets by mouth 2 times daily 60 tablet 3    tamsulosin (FLOMAX) 0.4 MG capsule TAKE 1 CAPSULE BY MOUTH EVERY DAY 30 capsule 5    Handicap Placard MISC by Does not apply route 1 each 0    lidocaine-prilocaine (EMLA) 2.5-2.5 % cream Apply topically as needed. Apply a quarter size amount to port site 1 hour before chemotherapy. Cover with plastic wrap. 30 g 0    acetaminophen (TYLENOL) 325 MG tablet Take 650 mg by mouth every 6 hours as needed for Pain      allopurinol (ZYLOPRIM) 100 MG tablet Take 100 mg by mouth daily      simvastatin (ZOCOR) 40 MG tablet Take 40 mg by mouth daily      Multiple Vitamins-Minerals (THERAPEUTIC MULTIVITAMIN-MINERALS) tablet Take 1 tablet by mouth daily         Patient has no known allergies. Social History     Tobacco Use   Smoking Status Current Every Day Smoker    Packs/day: 0.50    Types: Cigarettes   Smokeless Tobacco Former User     (Ifpatient a smoker, smoking cessation counseling offered)    Social History     Substance and Sexual Activity   Alcohol Use No       REVIEW OF SYSTEMS:  Review of Systems    Physical Exam:      Vitals:    10/26/20 0945   BP: 101/76   Pulse: 67   Temp: 97.6 °F (36.4 °C)     There is no height or weight on file to calculate BMI. Patient is a 72 y.o. male in no acute distress and alert and oriented to person, place and time. Physical Exam  Constitutional: Patient in no acute distress. Neuro: Alert and oriented to person, place and time.   Psych: Mood normal, affect normal  Skin: No rash noted  HEENT: Head: Normocephalic andatraumatic  Conjunctivae and EOM are normal. Pupils are equal, round  Nose:Normal  Right External Ear: Normal; Left External Ear: Normal  Mouth: Mucosa Moist  Neck: Supple  Lungs: Respiratory effort is normal  Cardiovascular: Warm & Pink  Abdomen: Soft, non-tender, non-distended with no CVA,  No flank tenderness,  Or hepatosplenomegaly       Assessment and Plan      1. Incomplete bladder emptying    2. BPH with obstruction/lower urinary tract symptoms    3. Incomplete emptying of bladder           Plan:   Cont flomax  F/u 6 mo bladder scan      Return in about 6 months (around 4/26/2021) for bladder scan. Prescriptions Ordered:  No orders of the defined types were placed in this encounter. Orders Placed:  Orders Placed This Encounter   Procedures   Jacinto Domínguez MD    Agree with the ROS entered by the MA.

## 2020-10-26 NOTE — PROGRESS NOTES
Review of Systems   Constitutional: Negative for appetite change, chills and fever. Eyes: Negative for pain, redness and visual disturbance. Respiratory: Negative for cough, shortness of breath and wheezing. Cardiovascular: Negative for chest pain and leg swelling. Gastrointestinal: Negative for abdominal pain, constipation, diarrhea, nausea and vomiting. Genitourinary: Negative for difficulty urinating, dysuria, flank pain, frequency, hematuria and urgency. Musculoskeletal: Negative for back pain, joint swelling and myalgias. Skin: Negative for rash and wound. Neurological: Negative for dizziness, tremors and numbness. Hematological: Does not bruise/bleed easily. PVR done today 40ml/ per us.  Patient urinated roughly 30min ago

## 2020-11-06 ENCOUNTER — HOSPITAL ENCOUNTER (OUTPATIENT)
Dept: CT IMAGING | Age: 70
Discharge: HOME | End: 2020-11-08
Payer: COMMERCIAL

## 2020-11-06 ENCOUNTER — HOSPITAL ENCOUNTER (OUTPATIENT)
Dept: MRI IMAGING | Age: 70
Discharge: HOME | End: 2020-11-08
Payer: COMMERCIAL

## 2020-11-06 ENCOUNTER — HOSPITAL ENCOUNTER (OUTPATIENT)
Dept: INFUSION THERAPY | Age: 70
Discharge: HOME | End: 2020-11-06
Payer: COMMERCIAL

## 2020-11-06 DIAGNOSIS — G62.0 CHEMOTHERAPY-INDUCED NEUROPATHY (HCC): ICD-10-CM

## 2020-11-06 DIAGNOSIS — R53.1 ASTHENIA: ICD-10-CM

## 2020-11-06 DIAGNOSIS — C79.70 MALIGNANT NEOPLASM METASTATIC TO ADRENAL GLAND, UNSPECIFIED LATERALITY (HCC): Primary | ICD-10-CM

## 2020-11-06 DIAGNOSIS — C34.91 SMALL CELL CARCINOMA OF LUNG, RIGHT (HCC): ICD-10-CM

## 2020-11-06 DIAGNOSIS — T45.1X5A CHEMOTHERAPY-INDUCED NEUROPATHY (HCC): ICD-10-CM

## 2020-11-06 DIAGNOSIS — C34.91 PRIMARY LUNG CANCER WITH METASTASIS FROM LUNG TO OTHER SITE, RIGHT (HCC): ICD-10-CM

## 2020-11-06 LAB
ABSOLUTE EOS #: 0.21 K/UL (ref 0–0.4)
ABSOLUTE IMMATURE GRANULOCYTE: ABNORMAL K/UL (ref 0–0.3)
ABSOLUTE LYMPH #: 0.69 K/UL (ref 1–4.8)
ABSOLUTE MONO #: 0.9 K/UL (ref 0.1–0.8)
ALBUMIN SERPL-MCNC: 4.1 G/DL (ref 3.5–5.2)
ALBUMIN/GLOBULIN RATIO: 1.6 (ref 1–2.5)
ALP BLD-CCNC: 81 U/L (ref 40–129)
ALT SERPL-CCNC: 12 U/L (ref 5–41)
ANION GAP SERPL CALCULATED.3IONS-SCNC: 8 MMOL/L (ref 9–17)
AST SERPL-CCNC: 14 U/L
BASOPHILS # BLD: 2 % (ref 0–2)
BASOPHILS ABSOLUTE: 0.14 K/UL (ref 0–0.2)
BILIRUB SERPL-MCNC: 0.33 MG/DL (ref 0.3–1.2)
BUN BLDV-MCNC: 14 MG/DL (ref 8–23)
BUN/CREAT BLD: ABNORMAL (ref 9–20)
CALCIUM SERPL-MCNC: 9.4 MG/DL (ref 8.6–10.4)
CHLORIDE BLD-SCNC: 104 MMOL/L (ref 98–107)
CO2: 24 MMOL/L (ref 20–31)
CREAT SERPL-MCNC: 1.13 MG/DL (ref 0.7–1.2)
DIFFERENTIAL TYPE: ABNORMAL
EOSINOPHILS RELATIVE PERCENT: 3 % (ref 1–4)
GFR AFRICAN AMERICAN: >60 ML/MIN
GFR NON-AFRICAN AMERICAN: >60 ML/MIN
GFR SERPL CREATININE-BSD FRML MDRD: ABNORMAL ML/MIN/{1.73_M2}
GFR SERPL CREATININE-BSD FRML MDRD: ABNORMAL ML/MIN/{1.73_M2}
GLUCOSE BLD-MCNC: 118 MG/DL (ref 70–99)
HCT VFR BLD CALC: 34.3 % (ref 41–53)
HEMOGLOBIN: 11.4 G/DL (ref 13.5–17.5)
IMMATURE GRANULOCYTES: ABNORMAL %
LYMPHOCYTES # BLD: 10 % (ref 24–44)
MCH RBC QN AUTO: 33.2 PG (ref 26–34)
MCHC RBC AUTO-ENTMCNC: 33.3 G/DL (ref 31–37)
MCV RBC AUTO: 99.6 FL (ref 80–100)
MONOCYTES # BLD: 13 % (ref 1–7)
MORPHOLOGY: NORMAL
NRBC AUTOMATED: ABNORMAL PER 100 WBC
PDW BLD-RTO: 15.5 % (ref 12.5–15.4)
PLATELET # BLD: 216 K/UL (ref 140–450)
PLATELET ESTIMATE: ABNORMAL
PMV BLD AUTO: 7.1 FL (ref 6–12)
POTASSIUM SERPL-SCNC: 4.3 MMOL/L (ref 3.7–5.3)
RBC # BLD: 3.44 M/UL (ref 4.5–5.9)
RBC # BLD: ABNORMAL 10*6/UL
SEG NEUTROPHILS: 72 % (ref 36–66)
SEGMENTED NEUTROPHILS ABSOLUTE COUNT: 4.96 K/UL (ref 1.8–7.7)
SODIUM BLD-SCNC: 136 MMOL/L (ref 135–144)
TOTAL PROTEIN: 6.6 G/DL (ref 6.4–8.3)
WBC # BLD: 6.9 K/UL (ref 3.5–11)
WBC # BLD: ABNORMAL 10*3/UL

## 2020-11-06 PROCEDURE — 74177 CT ABD & PELVIS W/CONTRAST: CPT

## 2020-11-06 PROCEDURE — 36591 DRAW BLOOD OFF VENOUS DEVICE: CPT

## 2020-11-06 PROCEDURE — 85025 COMPLETE CBC W/AUTO DIFF WBC: CPT

## 2020-11-06 PROCEDURE — A9579 GAD-BASE MR CONTRAST NOS,1ML: HCPCS | Performed by: INTERNAL MEDICINE

## 2020-11-06 PROCEDURE — 80053 COMPREHEN METABOLIC PANEL: CPT

## 2020-11-06 PROCEDURE — 6360000004 HC RX CONTRAST MEDICATION: Performed by: INTERNAL MEDICINE

## 2020-11-06 PROCEDURE — 2580000003 HC RX 258: Performed by: INTERNAL MEDICINE

## 2020-11-06 PROCEDURE — 70553 MRI BRAIN STEM W/O & W/DYE: CPT

## 2020-11-06 PROCEDURE — 6360000002 HC RX W HCPCS: Performed by: INTERNAL MEDICINE

## 2020-11-06 RX ORDER — HEPARIN SODIUM (PORCINE) LOCK FLUSH IV SOLN 100 UNIT/ML 100 UNIT/ML
500 SOLUTION INTRAVENOUS PRN
Status: CANCELLED | OUTPATIENT
Start: 2020-11-06

## 2020-11-06 RX ORDER — SODIUM CHLORIDE 0.9 % (FLUSH) 0.9 %
10 SYRINGE (ML) INJECTION PRN
Status: DISCONTINUED | OUTPATIENT
Start: 2020-11-06 | End: 2020-11-09 | Stop reason: HOSPADM

## 2020-11-06 RX ORDER — SODIUM CHLORIDE 0.9 % (FLUSH) 0.9 %
10 SYRINGE (ML) INJECTION PRN
Status: DISCONTINUED | OUTPATIENT
Start: 2020-11-06 | End: 2020-11-07 | Stop reason: HOSPADM

## 2020-11-06 RX ORDER — HEPARIN SODIUM (PORCINE) LOCK FLUSH IV SOLN 100 UNIT/ML 100 UNIT/ML
500 SOLUTION INTRAVENOUS PRN
Status: DISCONTINUED | OUTPATIENT
Start: 2020-11-06 | End: 2020-11-07 | Stop reason: HOSPADM

## 2020-11-06 RX ORDER — 0.9 % SODIUM CHLORIDE 0.9 %
80 INTRAVENOUS SOLUTION INTRAVENOUS ONCE
Status: COMPLETED | OUTPATIENT
Start: 2020-11-06 | End: 2020-11-06

## 2020-11-06 RX ORDER — SODIUM CHLORIDE 0.9 % (FLUSH) 0.9 %
10 SYRINGE (ML) INJECTION PRN
Status: CANCELLED | OUTPATIENT
Start: 2020-11-06

## 2020-11-06 RX ORDER — SODIUM CHLORIDE 0.9 % (FLUSH) 0.9 %
20 SYRINGE (ML) INJECTION PRN
Status: CANCELLED | OUTPATIENT
Start: 2020-11-06

## 2020-11-06 RX ADMIN — Medication 10 ML: at 10:43

## 2020-11-06 RX ADMIN — Medication 10 ML: at 12:01

## 2020-11-06 RX ADMIN — Medication 10 ML: at 10:44

## 2020-11-06 RX ADMIN — IOPAMIDOL 100 ML: 755 INJECTION, SOLUTION INTRAVENOUS at 13:32

## 2020-11-06 RX ADMIN — Medication 10 ML: at 13:32

## 2020-11-06 RX ADMIN — Medication 10 ML: at 13:34

## 2020-11-06 RX ADMIN — HEPARIN 500 UNITS: 100 SYRINGE at 13:35

## 2020-11-06 RX ADMIN — SODIUM CHLORIDE 80 ML: 9 INJECTION, SOLUTION INTRAVENOUS at 13:32

## 2020-11-06 RX ADMIN — IOHEXOL 50 ML: 240 INJECTION, SOLUTION INTRATHECAL; INTRAVASCULAR; INTRAVENOUS; ORAL at 13:32

## 2020-11-06 RX ADMIN — Medication 10 ML: at 10:42

## 2020-11-06 RX ADMIN — GADOTERIDOL 19 ML: 279.3 INJECTION, SOLUTION INTRAVENOUS at 12:01

## 2020-11-11 ENCOUNTER — OFFICE VISIT (OUTPATIENT)
Dept: ONCOLOGY | Age: 70
End: 2020-11-11
Payer: COMMERCIAL

## 2020-11-11 ENCOUNTER — TELEPHONE (OUTPATIENT)
Dept: ONCOLOGY | Age: 70
End: 2020-11-11

## 2020-11-11 VITALS
TEMPERATURE: 97.6 F | HEART RATE: 67 BPM | WEIGHT: 192.3 LBS | DIASTOLIC BLOOD PRESSURE: 76 MMHG | SYSTOLIC BLOOD PRESSURE: 118 MMHG | BODY MASS INDEX: 26.07 KG/M2

## 2020-11-11 PROCEDURE — 3017F COLORECTAL CA SCREEN DOC REV: CPT | Performed by: INTERNAL MEDICINE

## 2020-11-11 PROCEDURE — 4040F PNEUMOC VAC/ADMIN/RCVD: CPT | Performed by: INTERNAL MEDICINE

## 2020-11-11 PROCEDURE — G8484 FLU IMMUNIZE NO ADMIN: HCPCS | Performed by: INTERNAL MEDICINE

## 2020-11-11 PROCEDURE — 99215 OFFICE O/P EST HI 40 MIN: CPT | Performed by: INTERNAL MEDICINE

## 2020-11-11 PROCEDURE — G8427 DOCREV CUR MEDS BY ELIG CLIN: HCPCS | Performed by: INTERNAL MEDICINE

## 2020-11-11 PROCEDURE — 4004F PT TOBACCO SCREEN RCVD TLK: CPT | Performed by: INTERNAL MEDICINE

## 2020-11-11 PROCEDURE — G8417 CALC BMI ABV UP PARAM F/U: HCPCS | Performed by: INTERNAL MEDICINE

## 2020-11-11 PROCEDURE — 99211 OFF/OP EST MAY X REQ PHY/QHP: CPT | Performed by: INTERNAL MEDICINE

## 2020-11-11 PROCEDURE — 1123F ACP DISCUSS/DSCN MKR DOCD: CPT | Performed by: INTERNAL MEDICINE

## 2020-11-11 NOTE — PROGRESS NOTES
Daniel Robin                                                                                                                  11/11/2020  MRN:   [id number]  YOB: 1950  PCP:                           Thomas Lombardi MD  Referring Physician: No ref. provider found  Treating Physician Name: Nelli Lorenzana MD      Reason for visit:  Chief Complaint   Patient presents with    Follow-up     review status of disease    Results     go over scans and labs    Other     needs more time off work    Reviewed results of lab work-up and imaging studies. 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 Chemotherapy changed to carboplatin and etoposide due to renal insufficiency from cycle #2  PCI- 7/2019  SRS to adrenal gland metastasis, completed 07/2020  Anticipating systemic palliative chemoimmunotherapy with carboplatin etoposide and Tecentriq    Summary of Case/History:    Daniel Robin a 72 y.o.male is a patient diagnosed with lung cancer with the component of small cell as well as squamous cell carcinoma    Patient has a significant history of tobacco dependence and underwent CT lung screening in December 2018. CT scan was read as lung rads degree 4B. Subsequently she underwent biopsy of right upper lobe lung nodule on 1/16/19. 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 CT PET done showed abnormal FDG uptake in the right upper lobe nodule. There was also abnormal FDG uptake in the right lower lobe nodule. Additionally right hilar lymph node were also FDG avid. No bony lesion was reported. MRI brain for staging workup did not show any evidence of intracranial metastasis. Tip of the odontoid process was ill-defined and metastasis could not be ruled out. Clinically patient does give history of trauma to the neck area any years ago however denies any surgery history. Bone scan did not reveal any metastasis     Patient continues to smoke but has cut down quite a bit. He works full-time in a [address] Patient has good performance status, ECOG 0. Patient's other medical problems include dyslipidemia and hypertension. He also has arthritis pain    Started patient on concurrent chemoradiation using cisplatin and etoposide with dose adjustment for renal dysfunction. Chemotherapy changed to carboplatin and etoposide from cycle #2 due to renal dysfunction    Received PCI in July 2019. Interim History:    Patient presents to the clinic for a follow-up visit for lung cancer surveillance and to discuss results of his lab work-up and imaging. He reports he is feeling quite well. He has not had recent follow up with rad-onc, he completed SBRT on the adrenal gland 07/2020. He has not had recent hospitalizations. His short term memory remains poor. Patient has not gone back to work and still struggles with balance issues. Denies abdominal pain. The scans show disease progression. During this visit patient's allergy, social, medical, surgical history and medications were reviewed and updated. Past Medical History:   Past Medical History:   Diagnosis Date    DDD (degenerative disc disease), cervical     Gout     Heart murmur     hx. of when younger.     Hyperlipidemia     Hypertension     Lung cancer ([address]     right lung    MI (myocardial infarction) ([address]     Skin cancer     face    Wears glasses        Past Surgical History:     Past Surgical History:   Procedure Laterality Date    APPENDECTOMY      CARDIAC CATHETERIZATION      w/stent    COLONOSCOPY      CYST INCISION AND DRAINAGE Right 05/14/2020    rt elbow I and D and left knee aspiration with cultures    FOOT SURGERY Right     2nd toe(bone spur).  FOREARM SURGERY Right 5/14/2020    RIGHT ELBOW IRRIGATION AND DEBRIDEMENT performed by Humberto Morales DO at [place] [address] Left 5/14/2020    KNEE ASPIRATION WITH CULTURES SENT performed by Humberto Morales DO at [address] [place] [address] under lt. eye.     TONSILLECTOMY         Patient Family Social History:    Family History   Problem Relation Age of Onset    Cancer Father         lung cancer      Social History     Socioeconomic History    Marital status:      Spouse name: Not on file    Number of children: Not on file    Years of education: Not on file    Highest education level: Not on file   Occupational History    Not on file   Social Needs    Financial resource strain: Not on file    Food insecurity     Worry: Not on file     Inability: Not on file    Transportation needs     Medical: Not on file     Non-medical: Not on file   Tobacco Use    Smoking status: Current Every Day Smoker     Packs/day: 0.50     Types: Cigarettes    Smokeless tobacco: Former User   Substance and Sexual Activity    Alcohol use: No    Drug use: Yes     Frequency: 14.0 times per week     Types: Marijuana    Sexual activity: Not on file   Lifestyle    Physical activity     Days per week: Not on file     Minutes per session: Not on file    Stress: Not on file   Relationships    Social connections     Talks on phone: Not on file     Gets together: Not on file     Attends Sikh service: Not on file     Active member of club or organization: Not on file     Attends meetings of clubs or organizations: Not on file     Relationship status: Not on file    Intimate partner violence     Fear of current or ex partner: Not on file     Emotionally abused: Not on file     Physically abused: Not on file     Forced sexual activity: Not on file   Other Topics Concern    Not on file   Social History Narrative    Not on file      Current Medications: +exertional shortness of breath  Cardiovascular: negative for chest pain, palpitations, orthopnea, PND   Gastrointestinal: negative for nausea, vomiting, diarrhea, constipation, abdominal pain, Dysphagia, hematemesis and hematochezia   Genitourinary: negative for frequency, dysuria, nocturia, urinary incontinence, and hematuria +urgency  Integument: Positive for easy bruising - stable  Hematologic/Lymphatic: negative for easy bleeding, lymphadenopathy, or petechiae   Endocrine: negative for heat or cold intolerance,weight changes, change in bowel habits and hair loss   Musculoskeletal: Positive joint pain. +left shoulder pain - unchnaged  Neurological: negative for headaches, dizziness, seizures, weakness; + poor balance; +neuropathy in left hand from shoulder +poor short term memory        Physical Exam:    Vitals: /76   Pulse 67   Temp 97.6 °F (36.4 °C) (Oral)   Wt 192 lb 4.8 oz (87.2 kg)   BMI 26.07 kg/m²   General appearance -patient not in acute distress  Mental status - AAO X3  Eyes - pupils equal and reactive, extraocular eye movements intact  Mouth - mucous membranes moist, pharynx normal without lesions  Neck - supple, no significant adenopathy  Lymphatics - no palpable lymphadenopathy, no hepatosplenomegaly  Chest - clear to auscultation, rales or rhonchi, symmetric air entry +wheezing  Heart - normal rate, regular rhythm, normal S1, S2, no murmurs  Abdomen - soft, nontender, nondistended, no masses or organomegaly  Neurological - alert, oriented, normal speech, no focal findings or movement disorder noted  Extremities -+1 lower extremity pitting edema.   Skin - normal coloration and turgor, no rashes, no suspicious skin lesions noted       DATA:    Labs:   Lab Results   Component Value Date    WBC 6.9 11/06/2020    HGB 11.4 (L) 11/06/2020    HCT 34.3 (L) 11/06/2020    MCV 99.6 11/06/2020     11/06/2020       Chemistry        Component Value Date/Time     11/06/2020 1052    K 4.3 11/06/2020 1052     11/06/2020 1052    CO2 24 11/06/2020 1052    BUN 14 11/06/2020 1052    CREATININE 1.13 11/06/2020 1052        Component Value Date/Time    CALCIUM 9.4 11/06/2020 1052    ALKPHOS 81 11/06/2020 1052    AST 14 11/06/2020 1052    ALT 12 11/06/2020 1052    BILITOT 0.33 11/06/2020 1052        Ct Chest Abdomen Pelvis W Contrast    Result Date: 11/6/2020  EXAMINATION: CT OF THE CHEST, ABDOMEN, AND PELVIS WITH CONTRAST 11/6/2020 12:34 pm TECHNIQUE: CT of the chest, abdomen and pelvis was performed with the administration of intravenous contrast. Multiplanar reformatted images are provided for review. Dose modulation, iterative reconstruction, and/or weight based adjustment of the mA/kV was utilized to reduce the radiation dose to as low as reasonably achievable. COMPARISON: MRI abdomen 07/01/2020. CT chest abdomen and pelvis 06/26/2020. HISTORY: ORDERING SYSTEM PROVIDED HISTORY: Malignant neoplasm of right lung, unspecified part of lung ([address] TECHNOLOGIST PROVIDED HISTORY: small cell lung cancer Reason for Exam: f/u lung cancer, Acuity: Chronic Type of Exam: Subsequent/Follow-up Additional signs and symptoms: pt denies any complaints Relevant Medical/Surgical History: Hx:A-fib, HTN, MI, smoker. Sara Vaughn Surgeries: cardiac cath, appendectomy, colonoscopy FINDINGS: Chest: Mediastinum: The heart and great vessels are normal in size. Calcified atheromatous plaque and coronary calcifications are noted. No pericardial effusion. No enlarged or suspicious-appearing lymph nodes are identified. Left internal jugular port in place. Lungs/pleura: Radiation therapy changes in the right upper lobe and perihilar right lung again demonstrated. No new airspace disease identified. Focal area of pleural or soft tissue thickening in the medial right hemithorax on axial image 83 has progressively increased in size now measuring up to 1.8 x 1.0 cm on axial image 83. No effusion. The central airway is patent.  Soft Tissues/Bones: Gynecomastia. No lytic or blastic lesion identified. Multilevel degenerative change in the spine with mild chronic anterior wedging in the midthoracic spine. Abdomen/Pelvis: Organs: New 0.9 cm liver lesion in the posterior dome of the right hepatic lobe and inferior right hepatic lobe measuring 1.5 cm. The left adrenal mass is no longer visualized. A radiodensity is present in this location. Redemonstration of previously characterized benign right adrenal adenoma, unchanged. The gallbladder, pancreas, spleen and kidneys reveal no acute findings. Subcentimeter renal cysts are noted. GI/Bowel: There is no bowel dilatation or wall thickening identified. Pelvis: Distended bladder. Mild prostatomegaly. Peritoneum/Retroperitoneum: No free air or free fluid. The aorta is normal in caliber. The visceral branches are patent. No lymphadenopathy. Bones/Soft Tissues: No acute findings. 1.  New liver lesions compatible with metastatic disease. 2.  Ovoid soft tissue mass in the medial right hemithorax has progressively increased, which may represent a lymph node or focal pleural thickening, concerning for neoplastic involvement. 3.  Interval treatment of the left adrenal metastasis, which is no longer visualized.      Mri Brain W Wo Contrast    Result Date: 11/6/2020  EXAMINATION: MRI OF THE BRAIN WITHOUT AND WITH CONTRAST  11/6/2020 11:10 am TECHNIQUE: Multiplanar multisequence MRI of the head/brain was performed without and with the administration of intravenous contrast. COMPARISON: 04/24/2020 HISTORY: ORDERING SYSTEM PROVIDED HISTORY: Malignant neoplasm of right lung, unspecified part of lung ([address] TECHNOLOGIST PROVIDED HISTORY: small cell lung cance r Reason for Exam: Malignant neoplasm of right lung, unspecified part of lung ([address] , Malignant neoplasm metastatic to adrenal gland, unspecified laterality Acuity: Unknown Type of Exam: Unknown FINDINGS: INTRACRANIAL STRUCTURES/VENTRICLES:  There is no acute infarct. the cerebral and cerebellar parenchyma demonstrate volume loss. Scattered and confluent areas of increased T2 signal are noted supratentorially, compatible with severe chronic microvascular white matter ischemic disease. There are no abnormal extra-axial fluid collections. The ventricles are proportional to the cerebral sulci. Normal major intracranial flow voids are noted. There is a linear enhancing developmental venous anomaly in the left frontal lobe. There are no other areas of abnormal enhancement. There are no areas of blooming artifact noted on the gradient echo sequences to suggest sequela of acute or chronic hemorrhage. ORBITS: The globes are symmetric in size. SINUSES: The paranasal sinuses and mastoid air cells are clear. BONES/SOFT TISSUES: The bone marrow signal intensity and craniocervical junction are unremarkable. 1. No evidence of acute infarct or metastatic disease. 2. Cerebral parenchymal volume loss with severe chronic microvascular white matter ischemic disease, stable.          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 The CT of the chest, abdomen, pelvis  showed new liver lesions, increase in soft tissue mass in the right chest; adrenal mass is no longer visualized indicating good response to therapy. We also reviewed images of the chest and pelvis and also compared with the previous scans. Patient last chemotherapy was more than 6 months therefore I believe patient is still platinum sensitive. I discussed recommendation to initiate palliative systemic therapy using carboplatin etoposide and Tecentriq. Reviewed logistics.   Reviewed potential side effects. Discussed patient CT scan results over the phone with his wife. Discussed plan which is to give combination chemoimmunotherapy for 4 cycles followed by maintenance immune therapy depending upon response. Both the patient and his wife expressed understanding of the findings and recommendations. Clinically he appears improved over previous and recovered from previous therapy. I am putting in orders for therapy and we will see him back with first cycle. Patient's conditions were taken into consideration when deciding risk-benefit for therapy. Patient is at high risk for drug interaction risk of complications. Given multiple comorbid conditions patient is at high risk for complication from intervention, risk of hospitalization, medical interaction overall life expectancy. NCCN guidelines were reviewed and discussed with the patient. The diagnosis and care plan were discussed with the patient in detail. I discussed the natural history of the disease, prognosis, risks and goals of therapy and answered all the patients questions to the best of my ability. Patient expressed understanding and was in agreement. Hellen Sky        I spent more than 40 minutes examining, evaluating, reviewing data, counseling the patient and coordinating care. Greater than 50% of time was spent face-to-face with the patient this note is created with the assistance of a speech recognition program.  While intending to generate a document that actually reflects the content of the visit, the document can still have some errors including those of syntax and sound a like substitutions which may escape proof reading. It such instances, actual meaning can be extrapolated by contextual diversion.

## 2020-11-11 NOTE — PATIENT INSTRUCTIONS
Chemo orders placed     rv with c 1   Start as soon as approved     Needs letter to extend time off from work for 3 months

## 2020-11-12 ENCOUNTER — TELEPHONE (OUTPATIENT)
Dept: ONCOLOGY | Age: 70
End: 2020-11-12

## 2020-11-17 ENCOUNTER — TELEPHONE (OUTPATIENT)
Dept: ONCOLOGY | Age: 70
End: 2020-11-17

## 2020-11-17 NOTE — TELEPHONE ENCOUNTER
Pt called stating his [facility] representative, Pierre Brandon, is requesting information on his liver cancer and wants to know why he is not having a biopsy. Writer states pt does not have liver cancer; he has metastatic lung cancer with new liver lesions. He states Pierre Brandon needs this information. Pt gave her contact number as [phone]. Writer called the number provided and spoke with Pierre Aleksandr. She state if this was a new cancer, pt could get more money from the policy. Writer states it is disease progression and not a new cancer. She verbalized understanding.

## 2020-11-20 ENCOUNTER — TELEPHONE (OUTPATIENT)
Dept: INFUSION THERAPY | Age: 70
End: 2020-11-20

## 2020-11-20 NOTE — TELEPHONE ENCOUNTER
Chemo orders received, ht 72\", [id number],, and bsa 2.10 verified.    Dose of chemotherapy verified  Tecentriq flat dose 1200mg  Carboplatin AUC 5 determined day of treatment  Etoposide 100mg/m2 = 210mg  21 day cycle   Heather

## 2020-11-20 NOTE — TELEPHONE ENCOUNTER
Chemotherapy orders received:    Ht=72 inches  Qz=443 lbs  BSA=2.10  Chemotherapy doses verified:  Tecentriq 1200 mg flat dose   Carboplatin AUC 5 to be determined day of treatment   Etoposide 100mg/m2 = 210 mg   Mari Rodriges RN

## 2020-11-30 ENCOUNTER — APPOINTMENT (OUTPATIENT)
Dept: RADIATION ONCOLOGY | Age: 70
End: 2020-11-30
Attending: RADIOLOGY
Payer: COMMERCIAL

## 2020-12-02 ENCOUNTER — HOSPITAL ENCOUNTER (OUTPATIENT)
Dept: INFUSION THERAPY | Age: 70
Discharge: HOME | End: 2020-12-02
Payer: COMMERCIAL

## 2020-12-02 ENCOUNTER — OFFICE VISIT (OUTPATIENT)
Dept: ONCOLOGY | Age: 70
End: 2020-12-02
Payer: COMMERCIAL

## 2020-12-02 ENCOUNTER — TELEPHONE (OUTPATIENT)
Dept: ONCOLOGY | Age: 70
End: 2020-12-02

## 2020-12-02 VITALS
HEIGHT: 71 IN | HEART RATE: 76 BPM | DIASTOLIC BLOOD PRESSURE: 72 MMHG | RESPIRATION RATE: 16 BRPM | WEIGHT: 190.4 LBS | BODY MASS INDEX: 26.65 KG/M2 | SYSTOLIC BLOOD PRESSURE: 123 MMHG | TEMPERATURE: 97.8 F

## 2020-12-02 VITALS
WEIGHT: 190 LBS | TEMPERATURE: 97.8 F | DIASTOLIC BLOOD PRESSURE: 72 MMHG | SYSTOLIC BLOOD PRESSURE: 123 MMHG | HEART RATE: 76 BPM | BODY MASS INDEX: 26.88 KG/M2

## 2020-12-02 DIAGNOSIS — C79.70 MALIGNANT NEOPLASM METASTATIC TO ADRENAL GLAND, UNSPECIFIED LATERALITY (HCC): Primary | ICD-10-CM

## 2020-12-02 DIAGNOSIS — C34.91 MALIGNANT NEOPLASM OF RIGHT LUNG, UNSPECIFIED PART OF LUNG (HCC): ICD-10-CM

## 2020-12-02 DIAGNOSIS — C34.91 PRIMARY LUNG CANCER WITH METASTASIS FROM LUNG TO OTHER SITE, RIGHT (HCC): ICD-10-CM

## 2020-12-02 LAB
ABSOLUTE EOS #: 0.3 K/UL (ref 0–0.4)
ABSOLUTE IMMATURE GRANULOCYTE: ABNORMAL K/UL (ref 0–0.3)
ABSOLUTE LYMPH #: 0.7 K/UL (ref 1–4.8)
ABSOLUTE MONO #: 1 K/UL (ref 0.1–1.2)
ALBUMIN SERPL-MCNC: 3.9 G/DL (ref 3.5–5.2)
ALBUMIN/GLOBULIN RATIO: 1.2 (ref 1–2.5)
ALP BLD-CCNC: 85 U/L (ref 40–129)
ALT SERPL-CCNC: 11 U/L (ref 5–41)
AMYLASE: 49 U/L (ref 28–100)
ANION GAP SERPL CALCULATED.3IONS-SCNC: 10 MMOL/L (ref 9–17)
AST SERPL-CCNC: 15 U/L
BASOPHILS # BLD: 3 % (ref 0–2)
BASOPHILS ABSOLUTE: 0.2 K/UL (ref 0–0.2)
BILIRUB SERPL-MCNC: 0.34 MG/DL (ref 0.3–1.2)
BUN BLDV-MCNC: 14 MG/DL (ref 8–23)
BUN/CREAT BLD: ABNORMAL (ref 9–20)
CALCIUM SERPL-MCNC: 9.5 MG/DL (ref 8.6–10.4)
CHLORIDE BLD-SCNC: 104 MMOL/L (ref 98–107)
CO2: 23 MMOL/L (ref 20–31)
CREAT SERPL-MCNC: 1.12 MG/DL (ref 0.7–1.2)
DIFFERENTIAL TYPE: ABNORMAL
EOSINOPHILS RELATIVE PERCENT: 5 % (ref 1–4)
GFR AFRICAN AMERICAN: >60 ML/MIN
GFR NON-AFRICAN AMERICAN: >60 ML/MIN
GFR SERPL CREATININE-BSD FRML MDRD: ABNORMAL ML/MIN/{1.73_M2}
GFR SERPL CREATININE-BSD FRML MDRD: ABNORMAL ML/MIN/{1.73_M2}
GLUCOSE BLD-MCNC: 133 MG/DL (ref 70–99)
HCT VFR BLD CALC: 32.7 % (ref 41–53)
HEMOGLOBIN: 11 G/DL (ref 13.5–17.5)
IMMATURE GRANULOCYTES: ABNORMAL %
LIPASE: 32 U/L (ref 13–60)
LYMPHOCYTES # BLD: 11 % (ref 24–44)
MCH RBC QN AUTO: 33.6 PG (ref 26–34)
MCHC RBC AUTO-ENTMCNC: 33.7 G/DL (ref 31–37)
MCV RBC AUTO: 99.6 FL (ref 80–100)
MONOCYTES # BLD: 16 % (ref 2–11)
NRBC AUTOMATED: ABNORMAL PER 100 WBC
PDW BLD-RTO: 15.1 % (ref 12.5–15.4)
PLATELET # BLD: 295 K/UL (ref 140–450)
PLATELET ESTIMATE: ABNORMAL
PMV BLD AUTO: 7 FL (ref 6–12)
POTASSIUM SERPL-SCNC: 4.4 MMOL/L (ref 3.7–5.3)
RBC # BLD: 3.28 M/UL (ref 4.5–5.9)
RBC # BLD: ABNORMAL 10*6/UL
SEG NEUTROPHILS: 65 % (ref 36–66)
SEGMENTED NEUTROPHILS ABSOLUTE COUNT: 4.5 K/UL (ref 1.8–7.7)
SODIUM BLD-SCNC: 137 MMOL/L (ref 135–144)
TOTAL PROTEIN: 7.1 G/DL (ref 6.4–8.3)
WBC # BLD: 6.7 K/UL (ref 3.5–11)
WBC # BLD: ABNORMAL 10*3/UL

## 2020-12-02 PROCEDURE — 84443 ASSAY THYROID STIM HORMONE: CPT

## 2020-12-02 PROCEDURE — 36591 DRAW BLOOD OFF VENOUS DEVICE: CPT | Performed by: NURSE PRACTITIONER

## 2020-12-02 PROCEDURE — 85025 COMPLETE CBC W/AUTO DIFF WBC: CPT

## 2020-12-02 PROCEDURE — 6360000002 HC RX W HCPCS: Performed by: INTERNAL MEDICINE

## 2020-12-02 PROCEDURE — 82533 TOTAL CORTISOL: CPT

## 2020-12-02 PROCEDURE — 2580000003 HC RX 258: Performed by: INTERNAL MEDICINE

## 2020-12-02 PROCEDURE — 3017F COLORECTAL CA SCREEN DOC REV: CPT | Performed by: INTERNAL MEDICINE

## 2020-12-02 PROCEDURE — G8417 CALC BMI ABV UP PARAM F/U: HCPCS | Performed by: INTERNAL MEDICINE

## 2020-12-02 PROCEDURE — G8484 FLU IMMUNIZE NO ADMIN: HCPCS | Performed by: INTERNAL MEDICINE

## 2020-12-02 PROCEDURE — 96367 TX/PROPH/DG ADDL SEQ IV INF: CPT | Performed by: NURSE PRACTITIONER

## 2020-12-02 PROCEDURE — 96375 TX/PRO/DX INJ NEW DRUG ADDON: CPT | Performed by: NURSE PRACTITIONER

## 2020-12-02 PROCEDURE — 96413 CHEMO IV INFUSION 1 HR: CPT | Performed by: NURSE PRACTITIONER

## 2020-12-02 PROCEDURE — 83690 ASSAY OF LIPASE: CPT

## 2020-12-02 PROCEDURE — 96417 CHEMO IV INFUS EACH ADDL SEQ: CPT | Performed by: NURSE PRACTITIONER

## 2020-12-02 PROCEDURE — 4004F PT TOBACCO SCREEN RCVD TLK: CPT | Performed by: INTERNAL MEDICINE

## 2020-12-02 PROCEDURE — 1123F ACP DISCUSS/DSCN MKR DOCD: CPT | Performed by: INTERNAL MEDICINE

## 2020-12-02 PROCEDURE — G8427 DOCREV CUR MEDS BY ELIG CLIN: HCPCS | Performed by: INTERNAL MEDICINE

## 2020-12-02 PROCEDURE — 82150 ASSAY OF AMYLASE: CPT

## 2020-12-02 PROCEDURE — 80053 COMPREHEN METABOLIC PANEL: CPT

## 2020-12-02 PROCEDURE — 4040F PNEUMOC VAC/ADMIN/RCVD: CPT | Performed by: INTERNAL MEDICINE

## 2020-12-02 PROCEDURE — 99215 OFFICE O/P EST HI 40 MIN: CPT | Performed by: INTERNAL MEDICINE

## 2020-12-02 PROCEDURE — 99211 OFF/OP EST MAY X REQ PHY/QHP: CPT | Performed by: INTERNAL MEDICINE

## 2020-12-02 RX ORDER — EPINEPHRINE 1 MG/ML
0.3 INJECTION, SOLUTION, CONCENTRATE INTRAVENOUS PRN
Status: CANCELLED | OUTPATIENT
Start: 2020-12-03

## 2020-12-02 RX ORDER — SODIUM CHLORIDE 0.9 % (FLUSH) 0.9 %
10 SYRINGE (ML) INJECTION PRN
Status: CANCELLED | OUTPATIENT
Start: 2020-12-02

## 2020-12-02 RX ORDER — SODIUM CHLORIDE 0.9 % (FLUSH) 0.9 %
5 SYRINGE (ML) INJECTION PRN
Status: CANCELLED | OUTPATIENT
Start: 2020-12-04

## 2020-12-02 RX ORDER — HEPARIN SODIUM (PORCINE) LOCK FLUSH IV SOLN 100 UNIT/ML 100 UNIT/ML
500 SOLUTION INTRAVENOUS PRN
Status: CANCELLED | OUTPATIENT
Start: 2020-12-02

## 2020-12-02 RX ORDER — HEPARIN SODIUM (PORCINE) LOCK FLUSH IV SOLN 100 UNIT/ML 100 UNIT/ML
500 SOLUTION INTRAVENOUS PRN
Status: DISCONTINUED | OUTPATIENT
Start: 2020-12-02 | End: 2020-12-03 | Stop reason: HOSPADM

## 2020-12-02 RX ORDER — HEPARIN SODIUM (PORCINE) LOCK FLUSH IV SOLN 100 UNIT/ML 100 UNIT/ML
500 SOLUTION INTRAVENOUS PRN
Status: CANCELLED | OUTPATIENT
Start: 2020-12-03

## 2020-12-02 RX ORDER — SODIUM CHLORIDE 0.9 % (FLUSH) 0.9 %
20 SYRINGE (ML) INJECTION PRN
Status: CANCELLED | OUTPATIENT
Start: 2020-12-02

## 2020-12-02 RX ORDER — SODIUM CHLORIDE 0.9 % (FLUSH) 0.9 %
10 SYRINGE (ML) INJECTION PRN
Status: CANCELLED | OUTPATIENT
Start: 2020-12-04

## 2020-12-02 RX ORDER — SODIUM CHLORIDE 9 MG/ML
INJECTION, SOLUTION INTRAVENOUS CONTINUOUS
Status: CANCELLED | OUTPATIENT
Start: 2020-12-03

## 2020-12-02 RX ORDER — SODIUM CHLORIDE 0.9 % (FLUSH) 0.9 %
5 SYRINGE (ML) INJECTION PRN
Status: CANCELLED | OUTPATIENT
Start: 2020-12-02

## 2020-12-02 RX ORDER — SODIUM CHLORIDE 9 MG/ML
20 INJECTION, SOLUTION INTRAVENOUS ONCE
Status: CANCELLED | OUTPATIENT
Start: 2020-12-02

## 2020-12-02 RX ORDER — DIPHENHYDRAMINE HYDROCHLORIDE 50 MG/ML
50 INJECTION INTRAMUSCULAR; INTRAVENOUS ONCE
Status: CANCELLED | OUTPATIENT
Start: 2020-12-03

## 2020-12-02 RX ORDER — SODIUM CHLORIDE 9 MG/ML
20 INJECTION, SOLUTION INTRAVENOUS ONCE
Status: COMPLETED | OUTPATIENT
Start: 2020-12-02 | End: 2020-12-02

## 2020-12-02 RX ORDER — HEPARIN SODIUM (PORCINE) LOCK FLUSH IV SOLN 100 UNIT/ML 100 UNIT/ML
500 SOLUTION INTRAVENOUS PRN
Status: CANCELLED | OUTPATIENT
Start: 2020-12-04

## 2020-12-02 RX ORDER — SODIUM CHLORIDE 0.9 % (FLUSH) 0.9 %
10 SYRINGE (ML) INJECTION PRN
Status: DISCONTINUED | OUTPATIENT
Start: 2020-12-02 | End: 2020-12-03 | Stop reason: HOSPADM

## 2020-12-02 RX ORDER — SODIUM CHLORIDE 9 MG/ML
20 INJECTION, SOLUTION INTRAVENOUS ONCE
Status: CANCELLED | OUTPATIENT
Start: 2020-12-04

## 2020-12-02 RX ORDER — METHYLPREDNISOLONE SODIUM SUCCINATE 125 MG/2ML
125 INJECTION, POWDER, LYOPHILIZED, FOR SOLUTION INTRAMUSCULAR; INTRAVENOUS ONCE
Status: CANCELLED | OUTPATIENT
Start: 2020-12-03

## 2020-12-02 RX ORDER — PALONOSETRON 0.05 MG/ML
0.25 INJECTION, SOLUTION INTRAVENOUS ONCE
Status: CANCELLED | OUTPATIENT
Start: 2020-12-02

## 2020-12-02 RX ORDER — METHYLPREDNISOLONE SODIUM SUCCINATE 125 MG/2ML
125 INJECTION, POWDER, LYOPHILIZED, FOR SOLUTION INTRAMUSCULAR; INTRAVENOUS ONCE
Status: CANCELLED | OUTPATIENT
Start: 2020-12-04

## 2020-12-02 RX ORDER — PALONOSETRON 0.05 MG/ML
0.25 INJECTION, SOLUTION INTRAVENOUS ONCE
Status: COMPLETED | OUTPATIENT
Start: 2020-12-02 | End: 2020-12-02

## 2020-12-02 RX ORDER — EPINEPHRINE 1 MG/ML
0.3 INJECTION, SOLUTION, CONCENTRATE INTRAVENOUS PRN
Status: CANCELLED | OUTPATIENT
Start: 2020-12-04

## 2020-12-02 RX ORDER — SODIUM CHLORIDE 9 MG/ML
INJECTION, SOLUTION INTRAVENOUS CONTINUOUS
Status: CANCELLED | OUTPATIENT
Start: 2020-12-02

## 2020-12-02 RX ORDER — EPINEPHRINE 1 MG/ML
0.3 INJECTION, SOLUTION, CONCENTRATE INTRAVENOUS PRN
Status: CANCELLED | OUTPATIENT
Start: 2020-12-02

## 2020-12-02 RX ORDER — DIPHENHYDRAMINE HYDROCHLORIDE 50 MG/ML
50 INJECTION INTRAMUSCULAR; INTRAVENOUS ONCE
Status: CANCELLED | OUTPATIENT
Start: 2020-12-02

## 2020-12-02 RX ORDER — SODIUM CHLORIDE 9 MG/ML
INJECTION, SOLUTION INTRAVENOUS CONTINUOUS
Status: CANCELLED | OUTPATIENT
Start: 2020-12-04

## 2020-12-02 RX ORDER — METHYLPREDNISOLONE SODIUM SUCCINATE 125 MG/2ML
125 INJECTION, POWDER, LYOPHILIZED, FOR SOLUTION INTRAMUSCULAR; INTRAVENOUS ONCE
Status: CANCELLED | OUTPATIENT
Start: 2020-12-02

## 2020-12-02 RX ORDER — DEXAMETHASONE SODIUM PHOSPHATE 100 MG/10ML
10 INJECTION INTRAMUSCULAR; INTRAVENOUS ONCE
Status: COMPLETED | OUTPATIENT
Start: 2020-12-02 | End: 2020-12-02

## 2020-12-02 RX ORDER — DIPHENHYDRAMINE HYDROCHLORIDE 50 MG/ML
50 INJECTION INTRAMUSCULAR; INTRAVENOUS ONCE
Status: CANCELLED | OUTPATIENT
Start: 2020-12-04

## 2020-12-02 RX ORDER — SODIUM CHLORIDE 9 MG/ML
20 INJECTION, SOLUTION INTRAVENOUS ONCE
Status: CANCELLED | OUTPATIENT
Start: 2020-12-03

## 2020-12-02 RX ORDER — SODIUM CHLORIDE 0.9 % (FLUSH) 0.9 %
5 SYRINGE (ML) INJECTION PRN
Status: CANCELLED | OUTPATIENT
Start: 2020-12-03

## 2020-12-02 RX ORDER — SODIUM CHLORIDE 0.9 % (FLUSH) 0.9 %
10 SYRINGE (ML) INJECTION PRN
Status: CANCELLED | OUTPATIENT
Start: 2020-12-03

## 2020-12-02 RX ADMIN — Medication 10 ML: at 09:18

## 2020-12-02 RX ADMIN — Medication 10 ML: at 09:20

## 2020-12-02 RX ADMIN — PALONOSETRON 0.25 MG: 0.05 INJECTION, SOLUTION INTRAVENOUS at 10:36

## 2020-12-02 RX ADMIN — Medication 10 ML: at 09:15

## 2020-12-02 RX ADMIN — FOSAPREPITANT 150 MG: 150 INJECTION, POWDER, LYOPHILIZED, FOR SOLUTION INTRAVENOUS at 10:44

## 2020-12-02 RX ADMIN — CARBOPLATIN 500 MG: 10 INJECTION INTRAVENOUS at 12:17

## 2020-12-02 RX ADMIN — ATEZOLIZUMAB 1200 MG: 1200 INJECTION, SOLUTION INTRAVENOUS at 11:17

## 2020-12-02 RX ADMIN — SODIUM CHLORIDE 20 ML/HR: 9 INJECTION, SOLUTION INTRAVENOUS at 10:23

## 2020-12-02 RX ADMIN — HEPARIN 500 UNITS: 100 SYRINGE at 15:01

## 2020-12-02 RX ADMIN — ETOPOSIDE 210 MG: 20 INJECTION, SOLUTION, CONCENTRATE INTRAVENOUS at 13:37

## 2020-12-02 RX ADMIN — Medication 10 ML: at 09:14

## 2020-12-02 RX ADMIN — Medication 10 ML: at 09:23

## 2020-12-02 RX ADMIN — Medication 10 ML: at 15:01

## 2020-12-02 RX ADMIN — Medication 10 MG: at 10:37

## 2020-12-02 NOTE — PROGRESS NOTES
Patient her for c1d1 Tecentriq, carbo, [id number]. Labs within treatable parameters. Patient seen by Dr. Isidoro Yeager today, see his notes for visit details. Patient is being treated for a progression of disease. Chemo is the same as he received in 2019. Tencentriq is new. Patient tolerated all well.

## 2020-12-02 NOTE — PROGRESS NOTES
Tia Book                                                                                                                  12/2/2020  MRN:   [id number]  YOB: 1950  PCP:                           Mita Campoverde MD  Referring Physician: No ref. 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   Chemotherapy changed to carboplatin and etoposide due to renal insufficiency from cycle #2  PCI 7/2019  SRS to adrenal gland metastasis, completed 07/2020  systemic palliative chemoimmunotherapy with carboplatin etoposide and Tecentriq  Lubrenectidin12/3/2020    Summary of Case/History:    Tia Book a 72 y.o.male is a patient diagnosed with lung cancer with the component of small cell as well as squamous cell carcinoma Patient has a significant history of tobacco dependence and underwent CT lung screening in December 2018. CT scan was read as lung rads degree 4B. Subsequently she underwent biopsy of right upper lobe lung nodule on 1/16/19. 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 CT PET done showed abnormal FDG uptake in the right upper lobe nodule. There was also abnormal FDG uptake in the right lower lobe nodule. Additionally right hilar lymph node were also FDG avid. No bony lesion was reported. MRI brain for staging workup did not show any evidence of intracranial metastasis. Tip of the odontoid process was ill-defined and metastasis could not be ruled out. Clinically patient does give history of trauma to the neck area any years ago however denies any surgery history. Bone scan did not reveal any metastasis     Patient continues to smoke but has cut down quite a bit. He works full-time in a [address] Patient has good performance status, ECOG 0. Patient's other medical problems include dyslipidemia and hypertension. He also has arthritis pain    Started patient on concurrent chemoradiation using cisplatin and etoposide with dose adjustment for renal dysfunction. Chemotherapy changed to carboplatin and etoposide from cycle #2 due to renal dysfunction    Received PCI in July 2019. Interim History:    Patient presents to the clinic for a follow-up visit for lung cancer and to commence with palliative treatment. He denies any ER visits or hospitalizations in the interim. Denies headaches dizziness chest pain shortness of breath abdominal pain nausea bone pain weight loss or fatigue. Scheduled for treatment today    During this visit patient's allergy, social, medical, surgical history and medications were reviewed and updated.     Past Medical History:   Past Medical History:   Diagnosis Date    DDD (degenerative disc disease), cervical  Gout     Heart murmur     hx. of when younger.  Hyperlipidemia     Hypertension     [facility] [address]     right lung    MI (myocardial infarction) ([facility] [address]     Skin cancer     face    Wears glasses        Past Surgical History:     Past Surgical History:   Procedure Laterality Date    APPENDECTOMY      CARDIAC CATHETERIZATION      w/stent    COLONOSCOPY      CYST INCISION AND DRAINAGE Right 05/14/2020    rt elbow I and D and left knee aspiration with cultures    FOOT SURGERY Right     2nd toe(bone spur).  FOREARM SURGERY Right 5/14/2020    RIGHT ELBOW IRRIGATION AND DEBRIDEMENT performed by [facility] Shara, DO at [facility] Left 5/14/2020    KNEE ASPIRATION WITH CULTURES SENT performed by [facility] Stroke Shara, DO at [address] [place] [address] under lt. eye.     TONSILLECTOMY         Patient Family Social History:    Family History   Problem Relation Age of Onset    Cancer Father         lung cancer      Social History     Socioeconomic History    Marital status:      Spouse name: Not on file    Number of children: Not on file    Years of education: Not on file    Highest education level: Not on file   Occupational History    Not on file   Social Needs    Financial resource strain: Not on file    Food insecurity     Worry: Not on file     Inability: Not on file    Transportation needs     Medical: Not on file     Non-medical: Not on file   Tobacco Use    Smoking status: Current Every Day Smoker     Packs/day: 0.50     Types: Cigarettes    Smokeless tobacco: Former User   Substance and Sexual Activity    Alcohol use: No    Drug use: Yes     Frequency: 14.0 times per week     Types: Marijuana    Sexual activity: Not on file   Lifestyle    Physical activity     Days per week: Not on file     Minutes per session: Not on file    Stress: Not on file   Relationships    Social connections     Talks on phone: Not on file     Gets together: Not on file Attends [employer]al service: Not on file     Active member of club or organization: Not on file     Attends meetings of clubs or organizations: Not on file     Relationship status: Not on file    Intimate partner violence     Fear of current or ex partner: Not on file     Emotionally abused: Not on file     Physically abused: Not on file     Forced sexual activity: Not on file   Other Topics Concern    Not on file   Social History Narrative    Not on file      Current Medications:     Current Outpatient Medications   Medication Sig Dispense Refill    docusate sodium (COLACE) 100 MG capsule Take 100 mg by mouth 2 times daily      B Complex-C-Folic Acid (ANGELICA-HUGO) TABS TAKE 1 TABLET BY MOUTH DAILY. HEMATINIC VIT MINERALS      traZODone (DESYREL) 100 MG tablet TAKE 1 TABLET BY MOUTH EVERY DAY AT NIGHT      ipratropium-albuterol (DUONEB) 0.5-2.5 (3) MG/3ML SOLN nebulizer solution Inhale 3 mLs into the lungs 4 times daily      Melatonin 5 MG CHEW Take 5 mg by mouth nightly      potassium chloride (MICRO-K) 10 MEQ extended release capsule Take 20 mEq by mouth      Ferrous Fumarate (FERROCITE) 324 (106 Fe) MG TABS Take by mouth      apixaban (ELIQUIS) 5 MG TABS tablet Take 1 tablet by mouth 2 times daily 60 tablet 1    metoprolol tartrate (LOPRESSOR) 25 MG tablet Take 0.5 tablets by mouth 2 times daily 60 tablet 3    tamsulosin (FLOMAX) 0.4 MG capsule TAKE 1 CAPSULE BY MOUTH EVERY DAY 30 capsule 5    Handicap Placard MISC by Does not apply route 1 each 0    lidocaine-prilocaine (EMLA) 2.5-2.5 % cream Apply topically as needed. Apply a quarter size amount to port site 1 hour before chemotherapy. Cover with plastic wrap.  30 g 0    acetaminophen (TYLENOL) 325 MG tablet Take 650 mg by mouth every 6 hours as needed for Pain      allopurinol (ZYLOPRIM) 100 MG tablet Take 100 mg by mouth daily      simvastatin (ZOCOR) 40 MG tablet Take 40 mg by mouth daily  Multiple Vitamins-Minerals (THERAPEUTIC MULTIVITAMIN-MINERALS) tablet Take 1 tablet by mouth daily       No current facility-administered medications for this visit. Facility-Administered Medications Ordered in Other Visits   Medication Dose Route Frequency Provider Last Rate Last Dose    sodium chloride flush 0.9 % injection 10 mL  10 mL Intravenous PRN Sarai Fajardo MD   10 mL at 12/02/20 2937    heparin flush 100 UNIT/ML injection 500 Units  500 Units Intercatheter PRN Sarai Fajardo MD           Allergies:   Patient has no known allergies. Review of Systems:    Constitutional: No fever or chills. No night sweats, positive fatigue. Stable weight  Eyes: No eye discharge, double vision, or eye pain   HEENT: negative for sore mouth, sore throat, hoarseness and voice change; Respiratory: negative for cough, sputum, wheezing, hemoptysis, chest pain; +exertional shortness of breath  Cardiovascular: negative for chest pain, palpitations, orthopnea, PND   Gastrointestinal: negative for nausea, vomiting, diarrhea, constipation, abdominal pain, Dysphagia, hematemesis and hematochezia   Genitourinary: negative for frequency, dysuria, nocturia, urinary incontinence, and hematuria +urgency  Integument: Positive for easy bruising - stable  Hematologic/Lymphatic: negative for easy bleeding, lymphadenopathy, or petechiae   Endocrine: negative for heat or cold intolerance,weight changes, change in bowel habits and hair loss   Musculoskeletal: Positive joint pain.  +left shoulder pain - unchnaged  Neurological: negative for headaches, dizziness, seizures, weakness; + poor balance; +neuropathy in left hand from shoulder +poor short term memory        Physical Exam:    Vitals: /72   Pulse 76   Temp 97.8 °F (36.6 °C) (Oral)   Wt 190 lb (86.2 kg)   BMI 26.88 kg/m²   General appearance -patient not in acute distress  Mental status - AAO X3  Eyes - pupils equal and reactive, extraocular eye movements intact Mouth - mucous membranes moist, pharynx normal without lesions  Neck - supple, no significant adenopathy  Lymphatics - no palpable lymphadenopathy, no hepatosplenomegaly  Chest - clear to auscultation, rales or rhonchi, symmetric air entry +wheezing  Heart - normal rate, regular rhythm, normal S1, S2, no murmurs  Abdomen - soft, nontender, nondistended, no masses or organomegaly  Neurological - alert, oriented, normal speech, no focal findings or movement disorder noted  Extremities -+1 lower extremity pitting edema. Skin - normal coloration and turgor, no rashes, no suspicious skin lesions noted       DATA:    Labs:   Lab Results   Component Value Date    WBC 6.7 12/02/2020    HGB 11.0 (L) 12/02/2020    HCT 32.7 (L) 12/02/2020    MCV 99.6 12/02/2020     12/02/2020       Chemistry        Component Value Date/Time     12/02/2020 0921    K 4.4 12/02/2020 0921     12/02/2020 0921    CO2 23 12/02/2020 0921    BUN 14 12/02/2020 0921    CREATININE 1.12 12/02/2020 0921        Component Value Date/Time    CALCIUM 9.5 12/02/2020 0921    ALKPHOS 85 12/02/2020 0921    AST 15 12/02/2020 0921    ALT 11 12/02/2020 0921    BILITOT 0.34 12/02/2020 0921             Impression:  Limited stage 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 I completed baseline review of symptoms. We reviewed in detail treatment plan, dosing schedule, goals of treatment, and potential side effects. Clinically the patient is doing well and stable. We will treat today as per orders. Discussed natural history of small cell lung cancer. Return in 4 weeks. Patient's conditions were taken into consideration when deciding risk-benefit for therapy. Patient is at high risk for drug interaction risk of complications. Given multiple comorbid conditions patient is at high risk for complication from intervention, risk of hospitalization, medical interaction overall life expectancy. I educated the patient about the COVID-19. Information about the epidemic was shared with the patient. General information about safety precautions and protective care including hand hygiene and social distancing and avoidance of crowds was discussed with the patient. Routine checking for symptoms and temperature was advocated. The patient is asked to call us with any questions or concerns  They are specifically asked to contact us prior to their appointment if they have a fever or respiratory symptom. NCCN guidelines were reviewed and discussed with the patient. The diagnosis and care plan were discussed with the patient in detail. I discussed the natural history of the disease, prognosis, risks and goals of therapy and answered all the patients questions to the best of my ability. Patient expressed understanding and was in agreement. Allegra Gomez        I spent more than 40 minutes examining, evaluating, reviewing data, counseling the patient and coordinating care. Greater than 50% of time was spent face-to-face with the patient this note is created with the assistance of a speech recognition program.  While intending to generate a document that actually reflects the content of the visit, the document can still have some errors including those of syntax and sound a like substitutions which may escape proof reading. It such instances, actual meaning can be extrapolated by contextual diversion.

## 2020-12-03 ENCOUNTER — HOSPITAL ENCOUNTER (OUTPATIENT)
Dept: INFUSION THERAPY | Age: 70
Discharge: HOME | End: 2020-12-03
Payer: COMMERCIAL

## 2020-12-03 ENCOUNTER — HOSPITAL ENCOUNTER (OUTPATIENT)
Dept: RADIATION ONCOLOGY | Age: 70
Discharge: HOME | End: 2020-12-03
Attending: RADIOLOGY
Payer: COMMERCIAL

## 2020-12-03 VITALS
TEMPERATURE: 98 F | OXYGEN SATURATION: 98 % | HEART RATE: 68 BPM | SYSTOLIC BLOOD PRESSURE: 117 MMHG | WEIGHT: 194 LBS | DIASTOLIC BLOOD PRESSURE: 72 MMHG | BODY MASS INDEX: 27.44 KG/M2

## 2020-12-03 VITALS
TEMPERATURE: 97.6 F | RESPIRATION RATE: 16 BRPM | HEART RATE: 63 BPM | DIASTOLIC BLOOD PRESSURE: 83 MMHG | SYSTOLIC BLOOD PRESSURE: 127 MMHG

## 2020-12-03 DIAGNOSIS — C34.91 PRIMARY LUNG CANCER WITH METASTASIS FROM LUNG TO OTHER SITE, RIGHT (HCC): Primary | ICD-10-CM

## 2020-12-03 PROCEDURE — 6360000002 HC RX W HCPCS: Performed by: INTERNAL MEDICINE

## 2020-12-03 PROCEDURE — 96413 CHEMO IV INFUSION 1 HR: CPT

## 2020-12-03 PROCEDURE — 99212 OFFICE O/P EST SF 10 MIN: CPT | Performed by: RADIOLOGY

## 2020-12-03 PROCEDURE — 96375 TX/PRO/DX INJ NEW DRUG ADDON: CPT

## 2020-12-03 PROCEDURE — 2580000003 HC RX 258: Performed by: INTERNAL MEDICINE

## 2020-12-03 PROCEDURE — 99213 OFFICE O/P EST LOW 20 MIN: CPT | Performed by: RADIOLOGY

## 2020-12-03 RX ORDER — DEXAMETHASONE SODIUM PHOSPHATE 4 MG/ML
8 INJECTION, SOLUTION INTRA-ARTICULAR; INTRALESIONAL; INTRAMUSCULAR; INTRAVENOUS; SOFT TISSUE ONCE
Status: COMPLETED | OUTPATIENT
Start: 2020-12-03 | End: 2020-12-03

## 2020-12-03 RX ORDER — SODIUM CHLORIDE 0.9 % (FLUSH) 0.9 %
10 SYRINGE (ML) INJECTION PRN
Status: DISCONTINUED | OUTPATIENT
Start: 2020-12-03 | End: 2020-12-04 | Stop reason: HOSPADM

## 2020-12-03 RX ORDER — HEPARIN SODIUM (PORCINE) LOCK FLUSH IV SOLN 100 UNIT/ML 100 UNIT/ML
500 SOLUTION INTRAVENOUS PRN
Status: DISCONTINUED | OUTPATIENT
Start: 2020-12-03 | End: 2020-12-04 | Stop reason: HOSPADM

## 2020-12-03 RX ORDER — SODIUM CHLORIDE 9 MG/ML
20 INJECTION, SOLUTION INTRAVENOUS ONCE
Status: DISCONTINUED | OUTPATIENT
Start: 2020-12-03 | End: 2020-12-04 | Stop reason: HOSPADM

## 2020-12-03 RX ADMIN — Medication 10 ML: at 15:09

## 2020-12-03 RX ADMIN — Medication 10 ML: at 13:36

## 2020-12-03 RX ADMIN — ALTEPLASE 2 MG: 2.2 INJECTION, POWDER, LYOPHILIZED, FOR SOLUTION INTRAVENOUS at 12:51

## 2020-12-03 RX ADMIN — Medication 10 ML: at 12:36

## 2020-12-03 RX ADMIN — DEXAMETHASONE SODIUM PHOSPHATE 8 MG: 4 INJECTION, SOLUTION INTRAMUSCULAR; INTRAVENOUS at 13:38

## 2020-12-03 RX ADMIN — Medication 10 ML: at 12:48

## 2020-12-03 RX ADMIN — Medication 10 ML: at 13:37

## 2020-12-03 RX ADMIN — Medication 10 ML: at 12:45

## 2020-12-03 RX ADMIN — ETOPOSIDE 210 MG: 20 INJECTION, SOLUTION, CONCENTRATE INTRAVENOUS at 13:56

## 2020-12-03 RX ADMIN — Medication 10 ML: at 12:40

## 2020-12-03 RX ADMIN — Medication 10 ML: at 13:38

## 2020-12-03 RX ADMIN — Medication 10 ML: at 12:47

## 2020-12-03 RX ADMIN — SODIUM CHLORIDE 20 ML/HR: 9 INJECTION, SOLUTION INTRAVENOUS at 13:36

## 2020-12-03 RX ADMIN — HEPARIN 500 UNITS: 100 SYRINGE at 15:09

## 2020-12-03 RX ADMIN — WATER: 1 INJECTION INTRAMUSCULAR; INTRAVENOUS; SUBCUTANEOUS at 12:51

## 2020-12-03 NOTE — PROGRESS NOTES
Disp: , Rfl:   No current facility-administered medications for this encounter. Facility-Administered Medications Ordered in Other Encounters:     0.9 % sodium chloride infusion, 20 mL/hr, Intravenous, Once, Naaman Duane, MD    dexamethasone (DECADRON) injection 8 mg, 8 mg, Intravenous, Once, Naaman Duane, MD    etoposide (VEPESID) 210 mg in sodium chloride 0.9 % 1,000 mL chemo IVPB, 100 mg/m2 (Order-Specific), Intravenous, Once, Naaman Duane, MD    heparin flush 100 UNIT/ML injection 500 Units, 500 Units, Intracatheter, PRN, Naaman Duane, MD    sodium chloride flush 0.9 % injection 10 mL, 10 mL, Intravenous, PRN, Naaman Duane, MD    Immunizations:    Influenza status:    [x]   Current   []   Patient declined    Pneumococcal status:  []   Current  [x]   Patient declined    Smoking Status:    [x] Smoker - PPD:0.5  Smoking cessation education: Provided []   Declined []    [] Nonsmoker - Quit Date:               [] Never a smoker      Lupron []   Last dose given:           Next dose due:   Eligard []   Last dose given:           Next dose due:   Aromatase Inhibitors []   Medication name:   N/A:  [x]                  FALLS RISK SCREEN  Instructions:  Assess the patient and enter the appropriate indicators that are present for fall risk identification. Total the numbers entered and assign a fall risk score from Table 2.  Reassess patient at a minimum every 12 weeks or with status change. Assessment   Date  12/3/2020     1. Mental Ability: confusion/cognitively impaired 0     2. Elimination Issues: incontinence, frequency 0       3. Ambulatory: use of assistive devices (walker, cane, off-loading devices),        attached to equipment (IV pole, oxygen) 0     4. Sensory Limitations: dizziness, vertigo, impaired vision 0     5. Age less than 65        0     6. Age 72 or greater 1     7. Medication: diuretics, strong analgesics, hypnotics, sedatives,        antihypertensive agents 3   8.   Falls:

## 2020-12-03 NOTE — PROGRESS NOTES
Patient here for C1D2 Etoposide. Vitals stable. No blood return noted from port Actavase instilled for 40 minutes. Slight blood return then noted. He tolerated treatment well and was discharged home in stable condition. He is due to return tomorrow for C1D3 Etoposide.

## 2020-12-04 ENCOUNTER — HOSPITAL ENCOUNTER (OUTPATIENT)
Dept: INFUSION THERAPY | Age: 70
Discharge: HOME | End: 2020-12-04
Payer: COMMERCIAL

## 2020-12-04 VITALS
SYSTOLIC BLOOD PRESSURE: 118 MMHG | TEMPERATURE: 97.9 F | HEART RATE: 79 BPM | RESPIRATION RATE: 16 BRPM | DIASTOLIC BLOOD PRESSURE: 72 MMHG

## 2020-12-04 DIAGNOSIS — C34.91 PRIMARY LUNG CANCER WITH METASTASIS FROM LUNG TO OTHER SITE, RIGHT (HCC): Primary | ICD-10-CM

## 2020-12-04 LAB
CORTISOL COLLECTION INFO: NORMAL
CORTISOL: 15.4 UG/DL (ref 2.7–18.4)
TSH SERPL DL<=0.05 MIU/L-ACNC: 1.01 MIU/L (ref 0.3–5)

## 2020-12-04 PROCEDURE — 96413 CHEMO IV INFUSION 1 HR: CPT

## 2020-12-04 PROCEDURE — 6360000002 HC RX W HCPCS: Performed by: INTERNAL MEDICINE

## 2020-12-04 PROCEDURE — 96375 TX/PRO/DX INJ NEW DRUG ADDON: CPT

## 2020-12-04 PROCEDURE — 2580000003 HC RX 258: Performed by: INTERNAL MEDICINE

## 2020-12-04 RX ORDER — SODIUM CHLORIDE 9 MG/ML
20 INJECTION, SOLUTION INTRAVENOUS ONCE
Status: COMPLETED | OUTPATIENT
Start: 2020-12-04 | End: 2020-12-04

## 2020-12-04 RX ORDER — SODIUM CHLORIDE 0.9 % (FLUSH) 0.9 %
10 SYRINGE (ML) INJECTION PRN
Status: DISCONTINUED | OUTPATIENT
Start: 2020-12-04 | End: 2020-12-05 | Stop reason: HOSPADM

## 2020-12-04 RX ORDER — DEXAMETHASONE SODIUM PHOSPHATE 10 MG/ML
8 INJECTION, SOLUTION INTRAMUSCULAR; INTRAVENOUS ONCE
Status: COMPLETED | OUTPATIENT
Start: 2020-12-04 | End: 2020-12-04

## 2020-12-04 RX ORDER — HEPARIN SODIUM (PORCINE) LOCK FLUSH IV SOLN 100 UNIT/ML 100 UNIT/ML
500 SOLUTION INTRAVENOUS PRN
Status: DISCONTINUED | OUTPATIENT
Start: 2020-12-04 | End: 2020-12-05 | Stop reason: HOSPADM

## 2020-12-04 RX ADMIN — Medication 10 ML: at 14:37

## 2020-12-04 RX ADMIN — Medication 10 ML: at 13:13

## 2020-12-04 RX ADMIN — ETOPOSIDE 210 MG: 20 INJECTION, SOLUTION, CONCENTRATE INTRAVENOUS at 13:28

## 2020-12-04 RX ADMIN — HEPARIN 500 UNITS: 100 SYRINGE at 14:37

## 2020-12-04 RX ADMIN — DEXAMETHASONE SODIUM PHOSPHATE 8 MG: 10 INJECTION, SOLUTION INTRAMUSCULAR; INTRAVENOUS at 13:13

## 2020-12-04 RX ADMIN — SODIUM CHLORIDE 20 ML/HR: 9 INJECTION, SOLUTION INTRAVENOUS at 13:13

## 2020-12-04 NOTE — PROGRESS NOTES
[address]            Radiation Oncology          [address] [facility]          [place], [address]        Renata Meaghan: [phone]        F: [phone]       [email or web address]         Date of Service: 12/3/2020     Location:  [address],   [address] [facility] [place]   [phone]        RADIATION ONCOLOGY FOLLOW UP NOTE    Patient ID:   Praveena Mathur  : 1950   MRN: [id number]    DIAGNOSIS:  Cancer Staging  Primary lung cancer with metastasis from lung to other site, right [facility])  Staging form: Lung, AJCC 8th Edition  - Clinical stage from 2019: Stage IIIA (cT4, cN1, cM0) - Signed by Kristen Dunham MD on 2019  Staging comments: PET/CT scan 2019 revealed 1.2 cm right upper lobe mass SUV 2.6, 2 cm right lower lobe mass SUV 5.4, 1.4 cm right hilar lymph node SUV 7.8, 1.1 cm right adrenal nodule no uptake, negative bone. 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 MRI brain and negative 2019.   -s/p chemoRT R lung 60Gy 19  -s/p PCI 25Gy 19  -s/p L adrenal SBRT 35Gy 20    INTERVAL HISTORY:   Mr Eliza Wood is a 52-year-old gentleman with a history of a extensive stage small cell lung cancer who completed a course of SBRT to his left adrenal metastasis in July and is here today for routine follow-up visit and exam.  Overall patient is doing well with no significant complaints. He denies any abdominal pain, nausea, diarrhea, skin irritation, or fatigue. Patient did have recent imaging including CT scans of the chest and pelvis and MRI of the brain on 6020 which did not show any evidence of intracranial disease however there were 2 lesions in the liver as well as retroperitoneal lymphadenopathy that was mentioned.   This is felt to be likely due to disease progression and therefore patient was recommended to start on systemic therapy and has begun chemotherapy with Dr. Case Castañeda with carboplatinum etoposide and Tecentriq. He any other symptoms of headaches, dizziness, chest pain, shortness of breath, coughing, bony pain, or any bleeding. MEDICATIONS:    Current Outpatient Medications:     docusate sodium (COLACE) 100 MG capsule, Take 100 mg by mouth 2 times daily, Disp: , Rfl:     B Complex-C-Folic Acid (ANGELICA-HUGO) TABS, TAKE 1 TABLET BY MOUTH DAILY. HEMATINIC VIT MINERALS, Disp: , Rfl:     traZODone (DESYREL) 100 MG tablet, TAKE 1 TABLET BY MOUTH EVERY DAY AT NIGHT, Disp: , Rfl:     ipratropium-albuterol (DUONEB) 0.5-2.5 (3) MG/3ML SOLN nebulizer solution, Inhale 3 mLs into the lungs 4 times daily, Disp: , Rfl:     Melatonin 5 MG CHEW, Take 5 mg by mouth nightly, Disp: , Rfl:     potassium chloride (MICRO-K) 10 MEQ extended release capsule, Take 20 mEq by mouth, Disp: , Rfl:     Ferrous Fumarate (FERROCITE) 324 (106 Fe) MG TABS, Take by mouth, Disp: , Rfl:     apixaban (ELIQUIS) 5 MG TABS tablet, Take 1 tablet by mouth 2 times daily, Disp: 60 tablet, Rfl: 1    metoprolol tartrate (LOPRESSOR) 25 MG tablet, Take 0.5 tablets by mouth 2 times daily, Disp: 60 tablet, Rfl: 3    tamsulosin (FLOMAX) 0.4 MG capsule, TAKE 1 CAPSULE BY MOUTH EVERY DAY, Disp: 30 capsule, Rfl: 5    Handicap Placard MISC, by Does not apply route, Disp: 1 each, Rfl: 0    lidocaine-prilocaine (EMLA) 2.5-2.5 % cream, Apply topically as needed. Apply a quarter size amount to port site 1 hour before chemotherapy.   Cover with plastic wrap., Disp: 30 g, Rfl: 0    acetaminophen (TYLENOL) 325 MG tablet, Take 650 mg by mouth every 6 hours as needed for Pain, Disp: , Rfl:     allopurinol (ZYLOPRIM) 100 MG tablet, Take 100 mg by mouth daily, Disp: , Rfl:     simvastatin (ZOCOR) 40 MG tablet, Take 40 mg by mouth daily, Disp: , Rfl:     Multiple Vitamins-Minerals (THERAPEUTIC MULTIVITAMIN-MINERALS) tablet, Take 1 tablet by mouth daily, Disp: , Rfl:   No current facility-administered medications for this encounter. Facility-Administered Medications Ordered in Other Encounters:     heparin flush 100 UNIT/ML injection 500 Units, 500 Units, Intracatheter, PRN, Stephon Gilliland MD, 500 Units at 20 1437    sodium chloride flush 0.9 % injection 10 mL, 10 mL, Intravenous, PRN, Stephon Gilliland MD, 10 mL at 20 1437    ALLERGIES:  No Known Allergies      REVIEW OF SYSTEMS:    A full 14 point review of systems was performed and assessed and found to be negative except as noted above. PHYSICAL EXAMINATION:    CHAPERONE: Not Required    ECO Symptomatic but completely ambulatory    VITAL SIGNS: /72   Pulse 68   Temp 98 °F (36.7 °C)   Wt 194 lb (88 kg)   SpO2 98%   BMI 27.44 kg/m²   GENERAL:  General appearance is that of a well-nourished, well-developed in no apparent distress. HEENT: Normocephalic, atraumatic, EOMI, moist mucosa, no erythema. NECK:  No adenopathy or a palpable thyroid mass, trachea is midline. LYMPHATICS: No cervical, supraclavicular adenopathy. HEART:  Regular rate and rhythm, S1, S2, no murmurs. LUNGS:  Clear to auscultation bilaterally with no wheezing or crackles. ABDOMEN:  Soft, nontender, non distended, and no hepatosplenomegaly. EXTREMITIES:  No clubbing, cyanosis, or edema. No calf tenderness. MSK:  No CVA or spinal tenderness. NEUROLOGICAL: No focal deficits. CN II-XII intact. Strength and sensation intact bilaterally. SKIN: No erythema or desquamation. LABS:  WBC   Date Value Ref Range Status   2020 6.7 3.5 - 11.0 k/uL Final     Segs Absolute   Date Value Ref Range Status   2020 4.50 1.8 - 7.7 k/uL Final     Hemoglobin   Date Value Ref Range Status   2020 11.0 (L) 13.5 - 17.5 g/dL Final     Platelets   Date Value Ref Range Status   2020 295 140 - 450 k/uL Final       IMAGIN/6/20 CT C/A/P  Impression    1.  New liver lesions compatible with metastatic disease.         2. symptoms change. Patient was in agreement with my recommendations. All questions were answered to their satisfaction. Patient was advised to contact us anytime should they have any questions or concerns. Electronically signed by Mona Belcher MD on 12/4/2020 at 3:36 PM        Medications Prescribed:   New Prescriptions    No medications on file       Orders: No orders of the defined types were placed in this encounter.       CC:  Patient Care Team:  Mary Arceo MD as PCP - General (Family Medicine)  Emiliano King MD as Consulting Physician (Hematology and Oncology)  Claudell Gate, RN as Registered Nurse

## 2020-12-28 ENCOUNTER — HOSPITAL ENCOUNTER (OUTPATIENT)
Dept: INFUSION THERAPY | Age: 70
Discharge: HOME | End: 2020-12-28
Payer: COMMERCIAL

## 2020-12-28 VITALS
SYSTOLIC BLOOD PRESSURE: 109 MMHG | HEART RATE: 85 BPM | TEMPERATURE: 97.6 F | WEIGHT: 189.4 LBS | BODY MASS INDEX: 26.79 KG/M2 | DIASTOLIC BLOOD PRESSURE: 73 MMHG | RESPIRATION RATE: 18 BRPM

## 2020-12-28 DIAGNOSIS — C34.91 PRIMARY LUNG CANCER WITH METASTASIS FROM LUNG TO OTHER SITE, RIGHT (HCC): Primary | ICD-10-CM

## 2020-12-28 LAB
ABSOLUTE EOS #: 0.08 K/UL (ref 0–0.4)
ABSOLUTE IMMATURE GRANULOCYTE: ABNORMAL K/UL (ref 0–0.3)
ABSOLUTE LYMPH #: 0.8 K/UL (ref 1–4.8)
ABSOLUTE MONO #: 0.91 K/UL (ref 0.1–0.8)
ALBUMIN SERPL-MCNC: 3.9 G/DL (ref 3.5–5.2)
ALBUMIN/GLOBULIN RATIO: 1.2 (ref 1–2.5)
ALP BLD-CCNC: 91 U/L (ref 40–129)
ALT SERPL-CCNC: 15 U/L (ref 5–41)
ANION GAP SERPL CALCULATED.3IONS-SCNC: 11 MMOL/L (ref 9–17)
AST SERPL-CCNC: 18 U/L
BASOPHILS # BLD: 2 % (ref 0–2)
BASOPHILS ABSOLUTE: 0.08 K/UL (ref 0–0.2)
BILIRUB SERPL-MCNC: 0.24 MG/DL (ref 0.3–1.2)
BUN BLDV-MCNC: 14 MG/DL (ref 8–23)
BUN/CREAT BLD: ABNORMAL (ref 9–20)
CALCIUM SERPL-MCNC: 8.8 MG/DL (ref 8.6–10.4)
CHLORIDE BLD-SCNC: 103 MMOL/L (ref 98–107)
CO2: 24 MMOL/L (ref 20–31)
CREAT SERPL-MCNC: 1.16 MG/DL (ref 0.7–1.2)
DIFFERENTIAL TYPE: ABNORMAL
EOSINOPHILS RELATIVE PERCENT: 2 % (ref 1–4)
GFR AFRICAN AMERICAN: >60 ML/MIN
GFR NON-AFRICAN AMERICAN: >60 ML/MIN
GFR SERPL CREATININE-BSD FRML MDRD: ABNORMAL ML/MIN/{1.73_M2}
GFR SERPL CREATININE-BSD FRML MDRD: ABNORMAL ML/MIN/{1.73_M2}
GLUCOSE BLD-MCNC: 130 MG/DL (ref 70–99)
HCT VFR BLD CALC: 32.7 % (ref 41–53)
HEMOGLOBIN: 11.1 G/DL (ref 13.5–17.5)
IMMATURE GRANULOCYTES: ABNORMAL %
LYMPHOCYTES # BLD: 21 % (ref 24–44)
MCH RBC QN AUTO: 34.2 PG (ref 26–34)
MCHC RBC AUTO-ENTMCNC: 34 G/DL (ref 31–37)
MCV RBC AUTO: 100.4 FL (ref 80–100)
MONOCYTES # BLD: 24 % (ref 1–7)
MORPHOLOGY: ABNORMAL
NRBC AUTOMATED: ABNORMAL PER 100 WBC
PDW BLD-RTO: 16.3 % (ref 12.5–15.4)
PLATELET # BLD: 335 K/UL (ref 140–450)
PLATELET ESTIMATE: ABNORMAL
PMV BLD AUTO: 7 FL (ref 6–12)
POTASSIUM SERPL-SCNC: 4.7 MMOL/L (ref 3.7–5.3)
RBC # BLD: 3.26 M/UL (ref 4.5–5.9)
RBC # BLD: ABNORMAL 10*6/UL
SEG NEUTROPHILS: 51 % (ref 36–66)
SEGMENTED NEUTROPHILS ABSOLUTE COUNT: 1.93 K/UL (ref 1.8–7.7)
SODIUM BLD-SCNC: 138 MMOL/L (ref 135–144)
TOTAL PROTEIN: 7.1 G/DL (ref 6.4–8.3)
WBC # BLD: 3.8 K/UL (ref 3.5–11)
WBC # BLD: ABNORMAL 10*3/UL

## 2020-12-28 PROCEDURE — 2580000003 HC RX 258: Performed by: INTERNAL MEDICINE

## 2020-12-28 PROCEDURE — 85025 COMPLETE CBC W/AUTO DIFF WBC: CPT

## 2020-12-28 PROCEDURE — 96417 CHEMO IV INFUS EACH ADDL SEQ: CPT

## 2020-12-28 PROCEDURE — 96413 CHEMO IV INFUSION 1 HR: CPT

## 2020-12-28 PROCEDURE — 96367 TX/PROPH/DG ADDL SEQ IV INF: CPT

## 2020-12-28 PROCEDURE — 96375 TX/PRO/DX INJ NEW DRUG ADDON: CPT

## 2020-12-28 PROCEDURE — 80053 COMPREHEN METABOLIC PANEL: CPT

## 2020-12-28 PROCEDURE — 6360000002 HC RX W HCPCS: Performed by: INTERNAL MEDICINE

## 2020-12-28 PROCEDURE — 36591 DRAW BLOOD OFF VENOUS DEVICE: CPT

## 2020-12-28 RX ORDER — DIPHENHYDRAMINE HYDROCHLORIDE 50 MG/ML
50 INJECTION INTRAMUSCULAR; INTRAVENOUS ONCE
Status: CANCELLED | OUTPATIENT
Start: 2020-12-28 | End: 2020-12-28

## 2020-12-28 RX ORDER — EPINEPHRINE 1 MG/ML
0.3 INJECTION, SOLUTION, CONCENTRATE INTRAVENOUS PRN
Status: CANCELLED | OUTPATIENT
Start: 2020-12-28

## 2020-12-28 RX ORDER — METHYLPREDNISOLONE SODIUM SUCCINATE 125 MG/2ML
125 INJECTION, POWDER, LYOPHILIZED, FOR SOLUTION INTRAMUSCULAR; INTRAVENOUS ONCE
Status: CANCELLED | OUTPATIENT
Start: 2020-12-28 | End: 2020-12-28

## 2020-12-28 RX ORDER — PALONOSETRON 0.05 MG/ML
0.25 INJECTION, SOLUTION INTRAVENOUS ONCE
Status: COMPLETED | OUTPATIENT
Start: 2020-12-28 | End: 2020-12-28

## 2020-12-28 RX ORDER — SODIUM CHLORIDE 9 MG/ML
INJECTION, SOLUTION INTRAVENOUS CONTINUOUS
Status: CANCELLED | OUTPATIENT
Start: 2020-12-28

## 2020-12-28 RX ORDER — SODIUM CHLORIDE 9 MG/ML
20 INJECTION, SOLUTION INTRAVENOUS ONCE
Status: COMPLETED | OUTPATIENT
Start: 2020-12-28 | End: 2020-12-28

## 2020-12-28 RX ORDER — HEPARIN SODIUM (PORCINE) LOCK FLUSH IV SOLN 100 UNIT/ML 100 UNIT/ML
500 SOLUTION INTRAVENOUS PRN
Status: DISCONTINUED | OUTPATIENT
Start: 2020-12-28 | End: 2020-12-29 | Stop reason: HOSPADM

## 2020-12-28 RX ORDER — DEXAMETHASONE SODIUM PHOSPHATE 100 MG/10ML
10 INJECTION INTRAMUSCULAR; INTRAVENOUS ONCE
Status: COMPLETED | OUTPATIENT
Start: 2020-12-28 | End: 2020-12-28

## 2020-12-28 RX ORDER — SODIUM CHLORIDE 0.9 % (FLUSH) 0.9 %
5 SYRINGE (ML) INJECTION PRN
Status: CANCELLED | OUTPATIENT
Start: 2020-12-28

## 2020-12-28 RX ORDER — SODIUM CHLORIDE 0.9 % (FLUSH) 0.9 %
10 SYRINGE (ML) INJECTION PRN
Status: DISCONTINUED | OUTPATIENT
Start: 2020-12-28 | End: 2020-12-29 | Stop reason: HOSPADM

## 2020-12-28 RX ADMIN — ETOPOSIDE 210 MG: 20 INJECTION, SOLUTION, CONCENTRATE INTRAVENOUS at 12:40

## 2020-12-28 RX ADMIN — Medication 10 ML: at 13:52

## 2020-12-28 RX ADMIN — Medication 10 MG: at 09:54

## 2020-12-28 RX ADMIN — PALONOSETRON 0.25 MG: 0.05 INJECTION, SOLUTION INTRAVENOUS at 09:52

## 2020-12-28 RX ADMIN — HEPARIN 500 UNITS: 100 SYRINGE at 13:52

## 2020-12-28 RX ADMIN — CARBOPLATIN 490 MG: 10 INJECTION INTRAVENOUS at 11:29

## 2020-12-28 RX ADMIN — FOSAPREPITANT 150 MG: 150 INJECTION, POWDER, LYOPHILIZED, FOR SOLUTION INTRAVENOUS at 10:06

## 2020-12-28 RX ADMIN — SODIUM CHLORIDE 20 ML/HR: 9 INJECTION, SOLUTION INTRAVENOUS at 09:52

## 2020-12-28 RX ADMIN — ATEZOLIZUMAB 1200 MG: 1200 INJECTION, SOLUTION INTRAVENOUS at 10:49

## 2020-12-28 NOTE — FLOWSHEET NOTE
Situation:  Writer visited with Spouse and later with Patient in the infusion clinic. Assessment:  Writer encountered and visited briefly with Patient's spouse in the infusion clinic. Spouse shared that she comes with patient on his long chemotherapy days. She acknowledged her commitment, noting that they have been  over 30 years. Spouse expressed gratitude for the prayers that they have been saying and receiving. Patient arrived. He smiled and greeted writer. He reported how he was doing, accessing his humor. He expressed gratitude for Spouse's support, noting the number of years they have been together. Intervention:  Writer provided supportive presence and explored Pt's and Spouse's coping and needs; affirmed Pt's and Spouse's strengths; offered words of encouragement and support. Outcome:  Patient and Spouse thanked writer. 12/28/20 9987   Encounter Summary   Services provided to: Family; Patient   Referral/Consult From: [facility]   Support System Spouse; Family members   Continue Visiting   (12/28/20)   Complexity of Encounter Low   Length of Encounter 15 minutes   Spiritual Assessment Completed Yes   Routine   Type Follow up   Spiritual/Samaritan   Type Spiritual support   Assessment Calm; Approachable   Intervention Active listening;Explored feelings, thoughts, concerns;Explored coping resources;Sustaining presence/ Ministry of presence; Discussed belief system/Yazidi practices/brennen   Outcome Hopeful;Receptive;Encouraged;Coping;Engaged in conversation;Expressed gratitude     Electronically signed by Sara Conway, Oncology Outpatient [facility], [address] [facility] Radiation Oncology  12/28/2020  2:26 PM

## 2020-12-29 ENCOUNTER — HOSPITAL ENCOUNTER (OUTPATIENT)
Dept: INFUSION THERAPY | Age: 70
Discharge: HOME | End: 2020-12-29
Payer: COMMERCIAL

## 2020-12-29 VITALS
TEMPERATURE: 97.6 F | HEART RATE: 86 BPM | DIASTOLIC BLOOD PRESSURE: 75 MMHG | RESPIRATION RATE: 16 BRPM | SYSTOLIC BLOOD PRESSURE: 117 MMHG

## 2020-12-29 DIAGNOSIS — C34.91 PRIMARY LUNG CANCER WITH METASTASIS FROM LUNG TO OTHER SITE, RIGHT (HCC): Primary | ICD-10-CM

## 2020-12-29 PROCEDURE — 96375 TX/PRO/DX INJ NEW DRUG ADDON: CPT

## 2020-12-29 PROCEDURE — 2580000003 HC RX 258: Performed by: INTERNAL MEDICINE

## 2020-12-29 PROCEDURE — 96413 CHEMO IV INFUSION 1 HR: CPT

## 2020-12-29 PROCEDURE — 6360000002 HC RX W HCPCS: Performed by: INTERNAL MEDICINE

## 2020-12-29 RX ORDER — HEPARIN SODIUM (PORCINE) LOCK FLUSH IV SOLN 100 UNIT/ML 100 UNIT/ML
500 SOLUTION INTRAVENOUS PRN
Status: DISCONTINUED | OUTPATIENT
Start: 2020-12-29 | End: 2020-12-30 | Stop reason: HOSPADM

## 2020-12-29 RX ORDER — SODIUM CHLORIDE 9 MG/ML
20 INJECTION, SOLUTION INTRAVENOUS ONCE
Status: COMPLETED | OUTPATIENT
Start: 2020-12-29 | End: 2020-12-29

## 2020-12-29 RX ORDER — SODIUM CHLORIDE 0.9 % (FLUSH) 0.9 %
5 SYRINGE (ML) INJECTION PRN
Status: CANCELLED | OUTPATIENT
Start: 2020-12-29

## 2020-12-29 RX ORDER — DIPHENHYDRAMINE HYDROCHLORIDE 50 MG/ML
50 INJECTION INTRAMUSCULAR; INTRAVENOUS ONCE
Status: CANCELLED | OUTPATIENT
Start: 2020-12-29 | End: 2020-12-29

## 2020-12-29 RX ORDER — EPINEPHRINE 1 MG/ML
0.3 INJECTION, SOLUTION, CONCENTRATE INTRAVENOUS PRN
Status: CANCELLED | OUTPATIENT
Start: 2020-12-29

## 2020-12-29 RX ORDER — DEXAMETHASONE SODIUM PHOSPHATE 4 MG/ML
8 INJECTION, SOLUTION INTRA-ARTICULAR; INTRALESIONAL; INTRAMUSCULAR; INTRAVENOUS; SOFT TISSUE ONCE
Status: COMPLETED | OUTPATIENT
Start: 2020-12-29 | End: 2020-12-29

## 2020-12-29 RX ORDER — SODIUM CHLORIDE 9 MG/ML
INJECTION, SOLUTION INTRAVENOUS CONTINUOUS
Status: CANCELLED | OUTPATIENT
Start: 2020-12-29

## 2020-12-29 RX ORDER — METHYLPREDNISOLONE SODIUM SUCCINATE 125 MG/2ML
125 INJECTION, POWDER, LYOPHILIZED, FOR SOLUTION INTRAMUSCULAR; INTRAVENOUS ONCE
Status: CANCELLED | OUTPATIENT
Start: 2020-12-29 | End: 2020-12-29

## 2020-12-29 RX ORDER — SODIUM CHLORIDE 0.9 % (FLUSH) 0.9 %
10 SYRINGE (ML) INJECTION PRN
Status: DISCONTINUED | OUTPATIENT
Start: 2020-12-29 | End: 2020-12-30 | Stop reason: HOSPADM

## 2020-12-29 RX ADMIN — Medication 10 ML: at 15:11

## 2020-12-29 RX ADMIN — ETOPOSIDE 210 MG: 20 INJECTION, SOLUTION, CONCENTRATE INTRAVENOUS at 14:00

## 2020-12-29 RX ADMIN — Medication 10 ML: at 13:02

## 2020-12-29 RX ADMIN — DEXAMETHASONE SODIUM PHOSPHATE 8 MG: 4 INJECTION, SOLUTION INTRAMUSCULAR; INTRAVENOUS at 13:17

## 2020-12-29 RX ADMIN — HEPARIN 500 UNITS: 100 SYRINGE at 15:11

## 2020-12-29 RX ADMIN — SODIUM CHLORIDE 20 ML/HR: 9 INJECTION, SOLUTION INTRAVENOUS at 13:17

## 2020-12-29 NOTE — DISCHARGE INSTR - COC
Continuity of Care Form    Patient Name: Arely Nicholson   :  1950  MRN:  [id number]    Admit date:  2020  Discharge date:  ***    Code Status Order: Prior   Advance Directives:     Admitting Physician:  No admitting provider for patient encounter. PCP: Kirsty William MD    Discharging Nurse: [facility] Unit/Room#: No information available for this encounter. Discharging Unit Phone Number: ***    Emergency Contact:   Extended Emergency Contact Information  Primary Emergency Contact: Uma Courtney [address] Phone: [phone]  Work Phone: [phone]  Mobile Phone: [phone]  Relation: Spouse  Secondary Emergency Contact: Jazmin Mccarthy  Home Phone: [phone]  Work Phone: [phone]  Mobile Phone: [phone]  Relation: Child    Past Surgical History:  Past Surgical History:   Procedure Laterality Date    APPENDECTOMY      CARDIAC CATHETERIZATION      w/stent    COLONOSCOPY      CYST INCISION AND DRAINAGE Right 2020    rt elbow I and D and left knee aspiration with cultures    FOOT SURGERY Right     2nd toe(bone spur).  FOREARM SURGERY Right 2020    RIGHT ELBOW IRRIGATION AND DEBRIDEMENT performed by Cyn Hernandez DO at [facility] Left 2020    KNEE ASPIRATION WITH CULTURES SENT performed by Cyn Hernandez DO at [address] [place] [address] under lt. eye.     TONSILLECTOMY         Immunization History:   Immunization History   Administered Date(s) Administered    Influenza, High Dose (Fluzone 65 yrs and older) 10/07/2019    Influenza, Quadv, adjuvanted, 65 yrs +, IM, PF (Fluad) 2020       Active Problems:  Patient Active Problem List   Diagnosis Code    Primary lung cancer with metastasis from lung to other site, right ([address] C34.91    Hyperlipidemia E78.5    Hypertension I10    Lung cancer ([address] C34.90    Sepsis ([address] A41.9  Atrial fibrillation with RVR ([facility]) I48.91    Gout M10.9    Pyogenic arthritis of right elbow ([facility]) M00.9       Isolation/Infection:   Isolation          No Isolation        Patient Infection Status     Infection Onset Added Last Indicated Last Indicated By Review Planned Expiration Resolved Resolved By    None active    Resolved    COVID-19 Rule Out 05/14/20 05/14/20 05/14/20 COVID-19 (Ordered)   05/14/20 Rule-Out Test Resulted    COVID-19 Rule Out 05/07/20 05/07/20 05/07/20 COVID-19 (Ordered)   05/07/20 Rule-Out Test Resulted          Nurse Assessment:  Last Vital Signs: /75   Pulse 86   Temp 97.6 °F (36.4 °C) (Oral)   Resp 16     Last documented pain score (0-10 scale):    Last Weight:   Wt Readings from Last 1 Encounters:   12/28/20 189 lb 6.4 oz (85.9 kg)     Mental Status:  {IP PT MENTAL STATUS:93940}    IV Access:  { MARTINEZ IV ACCESS:459744949}    Nursing Mobility/ADLs:  Walking   {CHP DME VBTA:581760480}  Transfer  {P DME QYIY:798671602}  Bathing  {P DME KSHA:780454323}  Dressing  {CHP DME HDJS:767628816}  Toileting  {CHP DME PNFU:377130465}  Feeding  {P DME LMYE:101121235}  Med Admin  {P DME UKVT:587166552}  Med Delivery   { MARTINEZ MED Delivery:297977271}    Wound Care Documentation and Therapy:        Elimination:  Continence:   · Bowel: {YES / FO:93707}  · Bladder: {YES / MV:16933}  Urinary Catheter: {Urinary Catheter:653945441}   Colostomy/Ileostomy/Ileal Conduit: {YES / YQ:59107}       Date of Last BM: ***  No intake or output data in the 24 hours ending 12/29/20 1612  No intake/output data recorded.     Safety Concerns:     [employer] Safety Concerns:890667814}    Impairments/Disabilities:      [employer] Impairments/Disabilities:640411355}    Nutrition Therapy:  Current Nutrition Therapy:   [employer] Diet List:923409700}    Routes of Feeding: {CHP DME Other Feedings:134039363}  Liquids: {Slp liquid thickness:29223}  Daily Fluid Restriction: {CHP DME Yes amt example:972029801} Last Modified Barium Swallow with Video (Video Swallowing Test): {Done Not Done SGOP:805074764}    Treatments at the Time of Hospital Discharge:   Respiratory Treatments: ***  Oxygen Therapy:  {Therapy; copd oxygen:67387}  Ventilator:    {[facility] Vent UZAC:377999255}    Rehab Therapies: {THERAPEUTIC INTERVENTION:4192154202}  Weight Bearing Status/Restrictions: {[facility] Weight Bearin}  Other Medical Equipment (for information only, NOT a DME order):  {EQUIPMENT:417990263}  Other Treatments: ***    Patient's personal belongings (please select all that are sent with patient):  {[facility] DME Belongings:468594004}    RN SIGNATURE:  {Esignature:883885637}    CASE MANAGEMENT/SOCIAL WORK SECTION    Inpatient Status Date: ***    Readmission Risk Assessment Score:  Readmission Risk              Risk of Unplanned Readmission:        0           Discharging to Facility/ Agency   · Name:   · Address:  · Phone:  · Fax:    Dialysis Facility (if applicable)   · Name:  · Address:  · Dialysis Schedule:  · Phone:  · Fax:    / signature: {Esignature:021365962}    PHYSICIAN SECTION    Prognosis: {Prognosis:3956991925}    Condition at Discharge: [facility] Patient Condition:210058128}    Rehab Potential (if transferring to Rehab): {Prognosis:0554589542}    Recommended Labs or Other Treatments After Discharge: ***    Physician Certification: I certify the above information and transfer of Colleen Russell  is necessary for the continuing treatment of the diagnosis listed and that he requires {Admit to Appropriate Level of Care:62543} for {GREATER/LESS:744931335} 30 days.      Update Admission H&P: {P DME Changes in FDAQA:457571244}    PHYSICIAN SIGNATURE:  {Esignature:538155075}

## 2020-12-29 NOTE — PROGRESS NOTES
Pt here for C.2D.2 Etoposide. Arrives ambulatory by self. Denies any new complaints. Tx complete without incident. D/c'd in stable condition. Returns tomorrow for f/u with Dr. Emely Peraza and C.2D.3 tx.

## 2020-12-30 ENCOUNTER — HOSPITAL ENCOUNTER (OUTPATIENT)
Dept: INFUSION THERAPY | Age: 70
Discharge: HOME | End: 2020-12-30
Payer: COMMERCIAL

## 2020-12-30 ENCOUNTER — OFFICE VISIT (OUTPATIENT)
Dept: ONCOLOGY | Age: 70
End: 2020-12-30
Payer: COMMERCIAL

## 2020-12-30 ENCOUNTER — TELEPHONE (OUTPATIENT)
Dept: ONCOLOGY | Age: 70
End: 2020-12-30

## 2020-12-30 VITALS
RESPIRATION RATE: 18 BRPM | TEMPERATURE: 97.9 F | HEART RATE: 80 BPM | SYSTOLIC BLOOD PRESSURE: 143 MMHG | DIASTOLIC BLOOD PRESSURE: 73 MMHG

## 2020-12-30 VITALS
TEMPERATURE: 97.9 F | SYSTOLIC BLOOD PRESSURE: 143 MMHG | DIASTOLIC BLOOD PRESSURE: 73 MMHG | BODY MASS INDEX: 26.74 KG/M2 | HEART RATE: 80 BPM | WEIGHT: 189 LBS

## 2020-12-30 DIAGNOSIS — C34.91 PRIMARY LUNG CANCER WITH METASTASIS FROM LUNG TO OTHER SITE, RIGHT (HCC): Primary | ICD-10-CM

## 2020-12-30 PROCEDURE — 4004F PT TOBACCO SCREEN RCVD TLK: CPT | Performed by: INTERNAL MEDICINE

## 2020-12-30 PROCEDURE — 96375 TX/PRO/DX INJ NEW DRUG ADDON: CPT

## 2020-12-30 PROCEDURE — 1123F ACP DISCUSS/DSCN MKR DOCD: CPT | Performed by: INTERNAL MEDICINE

## 2020-12-30 PROCEDURE — 6360000002 HC RX W HCPCS: Performed by: INTERNAL MEDICINE

## 2020-12-30 PROCEDURE — G8427 DOCREV CUR MEDS BY ELIG CLIN: HCPCS | Performed by: INTERNAL MEDICINE

## 2020-12-30 PROCEDURE — 99211 OFF/OP EST MAY X REQ PHY/QHP: CPT | Performed by: INTERNAL MEDICINE

## 2020-12-30 PROCEDURE — G8417 CALC BMI ABV UP PARAM F/U: HCPCS | Performed by: INTERNAL MEDICINE

## 2020-12-30 PROCEDURE — G8484 FLU IMMUNIZE NO ADMIN: HCPCS | Performed by: INTERNAL MEDICINE

## 2020-12-30 PROCEDURE — 2580000003 HC RX 258: Performed by: INTERNAL MEDICINE

## 2020-12-30 PROCEDURE — 96413 CHEMO IV INFUSION 1 HR: CPT

## 2020-12-30 PROCEDURE — 4040F PNEUMOC VAC/ADMIN/RCVD: CPT | Performed by: INTERNAL MEDICINE

## 2020-12-30 PROCEDURE — 99215 OFFICE O/P EST HI 40 MIN: CPT | Performed by: INTERNAL MEDICINE

## 2020-12-30 PROCEDURE — 3017F COLORECTAL CA SCREEN DOC REV: CPT | Performed by: INTERNAL MEDICINE

## 2020-12-30 RX ORDER — DIPHENHYDRAMINE HYDROCHLORIDE 50 MG/ML
50 INJECTION INTRAMUSCULAR; INTRAVENOUS ONCE
Status: CANCELLED | OUTPATIENT
Start: 2021-01-22 | End: 2021-01-20

## 2020-12-30 RX ORDER — SODIUM CHLORIDE 0.9 % (FLUSH) 0.9 %
10 SYRINGE (ML) INJECTION PRN
Status: CANCELLED | OUTPATIENT
Start: 2021-01-21

## 2020-12-30 RX ORDER — SODIUM CHLORIDE 9 MG/ML
INJECTION, SOLUTION INTRAVENOUS CONTINUOUS
Status: CANCELLED | OUTPATIENT
Start: 2021-01-22

## 2020-12-30 RX ORDER — DIPHENHYDRAMINE HYDROCHLORIDE 50 MG/ML
50 INJECTION INTRAMUSCULAR; INTRAVENOUS ONCE
Status: CANCELLED | OUTPATIENT
Start: 2021-01-21 | End: 2021-01-19

## 2020-12-30 RX ORDER — SODIUM CHLORIDE 9 MG/ML
20 INJECTION, SOLUTION INTRAVENOUS ONCE
Status: COMPLETED | OUTPATIENT
Start: 2020-12-30 | End: 2020-12-30

## 2020-12-30 RX ORDER — SODIUM CHLORIDE 0.9 % (FLUSH) 0.9 %
5 SYRINGE (ML) INJECTION PRN
Status: CANCELLED | OUTPATIENT
Start: 2021-01-21

## 2020-12-30 RX ORDER — HEPARIN SODIUM (PORCINE) LOCK FLUSH IV SOLN 100 UNIT/ML 100 UNIT/ML
500 SOLUTION INTRAVENOUS PRN
Status: CANCELLED | OUTPATIENT
Start: 2021-01-22

## 2020-12-30 RX ORDER — METHYLPREDNISOLONE SODIUM SUCCINATE 125 MG/2ML
125 INJECTION, POWDER, LYOPHILIZED, FOR SOLUTION INTRAMUSCULAR; INTRAVENOUS ONCE
Status: CANCELLED | OUTPATIENT
Start: 2021-01-22 | End: 2021-01-20

## 2020-12-30 RX ORDER — SODIUM CHLORIDE 9 MG/ML
20 INJECTION, SOLUTION INTRAVENOUS ONCE
Status: CANCELLED | OUTPATIENT
Start: 2021-01-21 | End: 2021-01-19

## 2020-12-30 RX ORDER — EPINEPHRINE 1 MG/ML
0.3 INJECTION, SOLUTION, CONCENTRATE INTRAVENOUS PRN
Status: CANCELLED | OUTPATIENT
Start: 2021-01-21

## 2020-12-30 RX ORDER — SODIUM CHLORIDE 0.9 % (FLUSH) 0.9 %
5 SYRINGE (ML) INJECTION PRN
Status: CANCELLED | OUTPATIENT
Start: 2021-01-18

## 2020-12-30 RX ORDER — DEXAMETHASONE SODIUM PHOSPHATE 4 MG/ML
8 INJECTION, SOLUTION INTRA-ARTICULAR; INTRALESIONAL; INTRAMUSCULAR; INTRAVENOUS; SOFT TISSUE ONCE
Status: COMPLETED | OUTPATIENT
Start: 2020-12-30 | End: 2020-12-30

## 2020-12-30 RX ORDER — EPINEPHRINE 1 MG/ML
0.3 INJECTION, SOLUTION, CONCENTRATE INTRAVENOUS PRN
Status: CANCELLED | OUTPATIENT
Start: 2021-01-18

## 2020-12-30 RX ORDER — EPINEPHRINE 1 MG/ML
0.3 INJECTION, SOLUTION, CONCENTRATE INTRAVENOUS PRN
Status: CANCELLED | OUTPATIENT
Start: 2021-01-22

## 2020-12-30 RX ORDER — METHYLPREDNISOLONE SODIUM SUCCINATE 125 MG/2ML
125 INJECTION, POWDER, LYOPHILIZED, FOR SOLUTION INTRAMUSCULAR; INTRAVENOUS ONCE
Status: CANCELLED | OUTPATIENT
Start: 2021-01-21 | End: 2021-01-19

## 2020-12-30 RX ORDER — DIPHENHYDRAMINE HYDROCHLORIDE 50 MG/ML
50 INJECTION INTRAMUSCULAR; INTRAVENOUS ONCE
Status: CANCELLED | OUTPATIENT
Start: 2020-12-30 | End: 2020-12-30

## 2020-12-30 RX ORDER — HEPARIN SODIUM (PORCINE) LOCK FLUSH IV SOLN 100 UNIT/ML 100 UNIT/ML
500 SOLUTION INTRAVENOUS PRN
Status: DISCONTINUED | OUTPATIENT
Start: 2020-12-30 | End: 2020-12-31 | Stop reason: HOSPADM

## 2020-12-30 RX ORDER — SODIUM CHLORIDE 0.9 % (FLUSH) 0.9 %
10 SYRINGE (ML) INJECTION PRN
Status: CANCELLED | OUTPATIENT
Start: 2021-01-18

## 2020-12-30 RX ORDER — SODIUM CHLORIDE 9 MG/ML
INJECTION, SOLUTION INTRAVENOUS CONTINUOUS
Status: CANCELLED | OUTPATIENT
Start: 2020-12-30

## 2020-12-30 RX ORDER — DIPHENHYDRAMINE HYDROCHLORIDE 50 MG/ML
50 INJECTION INTRAMUSCULAR; INTRAVENOUS ONCE
Status: CANCELLED | OUTPATIENT
Start: 2021-01-18 | End: 2021-01-18

## 2020-12-30 RX ORDER — SODIUM CHLORIDE 9 MG/ML
INJECTION, SOLUTION INTRAVENOUS CONTINUOUS
Status: CANCELLED | OUTPATIENT
Start: 2021-01-21

## 2020-12-30 RX ORDER — METHYLPREDNISOLONE SODIUM SUCCINATE 125 MG/2ML
125 INJECTION, POWDER, LYOPHILIZED, FOR SOLUTION INTRAMUSCULAR; INTRAVENOUS ONCE
Status: CANCELLED | OUTPATIENT
Start: 2021-01-18 | End: 2021-01-18

## 2020-12-30 RX ORDER — SODIUM CHLORIDE 0.9 % (FLUSH) 0.9 %
10 SYRINGE (ML) INJECTION PRN
Status: DISCONTINUED | OUTPATIENT
Start: 2020-12-30 | End: 2020-12-31 | Stop reason: HOSPADM

## 2020-12-30 RX ORDER — EPINEPHRINE 1 MG/ML
0.3 INJECTION, SOLUTION, CONCENTRATE INTRAVENOUS PRN
Status: CANCELLED | OUTPATIENT
Start: 2020-12-30

## 2020-12-30 RX ORDER — SODIUM CHLORIDE 0.9 % (FLUSH) 0.9 %
5 SYRINGE (ML) INJECTION PRN
Status: CANCELLED | OUTPATIENT
Start: 2021-01-22

## 2020-12-30 RX ORDER — HEPARIN SODIUM (PORCINE) LOCK FLUSH IV SOLN 100 UNIT/ML 100 UNIT/ML
500 SOLUTION INTRAVENOUS PRN
Status: CANCELLED | OUTPATIENT
Start: 2021-01-21

## 2020-12-30 RX ORDER — SODIUM CHLORIDE 0.9 % (FLUSH) 0.9 %
10 SYRINGE (ML) INJECTION PRN
Status: CANCELLED | OUTPATIENT
Start: 2021-01-22

## 2020-12-30 RX ORDER — PALONOSETRON 0.05 MG/ML
0.25 INJECTION, SOLUTION INTRAVENOUS ONCE
Status: CANCELLED | OUTPATIENT
Start: 2021-01-18

## 2020-12-30 RX ORDER — SODIUM CHLORIDE 9 MG/ML
20 INJECTION, SOLUTION INTRAVENOUS ONCE
Status: CANCELLED | OUTPATIENT
Start: 2021-01-22 | End: 2021-01-20

## 2020-12-30 RX ORDER — HEPARIN SODIUM (PORCINE) LOCK FLUSH IV SOLN 100 UNIT/ML 100 UNIT/ML
500 SOLUTION INTRAVENOUS PRN
Status: CANCELLED | OUTPATIENT
Start: 2021-01-18

## 2020-12-30 RX ORDER — SODIUM CHLORIDE 9 MG/ML
INJECTION, SOLUTION INTRAVENOUS CONTINUOUS
Status: CANCELLED | OUTPATIENT
Start: 2021-01-18

## 2020-12-30 RX ORDER — METHYLPREDNISOLONE SODIUM SUCCINATE 125 MG/2ML
125 INJECTION, POWDER, LYOPHILIZED, FOR SOLUTION INTRAMUSCULAR; INTRAVENOUS ONCE
Status: CANCELLED | OUTPATIENT
Start: 2020-12-30 | End: 2020-12-30

## 2020-12-30 RX ORDER — SODIUM CHLORIDE 9 MG/ML
20 INJECTION, SOLUTION INTRAVENOUS ONCE
Status: CANCELLED | OUTPATIENT
Start: 2021-01-18 | End: 2021-01-18

## 2020-12-30 RX ORDER — SODIUM CHLORIDE 0.9 % (FLUSH) 0.9 %
5 SYRINGE (ML) INJECTION PRN
Status: CANCELLED | OUTPATIENT
Start: 2020-12-30

## 2020-12-30 RX ADMIN — Medication 10 ML: at 11:20

## 2020-12-30 RX ADMIN — SODIUM CHLORIDE 20 ML/HR: 9 INJECTION, SOLUTION INTRAVENOUS at 11:21

## 2020-12-30 RX ADMIN — ETOPOSIDE 210 MG: 20 INJECTION, SOLUTION, CONCENTRATE INTRAVENOUS at 11:42

## 2020-12-30 RX ADMIN — HEPARIN 500 UNITS: 100 SYRINGE at 12:52

## 2020-12-30 RX ADMIN — Medication 10 ML: at 12:52

## 2020-12-30 RX ADMIN — DEXAMETHASONE SODIUM PHOSPHATE 8 MG: 4 INJECTION, SOLUTION INTRAMUSCULAR; INTRAVENOUS at 11:21

## 2020-12-30 NOTE — FLOWSHEET NOTE
Situation:  Writer visited with Patient in the treatment cubicle. Assessment:  Mr. Tiffany Corbett smiled and greeted writer. He talked about his cancer journey, noting that his type of cancer has returned to different parts of his body. He voiced his hopes of living longer, naming \"the Lord\" and his wife, to whom he has been  for over 30 years, as his sources of strength. He talked about his spiritual and Zoroastrian practices. He was receptive to a necklace and prayer card for Dimitrios Velazco, the patron saint of cancer. He was open to writer offering the prayer from the prayer card for him. Intervention:  Writer provided supportive presence and explored Pt's coping and needs; inquired about Pt's sources of support and strength; offered words of encouragement and support; affirmed Pt's strengths; prayed for Pt; gave Pt a prayer card and necklace for Dimitrios Velazco. Outcome:  Mr. Tiffany Corbett thanked writer for the visit. 12/30/20 1301   Encounter Summary   Services provided to: Patient   Referral/Consult From: [address] [place] [address] Children;Family members;Spouse;Friends/neighbors   Continue Visiting   (12/30/20)   Complexity of Encounter Moderate   Length of Encounter 30 minutes   Spiritual Assessment Completed Yes   Routine   Type Follow up   Spiritual/Methodist   Type Spiritual support   Assessment Calm; Approachable   Intervention Active listening;Explored feelings, thoughts, concerns;Explored coping resources;Prayer;Provided reading materials/devotional materials;Sustaining presence/ Ministry of presence; Discussed relationship with God;Discussed belief system/Zoroastrian practices/brennen;Discussed illness/injury and it's impact; Discussed meaning/purpose   Outcome Receptive; Hopeful;Encouraged;Coping;Expressed feelings/needs/concerns;Engaged in conversation;Expressed gratitude     Electronically signed by Pablo Wilson [address], [address] [facility]  12/30/2020  1:02 PM

## 2020-12-30 NOTE — PROGRESS NOTES
Gaviota Medrano                                                                                                                  12/30/2020  MRN:   [id number]  YOB: 1950  PCP:                           Yair Whitman MD  Referring Physician: No ref. provider found  Treating Physician Name: Fawad Lozada MD      Reason for visit:  Discuss treatment plan. Toxicity check. Reviewed results of lab work-up. 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   Chemotherapy changed to carboplatin and etoposide due to renal insufficiency from cycle #2  PCI 7/2019  SRS to adrenal gland metastasis, completed 07/2020  systemic palliative chemoimmunotherapy with carboplatin etoposide and Tecentriq, 12/2020      Summary of Case/History:    Gaviota Medrano a 72 y.o.male is a patient diagnosed with lung cancer with the component of small cell as well as squamous cell carcinoma Patient has a significant history of tobacco dependence and underwent CT lung screening in December 2018. CT scan was read as lung rads degree 4B. Subsequently she underwent biopsy of right upper lobe lung nodule on 1/16/19. 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 CT PET done showed abnormal FDG uptake in the right upper lobe nodule. There was also abnormal FDG uptake in the right lower lobe nodule. Additionally right hilar lymph node were also FDG avid. No bony lesion was reported. MRI brain for staging workup did not show any evidence of intracranial metastasis. Tip of the odontoid process was ill-defined and metastasis could not be ruled out. Clinically patient does give history of trauma to the neck area any years ago however denies any surgery history. Bone scan did not reveal any metastasis     Patient continues to smoke but has cut down quite a bit. He works full-time in a [address] Patient has good performance status, ECOG 0. Patient's other medical problems include dyslipidemia and hypertension. He also has arthritis pain    Started patient on concurrent chemoradiation using cisplatin and etoposide with dose adjustment for renal dysfunction. Chemotherapy changed to carboplatin and etoposide from cycle #2 due to renal dysfunction    Received PCI in July 2019. Interim History:    Patient presents to the clinic for a follow-up visit and for toxicity check and to review results of her blood work-up. Patient has been tolerating treatment without any unexpected or severe side effects. Denies hospitalization or ER visit. Appetite is good. Weight is stable. Nausea is controlled. Tolerated cycle 1 well. Scheduled for cycle 2 today. During this visit patient's allergy, social, medical, surgical history and medications were reviewed and updated.     Past Medical History:   Past Medical History:   Diagnosis Date  DDD (degenerative disc disease), cervical     Gout     Heart murmur     hx. of when younger.  Hyperlipidemia     Hypertension     [facility]     right lung    MI (myocardial infarction) ([facility]     Skin cancer     face    Wears glasses        Past Surgical History:     Past Surgical History:   Procedure Laterality Date    APPENDECTOMY      CARDIAC CATHETERIZATION      w/stent    COLONOSCOPY      CYST INCISION AND DRAINAGE Right 05/14/2020    rt elbow I and D and left knee aspiration with cultures    FOOT SURGERY Right     2nd toe(bone spur).  FOREARM SURGERY Right 5/14/2020    RIGHT ELBOW IRRIGATION AND DEBRIDEMENT performed by Sabrina Montoya DO at [facility] Left 5/14/2020    KNEE ASPIRATION WITH CULTURES SENT performed by Sabrina Montoya DO at [facility]      face under lt. eye.     TONSILLECTOMY         Patient Family Social History:    Family History   Problem Relation Age of Onset    Cancer Father         lung cancer      Social History     Socioeconomic History    Marital status:      Spouse name: Not on file    Number of children: Not on file    Years of education: Not on file    Highest education level: Not on file   Occupational History    Not on file   Social Needs    Financial resource strain: Not on file    Food insecurity     Worry: Not on file     Inability: Not on file    Transportation needs     Medical: Not on file     Non-medical: Not on file   Tobacco Use    Smoking status: Current Every Day Smoker     Packs/day: 0.50     Types: Cigarettes    Smokeless tobacco: Former User   Substance and Sexual Activity    Alcohol use: No    Drug use: Yes     Frequency: 14.0 times per week     Types: Marijuana    Sexual activity: Not on file   Lifestyle    Physical activity     Days per week: Not on file     Minutes per session: Not on file    Stress: Not on file   Relationships    Social connections  simvastatin (ZOCOR) 40 MG tablet Take 40 mg by mouth daily      Multiple Vitamins-Minerals (THERAPEUTIC MULTIVITAMIN-MINERALS) tablet Take 1 tablet by mouth daily       No current facility-administered medications for this visit. Facility-Administered Medications Ordered in Other Visits   Medication Dose Route Frequency Provider Last Rate Last Admin    0.9 % sodium chloride infusion  20 mL/hr Intravenous Once Steve Samuels MD 20 mL/hr at 12/30/20 1121 20 mL/hr at 12/30/20 1121    etoposide (VEPESID) 210 mg in sodium chloride 0.9 % 1,000 mL chemo IVPB  100 mg/m2 (Order-Specific) Intravenous Once Steve Samuels MD 1,010.5 mL/hr at 12/30/20 1142 210 mg at 12/30/20 1142    sodium chloride flush 0.9 % injection 10 mL  10 mL Intravenous PRN Steve Samuels MD   10 mL at 12/30/20 1120    heparin flush 100 UNIT/ML injection 500 Units  500 Units Intracatheter PRN Steve Samuels MD           Allergies:   Patient has no known allergies. Review of Systems:    Constitutional: No fever or chills. No night sweats, positive fatigue. Stable weight  Eyes: No eye discharge, double vision, or eye pain   HEENT: negative for sore mouth, sore throat, hoarseness and voice change; Respiratory: negative for cough, sputum, wheezing, hemoptysis, chest pain; +exertional shortness of breath  Cardiovascular: negative for chest pain, palpitations, orthopnea, PND   Gastrointestinal: negative for nausea, vomiting, diarrhea, constipation, abdominal pain, Dysphagia, hematemesis and hematochezia   Genitourinary: negative for frequency, dysuria, nocturia, urinary incontinence, and hematuria +urgency  Integument: Positive for easy bruising - stable  Hematologic/Lymphatic: negative for easy bleeding, lymphadenopathy, or petechiae   Endocrine: negative for heat or cold intolerance,weight changes, change in bowel habits and hair loss   Musculoskeletal: Positive joint pain.  +left shoulder pain - unchnaged Neurological: negative for headaches, dizziness, seizures, weakness; + poor balance; +neuropathy in left hand from shoulder +poor short term memory        Physical Exam:    Vitals: BP (!) 143/73   Pulse 80   Temp 97.9 °F (36.6 °C) (Oral)   Wt 189 lb (85.7 kg)   BMI 26.74 kg/m²   General appearance -patient not in acute distress  Mental status - AAO X3  Eyes - pupils equal and reactive, extraocular eye movements intact  Mouth - mucous membranes moist, pharynx normal without lesions  Neck - supple, no significant adenopathy  Lymphatics - no palpable lymphadenopathy, no hepatosplenomegaly  Chest - clear to auscultation, rales or rhonchi, symmetric air entry  Heart - normal rate, regular rhythm, normal S1, S2, no murmurs  Abdomen - soft, nontender, nondistended, no masses or organomegaly  Neurological - alert, oriented, normal speech, no focal findings or movement disorder noted  Extremities -+1 lower extremity pitting edema.   Skin - normal coloration and turgor, no rashes, no suspicious skin lesions noted       DATA:    Labs:   Lab Results   Component Value Date    WBC 3.8 12/28/2020    HGB 11.1 (L) 12/28/2020    HCT 32.7 (L) 12/28/2020    .4 (H) 12/28/2020     12/28/2020       Chemistry        Component Value Date/Time     12/28/2020 0908    K 4.7 12/28/2020 0908     12/28/2020 0908    CO2 24 12/28/2020 0908    BUN 14 12/28/2020 0908    CREATININE 1.16 12/28/2020 0908        Component Value Date/Time    CALCIUM 8.8 12/28/2020 0908    ALKPHOS 91 12/28/2020 0908    AST 18 12/28/2020 0908    ALT 15 12/28/2020 0908    BILITOT 0.24 (L) 12/28/2020 0908        CT C/A/P    Impression   1.  New liver lesions compatible with metastatic disease.       2.  Ovoid soft tissue mass in the medial right hemithorax has progressively   increased, which may represent a lymph node or focal pleural thickening,   concerning for neoplastic involvement.     3.  Interval treatment of the left adrenal metastasis, which is no longer   visualized.          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 Toxicity check performed. Patient tolerated cycle 1 without any unexpected or severe side effects  Reiterated treatment plan  Reiterated toxicity and potential side effects. Labs are adequate for treatment. We will proceed with cycle 2. We will assess response after 34 cycles and consider switching to maintenance Tecentriq if patient has had good response. Patient's conditions were taken into consideration when deciding risk-benefit for therapy. Patient is at high risk for drug interaction risk of complications. Given multiple comorbid conditions patient is at high risk for complication from intervention, risk of hospitalization, medical interaction overall life expectancy. NCCN guidelines were reviewed and discussed with the patient. The diagnosis and care plan were discussed with the patient in detail. I discussed the natural history of the disease, prognosis, risks and goals of therapy and answered all the patients questions to the best of my ability. Patient expressed understanding and was in agreement. Xiomara Logan        This note is created with the assistance of a speech recognition program.  While intending to generate a document that actually reflects the content of the visit, the document can still have some errors including those of syntax and sound a like substitutions which may escape proof reading. It such instances, actual meaning can be extrapolated by contextual diversion.

## 2020-12-30 NOTE — TELEPHONE ENCOUNTER
Avs from 12/30/2020     rv in 3 weeks with tx      RV schedule 1/20/21 @ 8am with tx to follow    . PT was given AVS and an appt schedule    Electronically signed by Jena Abreu on 12/30/2020 at 12:30 PM

## 2020-12-30 NOTE — PROGRESS NOTES
Pt here for C2D3 Etoposide. Denies any new complaints. Pt seen by Dr Nino Lobato at chair side for follow up, refer to his note. Pt was treated without incident and d/c'd in stable condition. Pt will return on 1-20-21 for C3D1 and MD visit.

## 2021-01-19 ENCOUNTER — TELEPHONE (OUTPATIENT)
Dept: INFUSION THERAPY | Age: 71
End: 2021-01-19

## 2021-01-19 NOTE — TELEPHONE ENCOUNTER
Patient called requesting to take some extra strength tylenol. He stated he has fallen twice. He has been icing his shoulder. I enquired as to what happened. He stated both times he fell it was while using his urinal. He was not sure what happened. He was using the urinal and fell into the closet, bumping his head and left shoulder. The other time he fell into the bookshelf. I enquired how serious his injuries were. Does he need to be seen in the ER. He said no, he was just bruised and sore. He took some extra strength tylenol and it helped. He has been icing the shoulder as well. But then his wife said the Dr. Reddy Yoana no tylenol. So he called to check. I explained that tylenol in large doses and over extended periods of time can cause liver problems. But for a short time and couple times a day, within the recommendations on the bottle, should be fine. His liver enzymes have been good, see his lab work from last visit. I then encouraged the patient to sit while he is using the urinal. He said he can't, if he sits he does not empty completely. I encouraged him to sit for most of it, stand at the end of urination to empty his bladder fully. He erbalized understanding. He sees Dr. Ashwini Paredes tomorrow. I encouraged him to discuss all of this with Dr. Ashwini Paredes tomorrow. He stated he would.

## 2021-01-20 ENCOUNTER — HOSPITAL ENCOUNTER (OUTPATIENT)
Dept: INFUSION THERAPY | Age: 71
Discharge: HOME | End: 2021-01-20
Payer: COMMERCIAL

## 2021-01-20 ENCOUNTER — TELEPHONE (OUTPATIENT)
Dept: ONCOLOGY | Age: 71
End: 2021-01-20

## 2021-01-20 ENCOUNTER — OFFICE VISIT (OUTPATIENT)
Dept: ONCOLOGY | Age: 71
End: 2021-01-20
Payer: COMMERCIAL

## 2021-01-20 VITALS
HEART RATE: 91 BPM | TEMPERATURE: 97.7 F | BODY MASS INDEX: 25.73 KG/M2 | SYSTOLIC BLOOD PRESSURE: 117 MMHG | DIASTOLIC BLOOD PRESSURE: 75 MMHG | WEIGHT: 181.9 LBS
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
## 2021-01-20 DIAGNOSIS — D64.81 ANEMIA ASSOCIATED WITH CHEMOTHERAPY: ICD-10-CM

## 2021-01-20 DIAGNOSIS — N13.30 HYDRONEPHROSIS, UNSPECIFIED HYDRONEPHROSIS TYPE: ICD-10-CM

## 2021-01-20 DIAGNOSIS — C34.91 PRIMARY LUNG CANCER WITH METASTASIS FROM LUNG TO OTHER SITE, RIGHT (HCC): Primary | ICD-10-CM

## 2021-01-20 DIAGNOSIS — R53.1 ASTHENIA: ICD-10-CM

## 2021-01-20 DIAGNOSIS — T45.1X5A ANEMIA ASSOCIATED WITH CHEMOTHERAPY: ICD-10-CM

## 2021-01-20 DIAGNOSIS — C34.91 MALIGNANT NEOPLASM OF RIGHT LUNG, UNSPECIFIED PART OF LUNG (HCC): ICD-10-CM

## 2021-01-20 LAB
ABSOLUTE EOS #: 0.17 K/UL (ref 0–0.4)
ABSOLUTE IMMATURE GRANULOCYTE: ABNORMAL K/UL (ref 0–0.3)
ABSOLUTE LYMPH #: 0.5 K/UL (ref 1–4.8)
ABSOLUTE MONO #: 0.92 K/UL (ref 0.1–1.2)
ALBUMIN SERPL-MCNC: 3.4 G/DL (ref 3.5–5.2)
ALBUMIN/GLOBULIN RATIO: 0.9 (ref 1–2.5)
ALP BLD-CCNC: 102 U/L (ref 40–129)
ALT SERPL-CCNC: 28 U/L (ref 5–41)
ANION GAP SERPL CALCULATED.3IONS-SCNC: 9 MMOL/L (ref 9–17)
AST SERPL-CCNC: 26 U/L
BASOPHILS # BLD: 1 % (ref 0–2)
BASOPHILS ABSOLUTE: 0.04 K/UL (ref 0–0.2)
BILIRUB SERPL-MCNC: 0.19 MG/DL (ref 0.3–1.2)
BUN BLDV-MCNC: 23 MG/DL (ref 8–23)
BUN/CREAT BLD: ABNORMAL (ref 9–20)
CALCIUM SERPL-MCNC: 9.5 MG/DL (ref 8.6–10.4)
CHLORIDE BLD-SCNC: 102 MMOL/L (ref 98–107)
CO2: 23 MMOL/L (ref 20–31)
CREAT SERPL-MCNC: 1.25 MG/DL (ref 0.7–1.2)
DIFFERENTIAL TYPE: ABNORMAL
EOSINOPHILS RELATIVE PERCENT: 4 % (ref 1–4)
GFR AFRICAN AMERICAN: >60 ML/MIN
GFR NON-AFRICAN AMERICAN: 57 ML/MIN
GFR SERPL CREATININE-BSD FRML MDRD: ABNORMAL ML/MIN/{1.73_M2}
GFR SERPL CREATININE-BSD FRML MDRD: ABNORMAL ML/MIN/{1.73_M2}
GLUCOSE BLD-MCNC: 134 MG/DL (ref 70–99)
HCT VFR BLD CALC: 28.7 % (ref 41–53)
HEMOGLOBIN: 9.8 G/DL (ref 13.5–17.5)
IMMATURE GRANULOCYTES: ABNORMAL %
LYMPHOCYTES # BLD: 12 % (ref 24–44)
MCH RBC QN AUTO: 34.9 PG (ref 26–34)
MCHC RBC AUTO-ENTMCNC: 34.3 G/DL (ref 31–37)
MCV RBC AUTO: 101.7 FL (ref 80–100)
MONOCYTES # BLD: 22 % (ref 2–11)
MORPHOLOGY: ABNORMAL
MORPHOLOGY: ABNORMAL
NRBC AUTOMATED: ABNORMAL PER 100 WBC
PDW BLD-RTO: 17.1 % (ref 12.5–15.4)
PLATELET # BLD: 311 K/UL (ref 140–450)
PLATELET ESTIMATE: ABNORMAL
PMV BLD AUTO: 7.4 FL (ref 6–12)
POTASSIUM SERPL-SCNC: 4.2 MMOL/L (ref 3.7–5.3)
RBC # BLD: 2.82 M/UL (ref 4.5–5.9)
RBC # BLD: ABNORMAL 10*6/UL
SEG NEUTROPHILS: 61 % (ref 36–66)
SEGMENTED NEUTROPHILS ABSOLUTE COUNT: 2.57 K/UL (ref 1.8–7.7)
SODIUM BLD-SCNC: 134 MMOL/L (ref 135–144)
TOTAL PROTEIN: 7.2 G/DL (ref 6.4–8.3)
WBC # BLD: 4.2 K/UL (ref 3.5–11)
WBC # BLD: ABNORMAL 10*3/UL

## 2021-01-20 PROCEDURE — G8484 FLU IMMUNIZE NO ADMIN: HCPCS | Performed by: INTERNAL MEDICINE

## 2021-01-20 PROCEDURE — 3017F COLORECTAL CA SCREEN DOC REV: CPT | Performed by: INTERNAL MEDICINE

## 2021-01-20 PROCEDURE — 4040F PNEUMOC VAC/ADMIN/RCVD: CPT | Performed by: INTERNAL MEDICINE

## 2021-01-20 PROCEDURE — 36415 COLL VENOUS BLD VENIPUNCTURE: CPT

## 2021-01-20 PROCEDURE — 96413 CHEMO IV INFUSION 1 HR: CPT

## 2021-01-20 PROCEDURE — 2580000003 HC RX 258: Performed by: INTERNAL MEDICINE

## 2021-01-20 PROCEDURE — 96417 CHEMO IV INFUS EACH ADDL SEQ: CPT

## 2021-01-20 PROCEDURE — G8427 DOCREV CUR MEDS BY ELIG CLIN: HCPCS | Performed by: INTERNAL MEDICINE

## 2021-01-20 PROCEDURE — 85025 COMPLETE CBC W/AUTO DIFF WBC: CPT

## 2021-01-20 PROCEDURE — 36591 DRAW BLOOD OFF VENOUS DEVICE: CPT

## 2021-01-20 PROCEDURE — 96375 TX/PRO/DX INJ NEW DRUG ADDON: CPT

## 2021-01-20 PROCEDURE — 1123F ACP DISCUSS/DSCN MKR DOCD: CPT | Performed by: INTERNAL MEDICINE

## 2021-01-20 PROCEDURE — 96367 TX/PROPH/DG ADDL SEQ IV INF: CPT

## 2021-01-20 PROCEDURE — G8417 CALC BMI ABV UP PARAM F/U: HCPCS | Performed by: INTERNAL MEDICINE

## 2021-01-20 PROCEDURE — 6360000002 HC RX W HCPCS: Performed by: INTERNAL MEDICINE

## 2021-01-20 PROCEDURE — 99215 OFFICE O/P EST HI 40 MIN: CPT | Performed by: INTERNAL MEDICINE

## 2021-01-20 PROCEDURE — 4004F PT TOBACCO SCREEN RCVD TLK: CPT | Performed by: INTERNAL MEDICINE

## 2021-01-20 PROCEDURE — 80053 COMPREHEN METABOLIC PANEL: CPT

## 2021-01-20 PROCEDURE — 99211 OFF/OP EST MAY X REQ PHY/QHP: CPT | Performed by: INTERNAL MEDICINE

## 2021-01-20 RX ORDER — SODIUM CHLORIDE 0.9 % (FLUSH) 0.9 %
5 SYRINGE (ML) INJECTION PRN
Status: CANCELLED | OUTPATIENT
Start: 2021-02-19

## 2021-01-20 RX ORDER — SODIUM CHLORIDE 9 MG/ML
20 INJECTION, SOLUTION INTRAVENOUS ONCE
Status: COMPLETED | OUTPATIENT
Start: 2021-01-20 | End: 2021-01-20

## 2021-01-20 RX ORDER — DIPHENHYDRAMINE HYDROCHLORIDE 50 MG/ML
50 INJECTION INTRAMUSCULAR; INTRAVENOUS ONCE
Status: CANCELLED | OUTPATIENT
Start: 2021-02-18 | End: 2021-02-09

## 2021-01-20 RX ORDER — METHYLPREDNISOLONE SODIUM SUCCINATE 125 MG/2ML
125 INJECTION, POWDER, LYOPHILIZED, FOR SOLUTION INTRAMUSCULAR; INTRAVENOUS ONCE
Status: CANCELLED | OUTPATIENT
Start: 2021-02-17 | End: 2021-02-08

## 2021-01-20 RX ORDER — SODIUM CHLORIDE 0.9 % (FLUSH) 0.9 %
10 SYRINGE (ML) INJECTION PRN
Status: CANCELLED | OUTPATIENT
Start: 2021-02-17

## 2021-01-20 RX ORDER — DIPHENHYDRAMINE HYDROCHLORIDE 50 MG/ML
50 INJECTION INTRAMUSCULAR; INTRAVENOUS ONCE
Status: CANCELLED | OUTPATIENT
Start: 2021-02-19 | End: 2021-02-10

## 2021-01-20 RX ORDER — SODIUM CHLORIDE 0.9 % (FLUSH) 0.9 %
10 SYRINGE (ML) INJECTION PRN
Status: DISCONTINUED | OUTPATIENT
Start: 2021-01-20 | End: 2021-01-21 | Stop reason: HOSPADM

## 2021-01-20 RX ORDER — SODIUM CHLORIDE 9 MG/ML
INJECTION, SOLUTION INTRAVENOUS CONTINUOUS
Status: CANCELLED | OUTPATIENT
Start: 2021-02-18

## 2021-01-20 RX ORDER — SODIUM CHLORIDE 9 MG/ML
20 INJECTION, SOLUTION INTRAVENOUS ONCE
Status: CANCELLED | OUTPATIENT
Start: 2021-02-17 | End: 2021-02-08

## 2021-01-20 RX ORDER — EPINEPHRINE 1 MG/ML
0.3 INJECTION, SOLUTION, CONCENTRATE INTRAVENOUS PRN
Status: CANCELLED | OUTPATIENT
Start: 2021-02-18

## 2021-01-20 RX ORDER — SODIUM CHLORIDE 0.9 % (FLUSH) 0.9 %
5 SYRINGE (ML) INJECTION PRN
Status: CANCELLED | OUTPATIENT
Start: 2021-02-18

## 2021-01-20 RX ORDER — HEPARIN SODIUM (PORCINE) LOCK FLUSH IV SOLN 100 UNIT/ML 100 UNIT/ML
500 SOLUTION INTRAVENOUS PRN
Status: DISCONTINUED | OUTPATIENT
Start: 2021-01-20 | End: 2021-01-21 | Stop reason: HOSPADM

## 2021-01-20 RX ORDER — SODIUM CHLORIDE 0.9 % (FLUSH) 0.9 %
10 SYRINGE (ML) INJECTION PRN
Status: CANCELLED | OUTPATIENT
Start: 2021-02-18

## 2021-01-20 RX ORDER — SODIUM CHLORIDE 9 MG/ML
INJECTION, SOLUTION INTRAVENOUS CONTINUOUS
Status: CANCELLED | OUTPATIENT
Start: 2021-02-19

## 2021-01-20 RX ORDER — EPINEPHRINE 1 MG/ML
0.3 INJECTION, SOLUTION, CONCENTRATE INTRAVENOUS PRN
Status: CANCELLED | OUTPATIENT
Start: 2021-02-17

## 2021-01-20 RX ORDER — PALONOSETRON 0.05 MG/ML
0.25 INJECTION, SOLUTION INTRAVENOUS ONCE
Status: COMPLETED | OUTPATIENT
Start: 2021-01-20 | End: 2021-01-20

## 2021-01-20 RX ORDER — SODIUM CHLORIDE 9 MG/ML
20 INJECTION, SOLUTION INTRAVENOUS ONCE
Status: CANCELLED | OUTPATIENT
Start: 2021-02-18 | End: 2021-02-09

## 2021-01-20 RX ORDER — 0.9 % SODIUM CHLORIDE 0.9 %
500 INTRAVENOUS SOLUTION INTRAVENOUS ONCE
Status: CANCELLED
Start: 2021-01-22

## 2021-01-20 RX ORDER — DIPHENHYDRAMINE HYDROCHLORIDE 50 MG/ML
50 INJECTION INTRAMUSCULAR; INTRAVENOUS ONCE
Status: CANCELLED | OUTPATIENT
Start: 2021-02-17 | End: 2021-02-08

## 2021-01-20 RX ORDER — SODIUM CHLORIDE 0.9 % (FLUSH) 0.9 %
5 SYRINGE (ML) INJECTION PRN
Status: CANCELLED | OUTPATIENT
Start: 2021-02-17

## 2021-01-20 RX ORDER — SODIUM CHLORIDE 0.9 % (FLUSH) 0.9 %
10 SYRINGE (ML) INJECTION PRN
Status: CANCELLED | OUTPATIENT
Start: 2021-02-19

## 2021-01-20 RX ORDER — SODIUM CHLORIDE 9 MG/ML
INJECTION, SOLUTION INTRAVENOUS CONTINUOUS
Status: CANCELLED | OUTPATIENT
Start: 2021-02-17

## 2021-01-20 RX ORDER — METHYLPREDNISOLONE SODIUM SUCCINATE 125 MG/2ML
125 INJECTION, POWDER, LYOPHILIZED, FOR SOLUTION INTRAMUSCULAR; INTRAVENOUS ONCE
Status: CANCELLED | OUTPATIENT
Start: 2021-02-18 | End: 2021-02-09

## 2021-01-20 RX ORDER — PALONOSETRON 0.05 MG/ML
0.25 INJECTION, SOLUTION INTRAVENOUS ONCE
Status: CANCELLED | OUTPATIENT
Start: 2021-02-17

## 2021-01-20 RX ORDER — HEPARIN SODIUM (PORCINE) LOCK FLUSH IV SOLN 100 UNIT/ML 100 UNIT/ML
500 SOLUTION INTRAVENOUS PRN
Status: CANCELLED | OUTPATIENT
Start: 2021-02-18

## 2021-01-20 RX ORDER — 0.9 % SODIUM CHLORIDE 0.9 %
500 INTRAVENOUS SOLUTION INTRAVENOUS ONCE
Status: CANCELLED
Start: 2021-01-25

## 2021-01-20 RX ORDER — EPINEPHRINE 1 MG/ML
0.3 INJECTION, SOLUTION, CONCENTRATE INTRAVENOUS PRN
Status: CANCELLED | OUTPATIENT
Start: 2021-02-19

## 2021-01-20 RX ORDER — METHYLPREDNISOLONE SODIUM SUCCINATE 125 MG/2ML
125 INJECTION, POWDER, LYOPHILIZED, FOR SOLUTION INTRAMUSCULAR; INTRAVENOUS ONCE
Status: CANCELLED | OUTPATIENT
Start: 2021-02-19 | End: 2021-02-10

## 2021-01-20 RX ORDER — HEPARIN SODIUM (PORCINE) LOCK FLUSH IV SOLN 100 UNIT/ML 100 UNIT/ML
500 SOLUTION INTRAVENOUS PRN
Status: CANCELLED | OUTPATIENT
Start: 2021-02-19

## 2021-01-20 RX ORDER — SODIUM CHLORIDE 9 MG/ML
20 INJECTION, SOLUTION INTRAVENOUS ONCE
Status: CANCELLED | OUTPATIENT
Start: 2021-02-19 | End: 2021-02-10

## 2021-01-20 RX ORDER — HEPARIN SODIUM (PORCINE) LOCK FLUSH IV SOLN 100 UNIT/ML 100 UNIT/ML
500 SOLUTION INTRAVENOUS PRN
Status: CANCELLED | OUTPATIENT
Start: 2021-02-17

## 2021-01-20 RX ORDER — DEXAMETHASONE SODIUM PHOSPHATE 10 MG/ML
10 INJECTION INTRAMUSCULAR; INTRAVENOUS ONCE
Status: COMPLETED | OUTPATIENT
Start: 2021-01-20 | End: 2021-01-20

## 2021-01-20 RX ADMIN — DEXAMETHASONE SODIUM PHOSPHATE 10 MG: 10 INJECTION INTRAMUSCULAR; INTRAVENOUS at 09:40

## 2021-01-20 RX ADMIN — SODIUM CHLORIDE 20 ML/HR: 9 INJECTION, SOLUTION INTRAVENOUS at 09:41

## 2021-01-20 RX ADMIN — CARBOPLATIN 450 MG: 10 INJECTION INTRAVENOUS at 12:26

## 2021-01-20 RX ADMIN — Medication 10 ML: at 13:41

## 2021-01-20 RX ADMIN — FOSAPREPITANT 150 MG: 150 INJECTION, POWDER, LYOPHILIZED, FOR SOLUTION INTRAVENOUS at 10:09

## 2021-01-20 RX ADMIN — PALONOSETRON 0.25 MG: 0.05 INJECTION, SOLUTION INTRAVENOUS at 09:40

## 2021-01-20 RX ADMIN — HEPARIN 500 UNITS: 100 SYRINGE at 13:41

## 2021-01-20 RX ADMIN — ATEZOLIZUMAB 1200 MG: 1200 INJECTION, SOLUTION INTRAVENOUS at 10:42

## 2021-01-20 RX ADMIN — ETOPOSIDE 210 MG: 20 INJECTION INTRAVENOUS at 11:23

## 2021-01-20 RX ADMIN — Medication 10 ML: at 09:40

## 2021-01-20 NOTE — PROGRESS NOTES
Patient here for C3D1 Tecentriq Carbo VP16. Labs reviewed. He saw Dr. Ava Cai today see his dictation. He tolerated treatment well and was discharged home in stable condition. He is due to return tomorrow C3D2.

## 2021-01-20 NOTE — PATIENT INSTRUCTIONS
tx today     ivf added on Friday     Pt concerned about insurance would like to Marysol powell in 3 weeks

## 2021-01-20 NOTE — PROGRESS NOTES
Ramy Meza                                                                                                                  1/20/2021  MRN:   [id number]  YOB: 1950  PCP:                           Shana Lemon MD  Referring Physician: No ref. 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   Chemotherapy changed to carboplatin and etoposide due to renal insufficiency from cycle #2  PCI 7/2019  SRS to adrenal gland metastasis, completed 07/2020  systemic palliative chemoimmunotherapy with carboplatin etoposide and Tecentriq, 12/2020      Summary of Case/History:    Ramy Meza a 72 y.o.male is a patient diagnosed with lung cancer with the component of small cell as well as squamous cell carcinoma Patient has a significant history of tobacco dependence and underwent CT lung screening in December 2018. CT scan was read as lung rads degree 4B. Subsequently she underwent biopsy of right upper lobe lung nodule on 1/16/19. 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 CT PET done showed abnormal FDG uptake in the right upper lobe nodule. There was also abnormal FDG uptake in the right lower lobe nodule. Additionally right hilar lymph node were also FDG avid. No bony lesion was reported. MRI brain for staging workup did not show any evidence of intracranial metastasis. Tip of the odontoid process was ill-defined and metastasis could not be ruled out. Clinically patient does give history of trauma to the neck area any years ago however denies any surgery history. Bone scan did not reveal any metastasis     Patient continues to smoke but has cut down quite a bit. He works full-time in a [address] Patient has good performance status, ECOG 0. Patient's other medical problems include dyslipidemia and hypertension. He also has arthritis pain    Started patient on concurrent chemoradiation using cisplatin and etoposide with dose adjustment for renal dysfunction. Chemotherapy changed to carboplatin and etoposide from cycle #2 due to renal dysfunction    Received PCI in July 2019. Interim History:    Patient presents to the clinic for a follow-up visit and for toxicity check and to review results of her blood work-up. He has had recent falls, he doesn't feel he has any fractures but does have pain and poor mobility in the right arm that has been improving. He is unsure cause of the falls but both times were during the night. He has been working increase hydration. He has lost weight due to dysgeusia. During this visit patient's allergy, social, medical, surgical history and medications were reviewed and updated.     Past Medical History: Past Medical History:   Diagnosis Date    DDD (degenerative disc disease), cervical     Gout     Heart murmur     hx. of when younger.  Hyperlipidemia     Hypertension     Lung [facility] ([facility]     right lung    MI (myocardial infarction) ([facility] [facility]     Skin cancer     face    Wears glasses        Past Surgical History:     Past Surgical History:   Procedure Laterality Date    APPENDECTOMY      CARDIAC CATHETERIZATION      w/stent    COLONOSCOPY      CYST INCISION AND DRAINAGE Right 05/14/2020    rt elbow I and D and left knee aspiration with cultures    FOOT SURGERY Right     2nd toe(bone spur).  FOREARM SURGERY Right 5/14/2020    RIGHT ELBOW IRRIGATION AND DEBRIDEMENT performed by Samantha Grande DO at [facility] Left 5/14/2020    KNEE ASPIRATION WITH CULTURES SENT performed by Samantha Grande DO at [address] [place] [address] under lt. eye.     TONSILLECTOMY         Patient Family Social History:    Family History   Problem Relation Age of Onset    Cancer Father         lung cancer      Social History     Socioeconomic History    Marital status:      Spouse name: Not on file    Number of children: Not on file    Years of education: Not on file    Highest education level: Not on file   Occupational History    Not on file   Social Needs    Financial resource strain: Not on file    Food insecurity     Worry: Not on file     Inability: Not on file    Transportation needs     Medical: Not on file     Non-medical: Not on file   Tobacco Use    Smoking status: Current Every Day Smoker     Packs/day: 0.50     Types: Cigarettes    Smokeless tobacco: Former User   Substance and Sexual Activity    Alcohol use: No    Drug use: Yes     Frequency: 14.0 times per week     Types: Marijuana    Sexual activity: Not on file   Lifestyle    Physical activity     Days per week: Not on file     Minutes per session: Not on file    Stress: Not on file Relationships    Social connections     Talks on phone: Not on file     Gets together: Not on file     Attends [employer]ism service: Not on file     Active member of club or organization: Not on file     Attends meetings of clubs or organizations: Not on file     Relationship status: Not on file    Intimate partner violence     Fear of current or ex partner: Not on file     Emotionally abused: Not on file     Physically abused: Not on file     Forced sexual activity: Not on file   Other Topics Concern    Not on file   Social History Narrative    Not on file      Current Medications:     Current Outpatient Medications   Medication Sig Dispense Refill    B Complex-C-Folic Acid (ANGELICA-HUGO) TABS TAKE 1 TABLET BY MOUTH DAILY. HEMATINIC VIT MINERALS      traZODone (DESYREL) 100 MG tablet TAKE 1 TABLET BY MOUTH EVERY DAY AT NIGHT      ipratropium-albuterol (DUONEB) 0.5-2.5 (3) MG/3ML SOLN nebulizer solution Inhale 3 mLs into the lungs 4 times daily      potassium chloride (MICRO-K) 10 MEQ extended release capsule Take 20 mEq by mouth      Ferrous Fumarate (FERROCITE) 324 (106 Fe) MG TABS Take by mouth      apixaban (ELIQUIS) 5 MG TABS tablet Take 1 tablet by mouth 2 times daily 60 tablet 1    metoprolol tartrate (LOPRESSOR) 25 MG tablet Take 0.5 tablets by mouth 2 times daily 60 tablet 3    tamsulosin (FLOMAX) 0.4 MG capsule TAKE 1 CAPSULE BY MOUTH EVERY DAY 30 capsule 5    Handicap Placard MISC by Does not apply route 1 each 0    lidocaine-prilocaine (EMLA) 2.5-2.5 % cream Apply topically as needed. Apply a quarter size amount to port site 1 hour before chemotherapy. Cover with plastic wrap.  30 g 0    acetaminophen (TYLENOL) 325 MG tablet Take 650 mg by mouth every 6 hours as needed for Pain      allopurinol (ZYLOPRIM) 100 MG tablet Take 100 mg by mouth daily      simvastatin (ZOCOR) 40 MG tablet Take 40 mg by mouth daily  Multiple Vitamins-Minerals (THERAPEUTIC MULTIVITAMIN-MINERALS) tablet Take 1 tablet by mouth daily      docusate sodium (COLACE) 100 MG capsule Take 100 mg by mouth 2 times daily      Melatonin 5 MG CHEW Take 5 mg by mouth nightly       No current facility-administered medications for this visit. Allergies:   Patient has no known allergies. Review of Systems:    Constitutional: No fever or chills. No night sweats, positive fatigue. +weight loss  Eyes: No eye discharge, double vision, or eye pain   HEENT: negative for sore mouth, sore throat, hoarseness and voice change; Respiratory: negative for cough, sputum, wheezing, hemoptysis, chest pain; +exertional shortness of breath  Cardiovascular: negative for chest pain, palpitations, orthopnea, PND   Gastrointestinal: negative for nausea, vomiting, diarrhea, constipation, abdominal pain, Dysphagia, hematemesis and hematochezia   Genitourinary: negative for frequency, dysuria, nocturia, urinary incontinence, and hematuria +urgency  Integument: Positive for easy bruising - stable  Hematologic/Lymphatic: negative for easy bleeding, lymphadenopathy, or petechiae   Endocrine: negative for heat or cold intolerance,weight changes, change in bowel habits and hair loss   Musculoskeletal: Positive joint pain.  +right shoulder pain  Neurological: negative for headaches, dizziness, seizures, weakness; + poor balance; +neuropathy in left hand from shoulder +poor short term memory        Physical Exam:    Vitals: /75   Pulse 91   Temp 97.7 °F (36.5 °C) (Oral)   Wt 181 lb 14.4 oz (82.5 kg)   BMI 25.73 kg/m²   General appearance -patient not in acute distress  Mental status - AAO X3  Eyes - pupils equal and reactive, extraocular eye movements intact  Mouth - mucous membranes moist, pharynx normal without lesions  Neck - supple, no significant adenopathy  Lymphatics - no palpable lymphadenopathy, no hepatosplenomegaly Chest - clear to auscultation, rales or rhonchi, symmetric air entry  Heart - normal rate, regular rhythm, normal S1, S2, no murmurs  Abdomen - soft, nontender, nondistended, no masses or organomegaly  Neurological - alert, oriented, normal speech, no focal findings or movement disorder noted  Extremities -+1 lower extremity pitting edema.  Right arm has bruising, no point tenderness or swelling  Skin - normal coloration and turgor, no rashes, no suspicious skin lesions noted       DATA:    Labs:   Lab Results   Component Value Date    WBC 4.2 01/20/2021    HGB 9.8 (L) 01/20/2021    HCT 28.7 (L) 01/20/2021    .7 (H) 01/20/2021     01/20/2021       Chemistry        Component Value Date/Time     (L) 01/20/2021 0831    K 4.2 01/20/2021 0831     01/20/2021 0831    CO2 23 01/20/2021 0831    BUN 23 01/20/2021 0831    CREATININE 1.25 (H) 01/20/2021 0831        Component Value Date/Time    CALCIUM 9.5 01/20/2021 0831    ALKPHOS 102 01/20/2021 0831    AST 26 01/20/2021 0831    ALT 28 01/20/2021 0831    BILITOT 0.19 (L) 01/20/2021 0831      CT scan:  Impression   1.  New liver lesions compatible with metastatic disease.       2.  Ovoid soft tissue mass in the medial right hemithorax has progressively   increased, which may represent a lymph node or focal pleural thickening,   concerning for neoplastic involvement.       3.  Interval treatment of the left adrenal metastasis, which is no longer   visualized.              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 He is losing weight. His lab work was reviewed, counts are adequate for treatment, his kidney function is decreased and I am writing for IV hydration to be done Fridays for the remainder of treatment, he refused any additional hydration. I completed toxicity check. We reviewed plan for 4 cycles to be followed by maintenance immunotherapy. Reviewed goals and expectations. Reiterated treatment plan. Discussed natural history of small cell lung cancer. We will also obtain MRI of brain after cycle 4 or sooner if clinically indicated given patient's complaints of falls. Return in 3 weeks. Patient's conditions were taken into consideration when deciding risk-benefit for therapy. Patient is at high risk for drug interaction risk of complications. Given multiple comorbid conditions patient is at high risk for complication from intervention, risk of hospitalization, medical interaction overall life expectancy. NCCN guidelines were reviewed and discussed with the patient. The diagnosis and care plan were discussed with the patient in detail. I discussed the natural history of the disease, prognosis, risks and goals of therapy and answered all the patients questions to the best of my ability. Patient expressed understanding and was in agreement.     Eliot Sanchez

## 2021-01-20 NOTE — TELEPHONE ENCOUNTER
AVS fron 1/20/21     tx today      ivf added on Friday      Pt concerned about insurance would like to [facility] in 3 weeks     Tx today as scheduled    Sully Otero will contact pt in re to insurance    RV scheduled 2/10/21 @ 8:30am with tx to follow, draw cbc cmp    PT was given AVS and an appt schedule    Electronically signed by Segundo De Anda on 1/20/2021 at 10:01 AM

## 2021-01-21 ENCOUNTER — HOSPITAL ENCOUNTER (OUTPATIENT)
Dept: INFUSION THERAPY | Age: 71
Discharge: HOME | End: 2021-01-21
Payer: COMMERCIAL

## 2021-01-21 VITALS
TEMPERATURE: 97.7 F | DIASTOLIC BLOOD PRESSURE: 67 MMHG | SYSTOLIC BLOOD PRESSURE: 109 MMHG | HEART RATE: 74 BPM | RESPIRATION RATE: 18 BRPM

## 2021-01-21 DIAGNOSIS — C34.91 PRIMARY LUNG CANCER WITH METASTASIS FROM LUNG TO OTHER SITE, RIGHT (HCC): Primary | ICD-10-CM

## 2021-01-21 PROCEDURE — 96413 CHEMO IV INFUSION 1 HR: CPT

## 2021-01-21 PROCEDURE — 96375 TX/PRO/DX INJ NEW DRUG ADDON: CPT

## 2021-01-21 PROCEDURE — 6360000002 HC RX W HCPCS: Performed by: INTERNAL MEDICINE

## 2021-01-21 PROCEDURE — 2580000003 HC RX 258: Performed by: INTERNAL MEDICINE

## 2021-01-21 RX ORDER — HEPARIN SODIUM (PORCINE) LOCK FLUSH IV SOLN 100 UNIT/ML 100 UNIT/ML
500 SOLUTION INTRAVENOUS PRN
Status: DISCONTINUED | OUTPATIENT
Start: 2021-01-21 | End: 2021-01-22 | Stop reason: HOSPADM

## 2021-01-21 RX ORDER — SODIUM CHLORIDE 9 MG/ML
20 INJECTION, SOLUTION INTRAVENOUS ONCE
Status: COMPLETED | OUTPATIENT
Start: 2021-01-21 | End: 2021-01-21

## 2021-01-21 RX ORDER — SODIUM CHLORIDE 0.9 % (FLUSH) 0.9 %
10 SYRINGE (ML) INJECTION PRN
Status: DISCONTINUED | OUTPATIENT
Start: 2021-01-21 | End: 2021-01-22 | Stop reason: HOSPADM

## 2021-01-21 RX ORDER — DEXAMETHASONE SODIUM PHOSPHATE 4 MG/ML
8 INJECTION, SOLUTION INTRA-ARTICULAR; INTRALESIONAL; INTRAMUSCULAR; INTRAVENOUS; SOFT TISSUE ONCE
Status: COMPLETED | OUTPATIENT
Start: 2021-01-21 | End: 2021-01-21

## 2021-01-21 RX ADMIN — SODIUM CHLORIDE 20 ML/HR: 9 INJECTION, SOLUTION INTRAVENOUS at 12:08

## 2021-01-21 RX ADMIN — Medication 10 ML: at 12:05

## 2021-01-21 RX ADMIN — ETOPOSIDE 210 MG: 20 INJECTION INTRAVENOUS at 12:23

## 2021-01-21 RX ADMIN — Medication 10 ML: at 13:40

## 2021-01-21 RX ADMIN — Medication 10 ML: at 12:04

## 2021-01-21 RX ADMIN — DEXAMETHASONE SODIUM PHOSPHATE 8 MG: 4 INJECTION, SOLUTION INTRAMUSCULAR; INTRAVENOUS at 12:14

## 2021-01-21 RX ADMIN — HEPARIN 500 UNITS: 100 SYRINGE at 13:40

## 2021-01-21 RX ADMIN — Medication 10 ML: at 12:06

## 2021-01-21 NOTE — PROGRESS NOTES
Pt here for C3D2. Denies any new complaints. Labs drawn from port and results reviewed. Pt was treated without incident and d/c'd in stable condition. Pt will return 1/22 for C3D3.

## 2021-01-22 ENCOUNTER — HOSPITAL ENCOUNTER (OUTPATIENT)
Dept: INFUSION THERAPY | Age: 71
Discharge: HOME | End: 2021-01-22
Payer: COMMERCIAL

## 2021-01-22 VITALS
HEART RATE: 70 BPM | DIASTOLIC BLOOD PRESSURE: 70 MMHG | RESPIRATION RATE: 18 BRPM | SYSTOLIC BLOOD PRESSURE: 115 MMHG | TEMPERATURE: 97.8 F

## 2021-01-22 DIAGNOSIS — C34.91 PRIMARY LUNG CANCER WITH METASTASIS FROM LUNG TO OTHER SITE, RIGHT (HCC): Primary | ICD-10-CM

## 2021-01-22 PROCEDURE — 96413 CHEMO IV INFUSION 1 HR: CPT

## 2021-01-22 PROCEDURE — 96375 TX/PRO/DX INJ NEW DRUG ADDON: CPT

## 2021-01-22 PROCEDURE — 2580000003 HC RX 258: Performed by: INTERNAL MEDICINE

## 2021-01-22 PROCEDURE — 6360000002 HC RX W HCPCS: Performed by: INTERNAL MEDICINE

## 2021-01-22 PROCEDURE — 96361 HYDRATE IV INFUSION ADD-ON: CPT

## 2021-01-22 RX ORDER — 0.9 % SODIUM CHLORIDE 0.9 %
500 INTRAVENOUS SOLUTION INTRAVENOUS ONCE
Status: COMPLETED | OUTPATIENT
Start: 2021-01-22 | End: 2021-01-22

## 2021-01-22 RX ORDER — SODIUM CHLORIDE 9 MG/ML
20 INJECTION, SOLUTION INTRAVENOUS ONCE
Status: DISCONTINUED | OUTPATIENT
Start: 2021-01-22 | End: 2021-01-23 | Stop reason: HOSPADM

## 2021-01-22 RX ORDER — SODIUM CHLORIDE 0.9 % (FLUSH) 0.9 %
10 SYRINGE (ML) INJECTION PRN
Status: DISCONTINUED | OUTPATIENT
Start: 2021-01-22 | End: 2021-01-23 | Stop reason: HOSPADM

## 2021-01-22 RX ORDER — DEXAMETHASONE SODIUM PHOSPHATE 4 MG/ML
8 INJECTION, SOLUTION INTRA-ARTICULAR; INTRALESIONAL; INTRAMUSCULAR; INTRAVENOUS; SOFT TISSUE ONCE
Status: COMPLETED | OUTPATIENT
Start: 2021-01-22 | End: 2021-01-22

## 2021-01-22 RX ORDER — HEPARIN SODIUM (PORCINE) LOCK FLUSH IV SOLN 100 UNIT/ML 100 UNIT/ML
500 SOLUTION INTRAVENOUS PRN
Status: DISCONTINUED | OUTPATIENT
Start: 2021-01-22 | End: 2021-01-23 | Stop reason: HOSPADM

## 2021-01-22 RX ADMIN — ETOPOSIDE 210 MG: 20 INJECTION INTRAVENOUS at 11:20

## 2021-01-22 RX ADMIN — DEXAMETHASONE SODIUM PHOSPHATE 8 MG: 4 INJECTION, SOLUTION INTRAMUSCULAR; INTRAVENOUS at 11:01

## 2021-01-22 RX ADMIN — Medication 10 ML: at 11:01

## 2021-01-22 RX ADMIN — HEPARIN 500 UNITS: 100 SYRINGE at 12:27

## 2021-01-22 RX ADMIN — SODIUM CHLORIDE 500 ML: 9 INJECTION, SOLUTION INTRAVENOUS at 11:01

## 2021-01-22 RX ADMIN — Medication 10 ML: at 12:27

## 2021-01-22 NOTE — PROGRESS NOTES
Pt here for C3D3 VP16 and hydration. Denies any new complaints. Pt was treated without incident and d/c'd in stable condition. Pt will return on 2-10-21 for MD follow up and C4D1.

## 2021-02-10 ENCOUNTER — TELEPHONE (OUTPATIENT)
Dept: ONCOLOGY | Age: 71
End: 2021-02-10

## 2021-02-10 ENCOUNTER — OFFICE VISIT (OUTPATIENT)
Dept: ONCOLOGY | Age: 71
End: 2021-02-10
Payer: COMMERCIAL

## 2021-02-10 ENCOUNTER — TELEPHONE (OUTPATIENT)
Dept: SPIRITUAL SERVICES | Age: 71
End: 2021-02-10

## 2021-02-10 ENCOUNTER — CLINICAL DOCUMENTATION (OUTPATIENT)
Dept: SPIRITUAL SERVICES | Age: 71
End: 2021-02-10

## 2021-02-10 ENCOUNTER — HOSPITAL ENCOUNTER (OUTPATIENT)
Dept: INFUSION THERAPY | Age: 71
Discharge: HOME | End: 2021-02-10
Payer: COMMERCIAL

## 2021-02-10 VITALS
SYSTOLIC BLOOD PRESSURE: 108 MMHG | DIASTOLIC BLOOD PRESSURE: 71 MMHG | BODY MASS INDEX: 26.65 KG/M2 | HEART RATE: 83 BPM | WEIGHT: 188.4 LBS | TEMPERATURE: 97.4 F

## 2021-02-10 DIAGNOSIS — C79.70 MALIGNANT NEOPLASM METASTATIC TO ADRENAL GLAND, UNSPECIFIED LATERALITY (HCC): ICD-10-CM

## 2021-02-10 DIAGNOSIS — C34.91 MALIGNANT NEOPLASM OF RIGHT LUNG, UNSPECIFIED PART OF LUNG (HCC): ICD-10-CM

## 2021-02-10 DIAGNOSIS — C34.91 PRIMARY LUNG CANCER WITH METASTASIS FROM LUNG TO OTHER SITE, RIGHT (HCC): Primary | ICD-10-CM

## 2021-02-10 LAB
ABSOLUTE EOS #: 0.1 K/UL (ref 0–0.4)
ABSOLUTE IMMATURE GRANULOCYTE: ABNORMAL K/UL (ref 0–0.3)
ABSOLUTE LYMPH #: 0.92 K/UL (ref 1–4.8)
ABSOLUTE MONO #: 1.19 K/UL (ref 0.1–0.8)
ALBUMIN SERPL-MCNC: 3.6 G/DL (ref 3.5–5.2)
ALBUMIN/GLOBULIN RATIO: 1.1 (ref 1–2.5)
ALP BLD-CCNC: 96 U/L (ref 40–129)
ALT SERPL-CCNC: 12 U/L (ref 5–41)
AMYLASE: 46 U/L (ref 28–100)
ANION GAP SERPL CALCULATED.3IONS-SCNC: 7 MMOL/L (ref 9–17)
AST SERPL-CCNC: 14 U/L
BASOPHILS # BLD: 1 % (ref 0–2)
BASOPHILS ABSOLUTE: 0.03 K/UL (ref 0–0.2)
BILIRUB SERPL-MCNC: 0.17 MG/DL (ref 0.3–1.2)
BUN BLDV-MCNC: 19 MG/DL (ref 8–23)
BUN/CREAT BLD: ABNORMAL (ref 9–20)
CALCIUM SERPL-MCNC: 9.2 MG/DL (ref 8.6–10.4)
CHLORIDE BLD-SCNC: 107 MMOL/L (ref 98–107)
CO2: 24 MMOL/L (ref 20–31)
CORTISOL COLLECTION INFO: NORMAL
CORTISOL: 13.4 UG/DL (ref 2.7–18.4)
CREAT SERPL-MCNC: 1.19 MG/DL (ref 0.7–1.2)
DIFFERENTIAL TYPE: ABNORMAL
EOSINOPHILS RELATIVE PERCENT: 3 % (ref 1–4)
GFR AFRICAN AMERICAN: >60 ML/MIN
GFR NON-AFRICAN AMERICAN: >60 ML/MIN
GFR SERPL CREATININE-BSD FRML MDRD: ABNORMAL ML/MIN/{1.73_M2}
GFR SERPL CREATININE-BSD FRML MDRD: ABNORMAL ML/MIN/{1.73_M2}
GLUCOSE BLD-MCNC: 145 MG/DL (ref 70–99)
HCT VFR BLD CALC: 28.1 % (ref 41–53)
HEMOGLOBIN: 9.1 G/DL (ref 13.5–17.5)
IMMATURE GRANULOCYTES: ABNORMAL %
LIPASE: 27 U/L (ref 13–60)
LYMPHOCYTES # BLD: 27 % (ref 24–44)
MCH RBC QN AUTO: 34 PG (ref 26–34)
MCHC RBC AUTO-ENTMCNC: 32.4 G/DL (ref 31–37)
MCV RBC AUTO: 104.9 FL (ref 80–100)
MONOCYTES # BLD: 35 % (ref 1–7)
MORPHOLOGY: ABNORMAL
MORPHOLOGY: ABNORMAL
NRBC AUTOMATED: ABNORMAL PER 100 WBC
PDW BLD-RTO: 16.5 % (ref 12.5–15.4)
PLATELET # BLD: 210 K/UL (ref 140–450)
PLATELET ESTIMATE: ABNORMAL
PMV BLD AUTO: 9.3 FL (ref 8–14)
POTASSIUM SERPL-SCNC: 4.7 MMOL/L (ref 3.7–5.3)
RBC # BLD: 2.68 M/UL (ref 4.5–5.9)
RBC # BLD: ABNORMAL 10*6/UL
SEG NEUTROPHILS: 34 % (ref 36–66)
SEGMENTED NEUTROPHILS ABSOLUTE COUNT: 1.16 K/UL (ref 1.8–7.7)
SODIUM BLD-SCNC: 138 MMOL/L (ref 135–144)
TOTAL PROTEIN: 6.9 G/DL (ref 6.4–8.3)
TSH SERPL DL<=0.05 MIU/L-ACNC: 1.49 MIU/L (ref 0.3–5)
WBC # BLD: 3.4 K/UL (ref 3.5–11)
WBC # BLD: ABNORMAL 10*3/UL

## 2021-02-10 PROCEDURE — 36591 DRAW BLOOD OFF VENOUS DEVICE: CPT

## 2021-02-10 PROCEDURE — 6360000002 HC RX W HCPCS: Performed by: INTERNAL MEDICINE

## 2021-02-10 PROCEDURE — G8484 FLU IMMUNIZE NO ADMIN: HCPCS | Performed by: INTERNAL MEDICINE

## 2021-02-10 PROCEDURE — 4004F PT TOBACCO SCREEN RCVD TLK: CPT | Performed by: INTERNAL MEDICINE

## 2021-02-10 PROCEDURE — 84443 ASSAY THYROID STIM HORMONE: CPT

## 2021-02-10 PROCEDURE — 85025 COMPLETE CBC W/AUTO DIFF WBC: CPT

## 2021-02-10 PROCEDURE — 3017F COLORECTAL CA SCREEN DOC REV: CPT | Performed by: INTERNAL MEDICINE

## 2021-02-10 PROCEDURE — 99211 OFF/OP EST MAY X REQ PHY/QHP: CPT | Performed by: INTERNAL MEDICINE

## 2021-02-10 PROCEDURE — 4040F PNEUMOC VAC/ADMIN/RCVD: CPT | Performed by: INTERNAL MEDICINE

## 2021-02-10 PROCEDURE — 83690 ASSAY OF LIPASE: CPT

## 2021-02-10 PROCEDURE — 82150 ASSAY OF AMYLASE: CPT

## 2021-02-10 PROCEDURE — G8417 CALC BMI ABV UP PARAM F/U: HCPCS | Performed by: INTERNAL MEDICINE

## 2021-02-10 PROCEDURE — G8427 DOCREV CUR MEDS BY ELIG CLIN: HCPCS | Performed by: INTERNAL MEDICINE

## 2021-02-10 PROCEDURE — 80053 COMPREHEN METABOLIC PANEL: CPT

## 2021-02-10 PROCEDURE — 2580000003 HC RX 258: Performed by: INTERNAL MEDICINE

## 2021-02-10 PROCEDURE — 1123F ACP DISCUSS/DSCN MKR DOCD: CPT | Performed by: INTERNAL MEDICINE

## 2021-02-10 PROCEDURE — 82533 TOTAL CORTISOL: CPT

## 2021-02-10 PROCEDURE — 99215 OFFICE O/P EST HI 40 MIN: CPT | Performed by: INTERNAL MEDICINE

## 2021-02-10 RX ORDER — SODIUM CHLORIDE 0.9 % (FLUSH) 0.9 %
20 SYRINGE (ML) INJECTION PRN
Status: CANCELLED | OUTPATIENT
Start: 2021-02-10

## 2021-02-10 RX ORDER — SODIUM CHLORIDE 0.9 % (FLUSH) 0.9 %
20 SYRINGE (ML) INJECTION PRN
Status: DISCONTINUED | OUTPATIENT
Start: 2021-02-10 | End: 2021-02-11 | Stop reason: HOSPADM

## 2021-02-10 RX ORDER — 0.9 % SODIUM CHLORIDE 0.9 %
500 INTRAVENOUS SOLUTION INTRAVENOUS ONCE
Status: CANCELLED
Start: 2021-02-19 | End: 2021-02-12

## 2021-02-10 RX ORDER — SODIUM CHLORIDE 0.9 % (FLUSH) 0.9 %
10 SYRINGE (ML) INJECTION PRN
Status: CANCELLED | OUTPATIENT
Start: 2021-02-10

## 2021-02-10 RX ORDER — HEPARIN SODIUM (PORCINE) LOCK FLUSH IV SOLN 100 UNIT/ML 100 UNIT/ML
500 SOLUTION INTRAVENOUS PRN
Status: CANCELLED | OUTPATIENT
Start: 2021-02-10

## 2021-02-10 RX ORDER — HEPARIN SODIUM (PORCINE) LOCK FLUSH IV SOLN 100 UNIT/ML 100 UNIT/ML
500 SOLUTION INTRAVENOUS PRN
Status: DISCONTINUED | OUTPATIENT
Start: 2021-02-10 | End: 2021-02-11 | Stop reason: HOSPADM

## 2021-02-10 RX ADMIN — HEPARIN 500 UNITS: 100 SYRINGE at 09:43

## 2021-02-10 RX ADMIN — SODIUM CHLORIDE, PRESERVATIVE FREE 20 ML: 5 INJECTION INTRAVENOUS at 08:17

## 2021-02-10 NOTE — TELEPHONE ENCOUNTER
AVS from 2/10/21     Postpone chemo by 1 week   rv in 4 weeks with tx and scans in the week prior     Tx pushed to next week, notified [facility] and infusion    Pushed remaining scheduled appts out to reflect AVS instructions    CT & MRI scheduled 3/2/21    RV scheduled 3/10/21 @ 10am w/ tx to follow    PT was given AVS and an appt schedule    Electronically signed by Nohemy Frias on 2/10/2021 at 9:55 AM

## 2021-02-10 NOTE — PROGRESS NOTES
Barbra Oneal                                                                                                                  2/10/2021  MRN:   [id number]  YOB: 1950  PCP:                           Abdelrahman Christie MD  Referring Physician: No ref. 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 Chemotherapy changed to carboplatin and etoposide due to renal insufficiency from cycle #2  PCI 7/2019  SRS to adrenal gland metastasis, completed 07/2020  systemic palliative chemoimmunotherapy with carboplatin etoposide and Tecentriq, 12/2020    Summary of Case/History:    Barbra Oneal a 72 y.o.male is a patient diagnosed with lung cancer with the component of small cell as well as squamous cell carcinoma    Patient has a significant history of tobacco dependence and underwent CT lung screening in December 2018. CT scan was read as lung rads degree 4B. Subsequently she underwent biopsy of right upper lobe lung nodule on 1/16/19. 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 CT PET done showed abnormal FDG uptake in the right upper lobe nodule. There was also abnormal FDG uptake in the right lower lobe nodule. Additionally right hilar lymph node were also FDG avid. No bony lesion was reported. MRI brain for staging workup did not show any evidence of intracranial metastasis. Tip of the odontoid process was ill-defined and metastasis could not be ruled out.   Clinically patient does give history of trauma to the neck area any years ago however denies any surgery history. Bone scan did not reveal any metastasis     Patient continues to smoke but has cut down quite a bit. He works full-time in a [address] Patient has good performance status, ECOG 0. Patient's other medical problems include dyslipidemia and hypertension. He also has arthritis pain    Started patient on concurrent chemoradiation using cisplatin and etoposide with dose adjustment for renal dysfunction. Chemotherapy changed to carboplatin and etoposide from cycle #2 due to renal dysfunction    Received PCI in July 2019. Interim History:    Patient presents to the clinic for a follow-up visit and to discuss results of his lab work-up and for toxicity check. Patient complains of fatigue. Weight is stable. Appetite is fair. Denies hospitalization or ER visit. Complains of pain in his feet. Denies shortness of breath with exertion. Complains of memory loss and difficulty remembering things. Complains of occasional headaches. During this visit patient's allergy, social, medical, surgical history and medications were reviewed and updated. Past Medical History:   Past Medical History:   Diagnosis Date    DDD (degenerative disc disease), cervical     Gout     Heart murmur     hx. of when younger.  Hyperlipidemia     Hypertension     Lung cancer ([address]     right lung    MI (myocardial infarction) ([address]     Skin cancer     face    Wears glasses        Past Surgical History:     Past Surgical History:   Procedure Laterality Date    APPENDECTOMY      CARDIAC CATHETERIZATION      w/stent    COLONOSCOPY      CYST INCISION AND DRAINAGE Right 05/14/2020    rt elbow I and D and left knee aspiration with cultures    FOOT SURGERY Right     2nd toe(bone spur).     FOREARM SURGERY Right 5/14/2020    RIGHT ELBOW IRRIGATION AND DEBRIDEMENT performed by Ronny Ortez DO at [facility] [address] Left 5/14/2020    KNEE ASPIRATION WITH CULTURES SENT hematochezia   Genitourinary: negative for frequency, dysuria, nocturia, urinary incontinence, and hematuria +urgency  Integument: Positive for easy bruising - stable  Hematologic/Lymphatic: negative for easy bleeding, lymphadenopathy, or petechiae   Endocrine: negative for heat or cold intolerance,weight changes, change in bowel habits and hair loss   Musculoskeletal: Positive joint pain. +right shoulder pain  Neurological: negative for headaches, dizziness, seizures, weakness; + poor balance; +neuropathy in left hand from shoulder +poor short term memory        Physical Exam:    Vitals: /71   Pulse 83   Temp 97.4 °F (36.3 °C) (Oral)   Wt 188 lb 6.4 oz (85.5 kg)   BMI 26.65 kg/m²   General appearance -patient not in acute distress  Mental status - AAO X3  Eyes - pupils equal and reactive, extraocular eye movements intact  Mouth - mucous membranes moist, pharynx normal without lesions  Neck - supple, no significant adenopathy  Lymphatics - no palpable lymphadenopathy, no hepatosplenomegaly  Chest - clear to auscultation, rales or rhonchi, symmetric air entry  Heart - normal rate, regular rhythm, normal S1, S2, no murmurs  Abdomen - soft, nontender, nondistended, no masses or organomegaly  Neurological - alert, oriented, normal speech, no focal findings or movement disorder noted  Extremities -+1 lower extremity pitting edema.  Right arm has bruising, no point tenderness or swelling  Skin - normal coloration and turgor, no rashes, no suspicious skin lesions noted       DATA:    Labs:   Lab Results   Component Value Date    WBC 3.4 (L) 02/10/2021    HGB 9.1 (L) 02/10/2021    HCT 28.1 (L) 02/10/2021    .9 (H) 02/10/2021     02/10/2021       Chemistry        Component Value Date/Time     02/10/2021 0817    K 4.7 02/10/2021 0817     02/10/2021 0817    CO2 24 02/10/2021 0817    BUN 19 02/10/2021 0817    CREATININE 1.19 02/10/2021 0817        Component Value Date/Time    CALCIUM 9.2 02/10/2021 0817    ALKPHOS 96 02/10/2021 0817    AST 14 02/10/2021 0817    ALT 12 02/10/2021 0817    BILITOT 0.17 (L) 02/10/2021 0817      CT scan:  Impression   1.  New liver lesions compatible with metastatic disease.       2.  Ovoid soft tissue mass in the medial right hemithorax has progressively   increased, which may represent a lymph node or focal pleural thickening,   concerning for neoplastic involvement.       3.  Interval treatment of the left adrenal metastasis, which is no longer   visualized.              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 Toxicity check performed. Patient bone marrow has not recovered adequately to proceed with chemotherapy. Patient was scheduled for cycle #4 today. We will postpone it by week. Discussed next stage small cell lung cancer. We will assess response after cycle 4 with CT scans and also obtain MRI of the brain. Reiterated treatment plan. Encourage patient to keep himself hydrated. Patient counseled on neutropenic precautions  Patient counseled on use of antiemetics and pain medication  Return to clinic in 1 month to review results of CT scans. If patient has response to treatment will transition to maintenance Tecentriq. Patient's conditions were taken into consideration when deciding risk-benefit for therapy. Patient is at high risk for drug interaction risk of complications. Given multiple comorbid conditions patient is at high risk for complication from intervention, risk of hospitalization, medical interaction overall life expectancy. NCCN guidelines were reviewed and discussed with the patient.   The diagnosis and care plan were discussed with the patient in detail. I discussed the natural history of the disease, prognosis, risks and goals of therapy and answered all the patients questions to the best of my ability. Patient expressed understanding and was in agreement. Julissa Dale        I spent more than 40 minutes examining, evaluating, reviewing data, counseling the patient and coordinating care. Greater than 50% of time was spent face-to-face with the patient this note is created with the assistance of a speech recognition program.  While intending to generate a document that actually reflects the content of the visit, the document can still have some errors including those of syntax and sound a like substitutions which may escape proof reading. It such instances, actual meaning can be extrapolated by contextual diversion.

## 2021-02-17 ENCOUNTER — HOSPITAL ENCOUNTER (OUTPATIENT)
Dept: INFUSION THERAPY | Age: 71
Discharge: HOME | End: 2021-02-17
Payer: COMMERCIAL

## 2021-02-17 ENCOUNTER — TELEPHONE (OUTPATIENT)
Dept: ONCOLOGY | Age: 71
End: 2021-02-17

## 2021-02-17 VITALS
HEART RATE: 71 BPM | TEMPERATURE: 97.5 F | DIASTOLIC BLOOD PRESSURE: 69 MMHG | WEIGHT: 190.8 LBS | SYSTOLIC BLOOD PRESSURE: 110 MMHG | BODY MASS INDEX: 26.99 KG/M2 | RESPIRATION RATE: 16 BRPM

## 2021-02-17 DIAGNOSIS — C34.91 PRIMARY LUNG CANCER WITH METASTASIS FROM LUNG TO OTHER SITE, RIGHT (HCC): Primary | ICD-10-CM

## 2021-02-17 LAB
ABSOLUTE EOS #: 0.1 K/UL (ref 0–0.4)
ABSOLUTE IMMATURE GRANULOCYTE: ABNORMAL K/UL (ref 0–0.3)
ABSOLUTE LYMPH #: 0.6 K/UL (ref 1–4.8)
ABSOLUTE MONO #: 1.1 K/UL (ref 0.1–1.2)
ALBUMIN SERPL-MCNC: 3.8 G/DL (ref 3.5–5.2)
ALBUMIN/GLOBULIN RATIO: 1.3 (ref 1–2.5)
ALP BLD-CCNC: 93 U/L (ref 40–129)
ALT SERPL-CCNC: 13 U/L (ref 5–41)
AMYLASE: 55 U/L (ref 28–100)
ANION GAP SERPL CALCULATED.3IONS-SCNC: 6 MMOL/L (ref 9–17)
AST SERPL-CCNC: 16 U/L
BASOPHILS # BLD: 1 % (ref 0–2)
BASOPHILS ABSOLUTE: 0 K/UL (ref 0–0.2)
BILIRUB SERPL-MCNC: 0.26 MG/DL (ref 0.3–1.2)
BUN BLDV-MCNC: 15 MG/DL (ref 8–23)
BUN/CREAT BLD: ABNORMAL (ref 9–20)
CALCIUM SERPL-MCNC: 9.7 MG/DL (ref 8.6–10.4)
CHLORIDE BLD-SCNC: 107 MMOL/L (ref 98–107)
CO2: 25 MMOL/L (ref 20–31)
CORTISOL COLLECTION INFO: NORMAL
CORTISOL: 10.4 UG/DL (ref 2.7–18.4)
CREAT SERPL-MCNC: 1.06 MG/DL (ref 0.7–1.2)
DIFFERENTIAL TYPE: ABNORMAL
EOSINOPHILS RELATIVE PERCENT: 2 % (ref 1–4)
GFR AFRICAN AMERICAN: >60 ML/MIN
GFR NON-AFRICAN AMERICAN: >60 ML/MIN
GFR SERPL CREATININE-BSD FRML MDRD: ABNORMAL ML/MIN/{1.73_M2}
GFR SERPL CREATININE-BSD FRML MDRD: ABNORMAL ML/MIN/{1.73_M2}
GLUCOSE BLD-MCNC: 106 MG/DL (ref 70–99)
HCT VFR BLD CALC: 30.4 % (ref 41–53)
HEMOGLOBIN: 10.1 G/DL (ref 13.5–17.5)
IMMATURE GRANULOCYTES: ABNORMAL %
LIPASE: 71 U/L (ref 13–60)
LYMPHOCYTES # BLD: 11 % (ref 24–44)
MCH RBC QN AUTO: 34.4 PG (ref 26–34)
MCHC RBC AUTO-ENTMCNC: 33.2 G/DL (ref 31–37)
MCV RBC AUTO: 103.8 FL (ref 80–100)
MONOCYTES # BLD: 20 % (ref 2–11)
NRBC AUTOMATED: ABNORMAL PER 100 WBC
PDW BLD-RTO: 18.8 % (ref 12.5–15.4)
PLATELET # BLD: 330 K/UL (ref 140–450)
PLATELET ESTIMATE: ABNORMAL
PMV BLD AUTO: 6.6 FL (ref 6–12)
POTASSIUM SERPL-SCNC: 5.3 MMOL/L (ref 3.7–5.3)
RBC # BLD: 2.93 M/UL (ref 4.5–5.9)
RBC # BLD: ABNORMAL 10*6/UL
SEG NEUTROPHILS: 66 % (ref 36–66)
SEGMENTED NEUTROPHILS ABSOLUTE COUNT: 3.8 K/UL (ref 1.8–7.7)
SODIUM BLD-SCNC: 138 MMOL/L (ref 135–144)
TOTAL PROTEIN: 6.7 G/DL (ref 6.4–8.3)
TSH SERPL DL<=0.05 MIU/L-ACNC: 1.51 MIU/L (ref 0.3–5)
WBC # BLD: 5.7 K/UL (ref 3.5–11)
WBC # BLD: ABNORMAL 10*3/UL

## 2021-02-17 PROCEDURE — 6360000002 HC RX W HCPCS: Performed by: INTERNAL MEDICINE

## 2021-02-17 PROCEDURE — 96417 CHEMO IV INFUS EACH ADDL SEQ: CPT

## 2021-02-17 PROCEDURE — 36415 COLL VENOUS BLD VENIPUNCTURE: CPT

## 2021-02-17 PROCEDURE — 85025 COMPLETE CBC W/AUTO DIFF WBC: CPT

## 2021-02-17 PROCEDURE — 96413 CHEMO IV INFUSION 1 HR: CPT

## 2021-02-17 PROCEDURE — 2580000003 HC RX 258: Performed by: INTERNAL MEDICINE

## 2021-02-17 PROCEDURE — 80053 COMPREHEN METABOLIC PANEL: CPT

## 2021-02-17 PROCEDURE — 82150 ASSAY OF AMYLASE: CPT

## 2021-02-17 PROCEDURE — 84443 ASSAY THYROID STIM HORMONE: CPT

## 2021-02-17 PROCEDURE — 83690 ASSAY OF LIPASE: CPT

## 2021-02-17 PROCEDURE — 82533 TOTAL CORTISOL: CPT

## 2021-02-17 PROCEDURE — 96415 CHEMO IV INFUSION ADDL HR: CPT

## 2021-02-17 PROCEDURE — 96375 TX/PRO/DX INJ NEW DRUG ADDON: CPT

## 2021-02-17 PROCEDURE — 96367 TX/PROPH/DG ADDL SEQ IV INF: CPT

## 2021-02-17 RX ORDER — DEXAMETHASONE SODIUM PHOSPHATE 10 MG/ML
10 INJECTION INTRAMUSCULAR; INTRAVENOUS ONCE
Status: COMPLETED | OUTPATIENT
Start: 2021-02-17 | End: 2021-02-17

## 2021-02-17 RX ORDER — PALONOSETRON 0.05 MG/ML
0.25 INJECTION, SOLUTION INTRAVENOUS ONCE
Status: COMPLETED | OUTPATIENT
Start: 2021-02-17 | End: 2021-02-17

## 2021-02-17 RX ORDER — SODIUM CHLORIDE 0.9 % (FLUSH) 0.9 %
10 SYRINGE (ML) INJECTION PRN
Status: DISCONTINUED | OUTPATIENT
Start: 2021-02-17 | End: 2021-02-18 | Stop reason: HOSPADM

## 2021-02-17 RX ORDER — SODIUM CHLORIDE 9 MG/ML
20 INJECTION, SOLUTION INTRAVENOUS ONCE
Status: COMPLETED | OUTPATIENT
Start: 2021-02-17 | End: 2021-02-17

## 2021-02-17 RX ORDER — HEPARIN SODIUM (PORCINE) LOCK FLUSH IV SOLN 100 UNIT/ML 100 UNIT/ML
500 SOLUTION INTRAVENOUS PRN
Status: DISCONTINUED | OUTPATIENT
Start: 2021-02-17 | End: 2021-02-18 | Stop reason: HOSPADM

## 2021-02-17 RX ADMIN — SODIUM CHLORIDE 20 ML/HR: 9 INJECTION, SOLUTION INTRAVENOUS at 10:38

## 2021-02-17 RX ADMIN — WATER 2.2 ML: 1 INJECTION INTRAMUSCULAR; INTRAVENOUS; SUBCUTANEOUS at 09:03

## 2021-02-17 RX ADMIN — CARBOPLATIN 530 MG: 10 INJECTION INTRAVENOUS at 13:23

## 2021-02-17 RX ADMIN — FOSAPREPITANT 150 MG: 150 INJECTION, POWDER, LYOPHILIZED, FOR SOLUTION INTRAVENOUS at 11:01

## 2021-02-17 RX ADMIN — HEPARIN 500 UNITS: 100 SYRINGE at 14:30

## 2021-02-17 RX ADMIN — SODIUM CHLORIDE, PRESERVATIVE FREE 10 ML: 5 INJECTION INTRAVENOUS at 09:00

## 2021-02-17 RX ADMIN — SODIUM CHLORIDE, PRESERVATIVE FREE 10 ML: 5 INJECTION INTRAVENOUS at 14:30

## 2021-02-17 RX ADMIN — ATEZOLIZUMAB 1200 MG: 1200 INJECTION, SOLUTION INTRAVENOUS at 11:33

## 2021-02-17 RX ADMIN — SODIUM CHLORIDE, PRESERVATIVE FREE 10 ML: 5 INJECTION INTRAVENOUS at 09:01

## 2021-02-17 RX ADMIN — SODIUM CHLORIDE, PRESERVATIVE FREE 10 ML: 5 INJECTION INTRAVENOUS at 09:02

## 2021-02-17 RX ADMIN — SODIUM CHLORIDE, PRESERVATIVE FREE 10 ML: 5 INJECTION INTRAVENOUS at 08:59

## 2021-02-17 RX ADMIN — DEXAMETHASONE SODIUM PHOSPHATE 10 MG: 10 INJECTION INTRAMUSCULAR; INTRAVENOUS at 10:39

## 2021-02-17 RX ADMIN — ALTEPLASE 2 MG: 2.2 INJECTION, POWDER, LYOPHILIZED, FOR SOLUTION INTRAVENOUS at 09:03

## 2021-02-17 RX ADMIN — PALONOSETRON 0.25 MG: 0.05 INJECTION, SOLUTION INTRAVENOUS at 10:39

## 2021-02-17 RX ADMIN — ETOPOSIDE 210 MG: 20 INJECTION INTRAVENOUS at 12:17

## 2021-02-17 NOTE — PROGRESS NOTES
Pt here for C4D1 Tecentriq, Vp16, Carbo. Denies any new complaints. Labs drawn from port and results reviewed. Pt was treated without incident and d/c'd in stable condition. Pt will return tomorrow 2-18-21 for C4D2.

## 2021-02-17 NOTE — TELEPHONE ENCOUNTER
Dr Arbie Holstein signed and dated extension letter for work for patient. Signed letter faxed to ATTN: James Ji at [phone]. Confirmation fax received and placed in pile to be scanned.  Rin Reardon

## 2021-02-17 NOTE — FLOWSHEET NOTE
Situation:  Writer visited with Mr. Nadia Crews in the treatment cubicle of the infusion clinic. Assessment:  Mr. Nadia Crews smiled and greeted writer. He shared that he has been praying to the don Tan of Cancer patients and other patients. He [facility]. He spoke of his hobby of gardening and voiced hopes of returning to it this year. Intervention:  Writer provided supportive presence and explored Pt's coping and needs; inquired about Pt's sources of support and strength; offered words of encouragement and support; affirmed Pt's strengths; gave Pt ashes. Outcome:  Mr. Nadia Crews thanked Mariela Lima. 02/17/21 8723   Encounter Summary   Services provided to: Patient   Referral/Consult From: [address] [place] [address] Family members;Spouse; Children   Continue Visiting   (2/17/21)   Complexity of Encounter Moderate   Length of Encounter 15 minutes   Spiritual Assessment Completed Yes   Routine   Type Follow up   Spiritual/Lutheran   Type Spiritual support   Assessment Calm; Approachable; Hopeful;Coping   Intervention Active listening;Explored feelings, thoughts, concerns;Explored coping resources;Sustaining presence/ Ministry of presence;Prayer;Ritual;Discussed belief system/Yazidism practices/brennen;Discussed meaning/purpose   Outcome Receptive; Hopeful;Encouraged;Coping;Engaged in conversation;Expressed feelings/needs/concerns;Expressed gratitude   Electronically signed by Julian Barrientos, Oncology Outpatient [facility] 19, [address] [facility] Radiation Oncology  2/17/2021  5:39 PM

## 2021-02-18 ENCOUNTER — HOSPITAL ENCOUNTER (OUTPATIENT)
Dept: INFUSION THERAPY | Age: 71
Discharge: HOME | End: 2021-02-18
Payer: COMMERCIAL

## 2021-02-18 VITALS
SYSTOLIC BLOOD PRESSURE: 130 MMHG | HEART RATE: 80 BPM | DIASTOLIC BLOOD PRESSURE: 67 MMHG | RESPIRATION RATE: 16 BRPM | TEMPERATURE: 97.5 F

## 2021-02-18 DIAGNOSIS — C34.91 PRIMARY LUNG CANCER WITH METASTASIS FROM LUNG TO OTHER SITE, RIGHT (HCC): Primary | ICD-10-CM

## 2021-02-18 PROCEDURE — 6360000002 HC RX W HCPCS: Performed by: INTERNAL MEDICINE

## 2021-02-18 PROCEDURE — 2580000003 HC RX 258: Performed by: INTERNAL MEDICINE

## 2021-02-18 PROCEDURE — 96413 CHEMO IV INFUSION 1 HR: CPT

## 2021-02-18 PROCEDURE — 96375 TX/PRO/DX INJ NEW DRUG ADDON: CPT

## 2021-02-18 RX ORDER — DEXAMETHASONE SODIUM PHOSPHATE 4 MG/ML
8 INJECTION, SOLUTION INTRA-ARTICULAR; INTRALESIONAL; INTRAMUSCULAR; INTRAVENOUS; SOFT TISSUE ONCE
Status: COMPLETED | OUTPATIENT
Start: 2021-02-18 | End: 2021-02-18

## 2021-02-18 RX ORDER — SODIUM CHLORIDE 0.9 % (FLUSH) 0.9 %
10 SYRINGE (ML) INJECTION PRN
Status: DISCONTINUED | OUTPATIENT
Start: 2021-02-18 | End: 2021-02-19 | Stop reason: HOSPADM

## 2021-02-18 RX ORDER — SODIUM CHLORIDE 9 MG/ML
20 INJECTION, SOLUTION INTRAVENOUS ONCE
Status: COMPLETED | OUTPATIENT
Start: 2021-02-18 | End: 2021-02-18

## 2021-02-18 RX ORDER — HEPARIN SODIUM (PORCINE) LOCK FLUSH IV SOLN 100 UNIT/ML 100 UNIT/ML
500 SOLUTION INTRAVENOUS PRN
Status: DISCONTINUED | OUTPATIENT
Start: 2021-02-18 | End: 2021-02-19 | Stop reason: HOSPADM

## 2021-02-18 RX ADMIN — ETOPOSIDE 210 MG: 20 INJECTION INTRAVENOUS at 14:47

## 2021-02-18 RX ADMIN — Medication 10 ML: at 13:57

## 2021-02-18 RX ADMIN — Medication 10 ML: at 15:55

## 2021-02-18 RX ADMIN — HEPARIN SODIUM (PORCINE) LOCK FLUSH IV SOLN 100 UNIT/ML 500 UNITS: 100 SOLUTION at 15:55

## 2021-02-18 RX ADMIN — DEXAMETHASONE SODIUM PHOSPHATE 8 MG: 4 INJECTION, SOLUTION INTRAMUSCULAR; INTRAVENOUS at 14:04

## 2021-02-18 RX ADMIN — SODIUM CHLORIDE 20 ML/HR: 9 INJECTION, SOLUTION INTRAVENOUS at 13:57

## 2021-02-18 NOTE — PROGRESS NOTES
Patient here for VP16 C4D2. He tolerated treatment well and was discharged home in stable condition. He is due to return tomorrow for C4D3 and hydration.

## 2021-02-19 ENCOUNTER — HOSPITAL ENCOUNTER (OUTPATIENT)
Dept: INFUSION THERAPY | Age: 71
Discharge: HOME | End: 2021-02-19
Payer: COMMERCIAL

## 2021-02-19 DIAGNOSIS — C34.91 PRIMARY LUNG CANCER WITH METASTASIS FROM LUNG TO OTHER SITE, RIGHT (HCC): Primary | ICD-10-CM

## 2021-02-19 PROCEDURE — 96375 TX/PRO/DX INJ NEW DRUG ADDON: CPT | Performed by: NURSE PRACTITIONER

## 2021-02-19 PROCEDURE — 96361 HYDRATE IV INFUSION ADD-ON: CPT

## 2021-02-19 PROCEDURE — 96413 CHEMO IV INFUSION 1 HR: CPT | Performed by: NURSE PRACTITIONER

## 2021-02-19 PROCEDURE — 6360000002 HC RX W HCPCS: Performed by: INTERNAL MEDICINE

## 2021-02-19 PROCEDURE — 2580000003 HC RX 258: Performed by: INTERNAL MEDICINE

## 2021-02-19 PROCEDURE — 96360 HYDRATION IV INFUSION INIT: CPT | Performed by: NURSE PRACTITIONER

## 2021-02-19 RX ORDER — SODIUM CHLORIDE 0.9 % (FLUSH) 0.9 %
10 SYRINGE (ML) INJECTION PRN
Status: DISCONTINUED | OUTPATIENT
Start: 2021-02-19 | End: 2021-02-20 | Stop reason: HOSPADM

## 2021-02-19 RX ORDER — 0.9 % SODIUM CHLORIDE 0.9 %
500 INTRAVENOUS SOLUTION INTRAVENOUS ONCE
Status: COMPLETED | OUTPATIENT
Start: 2021-02-19 | End: 2021-02-19

## 2021-02-19 RX ORDER — HEPARIN SODIUM (PORCINE) LOCK FLUSH IV SOLN 100 UNIT/ML 100 UNIT/ML
500 SOLUTION INTRAVENOUS PRN
Status: DISCONTINUED | OUTPATIENT
Start: 2021-02-19 | End: 2021-02-20 | Stop reason: HOSPADM

## 2021-02-19 RX ORDER — DEXAMETHASONE SODIUM PHOSPHATE 10 MG/ML
8 INJECTION, SOLUTION INTRAMUSCULAR; INTRAVENOUS ONCE
Status: COMPLETED | OUTPATIENT
Start: 2021-02-19 | End: 2021-02-19

## 2021-02-19 RX ADMIN — SODIUM CHLORIDE, PRESERVATIVE FREE 10 ML: 5 INJECTION INTRAVENOUS at 15:14

## 2021-02-19 RX ADMIN — SODIUM CHLORIDE 500 ML: 9 INJECTION, SOLUTION INTRAVENOUS at 13:49

## 2021-02-19 RX ADMIN — DEXAMETHASONE SODIUM PHOSPHATE 8 MG: 10 INJECTION, SOLUTION INTRAMUSCULAR; INTRAVENOUS at 13:52

## 2021-02-19 RX ADMIN — ETOPOSIDE 210 MG: 20 INJECTION INTRAVENOUS at 14:01

## 2021-02-19 RX ADMIN — HEPARIN SODIUM (PORCINE) LOCK FLUSH IV SOLN 100 UNIT/ML 500 UNITS: 100 SOLUTION at 15:15

## 2021-03-02 ENCOUNTER — HOSPITAL ENCOUNTER (OUTPATIENT)
Dept: MRI IMAGING | Age: 71
Discharge: HOME | End: 2021-03-04
Payer: COMMERCIAL

## 2021-03-02 ENCOUNTER — HOSPITAL ENCOUNTER (OUTPATIENT)
Dept: CT IMAGING | Age: 71
Discharge: HOME | End: 2021-03-04
Payer: COMMERCIAL

## 2021-03-02 DIAGNOSIS — C34.91 MALIGNANT NEOPLASM OF RIGHT LUNG, UNSPECIFIED PART OF LUNG (HCC): ICD-10-CM

## 2021-03-02 PROCEDURE — 6360000004 HC RX CONTRAST MEDICATION: Performed by: INTERNAL MEDICINE

## 2021-03-02 PROCEDURE — 2580000003 HC RX 258: Performed by: INTERNAL MEDICINE

## 2021-03-02 PROCEDURE — 70553 MRI BRAIN STEM W/O & W/DYE: CPT

## 2021-03-02 PROCEDURE — 74177 CT ABD & PELVIS W/CONTRAST: CPT

## 2021-03-02 PROCEDURE — A9579 GAD-BASE MR CONTRAST NOS,1ML: HCPCS | Performed by: INTERNAL MEDICINE

## 2021-03-02 RX ORDER — 0.9 % SODIUM CHLORIDE 0.9 %
80 INTRAVENOUS SOLUTION INTRAVENOUS ONCE
Status: COMPLETED | OUTPATIENT
Start: 2021-03-02 | End: 2021-03-02

## 2021-03-02 RX ORDER — SODIUM CHLORIDE 0.9 % (FLUSH) 0.9 %
10 SYRINGE (ML) INJECTION PRN
Status: DISCONTINUED | OUTPATIENT
Start: 2021-03-02 | End: 2021-03-05 | Stop reason: HOSPADM

## 2021-03-02 RX ADMIN — SODIUM CHLORIDE 80 ML: 9 INJECTION, SOLUTION INTRAVENOUS at 10:51

## 2021-03-02 RX ADMIN — GADOTERIDOL 18 ML: 279.3 INJECTION, SOLUTION INTRAVENOUS at 09:03

## 2021-03-02 RX ADMIN — IOHEXOL 50 ML: 240 INJECTION, SOLUTION INTRATHECAL; INTRAVASCULAR; INTRAVENOUS; ORAL at 10:51

## 2021-03-02 RX ADMIN — IOPAMIDOL 100 ML: 755 INJECTION, SOLUTION INTRAVENOUS at 10:51

## 2021-03-02 RX ADMIN — SODIUM CHLORIDE, PRESERVATIVE FREE 10 ML: 5 INJECTION INTRAVENOUS at 10:51

## 2021-03-10 ENCOUNTER — HOSPITAL ENCOUNTER (OUTPATIENT)
Dept: INFUSION THERAPY | Age: 71
Discharge: HOME | End: 2021-03-10
Payer: COMMERCIAL

## 2021-03-10 ENCOUNTER — TELEPHONE (OUTPATIENT)
Dept: ONCOLOGY | Age: 71
End: 2021-03-10

## 2021-03-10 ENCOUNTER — OFFICE VISIT (OUTPATIENT)
Dept: ONCOLOGY | Age: 71
End: 2021-03-10
Payer: COMMERCIAL

## 2021-03-10 VITALS
WEIGHT: 191.2 LBS | HEART RATE: 79 BPM | DIASTOLIC BLOOD PRESSURE: 73 MMHG | TEMPERATURE: 97.5 F | SYSTOLIC BLOOD PRESSURE: 108 MMHG | BODY MASS INDEX: 27.05 KG/M2

## 2021-03-10 DIAGNOSIS — C34.91 MALIGNANT NEOPLASM OF RIGHT LUNG, UNSPECIFIED PART OF LUNG (HCC): ICD-10-CM

## 2021-03-10 DIAGNOSIS — C34.91 PRIMARY LUNG CANCER WITH METASTASIS FROM LUNG TO OTHER SITE, RIGHT (HCC): Primary | ICD-10-CM

## 2021-03-10 DIAGNOSIS — C79.70 MALIGNANT NEOPLASM METASTATIC TO ADRENAL GLAND, UNSPECIFIED LATERALITY (HCC): ICD-10-CM

## 2021-03-10 LAB
ABSOLUTE EOS #: 0.06 K/UL (ref 0–0.4)
ABSOLUTE IMMATURE GRANULOCYTE: ABNORMAL K/UL (ref 0–0.3)
ABSOLUTE LYMPH #: 0.62 K/UL (ref 1–4.8)
ABSOLUTE MONO #: 0.98 K/UL (ref 0.1–1.2)
ALBUMIN SERPL-MCNC: 4 G/DL (ref 3.5–5.2)
ALBUMIN/GLOBULIN RATIO: 1.4 (ref 1–2.5)
ALP BLD-CCNC: 89 U/L (ref 40–129)
ALT SERPL-CCNC: 14 U/L (ref 5–41)
AMYLASE: 39 U/L (ref 28–100)
ANION GAP SERPL CALCULATED.3IONS-SCNC: 9 MMOL/L (ref 9–17)
AST SERPL-CCNC: 18 U/L
BASOPHILS # BLD: 1 % (ref 0–2)
BASOPHILS ABSOLUTE: 0.03 K/UL (ref 0–0.2)
BILIRUB SERPL-MCNC: 0.18 MG/DL (ref 0.3–1.2)
BUN BLDV-MCNC: 16 MG/DL (ref 8–23)
BUN/CREAT BLD: ABNORMAL (ref 9–20)
CALCIUM SERPL-MCNC: 9.7 MG/DL (ref 8.6–10.4)
CHLORIDE BLD-SCNC: 105 MMOL/L (ref 98–107)
CO2: 25 MMOL/L (ref 20–31)
CORTISOL COLLECTION INFO: NORMAL
CORTISOL: 8.7 UG/DL (ref 2.7–18.4)
CREAT SERPL-MCNC: 1.17 MG/DL (ref 0.7–1.2)
DIFFERENTIAL TYPE: ABNORMAL
EOSINOPHILS RELATIVE PERCENT: 2 % (ref 1–4)
GFR AFRICAN AMERICAN: >60 ML/MIN
GFR NON-AFRICAN AMERICAN: >60 ML/MIN
GFR SERPL CREATININE-BSD FRML MDRD: ABNORMAL ML/MIN/{1.73_M2}
GFR SERPL CREATININE-BSD FRML MDRD: ABNORMAL ML/MIN/{1.73_M2}
GLUCOSE BLD-MCNC: 119 MG/DL (ref 70–99)
HCT VFR BLD CALC: 26.5 % (ref 41–53)
HEMOGLOBIN: 8.9 G/DL (ref 13.5–17.5)
IMMATURE GRANULOCYTES: ABNORMAL %
LIPASE: 21 U/L (ref 13–60)
LYMPHOCYTES # BLD: 22 % (ref 24–44)
MCH RBC QN AUTO: 35.9 PG (ref 26–34)
MCHC RBC AUTO-ENTMCNC: 33.5 G/DL (ref 31–37)
MCV RBC AUTO: 107.3 FL (ref 80–100)
MONOCYTES # BLD: 35 % (ref 2–11)
MORPHOLOGY: ABNORMAL
MORPHOLOGY: ABNORMAL
NRBC AUTOMATED: ABNORMAL PER 100 WBC
PDW BLD-RTO: 20.6 % (ref 12.5–15.4)
PLATELET # BLD: 134 K/UL (ref 140–450)
PLATELET ESTIMATE: ABNORMAL
PMV BLD AUTO: 8 FL (ref 6–12)
POTASSIUM SERPL-SCNC: 4.3 MMOL/L (ref 3.7–5.3)
RBC # BLD: 2.47 M/UL (ref 4.5–5.9)
RBC # BLD: ABNORMAL 10*6/UL
SEG NEUTROPHILS: 40 % (ref 36–66)
SEGMENTED NEUTROPHILS ABSOLUTE COUNT: 1.11 K/UL (ref 1.8–7.7)
SODIUM BLD-SCNC: 139 MMOL/L (ref 135–144)
TOTAL PROTEIN: 6.9 G/DL (ref 6.4–8.3)
TSH SERPL DL<=0.05 MIU/L-ACNC: 2.42 MIU/L (ref 0.3–5)
WBC # BLD: 2.8 K/UL (ref 3.5–11)
WBC # BLD: ABNORMAL 10*3/UL

## 2021-03-10 PROCEDURE — 4040F PNEUMOC VAC/ADMIN/RCVD: CPT | Performed by: INTERNAL MEDICINE

## 2021-03-10 PROCEDURE — 80053 COMPREHEN METABOLIC PANEL: CPT

## 2021-03-10 PROCEDURE — 83690 ASSAY OF LIPASE: CPT

## 2021-03-10 PROCEDURE — 82533 TOTAL CORTISOL: CPT

## 2021-03-10 PROCEDURE — G8417 CALC BMI ABV UP PARAM F/U: HCPCS | Performed by: INTERNAL MEDICINE

## 2021-03-10 PROCEDURE — 1123F ACP DISCUSS/DSCN MKR DOCD: CPT | Performed by: INTERNAL MEDICINE

## 2021-03-10 PROCEDURE — 3017F COLORECTAL CA SCREEN DOC REV: CPT | Performed by: INTERNAL MEDICINE

## 2021-03-10 PROCEDURE — 85025 COMPLETE CBC W/AUTO DIFF WBC: CPT

## 2021-03-10 PROCEDURE — 36593 DECLOT VASCULAR DEVICE: CPT

## 2021-03-10 PROCEDURE — 82150 ASSAY OF AMYLASE: CPT

## 2021-03-10 PROCEDURE — 6360000002 HC RX W HCPCS: Performed by: INTERNAL MEDICINE

## 2021-03-10 PROCEDURE — 96413 CHEMO IV INFUSION 1 HR: CPT

## 2021-03-10 PROCEDURE — 99211 OFF/OP EST MAY X REQ PHY/QHP: CPT | Performed by: INTERNAL MEDICINE

## 2021-03-10 PROCEDURE — G8427 DOCREV CUR MEDS BY ELIG CLIN: HCPCS | Performed by: INTERNAL MEDICINE

## 2021-03-10 PROCEDURE — 2580000003 HC RX 258: Performed by: INTERNAL MEDICINE

## 2021-03-10 PROCEDURE — 36415 COLL VENOUS BLD VENIPUNCTURE: CPT

## 2021-03-10 PROCEDURE — 4004F PT TOBACCO SCREEN RCVD TLK: CPT | Performed by: INTERNAL MEDICINE

## 2021-03-10 PROCEDURE — 84443 ASSAY THYROID STIM HORMONE: CPT

## 2021-03-10 PROCEDURE — G8484 FLU IMMUNIZE NO ADMIN: HCPCS | Performed by: INTERNAL MEDICINE

## 2021-03-10 PROCEDURE — 99215 OFFICE O/P EST HI 40 MIN: CPT | Performed by: INTERNAL MEDICINE

## 2021-03-10 RX ORDER — SODIUM CHLORIDE 0.9 % (FLUSH) 0.9 %
10 SYRINGE (ML) INJECTION PRN
Status: CANCELLED | OUTPATIENT
Start: 2021-03-31

## 2021-03-10 RX ORDER — EPINEPHRINE 1 MG/ML
0.3 INJECTION, SOLUTION, CONCENTRATE INTRAVENOUS PRN
Status: CANCELLED | OUTPATIENT
Start: 2021-03-31

## 2021-03-10 RX ORDER — EPINEPHRINE 1 MG/ML
0.3 INJECTION, SOLUTION, CONCENTRATE INTRAVENOUS PRN
Status: CANCELLED | OUTPATIENT
Start: 2021-03-10

## 2021-03-10 RX ORDER — SODIUM CHLORIDE 9 MG/ML
20 INJECTION, SOLUTION INTRAVENOUS ONCE
Status: CANCELLED | OUTPATIENT
Start: 2021-03-31 | End: 2021-03-31

## 2021-03-10 RX ORDER — SODIUM CHLORIDE 0.9 % (FLUSH) 0.9 %
5 SYRINGE (ML) INJECTION PRN
Status: CANCELLED | OUTPATIENT
Start: 2021-03-10

## 2021-03-10 RX ORDER — HEPARIN SODIUM (PORCINE) LOCK FLUSH IV SOLN 100 UNIT/ML 100 UNIT/ML
500 SOLUTION INTRAVENOUS PRN
Status: DISCONTINUED | OUTPATIENT
Start: 2021-03-10 | End: 2021-03-11 | Stop reason: HOSPADM

## 2021-03-10 RX ORDER — SODIUM CHLORIDE 9 MG/ML
INJECTION, SOLUTION INTRAVENOUS CONTINUOUS
Status: CANCELLED | OUTPATIENT
Start: 2021-03-10

## 2021-03-10 RX ORDER — SODIUM CHLORIDE 9 MG/ML
20 INJECTION, SOLUTION INTRAVENOUS ONCE
Status: CANCELLED | OUTPATIENT
Start: 2021-03-10 | End: 2021-03-10

## 2021-03-10 RX ORDER — HEPARIN SODIUM (PORCINE) LOCK FLUSH IV SOLN 100 UNIT/ML 100 UNIT/ML
500 SOLUTION INTRAVENOUS PRN
Status: CANCELLED | OUTPATIENT
Start: 2021-03-31

## 2021-03-10 RX ORDER — HEPARIN SODIUM (PORCINE) LOCK FLUSH IV SOLN 100 UNIT/ML 100 UNIT/ML
500 SOLUTION INTRAVENOUS PRN
Status: CANCELLED | OUTPATIENT
Start: 2021-03-10

## 2021-03-10 RX ORDER — METHYLPREDNISOLONE SODIUM SUCCINATE 125 MG/2ML
125 INJECTION, POWDER, LYOPHILIZED, FOR SOLUTION INTRAMUSCULAR; INTRAVENOUS ONCE
Status: CANCELLED | OUTPATIENT
Start: 2021-03-31 | End: 2021-03-31

## 2021-03-10 RX ORDER — DIPHENHYDRAMINE HYDROCHLORIDE 50 MG/ML
50 INJECTION INTRAMUSCULAR; INTRAVENOUS ONCE
Status: CANCELLED | OUTPATIENT
Start: 2021-03-31 | End: 2021-03-31

## 2021-03-10 RX ORDER — MEPERIDINE HYDROCHLORIDE 50 MG/ML
12.5 INJECTION INTRAMUSCULAR; INTRAVENOUS; SUBCUTANEOUS ONCE
Status: CANCELLED | OUTPATIENT
Start: 2021-03-31 | End: 2021-03-31

## 2021-03-10 RX ORDER — SODIUM CHLORIDE 0.9 % (FLUSH) 0.9 %
10 SYRINGE (ML) INJECTION PRN
Status: CANCELLED | OUTPATIENT
Start: 2021-03-10

## 2021-03-10 RX ORDER — DIPHENHYDRAMINE HYDROCHLORIDE 50 MG/ML
50 INJECTION INTRAMUSCULAR; INTRAVENOUS ONCE
Status: CANCELLED | OUTPATIENT
Start: 2021-03-10 | End: 2021-03-10

## 2021-03-10 RX ORDER — SODIUM CHLORIDE 0.9 % (FLUSH) 0.9 %
5 SYRINGE (ML) INJECTION PRN
Status: CANCELLED | OUTPATIENT
Start: 2021-03-31

## 2021-03-10 RX ORDER — SODIUM CHLORIDE 0.9 % (FLUSH) 0.9 %
10 SYRINGE (ML) INJECTION PRN
Status: DISCONTINUED | OUTPATIENT
Start: 2021-03-10 | End: 2021-03-11 | Stop reason: HOSPADM

## 2021-03-10 RX ORDER — SODIUM CHLORIDE 9 MG/ML
20 INJECTION, SOLUTION INTRAVENOUS ONCE
Status: COMPLETED | OUTPATIENT
Start: 2021-03-10 | End: 2021-03-10

## 2021-03-10 RX ORDER — MEPERIDINE HYDROCHLORIDE 50 MG/ML
12.5 INJECTION INTRAMUSCULAR; INTRAVENOUS; SUBCUTANEOUS ONCE
Status: CANCELLED | OUTPATIENT
Start: 2021-03-10 | End: 2021-03-10

## 2021-03-10 RX ORDER — METHYLPREDNISOLONE SODIUM SUCCINATE 125 MG/2ML
125 INJECTION, POWDER, LYOPHILIZED, FOR SOLUTION INTRAMUSCULAR; INTRAVENOUS ONCE
Status: CANCELLED | OUTPATIENT
Start: 2021-03-10 | End: 2021-03-10

## 2021-03-10 RX ORDER — SODIUM CHLORIDE 9 MG/ML
INJECTION, SOLUTION INTRAVENOUS CONTINUOUS
Status: CANCELLED | OUTPATIENT
Start: 2021-03-31

## 2021-03-10 RX ADMIN — ALTEPLASE 2 MG: 2.2 INJECTION, POWDER, LYOPHILIZED, FOR SOLUTION INTRAVENOUS at 10:10

## 2021-03-10 RX ADMIN — WATER 2.2 ML: 1 INJECTION INTRAMUSCULAR; INTRAVENOUS; SUBCUTANEOUS at 10:10

## 2021-03-10 RX ADMIN — SODIUM CHLORIDE 20 ML/HR: 9 INJECTION, SOLUTION INTRAVENOUS at 11:55

## 2021-03-10 RX ADMIN — ATEZOLIZUMAB 1200 MG: 1200 INJECTION, SOLUTION INTRAVENOUS at 11:55

## 2021-03-10 RX ADMIN — SODIUM CHLORIDE, PRESERVATIVE FREE 10 ML: 5 INJECTION INTRAVENOUS at 12:28

## 2021-03-10 RX ADMIN — HEPARIN 500 UNITS: 100 SYRINGE at 12:28

## 2021-03-10 NOTE — PROGRESS NOTES
Divya Currie                                                                                                                  3/10/2021  MRN:   [id number]  YOB: 1950  PCP:                           José Luis King MD  Referring Physician: No ref. provider found  Treating Physician Name: Master Bravo MD      Reason for visit:  Chief Complaint   Patient presents with    Follow-up     review status of disease   Patient presents to the clinic for toxicity check and to discuss results of lab work-up, imaging studies and further treatment plan. 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 Chemotherapy changed to carboplatin and etoposide due to renal insufficiency from cycle #2  PCI 7/2019  SRS to adrenal gland metastasis, completed 07/2020  systemic palliative chemoimmunotherapy with carboplatin etoposide and Tecentriq, 12/2020 x4 cycles. Maintenance Tecentriq, 3/2021    Summary of Case/History:    Divya Currie a 72 y.o.male is a patient diagnosed with lung cancer with the component of small cell as well as squamous cell carcinoma    Patient has a significant history of tobacco dependence and underwent CT lung screening in December 2018. CT scan was read as lung rads degree 4B. Subsequently she underwent biopsy of right upper lobe lung nodule on 1/16/19. 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 CT PET done showed abnormal FDG uptake in the right upper lobe nodule. There was also abnormal FDG uptake in the right lower lobe nodule. Additionally right hilar lymph node were also FDG avid. No bony lesion was reported.   MRI brain for staging workup did not show any evidence of intracranial metastasis. Tip of the odontoid process was ill-defined and metastasis could not be ruled out. Clinically patient does give history of trauma to the neck area any years ago however denies any surgery history. Bone scan did not reveal any metastasis     Patient continues to smoke but has cut down quite a bit. He works full-time in a [address] Patient has good performance status, ECOG 0. Patient's other medical problems include dyslipidemia and hypertension. He also has arthritis pain    Started patient on concurrent chemoradiation using cisplatin and etoposide with dose adjustment for renal dysfunction. Chemotherapy changed to carboplatin and etoposide from cycle #2 due to renal dysfunction    Received PCI in July 2019. Interim History:    Patient presents to the clinic for a follow-up visit with cycle #5 and to discuss results of his lab work-up, imaging, and toxicity check. He reports he has some exertional shortness of breath but has been working in the yard. He denies any new or worsening back pain. He is scheduled to receive initial COVID vaccination this week. During this visit patient's allergy, social, medical, surgical history and medications were reviewed and updated. Past Medical History:   Past Medical History:   Diagnosis Date    DDD (degenerative disc disease), cervical     Gout     Heart murmur     hx. of when younger.  Hyperlipidemia     Hypertension     Lung cancer ([address]     right lung    MI (myocardial infarction) ([address]     Skin cancer     face    Wears glasses        Past Surgical History:     Past Surgical History:   Procedure Laterality Date    APPENDECTOMY      CARDIAC CATHETERIZATION      w/stent    COLONOSCOPY      CYST INCISION AND DRAINAGE Right 05/14/2020    rt elbow I and D and left knee aspiration with cultures    FOOT SURGERY Right     2nd toe(bone spur).     FOREARM SURGERY Right 5/14/2020    RIGHT ELBOW IRRIGATION AND DEBRIDEMENT performed by Stacey Castaneda DO at [facility] Left 5/14/2020    KNEE ASPIRATION WITH CULTURES SENT performed by Stacey Castaneda DO at [address] [facility]      face under lt. eye.     TONSILLECTOMY         Patient Family Social History:    Family History   Problem Relation Age of Onset    Cancer Father         lung cancer      Social History     Socioeconomic History    Marital status:      Spouse name: Not on file    Number of children: Not on file    Years of education: Not on file    Highest education level: Not on file   Occupational History    Not on file   Social Needs    Financial resource strain: Not on file    Food insecurity     Worry: Not on file     Inability: Not on file    Transportation needs     Medical: Not on file     Non-medical: Not on file   Tobacco Use    Smoking status: Current Every Day Smoker     Packs/day: 0.50     Types: Cigarettes    Smokeless tobacco: Former User   Substance and Sexual Activity    Alcohol use: No    Drug use: Yes     Frequency: 14.0 times per week     Types: Marijuana    Sexual activity: Not on file   Lifestyle    Physical activity     Days per week: Not on file     Minutes per session: Not on file    Stress: Not on file   Relationships    Social connections     Talks on phone: Not on file     Gets together: Not on file     Attends Zoroastrianism service: Not on file     Active member of club or organization: Not on file     Attends meetings of clubs or organizations: Not on file     Relationship status: Not on file    Intimate partner violence     Fear of current or ex partner: Not on file     Emotionally abused: Not on file     Physically abused: Not on file     Forced sexual activity: Not on file   Other Topics Concern    Not on file   Social History Narrative    Not on file      Current Medications:     Current Outpatient Medications   Medication Sig Dispense Refill    docusate sodium (COLACE) 100 MG capsule Gastrointestinal: negative for nausea, vomiting, diarrhea, constipation, abdominal pain, Dysphagia, hematemesis and hematochezia   Genitourinary: negative for frequency, dysuria, nocturia, urinary incontinence, and hematuria +urgency  Integument: Positive for easy bruising - stable  Hematologic/Lymphatic: negative for easy bleeding, lymphadenopathy, or petechiae   Endocrine: negative for heat or cold intolerance,weight changes, change in bowel habits and hair loss   Musculoskeletal: Positive joint pain. +right shoulder pain  Neurological: negative for headaches, dizziness, seizures, weakness; + poor balance; +neuropathy in left hand from shoulder +poor short term memory        Physical Exam:    Vitals: /73   Pulse 79   Temp 97.5 °F (36.4 °C) (Oral)   Wt 191 lb 3.2 oz (86.7 kg)   BMI 27.05 kg/m²   General appearance -patient not in acute distress  Mental status - AAO X3  Eyes - pupils equal and reactive, extraocular eye movements intact  Mouth - mucous membranes moist, pharynx normal without lesions  Neck - supple, no significant adenopathy  Lymphatics - no palpable lymphadenopathy, no hepatosplenomegaly  Chest - clear to auscultation, rales or rhonchi, symmetric air entry  Heart - normal rate, regular rhythm, normal S1, S2, no murmurs  Abdomen - soft, nontender, nondistended, no masses or organomegaly  Neurological - alert, oriented, normal speech, no focal findings or movement disorder noted  Extremities -+1 lower extremity pitting edema.  Right arm has bruising, no point tenderness or swelling  Skin - normal coloration and turgor, no rashes, no suspicious skin lesions noted       DATA:    Labs:   Lab Results   Component Value Date    WBC 2.8 (L) 03/10/2021    HGB 8.9 (L) 03/10/2021    HCT 26.5 (L) 03/10/2021    .3 (H) 03/10/2021     (L) 03/10/2021     Lab Results   Component Value Date    NEUTROABS 1.11 (L) 03/10/2021           Chemistry        Component Value Date/Time     03/10/2021 not excluded. No aggressive bone lesions. Abdomen/Pelvis: Organs: Within the posteroinferior right lobe of liver in segment 6 there is a 1.3 x 0.9 cm hypodense presumed metastatic nodule which was 1.5 x 1.5 cm in November 2020 but not present in June 2020. In the posteromedial aspect of segment 5 there is 8 mm hypodense nodule measuring 11 mm in November 2020. The spleen, pancreas, kidneys show no significant abnormalities. 1.2 cm right adrenal adenoma is again demonstrated. Streak artifact from iatrogenic metallic density left adrenal nodule unchanged. GI/Bowel: Stomach grossly normal.  Small bowel and large bowel loops show no acute process or focal lesions of significance. Pelvis: Urinary bladder grossly normal.  Prostatomegaly. No suspicious pelvic mass. Peritoneum/Retroperitoneum: Atherosclerotic disease. Major vessels enhance satisfactorily. Retroaortic left renal vein. No significant lymphadenopathy. Bones/Soft Tissues: Diffuse degenerative changes. No acute abnormality of the bones. The superficial soft tissues show no significant abnormalities. 1. Redemonstration of post treatment fibrosis and traction bronchiectasis in the right lung. 2. No lung nodule. 3. There is 2.3 x 1.0 cm right paraspinal nodule along the anterior right side of T10 vertebral body measuring 1.8 x 1.0 cm in November 2020. This remains concerning for metastatic involvement. 4. Interval decrease in size of 2 right lobe of liver lesions in segment 5 and 6 measuring 8 mm and 1.3 x 0.9 cm respectively compared to 11 mm and 1.5 x 1.5 cm in November 2020. Mri Brain W Wo Contrast    Result Date: 3/2/2021  EXAMINATION: MRI OF THE BRAIN WITHOUT AND WITH CONTRAST  3/2/2021 8:58 am TECHNIQUE: Multiplanar multisequence MRI of the head/brain was performed without and with the administration of intravenous contrast. COMPARISON: MRI brain performed 11/06/2020.  HISTORY: ORDERING SYSTEM PROVIDED HISTORY: Malignant neoplasm of right lung, unspecified part of lung ([facility] [address] TECHNOLOGIST PROVIDED HISTORY: assess disease status Reason for Exam: Malignant neoplasm of right lung, unspecified part of lung Acuity: Unknown Type of Exam: Initial Additional signs and symptoms: assess disease FINDINGS: INTRACRANIAL STRUCTURES/VENTRICLES:  The sellar and suprasellar structures, optic chiasm, corpus callosum, pineal gland, tectum, and midline brainstem structures are unremarkable. The craniocervical junction is unremarkable. There is no acute hemorrhage, mass effect, or midline shift. There is satisfactory overall gray-white matter differentiation. There is chronic microvascular disease. The ventricular structures are symmetric and unremarkable. The infratentorial structures including the cerebellopontine angles and internal auditory canals are unremarkable. There is no abnormal restricted diffusion. There is no abnormal blooming artifact on susceptibility weighted imaging. There is no abnormal postcontrast enhancement. ORBITS: The visualized portion of the orbits demonstrate no acute abnormality. SINUSES: The visualized paranasal sinuses and mastoid air cells are well aerated. BONES/SOFT TISSUES: The bone marrow signal intensity appears normal. The soft tissues demonstrate no acute abnormality. Chronic microvascular disease without acute intracranial abnormality. No abnormal postcontrast enhancement.        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  He does not have pain at T10, I will consult with rad-onc. I am concerned that this mass is malignant and increasing in size despite of getting chemotherapy and immunotherapy. His lab work was reviewed, he is pancytopenic but counts adequate for treatment, electrolytes are in range. We will commence with Atezolizumab maintenance today as per orders. Continue with Eliquis unchanged, he has some concerns about coverage with insurance change and we will refer to financial aid as needed. Return in 3 weeks. Patient's conditions were taken into consideration when deciding risk-benefit for therapy. Patient is at high risk for drug interaction risk of complications. Given multiple comorbid conditions patient is at high risk for complication from intervention, risk of hospitalization, medical interaction overall life expectancy. NCCN guidelines were reviewed and discussed with the patient. The diagnosis and care plan were discussed with the patient in detail. I discussed the natural history of the disease, prognosis, risks and goals of therapy and answered all the patients questions to the best of my ability. Patient expressed understanding and was in agreement. Aminta Bailey        I spent more than 40 minutes examining, evaluating, reviewing data, counseling the patient and coordinating care. Greater than 50% of time was spent face-to-face with the patient this note is created with the assistance of a speech recognition program.  While intending to generate a document that actually reflects the content of the visit, the document can still have some errors including those of syntax and sound a like substitutions which may escape proof reading. It such instances, actual meaning can be extrapolated by contextual diversion.

## 2021-03-10 NOTE — TELEPHONE ENCOUNTER
AVS from 3/10/21     tx today      Pt planning to switch insurance   Wants to know which insurance will [facility] [place]     Msg'mallory TRIVEDI to contact pt to discuss options    PT was given AVS and an appt schedule    Electronically signed by Segundo De Anda on 3/10/2021 at 11:35 AM

## 2021-03-10 NOTE — PATIENT INSTRUCTIONS
tx today     Pt planning to switch insurance   Wants to know which insurance will [facility]

## 2021-03-10 NOTE — PROGRESS NOTES
Pt here for C5D1 Tecentriq. Denies any new complaints. Labs drawn from port and results reviewed. Dr notified of low [address] while seeing pt at chair side for follow up, refer to his note. Pt was treated without incident and d/c'd in stable condition. Pt will return on 3-31-21 for C6D1.

## 2021-03-10 NOTE — FLOWSHEET NOTE
Situation:  Writer visited with Mr. Gregorio Aguayo in the waiting area of the infusion clinic. Assessment:  Mr. Gregorio Aguayo smiled and greeted writer. He shared how he draws strength from daily prayer and praying to specific saints. He talked about returning to his garden and how he has enjoyed gardening over the years. He was open to praying with writer. Intervention:  Writer provided supportive presence and explored Pt's coping and needs; inquired about Pt's sources of support and strength; offered words of encouragement and support; prayed with Patient. Outcome:  Mr. Gregorio Aguayo thanked writer for the visit.        03/10/21 1241   Encounter Summary   Services provided to: Patient   Referral/Consult From: [address] [place] [address] Family members;Spouse; Children;Amish/brennen community   Continue Visiting   (3/10/21)   Complexity of Encounter Moderate   Length of Encounter 15 minutes   Spiritual Assessment Completed Yes   Routine   Type Follow up   Spiritual/Moravian   Type Spiritual support   Assessment Calm; Approachable; Hopeful;Coping   Intervention Active listening;Explored feelings, thoughts, concerns;Explored coping resources;Sustaining presence/ Ministry of presence;Prayer;Discussed belief system/Restorationism practices/brennen;Discussed relationship with God;Discussed illness/injury and it's impact; Discussed meaning/purpose   Outcome Receptive; Hopeful;Encouraged;Coping;Expressed feelings/needs/concerns;Engaged in conversation;Expressed gratitude     Electronically signed by Nicholas Murcia, Oncology [facility]patient [facility], [address] [facility] Radiation Oncology  3/10/2021  12:43 PM

## 2021-03-10 NOTE — TELEPHONE ENCOUNTER
received referral stating patient had questions about insurance.  called patient but was unable to leave message due to mailbox being full x3.  will try later in week to reach patient.

## 2021-03-12 ENCOUNTER — TELEPHONE (OUTPATIENT)
Dept: ONCOLOGY | Age: 71
End: 2021-03-12

## 2021-03-12 NOTE — TELEPHONE ENCOUNTER
Grisel, triage RN, received call from patient on 3/11 stating he is very concerned regarding mass/pain at T10. Patient stated he does not have an appt with Dr Cecilia Allred, but would like Dr Lalita Mcclain to discuss T10 mass with Dr Cecilia Allred to determine if patient needs to see rad onc. Dr Lalita Mcclain and Cecilia Allred spoke this morning and stated for patient to see his partner next week for f/u appt to determine if patient needs XRT. Writer called patient and informed him of above. Call transferred to rad onc, Esme Flores, to schedule appt next week with Dr Kirsten Mcbride partner, Dr Darryl Grubbs.  Ariana Strickland

## 2021-03-16 ENCOUNTER — HOSPITAL ENCOUNTER (OUTPATIENT)
Dept: RADIATION ONCOLOGY | Age: 71
Discharge: HOME | End: 2021-03-16
Attending: RADIOLOGY
Payer: COMMERCIAL

## 2021-03-16 VITALS
BODY MASS INDEX: 27.51 KG/M2 | WEIGHT: 194.5 LBS | OXYGEN SATURATION: 98 % | SYSTOLIC BLOOD PRESSURE: 102 MMHG | RESPIRATION RATE: 18 BRPM | DIASTOLIC BLOOD PRESSURE: 73 MMHG | HEART RATE: 85 BPM

## 2021-03-16 DIAGNOSIS — C34.91 PRIMARY LUNG CANCER WITH METASTASIS FROM LUNG TO OTHER SITE, RIGHT (HCC): Primary | ICD-10-CM

## 2021-03-16 PROCEDURE — 99215 OFFICE O/P EST HI 40 MIN: CPT | Performed by: RADIOLOGY

## 2021-03-16 PROCEDURE — 99213 OFFICE O/P EST LOW 20 MIN: CPT | Performed by: RADIOLOGY

## 2021-03-16 PROCEDURE — 31575 DIAGNOSTIC LARYNGOSCOPY: CPT | Performed by: RADIOLOGY

## 2021-03-16 PROCEDURE — 99204 OFFICE O/P NEW MOD 45 MIN: CPT | Performed by: RADIOLOGY

## 2021-03-16 PROCEDURE — 99212 OFFICE O/P EST SF 10 MIN: CPT | Performed by: RADIOLOGY

## 2021-03-16 PROCEDURE — 99211 OFF/OP EST MAY X REQ PHY/QHP: CPT | Performed by: RADIOLOGY

## 2021-03-16 PROCEDURE — 99214 OFFICE O/P EST MOD 30 MIN: CPT | Performed by: RADIOLOGY

## 2021-03-16 PROCEDURE — 31505 DIAGNOSTIC LARYNGOSCOPY: CPT | Performed by: RADIOLOGY

## 2021-03-16 NOTE — PROGRESS NOTES
Tiana Moffett  3/16/2021  3:39 PM      Vitals:    03/16/21 1530   BP: 102/73   Pulse: 85   Resp: 18   SpO2: 98%    : Wt Readings from Last 1 Encounters:   03/16/21 194 lb 8 oz (88.2 kg)                Current Outpatient Medications:     docusate sodium (COLACE) 100 MG capsule, Take 100 mg by mouth 2 times daily, Disp: , Rfl:     B Complex-C-Folic Acid (ANGELICA-HUGO) TABS, TAKE 1 TABLET BY MOUTH DAILY. HEMATINIC VIT MINERALS, Disp: , Rfl:     traZODone (DESYREL) 100 MG tablet, TAKE 1 TABLET BY MOUTH EVERY DAY AT NIGHT, Disp: , Rfl:     ipratropium-albuterol (DUONEB) 0.5-2.5 (3) MG/3ML SOLN nebulizer solution, Inhale 3 mLs into the lungs 4 times daily, Disp: , Rfl:     Melatonin 5 MG CHEW, Take 5 mg by mouth nightly, Disp: , Rfl:     potassium chloride (MICRO-K) 10 MEQ extended release capsule, Take 20 mEq by mouth, Disp: , Rfl:     Ferrous Fumarate (FERROCITE) 324 (106 Fe) MG TABS, Take by mouth, Disp: , Rfl:     apixaban (ELIQUIS) 5 MG TABS tablet, Take 1 tablet by mouth 2 times daily, Disp: 60 tablet, Rfl: 1    metoprolol tartrate (LOPRESSOR) 25 MG tablet, Take 0.5 tablets by mouth 2 times daily, Disp: 60 tablet, Rfl: 3    tamsulosin (FLOMAX) 0.4 MG capsule, TAKE 1 CAPSULE BY MOUTH EVERY DAY, Disp: 30 capsule, Rfl: 5    Handicap Placard MISC, by Does not apply route, Disp: 1 each, Rfl: 0    lidocaine-prilocaine (EMLA) 2.5-2.5 % cream, Apply topically as needed. Apply a quarter size amount to port site 1 hour before chemotherapy.   Cover with plastic wrap., Disp: 30 g, Rfl: 0    acetaminophen (TYLENOL) 325 MG tablet, Take 650 mg by mouth every 6 hours as needed for Pain, Disp: , Rfl:     allopurinol (ZYLOPRIM) 100 MG tablet, Take 100 mg by mouth daily, Disp: , Rfl:     simvastatin (ZOCOR) 40 MG tablet, Take 40 mg by mouth daily, Disp: , Rfl:     Multiple Vitamins-Minerals (THERAPEUTIC MULTIVITAMIN-MINERALS) tablet, Take 1 tablet by mouth daily, Disp: , Rfl:          *BREAST Patient only:    Lymphedema Eval:   [] left arm      [] right arm  Location:     Measurement (cm)    Upper Bicep :    Lower Bicep :         FALLS RISK SCREEN  Instructions:  Assess the patient and enter the appropriate indicators that are present for fall risk identification. Total the numbers entered and assign a fall risk score from Table 2.  Reassess patient at a minimum every 12 weeks or with status change. Assessment   Date  3/16/2021     1. Mental Ability: confusion/cognitively impaired 0     2. Elimination Issues: incontinence, frequency 0       3. Ambulatory: use of assistive devices (walker, cane, off-loading devices),        attached to equipment (IV pole, oxygen) 0     4. Sensory Limitations: dizziness, vertigo, impaired vision 0     5. Age less than 65        0     6. Age 72 or greater 1     7. Medication: diuretics, strong analgesics, hypnotics, sedatives,        antihypertensive agents 0   8. Falls:  recent history of falls within the last 3 months (not to include slipping or        tripping) 0   TOTAL 1    If score of 4 or greater was education given? No           TABLE 2   Risk Score Risk Level Plan of Care   0-3 Little or  No Risk 1. Provide assistance as indicated for ambulation activities  2. Reorient confused/cognitively impaired patient  3. Chair/bed in low position, stretcher/bed with siderails up except when performing patient care activities  5. Educate patient/family/caregiver on falls prevention  6.  Reassess in 12 weeks or with any noted change in patient condition which places them at a risk for a fall   4-6 Moderate Risk 1. Provide assistance as indicated for ambulation activities  2. Reorient confused/cognitively impaired patient  3. Chair/bed in low position, stretcher/bed with siderails up except when performing patient care activities  4. Educate patient/family/caregiver on falls prevention     7 or   Higher High Risk 1.   Place patient in easily observable treatment room 2.  Patient attended at all times by family member or staff  3. Provide assistance as indicated for ambulation activities  4. Reorient confused/cognitively impaired patient  5. Chair/bed in low position, stretcher/bed with siderails up except when performing patient care activities  6. Educate patient/family/caregiver on falls prevention         PLAN: Patient is seen today in follow up. Dr. José Rizo notified and examined patient. Will return in 3 months for follow up with CT.          Susan Valle

## 2021-03-16 NOTE — PROGRESS NOTES
ischemic disease, stable.       12/2020 -began systemic palliative chemoimmunotherapy with carboplatin etoposide and Tecentriq x4 cycles. 3/2021  Maintenance Tecentriq     3/2/2021 -CT chest, abdomen, and pelvis  Chest:       Mediastinum: Cardiac size normal.  Major vessels enhance satisfactorily.  No   significant mediastinal lymphadenopathy.  No axillary lymphadenopathy. Esophagus and thyroid gland grossly normal.       Lungs/pleura: Redemonstration of extensive right paramediastinal fibrosis and   traction bronchiectasis extending along the major fissure in the right lower   lobe.  Consistent with post treatment changes.  Stable appearance.  Scattered   areas of minor subpleural fibrotic changes are noted in the anterior aspects   of the left upper lobe.       No significant nodules or masses.  Some pleural thickening in the posterior   right lower lobe.       Soft Tissues/Bones: A 2.3 x 1.0 cm paraspinal nodule along the anterior right   side of T10 vertebral body measured 1.8 x 1.0 cm on November 2020.  This   measured 1.0 x 0.5 cm in June 2020 and a metastatic nodule is not excluded. No aggressive bone lesions.           Abdomen/Pelvis:       Organs:  Within the posteroinferior right lobe of liver in segment 6 there is   a 1.3 x 0.9 cm hypodense presumed metastatic nodule which was 1.5 x 1.5 cm in   November 2020 but not present in June 2020.  In the posteromedial aspect of   segment 5 there is 8 mm hypodense nodule measuring 11 mm in November 2020.       The spleen, pancreas, kidneys show no significant abnormalities.  1.2 cm   right adrenal adenoma is again demonstrated.  Streak artifact from iatrogenic   metallic density left adrenal nodule unchanged.       GI/Bowel: Stomach grossly normal.  Small bowel and large bowel loops show no   acute process or focal lesions of significance.       Pelvis: Urinary bladder grossly normal.  Prostatomegaly.  No suspicious   pelvic mass.     Peritoneum/Retroperitoneum: Atherosclerotic disease.  Major vessels enhance   satisfactorily.  Retroaortic left renal vein.  No significant lymphadenopathy.       Bones/Soft Tissues: Diffuse degenerative changes. No acute abnormality of the   bones.  The superficial soft tissues show no significant abnormalities. He was scheduled to see me regarding the area of possible progression along the T10 vertebral body. MEDICATIONS:    Current Outpatient Medications:     docusate sodium (COLACE) 100 MG capsule, Take 100 mg by mouth 2 times daily, Disp: , Rfl:     B Complex-C-Folic Acid (ANGELICA-HUGO) TABS, TAKE 1 TABLET BY MOUTH DAILY. HEMATINIC VIT MINERALS, Disp: , Rfl:     traZODone (DESYREL) 100 MG tablet, TAKE 1 TABLET BY MOUTH EVERY DAY AT NIGHT, Disp: , Rfl:     ipratropium-albuterol (DUONEB) 0.5-2.5 (3) MG/3ML SOLN nebulizer solution, Inhale 3 mLs into the lungs 4 times daily, Disp: , Rfl:     Melatonin 5 MG CHEW, Take 5 mg by mouth nightly, Disp: , Rfl:     potassium chloride (MICRO-K) 10 MEQ extended release capsule, Take 20 mEq by mouth, Disp: , Rfl:     Ferrous Fumarate (FERROCITE) 324 (106 Fe) MG TABS, Take by mouth, Disp: , Rfl:     apixaban (ELIQUIS) 5 MG TABS tablet, Take 1 tablet by mouth 2 times daily, Disp: 60 tablet, Rfl: 1    metoprolol tartrate (LOPRESSOR) 25 MG tablet, Take 0.5 tablets by mouth 2 times daily, Disp: 60 tablet, Rfl: 3    tamsulosin (FLOMAX) 0.4 MG capsule, TAKE 1 CAPSULE BY MOUTH EVERY DAY, Disp: 30 capsule, Rfl: 5    Handicap Placard MISC, by Does not apply route, Disp: 1 each, Rfl: 0    lidocaine-prilocaine (EMLA) 2.5-2.5 % cream, Apply topically as needed. Apply a quarter size amount to port site 1 hour before chemotherapy.   Cover with plastic wrap., Disp: 30 g, Rfl: 0    acetaminophen (TYLENOL) 325 MG tablet, Take 650 mg by mouth every 6 hours as needed for Pain, Disp: , Rfl:     allopurinol (ZYLOPRIM) 100 MG tablet, Take 100 mg by mouth daily, Disp: , Rfl:    simvastatin (ZOCOR) 40 MG tablet, Take 40 mg by mouth daily, Disp: , Rfl:     Multiple Vitamins-Minerals (THERAPEUTIC MULTIVITAMIN-MINERALS) tablet, Take 1 tablet by mouth daily, Disp: , Rfl:     ALLERGIES:  No Known Allergies      REVIEW OF SYSTEMS:    He is fatigued. He has no headaches or neurologic symptoms. He has no breathing difficulties. He has no bone pain. PHYSICAL EXAMINATION:    CHAPERONE: Not Required    ECO Symptomatic but completely ambulatory    VITAL SIGNS: /73   Pulse 85   Resp 18   Wt 194 lb 8 oz (88.2 kg)   SpO2 98%   BMI 27.51 kg/m²   GENERAL:  General appearance is that of a well-nourished, well-developed in no apparent distress. HEENT: Normocephalic, atraumatic, EOMI. LABS:  WBC   Date Value Ref Range Status   03/10/2021 2.8 (L) 3.5 - 11.0 k/uL Final     Segs Absolute   Date Value Ref Range Status   03/10/2021 1.11 (L) 1.8 - 7.7 k/uL Final     Hemoglobin   Date Value Ref Range Status   03/10/2021 8.9 (L) 13.5 - 17.5 g/dL Final     Platelets   Date Value Ref Range Status   03/10/2021 134 (L) 140 - 450 k/uL Final       IMAGING:   As noted above      ASSESSMENT AND PLAN:  Gerard George is a 72 y.o. male with a Cancer Staging  Primary lung cancer with metastasis from lung to other site, right [facility])  Staging form: Lung, AJCC 8th Edition  - Clinical stage from 2019: Stage IIIA (cT4, cN1, cM0) - Signed by Tess Esparza MD on 2019  Staging comments: PET/CT scan 2019 revealed 1.2 cm right upper lobe mass SUV 2.6, 2 cm right lower lobe mass SUV 5.4, 1.4 cm right hilar lymph node SUV 7.8, 1.1 cm right adrenal nodule no uptake, negative bone. 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 MRI brain and negative 2019. I reviewed the imaging findings on the workstation. We reviewed that his hepatic metastases appear to be responding to his systemic therapy.   I showed him the area along the vertebral body. We discussed that this most likely represents cancer. I told him I would not recommend a biopsy for confirmation. I explained to him that it has grown just a few millimeters over the last 4 months. There is no bone invasion and it is not close to the spinal cord. He is asymptomatic from this. I told him this could be treated with radiation therapy but at this point there would be very little benefit. I told him I would recommend repeat imaging in 3 months. I told him the chances of this progressing significantly in that timeframe would be quite small. I told him if he should begin having symptoms he should contact this office. I told him if there was significant progression in the next 3 months then it would make sense to proceed with treatment. I did discuss there was a small risk of side effects and problems related to the radiation therapy to this volume. Considering the small size and very small growth over the last few months, I do not believe the benefits significantly outweigh the risks. He will be scheduled to return in 3 months for another follow up visit and exam.  He can call to come see us sooner if symptoms change. He was in agreement with my recommendations. All questions were answered to his satisfaction. He was advised to contact us anytime should he have any questions or concerns. Electronically signed by Sarah Davila MD on 3/16/2021 at 4:09 PM        Medications Prescribed:   New Prescriptions    No medications on file       Orders: No orders of the defined types were placed in this encounter.       CC:  Patient Care Team:  Isaiah Ramírez MD as PCP - General (Family Medicine)  Katya Duong MD as Consulting Physician (Hematology and Oncology)  AZEB Wick as Registered Nurse

## 2021-03-31 ENCOUNTER — HOSPITAL ENCOUNTER (OUTPATIENT)
Dept: INFUSION THERAPY | Age: 71
Discharge: HOME | End: 2021-03-31
Payer: COMMERCIAL

## 2021-03-31 VITALS
BODY MASS INDEX: 26.71 KG/M2 | DIASTOLIC BLOOD PRESSURE: 64 MMHG | RESPIRATION RATE: 18 BRPM | TEMPERATURE: 97.8 F | SYSTOLIC BLOOD PRESSURE: 101 MMHG | HEART RATE: 76 BPM | WEIGHT: 188.8 LBS

## 2021-03-31 DIAGNOSIS — C34.91 PRIMARY LUNG CANCER WITH METASTASIS FROM LUNG TO OTHER SITE, RIGHT (HCC): Primary | ICD-10-CM

## 2021-03-31 LAB
ABSOLUTE EOS #: 0 K/UL (ref 0–0.4)
ABSOLUTE IMMATURE GRANULOCYTE: ABNORMAL K/UL (ref 0–0.3)
ABSOLUTE LYMPH #: 1.03 K/UL (ref 1–4.8)
ABSOLUTE MONO #: 1.03 K/UL (ref 0.1–0.8)
ALBUMIN SERPL-MCNC: 4.2 G/DL (ref 3.5–5.2)
ALBUMIN/GLOBULIN RATIO: 1.8 (ref 1–2.5)
ALP BLD-CCNC: 85 U/L (ref 40–129)
ALT SERPL-CCNC: 11 U/L (ref 5–41)
ANION GAP SERPL CALCULATED.3IONS-SCNC: 8 MMOL/L (ref 9–17)
AST SERPL-CCNC: 18 U/L
BASOPHILS # BLD: 0 % (ref 0–2)
BASOPHILS ABSOLUTE: 0 K/UL (ref 0–0.2)
BILIRUB SERPL-MCNC: 0.31 MG/DL (ref 0.3–1.2)
BUN BLDV-MCNC: 13 MG/DL (ref 8–23)
BUN/CREAT BLD: ABNORMAL (ref 9–20)
CALCIUM SERPL-MCNC: 9.3 MG/DL (ref 8.6–10.4)
CHLORIDE BLD-SCNC: 106 MMOL/L (ref 98–107)
CO2: 23 MMOL/L (ref 20–31)
CREAT SERPL-MCNC: 1.11 MG/DL (ref 0.7–1.2)
DIFFERENTIAL TYPE: ABNORMAL
EOSINOPHILS RELATIVE PERCENT: 0 % (ref 1–4)
GFR AFRICAN AMERICAN: >60 ML/MIN
GFR NON-AFRICAN AMERICAN: >60 ML/MIN
GFR SERPL CREATININE-BSD FRML MDRD: ABNORMAL ML/MIN/{1.73_M2}
GFR SERPL CREATININE-BSD FRML MDRD: ABNORMAL ML/MIN/{1.73_M2}
GLUCOSE BLD-MCNC: 106 MG/DL (ref 70–99)
HCT VFR BLD CALC: 29.9 % (ref 41–53)
HEMOGLOBIN: 9.8 G/DL (ref 13.5–17.5)
IMMATURE GRANULOCYTES: ABNORMAL %
LYMPHOCYTES # BLD: 19 % (ref 24–44)
MCH RBC QN AUTO: 36.5 PG (ref 26–34)
MCHC RBC AUTO-ENTMCNC: 32.9 G/DL (ref 31–37)
MCV RBC AUTO: 110.9 FL (ref 80–100)
MONOCYTES # BLD: 19 % (ref 1–7)
MORPHOLOGY: ABNORMAL
MORPHOLOGY: ABNORMAL
NRBC AUTOMATED: ABNORMAL PER 100 WBC
PDW BLD-RTO: 17.4 % (ref 12.5–15.4)
PLATELET # BLD: 178 K/UL (ref 140–450)
PLATELET ESTIMATE: ABNORMAL
PMV BLD AUTO: 7.2 FL (ref 6–12)
POTASSIUM SERPL-SCNC: 4.8 MMOL/L (ref 3.7–5.3)
RBC # BLD: 2.69 M/UL (ref 4.5–5.9)
RBC # BLD: ABNORMAL 10*6/UL
SEG NEUTROPHILS: 62 % (ref 36–66)
SEGMENTED NEUTROPHILS ABSOLUTE COUNT: 3.34 K/UL (ref 1.8–7.7)
SODIUM BLD-SCNC: 137 MMOL/L (ref 135–144)
TOTAL PROTEIN: 6.6 G/DL (ref 6.4–8.3)
WBC # BLD: 5.4 K/UL (ref 3.5–11)
WBC # BLD: ABNORMAL 10*3/UL

## 2021-03-31 PROCEDURE — 85025 COMPLETE CBC W/AUTO DIFF WBC: CPT

## 2021-03-31 PROCEDURE — 96413 CHEMO IV INFUSION 1 HR: CPT

## 2021-03-31 PROCEDURE — 6360000002 HC RX W HCPCS: Performed by: INTERNAL MEDICINE

## 2021-03-31 PROCEDURE — 36591 DRAW BLOOD OFF VENOUS DEVICE: CPT

## 2021-03-31 PROCEDURE — 2580000003 HC RX 258: Performed by: INTERNAL MEDICINE

## 2021-03-31 PROCEDURE — 80053 COMPREHEN METABOLIC PANEL: CPT

## 2021-03-31 RX ORDER — SODIUM CHLORIDE 0.9 % (FLUSH) 0.9 %
10 SYRINGE (ML) INJECTION PRN
Status: DISCONTINUED | OUTPATIENT
Start: 2021-03-31 | End: 2021-04-01 | Stop reason: HOSPADM

## 2021-03-31 RX ORDER — SODIUM CHLORIDE 9 MG/ML
20 INJECTION, SOLUTION INTRAVENOUS ONCE
Status: COMPLETED | OUTPATIENT
Start: 2021-03-31 | End: 2021-03-31

## 2021-03-31 RX ORDER — HEPARIN SODIUM (PORCINE) LOCK FLUSH IV SOLN 100 UNIT/ML 100 UNIT/ML
500 SOLUTION INTRAVENOUS PRN
Status: DISCONTINUED | OUTPATIENT
Start: 2021-03-31 | End: 2021-04-01 | Stop reason: HOSPADM

## 2021-03-31 RX ADMIN — SODIUM CHLORIDE, PRESERVATIVE FREE 10 ML: 5 INJECTION INTRAVENOUS at 12:05

## 2021-03-31 RX ADMIN — SODIUM CHLORIDE 20 ML/HR: 9 INJECTION, SOLUTION INTRAVENOUS at 11:21

## 2021-03-31 RX ADMIN — SODIUM CHLORIDE, PRESERVATIVE FREE 10 ML: 5 INJECTION INTRAVENOUS at 10:48

## 2021-03-31 RX ADMIN — HEPARIN 500 UNITS: 100 SYRINGE at 12:05

## 2021-03-31 RX ADMIN — SODIUM CHLORIDE, PRESERVATIVE FREE 10 ML: 5 INJECTION INTRAVENOUS at 10:49

## 2021-03-31 RX ADMIN — SODIUM CHLORIDE, PRESERVATIVE FREE 10 ML: 5 INJECTION INTRAVENOUS at 10:50

## 2021-03-31 RX ADMIN — SODIUM CHLORIDE, PRESERVATIVE FREE 10 ML: 5 INJECTION INTRAVENOUS at 10:52

## 2021-03-31 RX ADMIN — SODIUM CHLORIDE, PRESERVATIVE FREE 10 ML: 5 INJECTION INTRAVENOUS at 10:51

## 2021-03-31 RX ADMIN — ATEZOLIZUMAB 1200 MG: 1200 INJECTION, SOLUTION INTRAVENOUS at 11:29

## 2021-03-31 NOTE — PROGRESS NOTES
Patient arrived for C6 tecentriq  Labs collected and reviewed   Tolerated tx w/o incident   Awaiting precert to be ran tomorrow to see if his new insurance will be covered if so he will be scheduled for additional appts  Amber Bennett RN

## 2021-03-31 NOTE — PLAN OF CARE
Problem:  Activity:  Goal: Ability to perform activities at highest level will improve  Description: Ability to perform activities at highest level will improve  Outcome: Completed

## 2021-04-21 ENCOUNTER — TELEPHONE (OUTPATIENT)
Dept: ONCOLOGY | Age: 71
End: 2021-04-21

## 2021-04-21 ENCOUNTER — HOSPITAL ENCOUNTER (OUTPATIENT)
Dept: INFUSION THERAPY | Age: 71
Discharge: HOME | End: 2021-04-21
Payer: MEDICARE

## 2021-04-21 ENCOUNTER — OFFICE VISIT (OUTPATIENT)
Dept: ONCOLOGY | Age: 71
End: 2021-04-21
Payer: MEDICARE

## 2021-04-21 VITALS
WEIGHT: 187 LBS | BODY MASS INDEX: 26.45 KG/M2 | HEART RATE: 74 BPM | DIASTOLIC BLOOD PRESSURE: 65 MMHG | TEMPERATURE: 97.9 F | SYSTOLIC BLOOD PRESSURE: 101 MMHG

## 2021-04-21 VITALS
RESPIRATION RATE: 18 BRPM | TEMPERATURE: 97.9 F | HEART RATE: 74 BPM | BODY MASS INDEX: 26.23 KG/M2 | DIASTOLIC BLOOD PRESSURE: 65 MMHG | SYSTOLIC BLOOD PRESSURE: 101 MMHG | HEIGHT: 71 IN | WEIGHT: 187.4 LBS

## 2021-04-21 DIAGNOSIS — C34.91 PRIMARY LUNG CANCER WITH METASTASIS FROM LUNG TO OTHER SITE, RIGHT (HCC): Primary | ICD-10-CM

## 2021-04-21 DIAGNOSIS — C34.91 MALIGNANT NEOPLASM OF RIGHT LUNG, UNSPECIFIED PART OF LUNG (HCC): ICD-10-CM

## 2021-04-21 DIAGNOSIS — C79.70 MALIGNANT NEOPLASM METASTATIC TO ADRENAL GLAND, UNSPECIFIED LATERALITY (HCC): ICD-10-CM

## 2021-04-21 LAB
ABSOLUTE EOS #: 0.3 K/UL (ref 0–0.4)
ABSOLUTE IMMATURE GRANULOCYTE: ABNORMAL K/UL (ref 0–0.3)
ABSOLUTE LYMPH #: 0.8 K/UL (ref 1–4.8)
ABSOLUTE MONO #: 0.8 K/UL (ref 0.1–1.2)
ALBUMIN SERPL-MCNC: 4.1 G/DL (ref 3.5–5.2)
ALBUMIN/GLOBULIN RATIO: 1.5 (ref 1–2.5)
ALP BLD-CCNC: 96 U/L (ref 40–129)
ALT SERPL-CCNC: 11 U/L (ref 5–41)
AMYLASE: 39 U/L (ref 28–100)
ANION GAP SERPL CALCULATED.3IONS-SCNC: 8 MMOL/L (ref 9–17)
AST SERPL-CCNC: 18 U/L
BASOPHILS # BLD: 1 % (ref 0–2)
BASOPHILS ABSOLUTE: 0.1 K/UL (ref 0–0.2)
BILIRUB SERPL-MCNC: 0.17 MG/DL (ref 0.3–1.2)
BUN BLDV-MCNC: 17 MG/DL (ref 8–23)
BUN/CREAT BLD: ABNORMAL (ref 9–20)
CALCIUM SERPL-MCNC: 9.5 MG/DL (ref 8.6–10.4)
CHLORIDE BLD-SCNC: 105 MMOL/L (ref 98–107)
CO2: 25 MMOL/L (ref 20–31)
CORTISOL COLLECTION INFO: NORMAL
CORTISOL: 11.4 UG/DL (ref 2.7–18.4)
CREAT SERPL-MCNC: 1.19 MG/DL (ref 0.7–1.2)
DIFFERENTIAL TYPE: ABNORMAL
EOSINOPHILS RELATIVE PERCENT: 5 % (ref 1–4)
GFR AFRICAN AMERICAN: >60 ML/MIN
GFR NON-AFRICAN AMERICAN: >60 ML/MIN
GFR SERPL CREATININE-BSD FRML MDRD: ABNORMAL ML/MIN/{1.73_M2}
GFR SERPL CREATININE-BSD FRML MDRD: ABNORMAL ML/MIN/{1.73_M2}
GLUCOSE BLD-MCNC: 142 MG/DL (ref 70–99)
HCT VFR BLD CALC: 32.2 % (ref 41–53)
HEMOGLOBIN: 10.6 G/DL (ref 13.5–17.5)
IMMATURE GRANULOCYTES: ABNORMAL %
LIPASE: 22 U/L (ref 13–60)
LYMPHOCYTES # BLD: 17 % (ref 24–44)
MCH RBC QN AUTO: 35.5 PG (ref 26–34)
MCHC RBC AUTO-ENTMCNC: 33.1 G/DL (ref 31–37)
MCV RBC AUTO: 107.3 FL (ref 80–100)
MONOCYTES # BLD: 17 % (ref 2–11)
NRBC AUTOMATED: ABNORMAL PER 100 WBC
PDW BLD-RTO: 15.4 % (ref 12.5–15.4)
PLATELET # BLD: 230 K/UL (ref 140–450)
PLATELET ESTIMATE: ABNORMAL
PMV BLD AUTO: 7.5 FL (ref 6–12)
POTASSIUM SERPL-SCNC: 4.5 MMOL/L (ref 3.7–5.3)
RBC # BLD: 3 M/UL (ref 4.5–5.9)
RBC # BLD: ABNORMAL 10*6/UL
SEG NEUTROPHILS: 60 % (ref 36–66)
SEGMENTED NEUTROPHILS ABSOLUTE COUNT: 2.8 K/UL (ref 1.8–7.7)
SODIUM BLD-SCNC: 138 MMOL/L (ref 135–144)
TOTAL PROTEIN: 6.8 G/DL (ref 6.4–8.3)
TSH SERPL DL<=0.05 MIU/L-ACNC: 1.31 MIU/L (ref 0.3–5)
WBC # BLD: 4.8 K/UL (ref 3.5–11)
WBC # BLD: ABNORMAL 10*3/UL

## 2021-04-21 PROCEDURE — 99211 OFF/OP EST MAY X REQ PHY/QHP: CPT | Performed by: INTERNAL MEDICINE

## 2021-04-21 PROCEDURE — 3017F COLORECTAL CA SCREEN DOC REV: CPT | Performed by: INTERNAL MEDICINE

## 2021-04-21 PROCEDURE — 4040F PNEUMOC VAC/ADMIN/RCVD: CPT | Performed by: INTERNAL MEDICINE

## 2021-04-21 PROCEDURE — G8417 CALC BMI ABV UP PARAM F/U: HCPCS | Performed by: INTERNAL MEDICINE

## 2021-04-21 PROCEDURE — 2580000003 HC RX 258: Performed by: INTERNAL MEDICINE

## 2021-04-21 PROCEDURE — 80053 COMPREHEN METABOLIC PANEL: CPT

## 2021-04-21 PROCEDURE — 82533 TOTAL CORTISOL: CPT

## 2021-04-21 PROCEDURE — 85025 COMPLETE CBC W/AUTO DIFF WBC: CPT

## 2021-04-21 PROCEDURE — 84443 ASSAY THYROID STIM HORMONE: CPT

## 2021-04-21 PROCEDURE — 6360000002 HC RX W HCPCS: Performed by: INTERNAL MEDICINE

## 2021-04-21 PROCEDURE — 96413 CHEMO IV INFUSION 1 HR: CPT

## 2021-04-21 PROCEDURE — 36591 DRAW BLOOD OFF VENOUS DEVICE: CPT

## 2021-04-21 PROCEDURE — 99214 OFFICE O/P EST MOD 30 MIN: CPT | Performed by: INTERNAL MEDICINE

## 2021-04-21 PROCEDURE — 4004F PT TOBACCO SCREEN RCVD TLK: CPT | Performed by: INTERNAL MEDICINE

## 2021-04-21 PROCEDURE — 83690 ASSAY OF LIPASE: CPT

## 2021-04-21 PROCEDURE — 82150 ASSAY OF AMYLASE: CPT

## 2021-04-21 PROCEDURE — 1123F ACP DISCUSS/DSCN MKR DOCD: CPT | Performed by: INTERNAL MEDICINE

## 2021-04-21 PROCEDURE — G8427 DOCREV CUR MEDS BY ELIG CLIN: HCPCS | Performed by: INTERNAL MEDICINE

## 2021-04-21 RX ORDER — EPINEPHRINE 1 MG/ML
0.3 INJECTION, SOLUTION, CONCENTRATE INTRAVENOUS PRN
Status: CANCELLED | OUTPATIENT
Start: 2021-05-12

## 2021-04-21 RX ORDER — MEPERIDINE HYDROCHLORIDE 50 MG/ML
12.5 INJECTION INTRAMUSCULAR; INTRAVENOUS; SUBCUTANEOUS ONCE
Status: CANCELLED | OUTPATIENT
Start: 2021-05-12 | End: 2021-05-12

## 2021-04-21 RX ORDER — HEPARIN SODIUM (PORCINE) LOCK FLUSH IV SOLN 100 UNIT/ML 100 UNIT/ML
500 SOLUTION INTRAVENOUS PRN
Status: CANCELLED | OUTPATIENT
Start: 2021-05-12

## 2021-04-21 RX ORDER — DIPHENHYDRAMINE HYDROCHLORIDE 50 MG/ML
50 INJECTION INTRAMUSCULAR; INTRAVENOUS ONCE
Status: CANCELLED | OUTPATIENT
Start: 2021-04-21 | End: 2021-04-21

## 2021-04-21 RX ORDER — SODIUM CHLORIDE 0.9 % (FLUSH) 0.9 %
10 SYRINGE (ML) INJECTION PRN
Status: CANCELLED | OUTPATIENT
Start: 2021-05-12

## 2021-04-21 RX ORDER — DIPHENHYDRAMINE HYDROCHLORIDE 50 MG/ML
50 INJECTION INTRAMUSCULAR; INTRAVENOUS ONCE
Status: CANCELLED | OUTPATIENT
Start: 2021-05-12 | End: 2021-05-12

## 2021-04-21 RX ORDER — METHYLPREDNISOLONE SODIUM SUCCINATE 125 MG/2ML
125 INJECTION, POWDER, LYOPHILIZED, FOR SOLUTION INTRAMUSCULAR; INTRAVENOUS ONCE
Status: CANCELLED | OUTPATIENT
Start: 2021-04-21 | End: 2021-04-21

## 2021-04-21 RX ORDER — SODIUM CHLORIDE 0.9 % (FLUSH) 0.9 %
5 SYRINGE (ML) INJECTION PRN
Status: CANCELLED | OUTPATIENT
Start: 2021-05-12

## 2021-04-21 RX ORDER — SODIUM CHLORIDE 0.9 % (FLUSH) 0.9 %
5 SYRINGE (ML) INJECTION PRN
Status: CANCELLED | OUTPATIENT
Start: 2021-04-21

## 2021-04-21 RX ORDER — SODIUM CHLORIDE 0.9 % (FLUSH) 0.9 %
10 SYRINGE (ML) INJECTION PRN
Status: DISCONTINUED | OUTPATIENT
Start: 2021-04-21 | End: 2021-04-22 | Stop reason: HOSPADM

## 2021-04-21 RX ORDER — HEPARIN SODIUM (PORCINE) LOCK FLUSH IV SOLN 100 UNIT/ML 100 UNIT/ML
500 SOLUTION INTRAVENOUS PRN
Status: DISCONTINUED | OUTPATIENT
Start: 2021-04-21 | End: 2021-04-22 | Stop reason: HOSPADM

## 2021-04-21 RX ORDER — SODIUM CHLORIDE 9 MG/ML
INJECTION, SOLUTION INTRAVENOUS CONTINUOUS
Status: CANCELLED | OUTPATIENT
Start: 2021-05-12

## 2021-04-21 RX ORDER — SODIUM CHLORIDE 9 MG/ML
20 INJECTION, SOLUTION INTRAVENOUS ONCE
Status: CANCELLED | OUTPATIENT
Start: 2021-05-12 | End: 2021-05-12

## 2021-04-21 RX ORDER — METHYLPREDNISOLONE SODIUM SUCCINATE 125 MG/2ML
125 INJECTION, POWDER, LYOPHILIZED, FOR SOLUTION INTRAMUSCULAR; INTRAVENOUS ONCE
Status: CANCELLED | OUTPATIENT
Start: 2021-05-12 | End: 2021-05-12

## 2021-04-21 RX ORDER — SODIUM CHLORIDE 9 MG/ML
20 INJECTION, SOLUTION INTRAVENOUS ONCE
Status: COMPLETED | OUTPATIENT
Start: 2021-04-21 | End: 2021-04-21

## 2021-04-21 RX ORDER — EPINEPHRINE 1 MG/ML
0.3 INJECTION, SOLUTION, CONCENTRATE INTRAVENOUS PRN
Status: CANCELLED | OUTPATIENT
Start: 2021-04-21

## 2021-04-21 RX ORDER — SODIUM CHLORIDE 9 MG/ML
INJECTION, SOLUTION INTRAVENOUS CONTINUOUS
Status: CANCELLED | OUTPATIENT
Start: 2021-04-21

## 2021-04-21 RX ADMIN — Medication 10 ML: at 08:08

## 2021-04-21 RX ADMIN — Medication 10 ML: at 08:06

## 2021-04-21 RX ADMIN — ATEZOLIZUMAB 1200 MG: 1200 INJECTION, SOLUTION INTRAVENOUS at 09:34

## 2021-04-21 RX ADMIN — Medication 10 ML: at 08:07

## 2021-04-21 RX ADMIN — Medication 10 ML: at 10:10

## 2021-04-21 RX ADMIN — SODIUM CHLORIDE 20 ML/HR: 9 INJECTION, SOLUTION INTRAVENOUS at 09:34

## 2021-04-21 RX ADMIN — HEPARIN 500 UNITS: 100 SYRINGE at 10:10

## 2021-04-21 RX ADMIN — Medication 10 ML: at 08:09

## 2021-04-21 RX ADMIN — Medication 10 ML: at 08:05

## 2021-04-21 NOTE — TELEPHONE ENCOUNTER
AVS from 4/21/21    tx today  rv in 3 weeks    Tx today as scheduled    RV scheduled 5/21/21 @ 1pm w/ tx to follow    PT was given AVS and an appt schedule    Electronically signed by Preston Benavides on 4/21/2021 at 11:30 AM

## 2021-04-21 NOTE — PROGRESS NOTES
out.  Clinically patient does give history of trauma to the neck area any years ago however denies any surgery history. Bone scan did not reveal any metastasis     Patient continues to smoke but has cut down quite a bit. He works full-time in a [employer]. Patient has good performance status, ECOG 0. Patient's other medical problems include dyslipidemia and hypertension. He also has arthritis pain    Started patient on concurrent chemoradiation using cisplatin and etoposide with dose adjustment for renal dysfunction. Chemotherapy changed to carboplatin and etoposide from cycle #2 due to renal dysfunction    Received PCI in July 2019. Interim History:    Patient presents to the clinic for a follow-up visit with cycle #7 and to discuss results of his lab work-up, imaging, and toxicity check. He is tolerating treatment well overall. He has been fully vaccinated for COVID. His appetite is poor, he does work on nutrition and weight maintenance. He is taking Eliquis as directed. He hasn't had recent follow up with PCP or cardiology. During this visit patient's allergy, social, medical, surgical history and medications were reviewed and updated. Past Medical History:   Past Medical History:   Diagnosis Date    DDD (degenerative disc disease), cervical     Gout     Heart murmur     hx. of when younger.  Hyperlipidemia     Hypertension     Lung cancer ([address]     right lung    MI (myocardial infarction) ([address]     Skin cancer     face    Wears glasses        Past Surgical History:     Past Surgical History:   Procedure Laterality Date    APPENDECTOMY      CARDIAC CATHETERIZATION      w/stent    COLONOSCOPY      CYST INCISION AND DRAINAGE Right 05/14/2020    rt elbow I and D and left knee aspiration with cultures    FOOT SURGERY Right     2nd toe(bone spur).     FOREARM SURGERY Right 5/14/2020    RIGHT ELBOW IRRIGATION AND DEBRIDEMENT performed by Pablito Arias DO at [address] [place] [address] Left 5/14/2020    KNEE ASPIRATION WITH CULTURES SENT performed by Carmela Key DO at [address]      face under lt. eye.     TONSILLECTOMY         Patient Family Social History:    Family History   Problem Relation Age of Onset    Cancer Father         lung cancer      Social History     Socioeconomic History    Marital status:      Spouse name: Not on file    Number of children: Not on file    Years of education: Not on file    Highest education level: Not on file   Occupational History    Not on file   Social Needs    Financial resource strain: Not on file    Food insecurity     Worry: Not on file     Inability: Not on file    Transportation needs     Medical: Not on file     Non-medical: Not on file   Tobacco Use    Smoking status: Current Every Day Smoker     Packs/day: 0.50     Types: Cigarettes    Smokeless tobacco: Former User   Substance and Sexual Activity    Alcohol use: No    Drug use: Yes     Frequency: 14.0 times per week     Types: Marijuana    Sexual activity: Not on file   Lifestyle    Physical activity     Days per week: Not on file     Minutes per session: Not on file    Stress: Not on file   Relationships    Social connections     Talks on phone: Not on file     Gets together: Not on file     Attends Restorationism service: Not on file     Active member of club or organization: Not on file     Attends meetings of clubs or organizations: Not on file     Relationship status: Not on file    Intimate partner violence     Fear of current or ex partner: Not on file     Emotionally abused: Not on file     Physically abused: Not on file     Forced sexual activity: Not on file   Other Topics Concern    Not on file   Social History Narrative    Not on file      Current Medications:     Current Outpatient Medications   Medication Sig Dispense Refill    docusate sodium (COLACE) 100 MG capsule Take 100 mg by mouth 2 times daily      B Complex-C-Folic Acid (ANGELICA-HUGO) TABS TAKE 1 TABLET BY MOUTH DAILY. HEMATINIC VIT MINERALS      traZODone (DESYREL) 100 MG tablet TAKE 1 TABLET BY MOUTH EVERY DAY AT NIGHT      ipratropium-albuterol (DUONEB) 0.5-2.5 (3) MG/3ML SOLN nebulizer solution Inhale 3 mLs into the lungs 4 times daily      Melatonin 5 MG CHEW Take 5 mg by mouth nightly      potassium chloride (MICRO-K) 10 MEQ extended release capsule Take 20 mEq by mouth      Ferrous Fumarate (FERROCITE) 324 (106 Fe) MG TABS Take by mouth      apixaban (ELIQUIS) 5 MG TABS tablet Take 1 tablet by mouth 2 times daily 60 tablet 1    metoprolol tartrate (LOPRESSOR) 25 MG tablet Take 0.5 tablets by mouth 2 times daily 60 tablet 3    tamsulosin (FLOMAX) 0.4 MG capsule TAKE 1 CAPSULE BY MOUTH EVERY DAY 30 capsule 5    Handicap Placard MISC by Does not apply route 1 each 0    lidocaine-prilocaine (EMLA) 2.5-2.5 % cream Apply topically as needed. Apply a quarter size amount to port site 1 hour before chemotherapy. Cover with plastic wrap. 30 g 0    acetaminophen (TYLENOL) 325 MG tablet Take 650 mg by mouth every 6 hours as needed for Pain      allopurinol (ZYLOPRIM) 100 MG tablet Take 100 mg by mouth daily      simvastatin (ZOCOR) 40 MG tablet Take 40 mg by mouth daily      Multiple Vitamins-Minerals (THERAPEUTIC MULTIVITAMIN-MINERALS) tablet Take 1 tablet by mouth daily       No current facility-administered medications for this visit.       Facility-Administered Medications Ordered in Other Visits   Medication Dose Route Frequency Provider Last Rate Last Admin    sodium chloride flush 0.9 % injection 10 mL  10 mL Intravenous PRN Pastor Laurel MD   10 mL at 04/21/21 0809    heparin flush 100 UNIT/ML injection 500 Units  500 Units Intracatheter PRN Pastor Laurel MD        0.9 % sodium chloride infusion  20 mL/hr Intravenous Once Pastor Laurel MD        atezolizumab [facility]) 1,200 mg in sodium chloride 0.9 % 250 mL chemo IVPB  1,200 mg Intravenous Once Guillermo edema. Right arm has bruising, no point tenderness or swelling  Skin - normal coloration and turgor, no rashes, no suspicious skin lesions noted       DATA:    Labs:   Lab Results   Component Value Date    WBC 4.8 04/21/2021    HGB 10.6 (L) 04/21/2021    HCT 32.2 (L) 04/21/2021    .3 (H) 04/21/2021     04/21/2021     Lab Results   Component Value Date    NEUTROABS 2.80 04/21/2021           Chemistry        Component Value Date/Time     04/21/2021 0805    K 4.5 04/21/2021 0805     04/21/2021 0805    CO2 25 04/21/2021 0805    BUN 17 04/21/2021 0805    CREATININE 1.19 04/21/2021 0805        Component Value Date/Time    CALCIUM 9.5 04/21/2021 0805    ALKPHOS 96 04/21/2021 0805    AST 18 04/21/2021 0805    ALT 11 04/21/2021 0805    BILITOT 0.17 (L) 04/21/2021 0805        No results found.         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 I completed toxicity check. He is stable and tolerating treatment well. Patient continues to be on Tecentriq. Reviewed goals and expectations. Reiterated treatment plan to continue treatment until disease progression or intolerable toxicity. We discussed continued anti-coagulation for his paroxysmal Afib, I asked him to schedule follow up with cardiology. We will treat today as per orders. Return in 3 weeks. Patient's conditions were taken into consideration when deciding risk-benefit for therapy. Patient is at high risk for drug interaction risk of complications.  Given multiple comorbid conditions patient is at high risk for complication from intervention, risk of

## 2021-04-21 NOTE — FLOWSHEET NOTE
Situation:  Writer visited with Mr. Irvin Rosales as he was leaving the infusion clinic. Assessment:  Mr. Irvin Rosales smiled and engaged with writer. He reported that he was doing well. He identified God as who is giving him strength. He accessed his sense of humor during the conversation. He told Pt he would be back in two weeks. Intervention:  Writer provided supportive presence and explored Pt's coping and needs; inquired about Pt's sources of support and strength; offered words of support and encouragement. Outcome:  Mr. Irvin Rosales thanked Marie Lozano. 04/21/21 1321   Encounter Summary   Services provided to: Patient   Referral/Consult From: [address] [place] [address] Family members;Spouse; [employer]   Continue Visiting   (4/21/21)   Complexity of Encounter Low   Length of Encounter 15 minutes   Spiritual Assessment Completed Yes   Routine   Type Follow up   Spiritual/Druze   Type Spiritual support   Assessment Calm; Approachable; Hopeful;Coping   Intervention Active listening;Explored feelings, thoughts, concerns;Explored coping resources;Sustaining presence/ Ministry of presence; Discussed illness/injury and it's impact; Discussed relationship with God;Discussed meaning/purpose   Outcome Receptive; Hopeful;Encouraged;Coping;Engaged in conversation;Expressed gratitude     Electronically signed by Oneida Martínez [address] [facility]  4/21/2021  1:22 PM

## 2021-04-21 NOTE — PROGRESS NOTES
Pt here for C7D1 Tecentriq. Denies any new complaints. Labs drawn from port and results reviewed. Pt seen by Dr Amalia Stein at chair side for follow up, refer to his note. Pt was treated without incident and d/c'd in stable condition. Pt will return on 5-12-21 for C8D1.

## 2021-05-12 ENCOUNTER — HOSPITAL ENCOUNTER (OUTPATIENT)
Dept: INFUSION THERAPY | Age: 71
Discharge: HOME | End: 2021-05-12
Payer: MEDICARE

## 2021-05-12 ENCOUNTER — TELEPHONE (OUTPATIENT)
Dept: ONCOLOGY | Age: 71
End: 2021-05-12

## 2021-05-12 ENCOUNTER — OFFICE VISIT (OUTPATIENT)
Dept: ONCOLOGY | Age: 71
End: 2021-05-12
Payer: MEDICARE

## 2021-05-12 VITALS
HEART RATE: 71 BPM | TEMPERATURE: 97.9 F | BODY MASS INDEX: 26.89 KG/M2 | WEIGHT: 190.1 LBS | SYSTOLIC BLOOD PRESSURE: 114 MMHG | DIASTOLIC BLOOD PRESSURE: 73 MMHG

## 2021-05-12 DIAGNOSIS — C34.91 MALIGNANT NEOPLASM OF RIGHT LUNG, UNSPECIFIED PART OF LUNG (HCC): Primary | ICD-10-CM

## 2021-05-12 DIAGNOSIS — C79.70 MALIGNANT NEOPLASM METASTATIC TO ADRENAL GLAND, UNSPECIFIED LATERALITY (HCC): ICD-10-CM

## 2021-05-12 DIAGNOSIS — C34.91 PRIMARY LUNG CANCER WITH METASTASIS FROM LUNG TO OTHER SITE, RIGHT (HCC): ICD-10-CM

## 2021-05-12 DIAGNOSIS — C34.91 PRIMARY LUNG CANCER WITH METASTASIS FROM LUNG TO OTHER SITE, RIGHT (HCC): Primary | ICD-10-CM

## 2021-05-12 DIAGNOSIS — R53.1 ASTHENIA: ICD-10-CM

## 2021-05-12 LAB
ABSOLUTE EOS #: 0.2 K/UL (ref 0–0.4)
ABSOLUTE IMMATURE GRANULOCYTE: ABNORMAL K/UL (ref 0–0.3)
ABSOLUTE LYMPH #: 1 K/UL (ref 1–4.8)
ABSOLUTE MONO #: 0.9 K/UL (ref 0.1–1.2)
ALBUMIN SERPL-MCNC: 4.2 G/DL (ref 3.5–5.2)
ALBUMIN/GLOBULIN RATIO: 1.4 (ref 1–2.5)
ALP BLD-CCNC: 86 U/L (ref 40–129)
ALT SERPL-CCNC: 14 U/L (ref 5–41)
AMYLASE: 46 U/L (ref 28–100)
ANION GAP SERPL CALCULATED.3IONS-SCNC: 10 MMOL/L (ref 9–17)
AST SERPL-CCNC: 20 U/L
BASOPHILS # BLD: 1 % (ref 0–2)
BASOPHILS ABSOLUTE: 0 K/UL (ref 0–0.2)
BILIRUB SERPL-MCNC: 0.31 MG/DL (ref 0.3–1.2)
BUN BLDV-MCNC: 15 MG/DL (ref 8–23)
BUN/CREAT BLD: ABNORMAL (ref 9–20)
CALCIUM SERPL-MCNC: 10 MG/DL (ref 8.6–10.4)
CHLORIDE BLD-SCNC: 105 MMOL/L (ref 98–107)
CO2: 24 MMOL/L (ref 20–31)
CORTISOL COLLECTION INFO: NORMAL
CORTISOL: 7.5 UG/DL (ref 2.7–18.4)
CREAT SERPL-MCNC: 1.24 MG/DL (ref 0.7–1.2)
DIFFERENTIAL TYPE: ABNORMAL
EOSINOPHILS RELATIVE PERCENT: 3 % (ref 1–4)
GFR AFRICAN AMERICAN: >60 ML/MIN
GFR NON-AFRICAN AMERICAN: 58 ML/MIN
GFR SERPL CREATININE-BSD FRML MDRD: ABNORMAL ML/MIN/{1.73_M2}
GFR SERPL CREATININE-BSD FRML MDRD: ABNORMAL ML/MIN/{1.73_M2}
GLUCOSE BLD-MCNC: 123 MG/DL (ref 70–99)
HCT VFR BLD CALC: 35 % (ref 41–53)
HEMOGLOBIN: 11.6 G/DL (ref 13.5–17.5)
IMMATURE GRANULOCYTES: ABNORMAL %
LIPASE: 24 U/L (ref 13–60)
LYMPHOCYTES # BLD: 19 % (ref 24–44)
MCH RBC QN AUTO: 35.1 PG (ref 26–34)
MCHC RBC AUTO-ENTMCNC: 33.3 G/DL (ref 31–37)
MCV RBC AUTO: 105.7 FL (ref 80–100)
MONOCYTES # BLD: 18 % (ref 2–11)
NRBC AUTOMATED: ABNORMAL PER 100 WBC
PDW BLD-RTO: 14.5 % (ref 12.5–15.4)
PLATELET # BLD: 217 K/UL (ref 140–450)
PLATELET ESTIMATE: ABNORMAL
PMV BLD AUTO: 7 FL (ref 6–12)
POTASSIUM SERPL-SCNC: 4.9 MMOL/L (ref 3.7–5.3)
RBC # BLD: 3.31 M/UL (ref 4.5–5.9)
RBC # BLD: ABNORMAL 10*6/UL
SEG NEUTROPHILS: 59 % (ref 36–66)
SEGMENTED NEUTROPHILS ABSOLUTE COUNT: 3.1 K/UL (ref 1.8–7.7)
SODIUM BLD-SCNC: 139 MMOL/L (ref 135–144)
TOTAL PROTEIN: 7.3 G/DL (ref 6.4–8.3)
TSH SERPL DL<=0.05 MIU/L-ACNC: 0.89 MIU/L (ref 0.3–5)
WBC # BLD: 5.1 K/UL (ref 3.5–11)
WBC # BLD: ABNORMAL 10*3/UL

## 2021-05-12 PROCEDURE — 99211 OFF/OP EST MAY X REQ PHY/QHP: CPT | Performed by: INTERNAL MEDICINE

## 2021-05-12 PROCEDURE — 84443 ASSAY THYROID STIM HORMONE: CPT

## 2021-05-12 PROCEDURE — 6360000002 HC RX W HCPCS: Performed by: INTERNAL MEDICINE

## 2021-05-12 PROCEDURE — 80053 COMPREHEN METABOLIC PANEL: CPT

## 2021-05-12 PROCEDURE — G8427 DOCREV CUR MEDS BY ELIG CLIN: HCPCS | Performed by: INTERNAL MEDICINE

## 2021-05-12 PROCEDURE — 36415 COLL VENOUS BLD VENIPUNCTURE: CPT

## 2021-05-12 PROCEDURE — 4004F PT TOBACCO SCREEN RCVD TLK: CPT | Performed by: INTERNAL MEDICINE

## 2021-05-12 PROCEDURE — 3017F COLORECTAL CA SCREEN DOC REV: CPT | Performed by: INTERNAL MEDICINE

## 2021-05-12 PROCEDURE — 96375 TX/PRO/DX INJ NEW DRUG ADDON: CPT | Performed by: NURSE PRACTITIONER

## 2021-05-12 PROCEDURE — 96413 CHEMO IV INFUSION 1 HR: CPT | Performed by: NURSE PRACTITIONER

## 2021-05-12 PROCEDURE — 83690 ASSAY OF LIPASE: CPT

## 2021-05-12 PROCEDURE — 85025 COMPLETE CBC W/AUTO DIFF WBC: CPT

## 2021-05-12 PROCEDURE — 82533 TOTAL CORTISOL: CPT

## 2021-05-12 PROCEDURE — 99215 OFFICE O/P EST HI 40 MIN: CPT | Performed by: INTERNAL MEDICINE

## 2021-05-12 PROCEDURE — 2580000003 HC RX 258: Performed by: INTERNAL MEDICINE

## 2021-05-12 PROCEDURE — 4040F PNEUMOC VAC/ADMIN/RCVD: CPT | Performed by: INTERNAL MEDICINE

## 2021-05-12 PROCEDURE — 82150 ASSAY OF AMYLASE: CPT

## 2021-05-12 PROCEDURE — G8417 CALC BMI ABV UP PARAM F/U: HCPCS | Performed by: INTERNAL MEDICINE

## 2021-05-12 PROCEDURE — 1123F ACP DISCUSS/DSCN MKR DOCD: CPT | Performed by: INTERNAL MEDICINE

## 2021-05-12 RX ORDER — SODIUM CHLORIDE 0.9 % (FLUSH) 0.9 %
5 SYRINGE (ML) INJECTION PRN
Status: CANCELLED | OUTPATIENT
Start: 2021-06-02

## 2021-05-12 RX ORDER — MEPERIDINE HYDROCHLORIDE 50 MG/ML
12.5 INJECTION INTRAMUSCULAR; INTRAVENOUS; SUBCUTANEOUS ONCE
Status: CANCELLED | OUTPATIENT
Start: 2021-06-02 | End: 2021-06-02

## 2021-05-12 RX ORDER — SODIUM CHLORIDE 0.9 % (FLUSH) 0.9 %
10 SYRINGE (ML) INJECTION PRN
Status: DISCONTINUED | OUTPATIENT
Start: 2021-05-12 | End: 2021-05-13 | Stop reason: HOSPADM

## 2021-05-12 RX ORDER — HEPARIN SODIUM (PORCINE) LOCK FLUSH IV SOLN 100 UNIT/ML 100 UNIT/ML
500 SOLUTION INTRAVENOUS PRN
Status: CANCELLED | OUTPATIENT
Start: 2021-06-02

## 2021-05-12 RX ORDER — SODIUM CHLORIDE 9 MG/ML
INJECTION, SOLUTION INTRAVENOUS CONTINUOUS
Status: CANCELLED | OUTPATIENT
Start: 2021-06-02

## 2021-05-12 RX ORDER — DIPHENHYDRAMINE HYDROCHLORIDE 50 MG/ML
50 INJECTION INTRAMUSCULAR; INTRAVENOUS ONCE
Status: CANCELLED | OUTPATIENT
Start: 2021-06-02 | End: 2021-06-02

## 2021-05-12 RX ORDER — SODIUM CHLORIDE 0.9 % (FLUSH) 0.9 %
10 SYRINGE (ML) INJECTION PRN
Status: CANCELLED | OUTPATIENT
Start: 2021-06-02

## 2021-05-12 RX ORDER — METHYLPREDNISOLONE SODIUM SUCCINATE 125 MG/2ML
125 INJECTION, POWDER, LYOPHILIZED, FOR SOLUTION INTRAMUSCULAR; INTRAVENOUS ONCE
Status: CANCELLED | OUTPATIENT
Start: 2021-06-02 | End: 2021-06-02

## 2021-05-12 RX ORDER — SODIUM CHLORIDE 9 MG/ML
20 INJECTION, SOLUTION INTRAVENOUS ONCE
Status: COMPLETED | OUTPATIENT
Start: 2021-05-12 | End: 2021-05-12

## 2021-05-12 RX ORDER — EPINEPHRINE 1 MG/ML
0.3 INJECTION, SOLUTION, CONCENTRATE INTRAVENOUS PRN
Status: CANCELLED | OUTPATIENT
Start: 2021-06-02

## 2021-05-12 RX ORDER — HEPARIN SODIUM (PORCINE) LOCK FLUSH IV SOLN 100 UNIT/ML 100 UNIT/ML
500 SOLUTION INTRAVENOUS PRN
Status: DISCONTINUED | OUTPATIENT
Start: 2021-05-12 | End: 2021-05-13 | Stop reason: HOSPADM

## 2021-05-12 RX ORDER — CLINDAMYCIN HYDROCHLORIDE 300 MG/1
300 CAPSULE ORAL 3 TIMES DAILY
Qty: 15 CAPSULE | Refills: 0 | Status: SHIPPED | OUTPATIENT
Start: 2021-05-12 | End: 2021-05-17

## 2021-05-12 RX ORDER — SODIUM CHLORIDE 9 MG/ML
20 INJECTION, SOLUTION INTRAVENOUS ONCE
Status: CANCELLED | OUTPATIENT
Start: 2021-06-02 | End: 2021-06-02

## 2021-05-12 RX ADMIN — SODIUM CHLORIDE, PRESERVATIVE FREE 10 ML: 5 INJECTION INTRAVENOUS at 14:49

## 2021-05-12 RX ADMIN — ALTEPLASE 2 MG: 2.2 INJECTION, POWDER, LYOPHILIZED, FOR SOLUTION INTRAVENOUS at 13:58

## 2021-05-12 RX ADMIN — SODIUM CHLORIDE, PRESERVATIVE FREE 10 ML: 5 INJECTION INTRAVENOUS at 14:46

## 2021-05-12 RX ADMIN — WATER 2.2 ML: 1 INJECTION INTRAMUSCULAR; INTRAVENOUS; SUBCUTANEOUS at 13:58

## 2021-05-12 RX ADMIN — HEPARIN 500 UNITS: 100 SYRINGE at 15:38

## 2021-05-12 RX ADMIN — SODIUM CHLORIDE, PRESERVATIVE FREE 10 ML: 5 INJECTION INTRAVENOUS at 14:44

## 2021-05-12 RX ADMIN — ATEZOLIZUMAB 1200 MG: 1200 INJECTION, SOLUTION INTRAVENOUS at 14:52

## 2021-05-12 RX ADMIN — SODIUM CHLORIDE, PRESERVATIVE FREE 10 ML: 5 INJECTION INTRAVENOUS at 15:38

## 2021-05-12 RX ADMIN — SODIUM CHLORIDE, PRESERVATIVE FREE 10 ML: 5 INJECTION INTRAVENOUS at 14:50

## 2021-05-12 RX ADMIN — SODIUM CHLORIDE 20 ML/HR: 9 INJECTION, SOLUTION INTRAVENOUS at 14:52

## 2021-05-12 NOTE — PROGRESS NOTES
Casandra Lan                                                                                                                  5/12/2021  MRN:   [id number]  YOB: 1950  PCP:                           Phillip Broderick MD  Referring Physician: No ref. provider found  Treating Physician Name: Sadie Estrada MD      Reason for visit:  Chief Complaint   Patient presents with    Follow-up     review status of disease   Patient presents to the clinic for toxicity check and to discuss results of lab work-up, imaging studies and further treatment plan. 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 Chemotherapy changed to carboplatin and etoposide due to renal insufficiency from cycle #2  PCI 7/2019  SRS to adrenal gland metastasis, completed 07/2020  systemic palliative chemoimmunotherapy with carboplatin etoposide and Tecentriq, 12/2020 x4 cycles. Maintenance Tecentriq, 3/2021    Summary of Case/History:    Casandra Lan a 72 y.o.male is a patient diagnosed with lung cancer with the component of small cell as well as squamous cell carcinoma    Patient has a significant history of tobacco dependence and underwent CT lung screening in December 2018. CT scan was read as lung rads degree 4B. Subsequently she underwent biopsy of right upper lobe lung nodule on 1/16/19. 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 CT PET done showed abnormal FDG uptake in the right upper lobe nodule. There was also abnormal FDG uptake in the right lower lobe nodule. Additionally right hilar lymph node were also FDG avid. No bony lesion was reported.   MRI brain for staging workup did not show any evidence of intracranial metastasis. Tip of the odontoid process was ill-defined and metastasis could not be ruled out. Clinically patient does give history of trauma to the neck area any years ago however denies any surgery history. Bone scan did not reveal any metastasis     Patient continues to smoke but has cut down quite a bit. He works full-time in a [address] Patient has good performance status, ECOG 0. Patient's other medical problems include dyslipidemia and hypertension. He also has arthritis pain    Started patient on concurrent chemoradiation using cisplatin and etoposide with dose adjustment for renal dysfunction. Chemotherapy changed to carboplatin and etoposide from cycle #2 due to renal dysfunction    Received PCI in July 2019. Interim History:    Patient presents to the clinic for a follow-up visit with cycle #8 and to discuss results of his lab work-up, imaging, and toxicity check. He reports pain to the left of the spine that is tender to touch, the area is raised. He is using nebulizer regularly but complains it is loud. He remains active. During this visit patient's allergy, social, medical, surgical history and medications were reviewed and updated. Past Medical History:   Past Medical History:   Diagnosis Date    DDD (degenerative disc disease), cervical     Gout     Heart murmur     hx. of when younger.  Hyperlipidemia     Hypertension     Lung cancer ([address]     right lung    MI (myocardial infarction) ([address]     Skin cancer     face    Wears glasses        Past Surgical History:     Past Surgical History:   Procedure Laterality Date    APPENDECTOMY      CARDIAC CATHETERIZATION      w/stent    COLONOSCOPY      CYST INCISION AND DRAINAGE Right 05/14/2020    rt elbow I and D and left knee aspiration with cultures    FOOT SURGERY Right     2nd toe(bone spur).     FOREARM SURGERY Right 5/14/2020    RIGHT ELBOW IRRIGATION AND DEBRIDEMENT performed by Cody Hdz DO daily      B Complex-C-Folic Acid (ANGELICA-HUGO) TABS TAKE 1 TABLET BY MOUTH DAILY. HEMATINIC VIT MINERALS      traZODone (DESYREL) 100 MG tablet TAKE 1 TABLET BY MOUTH EVERY DAY AT NIGHT      ipratropium-albuterol (DUONEB) 0.5-2.5 (3) MG/3ML SOLN nebulizer solution Inhale 3 mLs into the lungs 4 times daily      Melatonin 5 MG CHEW Take 5 mg by mouth nightly      potassium chloride (MICRO-K) 10 MEQ extended release capsule Take 20 mEq by mouth      Ferrous Fumarate (FERROCITE) 324 (106 Fe) MG TABS Take by mouth      apixaban (ELIQUIS) 5 MG TABS tablet Take 1 tablet by mouth 2 times daily 60 tablet 1    metoprolol tartrate (LOPRESSOR) 25 MG tablet Take 0.5 tablets by mouth 2 times daily 60 tablet 3    tamsulosin (FLOMAX) 0.4 MG capsule TAKE 1 CAPSULE BY MOUTH EVERY DAY 30 capsule 5    Handicap Placard MISC by Does not apply route 1 each 0    lidocaine-prilocaine (EMLA) 2.5-2.5 % cream Apply topically as needed. Apply a quarter size amount to port site 1 hour before chemotherapy. Cover with plastic wrap. 30 g 0    acetaminophen (TYLENOL) 325 MG tablet Take 650 mg by mouth every 6 hours as needed for Pain      allopurinol (ZYLOPRIM) 100 MG tablet Take 100 mg by mouth daily      simvastatin (ZOCOR) 40 MG tablet Take 40 mg by mouth daily      Multiple Vitamins-Minerals (THERAPEUTIC MULTIVITAMIN-MINERALS) tablet Take 1 tablet by mouth daily       No current facility-administered medications for this visit. Facility-Administered Medications Ordered in Other Visits   Medication Dose Route Frequency Provider Last Rate Last Admin    alteplase (CATHFLO) injection 2 mg  2 mg Intracatheter Once [facility], MD        sterile water injection 2.2 mL  2.2 mL Injection Once [facility], MD           Allergies:   Patient has no known allergies. Review of Systems:    Constitutional: No fever or chills. No night sweats, positive fatigue.  +weight loss  Eyes: No eye discharge, double vision, or eye pain HEENT: negative for sore mouth, sore throat, hoarseness and voice change; Respiratory: negative for cough, sputum, wheezing, hemoptysis, chest pain; +exertional shortness of breath  Cardiovascular: negative for chest pain, palpitations, orthopnea, PND   Gastrointestinal: negative for nausea, vomiting, diarrhea, constipation, abdominal pain, Dysphagia, hematemesis and hematochezia   Genitourinary: negative for frequency, dysuria, nocturia, urinary incontinence, and hematuria +urgency  Integument: Positive for easy bruising - stable +rasied area to the left of the spine that is tender  Hematologic/Lymphatic: negative for easy bleeding, lymphadenopathy, or petechiae   Endocrine: negative for heat or cold intolerance,weight changes, change in bowel habits and hair loss   Musculoskeletal: Positive joint pain. +right shoulder pain  Neurological: negative for headaches, dizziness, seizures, weakness; + poor balance; +neuropathy in left hand from shoulder +poor short term memory        Physical Exam:    Vitals: /73   Pulse 71   Temp 97.9 °F (36.6 °C) (Temporal)   Wt 190 lb 1.6 oz (86.2 kg)   BMI 26.89 kg/m²   General appearance -patient not in acute distress  Mental status - AAO X3  Eyes - pupils equal and reactive, extraocular eye movements intact  Mouth - mucous membranes moist, pharynx normal without lesions  Neck - supple, no significant adenopathy  Lymphatics - no palpable lymphadenopathy, no hepatosplenomegaly  Chest - clear to auscultation, rales or rhonchi, symmetric air entry  Heart - normal rate, regular rhythm, normal S1, S2, no murmurs  Abdomen - soft, nontender, nondistended, no masses or organomegaly  Neurological - alert, oriented, normal speech, no focal findings or movement disorder noted  Extremities -+1 lower extremity pitting edema.  Right arm has bruising, no point tenderness or swelling  Skin - normal coloration and turgor, no rashes, no suspicious skin lesions noted; +raised area with tenderness to the left of the spine, no erythema        DATA:    Labs:   Lab Results   Component Value Date    WBC 5.1 05/12/2021    HGB 11.6 (L) 05/12/2021    HCT 35.0 (L) 05/12/2021    .7 (H) 05/12/2021     05/12/2021     Lab Results   Component Value Date    NEUTROABS 3.10 05/12/2021           Chemistry        Component Value Date/Time     04/21/2021 0805    K 4.5 04/21/2021 0805     04/21/2021 0805    CO2 25 04/21/2021 0805    BUN 17 04/21/2021 0805    CREATININE 1.19 04/21/2021 0805        Component Value Date/Time    CALCIUM 9.5 04/21/2021 0805    ALKPHOS 96 04/21/2021 0805    AST 18 04/21/2021 0805    ALT 11 04/21/2021 0805    BILITOT 0.17 (L) 04/21/2021 0805        No results found.           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 I completed toxicity check. He continues to do well with treatment and we will treat to progression. We discussed previous imaging, I reiterated he has controlled disease, prognostic data, and goals of therapy are palliative. Clinically he is doing well and stable. Exam shows area of tenderness near the spine I am concerned about cellulitis/small boil, I am writing for anti-biotics course. Return in 3 weeks. Patient's conditions were taken into consideration when deciding risk-benefit for therapy. Patient is at high risk for drug interaction risk of complications. Given multiple comorbid conditions patient is at high risk for complication from intervention, risk of hospitalization, medical interaction overall life expectancy. NCCN guidelines were reviewed and discussed with the patient.   The diagnosis

## 2021-05-12 NOTE — TELEPHONE ENCOUNTER
AVS from 5/12/21      tx today      rv in 3 weeks      Change ct chest to ct c/a/p     *tx as scheduled  *rv is scheduled for [email or web address] w/ chemo to follow  *CT scheduled for 6/16/21 [email or web address]      PT was given AVS and an appt schedule

## 2021-05-12 NOTE — PROGRESS NOTES
Patient seen by Dr. Paras Griffiths today, see his notes for visit details. Patients port gave no blood return. It was accessed by triage RN. Labs were drawn peripherally. Activase added in infusion when he arrived. Labs within treatable parameters. Medications released, but waiting on the blood return via the port. Patient updated on the wait. He verbalized understanding. Was able to get a blood return from port after several flushes and activase. Patient treated without incident.

## 2021-05-12 NOTE — PLAN OF CARE
Problem: KNOWLEDGE DEFICIT  Goal: Patient/S.O. demonstrates understanding of disease process, treatment plan, medications, and discharge instructions.   Outcome: Met This Shift Cady Maguire), Pediatrics  [address] [place] [address]  Phone: [phone]  Fax: [phone] Cady Maguire), Pediatrics  [address] [place] [address]  Phone: [phone]  Fax: [phone]    Caitlin Coley), Pediatric Nephrology; Pediatrics  [address] [address] [place] [address]  Phone: [phone]  Fax: [phone] Cady Maguire), Pediatrics  [address] [place] [address]  Phone: [phone]  Fax: [phone]

## 2021-05-12 NOTE — FLOWSHEET NOTE
Situation:  Writer visited with patient as he was leaving the infusion clinic. Assessment:  Mr. Mamadou Caicedo smiled and greeted writer. He reported that he was doing well. He acknowledged the sources of support upon which he draws, including his brennen, prayer, and his wife. He noted the importance of getting outside and keeping active. Intervention:  Writer offered supportive presence and explored Pt's coping and needs; inquired about Pt's sources of support and strength; offered words of encouragement and support; affirmed Pt's strengths. Outcome:  Mr. Mamadou Caicedo thanked Delvin Ervin. 05/12/21 1728   Encounter Summary   Services provided to: Patient   Referral/Consult From: [address] [place] [address] Family members;Spouse; [place]/brennen community   Continue Visiting   (5/12/21)   Complexity of Encounter Low   Length of Encounter 15 minutes   Spiritual Assessment Completed Yes   Routine   Type Follow up   Assessment Calm; Approachable; Hopeful;Coping   Intervention Active listening;Explored feelings, thoughts, concerns;Explored coping resources;Sustaining presence/ Ministry of presence; Discussed belief system/Evangelical practices/brennen;Discussed illness/injury and it's impact; Discussed meaning/purpose;Discussed relationship with God   Outcome Receptive; Hopeful;Encouraged;Coping;Expressed feelings/needs/concerns;Engaged in conversation;Expressed gratitude     Electronically signed by Ghulam Henderson, Oncology Outpatient [facility], [address] [facility] Radiation Oncology  5/12/2021  5:29 PM

## 2021-06-02 ENCOUNTER — HOSPITAL ENCOUNTER (OUTPATIENT)
Dept: INFUSION THERAPY | Age: 71
Discharge: HOME | End: 2021-06-02
Payer: MEDICARE

## 2021-06-02 ENCOUNTER — TELEPHONE (OUTPATIENT)
Dept: ONCOLOGY | Age: 71
End: 2021-06-02

## 2021-06-02 ENCOUNTER — OFFICE VISIT (OUTPATIENT)
Dept: ONCOLOGY | Age: 71
End: 2021-06-02
Payer: MEDICARE

## 2021-06-02 VITALS
TEMPERATURE: 96.5 F | DIASTOLIC BLOOD PRESSURE: 80 MMHG | HEART RATE: 80 BPM | BODY MASS INDEX: 26.45 KG/M2 | WEIGHT: 187 LBS | SYSTOLIC BLOOD PRESSURE: 119 MMHG

## 2021-06-02 DIAGNOSIS — C34.91 PRIMARY LUNG CANCER WITH METASTASIS FROM LUNG TO OTHER SITE, RIGHT (HCC): Primary | ICD-10-CM

## 2021-06-02 DIAGNOSIS — C79.70 MALIGNANT NEOPLASM METASTATIC TO ADRENAL GLAND, UNSPECIFIED LATERALITY (HCC): ICD-10-CM

## 2021-06-02 LAB
ABSOLUTE EOS #: 0.2 K/UL (ref 0–0.4)
ABSOLUTE IMMATURE GRANULOCYTE: ABNORMAL K/UL (ref 0–0.3)
ABSOLUTE LYMPH #: 1.1 K/UL (ref 1–4.8)
ABSOLUTE MONO #: 0.9 K/UL (ref 0.1–1.2)
ALBUMIN SERPL-MCNC: 4.2 G/DL (ref 3.5–5.2)
ALBUMIN/GLOBULIN RATIO: 1.4 (ref 1–2.5)
ALP BLD-CCNC: 97 U/L (ref 40–129)
ALT SERPL-CCNC: 16 U/L (ref 5–41)
AMYLASE: 79 U/L (ref 28–100)
ANION GAP SERPL CALCULATED.3IONS-SCNC: 10 MMOL/L (ref 9–17)
AST SERPL-CCNC: 21 U/L
BASOPHILS # BLD: 2 % (ref 0–2)
BASOPHILS ABSOLUTE: 0.1 K/UL (ref 0–0.2)
BILIRUB SERPL-MCNC: 0.24 MG/DL (ref 0.3–1.2)
BUN BLDV-MCNC: 16 MG/DL (ref 8–23)
BUN/CREAT BLD: ABNORMAL (ref 9–20)
CALCIUM SERPL-MCNC: 9.5 MG/DL (ref 8.6–10.4)
CHLORIDE BLD-SCNC: 102 MMOL/L (ref 98–107)
CO2: 25 MMOL/L (ref 20–31)
CORTISOL COLLECTION INFO: NORMAL
CORTISOL: 6.8 UG/DL (ref 2.7–18.4)
CREAT SERPL-MCNC: 1.02 MG/DL (ref 0.7–1.2)
DIFFERENTIAL TYPE: ABNORMAL
EOSINOPHILS RELATIVE PERCENT: 4 % (ref 1–4)
GFR AFRICAN AMERICAN: >60 ML/MIN
GFR NON-AFRICAN AMERICAN: >60 ML/MIN
GFR SERPL CREATININE-BSD FRML MDRD: ABNORMAL ML/MIN/{1.73_M2}
GFR SERPL CREATININE-BSD FRML MDRD: ABNORMAL ML/MIN/{1.73_M2}
GLUCOSE BLD-MCNC: 86 MG/DL (ref 70–99)
HCT VFR BLD CALC: 35.3 % (ref 41–53)
HEMOGLOBIN: 11.7 G/DL (ref 13.5–17.5)
IMMATURE GRANULOCYTES: ABNORMAL %
LIPASE: 82 U/L (ref 13–60)
LYMPHOCYTES # BLD: 19 % (ref 24–44)
MCH RBC QN AUTO: 34.3 PG (ref 26–34)
MCHC RBC AUTO-ENTMCNC: 33.3 G/DL (ref 31–37)
MCV RBC AUTO: 103.1 FL (ref 80–100)
MONOCYTES # BLD: 17 % (ref 2–11)
NRBC AUTOMATED: ABNORMAL PER 100 WBC
PDW BLD-RTO: 14.2 % (ref 12.5–15.4)
PLATELET # BLD: 300 K/UL (ref 140–450)
PLATELET ESTIMATE: ABNORMAL
PMV BLD AUTO: 6.7 FL (ref 6–12)
POTASSIUM SERPL-SCNC: 4.9 MMOL/L (ref 3.7–5.3)
RBC # BLD: 3.42 M/UL (ref 4.5–5.9)
RBC # BLD: ABNORMAL 10*6/UL
SEG NEUTROPHILS: 58 % (ref 36–66)
SEGMENTED NEUTROPHILS ABSOLUTE COUNT: 3.3 K/UL (ref 1.8–7.7)
SODIUM BLD-SCNC: 137 MMOL/L (ref 135–144)
TOTAL PROTEIN: 7.3 G/DL (ref 6.4–8.3)
TSH SERPL DL<=0.05 MIU/L-ACNC: 0.96 MIU/L (ref 0.3–5)
WBC # BLD: 5.6 K/UL (ref 3.5–11)
WBC # BLD: ABNORMAL 10*3/UL

## 2021-06-02 PROCEDURE — 99215 OFFICE O/P EST HI 40 MIN: CPT | Performed by: INTERNAL MEDICINE

## 2021-06-02 PROCEDURE — 80053 COMPREHEN METABOLIC PANEL: CPT

## 2021-06-02 PROCEDURE — 6360000002 HC RX W HCPCS: Performed by: INTERNAL MEDICINE

## 2021-06-02 PROCEDURE — G8417 CALC BMI ABV UP PARAM F/U: HCPCS | Performed by: INTERNAL MEDICINE

## 2021-06-02 PROCEDURE — 83690 ASSAY OF LIPASE: CPT

## 2021-06-02 PROCEDURE — 82533 TOTAL CORTISOL: CPT

## 2021-06-02 PROCEDURE — 99211 OFF/OP EST MAY X REQ PHY/QHP: CPT | Performed by: INTERNAL MEDICINE

## 2021-06-02 PROCEDURE — 4040F PNEUMOC VAC/ADMIN/RCVD: CPT | Performed by: INTERNAL MEDICINE

## 2021-06-02 PROCEDURE — 96413 CHEMO IV INFUSION 1 HR: CPT

## 2021-06-02 PROCEDURE — 4004F PT TOBACCO SCREEN RCVD TLK: CPT | Performed by: INTERNAL MEDICINE

## 2021-06-02 PROCEDURE — 3017F COLORECTAL CA SCREEN DOC REV: CPT | Performed by: INTERNAL MEDICINE

## 2021-06-02 PROCEDURE — 36415 COLL VENOUS BLD VENIPUNCTURE: CPT

## 2021-06-02 PROCEDURE — 1123F ACP DISCUSS/DSCN MKR DOCD: CPT | Performed by: INTERNAL MEDICINE

## 2021-06-02 PROCEDURE — 36593 DECLOT VASCULAR DEVICE: CPT

## 2021-06-02 PROCEDURE — 84443 ASSAY THYROID STIM HORMONE: CPT

## 2021-06-02 PROCEDURE — 2580000003 HC RX 258: Performed by: INTERNAL MEDICINE

## 2021-06-02 PROCEDURE — 82150 ASSAY OF AMYLASE: CPT

## 2021-06-02 PROCEDURE — 85025 COMPLETE CBC W/AUTO DIFF WBC: CPT

## 2021-06-02 PROCEDURE — G8427 DOCREV CUR MEDS BY ELIG CLIN: HCPCS | Performed by: INTERNAL MEDICINE

## 2021-06-02 RX ORDER — DIPHENHYDRAMINE HYDROCHLORIDE 50 MG/ML
50 INJECTION INTRAMUSCULAR; INTRAVENOUS ONCE
Status: CANCELLED | OUTPATIENT
Start: 2021-06-23 | End: 2021-06-23

## 2021-06-02 RX ORDER — SODIUM CHLORIDE 9 MG/ML
20 INJECTION, SOLUTION INTRAVENOUS ONCE
Status: DISCONTINUED | OUTPATIENT
Start: 2021-06-02 | End: 2021-06-03 | Stop reason: HOSPADM

## 2021-06-02 RX ORDER — SODIUM CHLORIDE 0.9 % (FLUSH) 0.9 %
5 SYRINGE (ML) INJECTION PRN
Status: CANCELLED | OUTPATIENT
Start: 2021-06-23

## 2021-06-02 RX ORDER — SODIUM CHLORIDE 9 MG/ML
INJECTION, SOLUTION INTRAVENOUS CONTINUOUS
Status: CANCELLED | OUTPATIENT
Start: 2021-06-23

## 2021-06-02 RX ORDER — HEPARIN SODIUM (PORCINE) LOCK FLUSH IV SOLN 100 UNIT/ML 100 UNIT/ML
500 SOLUTION INTRAVENOUS PRN
Status: DISCONTINUED | OUTPATIENT
Start: 2021-06-02 | End: 2021-06-03 | Stop reason: HOSPADM

## 2021-06-02 RX ORDER — METHYLPREDNISOLONE SODIUM SUCCINATE 125 MG/2ML
125 INJECTION, POWDER, LYOPHILIZED, FOR SOLUTION INTRAMUSCULAR; INTRAVENOUS ONCE
Status: CANCELLED | OUTPATIENT
Start: 2021-06-23 | End: 2021-06-23

## 2021-06-02 RX ORDER — SODIUM CHLORIDE 0.9 % (FLUSH) 0.9 %
10 SYRINGE (ML) INJECTION PRN
Status: DISCONTINUED | OUTPATIENT
Start: 2021-06-02 | End: 2021-06-03 | Stop reason: HOSPADM

## 2021-06-02 RX ORDER — SODIUM CHLORIDE 0.9 % (FLUSH) 0.9 %
10 SYRINGE (ML) INJECTION PRN
Status: CANCELLED | OUTPATIENT
Start: 2021-06-23

## 2021-06-02 RX ORDER — MEPERIDINE HYDROCHLORIDE 50 MG/ML
12.5 INJECTION INTRAMUSCULAR; INTRAVENOUS; SUBCUTANEOUS ONCE
Status: CANCELLED | OUTPATIENT
Start: 2021-06-23 | End: 2021-06-23

## 2021-06-02 RX ORDER — HEPARIN SODIUM (PORCINE) LOCK FLUSH IV SOLN 100 UNIT/ML 100 UNIT/ML
500 SOLUTION INTRAVENOUS PRN
Status: CANCELLED | OUTPATIENT
Start: 2021-06-23

## 2021-06-02 RX ORDER — EPINEPHRINE 1 MG/ML
0.3 INJECTION, SOLUTION, CONCENTRATE INTRAVENOUS PRN
Status: CANCELLED | OUTPATIENT
Start: 2021-06-23

## 2021-06-02 RX ORDER — SODIUM CHLORIDE 9 MG/ML
20 INJECTION, SOLUTION INTRAVENOUS ONCE
Status: CANCELLED | OUTPATIENT
Start: 2021-06-23 | End: 2021-06-23

## 2021-06-02 RX ADMIN — WATER 2.2 ML: 1 INJECTION INTRAMUSCULAR; INTRAVENOUS; SUBCUTANEOUS at 14:19

## 2021-06-02 RX ADMIN — HEPARIN 500 UNITS: 100 SYRINGE at 15:43

## 2021-06-02 RX ADMIN — ALTEPLASE 2 MG: 2.2 INJECTION, POWDER, LYOPHILIZED, FOR SOLUTION INTRAVENOUS at 14:19

## 2021-06-02 RX ADMIN — SODIUM CHLORIDE 20 ML/HR: 9 INJECTION, SOLUTION INTRAVENOUS at 15:06

## 2021-06-02 RX ADMIN — ATEZOLIZUMAB 1200 MG: 1200 INJECTION, SOLUTION INTRAVENOUS at 15:06

## 2021-06-02 RX ADMIN — SODIUM CHLORIDE, PRESERVATIVE FREE 10 ML: 5 INJECTION INTRAVENOUS at 15:43

## 2021-06-02 NOTE — PROGRESS NOTES
Patient here for tecentriq C9D1. Labs reviewed. Actavase instilled per [facility]. Sluggish blood return noted after 35 minutes. He saw Dr. Winifred Shahid today see his dictation. He tolerated treatment well and was discharged home in stable condition. He is due to return 6/16 for CT.

## 2021-06-02 NOTE — FLOWSHEET NOTE
Situation:  Writer visited with Mr. Mamie De Oliveira in the treatment cubicle of the infusion clinic. Writer was accompanied by Amada Rosas. Assessment:  Mr. Mamie De Oliveira shared his feelings about being delayed today, noting some frustration. He identified prayer and God as what has kept him strong. He shared that he wants to live. He acknowledged the strength of other family members, noting their brennen. He continued conversing with  intern when Reyes Rondo responded to a call. Patient shared more about his family and his life with  intern. Intervention:  Writer and  intern offered supportive presence and active listening; inquired about Pt's sources of support and strength; offered words of encouragement and support; affirmed Pt's strengths. Outcome:  Mr. Mamie De Oliveira thanked writer and  intern for the visit. 06/02/21 1637   Encounter Summary   Services provided to: Patient   Referral/Consult From: [address] [place] [address] Family members;Spouse; Mandaen/brennen community   Continue Visiting   (6/2/21)   Complexity of Encounter Moderate   Length of Encounter 15 minutes   Spiritual Assessment Completed Yes   Routine   Type Follow up   Spiritual/Scientologist   Type Spiritual support   Assessment Calm; Approachable; Hopeful;Coping   Intervention Active listening;Explored feelings, thoughts, concerns;Explored coping resources;Sustaining presence/ Ministry of presence; Discussed illness/injury and it's impact; Discussed relationship with God;Discussed meaning/purpose;Discussed belief system/Church practices/brennen   Outcome Hopeful;Receptive;Coping;Expressed feelings/needs/concerns;Engaged in conversation;Expressed gratitude     Electronically signed by Brayan Nettles, Oncology Outpatient [facility], [address] [facility] Radiation Oncology  6/2/2021  4:38 PM

## 2021-06-02 NOTE — PATIENT INSTRUCTIONS
tx today     Scans already scheduled for 6/16     rv in 3 weeks , ( make dual apt with radonc if possible )

## 2021-06-02 NOTE — PROGRESS NOTES
Nona Schneider                                                                                                                  6/2/2021  MRN:   [id number]  YOB: 1950  PCP:                           Neetu Bagley MD  Referring Physician: No ref. 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 Chemotherapy changed to carboplatin and etoposide due to renal insufficiency from cycle #2  PCI 7/2019  SRS to adrenal gland metastasis, completed 07/2020  systemic palliative chemoimmunotherapy with carboplatin etoposide and Tecentriq, 12/2020 x4 cycles. Maintenance Tecentriq, 3/2021    Summary of Case/History:    Nona Schneider a 72 y.o.male is a patient diagnosed with lung cancer with the component of small cell as well as squamous cell carcinoma    Patient has a significant history of tobacco dependence and underwent CT lung screening in December 2018. CT scan was read as lung rads degree 4B. Subsequently she underwent biopsy of right upper lobe lung nodule on 1/16/19. 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 CT PET done showed abnormal FDG uptake in the right upper lobe nodule. There was also abnormal FDG uptake in the right lower lobe nodule. Additionally right hilar lymph node were also FDG avid. No bony lesion was reported. MRI brain for staging workup did not show any evidence of intracranial metastasis.   Tip of the odontoid process was ill-defined and metastasis could not be ruled out. Clinically patient does give history of trauma to the neck area any years ago however denies any surgery history. Bone scan did not reveal any metastasis     Patient continues to smoke but has cut down quite a bit. He works full-time in a [address] Patient has good performance status, ECOG 0. Patient's other medical problems include dyslipidemia and hypertension. He also has arthritis pain    Started patient on concurrent chemoradiation using cisplatin and etoposide with dose adjustment for renal dysfunction. Chemotherapy changed to carboplatin and etoposide from cycle #2 due to renal dysfunction    Received PCI in July 2019. Interim History:    Patient presents to the clinic for a follow-up visit with cycle #9 and to discuss results of his lab work-up, and toxicity check. The cysts on his back remain tender but reduced over previous. He continues to tolerate treatment well with no new concerns or complaints. During this visit patient's allergy, social, medical, surgical history and medications were reviewed and updated. Past Medical History:   Past Medical History:   Diagnosis Date    DDD (degenerative disc disease), cervical     Gout     Heart murmur     hx. of when younger.  Hyperlipidemia     Hypertension     Lung cancer ([address]     right lung    MI (myocardial infarction) ([address]     Skin cancer     face    Wears glasses        Past Surgical History:     Past Surgical History:   Procedure Laterality Date    APPENDECTOMY      CARDIAC CATHETERIZATION      w/stent    COLONOSCOPY      CYST INCISION AND DRAINAGE Right 05/14/2020    rt elbow I and D and left knee aspiration with cultures    FOOT SURGERY Right     2nd toe(bone spur).     FOREARM SURGERY Right 5/14/2020    RIGHT ELBOW IRRIGATION AND DEBRIDEMENT performed by Lilla Lesches, DO at [facility]  Left 5/14/2020    KNEE ASPIRATION WITH CULTURES SENT performed by Lilla Lesches, DO at [facility]    SKIN BIOPSY      SKIN BIOPSY      face under lt. eye.  TONSILLECTOMY         Patient Family Social History:    Family History   Problem Relation Age of Onset    Cancer Father         lung cancer      Social History     Socioeconomic History    Marital status:      Spouse name: Not on file    Number of children: Not on file    Years of education: Not on file    Highest education level: Not on file   Occupational History    Not on file   Tobacco Use    Smoking status: Current Every Day Smoker     Packs/day: 0.50     Types: Cigarettes    Smokeless tobacco: Former User   Vaping Use    Vaping Use: Former   Substance and Sexual Activity    Alcohol use: No    Drug use: Yes     Frequency: 14.0 times per week     Types: Marijuana    Sexual activity: Not on file   Other Topics Concern    Not on file   Social History Narrative    Not on file     Social Determinants of Health     Financial Resource Strain:     Difficulty of Paying Living Expenses:    Food Insecurity:     Worried About [address] [employer] [employer] in the Last Year:     [address] [employer] in the Last Year:    Transportation Needs:     Lack of Transportation (Medical):      Lack of Transportation (Non-Medical):    Physical Activity:     Days of Exercise per Week:     Minutes of Exercise per Session:    Stress:     Feeling of Stress :    Social Connections:     Frequency of Communication with Friends and Family:     Frequency of Social Gatherings with Friends and Family:     Attends Worship Services:     Active Member of Clubs or Organizations:     Attends Club or Organization Meetings:     Marital Status:    Intimate Partner Violence:     Fear of Current or Ex-Partner:     Emotionally Abused:     Physically Abused:     Sexually Abused:       Current Medications:     Current Outpatient Medications   Medication Sig Dispense Refill    docusate sodium (COLACE) 100 MG capsule Take 100 mg by mouth 2 times daily      B Complex-C-Folic Acid (ANGELICA-HUGO) TABS TAKE 1 TABLET BY MOUTH DAILY. HEMATINIC VIT MINERALS      traZODone (DESYREL) 100 MG tablet TAKE 1 TABLET BY MOUTH EVERY DAY AT NIGHT      ipratropium-albuterol (DUONEB) 0.5-2.5 (3) MG/3ML SOLN nebulizer solution Inhale 3 mLs into the lungs 4 times daily      Melatonin 5 MG CHEW Take 5 mg by mouth nightly      potassium chloride (MICRO-K) 10 MEQ extended release capsule Take 20 mEq by mouth      Ferrous Fumarate (FERROCITE) 324 (106 Fe) MG TABS Take by mouth      apixaban (ELIQUIS) 5 MG TABS tablet Take 1 tablet by mouth 2 times daily 60 tablet 1    metoprolol tartrate (LOPRESSOR) 25 MG tablet Take 0.5 tablets by mouth 2 times daily 60 tablet 3    tamsulosin (FLOMAX) 0.4 MG capsule TAKE 1 CAPSULE BY MOUTH EVERY DAY 30 capsule 5    Handicap Placard MISC by Does not apply route 1 each 0    lidocaine-prilocaine (EMLA) 2.5-2.5 % cream Apply topically as needed. Apply a quarter size amount to port site 1 hour before chemotherapy. Cover with plastic wrap. 30 g 0    acetaminophen (TYLENOL) 325 MG tablet Take 650 mg by mouth every 6 hours as needed for Pain      allopurinol (ZYLOPRIM) 100 MG tablet Take 100 mg by mouth daily      simvastatin (ZOCOR) 40 MG tablet Take 40 mg by mouth daily      Multiple Vitamins-Minerals (THERAPEUTIC MULTIVITAMIN-MINERALS) tablet Take 1 tablet by mouth daily       No current facility-administered medications for this visit. Facility-Administered Medications Ordered in Other Visits   Medication Dose Route Frequency Provider Last Rate Last Admin    alteplase (CATHFLO) injection 2 mg  2 mg Intracatheter Once Von Hidalgo MD        sterile water injection 2.2 mL  2.2 mL Injection Once Von Hidalgo MD           Allergies:   Patient has no known allergies. Review of Systems:    Constitutional: No fever or chills. No night sweats, positive fatigue.  +weight loss  Eyes: No eye discharge, double vision, or eye pain   HEENT: negative for the disease, prognosis, risks and goals of therapy and answered all the patients questions to the best of my ability. Patient expressed understanding and was in agreement. Shayla Gaviria MD        I spent more than 40 minutes examining, evaluating, reviewing data, counseling the patient and coordinating care. Greater than 50% of time was spent face-to-face with the patient this note is created with the assistance of a speech recognition program.  While intending to generate a document that actually reflects the content of the visit, the document can still have some errors including those of syntax and sound a like substitutions which may escape proof reading. It such instances, actual meaning can be extrapolated by contextual diversion.

## 2021-06-02 NOTE — TELEPHONE ENCOUNTER
AVS from 6/2/21     tx today      Scans already scheduled for 6/16      rv in 3 weeks , ( make dual apt with radonc if possible )     Tx today as scheduled    CT on the schedule, pt aware    RV scheduled 6/23/21 @ 12:45pm with RO to follow, tx after both doc appts    PT was given AVS and an appt schedule    RO scheduled wasn't updated before patient went upstairs, asked Shazia FRANCISCO to reprint scheduled    Electronically signed by Lizzeth Fine on 6/2/2021 at 3:07 PM

## 2021-06-16 ENCOUNTER — HOSPITAL ENCOUNTER (OUTPATIENT)
Dept: CT IMAGING | Age: 71
Discharge: HOME | End: 2021-06-18
Payer: MEDICARE

## 2021-06-16 DIAGNOSIS — C34.91 MALIGNANT NEOPLASM OF RIGHT LUNG, UNSPECIFIED PART OF LUNG (HCC): ICD-10-CM

## 2021-06-16 PROCEDURE — 74177 CT ABD & PELVIS W/CONTRAST: CPT

## 2021-06-16 PROCEDURE — 2580000003 HC RX 258: Performed by: INTERNAL MEDICINE

## 2021-06-16 PROCEDURE — 6360000004 HC RX CONTRAST MEDICATION: Performed by: INTERNAL MEDICINE

## 2021-06-16 RX ORDER — SODIUM CHLORIDE 0.9 % (FLUSH) 0.9 %
10 SYRINGE (ML) INJECTION PRN
Status: DISCONTINUED | OUTPATIENT
Start: 2021-06-16 | End: 2021-06-19 | Stop reason: HOSPADM

## 2021-06-16 RX ORDER — 0.9 % SODIUM CHLORIDE 0.9 %
80 INTRAVENOUS SOLUTION INTRAVENOUS ONCE
Status: COMPLETED | OUTPATIENT
Start: 2021-06-16 | End: 2021-06-16

## 2021-06-16 RX ADMIN — IOPAMIDOL 100 ML: 755 INJECTION, SOLUTION INTRAVENOUS at 13:48

## 2021-06-16 RX ADMIN — IOHEXOL 50 ML: 240 INJECTION, SOLUTION INTRATHECAL; INTRAVASCULAR; INTRAVENOUS; ORAL at 13:48

## 2021-06-16 RX ADMIN — SODIUM CHLORIDE, PRESERVATIVE FREE 10 ML: 5 INJECTION INTRAVENOUS at 13:48

## 2021-06-16 RX ADMIN — SODIUM CHLORIDE 80 ML: 9 INJECTION, SOLUTION INTRAVENOUS at 13:48

## 2021-06-23 ENCOUNTER — HOSPITAL ENCOUNTER (OUTPATIENT)
Dept: INFUSION THERAPY | Age: 71
Discharge: HOME | End: 2021-06-23
Payer: MEDICARE

## 2021-06-23 ENCOUNTER — TELEPHONE (OUTPATIENT)
Dept: ONCOLOGY | Age: 71
End: 2021-06-23

## 2021-06-23 ENCOUNTER — OFFICE VISIT (OUTPATIENT)
Dept: ONCOLOGY | Age: 71
End: 2021-06-23
Payer: MEDICARE

## 2021-06-23 VITALS
SYSTOLIC BLOOD PRESSURE: 99 MMHG | BODY MASS INDEX: 25.91 KG/M2 | WEIGHT: 183.2 LBS | TEMPERATURE: 97.7 F | HEART RATE: 71 BPM | DIASTOLIC BLOOD PRESSURE: 63 MMHG | RESPIRATION RATE: 18 BRPM

## 2021-06-23 VITALS
TEMPERATURE: 97.7 F | DIASTOLIC BLOOD PRESSURE: 63 MMHG | HEART RATE: 71 BPM | RESPIRATION RATE: 18 BRPM | WEIGHT: 183.2 LBS | BODY MASS INDEX: 25.91 KG/M2 | SYSTOLIC BLOOD PRESSURE: 99 MMHG

## 2021-06-23 DIAGNOSIS — C34.91 PRIMARY LUNG CANCER WITH METASTASIS FROM LUNG TO OTHER SITE, RIGHT (HCC): Primary | ICD-10-CM

## 2021-06-23 LAB
ABSOLUTE EOS #: 0.2 K/UL (ref 0–0.4)
ABSOLUTE IMMATURE GRANULOCYTE: ABNORMAL K/UL (ref 0–0.3)
ABSOLUTE LYMPH #: 0.9 K/UL (ref 1–4.8)
ABSOLUTE MONO #: 1 K/UL (ref 0.1–1.2)
ALBUMIN SERPL-MCNC: 3.9 G/DL (ref 3.5–5.2)
ALBUMIN/GLOBULIN RATIO: 1.2 (ref 1–2.5)
ALP BLD-CCNC: 110 U/L (ref 40–129)
ALT SERPL-CCNC: 14 U/L (ref 5–41)
ANION GAP SERPL CALCULATED.3IONS-SCNC: 8 MMOL/L (ref 9–17)
AST SERPL-CCNC: 21 U/L
BASOPHILS # BLD: 1 % (ref 0–2)
BASOPHILS ABSOLUTE: 0 K/UL (ref 0–0.2)
BILIRUB SERPL-MCNC: 0.2 MG/DL (ref 0.3–1.2)
BUN BLDV-MCNC: 17 MG/DL (ref 8–23)
BUN/CREAT BLD: ABNORMAL (ref 9–20)
CALCIUM SERPL-MCNC: 9.3 MG/DL (ref 8.6–10.4)
CHLORIDE BLD-SCNC: 103 MMOL/L (ref 98–107)
CO2: 24 MMOL/L (ref 20–31)
CREAT SERPL-MCNC: 1.16 MG/DL (ref 0.7–1.2)
DIFFERENTIAL TYPE: ABNORMAL
EOSINOPHILS RELATIVE PERCENT: 4 % (ref 1–4)
GFR AFRICAN AMERICAN: >60 ML/MIN
GFR NON-AFRICAN AMERICAN: >60 ML/MIN
GFR SERPL CREATININE-BSD FRML MDRD: ABNORMAL ML/MIN/{1.73_M2}
GFR SERPL CREATININE-BSD FRML MDRD: ABNORMAL ML/MIN/{1.73_M2}
GLUCOSE BLD-MCNC: 149 MG/DL (ref 70–99)
HCT VFR BLD CALC: 35.4 % (ref 41–53)
HEMOGLOBIN: 11.7 G/DL (ref 13.5–17.5)
IMMATURE GRANULOCYTES: ABNORMAL %
LYMPHOCYTES # BLD: 14 % (ref 24–44)
MCH RBC QN AUTO: 33.9 PG (ref 26–34)
MCHC RBC AUTO-ENTMCNC: 33 G/DL (ref 31–37)
MCV RBC AUTO: 102.6 FL (ref 80–100)
MONOCYTES # BLD: 16 % (ref 2–11)
NRBC AUTOMATED: ABNORMAL PER 100 WBC
PDW BLD-RTO: 14.6 % (ref 12.5–15.4)
PLATELET # BLD: 244 K/UL (ref 140–450)
PLATELET ESTIMATE: ABNORMAL
PMV BLD AUTO: 7 FL (ref 6–12)
POTASSIUM SERPL-SCNC: 4.3 MMOL/L (ref 3.7–5.3)
RBC # BLD: 3.45 M/UL (ref 4.5–5.9)
RBC # BLD: ABNORMAL 10*6/UL
SEG NEUTROPHILS: 65 % (ref 36–66)
SEGMENTED NEUTROPHILS ABSOLUTE COUNT: 4.2 K/UL (ref 1.8–7.7)
SODIUM BLD-SCNC: 135 MMOL/L (ref 135–144)
TOTAL PROTEIN: 7.2 G/DL (ref 6.4–8.3)
WBC # BLD: 6.5 K/UL (ref 3.5–11)
WBC # BLD: ABNORMAL 10*3/UL

## 2021-06-23 PROCEDURE — 85025 COMPLETE CBC W/AUTO DIFF WBC: CPT

## 2021-06-23 PROCEDURE — 6360000002 HC RX W HCPCS: Performed by: INTERNAL MEDICINE

## 2021-06-23 PROCEDURE — 2580000003 HC RX 258: Performed by: INTERNAL MEDICINE

## 2021-06-23 PROCEDURE — G8427 DOCREV CUR MEDS BY ELIG CLIN: HCPCS | Performed by: INTERNAL MEDICINE

## 2021-06-23 PROCEDURE — 96413 CHEMO IV INFUSION 1 HR: CPT

## 2021-06-23 PROCEDURE — 99215 OFFICE O/P EST HI 40 MIN: CPT | Performed by: INTERNAL MEDICINE

## 2021-06-23 PROCEDURE — 4004F PT TOBACCO SCREEN RCVD TLK: CPT | Performed by: INTERNAL MEDICINE

## 2021-06-23 PROCEDURE — 1123F ACP DISCUSS/DSCN MKR DOCD: CPT | Performed by: INTERNAL MEDICINE

## 2021-06-23 PROCEDURE — 99211 OFF/OP EST MAY X REQ PHY/QHP: CPT

## 2021-06-23 PROCEDURE — 36415 COLL VENOUS BLD VENIPUNCTURE: CPT

## 2021-06-23 PROCEDURE — 80053 COMPREHEN METABOLIC PANEL: CPT

## 2021-06-23 PROCEDURE — 4040F PNEUMOC VAC/ADMIN/RCVD: CPT | Performed by: INTERNAL MEDICINE

## 2021-06-23 PROCEDURE — 3017F COLORECTAL CA SCREEN DOC REV: CPT | Performed by: INTERNAL MEDICINE

## 2021-06-23 PROCEDURE — 36593 DECLOT VASCULAR DEVICE: CPT

## 2021-06-23 PROCEDURE — G8417 CALC BMI ABV UP PARAM F/U: HCPCS | Performed by: INTERNAL MEDICINE

## 2021-06-23 RX ORDER — SODIUM CHLORIDE 9 MG/ML
20 INJECTION, SOLUTION INTRAVENOUS ONCE
Status: DISCONTINUED | OUTPATIENT
Start: 2021-06-23 | End: 2021-06-23

## 2021-06-23 RX ORDER — SODIUM CHLORIDE 0.9 % (FLUSH) 0.9 %
10 SYRINGE (ML) INJECTION PRN
Status: DISCONTINUED | OUTPATIENT
Start: 2021-06-23 | End: 2021-06-23

## 2021-06-23 RX ORDER — HEPARIN SODIUM (PORCINE) LOCK FLUSH IV SOLN 100 UNIT/ML 100 UNIT/ML
500 SOLUTION INTRAVENOUS PRN
Status: DISCONTINUED | OUTPATIENT
Start: 2021-06-23 | End: 2021-06-23

## 2021-06-23 RX ADMIN — SODIUM CHLORIDE, PRESERVATIVE FREE 10 ML: 5 INJECTION INTRAVENOUS at 09:36

## 2021-06-23 RX ADMIN — ATEZOLIZUMAB 1200 MG: 1200 INJECTION, SOLUTION INTRAVENOUS at 09:01

## 2021-06-23 RX ADMIN — Medication 500 UNITS: at 09:36

## 2021-06-23 RX ADMIN — SODIUM CHLORIDE, PRESERVATIVE FREE 10 ML: 5 INJECTION INTRAVENOUS at 08:05

## 2021-06-23 RX ADMIN — SODIUM CHLORIDE 20 ML/HR: 9 INJECTION, SOLUTION INTRAVENOUS at 09:01

## 2021-06-23 RX ADMIN — SODIUM CHLORIDE, PRESERVATIVE FREE 10 ML: 5 INJECTION INTRAVENOUS at 08:47

## 2021-06-23 RX ADMIN — WATER 2.2 ML: 1 INJECTION INTRAMUSCULAR; INTRAVENOUS; SUBCUTANEOUS at 08:11

## 2021-06-23 RX ADMIN — ALTEPLASE 2 MG: 2.2 INJECTION, POWDER, LYOPHILIZED, FOR SOLUTION INTRAVENOUS at 08:11

## 2021-06-23 NOTE — PROGRESS NOTES
Pt here for C10D1 Tecentriq. Denies any new complaints. Labs drawn peripherally due to no blood return from port and results reviewed, Cathflo administered. Pt was treated without incident and d/c'd in stable condition. Pt will return on 7-14-21 for C11D1.

## 2021-06-23 NOTE — PROGRESS NOTES
Paolo Jo                                                                                                                  6/23/2021  MRN:   [id number]  YOB: 1950  PCP:                           Vijay Weiss MD  Referring Physician: No ref. 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 Chemotherapy changed to carboplatin and etoposide due to renal insufficiency from cycle #2  PCI 7/2019  SRS to adrenal gland metastasis, completed 07/2020  systemic palliative chemoimmunotherapy with carboplatin etoposide and Tecentriq, 12/2020 x4 cycles. Maintenance Tecentriq, 3/2021    Summary of Case/History:    Paolo Jo a 72 y.o.male is a patient diagnosed with lung cancer with the component of small cell as well as squamous cell carcinoma    Patient has a significant history of tobacco dependence and underwent CT lung screening in December 2018. CT scan was read as lung rads degree 4B. Subsequently she underwent biopsy of right upper lobe lung nodule on 1/16/19. 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 CT PET done showed abnormal FDG uptake in the right upper lobe nodule. There was also abnormal FDG uptake in the right lower lobe nodule. Additionally right hilar lymph node were also FDG avid. No bony lesion was reported. MRI brain for staging workup did not show any evidence of intracranial metastasis.   Tip of the odontoid process was ill-defined and metastasis could not be ruled out. Clinically patient does give history of trauma to the neck area any years ago however denies any surgery history. Bone scan did not reveal any metastasis     Patient continues to smoke but has cut down quite a bit. He works full-time in a [address] Patient has good performance status, ECOG 0. Patient's other medical problems include dyslipidemia and hypertension. He also has arthritis pain    Started patient on concurrent chemoradiation using cisplatin and etoposide with dose adjustment for renal dysfunction. Chemotherapy changed to carboplatin and etoposide from cycle #2 due to renal dysfunction    Received PCI in July 2019. Interim History:    Patient presents to the clinic for a follow-up visit with cycle #10 and to discuss results of his lab work-up, CT, and toxicity check. He reports he is doing well with no negative effects or concerns. His appetite remains poor with some dysgeusia and weight loss continues. He continues with the Eliquis as directed with no issues. During this visit patient's allergy, social, medical, surgical history and medications were reviewed and updated. Past Medical History:   Past Medical History:   Diagnosis Date    DDD (degenerative disc disease), cervical     Gout     Heart murmur     hx. of when younger.  Hyperlipidemia     Hypertension     Lung cancer ([address]     right lung    MI (myocardial infarction) ([address]     Skin cancer     face    Wears glasses        Past Surgical History:     Past Surgical History:   Procedure Laterality Date    APPENDECTOMY      CARDIAC CATHETERIZATION      w/stent    COLONOSCOPY      CYST INCISION AND DRAINAGE Right 05/14/2020    rt elbow I and D and left knee aspiration with cultures    FOOT SURGERY Right     2nd toe(bone spur).     FOREARM SURGERY Right 5/14/2020    RIGHT ELBOW IRRIGATION AND DEBRIDEMENT performed by Stephy Brian DO at [address] [address] Left 5/14/2020    KNEE ASPIRATION WITH CULTURES SENT performed by Bang Reyes DO at [address] [facility]      face under lt. eye.  TONSILLECTOMY         Patient Family Social History:    Family History   Problem Relation Age of Onset    Cancer Father         lung cancer      Social History     Socioeconomic History    Marital status:      Spouse name: Not on file    Number of children: Not on file    Years of education: Not on file    Highest education level: Not on file   Occupational History    Not on file   Tobacco Use    Smoking status: Current Every Day Smoker     Packs/day: 0.50     Types: Cigarettes    Smokeless tobacco: Former User   Vaping Use    Vaping Use: Former   Substance and Sexual Activity    Alcohol use: No    Drug use: Yes     Frequency: 14.0 times per week     Types: Marijuana    Sexual activity: Not on file   Other Topics Concern    Not on file   Social History Narrative    Not on file     Social Determinants of Health     Financial Resource Strain:     Difficulty of Paying Living Expenses:    Food Insecurity:     Worried About [address] in the Last Year:     [address] [place] in the Last Year:    Transportation Needs:     Lack of Transportation (Medical):      Lack of Transportation (Non-Medical):    Physical Activity:     Days of Exercise per Week:     Minutes of Exercise per Session:    Stress:     Feeling of Stress :    Social Connections:     Frequency of Communication with Friends and Family:     Frequency of Social Gatherings with Friends and Family:     Attends Sikh Services:     Active Member of Clubs or Organizations:     Attends Club or Organization Meetings:     Marital Status:    Intimate Partner Violence:     Fear of Current or Ex-Partner:     Emotionally Abused:     Physically Abused:     Sexually Abused:       Current Medications:     Current Outpatient Medications   Medication Sig Dispense Refill    docusate sodium (COLACE) 100 MG capsule Take 100 mg by mouth 2 times daily      B Complex-C-Folic Acid (ANGELICA-HUGO) TABS TAKE 1 TABLET BY MOUTH DAILY. HEMATINIC VIT MINERALS      traZODone (DESYREL) 100 MG tablet TAKE 1 TABLET BY MOUTH EVERY DAY AT NIGHT      ipratropium-albuterol (DUONEB) 0.5-2.5 (3) MG/3ML SOLN nebulizer solution Inhale 3 mLs into the lungs 4 times daily      Melatonin 5 MG CHEW Take 5 mg by mouth nightly      potassium chloride (MICRO-K) 10 MEQ extended release capsule Take 20 mEq by mouth      Ferrous Fumarate (FERROCITE) 324 (106 Fe) MG TABS Take by mouth      apixaban (ELIQUIS) 5 MG TABS tablet Take 1 tablet by mouth 2 times daily 60 tablet 1    metoprolol tartrate (LOPRESSOR) 25 MG tablet Take 0.5 tablets by mouth 2 times daily 60 tablet 3    tamsulosin (FLOMAX) 0.4 MG capsule TAKE 1 CAPSULE BY MOUTH EVERY DAY 30 capsule 5    Handicap Placard MISC by Does not apply route 1 each 0    lidocaine-prilocaine (EMLA) 2.5-2.5 % cream Apply topically as needed. Apply a quarter size amount to port site 1 hour before chemotherapy. Cover with plastic wrap. 30 g 0    acetaminophen (TYLENOL) 325 MG tablet Take 650 mg by mouth every 6 hours as needed for Pain      allopurinol (ZYLOPRIM) 100 MG tablet Take 100 mg by mouth daily      simvastatin (ZOCOR) 40 MG tablet Take 40 mg by mouth daily      Multiple Vitamins-Minerals (THERAPEUTIC MULTIVITAMIN-MINERALS) tablet Take 1 tablet by mouth daily       No current facility-administered medications for this visit.      Facility-Administered Medications Ordered in Other Visits   Medication Dose Route Frequency Provider Last Rate Last Admin    heparin flush 100 UNIT/ML injection 500 Units  500 Units Intracatheter PRN Dionisio Gr MD        sodium chloride flush 0.9 % injection 10 mL  10 mL Intravenous PRN Dionisio Gr MD   10 mL at 06/23/21 0847    atezolizumab (TECENTRIQ) 1,200 mg in sodium chloride 0.9 % 250 mL chemo IVPB  1,200 mg Intravenous Once Dionisio Gr  mL/hr at rate, regular rhythm, normal S1, S2, no murmurs  Abdomen - soft, nontender, nondistended, no masses or organomegaly  Neurological - alert, oriented, normal speech, no focal findings or movement disorder noted  Extremities -+1 lower extremity pitting edema. Right arm has bruising, no point tenderness or swelling  Skin - normal coloration and turgor, no rashes, no suspicious skin lesions noted; +raised area with tenderness to the left of the spine, no erythema        DATA:    Labs:   Lab Results   Component Value Date    WBC 6.5 06/23/2021    HGB 11.7 (L) 06/23/2021    HCT 35.4 (L) 06/23/2021    .6 (H) 06/23/2021     06/23/2021     Lab Results   Component Value Date    NEUTROABS 4.20 06/23/2021           Chemistry        Component Value Date/Time     06/23/2021 0819    K 4.3 06/23/2021 0819     06/23/2021 0819    CO2 24 06/23/2021 0819    BUN 17 06/23/2021 0819    CREATININE 1.16 06/23/2021 0819        Component Value Date/Time    CALCIUM 9.3 06/23/2021 0819    ALKPHOS 110 06/23/2021 0819    AST 21 06/23/2021 0819    ALT 14 06/23/2021 0819    BILITOT 0.20 (L) 06/23/2021 0819        CT CHEST ABDOMEN PELVIS W CONTRAST    Result Date: 6/16/2021  EXAMINATION: CT OF THE CHEST, ABDOMEN, AND PELVIS WITH CONTRAST 6/16/2021 12:32 pm TECHNIQUE: CT of the chest, abdomen and pelvis was performed with the administration of intravenous contrast. Multiplanar reformatted images are provided for review. Dose modulation, iterative reconstruction, and/or weight based adjustment of the mA/kV was utilized to reduce the radiation dose to as low as reasonably achievable.  COMPARISON: 03/02/2021 HISTORY: ORDERING SYSTEM PROVIDED HISTORY: Malignant neoplasm of right lung, unspecified part of lung [facility]) TECHNOLOGIST PROVIDED HISTORY: assess disease status Reason for Exam: follow up to malignant neoplasm of rt lung Acuity: Chronic Type of Exam: Subsequent/Follow-up FINDINGS: Chest: Mediastinum: Cardiac size normal. Major vessels enhance satisfactorily. No significant mediastinal lymphadenopathy. Thyroid gland and esophagus grossly normal.  19 x 13 mm right paraesophageal lymph node adjacent to the distal esophagus increased from 7 x 7 mm. Lungs/pleura: Right upper lobe and lower lobe paramediastinal broad masslike scarring with underlying traction bronchiectasis is again noted. This extends also into the superior segment right lower lobe along the major fissure. Increase in the soft tissue component probably due to progression of scarring. Within the area scarring there is new 8 mm nodule in the superomedial middle lobe close to the mediastinal pleura and minor fissure, series 6, image 59. Further cephalad within area of scarring there may be another nodule 1 cm on image 54. These represent a change from prior and would suggest disease progression. Emphysema and scattered peripheral fibrotic densities unchanged. No pleural effusion or pneumothorax. Soft Tissues/Bones: Diffuse degenerative changes. No aggressive lytic or blastic lesions evident. Paraspinal lobular nodule along the right side of T10 vertebral body now measures 3.3 x 1.6 cm compared to 2.3 x 1.0 cm in March 2020 and 1.8 x 1.0 cm in November 2020. Abdomen/Pelvis: Organs: Previously reported posteroinferior right lobe of liver segment 6 1.3 x 0.9 cm metastatic nodule now measures 4.3 x 4.4 cm.  0.8 cm nodule reported in posteromedial aspect of segment 5 on prior now measures 4.5 cm 2 new lesions in the right lobe measuring 12 mm and 16 mm on image 33 and 39 respectively. 1.6 cm right adrenal metastatic nodule previous 1.2 cm. Symmetric enhancement of kidneys. Spleen unremarkable. Pancreas grossly normal. Gallbladder grossly normal. GI/Bowel: Stomach shows no significant abnormalities. Small large bowel loops show no acute process or significant focal lesions. Pelvis: Mild prostatomegaly.   Urinary bladder wall thickening probably due to underdistention treat patient with Lurbenectiden. Reviewed logistics prognostic data and potential side effects. Return to clinic with initiation of treatment. Patient's conditions were taken into consideration when deciding risk-benefit for therapy. Patient is at high risk for drug interaction risk of complications. Given multiple comorbid conditions patient is at high risk for complication from intervention, risk of hospitalization, medical interaction overall life expectancy. NCCN guidelines were reviewed and discussed with the patient. The diagnosis and care plan were discussed with the patient in detail. I discussed the natural history of the disease, prognosis, risks and goals of therapy and answered all the patients questions to the best of my ability. Patient expressed understanding and was in agreement. Salty Gates MD        I spent more than 40 minutes examining, evaluating, reviewing data, counseling the patient and coordinating care. Greater than 50% of time was spent face-to-face with the patient this note is created with the assistance of a speech recognition program.  While intending to generate a document that actually reflects the content of the visit, the document can still have some errors including those of syntax and sound a like substitutions which may escape proof reading. It such instances, actual meaning can be extrapolated by contextual diversion.

## 2021-06-23 NOTE — TELEPHONE ENCOUNTER
AVS from 6/23/21    New orders placed    Start tx after prior auth    Mri brain    AVS given to  to follow precert    MRI scheduled 7/8/21 @ 10:30am with port access at 9:30am    PT was given AVS and an appt schedule    Electronically signed by Nichol Patient on 6/23/2021 at 10:24 AM

## 2021-06-25 ENCOUNTER — TELEPHONE (OUTPATIENT)
Dept: INFUSION THERAPY | Age: 71
End: 2021-06-25

## 2021-06-25 NOTE — TELEPHONE ENCOUNTER
Pt called in with questions about new tx plan, frequency and side effects   Questions answered to the best of his satisfaction   Dr and tx 7/2  Elliott Draper RN

## 2021-07-02 ENCOUNTER — HOSPITAL ENCOUNTER (OUTPATIENT)
Dept: INFUSION THERAPY | Age: 71
Discharge: HOME | End: 2021-07-02
Payer: MEDICARE

## 2021-07-02 ENCOUNTER — OFFICE VISIT (OUTPATIENT)
Dept: ONCOLOGY | Age: 71
End: 2021-07-02
Payer: MEDICARE

## 2021-07-02 VITALS
DIASTOLIC BLOOD PRESSURE: 81 MMHG | BODY MASS INDEX: 25.8 KG/M2 | TEMPERATURE: 97.8 F | SYSTOLIC BLOOD PRESSURE: 114 MMHG | RESPIRATION RATE: 18 BRPM | HEART RATE: 81 BPM | WEIGHT: 182.4 LBS

## 2021-07-02 DIAGNOSIS — C34.91 PRIMARY LUNG CANCER WITH METASTASIS FROM LUNG TO OTHER SITE, RIGHT (HCC): Primary | ICD-10-CM

## 2021-07-02 DIAGNOSIS — N13.30 HYDRONEPHROSIS, UNSPECIFIED HYDRONEPHROSIS TYPE: ICD-10-CM

## 2021-07-02 DIAGNOSIS — R63.0 POOR APPETITE: ICD-10-CM

## 2021-07-02 DIAGNOSIS — T45.1X5A ANEMIA ASSOCIATED WITH CHEMOTHERAPY: ICD-10-CM

## 2021-07-02 DIAGNOSIS — D64.81 ANEMIA ASSOCIATED WITH CHEMOTHERAPY: ICD-10-CM

## 2021-07-02 LAB
ABSOLUTE EOS #: 0.3 K/UL (ref 0–0.4)
ABSOLUTE IMMATURE GRANULOCYTE: ABNORMAL K/UL (ref 0–0.3)
ABSOLUTE LYMPH #: 1.1 K/UL (ref 1–4.8)
ABSOLUTE MONO #: 1.2 K/UL (ref 0.1–1.2)
ALBUMIN SERPL-MCNC: 4.2 G/DL (ref 3.5–5.2)
ALBUMIN/GLOBULIN RATIO: 1.3 (ref 1–2.5)
ALP BLD-CCNC: 115 U/L (ref 40–129)
ALT SERPL-CCNC: 19 U/L (ref 5–41)
ANION GAP SERPL CALCULATED.3IONS-SCNC: 9 MMOL/L (ref 9–17)
AST SERPL-CCNC: 26 U/L
BASOPHILS # BLD: 0 % (ref 0–2)
BASOPHILS ABSOLUTE: 0 K/UL (ref 0–0.2)
BILIRUB SERPL-MCNC: 0.25 MG/DL (ref 0.3–1.2)
BUN BLDV-MCNC: 17 MG/DL (ref 8–23)
BUN/CREAT BLD: ABNORMAL (ref 9–20)
CALCIUM SERPL-MCNC: 9.6 MG/DL (ref 8.6–10.4)
CHLORIDE BLD-SCNC: 103 MMOL/L (ref 98–107)
CO2: 25 MMOL/L (ref 20–31)
CREAT SERPL-MCNC: 1.05 MG/DL (ref 0.7–1.2)
DIFFERENTIAL TYPE: ABNORMAL
EOSINOPHILS RELATIVE PERCENT: 4 % (ref 1–4)
GFR AFRICAN AMERICAN: >60 ML/MIN
GFR NON-AFRICAN AMERICAN: >60 ML/MIN
GFR SERPL CREATININE-BSD FRML MDRD: ABNORMAL ML/MIN/{1.73_M2}
GFR SERPL CREATININE-BSD FRML MDRD: ABNORMAL ML/MIN/{1.73_M2}
GLUCOSE BLD-MCNC: 95 MG/DL (ref 70–99)
HCT VFR BLD CALC: 35.2 % (ref 41–53)
HEMOGLOBIN: 11.6 G/DL (ref 13.5–17.5)
IMMATURE GRANULOCYTES: ABNORMAL %
LYMPHOCYTES # BLD: 16 % (ref 24–44)
MCH RBC QN AUTO: 33.5 PG (ref 26–34)
MCHC RBC AUTO-ENTMCNC: 33.1 G/DL (ref 31–37)
MCV RBC AUTO: 101.3 FL (ref 80–100)
MONOCYTES # BLD: 17 % (ref 2–11)
NRBC AUTOMATED: ABNORMAL PER 100 WBC
PDW BLD-RTO: 14.3 % (ref 12.5–15.4)
PLATELET # BLD: 302 K/UL (ref 140–450)
PLATELET ESTIMATE: ABNORMAL
PMV BLD AUTO: 7 FL (ref 6–12)
POTASSIUM SERPL-SCNC: 5 MMOL/L (ref 3.7–5.3)
RBC # BLD: 3.47 M/UL (ref 4.5–5.9)
RBC # BLD: ABNORMAL 10*6/UL
SEG NEUTROPHILS: 63 % (ref 36–66)
SEGMENTED NEUTROPHILS ABSOLUTE COUNT: 4.5 K/UL (ref 1.8–7.7)
SODIUM BLD-SCNC: 137 MMOL/L (ref 135–144)
TOTAL PROTEIN: 7.4 G/DL (ref 6.4–8.3)
WBC # BLD: 7 K/UL (ref 3.5–11)
WBC # BLD: ABNORMAL 10*3/UL

## 2021-07-02 PROCEDURE — 3017F COLORECTAL CA SCREEN DOC REV: CPT | Performed by: INTERNAL MEDICINE

## 2021-07-02 PROCEDURE — G8427 DOCREV CUR MEDS BY ELIG CLIN: HCPCS | Performed by: INTERNAL MEDICINE

## 2021-07-02 PROCEDURE — 99211 OFF/OP EST MAY X REQ PHY/QHP: CPT | Performed by: INTERNAL MEDICINE

## 2021-07-02 PROCEDURE — 80053 COMPREHEN METABOLIC PANEL: CPT

## 2021-07-02 PROCEDURE — 4040F PNEUMOC VAC/ADMIN/RCVD: CPT | Performed by: INTERNAL MEDICINE

## 2021-07-02 PROCEDURE — 1123F ACP DISCUSS/DSCN MKR DOCD: CPT | Performed by: INTERNAL MEDICINE

## 2021-07-02 PROCEDURE — G8417 CALC BMI ABV UP PARAM F/U: HCPCS | Performed by: INTERNAL MEDICINE

## 2021-07-02 PROCEDURE — 85025 COMPLETE CBC W/AUTO DIFF WBC: CPT

## 2021-07-02 PROCEDURE — 96413 CHEMO IV INFUSION 1 HR: CPT

## 2021-07-02 PROCEDURE — 6360000002 HC RX W HCPCS: Performed by: INTERNAL MEDICINE

## 2021-07-02 PROCEDURE — 36591 DRAW BLOOD OFF VENOUS DEVICE: CPT

## 2021-07-02 PROCEDURE — 96375 TX/PRO/DX INJ NEW DRUG ADDON: CPT

## 2021-07-02 PROCEDURE — 2580000003 HC RX 258: Performed by: INTERNAL MEDICINE

## 2021-07-02 PROCEDURE — 4004F PT TOBACCO SCREEN RCVD TLK: CPT | Performed by: INTERNAL MEDICINE

## 2021-07-02 PROCEDURE — 99214 OFFICE O/P EST MOD 30 MIN: CPT | Performed by: INTERNAL MEDICINE

## 2021-07-02 RX ORDER — SODIUM CHLORIDE 0.9 % (FLUSH) 0.9 %
5-40 SYRINGE (ML) INJECTION PRN
Status: CANCELLED | OUTPATIENT
Start: 2021-07-02

## 2021-07-02 RX ORDER — DEXAMETHASONE SODIUM PHOSPHATE 10 MG/ML
10 INJECTION INTRAMUSCULAR; INTRAVENOUS ONCE
Status: COMPLETED | OUTPATIENT
Start: 2021-07-02 | End: 2021-07-02

## 2021-07-02 RX ORDER — METHYLPREDNISOLONE SODIUM SUCCINATE 125 MG/2ML
125 INJECTION, POWDER, LYOPHILIZED, FOR SOLUTION INTRAMUSCULAR; INTRAVENOUS ONCE
Status: CANCELLED | OUTPATIENT
Start: 2021-07-02 | End: 2021-07-02

## 2021-07-02 RX ORDER — SODIUM CHLORIDE 0.9 % (FLUSH) 0.9 %
5-40 SYRINGE (ML) INJECTION PRN
Status: DISCONTINUED | OUTPATIENT
Start: 2021-07-02 | End: 2021-07-03 | Stop reason: HOSPADM

## 2021-07-02 RX ORDER — SODIUM CHLORIDE 9 MG/ML
INJECTION, SOLUTION INTRAVENOUS CONTINUOUS
Status: CANCELLED | OUTPATIENT
Start: 2021-07-02

## 2021-07-02 RX ORDER — SODIUM CHLORIDE 9 MG/ML
25 INJECTION, SOLUTION INTRAVENOUS PRN
Status: CANCELLED | OUTPATIENT
Start: 2021-07-02

## 2021-07-02 RX ORDER — PALONOSETRON 0.05 MG/ML
0.25 INJECTION, SOLUTION INTRAVENOUS ONCE
Status: CANCELLED | OUTPATIENT
Start: 2021-07-02

## 2021-07-02 RX ORDER — SODIUM CHLORIDE 9 MG/ML
20 INJECTION, SOLUTION INTRAVENOUS ONCE
Status: CANCELLED | OUTPATIENT
Start: 2021-07-02 | End: 2021-07-02

## 2021-07-02 RX ORDER — SODIUM CHLORIDE 9 MG/ML
20 INJECTION, SOLUTION INTRAVENOUS ONCE
Status: COMPLETED | OUTPATIENT
Start: 2021-07-02 | End: 2021-07-02

## 2021-07-02 RX ORDER — EPINEPHRINE 1 MG/ML
0.3 INJECTION, SOLUTION, CONCENTRATE INTRAVENOUS PRN
Status: CANCELLED | OUTPATIENT
Start: 2021-07-02

## 2021-07-02 RX ORDER — HEPARIN SODIUM (PORCINE) LOCK FLUSH IV SOLN 100 UNIT/ML 100 UNIT/ML
500 SOLUTION INTRAVENOUS PRN
Status: CANCELLED | OUTPATIENT
Start: 2021-07-02

## 2021-07-02 RX ORDER — DIPHENHYDRAMINE HYDROCHLORIDE 50 MG/ML
50 INJECTION INTRAMUSCULAR; INTRAVENOUS ONCE
Status: CANCELLED | OUTPATIENT
Start: 2021-07-02 | End: 2021-07-02

## 2021-07-02 RX ORDER — HEPARIN SODIUM (PORCINE) LOCK FLUSH IV SOLN 100 UNIT/ML 100 UNIT/ML
500 SOLUTION INTRAVENOUS PRN
Status: DISCONTINUED | OUTPATIENT
Start: 2021-07-02 | End: 2021-07-03 | Stop reason: HOSPADM

## 2021-07-02 RX ORDER — PALONOSETRON 0.05 MG/ML
0.25 INJECTION, SOLUTION INTRAVENOUS ONCE
Status: COMPLETED | OUTPATIENT
Start: 2021-07-02 | End: 2021-07-02

## 2021-07-02 RX ADMIN — SODIUM CHLORIDE, PRESERVATIVE FREE 10 ML: 5 INJECTION INTRAVENOUS at 15:07

## 2021-07-02 RX ADMIN — DEXAMETHASONE SODIUM PHOSPHATE 10 MG: 10 INJECTION INTRAMUSCULAR; INTRAVENOUS at 13:31

## 2021-07-02 RX ADMIN — HEPARIN 500 UNITS: 100 SYRINGE at 15:07

## 2021-07-02 RX ADMIN — PALONOSETRON HYDROCHLORIDE 0.25 MG: 0.25 INJECTION, SOLUTION INTRAVENOUS at 13:26

## 2021-07-02 RX ADMIN — LURBINECTEDIN 6.5 MG: 0.5 INJECTION, POWDER, LYOPHILIZED, FOR SOLUTION INTRAVENOUS at 13:55

## 2021-07-02 RX ADMIN — SODIUM CHLORIDE 20 ML/HR: 9 INJECTION, SOLUTION INTRAVENOUS at 13:21

## 2021-07-02 NOTE — PROGRESS NOTES
Emilee Jauregui                                                                                                                  7/2/2021  MRN:   [id number]  YOB: 1950  PCP:                           Carly Alston MD  Referring Physician: No ref. 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 Chemotherapy changed to carboplatin and etoposide due to renal insufficiency from cycle #2  PCI 7/2019  SRS to adrenal gland metastasis, completed 07/2020  systemic palliative chemoimmunotherapy with carboplatin etoposide and Tecentriq, 12/2020 x4 cycles. Maintenance Tecentriq, 3/2021  Lurbenectidin7/2/2021    Summary of Case/History:    Emilee Jauregui a 72 y.o.male is a patient diagnosed with lung cancer with the component of small cell as well as squamous cell carcinoma    Patient has a significant history of tobacco dependence and underwent CT lung screening in December 2018. CT scan was read as lung rads degree 4B. Subsequently she underwent biopsy of right upper lobe lung nodule on 1/16/19. 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 CT PET done showed abnormal FDG uptake in the right upper lobe nodule. There was also abnormal FDG uptake in the right lower lobe nodule. Additionally right hilar lymph node were also FDG avid. No bony lesion was reported. MRI brain for staging workup did not show any evidence of intracranial metastasis.   Tip of the odontoid process was ill-defined and metastasis could not be ruled out. Clinically patient does give history of trauma to the neck area any years ago however denies any surgery history. Bone scan did not reveal any metastasis     Patient continues to smoke but has cut down quite a bit. He works full-time in a [address] Patient has good performance status, ECOG 0. Patient's other medical problems include dyslipidemia and hypertension. He also has arthritis pain    Started patient on concurrent chemoradiation using cisplatin and etoposide with dose adjustment for renal dysfunction. Chemotherapy changed to carboplatin and etoposide from cycle #2 due to renal dysfunction    Received PCI in July 2019. Interim History:    Patient presents to the clinic for a follow-up visit and to discuss further treatment plan. Is scheduled to start cycle 1 day one of Lurbenectidin. Overall he is doing well. Pain is controlled. Complains of tenderness over a bump on his back which he feels is getting bigger. Denies hospitalization ER visit. Continues to be on anticoagulation. Complains of being tired but fatigue is manageable. During this visit patient's allergy, social, medical, surgical history and medications were reviewed and updated. Past Medical History:   Past Medical History:   Diagnosis Date    DDD (degenerative disc disease), cervical     Gout     Heart murmur     hx. of when younger.  Hyperlipidemia     Hypertension     Lung cancer ([address]     right lung    MI (myocardial infarction) ([address]     Skin cancer     face    Wears glasses        Past Surgical History:     Past Surgical History:   Procedure Laterality Date    APPENDECTOMY      CARDIAC CATHETERIZATION      w/stent    COLONOSCOPY      CYST INCISION AND DRAINAGE Right 05/14/2020    rt elbow I and D and left knee aspiration with cultures    FOOT SURGERY Right     2nd toe(bone spur).     FOREARM SURGERY Right 5/14/2020    RIGHT ELBOW IRRIGATION AND DEBRIDEMENT [facility] Santy [facility] DO [facility] Left 5/14/2020    KNEE ASPIRATION WITH CULTURES SENT performed by Santy Man DO at [address]      face under lt. eye.  TONSILLECTOMY         Patient Family Social History:    Family History   Problem Relation Age of Onset    Cancer Father         lung cancer      Social History     Socioeconomic History    Marital status:      Spouse name: Not on file    Number of children: Not on file    Years of education: Not on file    Highest education level: Not on file   Occupational History    Not on file   Tobacco Use    Smoking status: Current Every Day Smoker     Packs/day: 0.50     Types: Cigarettes    Smokeless tobacco: Former User   Vaping Use    Vaping Use: Former   Substance and Sexual Activity    Alcohol use: No    Drug use: Yes     Frequency: 14.0 times per week     Types: Marijuana    Sexual activity: Not on file   Other Topics Concern    Not on file   Social History Narrative    Not on file     Social Determinants of Health     Financial Resource Strain:     Difficulty of Paying Living Expenses:    Food Insecurity:     Worried About [address] in the Last Year:     [facility] in the Last Year:    Transportation Needs:     Lack of Transportation (Medical):      Lack of Transportation (Non-Medical):    Physical Activity:     Days of Exercise per Week:     Minutes of Exercise per Session:    Stress:     Feeling of Stress :    Social Connections:     Frequency of Communication with Friends and Family:     Frequency of Social Gatherings with Friends and Family:     Attends Hindu Services:     Active Member of Clubs or Organizations:     Attends Club or Organization Meetings:     Marital Status:    Intimate Partner Violence:     Fear of Current or Ex-Partner:     Emotionally Abused:     Physically Abused:     Sexually Abused: Current Medications:     Current Outpatient Medications   Medication Sig Dispense Refill    docusate sodium (COLACE) 100 MG capsule Take 100 mg by mouth 2 times daily      B Complex-C-Folic Acid (ANGELICA-HUGO) TABS TAKE 1 TABLET BY MOUTH DAILY. HEMATINIC VIT MINERALS      traZODone (DESYREL) 100 MG tablet TAKE 1 TABLET BY MOUTH EVERY DAY AT NIGHT      ipratropium-albuterol (DUONEB) 0.5-2.5 (3) MG/3ML SOLN nebulizer solution Inhale 3 mLs into the lungs 4 times daily      Melatonin 5 MG CHEW Take 5 mg by mouth nightly      potassium chloride (MICRO-K) 10 MEQ extended release capsule Take 20 mEq by mouth      Ferrous Fumarate (FERROCITE) 324 (106 Fe) MG TABS Take by mouth      apixaban (ELIQUIS) 5 MG TABS tablet Take 1 tablet by mouth 2 times daily 60 tablet 1    metoprolol tartrate (LOPRESSOR) 25 MG tablet Take 0.5 tablets by mouth 2 times daily 60 tablet 3    tamsulosin (FLOMAX) 0.4 MG capsule TAKE 1 CAPSULE BY MOUTH EVERY DAY 30 capsule 5    Handicap Placard MISC by Does not apply route 1 each 0    lidocaine-prilocaine (EMLA) 2.5-2.5 % cream Apply topically as needed. Apply a quarter size amount to port site 1 hour before chemotherapy. Cover with plastic wrap. 30 g 0    acetaminophen (TYLENOL) 325 MG tablet Take 650 mg by mouth every 6 hours as needed for Pain      allopurinol (ZYLOPRIM) 100 MG tablet Take 100 mg by mouth daily      simvastatin (ZOCOR) 40 MG tablet Take 40 mg by mouth daily      Multiple Vitamins-Minerals (THERAPEUTIC MULTIVITAMIN-MINERALS) tablet Take 1 tablet by mouth daily       No current facility-administered medications for this visit. Allergies:   Patient has no known allergies. Review of Systems:    Constitutional: No fever or chills. No night sweats, positive fatigue. +weight loss, dysgeusia, poor appetite  Eyes: No eye discharge, double vision, or eye pain   HEENT: negative for sore mouth, sore throat, hoarseness and voice change;    Respiratory: negative for cough, sputum, wheezing, hemoptysis, chest pain; +exertional shortness of breath  Cardiovascular: negative for chest pain, palpitations, orthopnea, PND   Gastrointestinal: negative for nausea, vomiting, diarrhea, constipation, abdominal pain, Dysphagia, hematemesis and hematochezia   Genitourinary: negative for frequency, dysuria, nocturia, urinary incontinence, and hematuria +urgency  Integument: Positive for easy bruising - stable +rasied area to the left of the spine that is tender  Hematologic/Lymphatic: negative for easy bleeding, lymphadenopathy, or petechiae   Endocrine: negative for heat or cold intolerance,weight changes, change in bowel habits and hair loss   Musculoskeletal: Positive joint pain. +right shoulder pain  Neurological: negative for headaches, dizziness, seizures, weakness; + poor balance; +neuropathy in left hand from shoulder +poor short term memory        Physical Exam:    Vitals: /81   Pulse 81   Temp 97.8 °F (36.6 °C) (Oral)   Resp 18   Wt 182 lb 6.4 oz (82.7 kg)   BMI 25.80 kg/m²   General appearance -patient not in acute distress  Mental status - AAO X3  Eyes - pupils equal and reactive, extraocular eye movements intact  Mouth - mucous membranes moist, pharynx normal without lesions  Neck - supple, no significant adenopathy  Lymphatics - no palpable lymphadenopathy, no hepatosplenomegaly  Chest - clear to auscultation, rales or rhonchi, symmetric air entry  Heart - normal rate, regular rhythm, normal S1, S2, no murmurs  Abdomen - soft, nontender, nondistended, no masses or organomegaly  Neurological - alert, oriented, normal speech, no focal findings or movement disorder noted  Extremities -+1 lower extremity pitting edema.  Right arm has bruising, no point tenderness or swelling  Skin - normal coloration and turgor, no rashes, no suspicious skin lesions noted; +raised area with tenderness to the left of the spine, no erythema        DATA:    Labs:   Lab Results   Component progression of scarring. Within the area scarring there is new 8 mm nodule in the superomedial middle lobe close to the mediastinal pleura and minor fissure, series 6, image 59. Further cephalad within area of scarring there may be another nodule 1 cm on image 54. These represent a change from prior and would suggest disease progression. Emphysema and scattered peripheral fibrotic densities unchanged. No pleural effusion or pneumothorax. Soft Tissues/Bones: Diffuse degenerative changes. No aggressive lytic or blastic lesions evident. Paraspinal lobular nodule along the right side of T10 vertebral body now measures 3.3 x 1.6 cm compared to 2.3 x 1.0 cm in March 2020 and 1.8 x 1.0 cm in November 2020. Abdomen/Pelvis: Organs: Previously reported posteroinferior right lobe of liver segment 6 1.3 x 0.9 cm metastatic nodule now measures 4.3 x 4.4 cm.  0.8 cm nodule reported in posteromedial aspect of segment 5 on prior now measures 4.5 cm 2 new lesions in the right lobe measuring 12 mm and 16 mm on image 33 and 39 respectively. 1.6 cm right adrenal metastatic nodule previous 1.2 cm. Symmetric enhancement of kidneys. Spleen unremarkable. Pancreas grossly normal. Gallbladder grossly normal. GI/Bowel: Stomach shows no significant abnormalities. Small large bowel loops show no acute process or significant focal lesions. Pelvis: Mild prostatomegaly. Urinary bladder wall thickening probably due to underdistention unchanged. Peritoneum/Retroperitoneum: No free intraperitoneal fluid or significant lymphadenopathy. 3 cm fusiform abdominal aortic aneurysm. Unchanged. Bones/Soft Tissues: New blastic foci in right posterior inferior T11, T12 and L1 vertebral bodies concerning for metastasis. 1. Interval disease progression. At least 2 new lung nodules, a increased size of procedure lymph node, right T10 paraspinal mass noted along with increase in size and number of hepatic metastasis.   Right adrenal metastasis increasing size. 3 new blastic foci in T11, T12 and L1 also worrisome for metastasis. 2. Redemonstration of extensive right upper and lower lobe paramediastinal post treatment scarring with traction bronchiectasis. 3. A 3 cm fusiform abdominal aortic aneurysm is unchanged. Recommend follow-up every 3 years. RECOMMENDATIONS: 3 cm abdominal aortic aneurysm. Recommend follow-up every 3 years. Reference: J Am Damaris Radiol 2013;10:789-794.          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 I also discussed plan with patient's wife Tiago Jefferson over the phone. Patient and his wife understand patient disease not curable and are clean and goals is to control the disease prolong life and improve quality of life and control symptoms. Reviewed logistics prognostic data as well as potential side effects of the new chemotherapy regimen  Encourage patient to keep himself hydrated. Patient is scheduled to get MRI brain later this month. We will follow up on results. Patient's conditions were taken into consideration when deciding risk-benefit for therapy. Patient is at high risk for drug interaction risk of complications. Given multiple comorbid conditions patient is at high risk for complication from intervention, risk of hospitalization, medical interaction overall life expectancy. NCCN guidelines were reviewed and discussed with the patient. The diagnosis and care plan were discussed with the patient in detail.  I discussed the natural history of the disease, prognosis, risks and goals of therapy and answered all the patients questions to the best of my ability. Patient expressed understanding and was in agreement. Shabbir Reed MD        This note is created with the assistance of a speech recognition program.  While intending to generate a document that actually reflects the content of the visit, the document can still have some errors including those of syntax and sound a like substitutions which may escape proof reading. It such instances, actual meaning can be extrapolated by contextual diversion.

## 2021-07-02 NOTE — PROGRESS NOTES
Pt here for C1D.1. Denies any new complaints. Labs drawn from port and results reviewed. Had Dr visit prior to treatment. Pt was treated without incident and d/c'd in stable condition. Pt will return 7/23 for Dr visit and treatment.

## 2021-07-02 NOTE — FLOWSHEET NOTE
SITUATION:  Writer was referred to Patient by Lazaro Jacobs RN. Writer met Patient in the infusion clinic     ASSESSMENT:  Patient was sitting in the chair waiting for his chemo. Patient shared that his cancer has progressed and that he is beginning a new chemotherapy. Patient shared that he still wants to overcome his cancer. Patient spoke about his [employer] career and his job history. Patient spoke about his childhood. INTERVENTION:  Writer greeted Patient; inquired about how Patient is doing; explored Patient's thoughts and feelings; inquired about Patient's work and [place] history; sources of Patient's support. OUTCOME:  Patient expressed gratitude for visit. PLAN:  Hamzah Todd will attempt a follow up visit on a later treatment date. 07/02/21 1409   Encounter Summary   Services provided to: Patient   Referral/Consult From: Nurse   Support System Spouse; Family members; [employer] community   Complexity of Encounter Moderate   Length of Encounter 15 minutes   Spiritual Assessment Completed Yes   Routine   Type Follow up   Spiritual/Yazidi   Type Spiritual support   Assessment Calm; Approachable; Hopeful;Coping   Intervention Active listening;Explored feelings, thoughts, concerns;Explored coping resources;Sustaining presence/ Ministry of presence; Discussed illness/injury and it's impact   Outcome Expressed gratitude;Engaged in conversation; Hopeful;Coping;Receptive       Electronically signed by Rachel Nagel  Intern on 7/2/2021 at 2:11 PM

## 2021-07-07 RX ORDER — HEPARIN SODIUM (PORCINE) LOCK FLUSH IV SOLN 100 UNIT/ML 100 UNIT/ML
500 SOLUTION INTRAVENOUS PRN
Status: CANCELLED | OUTPATIENT
Start: 2021-07-07

## 2021-07-07 RX ORDER — SODIUM CHLORIDE 0.9 % (FLUSH) 0.9 %
10 SYRINGE (ML) INJECTION PRN
Status: CANCELLED | OUTPATIENT
Start: 2021-07-07

## 2021-07-07 RX ORDER — SODIUM CHLORIDE 0.9 % (FLUSH) 0.9 %
20 SYRINGE (ML) INJECTION PRN
Status: CANCELLED | OUTPATIENT
Start: 2021-07-07

## 2021-07-08 ENCOUNTER — HOSPITAL ENCOUNTER (OUTPATIENT)
Dept: MRI IMAGING | Age: 71
Discharge: HOME | End: 2021-07-10
Payer: MEDICARE

## 2021-07-08 DIAGNOSIS — C34.91 PRIMARY LUNG CANCER WITH METASTASIS FROM LUNG TO OTHER SITE, RIGHT (HCC): ICD-10-CM

## 2021-07-08 PROCEDURE — 70553 MRI BRAIN STEM W/O & W/DYE: CPT

## 2021-07-08 PROCEDURE — 6360000004 HC RX CONTRAST MEDICATION: Performed by: INTERNAL MEDICINE

## 2021-07-08 PROCEDURE — A9579 GAD-BASE MR CONTRAST NOS,1ML: HCPCS | Performed by: INTERNAL MEDICINE

## 2021-07-08 RX ADMIN — GADOTERIDOL 17 ML: 279.3 INJECTION, SOLUTION INTRAVENOUS at 10:31

## 2021-07-23 ENCOUNTER — TELEPHONE (OUTPATIENT)
Dept: ONCOLOGY | Age: 71
End: 2021-07-23

## 2021-07-23 ENCOUNTER — OFFICE VISIT (OUTPATIENT)
Dept: ONCOLOGY | Age: 71
End: 2021-07-23
Payer: MEDICARE

## 2021-07-23 ENCOUNTER — HOSPITAL ENCOUNTER (OUTPATIENT)
Dept: INFUSION THERAPY | Age: 71
Discharge: HOME | End: 2021-07-23
Payer: MEDICARE

## 2021-07-23 VITALS
DIASTOLIC BLOOD PRESSURE: 73 MMHG | SYSTOLIC BLOOD PRESSURE: 97 MMHG | TEMPERATURE: 98 F | HEART RATE: 63 BPM | WEIGHT: 181.1 LBS | RESPIRATION RATE: 18 BRPM | BODY MASS INDEX: 25.62 KG/M2

## 2021-07-23 VITALS — BODY MASS INDEX: 25.35 KG/M2 | WEIGHT: 181.1 LBS | HEIGHT: 71 IN

## 2021-07-23 DIAGNOSIS — C34.91 PRIMARY LUNG CANCER WITH METASTASIS FROM LUNG TO OTHER SITE, RIGHT (HCC): Primary | ICD-10-CM

## 2021-07-23 LAB
ABSOLUTE EOS #: 0.16 K/UL (ref 0–0.4)
ABSOLUTE IMMATURE GRANULOCYTE: ABNORMAL K/UL (ref 0–0.3)
ABSOLUTE LYMPH #: 1.11 K/UL (ref 1–4.8)
ABSOLUTE MONO #: 1.07 K/UL (ref 0.1–0.8)
ALBUMIN SERPL-MCNC: 4 G/DL (ref 3.5–5.2)
ALBUMIN/GLOBULIN RATIO: 1.4 (ref 1–2.5)
ALP BLD-CCNC: 100 U/L (ref 40–129)
ALT SERPL-CCNC: 17 U/L (ref 5–41)
ANION GAP SERPL CALCULATED.3IONS-SCNC: 9 MMOL/L (ref 9–17)
AST SERPL-CCNC: 21 U/L
BASOPHILS # BLD: 2 % (ref 0–2)
BASOPHILS ABSOLUTE: 0.08 K/UL (ref 0–0.2)
BILIRUB SERPL-MCNC: 0.24 MG/DL (ref 0.3–1.2)
BUN BLDV-MCNC: 16 MG/DL (ref 8–23)
BUN/CREAT BLD: ABNORMAL (ref 9–20)
CALCIUM SERPL-MCNC: 9.3 MG/DL (ref 8.6–10.4)
CHLORIDE BLD-SCNC: 105 MMOL/L (ref 98–107)
CO2: 23 MMOL/L (ref 20–31)
CREAT SERPL-MCNC: 1.04 MG/DL (ref 0.7–1.2)
DIFFERENTIAL TYPE: ABNORMAL
EOSINOPHILS RELATIVE PERCENT: 4 % (ref 1–4)
GFR AFRICAN AMERICAN: >60 ML/MIN
GFR NON-AFRICAN AMERICAN: >60 ML/MIN
GFR SERPL CREATININE-BSD FRML MDRD: ABNORMAL ML/MIN/{1.73_M2}
GFR SERPL CREATININE-BSD FRML MDRD: ABNORMAL ML/MIN/{1.73_M2}
GLUCOSE BLD-MCNC: 95 MG/DL (ref 70–99)
HCT VFR BLD CALC: 32.5 % (ref 41–53)
HEMOGLOBIN: 10.8 G/DL (ref 13.5–17.5)
IMMATURE GRANULOCYTES: ABNORMAL %
LYMPHOCYTES # BLD: 27 % (ref 24–44)
MCH RBC QN AUTO: 33.3 PG (ref 26–34)
MCHC RBC AUTO-ENTMCNC: 33.2 G/DL (ref 31–37)
MCV RBC AUTO: 100.5 FL (ref 80–100)
MONOCYTES # BLD: 26 % (ref 1–7)
MORPHOLOGY: NORMAL
NRBC AUTOMATED: ABNORMAL PER 100 WBC
PDW BLD-RTO: 15.4 % (ref 12.5–15.4)
PLATELET # BLD: 310 K/UL (ref 140–450)
PLATELET ESTIMATE: ABNORMAL
PMV BLD AUTO: 7 FL (ref 6–12)
POTASSIUM SERPL-SCNC: 4.8 MMOL/L (ref 3.7–5.3)
RBC # BLD: 3.23 M/UL (ref 4.5–5.9)
RBC # BLD: ABNORMAL 10*6/UL
SEG NEUTROPHILS: 41 % (ref 36–66)
SEGMENTED NEUTROPHILS ABSOLUTE COUNT: 1.68 K/UL (ref 1.8–7.7)
SODIUM BLD-SCNC: 137 MMOL/L (ref 135–144)
TOTAL PROTEIN: 6.9 G/DL (ref 6.4–8.3)
WBC # BLD: 4.1 K/UL (ref 3.5–11)
WBC # BLD: ABNORMAL 10*3/UL

## 2021-07-23 PROCEDURE — 99215 OFFICE O/P EST HI 40 MIN: CPT | Performed by: INTERNAL MEDICINE

## 2021-07-23 PROCEDURE — 4040F PNEUMOC VAC/ADMIN/RCVD: CPT | Performed by: INTERNAL MEDICINE

## 2021-07-23 PROCEDURE — G8417 CALC BMI ABV UP PARAM F/U: HCPCS | Performed by: INTERNAL MEDICINE

## 2021-07-23 PROCEDURE — 85025 COMPLETE CBC W/AUTO DIFF WBC: CPT

## 2021-07-23 PROCEDURE — 99211 OFF/OP EST MAY X REQ PHY/QHP: CPT | Performed by: INTERNAL MEDICINE

## 2021-07-23 PROCEDURE — 80053 COMPREHEN METABOLIC PANEL: CPT

## 2021-07-23 PROCEDURE — 2580000003 HC RX 258: Performed by: INTERNAL MEDICINE

## 2021-07-23 PROCEDURE — 36591 DRAW BLOOD OFF VENOUS DEVICE: CPT | Performed by: NURSE PRACTITIONER

## 2021-07-23 PROCEDURE — G8427 DOCREV CUR MEDS BY ELIG CLIN: HCPCS | Performed by: INTERNAL MEDICINE

## 2021-07-23 PROCEDURE — 4004F PT TOBACCO SCREEN RCVD TLK: CPT | Performed by: INTERNAL MEDICINE

## 2021-07-23 PROCEDURE — 96375 TX/PRO/DX INJ NEW DRUG ADDON: CPT | Performed by: NURSE PRACTITIONER

## 2021-07-23 PROCEDURE — 1123F ACP DISCUSS/DSCN MKR DOCD: CPT | Performed by: INTERNAL MEDICINE

## 2021-07-23 PROCEDURE — 3017F COLORECTAL CA SCREEN DOC REV: CPT | Performed by: INTERNAL MEDICINE

## 2021-07-23 PROCEDURE — 96413 CHEMO IV INFUSION 1 HR: CPT | Performed by: NURSE PRACTITIONER

## 2021-07-23 PROCEDURE — 6360000002 HC RX W HCPCS: Performed by: INTERNAL MEDICINE

## 2021-07-23 RX ORDER — SODIUM CHLORIDE 9 MG/ML
20 INJECTION, SOLUTION INTRAVENOUS ONCE
Status: COMPLETED | OUTPATIENT
Start: 2021-07-23 | End: 2021-07-23

## 2021-07-23 RX ORDER — SODIUM CHLORIDE 9 MG/ML
25 INJECTION, SOLUTION INTRAVENOUS PRN
Status: DISCONTINUED | OUTPATIENT
Start: 2021-07-23 | End: 2021-07-24 | Stop reason: HOSPADM

## 2021-07-23 RX ORDER — PALONOSETRON 0.05 MG/ML
0.25 INJECTION, SOLUTION INTRAVENOUS ONCE
Status: COMPLETED | OUTPATIENT
Start: 2021-07-23 | End: 2021-07-23

## 2021-07-23 RX ORDER — DEXAMETHASONE SODIUM PHOSPHATE 10 MG/ML
10 INJECTION INTRAMUSCULAR; INTRAVENOUS ONCE
Status: COMPLETED | OUTPATIENT
Start: 2021-07-23 | End: 2021-07-23

## 2021-07-23 RX ORDER — PALONOSETRON 0.05 MG/ML
0.25 INJECTION, SOLUTION INTRAVENOUS ONCE
Status: CANCELLED | OUTPATIENT
Start: 2021-07-23

## 2021-07-23 RX ORDER — SODIUM CHLORIDE 0.9 % (FLUSH) 0.9 %
5-40 SYRINGE (ML) INJECTION PRN
Status: CANCELLED | OUTPATIENT
Start: 2021-07-23

## 2021-07-23 RX ORDER — EPINEPHRINE 1 MG/ML
0.3 INJECTION, SOLUTION, CONCENTRATE INTRAVENOUS PRN
Status: CANCELLED | OUTPATIENT
Start: 2021-07-23

## 2021-07-23 RX ORDER — SODIUM CHLORIDE 9 MG/ML
20 INJECTION, SOLUTION INTRAVENOUS ONCE
Status: CANCELLED | OUTPATIENT
Start: 2021-07-23 | End: 2021-07-23

## 2021-07-23 RX ORDER — SODIUM CHLORIDE 9 MG/ML
INJECTION, SOLUTION INTRAVENOUS CONTINUOUS
Status: CANCELLED | OUTPATIENT
Start: 2021-07-23

## 2021-07-23 RX ORDER — HEPARIN SODIUM (PORCINE) LOCK FLUSH IV SOLN 100 UNIT/ML 100 UNIT/ML
500 SOLUTION INTRAVENOUS PRN
Status: DISCONTINUED | OUTPATIENT
Start: 2021-07-23 | End: 2021-07-24 | Stop reason: HOSPADM

## 2021-07-23 RX ORDER — HEPARIN SODIUM (PORCINE) LOCK FLUSH IV SOLN 100 UNIT/ML 100 UNIT/ML
500 SOLUTION INTRAVENOUS PRN
Status: CANCELLED | OUTPATIENT
Start: 2021-07-23

## 2021-07-23 RX ORDER — DIPHENHYDRAMINE HYDROCHLORIDE 50 MG/ML
50 INJECTION INTRAMUSCULAR; INTRAVENOUS ONCE
Status: CANCELLED | OUTPATIENT
Start: 2021-07-23 | End: 2021-07-23

## 2021-07-23 RX ORDER — SODIUM CHLORIDE 9 MG/ML
25 INJECTION, SOLUTION INTRAVENOUS PRN
Status: CANCELLED | OUTPATIENT
Start: 2021-07-23

## 2021-07-23 RX ORDER — SODIUM CHLORIDE 0.9 % (FLUSH) 0.9 %
5-40 SYRINGE (ML) INJECTION PRN
Status: DISCONTINUED | OUTPATIENT
Start: 2021-07-23 | End: 2021-07-24 | Stop reason: HOSPADM

## 2021-07-23 RX ORDER — METHYLPREDNISOLONE SODIUM SUCCINATE 125 MG/2ML
125 INJECTION, POWDER, LYOPHILIZED, FOR SOLUTION INTRAMUSCULAR; INTRAVENOUS ONCE
Status: CANCELLED | OUTPATIENT
Start: 2021-07-23 | End: 2021-07-23

## 2021-07-23 RX ADMIN — DEXAMETHASONE SODIUM PHOSPHATE 10 MG: 10 INJECTION INTRAMUSCULAR; INTRAVENOUS at 13:29

## 2021-07-23 RX ADMIN — HEPARIN 500 UNITS: 100 SYRINGE at 14:48

## 2021-07-23 RX ADMIN — LURBINECTEDIN 6.5 MG: 0.5 INJECTION, POWDER, LYOPHILIZED, FOR SOLUTION INTRAVENOUS at 13:42

## 2021-07-23 RX ADMIN — SODIUM CHLORIDE 20 ML/HR: 9 INJECTION, SOLUTION INTRAVENOUS at 13:20

## 2021-07-23 RX ADMIN — PALONOSETRON 0.25 MG: 0.05 INJECTION, SOLUTION INTRAVENOUS at 13:28

## 2021-07-23 RX ADMIN — SODIUM CHLORIDE, PRESERVATIVE FREE 10 ML: 5 INJECTION INTRAVENOUS at 14:48

## 2021-07-23 RX ADMIN — SODIUM CHLORIDE, PRESERVATIVE FREE 10 ML: 5 INJECTION INTRAVENOUS at 12:30

## 2021-07-23 RX ADMIN — SODIUM CHLORIDE, PRESERVATIVE FREE 10 ML: 5 INJECTION INTRAVENOUS at 13:20

## 2021-07-23 NOTE — PROGRESS NOTES
Patient seen by Dr. Janett Simpson today. See his notes for visit details. Labs within treatable parameters. Patient denied any problems or issues with new regimen. c2d1 Zepzelca completed without incident.

## 2021-07-23 NOTE — TELEPHONE ENCOUNTER
AVS from 7/23/21     tx today   rv in 3 weeks   *tx as sched  *rv is sched for [employer] w/tx to [email or web address]    PT was given AVS and an appt schedule

## 2021-07-23 NOTE — PROGRESS NOTES
Akosua Londono                                                                                                                  7/23/2021  MRN:   [id number]  YOB: 1950  PCP:                           Altha Gosselin, MD  Referring Physician: No ref. 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 Chemotherapy changed to carboplatin and etoposide due to renal insufficiency from cycle #2  PCI 7/2019  SRS to adrenal gland metastasis, completed 07/2020  systemic palliative chemoimmunotherapy with carboplatin etoposide and Tecentriq, 12/2020 x4 cycles. Maintenance Tecentriq, 3/2021  Lurbenectidin7/2/2021    Summary of Case/History:    Akosua Londono a 80 y.o.male is a patient diagnosed with lung cancer with the component of small cell as well as squamous cell carcinoma    Patient has a significant history of tobacco dependence and underwent CT lung screening in December 2018. CT scan was read as lung rads degree 4B. Subsequently she underwent biopsy of right upper lobe lung nodule on 1/16/19. 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 CT PET done showed abnormal FDG uptake in the right upper lobe nodule. There was also abnormal FDG uptake in the right lower lobe nodule. Additionally right hilar lymph node were also FDG avid. No bony lesion was reported. MRI brain for staging workup did not show any evidence of intracranial metastasis.   Tip of the odontoid process was ill-defined and metastasis could not be ruled out. Clinically patient does give history of trauma to the neck area any years ago however denies any surgery history. Bone scan did not reveal any metastasis     Patient continues to smoke but has cut down quite a bit. He works full-time in a [address] Patient has good performance status, ECOG 0. Patient's other medical problems include dyslipidemia and hypertension. He also has arthritis pain    Started patient on concurrent chemoradiation using cisplatin and etoposide with dose adjustment for renal dysfunction. Chemotherapy changed to carboplatin and etoposide from cycle #2 due to renal dysfunction    Received PCI in July 2019. Interim History:    Patient presents to the clinic for a follow-up visit and for toxicity check and to review results of her blood work-up. Patient has been tolerating treatment without any unexpected or severe side effects. Denies hospitalization or ER visit. Appetite is good. Weight is stable. Nausea is controlled. Complains of low energy and lack of drive to do things. MRI brain does not show any evidence of brain metastasis. During this visit patient's allergy, social, medical, surgical history and medications were reviewed and updated. Past Medical History:   Past Medical History:   Diagnosis Date    DDD (degenerative disc disease), cervical     Gout     Heart murmur     hx. of when younger.  Hyperlipidemia     Hypertension     Lung cancer ([address]     right lung    MI (myocardial infarction) ([address]     Skin cancer     face    Wears glasses        Past Surgical History:     Past Surgical History:   Procedure Laterality Date    APPENDECTOMY      CARDIAC CATHETERIZATION      w/stent    COLONOSCOPY      CYST INCISION AND DRAINAGE Right 05/14/2020    rt elbow I and D and left knee aspiration with cultures    FOOT SURGERY Right     2nd toe(bone spur).     FOREARM SURGERY Right 5/14/2020 RIGHT ELBOW IRRIGATION AND DEBRIDEMENT performed by [place] Saucedo DO at [facility] 1934 Left 5/14/2020    KNEE ASPIRATION WITH CULTURES SENT performed by [place] Saucedo DO at [facility]      face under lt. eye.  TONSILLECTOMY         Patient Family Social History:    Family History   Problem Relation Age of Onset    Cancer Father         lung cancer      Social History     Socioeconomic History    Marital status:      Spouse name: Not on file    Number of children: Not on file    Years of education: Not on file    Highest education level: Not on file   Occupational History    Not on file   Tobacco Use    Smoking status: Current Every Day Smoker     Packs/day: 0.50     Types: Cigarettes    Smokeless tobacco: Former User   Vaping Use    Vaping Use: Former   Substance and Sexual Activity    Alcohol use: No    Drug use: Yes     Frequency: 14.0 times per week     Types: Marijuana    Sexual activity: Not on file   Other Topics Concern    Not on file   Social History Narrative    Not on file     Social Determinants of Health     Financial Resource Strain:     Difficulty of Paying Living Expenses:    Food Insecurity:     Worried About [address] in the Last Year:     [facility] in the Last Year:    Transportation Needs:     Lack of Transportation (Medical):      Lack of Transportation (Non-Medical):    Physical Activity:     Days of Exercise per Week:     Minutes of Exercise per Session:    Stress:     Feeling of Stress :    Social Connections:     Frequency of Communication with Friends and Family:     Frequency of Social Gatherings with Friends and Family:     Attends Holiness Services:     Active Member of Clubs or Organizations:     Attends Club or Organization Meetings:     Marital Status:    Intimate Partner Violence:     Fear of Current or Ex-Partner:     Emotionally Abused:     Physically Abused:     Sexually Abused: Current Medications:     Current Outpatient Medications   Medication Sig Dispense Refill    docusate sodium (COLACE) 100 MG capsule Take 100 mg by mouth 2 times daily      B Complex-C-Folic Acid (ANGELICA-HUGO) TABS TAKE 1 TABLET BY MOUTH DAILY. HEMATINIC VIT MINERALS      traZODone (DESYREL) 100 MG tablet TAKE 1 TABLET BY MOUTH EVERY DAY AT NIGHT      ipratropium-albuterol (DUONEB) 0.5-2.5 (3) MG/3ML SOLN nebulizer solution Inhale 3 mLs into the lungs 4 times daily      Melatonin 5 MG CHEW Take 5 mg by mouth nightly      potassium chloride (MICRO-K) 10 MEQ extended release capsule Take 20 mEq by mouth      Ferrous Fumarate (FERROCITE) 324 (106 Fe) MG TABS Take by mouth      apixaban (ELIQUIS) 5 MG TABS tablet Take 1 tablet by mouth 2 times daily 60 tablet 1    metoprolol tartrate (LOPRESSOR) 25 MG tablet Take 0.5 tablets by mouth 2 times daily 60 tablet 3    tamsulosin (FLOMAX) 0.4 MG capsule TAKE 1 CAPSULE BY MOUTH EVERY DAY 30 capsule 5    Handicap Placard MISC by Does not apply route 1 each 0    lidocaine-prilocaine (EMLA) 2.5-2.5 % cream Apply topically as needed. Apply a quarter size amount to port site 1 hour before chemotherapy. Cover with plastic wrap. 30 g 0    acetaminophen (TYLENOL) 325 MG tablet Take 650 mg by mouth every 6 hours as needed for Pain      allopurinol (ZYLOPRIM) 100 MG tablet Take 100 mg by mouth daily      simvastatin (ZOCOR) 40 MG tablet Take 40 mg by mouth daily      Multiple Vitamins-Minerals (THERAPEUTIC MULTIVITAMIN-MINERALS) tablet Take 1 tablet by mouth daily (Patient not taking: Reported on 7/23/2021)       No current facility-administered medications for this visit.      Facility-Administered Medications Ordered in Other Visits   Medication Dose Route Frequency Provider Last Rate Last Admin    0.9 % sodium chloride infusion  25 mL Intravenous PRN Nellie Pierre MD        heparin flush 100 UNIT/ML injection 500 Units  500 Units Intracatheter PRMARK Pierre MD   500 Units at 07/23/21 1448    sodium chloride flush 0.9 % injection 5-40 mL  5-40 mL Intravenous PRN [facility], MD   10 mL at 07/23/21 1448       Allergies:   Patient has no known allergies. Review of Systems:    Constitutional: No fever or chills. No night sweats, positive fatigue. +weight loss, dysgeusia, poor appetite  Eyes: No eye discharge, double vision, or eye pain   HEENT: negative for sore mouth, sore throat, hoarseness and voice change; Respiratory: negative for cough, sputum, wheezing, hemoptysis, chest pain; +exertional shortness of breath  Cardiovascular: negative for chest pain, palpitations, orthopnea, PND   Gastrointestinal: negative for nausea, vomiting, diarrhea, constipation, abdominal pain, Dysphagia, hematemesis and hematochezia   Genitourinary: negative for frequency, dysuria, nocturia, urinary incontinence, and hematuria +urgency  Integument: Positive for easy bruising - stable +rasied area to the left of the spine that is tender  Hematologic/Lymphatic: negative for easy bleeding, lymphadenopathy, or petechiae   Endocrine: negative for heat or cold intolerance,weight changes, change in bowel habits and hair loss   Musculoskeletal: Positive joint pain.  +right shoulder pain  Neurological: negative for headaches, dizziness, seizures, weakness; + poor balance; +neuropathy in left hand from shoulder +poor short term memory        Physical Exam:    Vitals: BP 97/73   Pulse 63   Temp 98 °F (36.7 °C) (Oral)   Resp 18   Wt 181 lb 1.6 oz (82.1 kg)   BMI 25.62 kg/m²   General appearance -patient not in acute distress  Mental status - AAO X3  Eyes - pupils equal and reactive, extraocular eye movements intact  Mouth - mucous membranes moist, pharynx normal without lesions  Neck - supple, no significant adenopathy  Lymphatics - no palpable lymphadenopathy, no hepatosplenomegaly  Chest - clear to auscultation, rales or rhonchi, symmetric air entry  Heart - normal rate, regular rhythm, normal S1, S2, no murmurs  Abdomen - soft, nontender, nondistended, no masses or organomegaly  Neurological - alert, oriented, normal speech, no focal findings or movement disorder noted  Extremities -+1 lower extremity pitting edema. Right arm has bruising, no point tenderness or swelling  Skin - normal coloration and turgor, no rashes, no suspicious skin lesions noted; +raised area with tenderness to the left of the spine, no erythema        DATA:    Labs:   Lab Results   Component Value Date    WBC 4.1 07/23/2021    HGB 10.8 (L) 07/23/2021    HCT 32.5 (L) 07/23/2021    .5 (H) 07/23/2021     07/23/2021     Lab Results   Component Value Date    NEUTROABS 1.68 (L) 07/23/2021           Chemistry        Component Value Date/Time     07/23/2021 1200    K 4.8 07/23/2021 1200     07/23/2021 1200    CO2 23 07/23/2021 1200    BUN 16 07/23/2021 1200    CREATININE 1.04 07/23/2021 1200        Component Value Date/Time    CALCIUM 9.3 07/23/2021 1200    ALKPHOS 100 07/23/2021 1200    AST 21 07/23/2021 1200    ALT 17 07/23/2021 1200    BILITOT 0.24 (L) 07/23/2021 1200        MRI BRAIN W WO CONTRAST    Result Date: 7/8/2021  EXAMINATION: MRI OF THE BRAIN WITHOUT AND WITH CONTRAST  7/8/2021 10:31 am TECHNIQUE: Multiplanar multisequence MRI of the head/brain was performed without and with the administration of intravenous contrast. COMPARISON: MRI brain performed 03/02/2021.  HISTORY: ORDERING SYSTEM PROVIDED HISTORY: Primary lung cancer with metastasis from lung to other site, [facility]) TECHNOLOGIST PROVIDED HISTORY: small cell lung cancer Reason for Exam: small cell lung cancer, Primary lung cancer with metastasis from lung to other site, [facility]) Acuity: Unknown Type of Exam: Subsequent/Follow-up Additional signs and symptoms: no current symptoms FINDINGS: INTRACRANIAL STRUCTURES/VENTRICLES:  The sellar and suprasellar structures, optic chiasm, corpus callosum, pineal gland, tectum, and midline brainstem structures are unremarkable. The craniocervical junction is unremarkable. There is no acute hemorrhage, mass effect, or midline shift. There is satisfactory overall gray-white matter differentiation. There is extensive chronic microvascular disease. The ventricular structures are symmetric and unremarkable. The infratentorial structures including the cerebellopontine angles and internal auditory canals are unremarkable. There is no abnormal restricted diffusion. There is no abnormal blooming artifact on susceptibility weighted imaging. No abnormal postcontrast enhancement. ORBITS: The visualized portion of the orbits demonstrate no acute abnormality. SINUSES: The visualized paranasal sinuses and mastoid air cells are well aerated. BONES/SOFT TISSUES: The bone marrow signal intensity appears normal. The soft tissues demonstrate no acute abnormality. Extensive chronic microvascular disease without acute intracranial abnormality. No abnormal postcontrast enhancement.        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 Patient is tolerating treatment without unexpected side effects  Labs are adequate for treatment. We will proceed with treatment   Reiterated treatment plan. Reviewed risk benefit profile and reiterated potential side-effects of ongoing treatment  Discussed natural history of small cell lung cancer. Reviewed results of MRI brain  Reviewed logistics prognostic data  Addressed goals of care and expectations.   Patient wishes to proceed with treatment. Encourage patient to participate in aerobic sizes to help with asthenia  Patient's conditions were taken into consideration when deciding risk-benefit for therapy. Patient is at high risk for drug interaction risk of complications. Given multiple comorbid conditions patient is at high risk for complication from intervention, risk of hospitalization, medical interaction overall life expectancy. NCCN guidelines were reviewed and discussed with the patient. The diagnosis and care plan were discussed with the patient in detail. I discussed the natural history of the disease, prognosis, risks and goals of therapy and answered all the patients questions to the best of my ability. Patient expressed understanding and was in agreement. Meghana Godoy MD        I spent more than forty  minutes examining, evaluating, reviewing data, counseling the patient and coordinating care. Greater than 50% of time was spent face-to-face with the patient this note is created with the assistance of a speech recognition program.  While intending to generate a document that actually reflects the content of the visit, the document can still have some errors including those of syntax and sound a like substitutions which may escape proof reading. It such instances, actual meaning can be extrapolated by contextual diversion.

## 2021-08-12 ENCOUNTER — TELEPHONE (OUTPATIENT)
Dept: INTERNAL MEDICINE | Age: 71
End: 2021-08-12

## 2021-08-12 DIAGNOSIS — C34.91 PRIMARY LUNG CANCER WITH METASTASIS FROM LUNG TO OTHER SITE, RIGHT (HCC): Primary | ICD-10-CM

## 2021-08-12 NOTE — TELEPHONE ENCOUNTER
----- Message from Tyler Chance sent at 5/11/2020  8:37 AM EDT -----  Regarding: documentation questions  I need your help on this patient. It looks like critical care and you are documenting possible sepsis but no source of infection. Could you rule out sepsis since there is no source and does not meet clinical indicators for sepsis. Could the symptoms be due to dehydration . ( see below). And do u think there was any hypovolemic shock or just hypotension/hypovolemia. If you would document your thoughts in next progress note it would be appreciated. 5/8/2020  11:44 AM  Dear Dr. Lyndal Schlatter and associates; Pt admitted with dehydration  Pt noted to have tachycardia , hypotension and confusion . If possible, please document in the progress notes and discharge summary if you are evaluating and/or treating any of the following:    Hypovolemic Shock  Other Shock, please specify  Hypovolemia/hypotension only  Other, please specify  Clinically unable to determine    The medical record reflects the following:     Risk Factors: dehydration      Clinical Indicators: heart rate 450-353, bp 81'B sytolic, documented dehydration, and confusion      Treatment: iv levophed, iv fluids, icu monitoring  Call if any questions Thank you Jessica Conteh BSN  CCDS [phone]  +++++++++++++++++++++++++++++++++++++++++++++++++++++++    5/8/2020  11:38 AM    Dear Dr . Lyndal Schlatter and associates;     Patient admitted with dehydration . Noted documentation of sepsis iof unknown etiology .  In the H&P   Please indicate one of the following and document in the medical record:    Sepsis present as evidenced by ________ (Please provide clinical indicators to support the diagnosis)  Sepsis was ruled out after study  Other, please specify  Clinically unable to determine    The medical record reflects the following:     Risk Factors: lung cancer,      Clinical Indicators: wbc wnl , tachycardic hr 144 dehydration documented, lactic acid 2.5 and no known source.  ua wnl and cxr wnl     Treatment: iv antibiotics   Call if any questions Thank you Darrell Gomez BSN  CCDS [phone]

## 2021-08-13 ENCOUNTER — OFFICE VISIT (OUTPATIENT)
Dept: ONCOLOGY | Age: 71
End: 2021-08-13
Payer: MEDICARE

## 2021-08-13 ENCOUNTER — TELEPHONE (OUTPATIENT)
Dept: ONCOLOGY | Age: 71
End: 2021-08-13

## 2021-08-13 ENCOUNTER — HOSPITAL ENCOUNTER (OUTPATIENT)
Dept: INFUSION THERAPY | Age: 71
Discharge: HOME | End: 2021-08-13
Payer: MEDICARE

## 2021-08-13 VITALS
TEMPERATURE: 97.8 F | HEART RATE: 64 BPM | WEIGHT: 179.6 LBS | BODY MASS INDEX: 25.41 KG/M2 | DIASTOLIC BLOOD PRESSURE: 72 MMHG | SYSTOLIC BLOOD PRESSURE: 108 MMHG

## 2021-08-13 DIAGNOSIS — C34.91 MALIGNANT NEOPLASM OF RIGHT LUNG, UNSPECIFIED PART OF LUNG (HCC): ICD-10-CM

## 2021-08-13 DIAGNOSIS — C34.91 PRIMARY LUNG CANCER WITH METASTASIS FROM LUNG TO OTHER SITE, RIGHT (HCC): Primary | ICD-10-CM
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
## 2021-08-13 DIAGNOSIS — D64.81 ANEMIA ASSOCIATED WITH CHEMOTHERAPY: ICD-10-CM

## 2021-08-13 DIAGNOSIS — T45.1X5A ANEMIA ASSOCIATED WITH CHEMOTHERAPY: ICD-10-CM

## 2021-08-13 DIAGNOSIS — R53.1 ASTHENIA: ICD-10-CM

## 2021-08-13 LAB
ABSOLUTE EOS #: 0.14 K/UL (ref 0–0.4)
ABSOLUTE IMMATURE GRANULOCYTE: ABNORMAL K/UL (ref 0–0.3)
ABSOLUTE LYMPH #: 0.83 K/UL (ref 1–4.8)
ABSOLUTE MONO #: 1.26 K/UL (ref 0.1–1.2)
ALBUMIN SERPL-MCNC: 4 G/DL (ref 3.5–5.2)
ALBUMIN/GLOBULIN RATIO: 1.3 (ref 1–2.5)
ALP BLD-CCNC: 84 U/L (ref 40–129)
ALT SERPL-CCNC: 18 U/L (ref 5–41)
ANION GAP SERPL CALCULATED.3IONS-SCNC: 8 MMOL/L (ref 9–17)
AST SERPL-CCNC: 21 U/L
BASOPHILS # BLD: 0 % (ref 0–2)
BASOPHILS ABSOLUTE: 0 K/UL (ref 0–0.2)
BILIRUB SERPL-MCNC: 0.25 MG/DL (ref 0.3–1.2)
BUN BLDV-MCNC: 17 MG/DL (ref 8–23)
BUN/CREAT BLD: ABNORMAL (ref 9–20)
CALCIUM SERPL-MCNC: 9.1 MG/DL (ref 8.6–10.4)
CHLORIDE BLD-SCNC: 104 MMOL/L (ref 98–107)
CO2: 23 MMOL/L (ref 20–31)
CREAT SERPL-MCNC: 1.09 MG/DL (ref 0.7–1.2)
DIFFERENTIAL TYPE: ABNORMAL
EOSINOPHILS RELATIVE PERCENT: 4 % (ref 1–4)
GFR AFRICAN AMERICAN: >60 ML/MIN
GFR NON-AFRICAN AMERICAN: >60 ML/MIN
GFR SERPL CREATININE-BSD FRML MDRD: ABNORMAL ML/MIN/{1.73_M2}
GFR SERPL CREATININE-BSD FRML MDRD: ABNORMAL ML/MIN/{1.73_M2}
GLUCOSE BLD-MCNC: 106 MG/DL (ref 70–99)
HCT VFR BLD CALC: 30.3 % (ref 41–53)
HEMOGLOBIN: 10.2 G/DL (ref 13.5–17.5)
IMMATURE GRANULOCYTES: ABNORMAL %
LYMPHOCYTES # BLD: 23 % (ref 24–44)
MCH RBC QN AUTO: 34.2 PG (ref 26–34)
MCHC RBC AUTO-ENTMCNC: 33.6 G/DL (ref 31–37)
MCV RBC AUTO: 101.9 FL (ref 80–100)
MONOCYTES # BLD: 35 % (ref 2–11)
MORPHOLOGY: NORMAL
NRBC AUTOMATED: ABNORMAL PER 100 WBC
PDW BLD-RTO: 17.2 % (ref 12.5–15.4)
PLATELET # BLD: 279 K/UL (ref 140–450)
PLATELET ESTIMATE: ABNORMAL
PMV BLD AUTO: 7.1 FL (ref 6–12)
POTASSIUM SERPL-SCNC: 4.6 MMOL/L (ref 3.7–5.3)
RBC # BLD: 2.97 M/UL (ref 4.5–5.9)
RBC # BLD: ABNORMAL 10*6/UL
SEG NEUTROPHILS: 38 % (ref 36–66)
SEGMENTED NEUTROPHILS ABSOLUTE COUNT: 1.37 K/UL (ref 1.8–7.7)
SODIUM BLD-SCNC: 135 MMOL/L (ref 135–144)
TOTAL PROTEIN: 7.1 G/DL (ref 6.4–8.3)
WBC # BLD: 3.6 K/UL (ref 3.5–11)
WBC # BLD: ABNORMAL 10*3/UL

## 2021-08-13 PROCEDURE — G8417 CALC BMI ABV UP PARAM F/U: HCPCS | Performed by: INTERNAL MEDICINE

## 2021-08-13 PROCEDURE — 6360000002 HC RX W HCPCS: Performed by: INTERNAL MEDICINE

## 2021-08-13 PROCEDURE — 4040F PNEUMOC VAC/ADMIN/RCVD: CPT | Performed by: INTERNAL MEDICINE

## 2021-08-13 PROCEDURE — G8427 DOCREV CUR MEDS BY ELIG CLIN: HCPCS | Performed by: INTERNAL MEDICINE

## 2021-08-13 PROCEDURE — 80053 COMPREHEN METABOLIC PANEL: CPT

## 2021-08-13 PROCEDURE — 2580000003 HC RX 258: Performed by: INTERNAL MEDICINE

## 2021-08-13 PROCEDURE — 85025 COMPLETE CBC W/AUTO DIFF WBC: CPT

## 2021-08-13 PROCEDURE — 99214 OFFICE O/P EST MOD 30 MIN: CPT | Performed by: INTERNAL MEDICINE

## 2021-08-13 PROCEDURE — 4004F PT TOBACCO SCREEN RCVD TLK: CPT | Performed by: INTERNAL MEDICINE

## 2021-08-13 PROCEDURE — 99211 OFF/OP EST MAY X REQ PHY/QHP: CPT | Performed by: INTERNAL MEDICINE

## 2021-08-13 PROCEDURE — 1123F ACP DISCUSS/DSCN MKR DOCD: CPT | Performed by: INTERNAL MEDICINE

## 2021-08-13 PROCEDURE — 96375 TX/PRO/DX INJ NEW DRUG ADDON: CPT

## 2021-08-13 PROCEDURE — 3017F COLORECTAL CA SCREEN DOC REV: CPT | Performed by: INTERNAL MEDICINE

## 2021-08-13 PROCEDURE — 36591 DRAW BLOOD OFF VENOUS DEVICE: CPT

## 2021-08-13 PROCEDURE — 96413 CHEMO IV INFUSION 1 HR: CPT

## 2021-08-13 RX ORDER — METHYLPREDNISOLONE SODIUM SUCCINATE 125 MG/2ML
125 INJECTION, POWDER, LYOPHILIZED, FOR SOLUTION INTRAMUSCULAR; INTRAVENOUS ONCE
Status: CANCELLED | OUTPATIENT
Start: 2021-08-13 | End: 2021-08-13

## 2021-08-13 RX ORDER — PALONOSETRON 0.05 MG/ML
0.25 INJECTION, SOLUTION INTRAVENOUS ONCE
Status: CANCELLED | OUTPATIENT
Start: 2021-08-13

## 2021-08-13 RX ORDER — SODIUM CHLORIDE 9 MG/ML
25 INJECTION, SOLUTION INTRAVENOUS PRN
Status: CANCELLED | OUTPATIENT
Start: 2021-08-13

## 2021-08-13 RX ORDER — SODIUM CHLORIDE 0.9 % (FLUSH) 0.9 %
5-40 SYRINGE (ML) INJECTION PRN
Status: CANCELLED | OUTPATIENT
Start: 2021-08-13

## 2021-08-13 RX ORDER — SODIUM CHLORIDE 9 MG/ML
INJECTION, SOLUTION INTRAVENOUS CONTINUOUS
Status: CANCELLED | OUTPATIENT
Start: 2021-09-10

## 2021-08-13 RX ORDER — SODIUM CHLORIDE 0.9 % (FLUSH) 0.9 %
5-40 SYRINGE (ML) INJECTION PRN
Status: CANCELLED | OUTPATIENT
Start: 2021-09-10

## 2021-08-13 RX ORDER — DIPHENHYDRAMINE HYDROCHLORIDE 50 MG/ML
50 INJECTION INTRAMUSCULAR; INTRAVENOUS ONCE
Status: CANCELLED | OUTPATIENT
Start: 2021-09-10 | End: 2021-09-03

## 2021-08-13 RX ORDER — HEPARIN SODIUM (PORCINE) LOCK FLUSH IV SOLN 100 UNIT/ML 100 UNIT/ML
500 SOLUTION INTRAVENOUS PRN
Status: CANCELLED | OUTPATIENT
Start: 2021-09-10

## 2021-08-13 RX ORDER — SODIUM CHLORIDE 9 MG/ML
20 INJECTION, SOLUTION INTRAVENOUS ONCE
Status: COMPLETED | OUTPATIENT
Start: 2021-08-13 | End: 2021-08-13

## 2021-08-13 RX ORDER — SODIUM CHLORIDE 9 MG/ML
INJECTION, SOLUTION INTRAVENOUS CONTINUOUS
Status: CANCELLED | OUTPATIENT
Start: 2021-08-13

## 2021-08-13 RX ORDER — SODIUM CHLORIDE 0.9 % (FLUSH) 0.9 %
5-40 SYRINGE (ML) INJECTION PRN
Status: DISCONTINUED | OUTPATIENT
Start: 2021-08-13 | End: 2021-08-14 | Stop reason: HOSPADM

## 2021-08-13 RX ORDER — DEXAMETHASONE SODIUM PHOSPHATE 10 MG/ML
10 INJECTION INTRAMUSCULAR; INTRAVENOUS ONCE
Status: COMPLETED | OUTPATIENT
Start: 2021-08-13 | End: 2021-08-13

## 2021-08-13 RX ORDER — EPINEPHRINE 1 MG/ML
0.3 INJECTION, SOLUTION, CONCENTRATE INTRAVENOUS PRN
Status: CANCELLED | OUTPATIENT
Start: 2021-09-10

## 2021-08-13 RX ORDER — EPINEPHRINE 1 MG/ML
0.3 INJECTION, SOLUTION, CONCENTRATE INTRAVENOUS PRN
Status: CANCELLED | OUTPATIENT
Start: 2021-08-13

## 2021-08-13 RX ORDER — PALONOSETRON 0.05 MG/ML
0.25 INJECTION, SOLUTION INTRAVENOUS ONCE
Status: CANCELLED | OUTPATIENT
Start: 2021-09-10

## 2021-08-13 RX ORDER — HEPARIN SODIUM (PORCINE) LOCK FLUSH IV SOLN 100 UNIT/ML 100 UNIT/ML
500 SOLUTION INTRAVENOUS PRN
Status: DISCONTINUED | OUTPATIENT
Start: 2021-08-13 | End: 2021-08-14 | Stop reason: HOSPADM

## 2021-08-13 RX ORDER — METHYLPREDNISOLONE SODIUM SUCCINATE 125 MG/2ML
125 INJECTION, POWDER, LYOPHILIZED, FOR SOLUTION INTRAMUSCULAR; INTRAVENOUS ONCE
Status: CANCELLED | OUTPATIENT
Start: 2021-09-10 | End: 2021-09-03

## 2021-08-13 RX ORDER — SODIUM CHLORIDE 9 MG/ML
20 INJECTION, SOLUTION INTRAVENOUS ONCE
Status: CANCELLED | OUTPATIENT
Start: 2021-09-10 | End: 2021-09-03

## 2021-08-13 RX ORDER — SODIUM CHLORIDE 9 MG/ML
25 INJECTION, SOLUTION INTRAVENOUS PRN
Status: CANCELLED | OUTPATIENT
Start: 2021-09-10

## 2021-08-13 RX ORDER — PALONOSETRON 0.05 MG/ML
0.25 INJECTION, SOLUTION INTRAVENOUS ONCE
Status: COMPLETED | OUTPATIENT
Start: 2021-08-13 | End: 2021-08-13

## 2021-08-13 RX ORDER — DIPHENHYDRAMINE HYDROCHLORIDE 50 MG/ML
50 INJECTION INTRAMUSCULAR; INTRAVENOUS ONCE
Status: CANCELLED | OUTPATIENT
Start: 2021-08-13 | End: 2021-08-13

## 2021-08-13 RX ORDER — SODIUM CHLORIDE 9 MG/ML
20 INJECTION, SOLUTION INTRAVENOUS ONCE
Status: CANCELLED | OUTPATIENT
Start: 2021-08-13 | End: 2021-08-13

## 2021-08-13 RX ORDER — HEPARIN SODIUM (PORCINE) LOCK FLUSH IV SOLN 100 UNIT/ML 100 UNIT/ML
500 SOLUTION INTRAVENOUS PRN
Status: CANCELLED | OUTPATIENT
Start: 2021-08-13

## 2021-08-13 RX ADMIN — SODIUM CHLORIDE, PRESERVATIVE FREE 10 ML: 5 INJECTION INTRAVENOUS at 11:33

## 2021-08-13 RX ADMIN — HEPARIN 500 UNITS: 100 SYRINGE at 14:35

## 2021-08-13 RX ADMIN — SODIUM CHLORIDE 20 ML/HR: 9 INJECTION, SOLUTION INTRAVENOUS at 12:51

## 2021-08-13 RX ADMIN — SODIUM CHLORIDE, PRESERVATIVE FREE 10 ML: 5 INJECTION INTRAVENOUS at 14:35

## 2021-08-13 RX ADMIN — PALONOSETRON 0.25 MG: 0.05 INJECTION, SOLUTION INTRAVENOUS at 12:55

## 2021-08-13 RX ADMIN — LURBINECTEDIN 6.5 MG: 0.5 INJECTION, POWDER, LYOPHILIZED, FOR SOLUTION INTRAVENOUS at 13:34

## 2021-08-13 RX ADMIN — SODIUM CHLORIDE, PRESERVATIVE FREE 10 ML: 5 INJECTION INTRAVENOUS at 11:34

## 2021-08-13 RX ADMIN — DEXAMETHASONE SODIUM PHOSPHATE 10 MG: 10 INJECTION INTRAMUSCULAR; INTRAVENOUS at 12:56

## 2021-08-13 NOTE — TELEPHONE ENCOUNTER
AVS from 8/13/21    rv on 9/1 with scans couple of days prior       *rv is sched [email or web address]  *CT is sched [email or web address]    PT was given AVS and an appt schedule

## 2021-08-13 NOTE — PROGRESS NOTES
[facility]                                                                                                                  8/13/2021  MRN:   [id number]  YOB: 1950  PCP:                           Slade Sweet MD  Referring Physician: No ref. 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 Chemotherapy changed to carboplatin and etoposide due to renal insufficiency from cycle #2  PCI 7/2019  SRS to adrenal gland metastasis, completed 07/2020  systemic palliative chemoimmunotherapy with carboplatin etoposide and Tecentriq, 12/2020 x4 cycles. Maintenance Tecentriq, 3/2021  Lurbenectidin7/2/2021    Summary of Case/History:    [facility] a 80 y.o.male is a patient diagnosed with lung cancer with the component of small cell as well as squamous cell carcinoma    Patient has a significant history of tobacco dependence and underwent CT lung screening in December 2018. CT scan was read as lung rads degree 4B. Subsequently she underwent biopsy of right upper lobe lung nodule on 1/16/19. 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 CT PET done showed abnormal FDG uptake in the right upper lobe nodule. There was also abnormal FDG uptake in the right lower lobe nodule. Additionally right hilar lymph node were also FDG avid. No bony lesion was reported. MRI brain for staging workup did not show any evidence of intracranial metastasis.   Tip of the odontoid process was ill-defined and metastasis could not be ruled out. Clinically patient does give history of trauma to the neck area any years ago however denies any surgery history. Bone scan did not reveal any metastasis     Patient continues to smoke but has cut down quite a bit. He works full-time in a [address] Patient has good performance status, ECOG 0. Patient's other medical problems include dyslipidemia and hypertension. He also has arthritis pain    Started patient on concurrent chemoradiation using cisplatin and etoposide with dose adjustment for renal dysfunction. Chemotherapy changed to carboplatin and etoposide from cycle #2 due to renal dysfunction    Received PCI in July 2019. Interim History:    Patient presents to the clinic for a follow-up visit and for toxicity check and to review results of her blood work-up. Patient has been tolerating treatment without any unexpected or severe side effects. Denies hospitalization or ER visit. Appetite is fair. Weight is stable. Nausea is controlled. Complains of fatigue. Does have some balance issues. Denies hospitalization or ER visit    During this visit patient's allergy, social, medical, surgical history and medications were reviewed and updated. Past Medical History:   Past Medical History:   Diagnosis Date    DDD (degenerative disc disease), cervical     Gout     Heart murmur     hx. of when younger.  Hyperlipidemia     Hypertension     Lung cancer ([address]     right lung    [facility]     Skin cancer     face    Wears glasses        Past Surgical History:     Past Surgical History:   Procedure Laterality Date    APPENDECTOMY      CARDIAC CATHETERIZATION      w/stent    COLONOSCOPY      CYST INCISION AND DRAINAGE Right 05/14/2020    rt elbow I and D and left knee aspiration with cultures    FOOT SURGERY Right     2nd toe(bone spur).     FOREARM SURGERY Right 5/14/2020    RIGHT ELBOW IRRIGATION AND DEBRIDEMENT performed by Loly Gibbons DO at [facility] KNEE SURGERY Left 5/14/2020    KNEE ASPIRATION WITH CULTURES SENT performed by Renita Davey DO at [address]      face under lt. eye.  TONSILLECTOMY         Patient Family Social History:    Family History   Problem Relation Age of Onset    Cancer Father         lung cancer      Social History     Socioeconomic History    Marital status:      Spouse name: Not on file    Number of children: Not on file    Years of education: Not on file    Highest education level: Not on file   Occupational History    Not on file   Tobacco Use    Smoking status: Current Every Day Smoker     Packs/day: 0.50     Types: Cigarettes    Smokeless tobacco: Former User   Vaping Use    Vaping Use: Former   Substance and Sexual Activity    Alcohol use: No    Drug use: Yes     Frequency: 14.0 times per week     Types: Marijuana    Sexual activity: Not on file   Other Topics Concern    Not on file   Social History Narrative    Not on file     Social Determinants of Health     Financial Resource Strain:     Difficulty of Paying Living Expenses:    Food Insecurity:     Worried About [address] [employer] in the Last Year:     [address] [employer] in the Last Year:    Transportation Needs:     Lack of Transportation (Medical):      Lack of Transportation (Non-Medical):    Physical Activity:     Days of Exercise per Week:     Minutes of Exercise per Session:    Stress:     Feeling of Stress :    Social Connections:     Frequency of Communication with Friends and Family:     Frequency of Social Gatherings with Friends and Family:     Attends Rastafarian Services:     Active Member of Clubs or Organizations:     Attends Club or Organization Meetings:     Marital Status:    Intimate Partner Violence:     Fear of Current or Ex-Partner:     Emotionally Abused:     Physically Abused:     Sexually Abused:       Current Medications:     Current Outpatient Medications   Medication Sig Dispense Refill    docusate sodium (COLACE) 100 MG capsule Take 100 mg by mouth 2 times daily      B Complex-C-Folic Acid (ANGELICA-HUGO) TABS TAKE 1 TABLET BY MOUTH DAILY. HEMATINIC VIT MINERALS      traZODone (DESYREL) 100 MG tablet TAKE 1 TABLET BY MOUTH EVERY DAY AT NIGHT      ipratropium-albuterol (DUONEB) 0.5-2.5 (3) MG/3ML SOLN nebulizer solution Inhale 3 mLs into the lungs 4 times daily      Melatonin 5 MG CHEW Take 5 mg by mouth nightly      potassium chloride (MICRO-K) 10 MEQ extended release capsule Take 20 mEq by mouth      Ferrous Fumarate (FERROCITE) 324 (106 Fe) MG TABS Take by mouth      apixaban (ELIQUIS) 5 MG TABS tablet Take 1 tablet by mouth 2 times daily 60 tablet 1    metoprolol tartrate (LOPRESSOR) 25 MG tablet Take 0.5 tablets by mouth 2 times daily 60 tablet 3    tamsulosin (FLOMAX) 0.4 MG capsule TAKE 1 CAPSULE BY MOUTH EVERY DAY 30 capsule 5    Handicap Placard MISC by Does not apply route 1 each 0    lidocaine-prilocaine (EMLA) 2.5-2.5 % cream Apply topically as needed. Apply a quarter size amount to port site 1 hour before chemotherapy. Cover with plastic wrap. 30 g 0    acetaminophen (TYLENOL) 325 MG tablet Take 650 mg by mouth every 6 hours as needed for Pain      allopurinol (ZYLOPRIM) 100 MG tablet Take 100 mg by mouth daily      simvastatin (ZOCOR) 40 MG tablet Take 40 mg by mouth daily      Multiple Vitamins-Minerals (THERAPEUTIC MULTIVITAMIN-MINERALS) tablet Take 1 tablet by mouth daily  (Patient not taking: Reported on 8/13/2021)       No current facility-administered medications for this visit. Allergies:   Patient has no known allergies. Review of Systems:    Constitutional: No fever or chills. No night sweats, positive fatigue. +weight loss, dysgeusia, poor appetite  Eyes: No eye discharge, double vision, or eye pain   HEENT: negative for sore mouth, sore throat, hoarseness and voice change;    Respiratory: negative for cough, sputum, wheezing, hemoptysis, chest pain; +exertional shortness of breath  Cardiovascular: negative for chest pain, palpitations, orthopnea, PND   Gastrointestinal: negative for nausea, vomiting, diarrhea, constipation, abdominal pain, Dysphagia, hematemesis and hematochezia   Genitourinary: negative for frequency, dysuria, nocturia, urinary incontinence, and hematuria +urgency  Integument: Positive for easy bruising - stable +rasied area to the left of the spine that is tender  Hematologic/Lymphatic: negative for easy bleeding, lymphadenopathy, or petechiae   Endocrine: negative for heat or cold intolerance,weight changes, change in bowel habits and hair loss   Musculoskeletal: Positive joint pain. +right shoulder pain  Neurological: negative for headaches, dizziness, seizures, weakness; + poor balance; +neuropathy in left hand from shoulder +poor short term memory        Physical Exam:    Vitals: /72   Pulse 64   Temp 97.8 °F (36.6 °C) (Oral)   Wt 179 lb 9.6 oz (81.5 kg)   BMI 25.41 kg/m²   General appearance -patient not in acute distress  Mental status - AAO X3  Eyes - pupils equal and reactive, extraocular eye movements intact  Mouth - mucous membranes moist, pharynx normal without lesions  Neck - supple, no significant adenopathy  Lymphatics - no palpable lymphadenopathy, no hepatosplenomegaly  Chest - clear to auscultation, rales or rhonchi, symmetric air entry  Heart - normal rate, regular rhythm, normal S1, S2, no murmurs  Abdomen - soft, nontender, nondistended, no masses or organomegaly  Neurological - alert, oriented, normal speech, no focal findings or movement disorder noted  Extremities -+1 lower extremity pitting edema.  Right arm has bruising, no point tenderness or swelling  Skin - normal coloration and turgor, no rashes, no suspicious skin lesions noted; +raised area with tenderness to the left of the spine, no erythema        DATA:    Labs:   Lab Results   Component Value Date    WBC 3.6 08/13/2021    HGB susceptibility weighted imaging. No abnormal postcontrast enhancement. ORBITS: The visualized portion of the orbits demonstrate no acute abnormality. SINUSES: The visualized paranasal sinuses and mastoid air cells are well aerated. BONES/SOFT TISSUES: The bone marrow signal intensity appears normal. The soft tissues demonstrate no acute abnormality. Extensive chronic microvascular disease without acute intracranial abnormality. No abnormal postcontrast enhancement.        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 Patient is tolerating treatment without unexpected side effects  Labs are adequate for treatment. We will proceed with treatment   Reiterated treatment plan. Reviewed risk benefit profile and reiterated potential side-effects of ongoing treatment  Discussed natural history of small cell lung cancer. Reviewed logistics prognostic data  Addressed goals of care and expectations. Patient wishes to proceed with treatment. Encourage patient to participate in aerobic sizes to help with asthenia  We will obtain scans before next treatment to assess response and disease status  Patient's conditions were taken into consideration when deciding risk-benefit for therapy. Patient is at high risk for drug interaction risk of complications. Given multiple comorbid conditions patient is at high risk for complication from intervention, risk of hospitalization, medical interaction overall life expectancy.      NCCN guidelines were reviewed and discussed with the

## 2021-08-26 ENCOUNTER — HOSPITAL ENCOUNTER (OUTPATIENT)
Dept: INFUSION THERAPY | Age: 71
Discharge: HOME | End: 2021-08-26
Payer: MEDICARE

## 2021-08-26 ENCOUNTER — HOSPITAL ENCOUNTER (OUTPATIENT)
Dept: CT IMAGING | Age: 71
Discharge: HOME | End: 2021-08-28
Payer: MEDICARE

## 2021-08-26 DIAGNOSIS — C34.91 PRIMARY LUNG CANCER WITH METASTASIS FROM LUNG TO OTHER SITE, RIGHT (HCC): Primary | ICD-10-CM

## 2021-08-26 DIAGNOSIS — C34.91 PRIMARY LUNG CANCER WITH METASTASIS FROM LUNG TO OTHER SITE, RIGHT (HCC): ICD-10-CM

## 2021-08-26 DIAGNOSIS — T45.1X5A ANEMIA ASSOCIATED WITH CHEMOTHERAPY: ICD-10-CM

## 2021-08-26 DIAGNOSIS — C34.91 MALIGNANT NEOPLASM OF RIGHT LUNG, UNSPECIFIED PART OF LUNG (HCC): ICD-10-CM

## 2021-08-26 DIAGNOSIS — D64.81 ANEMIA ASSOCIATED WITH CHEMOTHERAPY: ICD-10-CM
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
## 2021-08-26 DIAGNOSIS — R53.1 ASTHENIA: ICD-10-CM

## 2021-08-26 PROCEDURE — 2580000003 HC RX 258: Performed by: INTERNAL MEDICINE

## 2021-08-26 PROCEDURE — 6360000002 HC RX W HCPCS: Performed by: INTERNAL MEDICINE

## 2021-08-26 PROCEDURE — 74177 CT ABD & PELVIS W/CONTRAST: CPT

## 2021-08-26 PROCEDURE — 6360000004 HC RX CONTRAST MEDICATION: Performed by: INTERNAL MEDICINE

## 2021-08-26 RX ORDER — SODIUM CHLORIDE 0.9 % (FLUSH) 0.9 %
20 SYRINGE (ML) INJECTION PRN
Status: CANCELLED | OUTPATIENT
Start: 2021-08-26

## 2021-08-26 RX ORDER — SODIUM CHLORIDE 0.9 % (FLUSH) 0.9 %
10 SYRINGE (ML) INJECTION PRN
Status: CANCELLED | OUTPATIENT
Start: 2021-08-26

## 2021-08-26 RX ORDER — HEPARIN SODIUM (PORCINE) LOCK FLUSH IV SOLN 100 UNIT/ML 100 UNIT/ML
500 SOLUTION INTRAVENOUS PRN
Status: DISCONTINUED | OUTPATIENT
Start: 2021-08-26 | End: 2021-08-27 | Stop reason: HOSPADM

## 2021-08-26 RX ORDER — SODIUM CHLORIDE 0.9 % (FLUSH) 0.9 %
10 SYRINGE (ML) INJECTION PRN
Status: DISCONTINUED | OUTPATIENT
Start: 2021-08-26 | End: 2021-08-29 | Stop reason: HOSPADM

## 2021-08-26 RX ORDER — HEPARIN SODIUM (PORCINE) LOCK FLUSH IV SOLN 100 UNIT/ML 100 UNIT/ML
500 SOLUTION INTRAVENOUS PRN
Status: CANCELLED | OUTPATIENT
Start: 2021-08-26

## 2021-08-26 RX ORDER — SODIUM CHLORIDE 0.9 % (FLUSH) 0.9 %
10 SYRINGE (ML) INJECTION PRN
Status: DISCONTINUED | OUTPATIENT
Start: 2021-08-26 | End: 2021-08-27 | Stop reason: HOSPADM

## 2021-08-26 RX ORDER — 0.9 % SODIUM CHLORIDE 0.9 %
80 INTRAVENOUS SOLUTION INTRAVENOUS ONCE
Status: COMPLETED | OUTPATIENT
Start: 2021-08-26 | End: 2021-08-26

## 2021-08-26 RX ADMIN — SODIUM CHLORIDE 80 ML: 9 INJECTION, SOLUTION INTRAVENOUS at 12:05

## 2021-08-26 RX ADMIN — IOHEXOL 50 ML: 240 INJECTION, SOLUTION INTRATHECAL; INTRAVASCULAR; INTRAVENOUS; ORAL at 12:05

## 2021-08-26 RX ADMIN — HEPARIN 500 UNITS: 100 SYRINGE at 12:22

## 2021-08-26 RX ADMIN — IOPAMIDOL 100 ML: 755 INJECTION, SOLUTION INTRAVENOUS at 12:05

## 2021-08-26 RX ADMIN — SODIUM CHLORIDE, PRESERVATIVE FREE 10 ML: 5 INJECTION INTRAVENOUS at 12:06

## 2021-08-26 RX ADMIN — SODIUM CHLORIDE, PRESERVATIVE FREE 10 ML: 5 INJECTION INTRAVENOUS at 10:34

## 2021-08-26 RX ADMIN — SODIUM CHLORIDE, PRESERVATIVE FREE 10 ML: 5 INJECTION INTRAVENOUS at 10:33

## 2021-08-26 RX ADMIN — SODIUM CHLORIDE, PRESERVATIVE FREE 10 ML: 5 INJECTION INTRAVENOUS at 12:21

## 2021-08-26 NOTE — PROGRESS NOTES
Discussed CT scan findings with patient. Advised him to go to  ER.   Patient states that he is currently not symptomatic however he will go to the ER as recommended

## 2021-08-27 ENCOUNTER — APPOINTMENT (OUTPATIENT)
Dept: GENERAL RADIOLOGY | Age: 71
DRG: 199 | End: 2021-08-27
Payer: MEDICARE

## 2021-08-27 ENCOUNTER — TELEPHONE (OUTPATIENT)
Dept: INFUSION THERAPY | Age: 71
End: 2021-08-27

## 2021-08-27 ENCOUNTER — HOSPITAL ENCOUNTER (INPATIENT)
Age: 71
LOS: 3 days | Discharge: HOME | DRG: 199 | End: 2021-08-30
Attending: STUDENT IN AN ORGANIZED HEALTH CARE EDUCATION/TRAINING PROGRAM | Admitting: FAMILY MEDICINE
Payer: MEDICARE

## 2021-08-27 DIAGNOSIS — J93.11 PRIMARY SPONTANEOUS PNEUMOTHORAX: ICD-10-CM

## 2021-08-27 DIAGNOSIS — J93.12 SECONDARY SPONTANEOUS PNEUMOTHORAX: Primary | ICD-10-CM

## 2021-08-27 PROBLEM — J93.9 PNEUMOTHORAX: Status: ACTIVE | Noted: 2021-08-27

## 2021-08-27 PROBLEM — J44.9 COPD (CHRONIC OBSTRUCTIVE PULMONARY DISEASE) (HCC): Status: ACTIVE | Noted: 2021-08-27

## 2021-08-27 PROBLEM — I48.0 PAF (PAROXYSMAL ATRIAL FIBRILLATION) (HCC): Status: ACTIVE | Noted: 2021-08-27

## 2021-08-27 PROBLEM — D50.9 IRON DEFICIENCY ANEMIA: Status: ACTIVE | Noted: 2021-08-27

## 2021-08-27 PROBLEM — J93.9 PNEUMOTHORAX ON RIGHT: Status: ACTIVE | Noted: 2021-08-27

## 2021-08-27 PROBLEM — F17.200 TOBACCO DEPENDENCE: Status: ACTIVE | Noted: 2021-08-27

## 2021-08-27 LAB
ABSOLUTE EOS #: 0.08 K/UL (ref 0–0.4)
ABSOLUTE IMMATURE GRANULOCYTE: ABNORMAL K/UL (ref 0–0.3)
ABSOLUTE LYMPH #: 0.44 K/UL (ref 1–4.8)
ABSOLUTE MONO #: 0.32 K/UL (ref 0.1–1.3)
ABSOLUTE RETIC #: 0.05 M/UL (ref 0.02–0.1)
ANION GAP SERPL CALCULATED.3IONS-SCNC: 10 MMOL/L (ref 9–17)
BASOPHILS # BLD: 0 % (ref 0–2)
BASOPHILS ABSOLUTE: 0 K/UL (ref 0–0.2)
BUN BLDV-MCNC: 16 MG/DL (ref 8–23)
BUN/CREAT BLD: ABNORMAL (ref 9–20)
CALCIUM SERPL-MCNC: 9 MG/DL (ref 8.6–10.4)
CHLORIDE BLD-SCNC: 101 MMOL/L (ref 98–107)
CO2: 23 MMOL/L (ref 20–31)
CREAT SERPL-MCNC: 1.17 MG/DL (ref 0.7–1.2)
DIFFERENTIAL TYPE: ABNORMAL
EOSINOPHILS RELATIVE PERCENT: 4 % (ref 0–4)
GFR AFRICAN AMERICAN: >60 ML/MIN
GFR NON-AFRICAN AMERICAN: >60 ML/MIN
GFR SERPL CREATININE-BSD FRML MDRD: ABNORMAL ML/MIN/{1.73_M2}
GFR SERPL CREATININE-BSD FRML MDRD: ABNORMAL ML/MIN/{1.73_M2}
GLUCOSE BLD-MCNC: 117 MG/DL (ref 70–99)
HCT VFR BLD CALC: 28.9 % (ref 41–53)
HEMOGLOBIN: 9.8 G/DL (ref 13.5–17.5)
IMMATURE GRANULOCYTES: ABNORMAL %
IMMATURE RETIC FRACT: NORMAL %
LYMPHOCYTES # BLD: 23 % (ref 24–44)
MCH RBC QN AUTO: 35.3 PG (ref 26–34)
MCHC RBC AUTO-ENTMCNC: 33.8 G/DL (ref 31–37)
MCV RBC AUTO: 104.7 FL (ref 80–100)
MONOCYTES # BLD: 17 % (ref 1–7)
MORPHOLOGY: ABNORMAL
NRBC AUTOMATED: ABNORMAL PER 100 WBC
PDW BLD-RTO: 17.9 % (ref 11.5–14.9)
PLATELET # BLD: 133 K/UL (ref 150–450)
PLATELET ESTIMATE: ABNORMAL
PMV BLD AUTO: 7.3 FL (ref 6–12)
POTASSIUM SERPL-SCNC: 4.2 MMOL/L (ref 3.7–5.3)
RBC # BLD: 2.76 M/UL (ref 4.5–5.9)
RBC # BLD: ABNORMAL 10*6/UL
RETIC %: 1.6 % (ref 0.5–2)
RETIC HEMOGLOBIN: NORMAL PG (ref 28.2–35.7)
SEG NEUTROPHILS: 56 % (ref 36–66)
SEGMENTED NEUTROPHILS ABSOLUTE COUNT: 1.06 K/UL (ref 1.3–9.1)
SODIUM BLD-SCNC: 134 MMOL/L (ref 135–144)
WBC # BLD: 1.9 K/UL (ref 3.5–11)
WBC # BLD: ABNORMAL 10*3/UL

## 2021-08-27 PROCEDURE — 99283 EMERGENCY DEPT VISIT LOW MDM: CPT

## 2021-08-27 PROCEDURE — 6370000000 HC RX 637 (ALT 250 FOR IP): Performed by: FAMILY MEDICINE

## 2021-08-27 PROCEDURE — 85025 COMPLETE CBC W/AUTO DIFF WBC: CPT

## 2021-08-27 PROCEDURE — 87040 BLOOD CULTURE FOR BACTERIA: CPT

## 2021-08-27 PROCEDURE — 2580000003 HC RX 258: Performed by: FAMILY MEDICINE

## 2021-08-27 PROCEDURE — 80048 BASIC METABOLIC PNL TOTAL CA: CPT

## 2021-08-27 PROCEDURE — 2060000000 HC ICU INTERMEDIATE R&B

## 2021-08-27 PROCEDURE — 32551 INSERTION OF CHEST TUBE: CPT

## 2021-08-27 PROCEDURE — 36415 COLL VENOUS BLD VENIPUNCTURE: CPT

## 2021-08-27 PROCEDURE — 71045 X-RAY EXAM CHEST 1 VIEW: CPT

## 2021-08-27 PROCEDURE — 0W9930Z DRAINAGE OF RIGHT PLEURAL CAVITY WITH DRAINAGE DEVICE, PERCUTANEOUS APPROACH: ICD-10-PCS | Performed by: STUDENT IN AN ORGANIZED HEALTH CARE EDUCATION/TRAINING PROGRAM

## 2021-08-27 PROCEDURE — 85045 AUTOMATED RETICULOCYTE COUNT: CPT

## 2021-08-27 PROCEDURE — 6360000002 HC RX W HCPCS: Performed by: FAMILY MEDICINE

## 2021-08-27 RX ORDER — ONDANSETRON 4 MG/1
4 TABLET, ORALLY DISINTEGRATING ORAL EVERY 8 HOURS PRN
Status: DISCONTINUED | OUTPATIENT
Start: 2021-08-27 | End: 2021-08-30 | Stop reason: HOSPADM

## 2021-08-27 RX ORDER — POTASSIUM CHLORIDE 7.45 MG/ML
10 INJECTION INTRAVENOUS PRN
Status: DISCONTINUED | OUTPATIENT
Start: 2021-08-27 | End: 2021-08-30 | Stop reason: HOSPADM

## 2021-08-27 RX ORDER — POTASSIUM CHLORIDE 20 MEQ/1
40 TABLET, EXTENDED RELEASE ORAL PRN
Status: DISCONTINUED | OUTPATIENT
Start: 2021-08-27 | End: 2021-08-30 | Stop reason: HOSPADM

## 2021-08-27 RX ORDER — SODIUM CHLORIDE 9 MG/ML
25 INJECTION, SOLUTION INTRAVENOUS PRN
Status: DISCONTINUED | OUTPATIENT
Start: 2021-08-27 | End: 2021-08-30 | Stop reason: HOSPADM

## 2021-08-27 RX ORDER — ACETAMINOPHEN 650 MG/1
650 SUPPOSITORY RECTAL EVERY 4 HOURS PRN
Status: DISCONTINUED | OUTPATIENT
Start: 2021-08-27 | End: 2021-08-28 | Stop reason: SDUPTHER

## 2021-08-27 RX ORDER — LIDOCAINE HYDROCHLORIDE 10 MG/ML
20 INJECTION, SOLUTION INFILTRATION; PERINEURAL ONCE
Status: DISCONTINUED | OUTPATIENT
Start: 2021-08-27 | End: 2021-08-30 | Stop reason: HOSPADM

## 2021-08-27 RX ORDER — DOCUSATE SODIUM 100 MG/1
100 CAPSULE, LIQUID FILLED ORAL 2 TIMES DAILY
Status: DISCONTINUED | OUTPATIENT
Start: 2021-08-27 | End: 2021-08-30 | Stop reason: HOSPADM

## 2021-08-27 RX ORDER — MAGNESIUM SULFATE 1 G/100ML
1000 INJECTION INTRAVENOUS PRN
Status: DISCONTINUED | OUTPATIENT
Start: 2021-08-27 | End: 2021-08-30 | Stop reason: HOSPADM

## 2021-08-27 RX ORDER — ATORVASTATIN CALCIUM 10 MG/1
10 TABLET, FILM COATED ORAL DAILY
Status: DISCONTINUED | OUTPATIENT
Start: 2021-08-27 | End: 2021-08-30 | Stop reason: HOSPADM

## 2021-08-27 RX ORDER — FERROUS SULFATE 325(65) MG
325 TABLET ORAL 2 TIMES DAILY WITH MEALS
Status: DISCONTINUED | OUTPATIENT
Start: 2021-08-27 | End: 2021-08-30 | Stop reason: HOSPADM

## 2021-08-27 RX ORDER — TRAZODONE HYDROCHLORIDE 50 MG/1
50 TABLET ORAL NIGHTLY
Status: DISCONTINUED | OUTPATIENT
Start: 2021-08-27 | End: 2021-08-30 | Stop reason: HOSPADM

## 2021-08-27 RX ORDER — SODIUM CHLORIDE 0.9 % (FLUSH) 0.9 %
10 SYRINGE (ML) INJECTION EVERY 12 HOURS SCHEDULED
Status: DISCONTINUED | OUTPATIENT
Start: 2021-08-27 | End: 2021-08-30 | Stop reason: HOSPADM

## 2021-08-27 RX ORDER — ACETAMINOPHEN 650 MG/1
650 SUPPOSITORY RECTAL EVERY 6 HOURS PRN
Status: DISCONTINUED | OUTPATIENT
Start: 2021-08-27 | End: 2021-08-30 | Stop reason: HOSPADM

## 2021-08-27 RX ORDER — SODIUM CHLORIDE 9 MG/ML
INJECTION, SOLUTION INTRAVENOUS CONTINUOUS
Status: DISCONTINUED | OUTPATIENT
Start: 2021-08-27 | End: 2021-08-30 | Stop reason: HOSPADM

## 2021-08-27 RX ORDER — ACETAMINOPHEN 325 MG/1
650 TABLET ORAL EVERY 6 HOURS PRN
Status: DISCONTINUED | OUTPATIENT
Start: 2021-08-27 | End: 2021-08-30 | Stop reason: HOSPADM

## 2021-08-27 RX ORDER — ONDANSETRON 2 MG/ML
4 INJECTION INTRAMUSCULAR; INTRAVENOUS EVERY 6 HOURS PRN
Status: DISCONTINUED | OUTPATIENT
Start: 2021-08-27 | End: 2021-08-30 | Stop reason: HOSPADM

## 2021-08-27 RX ORDER — SODIUM CHLORIDE 0.9 % (FLUSH) 0.9 %
10 SYRINGE (ML) INJECTION PRN
Status: DISCONTINUED | OUTPATIENT
Start: 2021-08-27 | End: 2021-08-30 | Stop reason: HOSPADM

## 2021-08-27 RX ORDER — POTASSIUM CHLORIDE 750 MG/1
20 CAPSULE, EXTENDED RELEASE ORAL DAILY
Status: DISCONTINUED | OUTPATIENT
Start: 2021-08-27 | End: 2021-08-30 | Stop reason: HOSPADM

## 2021-08-27 RX ORDER — LANOLIN ALCOHOL/MO/W.PET/CERES
3 CREAM (GRAM) TOPICAL NIGHTLY
Status: DISCONTINUED | OUTPATIENT
Start: 2021-08-27 | End: 2021-08-30 | Stop reason: HOSPADM

## 2021-08-27 RX ORDER — ALLOPURINOL 100 MG/1
100 TABLET ORAL DAILY
Status: DISCONTINUED | OUTPATIENT
Start: 2021-08-27 | End: 2021-08-30 | Stop reason: HOSPADM

## 2021-08-27 RX ORDER — IPRATROPIUM BROMIDE AND ALBUTEROL SULFATE 2.5; .5 MG/3ML; MG/3ML
3 SOLUTION RESPIRATORY (INHALATION)
Status: DISCONTINUED | OUTPATIENT
Start: 2021-08-27 | End: 2021-08-30 | Stop reason: HOSPADM

## 2021-08-27 RX ADMIN — POTASSIUM CHLORIDE 20 MEQ: 10 CAPSULE, COATED, EXTENDED RELEASE ORAL at 21:24

## 2021-08-27 RX ADMIN — IPRATROPIUM BROMIDE AND ALBUTEROL SULFATE 3 ML: .5; 3 SOLUTION RESPIRATORY (INHALATION) at 21:01

## 2021-08-27 RX ADMIN — ATORVASTATIN CALCIUM 10 MG: 10 TABLET, FILM COATED ORAL at 21:30

## 2021-08-27 RX ADMIN — FERROUS SULFATE TAB 325 MG (65 MG ELEMENTAL FE) 325 MG: 325 (65 FE) TAB at 21:17

## 2021-08-27 RX ADMIN — Medication 3 MG: at 21:17

## 2021-08-27 RX ADMIN — DOCUSATE SODIUM 100 MG: 100 CAPSULE, LIQUID FILLED ORAL at 21:18

## 2021-08-27 RX ADMIN — SODIUM CHLORIDE, PRESERVATIVE FREE 10 ML: 5 INJECTION INTRAVENOUS at 21:18

## 2021-08-27 RX ADMIN — SODIUM CHLORIDE: 9 INJECTION, SOLUTION INTRAVENOUS at 21:09

## 2021-08-27 RX ADMIN — TRAZODONE HYDROCHLORIDE 50 MG: 50 TABLET ORAL at 21:18

## 2021-08-27 RX ADMIN — ALLOPURINOL 100 MG: 100 TABLET ORAL at 21:30

## 2021-08-27 RX ADMIN — APIXABAN 5 MG: 5 TABLET, FILM COATED ORAL at 21:17

## 2021-08-27 RX ADMIN — CEFEPIME HYDROCHLORIDE 2000 MG: 2 INJECTION, POWDER, FOR SOLUTION INTRAVENOUS at 21:14

## 2021-08-27 ASSESSMENT — ENCOUNTER SYMPTOMS
SHORTNESS OF BREATH: 1
VOICE CHANGE: 0
EYE PAIN: 0
RECTAL PAIN: 0
TROUBLE SWALLOWING: 0
BACK PAIN: 0
COUGH: 0
SORE THROAT: 0
ABDOMINAL DISTENTION: 0
STRIDOR: 0
PHOTOPHOBIA: 0
FACIAL SWELLING: 0
DIARRHEA: 0
ANAL BLEEDING: 0
CHOKING: 0
RHINORRHEA: 0
ABDOMINAL PAIN: 0
EYE REDNESS: 0
NAUSEA: 0
VOMITING: 0
BLOOD IN STOOL: 0
COLOR CHANGE: 0

## 2021-08-27 ASSESSMENT — PAIN SCALES - GENERAL: PAINLEVEL_OUTOF10: 0

## 2021-08-27 NOTE — ED PROVIDER NOTES
EMERGENCY DEPARTMENT ENCOUNTER    Pt Name: Sandeep Willingham  MRN: [id number]  [place] 1950  Date of evaluation: 8/27/21  CHIEF COMPLAINT       Chief Complaint   Patient presents with    Lung Cancer     HISTORY OF PRESENT ILLNESS   HPI  80-year-old male history of right-sided small cell and squamous cell lung cancer with mets in the liver and adrenal glands status post chemotherapy currently on palliative chemo immunotherapy presents for evaluation with abnormal CT scan. Patient had a routine staging CT done yesterday per oncology which showed a new moderate-sized right-sided pneumothorax. Patient is having some very mild shortness of breath over the past several weeks but no new chest pain, cough. Was directed to come to ER last night by oncologist but he waited till this morning. No recent trauma. No other associated symptoms. REVIEW OF SYSTEMS     Review of Systems   Constitutional: Negative for chills and fatigue. HENT: Negative for facial swelling, nosebleeds, rhinorrhea and sore throat. Eyes: Negative for pain and redness. Respiratory: Positive for shortness of breath. Negative for cough. Cardiovascular: Negative for chest pain and leg swelling. Gastrointestinal: Negative for abdominal pain, diarrhea, nausea and vomiting. Genitourinary: Negative for flank pain and hematuria. Musculoskeletal: Negative for arthralgias and back pain. Skin: Negative for color change and rash. Neurological: Negative for dizziness, tremors, facial asymmetry, speech difficulty, weakness and numbness. PASTMEDICAL HISTORY     Past Medical History:   Diagnosis Date    DDD (degenerative disc disease), cervical     Gout     Heart murmur     hx. of when younger.     Hyperlipidemia     Hypertension     Lung cancer ([address]     right lung    MI (myocardial infarction) ([address]     Skin cancer     face    Wears glasses      Past Problem List  Patient Active Problem List   Diagnosis Code    Primary lung cancer with metastasis from lung to other site, right ([facility] C34.91    Hyperlipidemia E78.5    Hypertension I10    Lung cancer, primary, with metastasis from lung to other site ([facility] C34.90    Sepsis ([facility] A41.9    Atrial fibrillation with RVR (HCC) I48.91    Gout M10.9    Pyogenic arthritis of right elbow ([facility] M00.9    Pneumothorax on right J93.9    PAF [facility]paroxysmal atrial fibrillation) [facility] I48.0    Tobacco dependence F17.200    COPD (chronic obstructive pulmonary disease) ([facility]) J44.9    Iron deficiency anemia D50.9    Pneumothorax J93.9     SURGICAL HISTORY       Past Surgical History:   Procedure Laterality Date    APPENDECTOMY      CARDIAC CATHETERIZATION      w/stent    COLONOSCOPY      CYST INCISION AND DRAINAGE Right 05/14/2020    rt elbow I and D and left knee aspiration with cultures    FOOT SURGERY Right     2nd toe(bone spur).  FOREARM SURGERY Right 5/14/2020    RIGHT ELBOW IRRIGATION AND DEBRIDEMENT performed by Alex Florez DO at [place] [address] Left 5/14/2020    KNEE ASPIRATION WITH CULTURES SENT performed by Alex Florez DO at [address] [place] [address] under lt. eye.  TONSILLECTOMY       CURRENT MEDICATIONS       Current Discharge Medication List      CONTINUE these medications which have NOT CHANGED    Details   docusate sodium (COLACE) 100 MG capsule Take 100 mg by mouth 2 times daily      B Complex-C-Folic Acid (ANGELIAC-HUGO) TABS TAKE 1 TABLET BY MOUTH DAILY.  HEMATINIC VIT MINERALS      traZODone (DESYREL) 100 MG tablet TAKE 1 TABLET BY MOUTH EVERY DAY AT NIGHT      ipratropium-albuterol (DUONEB) 0.5-2.5 (3) MG/3ML SOLN nebulizer solution Inhale 3 mLs into the lungs 4 times daily      Melatonin 5 MG CHEW Take 5 mg by mouth nightly      potassium chloride (MICRO-K) 10 MEQ extended release capsule Take 20 mEq by mouth      Ferrous Fumarate (FERROCITE) 324 (106 Fe) MG TABS Take by mouth      apixaban (ELIQUIS) 5 MG TABS tablet Take 1 tablet by mouth 2 times daily  Qty: 60 tablet, Refills: 1      metoprolol tartrate (LOPRESSOR) 25 MG tablet Take 0.5 tablets by mouth 2 times daily  Qty: 60 tablet, Refills: 3      tamsulosin (FLOMAX) 0.4 MG capsule TAKE 1 CAPSULE BY MOUTH EVERY DAY  Qty: 30 capsule, Refills: 5    Associated Diagnoses: Primary lung cancer with metastasis from lung to other site, right ([address] Polyuria      Handicap Placard MISC by Does not apply route  Qty: 1 each, Refills: 0    Associated Diagnoses: Primary lung cancer with metastasis from lung to other site, right (HCC)      lidocaine-prilocaine (EMLA) 2.5-2.5 % cream Apply topically as needed. Apply a quarter size amount to port site 1 hour before chemotherapy. Cover with plastic wrap. Qty: 30 g, Refills: 0      acetaminophen (TYLENOL) 325 MG tablet Take 650 mg by mouth every 6 hours as needed for Pain      allopurinol (ZYLOPRIM) 100 MG tablet Take 100 mg by mouth daily      simvastatin (ZOCOR) 40 MG tablet Take 40 mg by mouth daily      Multiple Vitamins-Minerals (THERAPEUTIC MULTIVITAMIN-MINERALS) tablet Take 1 tablet by mouth daily            ALLERGIES     has No Known Allergies. FAMILY HISTORY     He indicated that his mother is . He indicated that his father is . SOCIAL HISTORY       Social History     Tobacco Use    Smoking status: Current Every Day Smoker     Packs/day: 0.50     Types: Cigarettes    Smokeless tobacco: Former User   Vaping Use    Vaping Use: Former   Substance Use Topics    Alcohol use: No    Drug use: Yes     Frequency: 14.0 times per week     Types: Marijuana     PHYSICAL EXAM     INITIAL VITALS: /72   Pulse 83   Temp 96.8 °F (36 °C) (Axillary)   Resp 16   Ht 5' 11\" (1.803 m)   Wt 186 lb 4.6 oz (84.5 kg)   SpO2 97%   BMI 25.98 kg/m²    Physical Exam  Vitals and nursing note reviewed. Constitutional:       Appearance: Normal appearance. HENT:      Head: Normocephalic and atraumatic.       Nose: Nose normal. Eyes:      Extraocular Movements: Extraocular movements intact. Pupils: Pupils are equal, round, and reactive to light. Cardiovascular:      Rate and Rhythm: Normal rate and regular rhythm. Pulmonary:      Effort: Pulmonary effort is normal. No respiratory distress. Breath sounds: Normal breath sounds. Comments: No distress bilateral breath sounds diminished on right   Abdominal:      General: Abdomen is flat. There is no distension. Palpations: Abdomen is soft. There is no mass. Musculoskeletal:         General: No swelling. Normal range of motion. Cervical back: Normal range of motion. No rigidity. Skin:     General: Skin is warm and dry. Neurological:      General: No focal deficit present. Mental Status: He is alert. Mental status is at baseline. Cranial Nerves: No cranial nerve deficit. MEDICAL DECISION MAKIN-year-old male presents for evaluation with right-sided pneumothorax documented on CT scan yesterday with history of underlying lung cancer. Repeat x-ray today that showed a 4.9 cm right-sided apical pneumothorax. Placed a chest tube to reexpand the lung. Will admit for observation and pulmonology evaluation. Discussed with primary care Dr. Karin Caputo and pulmonologist Dr. Latonia De Souza. CRITICAL CARE:   30 minutes for management of spontaneous pneumothorax requiring chest tube placement, hospitalization    PROCEDURES:    Chest Tube    Date/Time: 2021 4:57 PM  Performed by: Zander Saavedra MD  Authorized by: Zander Saavedra MD     Consent:     Consent obtained:  Verbal    Consent given by:  Patient    Risks discussed:  Bleeding, incomplete drainage, damage to surrounding structures, infection and pain    Alternatives discussed:  No treatment  Pre-procedure details:     Skin preparation:  ChloraPrep    Preparation: Patient was prepped and draped in the usual sterile fashion    Anesthesia (see MAR for exact dosages):      Anesthesia method:  Local infiltration    Local anesthetic:  Lidocaine 1% w/o epi  Procedure details:     Placement location:  R anterior    Scalpel size:  10    Tube size (Fr):  Minicatheter    Dissection instrument:  Any clamp    Ultrasound guidance: no      Tension pneumothorax: no      Tube connected to:  Suction    Drainage characteristics:  Air only  Post-procedure details:     Post-insertion x-ray findings: tube in good position      Patient tolerance of procedure: Tolerated well, no immediate complications        DIAGNOSTIC RESULTS   EKG:All EKG's are interpreted by the Emergency Department Physician who either signs or Co-signs this chart in the absence of a cardiologist.        RADIOLOGY:All plain film, CT, MRI, and formal ultrasound images (except ED bedside ultrasound) are read by the radiologist, see reports below, unless otherwisenoted in MDM or here. XR CHEST PORTABLE   Final Result      XR CHEST PORTABLE   Final Result   Right apical pneumothorax measuring 4.9 cm. Perihilar infiltrates. XR ACUTE ABD SERIES CHEST 1 VW    (Results Pending)     LABS: All lab results were reviewed by myself, and all abnormals are listed below.   Labs Reviewed   CBC WITH AUTO DIFFERENTIAL - Abnormal; Notable for the following components:       Result Value    WBC 1.9 (*)     RBC 2.76 (*)     Hemoglobin 9.8 (*)     Hematocrit 28.9 (*)     .7 (*)     MCH 35.3 (*)     RDW 17.9 (*)     Platelets 504 (*)     Lymphocytes 23 (*)     Monocytes 17 (*)     Segs Absolute 1.06 (*)     Absolute Lymph # 0.44 (*)     All other components within normal limits   BASIC METABOLIC PANEL W/ REFLEX TO MG FOR LOW K - Abnormal; Notable for the following components:    Glucose 117 (*)     Sodium 134 (*)     All other components within normal limits   CULTURE, BLOOD 1   CULTURE, BLOOD 1   RETICULOCYTES   PERIPHERAL BLOOD SMEAR, PATH REVIEW   COMPREHENSIVE METABOLIC PANEL W/ REFLEX TO MG FOR LOW K   CBC WITH AUTO DIFFERENTIAL PULMONOLOGY  IP CONSULT TO ONCOLOGY    FINAL IMPRESSION      1. Secondary spontaneous pneumothorax          DISPOSITION/PLAN   DISPOSITION Admitted 08/27/2021 02:11:47 PM      PATIENT REFERRED TO:  No follow-up provider specified.   DISCHARGE MEDICATIONS:  Current Discharge Medication List        Conley Schwab, MD  Attending Emergency Physician                    Conley Schwab, MD  08/27/21 6621

## 2021-08-27 NOTE — ED NOTES
Bed: 16  Expected date:   Expected time:   Means of arrival:   Comments:  [address] [facility], RN  08/27/21 1250

## 2021-08-27 NOTE — TELEPHONE ENCOUNTER
Pt called into triage stating that Dr Cristina Frederick called him at home last night and told him to go to the ER, he states his \"lung collapsed\"   Pt is still at home with his wife questioning Dr Francie Mclean recommendations   He wants to know if he can wait til Monday since \"nothing happens in the hospitals over the weekend\" and he says he does not have symptoms  Writer clarified with Dr Cristina Frederick that he wants the pt to go to [employer] called pt back and let him know that Dr Francie Mclean recommendations still stand for him to go to the ER  Pt states \"ok I guess ill go\"  Shaq Kapadia RN

## 2021-08-27 NOTE — H&P
History and Physical      Name: Akosua Londono  MRN: [id number]     Acct: [de-identified]  Room: 16/16    Admit Date: 8/27/2021  PCP: Altha Gosselin, MD      Chief Complaint:     Chief Complaint   Patient presents with    Lung Cancer       History Obtained From:     patient, electronic medical record    History of Present Illness:      Akosua Londono is a  80 y.o.  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 He was directed by his oncologist to report to the ER last night but patient waited till this morning. Patient denies chest pain cough hemoptysis palpitations lightheadedness visual change nausea vomiting abdominal pain sore throat dysuria flank pain fever or chills    Past Medical History:     Past Medical History:   Diagnosis Date    DDD (degenerative disc disease), cervical     Gout     Heart murmur     hx. of when younger.  Hyperlipidemia     Hypertension     Lung cancer ([address]     right lung    MI (myocardial infarction) ([address]     Skin cancer     face    Wears glasses         Past Surgical History:     Past Surgical History:   Procedure Laterality Date    APPENDECTOMY      CARDIAC CATHETERIZATION      w/stent    COLONOSCOPY      CYST INCISION AND DRAINAGE Right 05/14/2020    rt elbow I and D and left knee aspiration with cultures    FOOT SURGERY Right     2nd toe(bone spur).  FOREARM SURGERY Right 5/14/2020    RIGHT ELBOW IRRIGATION AND DEBRIDEMENT performed by Jing Bustillos DO at [place] [id number] Left 5/14/2020    KNEE ASPIRATION WITH CULTURES SENT performed by Jing Bustillos DO at [address] [place] [address] under lt. eye.  TONSILLECTOMY          Medications Prior to Admission:       Prior to Admission medications    Medication Sig Start Date End Date Taking? Authorizing Provider   docusate sodium (COLACE) 100 MG capsule Take 100 mg by mouth 2 times daily    Historical Provider, MD   B Complex-C-Folic Acid (ANGELICA-HUGO) TABS TAKE 1 TABLET BY MOUTH DAILY. HEMATINIC VIT MINERALS 7/21/20   Historical Provider, MD   traZODone (DESYREL) 100 MG tablet TAKE 1 TABLET BY MOUTH EVERY DAY AT NIGHT 7/20/20   Historical Provider, MD   ipratropium-albuterol (DUONEB) 0.5-2.5 (3) MG/3ML SOLN nebulizer solution Inhale 3 mLs into the lungs 4 times daily    Historical Provider, MD   Melatonin 5 MG CHEW Take 5 mg by mouth nightly    Historical Provider, MD   potassium chloride (MICRO-K) 10 MEQ extended release capsule Take 20 mEq by mouth 11/19/19   Historical Provider, MD   Ferrous Fumarate (FERROCITE) 324 (106 Fe) MG TABS Take by mouth    Historical Provider, MD   apixaban (ELIQUIS) 5 MG TABS tablet Take 1 tablet by mouth 2 times daily 5/21/20   Dean Christianson MD   metoprolol tartrate (LOPRESSOR) 25 MG tablet Take 0.5 tablets by mouth 2 times daily 5/16/20   Delisa Carnes MD   tamsulosin (FLOMAX) 0.4 MG capsule TAKE 1 CAPSULE BY MOUTH EVERY DAY 5/4/20   Jeannie Campo MD   Handicap Placard MISC by Does not apply route 7/30/19   Jeannie Campo MD   lidocaine-prilocaine (EMLA) 2.5-2.5 % cream Apply topically as needed. Apply a quarter size amount to port site 1 hour before chemotherapy. Cover with plastic wrap.  3/7/19   Guillermo Wharton MD   acetaminophen (TYLENOL) 325 MG tablet Take 650 mg by mouth every 6 hours as needed for Pain    Historical Provider, MD   allopurinol (ZYLOPRIM) 100 MG tablet Take 100 mg by mouth daily 12/4/18   Historical Provider, MD   simvastatin (ZOCOR) 40 MG tablet Take 40 mg by mouth daily 11/27/18   Historical Provider, MD   Multiple Vitamins-Minerals (THERAPEUTIC MULTIVITAMIN-MINERALS) tablet Take 1 tablet by mouth daily Patient not taking: Reported on 8/13/2021    Historical Provider, MD        Allergies:       Patient has no known allergies. Social History:     Tobacco:    reports that he has been smoking cigarettes. He has been smoking about 0.50 packs per day. He has quit using smokeless tobacco.  Alcohol:      reports no history of alcohol use. Drug Use:  reports current drug use. Frequency: 14.00 times per week. Drug: Marijuana. Family History:     Family History   Problem Relation Age of Onset    Cancer Father         lung cancer       Review of Systems:     Positive and Negative as described in HPI   all 10 systems are reviewed and negative except as Noted      Review of Systems   Constitutional: Negative for appetite change, chills and diaphoresis. HENT: Negative for drooling, ear pain, trouble swallowing and voice change. Eyes: Negative for photophobia and visual disturbance. Respiratory: Positive for shortness of breath. Negative for choking and stridor. Cardiovascular: Negative for chest pain and palpitations. Gastrointestinal: Negative for abdominal distention, anal bleeding, blood in stool and rectal pain. Endocrine: Negative for polyphagia and polyuria. Genitourinary: Negative for dysuria, flank pain, hematuria and urgency. Musculoskeletal: Negative for back pain, myalgias and neck stiffness. Skin: Negative for color change, pallor and rash. Allergic/Immunologic: Negative for environmental allergies and food allergies. Neurological: Negative for tremors, seizures, facial asymmetry and numbness. Hematological: Negative for adenopathy. Does not bruise/bleed easily. Psychiatric/Behavioral: Negative for agitation, behavioral problems, hallucinations, self-injury and suicidal ideas.        Code Status:  Prior    Physical Exam:     Vitals:  BP 98/75   Pulse 89   Temp 98.5 °F (36.9 °C) (Oral)   Resp 25   Ht 5' 11\" (1.803 m)   Wt 178 lb (80.7 kg)   SpO2 98%   BMI 24.83 kg/m²   Temp (24hrs), Av.5 °F (36.9 °C), Min:98.5 °F (36.9 °C), Max:98.5 °F (36.9 °C)        Physical Exam  Vitals reviewed. Constitutional:       Appearance: Normal appearance. He is not diaphoretic. HENT:      Head: Normocephalic and atraumatic. Right Ear: External ear normal.      Left Ear: External ear normal.      Nose: Nose normal.      Mouth/Throat:      Mouth: Mucous membranes are moist.      Pharynx: Oropharynx is clear. Eyes:      Conjunctiva/sclera: Conjunctivae normal.   Cardiovascular:      Rate and Rhythm: Normal rate and regular rhythm. Pulses: Normal pulses. Heart sounds: Normal heart sounds. Pulmonary:      Effort: Pulmonary effort is normal. No respiratory distress. Breath sounds: Examination of the right-upper field reveals decreased breath sounds. Examination of the right-middle field reveals decreased breath sounds. Decreased breath sounds present. No wheezing. Abdominal:      General: Bowel sounds are normal. There is no distension. Palpations: Abdomen is soft. Musculoskeletal:         General: No tenderness or deformity. Normal range of motion. Cervical back: Normal range of motion and neck supple. No rigidity. Right lower leg: No edema. Left lower leg: No edema. Skin:     General: Skin is warm and dry. Capillary Refill: Capillary refill takes less than 2 seconds. Coloration: Skin is not jaundiced. Neurological:      General: No focal deficit present. Mental Status: Mental status is at baseline.    Psychiatric:         Mood and Affect: Mood normal.         Behavior: Behavior normal.               Data:     Recent Results (from the past 24 hour(s))   CBC Auto Differential    Collection Time: 21  1:10 PM   Result Value Ref Range    WBC 1.9 (L) 3.5 - 11.0 k/uL    RBC 2.76 (L) 4.5 - 5.9 m/uL    Hemoglobin 9.8 (L) 13.5 - 17.5 g/dL    Hematocrit 28.9 (L) 41 - 53 %    .7 (H) 80 - 100 fL    MCH 35.3 (H) 26 - 34 pg    MCHC 33.8 31 - 37 g/dL    RDW 17.9 (H) 11.5 - 14.9 %    Platelets 776 (L) 745 - 450 k/uL    MPV 7.3 6.0 - 12.0 fL    NRBC Automated NOT REPORTED per 100 WBC    Differential Type NOT REPORTED     Immature Granulocytes NOT REPORTED 0 %    Absolute Immature Granulocyte NOT REPORTED 0.00 - 0.30 k/uL    WBC Morphology NOT REPORTED     RBC Morphology NOT REPORTED     Platelet Estimate NOT REPORTED     Seg Neutrophils 56 36 - 66 %    Lymphocytes 23 (L) 24 - 44 %    Monocytes 17 (H) 1 - 7 %    Eosinophils % 4 0 - 4 %    Basophils 0 0 - 2 %    Segs Absolute 1.06 (L) 1.3 - 9.1 k/uL    Absolute Lymph # 0.44 (L) 1.0 - 4.8 k/uL    Absolute Mono # 0.32 0.1 - 1.3 k/uL    Absolute Eos # 0.08 0.0 - 0.4 k/uL    Basophils Absolute 0.00 0.0 - 0.2 k/uL    Morphology ANISOCYTOSIS PRESENT     Morphology 1+ TEARDROPS     Morphology 1+ ELLIPTOCYTES     Morphology MACROCYTOSIS PRESENT    Basic Metabolic Panel w/ Reflex to MG    Collection Time: 08/27/21  1:10 PM   Result Value Ref Range    Glucose 117 (H) 70 - 99 mg/dL    BUN 16 8 - 23 mg/dL    CREATININE 1.17 0.70 - 1.20 mg/dL    Bun/Cre Ratio NOT REPORTED 9 - 20    Calcium 9.0 8.6 - 10.4 mg/dL    Sodium 134 (L) 135 - 144 mmol/L    Potassium 4.2 3.7 - 5.3 mmol/L    Chloride 101 98 - 107 mmol/L    CO2 23 20 - 31 mmol/L    Anion Gap 10 9 - 17 mmol/L    GFR Non-African American >60 >60 mL/min    GFR African American >60 >60 mL/min    GFR Comment          GFR Staging NOT REPORTED    Reticulocytes    Collection Time: 08/27/21  1:10 PM   Result Value Ref Range    Retic % 1.6 0.5 - 2.0 %    Absolute Retic # 0.046 0.0245 - 0.098 M/uL    Immature Retic Fract NOT REPORTED %    Retic Hemoglobin NOT REPORTED 28.2 - 35.7 pg       Assesment:     Primary Problem  Pneumothorax on right    Principal Problem:    Pneumothorax on right  Active Problems:    Lung cancer, primary, with metastasis from lung to other site (HCC)    PAF (paroxysmal atrial fibrillation) (HCC)    Tobacco dependence    COPD (chronic obstructive

## 2021-08-28 ENCOUNTER — APPOINTMENT (OUTPATIENT)
Dept: GENERAL RADIOLOGY | Age: 71
DRG: 199 | End: 2021-08-28
Payer: MEDICARE

## 2021-08-28 PROBLEM — D72.819 LEUKOPENIA: Status: ACTIVE | Noted: 2021-08-28

## 2021-08-28 PROBLEM — D69.6 THROMBOCYTOPENIA (HCC): Status: ACTIVE | Noted: 2021-08-28

## 2021-08-28 LAB
ABSOLUTE EOS #: 0.2 K/UL (ref 0–0.4)
ABSOLUTE IMMATURE GRANULOCYTE: ABNORMAL K/UL (ref 0–0.3)
ABSOLUTE LYMPH #: 0.46 K/UL (ref 1–4.8)
ABSOLUTE MONO #: 0.88 K/UL (ref 0.1–1.3)
ALBUMIN SERPL-MCNC: 3.7 G/DL (ref 3.5–5.2)
ALBUMIN/GLOBULIN RATIO: ABNORMAL (ref 1–2.5)
ALP BLD-CCNC: 84 U/L (ref 40–129)
ALT SERPL-CCNC: 28 U/L (ref 5–41)
ANION GAP SERPL CALCULATED.3IONS-SCNC: 9 MMOL/L (ref 9–17)
AST SERPL-CCNC: 32 U/L
BASOPHILS # BLD: 1 % (ref 0–2)
BASOPHILS ABSOLUTE: 0.02 K/UL (ref 0–0.2)
BILIRUB SERPL-MCNC: 0.22 MG/DL (ref 0.3–1.2)
BUN BLDV-MCNC: 12 MG/DL (ref 8–23)
BUN/CREAT BLD: ABNORMAL (ref 9–20)
CALCIUM SERPL-MCNC: 8.9 MG/DL (ref 8.6–10.4)
CHLORIDE BLD-SCNC: 101 MMOL/L (ref 98–107)
CO2: 23 MMOL/L (ref 20–31)
CREAT SERPL-MCNC: 1.09 MG/DL (ref 0.7–1.2)
DIFFERENTIAL TYPE: ABNORMAL
EOSINOPHILS RELATIVE PERCENT: 9 % (ref 0–4)
GFR AFRICAN AMERICAN: >60 ML/MIN
GFR NON-AFRICAN AMERICAN: >60 ML/MIN
GFR SERPL CREATININE-BSD FRML MDRD: ABNORMAL ML/MIN/{1.73_M2}
GFR SERPL CREATININE-BSD FRML MDRD: ABNORMAL ML/MIN/{1.73_M2}
GLUCOSE BLD-MCNC: 97 MG/DL (ref 70–99)
HCT VFR BLD CALC: 28.6 % (ref 41–53)
HEMOGLOBIN: 9.5 G/DL (ref 13.5–17.5)
IMMATURE GRANULOCYTES: ABNORMAL %
LYMPHOCYTES # BLD: 21 % (ref 24–44)
MCH RBC QN AUTO: 34.8 PG (ref 26–34)
MCHC RBC AUTO-ENTMCNC: 33.4 G/DL (ref 31–37)
MCV RBC AUTO: 104.1 FL (ref 80–100)
MONOCYTES # BLD: 40 % (ref 1–7)
MORPHOLOGY: ABNORMAL
NRBC AUTOMATED: ABNORMAL PER 100 WBC
PDW BLD-RTO: 17.5 % (ref 11.5–14.9)
PLATELET # BLD: 122 K/UL (ref 150–450)
PLATELET ESTIMATE: ABNORMAL
PMV BLD AUTO: 6.6 FL (ref 6–12)
POTASSIUM SERPL-SCNC: 4.4 MMOL/L (ref 3.7–5.3)
RBC # BLD: 2.74 M/UL (ref 4.5–5.9)
RBC # BLD: ABNORMAL 10*6/UL
SEG NEUTROPHILS: 29 % (ref 36–66)
SEGMENTED NEUTROPHILS ABSOLUTE COUNT: 0.64 K/UL (ref 1.3–9.1)
SODIUM BLD-SCNC: 133 MMOL/L (ref 135–144)
TOTAL PROTEIN: 6.5 G/DL (ref 6.4–8.3)
WBC # BLD: 2.2 K/UL (ref 3.5–11)
WBC # BLD: ABNORMAL 10*3/UL

## 2021-08-28 PROCEDURE — 80053 COMPREHEN METABOLIC PANEL: CPT

## 2021-08-28 PROCEDURE — 6370000000 HC RX 637 (ALT 250 FOR IP): Performed by: FAMILY MEDICINE

## 2021-08-28 PROCEDURE — 2060000000 HC ICU INTERMEDIATE R&B

## 2021-08-28 PROCEDURE — 36415 COLL VENOUS BLD VENIPUNCTURE: CPT

## 2021-08-28 PROCEDURE — 6360000002 HC RX W HCPCS: Performed by: FAMILY MEDICINE

## 2021-08-28 PROCEDURE — 85025 COMPLETE CBC W/AUTO DIFF WBC: CPT

## 2021-08-28 PROCEDURE — 94640 AIRWAY INHALATION TREATMENT: CPT

## 2021-08-28 PROCEDURE — 94761 N-INVAS EAR/PLS OXIMETRY MLT: CPT

## 2021-08-28 PROCEDURE — 99223 1ST HOSP IP/OBS HIGH 75: CPT | Performed by: INTERNAL MEDICINE

## 2021-08-28 PROCEDURE — 71046 X-RAY EXAM CHEST 2 VIEWS: CPT

## 2021-08-28 PROCEDURE — 2580000003 HC RX 258: Performed by: FAMILY MEDICINE

## 2021-08-28 PROCEDURE — 71045 X-RAY EXAM CHEST 1 VIEW: CPT

## 2021-08-28 RX ADMIN — ATORVASTATIN CALCIUM 10 MG: 10 TABLET, FILM COATED ORAL at 09:00

## 2021-08-28 RX ADMIN — IPRATROPIUM BROMIDE AND ALBUTEROL SULFATE 3 ML: .5; 3 SOLUTION RESPIRATORY (INHALATION) at 09:09

## 2021-08-28 RX ADMIN — FERROUS SULFATE TAB 325 MG (65 MG ELEMENTAL FE) 325 MG: 325 (65 FE) TAB at 09:00

## 2021-08-28 RX ADMIN — ALLOPURINOL 100 MG: 100 TABLET ORAL at 09:00

## 2021-08-28 RX ADMIN — IPRATROPIUM BROMIDE AND ALBUTEROL SULFATE 3 ML: .5; 3 SOLUTION RESPIRATORY (INHALATION) at 15:50

## 2021-08-28 RX ADMIN — DOCUSATE SODIUM 100 MG: 100 CAPSULE, LIQUID FILLED ORAL at 20:59

## 2021-08-28 RX ADMIN — IPRATROPIUM BROMIDE AND ALBUTEROL SULFATE 3 ML: .5; 3 SOLUTION RESPIRATORY (INHALATION) at 19:08

## 2021-08-28 RX ADMIN — FERROUS SULFATE TAB 325 MG (65 MG ELEMENTAL FE) 325 MG: 325 (65 FE) TAB at 16:39

## 2021-08-28 RX ADMIN — TRAZODONE HYDROCHLORIDE 50 MG: 50 TABLET ORAL at 21:00

## 2021-08-28 RX ADMIN — Medication 3 MG: at 20:59

## 2021-08-28 RX ADMIN — APIXABAN 5 MG: 5 TABLET, FILM COATED ORAL at 09:00

## 2021-08-28 RX ADMIN — SODIUM CHLORIDE: 9 INJECTION, SOLUTION INTRAVENOUS at 23:54

## 2021-08-28 RX ADMIN — APIXABAN 5 MG: 5 TABLET, FILM COATED ORAL at 21:00

## 2021-08-28 RX ADMIN — CEFEPIME HYDROCHLORIDE 2000 MG: 2 INJECTION, POWDER, FOR SOLUTION INTRAVENOUS at 09:00

## 2021-08-28 RX ADMIN — SODIUM CHLORIDE, PRESERVATIVE FREE 10 ML: 5 INJECTION INTRAVENOUS at 21:00

## 2021-08-28 RX ADMIN — CEFEPIME HYDROCHLORIDE 2000 MG: 2 INJECTION, POWDER, FOR SOLUTION INTRAVENOUS at 20:57

## 2021-08-28 RX ADMIN — POTASSIUM CHLORIDE 20 MEQ: 10 CAPSULE, COATED, EXTENDED RELEASE ORAL at 09:00

## 2021-08-28 ASSESSMENT — PAIN SCALES - GENERAL
PAINLEVEL_OUTOF10: 0
PAINLEVEL_OUTOF10: 0

## 2021-08-28 NOTE — CARE COORDINATION
CASE MANAGEMENT NOTE:    Admission Date:  8/27/2021 Edita Root is a 80 y.o.  male    Admitted for : Pneumothorax [J93.9]  Secondary spontaneous pneumothorax [J93.12]    Met with:  Patient    PCP:  Dr. aDfne Velazquez:  Medicare       Is patient alert and oriented at time of discussion:  Yes    Current Residence/ Living Arrangements:  independently at home             Current Services PTA:  No    Does patient go to outpatient dialysis: No  If yes, location and chair time:     Is patient agreeable to VNS: No    Freedom of choice provided:  No    List of [address] provided: No    VNS chosen:  NA    DME:  walker    Home Oxygen: No    Nebulizer: No    CPAP/BIPAP: No    Supplier: N/A    Potential Assistance Needed: No    SNF needed: No    Freedom of choice and list provided: No    Pharmacy:  [facility]       Does Patient want to use MEDS to BEDS? No    Is patient currently receiving oral anticoagulation therapy? No    Is the Patient an [facility] with Readmission Risk Score greater than 14%? No  If yes, pt needs a follow up appointment made within 7 days. Family Members/Caregivers that pt would like involved in their care:    Yes    If yes, list name here:  Cheryle Jamey    Transportation Provider:  Family             Discharge Plan:  8/28/21 Medicare PT is from home in a two story home he uses a walker and denies need for vns plan is to discharge to home with no needs will continue to follow for needs . //tv                  Electronically signed by:  Sherie Robin RN on 8/28/2021 at 12:26 PM

## 2021-08-28 NOTE — FLOWSHEET NOTE
08/27/21 2311   Provider Notification   Reason for Communication Review case   Provider Name Dr. Greg Guerin   Provider Notification Physician   Method of Communication Page   Response Other (Comment)   Notification Time 2318   Discussed thora vent with Dr. Greg Guerin. No new orders received.

## 2021-08-28 NOTE — PLAN OF CARE
Problem: Falls - Risk of:  Goal: Will remain free from falls  Description: Will remain free from falls  8/28/2021 1623 by Kiara Cardenas RN  Outcome: Ongoing  8/28/2021 0308 by Vania Robles RN  Outcome: Ongoing  Goal: Absence of physical injury  Description: Absence of physical injury  8/28/2021 1623 by Kiara Cardenas RN  Outcome: Ongoing  8/28/2021 0308 by Vania Robles RN  Outcome: Ongoing     Problem: Infection:  Goal: Will remain free from infection  Description: Will remain free from infection  8/28/2021 1623 by Kiara Cardenas RN  Outcome: Ongoing  8/28/2021 0308 by Vania Robles RN  Outcome: Ongoing  Note: Patient remains afebrile; no signs of erythema, edema, or warmth. Will continue to monitor for signs/symptoms of infection. Problem: Safety:  Goal: Free from accidental physical injury  Description: Free from accidental physical injury  8/28/2021 1623 by Kiara Cardenas RN  Outcome: Ongoing  8/28/2021 0308 by Vania Robles RN  Outcome: Ongoing  Goal: Free from intentional harm  Description: Free from intentional harm  8/28/2021 1623 by Kiara Cardenas RN  Outcome: Ongoing  8/28/2021 0308 by Vania Robles RN  Outcome: Ongoing  Note: Pt assessed as a fall risk this shift. Remains free from falls and accidental injury at this time. Fall precautions in place, including falling star sign and fall risk band on pt. Floor free from obstacles, and bed is locked and in lowest position. Adequate lighting provided. Pt encouraged to call  for any need. Bed alarm activated. Will continue to monitor needs during hourly rounding, and reinforce education on use of call light.        Problem: Daily Care:  Goal: Daily care needs are met  Description: Daily care needs are met  8/28/2021 1623 by Kiara Cardenas RN  Outcome: Ongoing  8/28/2021 0308 by Vania Robles RN  Outcome: Ongoing     Problem: Pain:  Goal: Patient's pain/discomfort is manageable  Description: Patient's pain/discomfort is manageable  8/28/2021 1623 by Jayde Reeves RN  Outcome: Ongoing  8/28/2021 0308 by Priscila Herr RN  Outcome: Ongoing  Note: Pt medicated with pain medication prn. Assessed all pain characteristics including level, type, location, frequency, and onset. Non-pharmacologic interventions offered to pt as well. Pt states pain is tolerable at this time. Will continue to monitor. Problem: Skin Integrity:  Goal: Skin integrity will stabilize  Description: Skin integrity will stabilize  8/28/2021 1623 by Jayde Reeves RN  Outcome: Ongoing  8/28/2021 0308 by Priscila Herr RN  Outcome: Ongoing  Note: Skin assessment complete. Waffle mattress in place. S[employer]are applied PRN. Area kept free from moisture. Proper nourishment and fluids encouraged, as appropriate. Will continue to monitor for additional needs and changes in skin breakdown.        Problem: Discharge Planning:  Goal: Patients continuum of care needs are met  Description: Patients continuum of care needs are met  8/28/2021 1623 by Jayde Reeves RN  Outcome: Ongoing  8/28/2021 0308 by Priscila Herr RN  Outcome: Ongoing

## 2021-08-28 NOTE — FLOWSHEET NOTE
08/27/21 2057   Provider Notification   Reason for Communication Evaluate   Provider Name Dr. Louvenia Mcburney   Provider Notification Physician   Method of Communication Secure Message   Response See orders   Notification Time 2058 08/27/21 2057   Provider Notification   Reason for Communication Evaluate   Provider Name Dr. Louvenia Mcburney   Provider Notification Physician   Method of Communication Secure Message   Response See orders   Notification Time 2058       Dr Louvenia Mcburney was contacted in regards to patients Thoravent orders, ED was also contacted and said it was placed at low continuos suction at 80. Also Dr Belen Maya and prn tylenol.  Please see orders

## 2021-08-28 NOTE — FLOWSHEET NOTE
08/27/21 2313   Provider Notification   Reason for Communication Review case   Provider Name Dr. Latonia De Souza   Provider Notification Physician   Method of Communication Page   Response Other (Comment)   Notification Time 2315      Charge nurse Dalila called and talked with Dr. Latonia De Souza.  Please see notes/orders

## 2021-08-28 NOTE — CONSULTS
Today's Date: 8/28/2021  Patient Name: Greyson Bellamy  Date of admission: 8/27/2021 12:52 PM  Patient's age: 80 y.o., 1950  Admission Dx: Pneumothorax [J93.9]  Secondary spontaneous pneumothorax [J93.12]    Reason for Consult: Lung cancer  Requesting Physician: Tony Diaz MD    CHIEF COMPLAINT:    Chief Complaint   Patient presents with    Lung Cancer       History Obtained From:  patient and chart    HISTORY OF PRESENT ILLNESS:      Greyson Bellamy is a 80 y.o. male who is admitted to the hospital on 8/27/2021  for abnormal CT finding. Patient has diagnosis of recurrent small cell lung cancer and currently receiving treatment with Dr. Joyce Lindsey as outpatient. He had a restaging scan on 8/26/2021 which did show positive response to therapy however new moderate-sized right pneumothorax was noted on the CT scan therefore his oncologist recommended him to go to ER for further evaluation and management. Patient did have a chest tube placement. Oncology consulted for history of lung cancer. Patient follows with Dr. 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 Chemotherapy changed to carboplatin and etoposide due to renal insufficiency from cycle #2  PCI 7/2019  SRS to adrenal gland metastasis, completed 07/2020  systemic palliative chemoimmunotherapy with carboplatin etoposide and Tecentriq, 12/2020 x4 cycles.   Maintenance Tecentriq, 3/2021  Lurbenectidin7/2/2021      Past Medical History:   has a past medical history of DDD (degenerative disc disease), cervical, Gout, Heart murmur, Hyperlipidemia, Hypertension, Lung cancer ([address] MI (myocardial infarction) ([address] Skin cancer, and Wears glasses. Past Surgical History:   has a past surgical history that includes Appendectomy; Tonsillectomy; skin biopsy; skin biopsy; Colonoscopy; Foot surgery (Right); Cardiac catheterization; cyst incision and drainage (Right, 05/14/2020); Forearm surgery (Right, 5/14/2020); and knee surgery (Left, 5/14/2020). Medications:    Prior to Admission medications    Medication Sig Start Date End Date Taking? Authorizing Provider   docusate sodium (COLACE) 100 MG capsule Take 100 mg by mouth 2 times daily    Historical Provider, MD   B Complex-C-Folic Acid (ANGELICA-HUGO) TABS TAKE 1 TABLET BY MOUTH DAILY. HEMATINIC VIT MINERALS 7/21/20   Historical Provider, MD   traZODone (DESYREL) 100 MG tablet TAKE 1 TABLET BY MOUTH EVERY DAY AT NIGHT 7/20/20   Historical Provider, MD   ipratropium-albuterol (DUONEB) 0.5-2.5 (3) MG/3ML SOLN nebulizer solution Inhale 3 mLs into the lungs 4 times daily    Historical Provider, MD   Melatonin 5 MG CHEW Take 5 mg by mouth nightly    Historical Provider, MD   potassium chloride (MICRO-K) 10 MEQ extended release capsule Take 20 mEq by mouth 11/19/19   Historical Provider, MD   Ferrous Fumarate (FERROCITE) 324 (106 Fe) MG TABS Take by mouth    Historical Provider, MD   apixaban (ELIQUIS) 5 MG TABS tablet Take 1 tablet by mouth 2 times daily 5/21/20   Daniel Rivera MD   metoprolol tartrate (LOPRESSOR) 25 MG tablet Take 0.5 tablets by mouth 2 times daily 5/16/20   Gerson Vu MD   tamsulosin (FLOMAX) 0.4 MG capsule TAKE 1 CAPSULE BY MOUTH EVERY DAY 5/4/20   Wade Guadarrama MD   Handicap Placard MISC by Does not apply route 7/30/19   Wdae Guadarrama MD   lidocaine-prilocaine (EMLA) 2.5-2.5 % cream Apply topically as needed. Apply a quarter size amount to port site 1 hour before chemotherapy. Cover with plastic wrap.  3/7/19   Guillermo Wharton MD   acetaminophen (TYLENOL) 325 MG tablet Take 650 mg by mouth every 6 hours as needed for Pain    Historical Provider, MD   allopurinol potassium chloride 10 mEq/100 mL IVPB (Peripheral Line)  10 mEq IntraVENous PRN Todd Kay MD        magnesium sulfate 1000 mg in dextrose 5% 100 mL IVPB  1,000 mg IntraVENous PRN Todd Kay MD        ondansetron (ZOFRAN-ODT) disintegrating tablet 4 mg  4 mg Oral Q8H PRN Todd Kay MD        Or    ondansetron (ZOFRAN) injection 4 mg  4 mg IntraVENous Q6H PRN Todd Kay MD        magnesium hydroxide (MILK OF MAGNESIA) 400 MG/5ML suspension 30 mL  30 mL Oral Daily PRN Todd Kay MD        acetaminophen (TYLENOL) tablet 650 mg  650 mg Oral Q6H PRN Todd Kay MD        Or    acetaminophen (TYLENOL) suppository 650 mg  650 mg Rectal Q6H PRN Todd Kay MD        0.9 % sodium chloride infusion   IntraVENous Continuous Todd Kay MD 75 mL/hr at 08/27/21 2109 New Bag at 08/27/21 2109    cefepime (MAXIPIME) 2000 mg IVPB minibag  2,000 mg IntraVENous Q12H Todd Kay MD   Stopped at 08/28/21 0930    ipratropium-albuterol (DUONEB) nebulizer solution 3 mL  3 mL Inhalation Q4H WA Todd Kay MD   3 mL at 08/28/21 1611    acetaminophen (TYLENOL) suppository 650 mg  650 mg Rectal Q4H PRN Bang De La O MD         Facility-Administered Medications Ordered in Other Encounters   Medication Dose Route Frequency Provider Last Rate Last Admin    sodium chloride flush 0.9 % injection 10 mL  10 mL IntraVENous PRN Sina Thomas MD   10 mL at 08/26/21 1206       Allergies:  Patient has no known allergies. Social History:   reports that he has been smoking cigarettes. He has been smoking about 0.50 packs per day. He has quit using smokeless tobacco. He reports current drug use. Frequency: 14.00 times per week. Drug: Marijuana. He reports that he does not drink alcohol. Family History: family history includes Cancer in his father. REVIEW OF SYSTEMS:    Constitutional: No fever or chills.  No night sweats, no weight loss   Eyes: No eye discharge, double vision, or eye pain   HEENT: negative for sore mouth, sore throat, hoarseness and voice change   Respiratory: negative for cough , sputum, dyspnea, wheezing, hemoptysis, chest pain   Cardiovascular: negative for chest pain, dyspnea, palpitations, orthopnea, PND   Gastrointestinal: negative for nausea, vomiting, diarrhea, constipation, abdominal pain, Dysphagia, hematemesis and hematochezia   Genitourinary: negative for frequency, dysuria, nocturia, urinary incontinence, and hematuria   Integument: negative for rash, skin lesions, bruises.    Hematologic/Lymphatic: negative for easy bruising, bleeding, lymphadenopathy, or petechiae   Endocrine: negative for heat or cold intolerance,weight changes, change in bowel habits and hair loss   Musculoskeletal: negative for myalgias, arthralgias, pain, joint swelling,and bone pain   Neurological: negative for headaches, dizziness, seizures, weakness, numbness    PHYSICAL EXAM:      BP 98/66   Pulse 77   Temp 97.7 °F (36.5 °C) (Oral)   Resp 18   Ht 5' 11\" (1.803 m)   Wt 175 lb 11.3 oz (79.7 kg)   SpO2 98%   BMI 24.51 kg/m²    Temp (24hrs), Av.5 °F (36.4 °C), Min:96.8 °F (36 °C), Max:98.3 °F (36.8 °C)    General appearance - well appearing, no in pain or distress   Mental status - alert and cooperative   Eyes - pupils equal and reactive, extraocular eye movements intact   Ears - bilateral TM's and external ear canals normal   Mouth - mucous membranes moist, pharynx normal without lesions   Neck - supple, no significant adenopathy   Lymphatics - no palpable lymphadenopathy, no hepatosplenomegaly   Chest - clear to auscultation, no wheezes, rales or rhonchi, symmetric air entry   Heart - normal rate, regular rhythm, normal S1, S2, no murmurs  Abdomen - soft, nontender, nondistended, no masses or organomegaly   Neurological - alert, oriented, normal speech, no focal findings or movement disorder noted   Musculoskeletal - no joint tenderness, deformity or swelling Extremities - peripheral pulses normal, no pedal edema, no clubbing or cyanosis   Skin - normal coloration and turgor, no rashes, no suspicious skin lesions noted ,    DATA:    Labs:   CBC:   Recent Labs     08/27/21  1310 08/28/21  0439   WBC 1.9* 2.2*   HGB 9.8* 9.5*   HCT 28.9* 28.6*   * 122*     BMP:   Recent Labs     08/27/21  1310 08/28/21  0439   * 133*   K 4.2 4.4   CO2 23 23   BUN 16 12   CREATININE 1.17 1.09   LABGLOM >60 >60   GLUCOSE 117* 97     PT/INR: No results for input(s): PROTIME, INR in the last 72 hours. IMAGING DATA:  XR CHEST PORTABLE   Final Result   Right chest tube in place with no significant pneumothorax visualized on   today's study. XR CHEST PORTABLE   Final Result      XR CHEST PORTABLE   Final Result   Right apical pneumothorax measuring 4.9 cm. Perihilar infiltrates. XR CHEST (2 VW)    (Results Pending)       Primary Problem  Pneumothorax on right    Active Hospital Problems    Diagnosis Date Noted    Pneumothorax on right [J93.9] 08/27/2021    PAF (paroxysmal atrial fibrillation) ([facility] [I48.0] 08/27/2021    Tobacco dependence [F17.200] 08/27/2021    COPD (chronic obstructive pulmonary disease) ([facility] [J44.9] 08/27/2021    Iron deficiency anemia [D50.9] 08/27/2021    Pneumothorax [J93.9] 08/27/2021    Lung cancer, primary, with metastasis from lung to other site ([facility] [C34.90]          IMPRESSION:   1. Lung cancer: Limited stage Right lung cancer with small cell and squamous cell component, stage IIIa (T3,N1,M0), Left adrenal metastasis, CT 2/2020, S/P SBRT 07/2020, most recently on Lurbinectedin  2. Liver metastasis  3. Right lateral paraspinal soft tissue mass  4. Right pneumothorax status post chest tube placement  5. Leukopenia  6. Anemia mild thrombocytopenia appears secondary to chemotherapy       RECOMMENDATIONS:  1. I reviewed laboratory data, imaging studies, diagnosis, treatment recommendations  2.  Continue other management as per primary team and pulmonary  3. Patient will resume his chemotherapy treatment as outpatient once acute issues resolved  4. He has appointment with oncology next week as outpatient  5. Discharge as per pulmonary  6. We will follow    Discussed with patient and Nurse. Thank you for asking us to see this patient. Micha Ramos MD  Hematologist/Medical Oncologist    Cell: [phone]    This note is created with the assistance of a speech recognition program.  While intending to generate a document that actually reflects the content of the visit, the document can still have some errors including those of syntax and sound a like substitutions which may escape proof reading. It such instances, actual meaning can be extrapolated by contextual diversion.

## 2021-08-28 NOTE — CONSULTS
Current Facility-Administered Medications   Medication Dose Route Frequency Provider Last Rate Last Admin    lidocaine 1 % injection 20 mL  20 mL Intradermal Once Annetta Lancaster MD        allopurinol (ZYLOPRIM) tablet 100 mg  100 mg Oral Daily Usha Larry MD   100 mg at 08/28/21 0900    apixaban (ELIQUIS) tablet 5 mg  5 mg Oral BID Usha Larry MD   5 mg at 08/28/21 0900    docusate sodium (COLACE) capsule 100 mg  100 mg Oral BID Usha Larry MD   100 mg at 08/27/21 2118    ferrous sulfate (IRON 325) tablet 325 mg  325 mg Oral BID WC Usha Larry MD   325 mg at 08/28/21 0900    melatonin tablet 3 mg  3 mg Oral Nightly Usha Larry MD   3 mg at 08/27/21 2117    potassium chloride (MICRO-K) extended release capsule 20 mEq  20 mEq Oral Daily Usha Larry MD   20 mEq at 08/28/21 0900    atorvastatin (LIPITOR) tablet 10 mg  10 mg Oral Daily Usha Larry MD   10 mg at 08/28/21 0900    traZODone (DESYREL) tablet 50 mg  50 mg Oral Nightly Usha Larry MD   50 mg at 08/27/21 2118    sodium chloride flush 0.9 % injection 10 mL  10 mL IntraVENous 2 times per day Usha Larry MD   10 mL at 08/27/21 2118    sodium chloride flush 0.9 % injection 10 mL  10 mL IntraVENous PRN Usha Larry MD        0.9 % sodium chloride infusion  25 mL IntraVENous PRN Usha Larry MD        potassium chloride (KLOR-CON M) extended release tablet 40 mEq  40 mEq Oral PRN Usha Larry MD        Or    potassium bicarb-citric acid (EFFER-K) effervescent tablet 40 mEq  40 mEq Oral PRN Usha Larry MD        Or    potassium chloride 10 mEq/100 mL IVPB (Peripheral Line)  10 mEq IntraVENous PRN Usha Larry MD        magnesium sulfate 1000 mg in dextrose 5% 100 mL IVPB  1,000 mg IntraVENous PRN Usha Larry MD        ondansetron (ZOFRAN-ODT) disintegrating tablet 4 mg  4 mg Oral Q8H PRN Usha Larry MD        Or    ondansetron (ZOFRAN) injection 4 mg  4 mg IntraVENous Q6H PRN Britany Qureshi MD        magnesium hydroxide (MILK OF MAGNESIA) 400 MG/5ML suspension 30 mL  30 mL Oral Daily PRN Britany Qureshi MD        acetaminophen (TYLENOL) tablet 650 mg  650 mg Oral Q6H PRN Britany Qureshi MD        Or    acetaminophen (TYLENOL) suppository 650 mg  650 mg Rectal Q6H PRN Britany Qureshi MD        0.9 % sodium chloride infusion   IntraVENous Continuous Britany Qureshi MD 75 mL/hr at 08/27/21 2109 New Bag at 08/27/21 2109    cefepime (MAXIPIME) 2000 mg IVPB minibag  2,000 mg IntraVENous Q12H Britany Qureshi MD   Stopped at 08/28/21 0930    ipratropium-albuterol (DUONEB) nebulizer solution 3 mL  3 mL Inhalation Q4H WA Britany Qureshi MD   3 mL at 08/28/21 3180    acetaminophen (TYLENOL) suppository 650 mg  650 mg Rectal Q4H PRN Zehra Murphy MD         Facility-Administered Medications Ordered in Other Encounters   Medication Dose Route Frequency Provider Last Rate Last Admin    sodium chloride flush 0.9 % injection 10 mL  10 mL IntraVENous PRN Twyla Orourke MD   10 mL at 08/26/21 1206      PULMONARY  CONSULT NOTE      Date of Admission: 8/27/2021 12:52 PM    Reason for Consult: PTX, lung cancer    Referring Physician: Dr Dana James  PCP: Baljeet Espinoza MD     History of Present Illness:     25-year-old male history of right-sided small cell and squamous cell lung cancer with mets in the liver and adrenal glands status post chemotherapy currently on palliative chemo immunotherapy presents for evaluation with abnormal CT scan. Patient had a routine staging CT done yesterday per oncology which showed a new moderate-sized right-sided pneumothorax. Patient is having some very mild shortness of breath over the past several weeks but no new chest pain, cough. Was directed to come to ER last night by oncologist but he waited till this morning. No recent trauma. No other associated symptoms.     He had thoravent placed in ED on 8/27/21; denies chest pain/ SOB  CXR today shows no PTX. Problem:  Principal Problem:     PMH:   Past Medical History:   Diagnosis Date    DDD (degenerative disc disease), cervical     Gout     Heart murmur     hx. of when younger.  Hyperlipidemia     Hypertension     [facility]     right lung    MI (myocardial infarction) ([facility]     Skin cancer     face    Wears glasses        PSH:   Past Surgical History:   Procedure Laterality Date    APPENDECTOMY      CARDIAC CATHETERIZATION      w/stent    COLONOSCOPY      CYST INCISION AND DRAINAGE Right 05/14/2020    rt elbow I and D and left knee aspiration with cultures    FOOT SURGERY Right     2nd toe(bone spur).  FOREARM SURGERY Right 5/14/2020    RIGHT ELBOW IRRIGATION AND DEBRIDEMENT performed by Rene Ko DO at [place] [id number] Left 5/14/2020    KNEE ASPIRATION WITH CULTURES SENT performed by Rene Ko DO at [address] [place] [address] under lt. eye.  TONSILLECTOMY         Allergies: No Known Allergies    Home Meds:  Medications Prior to Admission: docusate sodium (COLACE) 100 MG capsule, Take 100 mg by mouth 2 times daily  B Complex-C-Folic Acid (ANGELICA-HUGO) TABS, TAKE 1 TABLET BY MOUTH DAILY.  HEMATINIC VIT MINERALS  traZODone (DESYREL) 100 MG tablet, TAKE 1 TABLET BY MOUTH EVERY DAY AT NIGHT  ipratropium-albuterol (DUONEB) 0.5-2.5 (3) MG/3ML SOLN nebulizer solution, Inhale 3 mLs into the lungs 4 times daily  Melatonin 5 MG CHEW, Take 5 mg by mouth nightly  potassium chloride (MICRO-K) 10 MEQ extended release capsule, Take 20 mEq by mouth  Ferrous Fumarate (FERROCITE) 324 (106 Fe) MG TABS, Take by mouth  apixaban (ELIQUIS) 5 MG TABS tablet, Take 1 tablet by mouth 2 times daily  metoprolol tartrate (LOPRESSOR) 25 MG tablet, Take 0.5 tablets by mouth 2 times daily  tamsulosin (FLOMAX) 0.4 MG capsule, TAKE 1 CAPSULE BY MOUTH EVERY DAY  Handicap Placard MISC, by Does not apply route  lidocaine-prilocaine (EMLA) 2.5-2.5 % cream, Apply topically as needed. Apply a quarter size amount to port site 1 hour before chemotherapy. Cover with plastic wrap.  acetaminophen (TYLENOL) 325 MG tablet, Take 650 mg by mouth every 6 hours as needed for Pain  allopurinol (ZYLOPRIM) 100 MG tablet, Take 100 mg by mouth daily  simvastatin (ZOCOR) 40 MG tablet, Take 40 mg by mouth daily  Multiple Vitamins-Minerals (THERAPEUTIC MULTIVITAMIN-MINERALS) tablet, Take 1 tablet by mouth daily  (Patient not taking: Reported on 8/13/2021)    Social History:   Social History     Socioeconomic History    Marital status:      Spouse name: Not on file    Number of children: Not on file    Years of education: Not on file    Highest education level: Not on file   Occupational History    Not on file   Tobacco Use    Smoking status: Current Every Day Smoker     Packs/day: 0.50     Types: Cigarettes    Smokeless tobacco: Former User   Vaping Use    Vaping Use: Former   Substance and Sexual Activity    Alcohol use: No    Drug use: Yes     Frequency: 14.0 times per week     Types: Marijuana    Sexual activity: Not on file   Other Topics Concern    Not on file   Social History Narrative    Not on file     Social Determinants of Health     Financial Resource Strain:     Difficulty of Paying Living Expenses:    Food Insecurity:     Worried About Running Out of Food in the Last Year:     [address] in the Last Year:    Transportation Needs:     Lack of Transportation (Medical):      Lack of Transportation (Non-Medical):    Physical Activity:     Days of Exercise per Week:     Minutes of Exercise per Session:    Stress:     Feeling of Stress :    Social Connections:     Frequency of Communication with Friends and Family:     Frequency of Social Gatherings with Friends and Family:     Attends [employer] Services:     Active Member of Clubs or Organizations:     Attends Club or Organization Meetings:     Marital Status:    Intimate Partner Violence:     Fear of Current or Ex-Partner:     Emotionally Abused:     Physically Abused:     Sexually Abused:        Family History:   Family History   Problem Relation Age of Onset    Cancer Father         lung cancer       Review of Systems  Fever/ chills - no  Chest pain - no  Cough - minimal  Expectoration / hemoptysis - no  shortness of breath - at baseline  Headache - no  Sinus drainage/ sore throat - no  abdominal pain - no  Swelling feet - no  Nausea/ vomiting/ diarrhea/ constipation - no    Physical Exam  Vital Signs: BP 94/65   Pulse 82   Temp 98.3 °F (36.8 °C) (Oral)   Resp 18   Ht 5' 11\" (1.803 m)   Wt 175 lb 11.3 oz (79.7 kg)   SpO2 97%   BMI 24.51 kg/m²       Admission Weight: Weight: 178 lb (80.7 kg)    General Appearance: Healthy, alert, active, cooperative, and in no distress  Head: Normocephalic, without obvious abnormality, atraumatic  Neck: no adenopathy, no JVD, supple, symmetrical, trachea midline\"thyroid not enlarged, symmetric, no tenderness/mass/nodule  Lungs: fair air entry bilaterally; breath sounds- vesicular; rhonchi- absent; rales/ crackles- absent; right thoravent +  Heart: : regular rate and rhythm, S1, S2 normal, no murmur, click, rub or gallop  Abdomen: soft, non-tender; bowel sounds normal; no masses,  no organomegaly  Extremities: extremities normal, atraumatic, no cyanosis or edema  Skin: skin color, texture, turgor normal. No rashes or lesions  Neurologic: Grossly normal    [unfilled]    Recent labs, Imaging studies reviewed      Data ReviewCBC:   Recent Labs     08/27/21  1310 08/28/21  0439   WBC 1.9* 2.2*   RBC 2.76* 2.74*   HGB 9.8* 9.5*   HCT 28.9* 28.6*   * 122*     BMP:   Recent Labs     08/27/21  1310 08/28/21  0439   GLUCOSE 117* 97   * 133*   K 4.2 4.4   BUN 16 12   CREATININE 1.17 1.09   CALCIUM 9.0 8.9     ABGs: No results for input(s): PHART, PO2ART, ORK4AUR, WAZ9RAQ, BEART, T2ZMJEWB, CHB6YVF in the last 72 hours.    PT/INR:  No results found for: PTINR      ASSESSMENT / PLAN:    CA lung - on immunotherapy  PTX - spontaneous - SP thoravent 8/27/21 - resolved; clamp chest tube 8/28/21; recheck CXR later today  Possible chest tube removal 8/29/21    Electronically signed by Natacha Iqbal MD on 08/28/21 at 11:31 AM.

## 2021-08-28 NOTE — ED NOTES
Report called to PCU RN. Advised that this RN just released floor orders and have not administered medication at this time. Pt has even and nonlabored respirations noted at this time. Thoravent intact, clamped per Dr. Vishal Chan for transport. Small amount of serosanguinous fluid noted in thoravent to equal approx 1cc. Pt denies SOB at this time and vitals stable for transport. 18g saline lock intact in left ac. No s/s of infiltration noted. Dressing dry and intact. Pt aware of plan of care and denies questions.        Daniela Wade, [facility]  08/27/21 2016

## 2021-08-29 ENCOUNTER — APPOINTMENT (OUTPATIENT)
Dept: GENERAL RADIOLOGY | Age: 71
DRG: 199 | End: 2021-08-29
Payer: MEDICARE

## 2021-08-29 LAB
ABSOLUTE EOS #: 0.25 K/UL (ref 0–0.4)
ABSOLUTE IMMATURE GRANULOCYTE: ABNORMAL K/UL (ref 0–0.3)
ABSOLUTE LYMPH #: 0.59 K/UL (ref 1–4.8)
ABSOLUTE MONO #: 1.01 K/UL (ref 0.1–1.3)
ALBUMIN SERPL-MCNC: 3.7 G/DL (ref 3.5–5.2)
ALBUMIN/GLOBULIN RATIO: ABNORMAL (ref 1–2.5)
ALP BLD-CCNC: 82 U/L (ref 40–129)
ALT SERPL-CCNC: 26 U/L (ref 5–41)
ANION GAP SERPL CALCULATED.3IONS-SCNC: 7 MMOL/L (ref 9–17)
AST SERPL-CCNC: 27 U/L
BASOPHILS # BLD: 0 % (ref 0–2)
BASOPHILS ABSOLUTE: 0 K/UL (ref 0–0.2)
BILIRUB SERPL-MCNC: 0.22 MG/DL (ref 0.3–1.2)
BUN BLDV-MCNC: 11 MG/DL (ref 8–23)
BUN/CREAT BLD: ABNORMAL (ref 9–20)
CALCIUM SERPL-MCNC: 8.8 MG/DL (ref 8.6–10.4)
CHLORIDE BLD-SCNC: 106 MMOL/L (ref 98–107)
CO2: 24 MMOL/L (ref 20–31)
CREAT SERPL-MCNC: 1.1 MG/DL (ref 0.7–1.2)
DIFFERENTIAL TYPE: ABNORMAL
EOSINOPHILS RELATIVE PERCENT: 9 % (ref 0–4)
GFR AFRICAN AMERICAN: >60 ML/MIN
GFR NON-AFRICAN AMERICAN: >60 ML/MIN
GFR SERPL CREATININE-BSD FRML MDRD: ABNORMAL ML/MIN/{1.73_M2}
GFR SERPL CREATININE-BSD FRML MDRD: ABNORMAL ML/MIN/{1.73_M2}
GLUCOSE BLD-MCNC: 95 MG/DL (ref 70–99)
HCT VFR BLD CALC: 27.5 % (ref 41–53)
HEMOGLOBIN: 9.2 G/DL (ref 13.5–17.5)
IMMATURE GRANULOCYTES: ABNORMAL %
LYMPHOCYTES # BLD: 21 % (ref 24–44)
MCH RBC QN AUTO: 34.7 PG (ref 26–34)
MCHC RBC AUTO-ENTMCNC: 33.5 G/DL (ref 31–37)
MCV RBC AUTO: 103.4 FL (ref 80–100)
MONOCYTES # BLD: 36 % (ref 1–7)
MORPHOLOGY: ABNORMAL
NRBC AUTOMATED: ABNORMAL PER 100 WBC
PDW BLD-RTO: 17.7 % (ref 11.5–14.9)
PLATELET # BLD: 152 K/UL (ref 150–450)
PLATELET ESTIMATE: ABNORMAL
PMV BLD AUTO: 7.1 FL (ref 6–12)
POTASSIUM SERPL-SCNC: 4.1 MMOL/L (ref 3.7–5.3)
RBC # BLD: 2.66 M/UL (ref 4.5–5.9)
RBC # BLD: ABNORMAL 10*6/UL
SEG NEUTROPHILS: 34 % (ref 36–66)
SEGMENTED NEUTROPHILS ABSOLUTE COUNT: 0.95 K/UL (ref 1.3–9.1)
SODIUM BLD-SCNC: 137 MMOL/L (ref 135–144)
TOTAL PROTEIN: 6.5 G/DL (ref 6.4–8.3)
WBC # BLD: 2.8 K/UL (ref 3.5–11)
WBC # BLD: ABNORMAL 10*3/UL

## 2021-08-29 PROCEDURE — 97116 GAIT TRAINING THERAPY: CPT

## 2021-08-29 PROCEDURE — 85025 COMPLETE CBC W/AUTO DIFF WBC: CPT

## 2021-08-29 PROCEDURE — 97162 PT EVAL MOD COMPLEX 30 MIN: CPT

## 2021-08-29 PROCEDURE — 6360000002 HC RX W HCPCS: Performed by: FAMILY MEDICINE

## 2021-08-29 PROCEDURE — 94761 N-INVAS EAR/PLS OXIMETRY MLT: CPT

## 2021-08-29 PROCEDURE — 80053 COMPREHEN METABOLIC PANEL: CPT

## 2021-08-29 PROCEDURE — 71046 X-RAY EXAM CHEST 2 VIEWS: CPT

## 2021-08-29 PROCEDURE — 99232 SBSQ HOSP IP/OBS MODERATE 35: CPT | Performed by: INTERNAL MEDICINE

## 2021-08-29 PROCEDURE — 6370000000 HC RX 637 (ALT 250 FOR IP): Performed by: FAMILY MEDICINE

## 2021-08-29 PROCEDURE — 94640 AIRWAY INHALATION TREATMENT: CPT

## 2021-08-29 PROCEDURE — 2060000000 HC ICU INTERMEDIATE R&B

## 2021-08-29 PROCEDURE — 2580000003 HC RX 258: Performed by: FAMILY MEDICINE

## 2021-08-29 PROCEDURE — 36415 COLL VENOUS BLD VENIPUNCTURE: CPT

## 2021-08-29 RX ADMIN — DOCUSATE SODIUM 100 MG: 100 CAPSULE, LIQUID FILLED ORAL at 20:17

## 2021-08-29 RX ADMIN — IPRATROPIUM BROMIDE AND ALBUTEROL SULFATE 3 ML: .5; 3 SOLUTION RESPIRATORY (INHALATION) at 11:02

## 2021-08-29 RX ADMIN — FERROUS SULFATE TAB 325 MG (65 MG ELEMENTAL FE) 325 MG: 325 (65 FE) TAB at 07:55

## 2021-08-29 RX ADMIN — APIXABAN 5 MG: 5 TABLET, FILM COATED ORAL at 20:17

## 2021-08-29 RX ADMIN — IPRATROPIUM BROMIDE AND ALBUTEROL SULFATE 3 ML: .5; 3 SOLUTION RESPIRATORY (INHALATION) at 07:36

## 2021-08-29 RX ADMIN — ATORVASTATIN CALCIUM 10 MG: 10 TABLET, FILM COATED ORAL at 07:55

## 2021-08-29 RX ADMIN — Medication 3 MG: at 20:17

## 2021-08-29 RX ADMIN — IPRATROPIUM BROMIDE AND ALBUTEROL SULFATE 3 ML: .5; 3 SOLUTION RESPIRATORY (INHALATION) at 19:27

## 2021-08-29 RX ADMIN — CEFEPIME HYDROCHLORIDE 2000 MG: 2 INJECTION, POWDER, FOR SOLUTION INTRAVENOUS at 07:54

## 2021-08-29 RX ADMIN — POTASSIUM CHLORIDE 20 MEQ: 10 CAPSULE, COATED, EXTENDED RELEASE ORAL at 07:54

## 2021-08-29 RX ADMIN — IPRATROPIUM BROMIDE AND ALBUTEROL SULFATE 3 ML: .5; 3 SOLUTION RESPIRATORY (INHALATION) at 14:51

## 2021-08-29 RX ADMIN — SODIUM CHLORIDE, PRESERVATIVE FREE 10 ML: 5 INJECTION INTRAVENOUS at 20:14

## 2021-08-29 RX ADMIN — FERROUS SULFATE TAB 325 MG (65 MG ELEMENTAL FE) 325 MG: 325 (65 FE) TAB at 18:31

## 2021-08-29 RX ADMIN — TRAZODONE HYDROCHLORIDE 50 MG: 50 TABLET ORAL at 20:17

## 2021-08-29 RX ADMIN — SODIUM CHLORIDE, PRESERVATIVE FREE 10 ML: 5 INJECTION INTRAVENOUS at 07:54

## 2021-08-29 RX ADMIN — ALLOPURINOL 100 MG: 100 TABLET ORAL at 07:55

## 2021-08-29 RX ADMIN — CEFEPIME HYDROCHLORIDE 2000 MG: 2 INJECTION, POWDER, FOR SOLUTION INTRAVENOUS at 19:49

## 2021-08-29 RX ADMIN — APIXABAN 5 MG: 5 TABLET, FILM COATED ORAL at 07:54

## 2021-08-29 ASSESSMENT — ENCOUNTER SYMPTOMS
TROUBLE SWALLOWING: 0
CHOKING: 0
PHOTOPHOBIA: 0
SHORTNESS OF BREATH: 0
BLOOD IN STOOL: 0
ABDOMINAL DISTENTION: 0
RECTAL PAIN: 0
BACK PAIN: 0
VOICE CHANGE: 0
ANAL BLEEDING: 0
COUGH: 1
COLOR CHANGE: 0
STRIDOR: 0

## 2021-08-29 ASSESSMENT — PAIN SCALES - GENERAL
PAINLEVEL_OUTOF10: 0

## 2021-08-29 NOTE — PROGRESS NOTES
Today's Date: 8/29/2021  Patient Name: Sandeep Willingham  Date of admission: 8/27/2021 12:52 PM  Patient's age: 80 y.o., 1950  Admission Dx: Pneumothorax [J93.9]  Secondary spontaneous pneumothorax [J93.12]    Reason for Consult: Lung cancer  Requesting Physician: Fariha Senior MD    CHIEF COMPLAINT:    Chief Complaint   Patient presents with    Lung Cancer       SUBJECTIVE:    Patient seen and examined  Feels better  Denied any fever chills  Mild  SOB  Wants to go home    HISTORY OF PRESENT ILLNESS:      Sandeep Willingham is a 80 y.o. male who is admitted to the hospital on 8/27/2021  for abnormal CT finding. Patient has diagnosis of recurrent small cell lung cancer and currently receiving treatment with Dr. Isaiah Barrientos as outpatient. He had a restaging scan on 8/26/2021 which did show positive response to therapy however new moderate-sized right pneumothorax was noted on the CT scan therefore his oncologist recommended him to go to ER for further evaluation and management. Patient did have a chest tube placement. Oncology consulted for history of lung cancer. Patient follows with Dr. 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 Chemotherapy changed to carboplatin and etoposide due to renal insufficiency from cycle #2  PCI 7/2019  SRS to adrenal gland metastasis, completed 07/2020  systemic palliative chemoimmunotherapy with carboplatin etoposide and Tecentriq, 12/2020 x4 cycles.   Maintenance Tecentriq, 3/2021  Lurbenectidin7/2/2021      Past Medical History:   has a past medical history of DDD (degenerative disc disease), cervical, Gout, Heart murmur, Hyperlipidemia, Hypertension, hours as needed for Pain    Historical Provider, MD   allopurinol (ZYLOPRIM) 100 MG tablet Take 100 mg by mouth daily 12/4/18   Historical Provider, MD   simvastatin (ZOCOR) 40 MG tablet Take 40 mg by mouth daily 11/27/18   Historical Provider, MD   Multiple Vitamins-Minerals (THERAPEUTIC MULTIVITAMIN-MINERALS) tablet Take 1 tablet by mouth daily   Patient not taking: Reported on 8/13/2021    Historical Provider, MD     Current Facility-Administered Medications   Medication Dose Route Frequency Provider Last Rate Last Admin    lidocaine 1 % injection 20 mL  20 mL Intradermal Once Aldo Lindsey MD        allopurinol (ZYLOPRIM) tablet 100 mg  100 mg Oral Daily Fatoumata Gates MD   100 mg at 08/29/21 0755    apixaban (ELIQUIS) tablet 5 mg  5 mg Oral BID Fatoumata Gates MD   5 mg at 08/29/21 0754    docusate sodium (COLACE) capsule 100 mg  100 mg Oral BID Fatoumata Gates MD   100 mg at 08/28/21 2059    ferrous sulfate (IRON 325) tablet 325 mg  325 mg Oral BID WC Fatoumata Gates MD   325 mg at 08/29/21 0755    melatonin tablet 3 mg  3 mg Oral Nightly Fatoumata Gates MD   3 mg at 08/28/21 2059    potassium chloride (MICRO-K) extended release capsule 20 mEq  20 mEq Oral Daily Fatoumata Gates MD   20 mEq at 08/29/21 0754    atorvastatin (LIPITOR) tablet 10 mg  10 mg Oral Daily Fatoumata Gates MD   10 mg at 08/29/21 0755    traZODone (DESYREL) tablet 50 mg  50 mg Oral Nightly Fatoumata Gates MD   50 mg at 08/28/21 2100    sodium chloride flush 0.9 % injection 10 mL  10 mL IntraVENous 2 times per day Fatoumata Gates MD   10 mL at 08/29/21 0754    sodium chloride flush 0.9 % injection 10 mL  10 mL IntraVENous PRN Fatoumata Gates MD        0.9 % sodium chloride infusion  25 mL IntraVENous PRN Fatoumata Gates MD        potassium chloride (KLOR-CON M) extended release tablet 40 mEq  40 mEq Oral PRN Fatoumata Gates MD        Or    potassium bicarb-citric acid (EFFER-K) effervescent Gastrointestinal: negative for nausea, vomiting, diarrhea, constipation, abdominal pain, Dysphagia, hematemesis and hematochezia   Genitourinary: negative for frequency, dysuria, nocturia, urinary incontinence, and hematuria   Integument: negative for rash, skin lesions, bruises.    Hematologic/Lymphatic: negative for easy bruising, bleeding, lymphadenopathy, or petechiae   Endocrine: negative for heat or cold intolerance,weight changes, change in bowel habits and hair loss   Musculoskeletal: negative for myalgias, arthralgias, pain, joint swelling,and bone pain   Neurological: negative for headaches, dizziness, seizures, weakness, numbness    PHYSICAL EXAM:      /65   Pulse 73   Temp 97.8 °F (36.6 °C) (Oral)   Resp 18   Ht 5' 11\" (1.803 m)   Wt 171 lb 15.3 oz (78 kg)   SpO2 97%   BMI 23.98 kg/m²    Temp (24hrs), Av.9 °F (36.6 °C), Min:97.4 °F (36.3 °C), Max:98.4 °F (36.9 °C)    General appearance - well appearing, no in pain or distress   Mental status - alert and cooperative   Eyes - pupils equal and reactive, extraocular eye movements intact   Ears - bilateral TM's and external ear canals normal   Mouth - mucous membranes moist, pharynx normal without lesions   Neck - supple, no significant adenopathy   Lymphatics - no palpable lymphadenopathy, no hepatosplenomegaly   Chest - clear to auscultation, no wheezes, rales or rhonchi, symmetric air entry   Heart - normal rate, regular rhythm, normal S1, S2, no murmurs  Abdomen - soft, nontender, nondistended, no masses or organomegaly   Neurological - alert, oriented, normal speech, no focal findings or movement disorder noted   Musculoskeletal - no joint tenderness, deformity or swelling   Extremities - peripheral pulses normal, no pedal edema, no clubbing or cyanosis   Skin - normal coloration and turgor, no rashes, no suspicious skin lesions noted ,    DATA:    Labs:   CBC:   Recent Labs     21  0439 21  0416   WBC 2.2* 2.8*   HGB 9.5* 9.2*   HCT 28.6* 27.5*   * 152     BMP:   Recent Labs     08/28/21  0439 08/29/21  0416   * 137   K 4.4 4.1   CO2 23 24   BUN 12 11   CREATININE 1.09 1.10   LABGLOM >60 >60   GLUCOSE 97 95     PT/INR: No results for input(s): PROTIME, INR in the last 72 hours. IMAGING DATA:  XR CHEST (2 VW)   Final Result      XR CHEST PORTABLE   Final Result   Right chest tube in place with no significant pneumothorax visualized on   today's study. XR CHEST PORTABLE   Final Result      XR CHEST PORTABLE   Final Result   Right apical pneumothorax measuring 4.9 cm. Perihilar infiltrates. XR CHEST (2 VW)    (Results Pending)       Primary Problem  Pneumothorax on right    Active Hospital Problems    Diagnosis Date Noted    Thrombocytopenia ([facility] [D69.6] 08/28/2021    Leukopenia [D72.819] 08/28/2021    Pneumothorax on right [J93.9] 08/27/2021    PAF (paroxysmal atrial fibrillation) ([facility] [I48.0] 08/27/2021    Tobacco dependence [F17.200] 08/27/2021    COPD (chronic obstructive pulmonary disease) ([facility] [J44.9] 08/27/2021    Iron deficiency anemia [D50.9] 08/27/2021    Pneumothorax [J93.9] 08/27/2021    Lung cancer, primary, with metastasis from lung to other site ([facility] [facility] [C34.90]          IMPRESSION:   1. Lung cancer: Limited stage Right lung cancer with small cell and squamous cell component, stage IIIa (T3,N1,M0), Left adrenal metastasis, CT 2/2020, S/P SBRT 07/2020, most recently on Lurbinectedin  2. Penumothorax: SP thoravent 8/27/21  3. Liver metastasis  4. Right lateral paraspinal soft tissue mass  5. Right pneumothorax status post chest tube placement  6. Leukopenia  7. Anemia mild thrombocytopenia appears secondary to chemotherapy       RECOMMENDATIONS:  1. I reviewed laboratory data, imaging studies, diagnosis, treatment recommendations  2. Continue other management as per primary team and pulmonary  3.  Patient will resume his chemotherapy treatment as outpatient once acute issues resolved  4. He has appointment with oncology Dr Roberto Vargas next week as outpatient  5. Discharge as per pulmonary  6. We will follow    Discussed with patient and Nurse. Thank you for asking us to see this patient. Micha Vargas MD  Hematologist/Medical Oncologist    Cell: [phone]    This note is created with the assistance of a speech recognition program.  While intending to generate a document that actually reflects the content of the visit, the document can still have some errors including those of syntax and sound a like substitutions which may escape proof reading. It such instances, actual meaning can be extrapolated by contextual diversion.

## 2021-08-29 NOTE — PROGRESS NOTES
5/14/2020). Restrictions  Restrictions/Precautions  Restrictions/Precautions: General Precautions  Required Braces or Orthoses?: No  Vision/Hearing  Vision: Impaired  Vision Exceptions: Wears glasses for reading  Hearing: Within functional limits     Subjective  General  Patient assessed for rehabilitation services?: Yes  Family / Caregiver Present: No  Referring Practitioner: Leidy Mayen  Referral Date : 08/27/21  Diagnosis: Pneumothorax on right  Follows Commands: Within Functional Limits  Subjective  Subjective: Pt supine in bed and agreeable to therapy. Pt denies pain at this time. Pain Screening  Patient Currently in Pain: No  Pain Assessment  Pain Level: 0  Vital Signs  Patient Currently in Pain: No       Orientation  Orientation  Overall Orientation Status: Within Functional Limits  Social/Functional History  Social/Functional History  Lives With: Spouse  Type of Home: House  Home Layout: Two level, Able to Live on Main level with bedroom/bathroom  Home Access: Stairs to enter without rails  Entrance Stairs - Number of Steps: 1  Bathroom Shower/Tub: Tub/Shower unit  Bathroom Toilet: Standard  Bathroom Equipment: Grab bars in shower  Home Equipment: Cane, Rolling walker  Receives Help From: Family  ADL Assistance: Independent  Homemaking Assistance: Independent  Homemaking Responsibilities: Yes (shares some with spouse)  Ambulation Assistance: Independent (uses cane when necessary)  Transfer Assistance: Independent  Active : Yes  Occupation: Retired  Additional Comments: Pt reports he does not drive as much as he used to due to vision; spouse works during the day.   Cognition   Cognition  Overall Cognitive Status: WFL    Objective          AROM RLE (degrees)  RLE AROM: WFL  AROM LLE (degrees)  LLE AROM : WFL  Strength RLE  Strength RLE: WFL  Strength LLE  Strength LLE: WFL     Sensation  Overall Sensation Status: WFL  Bed mobility  Supine to Sit: Modified independent  Scooting: Modified independent  Comment: Pt in bed upon arrival and retired to chair at end of session. Transfers  Sit to Stand: Stand by assistance  Stand to sit: Stand by assistance  Stand Pivot Transfers: Stand by assistance  Comment: Transfers without device. Ambulation  Ambulation?: Yes  Ambulation 1  Surface: level tile  Device: No Device  Assistance: Stand by assistance;Contact guard assistance  Quality of Gait: slow yoan, slight unsteadiness  Gait Deviations: Slow Yoan  Distance: 150' x 1  Comments: Pt would benefit from use of cane during ambulation. Stairs/Curb  Stairs?: No     Balance  Posture: Good  Sitting - Static: Good  Sitting - Dynamic: Good  Standing - Static: Fair;+  Standing - Dynamic: Fair  Comments: balance assessed without device        Plan   Plan  Times per week: 5-6x/week  Times per day: Daily  Current Treatment Recommendations: Endurance Training, Gait Training, Balance Training, Safety Education & Training, Functional Mobility Training, Transfer Training  Safety Devices  Type of devices: Left in chair, Call light within reach, Gait belt  Restraints  Initially in place: No    G-Code       OutComes Score                                                  AM-PAC Score  AM-PAC Inpatient Mobility Raw Score : 18 (08/29/21 1614)  -PAC Inpatient T-Scale Score : 43.63 (08/29/21 1614)  Mobility Inpatient CMS 0-100% Score: 46.58 (08/29/21 1614)  Mobility Inpatient CMS G-Code Modifier : CK (08/29/21 1614)          Goals  Short term goals  Time Frame for Short term goals: 10 visits  Short term goal 1: Pt to be Independent with transfers with appropriate device without LOB. Short term goal 2: Pt to tolerate 20 minutes of therapy activity to improve endurance for mobility. Short term goal 3: Pt to ambulate with straight cane/aguilar device 200' without LOB. Short term goal 4: Pt to improve standing static/dynamic balance to Good with appropriate device.   Patient Goals   Patient goals : return home       Therapy Time   Individual Concurrent Group Co-treatment   Time In 1001         Time Out 1032         Minutes 31         Timed Code Treatment Minutes: 8 Minutes       Nusrat Nix, PT

## 2021-08-29 NOTE — PLAN OF CARE
Problem: Falls - Risk of:  Goal: Will remain free from falls  Description: Will remain free from falls  8/29/2021 0426 by Vania Robles RN  Outcome: Ongoing     Problem: Falls - Risk of:  Goal: Absence of physical injury  Description: Absence of physical injury  8/29/2021 0426 by Vania Robles RN  Outcome: Ongoing  Note: No falls noted this shift. Patient ambulates with x1 staff assistance without difficulty. Bed kept in low position. Safe environment maintained. Bedside table & call light in reach. Uses call light appropriately when needing assistance. Problem: Infection:  Goal: Will remain free from infection  Description: Will remain free from infection  8/29/2021 0426 by Vania Robles RN  Outcome: Ongoing  Note: Patient remains afebrile; no signs of erythema, edema, or warmth. Will continue to monitor for signs/symptoms of infection. Problem: Safety:  Goal: Free from accidental physical injury  Description: Free from accidental physical injury  8/29/2021 0426 by Vania Robles RN  Outcome: Ongoing  Note: I have discussed with the patient how to reduce likelihood of falling at home by removing throw rugs, loose wires or other objects from floor, installing hand-rails in bathrooms, tubs and halls, and leaving some lights on at night.        Problem: Safety:  Goal: Free from intentional harm  Description: Free from intentional harm  8/29/2021 0426 by Vania Robles RN  Outcome: Ongoing     Problem: Skin Integrity:  Goal: Skin integrity will stabilize  Description: Skin integrity will stabilize  8/29/2021 0426 by Vania Robles RN  Outcome: Ongoing

## 2021-08-29 NOTE — PLAN OF CARE
Problem: Falls - Risk of:  Goal: Will remain free from falls  8/29/2021 1704 by Lashae Mai RN  Outcome: Ongoing     Problem: Infection:  Goal: Will remain free from infection  8/29/2021 1704 by Lashae Mai RN  Outcome: Ongoing     Problem: Safety:  Goal: Free from accidental physical injury  8/29/2021 1704 by Lashae Mai RN  Outcome: Ongoing     Problem: Daily Care:  Goal: Daily care needs are met  8/29/2021 1704 by Lashae Mai RN  Outcome: Ongoing     Problem: Pain:  Goal: Patient's pain/discomfort is manageable  8/29/2021 1704 by Lashae Mai RN  Outcome: Ongoing     Problem: Skin Integrity:  Goal: Skin integrity will stabilize  8/29/2021 1704 by Lashae Mai RN  Outcome: Ongoing     Problem: Discharge Planning:  Goal: Patients continuum of care needs are met  8/29/2021 1704 by Lashae Mai RN  Outcome: Ongoing

## 2021-08-29 NOTE — H&P
[facility] Internal Medicine       Date:   8/28/2021  Patient name:  Praveena Mathur  Date of admission:  8/27/2021 12:52 PM  MRN:   [id number]  YOB: 1950      Cc pleural effusion     HPI   Pt admitted with sympts of sob     Pt with known lung ca with mets to liver and adrenal on palliative chemo with large pleural effusion thoracocentesis 8/27   Has right thoraco vent +       Past Medical History:   Diagnosis Date    DDD (degenerative disc disease), cervical     Gout     Heart murmur     hx. of when younger.  Hyperlipidemia     Hypertension     Lung cancer ([facility] [address]     right lung    MI (myocardial infarction) ([facility] [address]     Skin cancer     face    Wears glasses      Family History   Problem Relation Age of Onset   Dossie Paula Cancer Father         lung cancer      reports that he has been smoking cigarettes. He has been smoking about 0.50 packs per day. He has quit using smokeless tobacco. He reports current drug use. Frequency: 14.00 times per week. Drug: Marijuana. He reports that he does not drink alcohol. Past Medical History:   Diagnosis Date    DDD (degenerative disc disease), cervical     Gout     Heart murmur     hx. of when younger.  Hyperlipidemia     Hypertension     Lung cancer ([facility] [address]     right lung    MI (myocardial infarction) ([facility] [address]     Skin cancer     face    Wears glasses      No Known Allergies    Review of systems    Constitutional: Negative for fever and fatigue. Respiratory: Negative for cough and shortness of breath. Cardiovascular: Negative for chest pain, palpitations and leg swelling. Gastrointestinal: Negative for abdominal pain, diarrhea and blood in stool. Endocrine: Negative for cold intolerance. Genitourinary: Negative for dysuria and frequency. Musculoskeletal: Negative for back pain and arthralgias. Skin: Negative for color change and rash. Neurological: Negative for dizziness and headaches. Psychiatric/Behavioral: Negative for confusion. ROS  Physical exam     BP 95/62   Pulse 90   Temp 98.4 °F (36.9 °C) (Temporal)   Resp 16   Ht 5' 11\" (1.803 m)   Wt 175 lb 11.3 oz (79.7 kg)   SpO2 98%   BMI 24.51 kg/m²      General appearance: well nourished in no distress  Eyes:  KRISTAN   Head: AT/NC  ENT NAD   Neck: Trachea midline; supple  Lungs: normal effort, clear to auscultation. CVS sinus with no murmurs. Vasc No JVP, no carotid bruit  Abdomen: Soft, non-tender; no masses or HSM,   Extremities: no edema; no digital cyanosis or clubbing. Musculoskeletal NO joint effusion or synovitis  Skin: No rash or ulcers  Neurologic: Cranial nerves II-XII grossly intact; no motor deficit. Psych: Appropriate affect, alert and oriented to person, place and time  NO lymphadenopathy            Relevant Labs/Imaging     CBC:   Recent Labs     08/28/21  0439   WBC 2.2*   HGB 9.5*   *     BMP:    Recent Labs     08/27/21  1310 08/27/21  1310 08/28/21  0439   *   < > 133*   K 4.2   < > 4.4      < > 101   CO2 23   < > 23   BUN 16   < > 12   CREATININE 1.17  --  1.09   GLUCOSE 117*   < > 97    < > = values in this interval not displayed. S. Calcium:  Recent Labs     08/28/21  0439   CALCIUM 8.9     Glycosylated hemoglobin A1C: No results for input(s): LABA1C in the last 72 hours. BNP:No results for input(s): BNP in the last 72 hours.          Assessment / Plan      Patient Active Problem List   Diagnosis    Primary lung cancer with metastasis from lung to other site, right ([address]    Hyperlipidemia    Hypertension    Lung cancer, primary, with metastasis from lung to other site ([address]    Sepsis ([address]    Atrial fibrillation with RVR ([address]    Gout    Pyogenic arthritis of right elbow ([address]    Pneumothorax on right    PAF (paroxysmal atrial fibrillation) ([address]    Tobacco dependence    COPD (chronic obstructive pulmonary disease) (HCC)    Iron deficiency anemia    Pneumothorax    Thrombocytopenia (HCC)    Leukopenia       Pt admitted with sympts of sob     Pt with known lung ca with mets to liver and adrenal on palliative chemo with large pleural effusion thoracocentesis 8/27   Has right thoraco vent +   Pt has spontaneous PTx   SP thoravent 8/27/21 - resolved; clamp chest tube 8/28/21  pulm and oncology consults       Ney Resendez MD, MD  [facility] Internal Medicine   [address] [place] [address] [phone]

## 2021-08-29 NOTE — PROGRESS NOTES
Patient slept very well during this shift with no complains of shortness of breath hence the thoracentesis  Remains clamped. Patient's midnight BP was  And had a mews of three. Patient was rechecked Two hours later and she dropped to  MEWS of 2. Patient has been offered fluid and is resting with no sign of distress or dicomfort.

## 2021-08-29 NOTE — PROGRESS NOTES
Physician Progress Note      Sherron Sims  CSN #:                  [id number]  :                       1950  ADMIT DATE:       2021 12:52 PM  [address] [place] DATE:  RESPONDING  PROVIDER #:        Flory Cabrera MD          QUERY TEXT:    Patient admitted with  pneumothorax and lung cancer with metastasis. Leukopenia, anemia and mild thrombocytopenia are documented. If possible, please document in progress notes and discharge summary if you   are evaluating and / or treating:  [Pancytopenia with the leukopenia not due to chemotherapy[de-identified] Patient has   pancytopenia with the leukopenia not due to chemotherapy.]]    The medical record reflects the following:  Risk Factors: recurrent small cell lung cancer,  post chemotherapy currently   on palliative chemo and immunotherapy  Clinical Indicators: Per Oncology consult: \"5. Leukopenia. 6. Anemia mild   thrombocytopenia appears secondary to chemotherapy\"   On admission WBC 1.9,    RBC 2.76 ,  PLTS 133 -> 122  Treatment: oncology consult, daily CBC,  IV cefepime    Jenna Mack RN, [address] [place] [address]   [phone]  . Options provided:  -- Antineoplastic (chemotherapy) induced pancytopenia  -- Pancytopenia due to lung cancer with metastasis  -- Other - I will add my own diagnosis  -- Disagree - Not applicable / Not valid  -- Disagree - Clinically unable to determine / Unknown  -- Refer to Clinical Documentation Reviewer    PROVIDER RESPONSE TEXT:    Patient has antineoplastic (chemotherapy) induced pancytopenia.     Query created by: Gilles Nesbitt on 2021 7:03 PM      Electronically signed by:  Flory Cabrera MD 2021 10:20 PM

## 2021-08-29 NOTE — DISCHARGE INSTR - COC
Continuity of Care Form    Patient Name: Marcy Mahmood   :  1950  MRN:  [id number]    Admit date:  2021  Discharge date:  ***    Code Status Order: Full Code   Advance Directives:      Admitting Physician:  Marianna Nesbitt MD  PCP: Marianna Nesbitt MD    Discharging Nurse: [facility] Unit/Room#: 2121/2121-01  Discharging Unit Phone Number: ***    Emergency Contact:   Extended Emergency Contact Information  Primary Emergency Contact: Jjaa Marquez (Hipaa),Juan M   [facility] [address]  Phone: [phone]  Work Phone: [phone]  Mobile Phone: [phone]  Relation: Spouse  Secondary Emergency Contact: Nitish Schofield  Home Phone: [phone]  Work Phone: [phone]  Mobile Phone: [phone]  Relation: Child    Past Surgical History:  Past Surgical History:   Procedure Laterality Date    APPENDECTOMY      CARDIAC CATHETERIZATION      w/stent    COLONOSCOPY      CYST INCISION AND DRAINAGE Right 2020    rt elbow I and D and left knee aspiration with cultures    FOOT SURGERY Right     2nd toe(bone spur).     FOREARM SURGERY Right 2020    RIGHT ELBOW IRRIGATION AND DEBRIDEMENT performed by Lizett Lewis DO at [place] [address] Left 2020    KNEE ASPIRATION WITH CULTURES SENT performed by Lizett Lewis DO at [address] [place] [address] under lt. eye.    TONSILLECTOMY         Immunization History:   Immunization History   Administered Date(s) Administered    COVID-19, Moderna, PF, 100mcg/0.5mL 2021, 04/10/2021    Influenza, High Dose (Fluzone 65 yrs and older) 10/07/2019    Influenza, Quadv, adjuvanted, 65 yrs +, IM, PF (Fluad) 2020       Active Problems:  Patient Active Problem List   Diagnosis Code    Primary lung cancer with metastasis from lung to other site, right [facility]) C34.91    Hyperlipidemia E78.5    Hypertension I10    Lung cancer, primary, with metastasis from lung to other site [facility]) C34.90    Sepsis ([address] A41.9 Atrial fibrillation with RVR ([facility]) I48.91    Gout M10.9    Pyogenic arthritis of right elbow ([facility]) M00.9    Pneumothorax on right J93.9    PAF (paroxysmal atrial fibrillation) ([facility]) I48.0    Tobacco dependence F17.200    COPD (chronic obstructive pulmonary disease) ([facility]) J44.9    Iron deficiency anemia D50.9    Pneumothorax J93.9    Thrombocytopenia ([facility]) D69.6    Leukopenia D72.819       Isolation/Infection:   Isolation            No Isolation          Patient Infection Status       Infection Onset Added Last Indicated Last Indicated By Review Planned Expiration Resolved Resolved By    None active    Resolved    COVID-19 Rule Out 05/14/20 05/14/20 05/14/20 COVID-19 (Ordered)   05/14/20 Rule-Out Test Resulted    COVID-19 Rule Out 05/07/20 05/07/20 05/07/20 COVID-19 (Ordered)   05/07/20 Rule-Out Test Resulted            Nurse Assessment:  Last Vital Signs: /65   Pulse 73   Temp 97.8 °F (36.6 °C) (Oral)   Resp 18   Ht 5' 11\" (1.803 m)   Wt 171 lb 15.3 oz (78 kg)   SpO2 97%   BMI 23.98 kg/m²     Last documented pain score (0-10 scale): Pain Level: 0  Last Weight:   Wt Readings from Last 1 Encounters:   08/29/21 171 lb 15.3 oz (78 kg)     Mental Status:  {IP PT MENTAL STATUS:20030:::0}    IV Access:  { MARTINEZ IV ACCESS:355195156:::0}    Nursing Mobility/ADLs:  Walking   {CHP DME ADLs:088858835:::0}  Transfer  {CHP DME ADLs:248796769:::0}  Bathing  {CHP DME ADLs:878126185:::0}  Dressing  {CHP DME ADLs:619146348:::0}  Toileting  {CHP DME ADLs:148928932:::0}  Feeding  {CHP DME ADLs:136394051:::0}  Med Admin  {CHP DME ADLs:176296560:::0}  Med Delivery   { MARTINEZ MED Delivery:978442021:::0}    Wound Care Documentation and Therapy:        Elimination:  Continence:    Bowel: {YES / KX:41595}  Bladder: {YES / PA:22007}  Urinary Catheter: {Urinary Catheter:886826649:::0}   Colostomy/Ileostomy/Ileal Conduit: {YES / HJ:29060}       Date of Last BM: ***    Intake/Output Summary (Last 24 hours) at 8/29/2021 1328  Last data filed at 2021 0955  Gross per 24 hour   Intake 1915 ml   Output 3275 ml   Net -1360 ml     I/O last 3 completed shifts: In: 2547 [P.O.:240; I.V.:1270; IV Piggyback:50]  Out:  [Urine:3625]    Safety Concerns:     [address] [employer] Safety Concerns:529642118:::0}    Impairments/Disabilities:      [address] MARTINEZ Impairments/Disabilities:502654180:::0}    Nutrition Therapy:  Current Nutrition Therapy:   [address] [employer] Diet List:528015913:::0}    Routes of Feeding: {[facility] DME Other Feedings:228626813:::0}  Liquids: {Slp liquid thickness:67793}  Daily Fluid Restriction: {CHP DME Yes amt example:162670067:::0}  Last Modified Barium Swallow with Video (Video Swallowing Test): {Done Not Done SUZA:722709066:::6}    Treatments at the Time of Hospital Discharge:   Respiratory Treatments: ***  Oxygen Therapy:  {Therapy; copd oxygen:99794:::0}  Ventilator:    {[facility] Vent List:698270368:::0}    Rehab Therapies: {THERAPEUTIC INTERVENTION:2102649371}  Weight Bearing Status/Restrictions: {[facility] Weight Bearin:::0}  Other Medical Equipment (for information only, NOT a DME order):  {EQUIPMENT:572739135}  Other Treatments: ***    Patient's personal belongings (please select all that are sent with patient):  {[facility] DME Belongings:904924252:::0}    RN SIGNATURE:  {Esignature:514232953:::0}    CASE MANAGEMENT/SOCIAL WORK SECTION    Inpatient Status Date: ***    Readmission Risk Assessment Score:  Readmission Risk              Risk of Unplanned Readmission:  14           Discharging to Facility/ Agency   Name:   Address:  Phone:  Fax:    Dialysis Facility (if applicable)   Name:  Address:  Dialysis Schedule:  Phone:  Fax:    / signature: {Esignature:927028210:::0}    PHYSICIAN SECTION    Prognosis: Fair    Condition at Discharge: Stable    Rehab Potential (if transferring to Rehab):  Fair    Recommended Labs or Other Treatments After Discharge:     Physician Certification: I certify the above information and transfer of Deneen Vargas  is necessary for the continuing treatment of the diagnosis listed and that he requires [address] for less 30 days.      Update Admission H&P: No change in H&P    PHYSICIAN SIGNATURE:  Electronically signed by Dara Gilliland MD on 8/29/21 at 1:28 PM EDT

## 2021-08-29 NOTE — PROGRESS NOTES
Progress Note    8/29/2021   4:15 PM    Name:  Jada Gallardo  MRN:    [id number]     Acct:     [de-identified]   Room:  2121/2121-01  IP Day: 2     Admit Date: 8/27/2021 12:52 PM  PCP: Nayeli Huitron MD    Subjective:     C/C:   Chief Complaint   Patient presents with    Lung Cancer       Interval History: Status: not changed. Denies chest pain shortness of breath has intermittent cough vital signs reviewed and stable. Blood culture no growth for 1 days, recent labs reviewed hemoglobin 9.2    ROS:   all 10 systems reviewed and are negative except as noted    Review of Systems   Constitutional: Negative for appetite change, chills and diaphoresis. HENT: Negative for drooling, ear pain, trouble swallowing and voice change. Eyes: Negative for photophobia and visual disturbance. Respiratory: Positive for cough. Negative for choking, shortness of breath and stridor. Cardiovascular: Negative for chest pain and palpitations. Gastrointestinal: Negative for abdominal distention, anal bleeding, blood in stool and rectal pain. Endocrine: Negative for polyphagia and polyuria. Genitourinary: Negative for dysuria, flank pain, hematuria and urgency. Musculoskeletal: Negative for back pain, myalgias and neck stiffness. Skin: Negative for color change, pallor and rash. Allergic/Immunologic: Negative for environmental allergies and food allergies. Neurological: Negative for tremors, seizures, facial asymmetry and numbness. Hematological: Negative for adenopathy. Does not bruise/bleed easily. Psychiatric/Behavioral: Negative for agitation, behavioral problems, hallucinations, self-injury and suicidal ideas. Medications:      Allergies: No Known Allergies    Current Meds: lidocaine 1 % injection 20 mL, Once  allopurinol (ZYLOPRIM) tablet 100 mg, Daily  apixaban (ELIQUIS) tablet 5 mg, BID  docusate sodium (COLACE) capsule 100 mg, BID  ferrous sulfate (IRON 325) tablet 325 mg, BID WC  melatonin tablet 3 mg, Nightly  potassium chloride (MICRO-K) extended release capsule 20 mEq, Daily  atorvastatin (LIPITOR) tablet 10 mg, Daily  traZODone (DESYREL) tablet 50 mg, Nightly  sodium chloride flush 0.9 % injection 10 mL, 2 times per day  sodium chloride flush 0.9 % injection 10 mL, PRN  0.9 % sodium chloride infusion, PRN  potassium chloride (KLOR-CON M) extended release tablet 40 mEq, PRN   Or  potassium bicarb-citric acid (EFFER-K) effervescent tablet 40 mEq, PRN   Or  potassium chloride 10 mEq/100 mL IVPB (Peripheral Line), PRN  magnesium sulfate 1000 mg in dextrose 5% 100 mL IVPB, PRN  ondansetron (ZOFRAN-ODT) disintegrating tablet 4 mg, Q8H PRN   Or  ondansetron (ZOFRAN) injection 4 mg, Q6H PRN  magnesium hydroxide (MILK OF MAGNESIA) 400 MG/5ML suspension 30 mL, Daily PRN  acetaminophen (TYLENOL) tablet 650 mg, Q6H PRN   Or  acetaminophen (TYLENOL) suppository 650 mg, Q6H PRN  0.9 % sodium chloride infusion, Continuous  cefepime (MAXIPIME) 2000 mg IVPB minibag, Q12H  ipratropium-albuterol (DUONEB) nebulizer solution 3 mL, Q4H WA        Data:     Code Status:  Full Code    Family History   Problem Relation Age of Onset    Cancer Father         lung cancer       Social History     Socioeconomic History    Marital status:      Spouse name: Not on file    Number of children: Not on file    Years of education: Not on file    Highest education level: Not on file   Occupational History    Not on file   Tobacco Use    Smoking status: Current Every Day Smoker     Packs/day: 0.50     Types: Cigarettes    Smokeless tobacco: Former User   Vaping Use    Vaping Use: Former   Substance and Sexual Activity    Alcohol use: No    Drug use: Yes     Frequency: 14.0 times per week     Types: Marijuana    Sexual activity: Not on file   Other Topics Concern    Not on file   Social History Narrative    Not on file     Social Determinants of Health     Financial Resource Strain:     Difficulty of Paying Living Expenses:    Food Insecurity:     Worried About Running Out of Food in the Last Year:     [address] in the Last Year:    Transportation Needs:     Lack of Transportation (Medical):  Lack of Transportation (Non-Medical):    Physical Activity:     Days of Exercise per Week:     Minutes of Exercise per Session:    Stress:     Feeling of Stress :    Social Connections:     Frequency of Communication with Friends and Family:     Frequency of Social Gatherings with Friends and Family:     Attends Jainism Services:     Active Member of Clubs or Organizations:     Attends Club or Organization Meetings:     Marital Status:    Intimate Partner Violence:     Fear of Current or Ex-Partner:     Emotionally Abused:     Physically Abused:     Sexually Abused:        I/O (24Hr): Intake/Output Summary (Last 24 hours) at 8/29/2021 1615  Last data filed at 8/29/2021 1354  Gross per 24 hour   Intake 2270 ml   Output 3150 ml   Net -880 ml     Radiology:  XR CHEST PORTABLE    Result Date: 8/28/2021  Right chest tube in place with no significant pneumothorax visualized on today's study. XR CHEST PORTABLE    Result Date: 8/27/2021  Right apical pneumothorax measuring 4.9 cm. Perihilar infiltrates. CT CHEST ABDOMEN PELVIS W CONTRAST    Result Date: 8/26/2021  1. New moderate size right pneumothorax. Findings were discussed with [employer] at 3:42 pm on 8/26/2021. 2.  Overall positive response to therapy. Notable decrease in size of liver metastases. Small mediastinal lymph nodes are slightly larger in the interval.  Previously noted nodule in the right middle lobe appears slightly larger. 3.  No significant change in right adrenal nodule and bone lesions at T11 and T12. Little interval change in paraspinal soft tissue adjacent to the T10 vertebral body. 4.  Additional stable chronic and benign findings, including infrarenal aortic aneurysm measuring up to 3 cm, as described above. RECOMMENDATIONS: For management of fusiform AAA: 3.0-3.4 cm AAA, recommend follow-up every 3 years. Note: Recommend Vascular consultation if a fusiform AAA enlarges by >0.5 cm in 6 months or >1 cm in 1 year or for a saccular AAA of any size. References: Ada Gin Radiol 2013; 99(93):317-655; J Vasc Surg.  2018; 67:2-77       Labs:  Recent Results (from the past 24 hour(s))   CBC auto differential    Collection Time: 08/29/21  4:16 AM   Result Value Ref Range    WBC 2.8 (L) 3.5 - 11.0 k/uL    RBC 2.66 (L) 4.5 - 5.9 m/uL    Hemoglobin 9.2 (L) 13.5 - 17.5 g/dL    Hematocrit 27.5 (L) 41 - 53 %    .4 (H) 80 - 100 fL    MCH 34.7 (H) 26 - 34 pg    MCHC 33.5 31 - 37 g/dL    RDW 17.7 (H) 11.5 - 14.9 %    Platelets 444 423 - 905 k/uL    MPV 7.1 6.0 - 12.0 fL    NRBC Automated NOT REPORTED per 100 WBC    Differential Type NOT REPORTED     Immature Granulocytes NOT REPORTED 0 %    Absolute Immature Granulocyte NOT REPORTED 0.00 - 0.30 k/uL    WBC Morphology NOT REPORTED     RBC Morphology NOT REPORTED     Platelet Estimate NOT REPORTED     Seg Neutrophils 34 (L) 36 - 66 %    Lymphocytes 21 (L) 24 - 44 %    Monocytes 36 (H) 1 - 7 %    Eosinophils % 9 (H) 0 - 4 %    Basophils 0 0 - 2 %    Segs Absolute 0.95 (L) 1.3 - 9.1 k/uL    Absolute Lymph # 0.59 (L) 1.0 - 4.8 k/uL    Absolute Mono # 1.01 0.1 - 1.3 k/uL    Absolute Eos # 0.25 0.0 - 0.4 k/uL    Basophils Absolute 0.00 0.0 - 0.2 k/uL    Morphology ANISOCYTOSIS PRESENT     Morphology MACROCYTOSIS PRESENT     Morphology HYPOCHROMIA PRESENT     Morphology FEW TEARDROPS    Comprehensive Metabolic Panel w/ Reflex to MG    Collection Time: 08/29/21  4:16 AM   Result Value Ref Range    Glucose 95 70 - 99 mg/dL    BUN 11 8 - 23 mg/dL    CREATININE 1.10 0.70 - 1.20 mg/dL    Bun/Cre Ratio NOT REPORTED 9 - 20    Calcium 8.8 8.6 - 10.4 mg/dL    Sodium 137 135 - 144 mmol/L    Potassium 4.1 3.7 - 5.3 mmol/L    Chloride 106 98 - 107 mmol/L    CO2 24 20 - 31 mmol/L    Anion Gap 7 (L) 9 - 17 mmol/L    Alkaline Phosphatase 82 40 - 129 U/L    ALT 26 5 - 41 U/L    AST 27 <40 U/L    Total Bilirubin 0.22 (L) 0.3 - 1.2 mg/dL    Total Protein 6.5 6.4 - 8.3 g/dL    Albumin 3.7 3.5 - 5.2 g/dL    Albumin/Globulin Ratio NOT REPORTED 1.0 - 2.5    GFR Non-African American >60 >60 mL/min    GFR African American >60 >60 mL/min    GFR Comment          GFR Staging NOT REPORTED        Physical Examination:        Vitals:  /74   Pulse 82   Temp 98 °F (36.7 °C) (Oral)   Resp 18   Ht 5' 11\" (1.803 m)   Wt 171 lb 15.3 oz (78 kg)   SpO2 99%   BMI 23.98 kg/m²   Temp (24hrs), Av °F (36.7 °C), Min:97.4 °F (36.3 °C), Max:98.4 °F (36.9 °C)    No results for input(s): POCGLU in the last 72 hours. Physical Exam  Vitals reviewed. Constitutional:       Appearance: Normal appearance. He is not diaphoretic. HENT:      Head: Normocephalic and atraumatic. Right Ear: External ear normal.      Left Ear: External ear normal.      Nose: Nose normal.      Mouth/Throat:      Mouth: Mucous membranes are moist.      Pharynx: Oropharynx is clear. Eyes:      Conjunctiva/sclera: Conjunctivae normal.   Cardiovascular:      Rate and Rhythm: Normal rate and regular rhythm. Pulses: Normal pulses. Heart sounds: Normal heart sounds. Pulmonary:      Effort: Pulmonary effort is normal. No respiratory distress. Breath sounds: Normal breath sounds. No wheezing or rales. Comments: Right chest tube in place  Abdominal:      General: Bowel sounds are normal. There is no distension. Palpations: Abdomen is soft. Musculoskeletal:         General: No tenderness or deformity. Normal range of motion. Cervical back: Normal range of motion and neck supple. No rigidity. Right lower leg: No edema. Left lower leg: No edema. Skin:     General: Skin is warm and dry. Capillary Refill: Capillary refill takes less than 2 seconds. Coloration: Skin is not jaundiced.    Neurological: General: No focal deficit present. Mental Status: Mental status is at baseline. Psychiatric:         Mood and Affect: Mood normal.         Behavior: Behavior normal.         Assessment:        Primary Problem  Pneumothorax on right     Principal Problem:    Pneumothorax on right  Active Problems:    Lung cancer, primary, with metastasis from lung to other site (HCC)    PAF (paroxysmal atrial fibrillation) (HCC)    Tobacco dependence    COPD (chronic obstructive pulmonary disease) (HCC)    Iron deficiency anemia    Pneumothorax    Thrombocytopenia (HCC)    Leukopenia  Resolved Problems:    * No resolved hospital problems. *      Past Medical History:   Diagnosis Date    DDD (degenerative disc disease), cervical     Gout     Heart murmur     hx. of when younger.  Hyperlipidemia     Hypertension     Lung cancer ([facility]     right lung    MI (myocardial infarction) ([facility]     Skin cancer     face    Wears glasses         Plan:        1. Cefepime IV  1. Check chest x-ray  2. Discussed with patient and wife at bedside  3. DVT Prophylaxis Eliquis  4. EPCs  5. PT/OT to evaluate and treat  6. Pain control  7. Replace electrolytes as per sliding scale  8. Home medications reviewed and appropriate medications continued  9.  Reviewed labs and imaging studies from last 24 hours and results explained to patient      Electronically signed by Chauncey Leos MD

## 2021-08-29 NOTE — PROGRESS NOTES
PULMONARY PROGRESS NOTE:    REASON FOR VISIT: PTX, lung cancer  Interval History:    Shortness of Breath: no  Cough: no  Sputum: no          Hemoptysis: no  Chest Pain: no  Fever: no                   Swelling Feet: no  Headache: no                                           Nausea, Emesis, Abdominal Pain: no  Diarrhea: no         Constipation: no    Events since last visit: none    PAST MEDICAL HISTORY:      Scheduled Meds:   lidocaine  20 mL Intradermal Once    allopurinol  100 mg Oral Daily    apixaban  5 mg Oral BID    docusate sodium  100 mg Oral BID    ferrous sulfate  325 mg Oral BID WC    melatonin  3 mg Oral Nightly    potassium chloride  20 mEq Oral Daily    atorvastatin  10 mg Oral Daily    traZODone  50 mg Oral Nightly    sodium chloride flush  10 mL IntraVENous 2 times per day    cefepime  2,000 mg IntraVENous Q12H    ipratropium-albuterol  3 mL Inhalation Q4H WA     Continuous Infusions:   sodium chloride      sodium chloride 75 mL/hr at 08/28/21 2354     PRN Meds:sodium chloride flush, sodium chloride, potassium chloride **OR** potassium alternative oral replacement **OR** potassium chloride, magnesium sulfate, ondansetron **OR** ondansetron, magnesium hydroxide, acetaminophen **OR** acetaminophen        PHYSICAL EXAMINATION:  /74   Pulse 82   Temp 98 °F (36.7 °C) (Oral)   Resp 18   Ht 5' 11\" (1.803 m)   Wt 171 lb 15.3 oz (78 kg)   SpO2 99%   BMI 23.98 kg/m²     General : Awake, alert,   Neck  supple, no lymphadenopathy, JVD not raised  Heart  regular rhythm, S1 and S2 normal; no additional sounds heard  Lungs  Air Entry- fair bilaterally; breath sounds : vesicular;   rales/crackles - absent  Abdomen  soft, no tenderness  Upper Extremities  - no cyanosis, mottling; edema : absent  Lower Extremities: no cyanosis, mottling; edema : absent    Current Laboratory, Radiologic, Microbiologic, and Diagnostic studies reviewed  Data ReviewCBC:   Recent Labs     08/27/21  1310

## 2021-08-29 NOTE — FLOWSHEET NOTE
08/29/21 1452   Encounter Summary   Services provided to: Patient   Referral/Consult From: [facility]   (8/29/21V)   Volunteer Visit Yes   Complexity of Encounter Low   Length of Encounter 15 minutes   Spiritual/Rastafari   Type Spiritual support   Intervention Communion;Prayer   Sacraments   Communion Patient received communion

## 2021-08-30 ENCOUNTER — APPOINTMENT (OUTPATIENT)
Dept: GENERAL RADIOLOGY | Age: 71
DRG: 199 | End: 2021-08-30
Payer: MEDICARE

## 2021-08-30 VITALS
RESPIRATION RATE: 18 BRPM | SYSTOLIC BLOOD PRESSURE: 94 MMHG | HEIGHT: 71 IN | BODY MASS INDEX: 24.69 KG/M2 | TEMPERATURE: 97.5 F | OXYGEN SATURATION: 95 % | HEART RATE: 90 BPM | DIASTOLIC BLOOD PRESSURE: 60 MMHG | WEIGHT: 176.37 LBS

## 2021-08-30 LAB
ABSOLUTE EOS #: 0.25 K/UL (ref 0–0.4)
ABSOLUTE IMMATURE GRANULOCYTE: ABNORMAL K/UL (ref 0–0.3)
ABSOLUTE LYMPH #: 0.87 K/UL (ref 1–4.8)
ABSOLUTE MONO #: 0.85 K/UL (ref 0.1–1.3)
ALBUMIN SERPL-MCNC: 3.7 G/DL (ref 3.5–5.2)
ALBUMIN/GLOBULIN RATIO: ABNORMAL (ref 1–2.5)
ALP BLD-CCNC: 87 U/L (ref 40–129)
ALT SERPL-CCNC: 25 U/L (ref 5–41)
ANION GAP SERPL CALCULATED.3IONS-SCNC: 8 MMOL/L (ref 9–17)
AST SERPL-CCNC: 26 U/L
BASOPHILS # BLD: 0 % (ref 0–2)
BASOPHILS ABSOLUTE: 0 K/UL (ref 0–0.2)
BILIRUB SERPL-MCNC: 0.19 MG/DL (ref 0.3–1.2)
BUN BLDV-MCNC: 10 MG/DL (ref 8–23)
BUN/CREAT BLD: ABNORMAL (ref 9–20)
CALCIUM SERPL-MCNC: 8.8 MG/DL (ref 8.6–10.4)
CHLORIDE BLD-SCNC: 105 MMOL/L (ref 98–107)
CO2: 24 MMOL/L (ref 20–31)
CREAT SERPL-MCNC: 0.96 MG/DL (ref 0.7–1.2)
DIFFERENTIAL TYPE: ABNORMAL
EOSINOPHILS RELATIVE PERCENT: 10 % (ref 0–4)
GFR AFRICAN AMERICAN: >60 ML/MIN
GFR NON-AFRICAN AMERICAN: >60 ML/MIN
GFR SERPL CREATININE-BSD FRML MDRD: ABNORMAL ML/MIN/{1.73_M2}
GFR SERPL CREATININE-BSD FRML MDRD: ABNORMAL ML/MIN/{1.73_M2}
GLUCOSE BLD-MCNC: 85 MG/DL (ref 70–99)
HCT VFR BLD CALC: 28 % (ref 41–53)
HEMOGLOBIN: 9.5 G/DL (ref 13.5–17.5)
IMMATURE GRANULOCYTES: ABNORMAL %
LYMPHOCYTES # BLD: 35 % (ref 24–44)
MCH RBC QN AUTO: 34.9 PG (ref 26–34)
MCHC RBC AUTO-ENTMCNC: 33.9 G/DL (ref 31–37)
MCV RBC AUTO: 102.9 FL (ref 80–100)
MONOCYTES # BLD: 34 % (ref 1–7)
MORPHOLOGY: ABNORMAL
NRBC AUTOMATED: ABNORMAL PER 100 WBC
PDW BLD-RTO: 17.8 % (ref 11.5–14.9)
PLATELET # BLD: 190 K/UL (ref 150–450)
PLATELET ESTIMATE: ABNORMAL
PMV BLD AUTO: 6.7 FL (ref 6–12)
POTASSIUM SERPL-SCNC: 4.2 MMOL/L (ref 3.7–5.3)
RBC # BLD: 2.72 M/UL (ref 4.5–5.9)
RBC # BLD: ABNORMAL 10*6/UL
SEG NEUTROPHILS: 21 % (ref 36–66)
SEGMENTED NEUTROPHILS ABSOLUTE COUNT: 0.53 K/UL (ref 1.3–9.1)
SODIUM BLD-SCNC: 137 MMOL/L (ref 135–144)
SURGICAL PATHOLOGY REPORT: NORMAL
TOTAL PROTEIN: 6.4 G/DL (ref 6.4–8.3)
WBC # BLD: 2.5 K/UL (ref 3.5–11)
WBC # BLD: ABNORMAL 10*3/UL

## 2021-08-30 PROCEDURE — 6360000002 HC RX W HCPCS: Performed by: FAMILY MEDICINE

## 2021-08-30 PROCEDURE — 97530 THERAPEUTIC ACTIVITIES: CPT

## 2021-08-30 PROCEDURE — 85025 COMPLETE CBC W/AUTO DIFF WBC: CPT

## 2021-08-30 PROCEDURE — 94640 AIRWAY INHALATION TREATMENT: CPT

## 2021-08-30 PROCEDURE — 80053 COMPREHEN METABOLIC PANEL: CPT

## 2021-08-30 PROCEDURE — 71046 X-RAY EXAM CHEST 2 VIEWS: CPT

## 2021-08-30 PROCEDURE — 6370000000 HC RX 637 (ALT 250 FOR IP): Performed by: FAMILY MEDICINE

## 2021-08-30 PROCEDURE — 36415 COLL VENOUS BLD VENIPUNCTURE: CPT

## 2021-08-30 PROCEDURE — 94761 N-INVAS EAR/PLS OXIMETRY MLT: CPT

## 2021-08-30 PROCEDURE — 2580000003 HC RX 258: Performed by: FAMILY MEDICINE

## 2021-08-30 PROCEDURE — 97116 GAIT TRAINING THERAPY: CPT

## 2021-08-30 RX ADMIN — IPRATROPIUM BROMIDE AND ALBUTEROL SULFATE 3 ML: .5; 3 SOLUTION RESPIRATORY (INHALATION) at 11:07

## 2021-08-30 RX ADMIN — IPRATROPIUM BROMIDE AND ALBUTEROL SULFATE 3 ML: .5; 3 SOLUTION RESPIRATORY (INHALATION) at 08:43

## 2021-08-30 RX ADMIN — POTASSIUM CHLORIDE 20 MEQ: 10 CAPSULE, COATED, EXTENDED RELEASE ORAL at 09:45

## 2021-08-30 RX ADMIN — APIXABAN 5 MG: 5 TABLET, FILM COATED ORAL at 09:44

## 2021-08-30 RX ADMIN — ALLOPURINOL 100 MG: 100 TABLET ORAL at 09:44

## 2021-08-30 RX ADMIN — FERROUS SULFATE TAB 325 MG (65 MG ELEMENTAL FE) 325 MG: 325 (65 FE) TAB at 09:44

## 2021-08-30 RX ADMIN — ATORVASTATIN CALCIUM 10 MG: 10 TABLET, FILM COATED ORAL at 09:44

## 2021-08-30 ASSESSMENT — ENCOUNTER SYMPTOMS
BLOOD IN STOOL: 0
PHOTOPHOBIA: 0
COUGH: 0
STRIDOR: 0
VOICE CHANGE: 0
TROUBLE SWALLOWING: 0
BACK PAIN: 0
RECTAL PAIN: 0
ABDOMINAL DISTENTION: 0
SHORTNESS OF BREATH: 0
CHOKING: 0
COLOR CHANGE: 0
ANAL BLEEDING: 0

## 2021-08-30 ASSESSMENT — PAIN SCALES - GENERAL
PAINLEVEL_OUTOF10: 0
PAINLEVEL_OUTOF10: 0

## 2021-08-30 NOTE — FLOWSHEET NOTE
08/29/21 2222   Provider Notification   Reason for Communication Review case   Provider Name Dr. Chago Sears   Provider Notification Physician   Method of Communication Page   Notification Time 2228       Dr. Chago Sears was contacted with regards to patient's refusal to be on Tele monitor and continues fluid of 0.9%NaCl running at 75 ml/hr. MD was okay with patient's refusal and asked writer to document patient's refusal. Shagufta Tello will continue to educate patient on the needs for fluid and also offer oral fluids as well.

## 2021-08-30 NOTE — PROGRESS NOTES
Writer contacted xray dept and informedc them pt's discharge is pending on xray results; they will come get pt soon

## 2021-08-30 NOTE — PROGRESS NOTES
Patient refused  The tele monitor. Patient was educated on the risks and benefits and he still refused.

## 2021-08-30 NOTE — DISCHARGE SUMMARY
Discharge Summary      Patient ID: Shikha Torres    MRN: [id number]     Acct:  [de-identified]       Patient's PCP: Jason Norwood MD    Admit Date: 8/27/2021     Discharge Date:   8/30/2021    Admitting Physician: Jason Norwood MD    Discharge Physician: Анна Solis MD     Discharge Diagnoses:    Primary Problem  Pneumothorax on right    Principal Problem:    Pneumothorax on right  Active Problems:    Lung cancer, primary, with metastasis from lung to other site [facility])    PAF (paroxysmal atrial fibrillation) (HCC)    Tobacco dependence    COPD (chronic obstructive pulmonary disease) (HCC)    Iron deficiency anemia    Pneumothorax    Thrombocytopenia (HCC)    Leukopenia  Resolved Problems:    * No resolved hospital problems. *    Past Medical History:   Diagnosis Date    DDD (degenerative disc disease), cervical     Gout     Heart murmur     hx. of when younger.  Hyperlipidemia     Hypertension     Lung cancer ([facility] [address]     right lung    MI (myocardial infarction) ([facility] [address]     Skin cancer     face    Wears glasses      The patient was seen and examined on day of discharge and this discharge summary is in conjunction with daily progress note from day of discharge. Code Status:  Full Code    Hospital Course:   H&P Reviewed. Patient with lung carcinoma metastatic, tobacco dependence, PAF, thrombocytopenia was admitted with right pneumothorax. Patient was started on IV cefepime pulmonary services were consulted patient had chest tube placed in the ER. Patient symptoms improved during hospital stay. Patient chest x-ray after chest tube removal showed mild apical right pneumothorax. Patient is being discharged in stable condition. Repeat chest x-ray on 7/1/2021.       Discharge day progress note: Today   Patient was seen and examined at bedside today. Hemodynamically stable. No chest pain, shortness of breath, fever, chills, nausea, vomiting, palpitations, or abdominal pain reported.   No events reported overnight. Review of Systems   Constitutional: Negative for appetite change, chills and diaphoresis. HENT: Negative for drooling, ear pain, trouble swallowing and voice change. Eyes: Negative for photophobia and visual disturbance. Respiratory: Negative for cough, choking, shortness of breath and stridor. Cardiovascular: Negative for chest pain and palpitations. Gastrointestinal: Negative for abdominal distention, anal bleeding, blood in stool and rectal pain. Endocrine: Negative for polyphagia and polyuria. Genitourinary: Negative for dysuria, flank pain, hematuria and urgency. Musculoskeletal: Negative for back pain, myalgias and neck stiffness. Skin: Negative for color change, pallor and rash. Allergic/Immunologic: Negative for environmental allergies and food allergies. Neurological: Negative for tremors, seizures, facial asymmetry and numbness. Hematological: Negative for adenopathy. Does not bruise/bleed easily. Psychiatric/Behavioral: Negative for agitation, behavioral problems, hallucinations, self-injury and suicidal ideas. Physical Exam  Vitals reviewed. Constitutional:       Appearance: Normal appearance. He is not diaphoretic. HENT:      Head: Normocephalic and atraumatic. Right Ear: External ear normal.      Left Ear: External ear normal.      Nose: Nose normal.      Mouth/Throat:      Mouth: Mucous membranes are moist.      Pharynx: Oropharynx is clear. Eyes:      Conjunctiva/sclera: Conjunctivae normal.   Cardiovascular:      Rate and Rhythm: Normal rate and regular rhythm. Pulses: Normal pulses. Heart sounds: Normal heart sounds. Pulmonary:      Effort: Pulmonary effort is normal. No respiratory distress. Breath sounds: Normal breath sounds. No wheezing or rales. Abdominal:      General: Bowel sounds are normal. There is no distension. Palpations: Abdomen is soft.    Musculoskeletal:         General: No tenderness or deformity. Normal range of motion. Cervical back: Normal range of motion and neck supple. No rigidity. Right lower leg: No edema. Left lower leg: No edema. Skin:     General: Skin is warm and dry. Capillary Refill: Capillary refill takes less than 2 seconds. Coloration: Skin is not jaundiced. Neurological:      General: No focal deficit present. Mental Status: Mental status is at baseline. Psychiatric:         Mood and Affect: Mood normal.         Behavior: Behavior normal.         Consults:  IP CONSULT TO PULMONOLOGY  IP CONSULT TO PULMONOLOGY  IP CONSULT TO ONCOLOGY    Significant Diagnostic Studies: as above, and as follows:    Treatments: as above    Disposition: home    Discharged Condition: Stable    Follow Up:  Sebastien Razo MD in 10 days  Chest x-ray 7/1/2021  Pulmonology in 10 days  Oncology within a week    Discharge Medications:    Shiv Aid \"Frankie\"   Home Medication Instructions CIT:[id number]    Printed on:08/30/21 7686   Medication Information                      acetaminophen (TYLENOL) 325 MG tablet  Take 650 mg by mouth every 6 hours as needed for Pain             allopurinol (ZYLOPRIM) 100 MG tablet  Take 100 mg by mouth daily             apixaban (ELIQUIS) 5 MG TABS tablet  Take 1 tablet by mouth 2 times daily             B Complex-C-Folic Acid (ANGELICA-HUGO) TABS  TAKE 1 TABLET BY MOUTH DAILY. HEMATINIC VIT MINERALS             docusate sodium (COLACE) 100 MG capsule  Take 100 mg by mouth 2 times daily             Ferrous Fumarate (FERROCITE) 324 (106 Fe) MG TABS  Take by mouth             Handicap Placard MISC  by Does not apply route             ipratropium-albuterol (DUONEB) 0.5-2.5 (3) MG/3ML SOLN nebulizer solution  Inhale 3 mLs into the lungs 4 times daily             lidocaine-prilocaine (EMLA) 2.5-2.5 % cream  Apply topically as needed. Apply a quarter size amount to port site 1 hour before chemotherapy. Cover with plastic wrap. Melatonin 5 MG CHEW  Take 5 mg by mouth nightly             Multiple Vitamins-Minerals (THERAPEUTIC MULTIVITAMIN-MINERALS) tablet  Take 1 tablet by mouth daily              potassium chloride (MICRO-K) 10 MEQ extended release capsule  Take 20 mEq by mouth             simvastatin (ZOCOR) 40 MG tablet  Take 40 mg by mouth daily             tamsulosin (FLOMAX) 0.4 MG capsule  TAKE 1 CAPSULE BY MOUTH EVERY DAY             traZODone (DESYREL) 100 MG tablet  TAKE 1 TABLET BY MOUTH EVERY DAY AT NIGHT                          Time Spent on discharge is more than  35 min in the examination, evaluation, counseling and review of medications and discharge plan.       Electronically signed by Luz Salmon MD     Copy sent to Dr. Christina Sánchez MD

## 2021-08-30 NOTE — CARE COORDINATION
DISCHARGE PLANNING NOTE:    Plan is for this patient to return to home, no needs. Awaiting Pulm input in regards to CXR results. Possible discharge soon.      Electronically signed by Amaya Mejia RN on 8/30/2021 at 12:18 PM

## 2021-08-30 NOTE — PLAN OF CARE
Problem: Falls - Risk of:  Goal: Will remain free from falls  Description: Will remain free from falls  8/30/2021 0351 by Chyna Leija RN  Outcome: Ongoing     Problem: Falls - Risk of:  Goal: Absence of physical injury  Description: Absence of physical injury  8/30/2021 0351 by Chyna Leija RN  Outcome: Ongoing  Note: No falls noted this shift. Patient ambulates while  staff stands bye  without difficulty. Bed kept in low position. Safe environment maintained. Bedside table & call light in reach. Uses call light appropriately when needing assistance. Problem: Falls - Risk of:  Goal: Absence of physical injury  Description: Absence of physical injury  8/30/2021 0351 by Chyna Leija RN  Outcome: Ongoing  Note: No falls noted this shift. Patient ambulates while  staff stands bye  without difficulty. Bed kept in low position. Safe environment maintained. Bedside table & call light in reach. Uses call light appropriately when needing assistance. Problem: Infection:  Goal: Will remain free from infection  Description: Will remain free from infection  8/30/2021 0351 by Chyna Leija RN  Outcome: Ongoing  Note: Patient remains afebrile;  no signs of erythema, edema, or warmth. Will continue to monitor for signs/symptoms of infection. Problem: Safety:  Goal: Free from accidental physical injury  Description: Free from accidental physical injury  8/30/2021 0351 by Chyna Leija RN  Outcome: Ongoing     Problem: Discharge Planning:  Goal: Patients continuum of care needs are met  Description: Patients continuum of care needs are met  8/30/2021 0351 by Chyna Leija RN  Outcome: Ongoing  Note: Discharge teaching and instructions for diagnosis/procedure. Patient voiced understanding regarding follow up appointments, and care of self at home.

## 2021-08-30 NOTE — PROGRESS NOTES
Foot surgery 3 weeks ago- yesterday both hands started hurting and it was difficult to use hands, difficulty lifting arms, ear ache- today ear ache gone, can use hands but left hand is very sore- she called Daryle Coop at Dr Gordon Hodge at [facility] and she advised calling PCP-please advise therapy. Pt denies pain at this time. General Comment  Comments: RN Elvis Figueroa ok's pt for PT. Pain Screening  Patient Currently in Pain: No  Pain Assessment  Response to Pain Intervention: Patient Satisfied  Vital Signs  BP Location: Right upper arm  Level of Consciousness: Alert (0)  Patient Currently in Pain: No  Oxygen Therapy  O2 Device: None (Room air)       Orientation  Orientation  Overall Orientation Status: Within Functional Limits  Objective   Bed mobility  Supine to Sit: Modified independent  Sit to Supine: Modified independent  Scooting: Modified independent  Comment: No difficulties noted. Pt able to complete with bed flat. Transfers  Sit to Stand: Supervision  Stand to sit: Supervision  Comment: Transfers without device. Ambulation  Ambulation?: Yes  Ambulation 1  Surface: level tile  Device: No Device  Assistance: Stand by assistance;Contact guard assistance  Quality of Gait: Pt initially amb with good balance however with increase distance pt's balance decreases. Pt demos slight lateral sway and 2 minor LOB noted. Gait Deviations: Slow Brittani  Distance: >400'x1  Comments: Pt would benefit from use of cane during ambulation. Stairs/Curb  Stairs?: No     Balance  Posture: Good  Sitting - Static: Good  Sitting - Dynamic: Good  Standing - Static: Fair;+  Standing - Dynamic: Fair  Comments: balance assessed without device  Other exercises  Other exercises?: Yes  Other exercises 1: STS x6  Other exercises 2: Standing functional dynamic activities x3. No LOB noted. Other exercises 3: Pt declines HEP at this time. PT edu on energy conservation techniques, safety awareness, the importance of continued strengthening ex's to improve functional mobility. Pt left sitting on EOB per request. Call light in reach and all other needs addressed. Comment: Rest breaks PRN.                Goals  Short term goals  Time Frame for Short term goals: 10 visits  Short term goal 1: Pt to be Independent with

## 2021-08-30 NOTE — PROGRESS NOTES
PULMONARY PROGRESS NOTE:    REASON FOR VISIT: PTX, lung cancer  Interval History:    Shortness of Breath: no  Cough: no  Sputum: no          Hemoptysis: no  Chest Pain: no  Fever: no                   Swelling Feet: no  Headache: no                                           Nausea, Emesis, Abdominal Pain: no  Diarrhea: no         Constipation: no    Events since last visit: none    PAST MEDICAL HISTORY:      Scheduled Meds:   lidocaine  20 mL IntraDERmal Once    allopurinol  100 mg Oral Daily    apixaban  5 mg Oral BID    docusate sodium  100 mg Oral BID    ferrous sulfate  325 mg Oral BID WC    melatonin  3 mg Oral Nightly    potassium chloride  20 mEq Oral Daily    atorvastatin  10 mg Oral Daily    traZODone  50 mg Oral Nightly    sodium chloride flush  10 mL IntraVENous 2 times per day    cefepime  2,000 mg IntraVENous Q12H    ipratropium-albuterol  3 mL Inhalation Q4H WA     Continuous Infusions:   sodium chloride      sodium chloride 75 mL/hr at 08/28/21 7854     PRN Meds:sodium chloride flush, sodium chloride, potassium chloride **OR** potassium alternative oral replacement **OR** potassium chloride, magnesium sulfate, ondansetron **OR** ondansetron, magnesium hydroxide, acetaminophen **OR** acetaminophen        PHYSICAL EXAMINATION:  BP 94/60   Pulse 90   Temp 97.5 °F (36.4 °C) (Axillary)   Resp 18   Ht 5' 11\" (1.803 m)   Wt 176 lb 5.9 oz (80 kg)   SpO2 95%   BMI 24.60 kg/m²   afebrile  General : Awake, alert  Neck  supple, no lymphadenopathy, JVD not raised  Heart  regular rhythm, S1 and S2 normal; no additional sounds heard  Lungs  Air Entry- fair bilaterally; breath sounds : vesicular, 95% on RA  Abdomen  soft, no tenderness  Upper Extremities  - no cyanosis, mottling; edema : absent  Lower Extremities: no cyanosis, mottling; edema : absent    Current Laboratory, Radiologic, Microbiologic, and Diagnostic studies reviewed  Data ReviewCBC:   Recent Labs     08/28/21  6983 08/29/21  0416 08/30/21  0525   WBC 2.2* 2.8* 2.5*   RBC 2.74* 2.66* 2.72*   HGB 9.5* 9.2* 9.5*   HCT 28.6* 27.5* 28.0*   * 152 190     BMP:   Recent Labs     08/28/21  0439 08/29/21  0416 08/30/21  0525   GLUCOSE 97 95 85   * 137 137   K 4.4 4.1 4.2   BUN 12 11 10   CREATININE 1.09 1.10 0.96   CALCIUM 8.9 8.8 8.8     ABGs: No results for input(s): PHART, PO2ART, JTK1MVM, XOM5JFS, BEART, J9MBMUSF, FKM3WCE in the last 72 hours.    PT/INR:  No results found for: PTINR    ASSESSMENT / PLAN:    CA lung - on immunotherapy  PTX - spontaneous - SP thoravent 8/27/21 - resolved; clamp chest tube 8/28/21   chest tube removal 8/29/21; vaseline gauze dressing applied  CXR today    [place] f care discussed with Dr Lucy Sears  Electronically signed by AZEB Calloway - CNP on 08/30/21 at 11:09 AM.

## 2021-08-30 NOTE — PROGRESS NOTES
Dr Bean Reno with pt; xray dept again contacted and informed discharge pending on results of cxr of this a.m.

## 2021-08-31 LAB — PATHOLOGIST REVIEW: NORMAL

## 2021-09-01 ENCOUNTER — HOSPITAL ENCOUNTER (EMERGENCY)
Age: 71
Discharge: HOME | End: 2021-09-01
Attending: EMERGENCY MEDICINE
Payer: MEDICARE

## 2021-09-01 ENCOUNTER — HOSPITAL ENCOUNTER (OUTPATIENT)
Dept: GENERAL RADIOLOGY | Age: 71
Discharge: HOME | End: 2021-09-03
Payer: MEDICARE

## 2021-09-01 ENCOUNTER — TELEPHONE (OUTPATIENT)
Dept: ONCOLOGY | Age: 71
End: 2021-09-01

## 2021-09-01 ENCOUNTER — HOSPITAL ENCOUNTER (OUTPATIENT)
Dept: INFUSION THERAPY | Age: 71
Discharge: HOME | End: 2021-09-01
Payer: MEDICARE

## 2021-09-01 ENCOUNTER — TELEPHONE (OUTPATIENT)
Dept: INFUSION THERAPY | Age: 71
End: 2021-09-01

## 2021-09-01 ENCOUNTER — HOSPITAL ENCOUNTER (OUTPATIENT)
Age: 71
Discharge: HOME | End: 2021-09-03
Payer: MEDICARE

## 2021-09-01 ENCOUNTER — OFFICE VISIT (OUTPATIENT)
Dept: ONCOLOGY | Age: 71
End: 2021-09-01
Payer: MEDICARE

## 2021-09-01 VITALS
OXYGEN SATURATION: 99 % | HEART RATE: 110 BPM | RESPIRATION RATE: 17 BRPM | SYSTOLIC BLOOD PRESSURE: 102 MMHG | TEMPERATURE: 97.6 F | DIASTOLIC BLOOD PRESSURE: 60 MMHG

## 2021-09-01 VITALS
WEIGHT: 177 LBS | OXYGEN SATURATION: 99 % | DIASTOLIC BLOOD PRESSURE: 81 MMHG | BODY MASS INDEX: 24.69 KG/M2 | TEMPERATURE: 96.5 F | SYSTOLIC BLOOD PRESSURE: 131 MMHG | HEART RATE: 103 BPM

## 2021-09-01 DIAGNOSIS — C34.91 PRIMARY LUNG CANCER WITH METASTASIS FROM LUNG TO OTHER SITE, RIGHT (HCC): ICD-10-CM

## 2021-09-01 DIAGNOSIS — J93.9 PNEUMOTHORAX, RIGHT: Primary | ICD-10-CM

## 2021-09-01 DIAGNOSIS — C34.91 PRIMARY MALIGNANT NEOPLASM OF RIGHT LUNG METASTATIC TO OTHER SITE (HCC): ICD-10-CM

## 2021-09-01 DIAGNOSIS — J93.11 PRIMARY SPONTANEOUS PNEUMOTHORAX: ICD-10-CM

## 2021-09-01 LAB
ABSOLUTE EOS #: 0.15 K/UL (ref 0–0.4)
ABSOLUTE IMMATURE GRANULOCYTE: ABNORMAL K/UL (ref 0–0.3)
ABSOLUTE LYMPH #: 0.9 K/UL (ref 1–4.8)
ABSOLUTE MONO #: 0.93 K/UL (ref 0.1–0.8)
ALBUMIN SERPL-MCNC: 4.1 G/DL (ref 3.5–5.2)
ALBUMIN/GLOBULIN RATIO: 1.3 (ref 1–2.5)
ALP BLD-CCNC: 94 U/L (ref 40–129)
ALT SERPL-CCNC: 21 U/L (ref 5–41)
ANION GAP SERPL CALCULATED.3IONS-SCNC: 9 MMOL/L (ref 9–17)
AST SERPL-CCNC: 20 U/L
BASOPHILS # BLD: 1 % (ref 0–2)
BASOPHILS ABSOLUTE: 0.03 K/UL (ref 0–0.2)
BILIRUB SERPL-MCNC: 0.2 MG/DL (ref 0.3–1.2)
BUN BLDV-MCNC: 15 MG/DL (ref 8–23)
BUN/CREAT BLD: ABNORMAL (ref 9–20)
CALCIUM SERPL-MCNC: 9.4 MG/DL (ref 8.6–10.4)
CHLORIDE BLD-SCNC: 103 MMOL/L (ref 98–107)
CO2: 22 MMOL/L (ref 20–31)
CREAT SERPL-MCNC: 1.13 MG/DL (ref 0.7–1.2)
DIFFERENTIAL TYPE: ABNORMAL
EOSINOPHILS RELATIVE PERCENT: 5 % (ref 1–4)
GFR AFRICAN AMERICAN: >60 ML/MIN
GFR NON-AFRICAN AMERICAN: >60 ML/MIN
GFR SERPL CREATININE-BSD FRML MDRD: ABNORMAL ML/MIN/{1.73_M2}
GFR SERPL CREATININE-BSD FRML MDRD: ABNORMAL ML/MIN/{1.73_M2}
GLUCOSE BLD-MCNC: 126 MG/DL (ref 70–99)
HCT VFR BLD CALC: 28.5 % (ref 41–53)
HEMOGLOBIN: 9.5 G/DL (ref 13.5–17.5)
IMMATURE GRANULOCYTES: ABNORMAL %
LYMPHOCYTES # BLD: 30 % (ref 24–44)
MCH RBC QN AUTO: 34.4 PG (ref 26–34)
MCHC RBC AUTO-ENTMCNC: 33.3 G/DL (ref 31–37)
MCV RBC AUTO: 103.5 FL (ref 80–100)
MONOCYTES # BLD: 31 % (ref 1–7)
MORPHOLOGY: NORMAL
NRBC AUTOMATED: ABNORMAL PER 100 WBC
PDW BLD-RTO: 17.7 % (ref 12.5–15.4)
PLATELET # BLD: 271 K/UL (ref 140–450)
PLATELET ESTIMATE: ABNORMAL
PMV BLD AUTO: 6.9 FL (ref 6–12)
POTASSIUM SERPL-SCNC: 4.6 MMOL/L (ref 3.7–5.3)
RBC # BLD: 2.75 M/UL (ref 4.5–5.9)
RBC # BLD: ABNORMAL 10*6/UL
SEG NEUTROPHILS: 33 % (ref 36–66)
SEGMENTED NEUTROPHILS ABSOLUTE COUNT: 0.99 K/UL (ref 1.8–7.7)
SODIUM BLD-SCNC: 134 MMOL/L (ref 135–144)
TOTAL PROTEIN: 7.3 G/DL (ref 6.4–8.3)
WBC # BLD: 3 K/UL (ref 3.5–11)
WBC # BLD: ABNORMAL 10*3/UL

## 2021-09-01 PROCEDURE — 80053 COMPREHEN METABOLIC PANEL: CPT

## 2021-09-01 PROCEDURE — 4040F PNEUMOC VAC/ADMIN/RCVD: CPT | Performed by: INTERNAL MEDICINE

## 2021-09-01 PROCEDURE — 1111F DSCHRG MED/CURRENT MED MERGE: CPT | Performed by: INTERNAL MEDICINE

## 2021-09-01 PROCEDURE — 99283 EMERGENCY DEPT VISIT LOW MDM: CPT

## 2021-09-01 PROCEDURE — G8420 CALC BMI NORM PARAMETERS: HCPCS | Performed by: INTERNAL MEDICINE

## 2021-09-01 PROCEDURE — 3017F COLORECTAL CA SCREEN DOC REV: CPT | Performed by: INTERNAL MEDICINE

## 2021-09-01 PROCEDURE — 6360000002 HC RX W HCPCS: Performed by: EMERGENCY MEDICINE

## 2021-09-01 PROCEDURE — 93005 ELECTROCARDIOGRAM TRACING: CPT | Performed by: EMERGENCY MEDICINE

## 2021-09-01 PROCEDURE — 71046 X-RAY EXAM CHEST 2 VIEWS: CPT

## 2021-09-01 PROCEDURE — G8427 DOCREV CUR MEDS BY ELIG CLIN: HCPCS | Performed by: INTERNAL MEDICINE

## 2021-09-01 PROCEDURE — 99211 OFF/OP EST MAY X REQ PHY/QHP: CPT | Performed by: INTERNAL MEDICINE

## 2021-09-01 PROCEDURE — 99214 OFFICE O/P EST MOD 30 MIN: CPT | Performed by: INTERNAL MEDICINE

## 2021-09-01 PROCEDURE — 85025 COMPLETE CBC W/AUTO DIFF WBC: CPT

## 2021-09-01 PROCEDURE — 1123F ACP DISCUSS/DSCN MKR DOCD: CPT | Performed by: INTERNAL MEDICINE

## 2021-09-01 PROCEDURE — 4004F PT TOBACCO SCREEN RCVD TLK: CPT | Performed by: INTERNAL MEDICINE

## 2021-09-01 RX ORDER — HEPARIN SODIUM (PORCINE) LOCK FLUSH IV SOLN 100 UNIT/ML 100 UNIT/ML
300 SOLUTION INTRAVENOUS ONCE
Status: COMPLETED | OUTPATIENT
Start: 2021-09-01 | End: 2021-09-01

## 2021-09-01 RX ADMIN — HEPARIN 300 UNITS: 100 SYRINGE at 16:03

## 2021-09-01 ASSESSMENT — ENCOUNTER SYMPTOMS
COLOR CHANGE: 0
DIARRHEA: 0
SHORTNESS OF BREATH: 0
BLOOD IN STOOL: 0
BACK PAIN: 0
ABDOMINAL PAIN: 0
COUGH: 0
CONSTIPATION: 0
NAUSEA: 0
TROUBLE SWALLOWING: 0
VOMITING: 0
SORE THROAT: 0

## 2021-09-01 NOTE — PROGRESS NOTES
Malou Weiss                                                                                                                  9/1/2021  MRN:   [id number]  YOB: 1950  PCP:                           Jovanny Garza MD  Referring Physician: No ref. 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 Maintenance Tecentriq, 3/2021  Lurbenectidin-7/2/2021    Summary of Case/History:    Malou Weiss a 80 y.o.male is a patient diagnosed with lung cancer with the component of small cell as well as squamous cell carcinoma    Patient has a significant history of tobacco dependence and underwent CT lung screening in December 2018. CT scan was read as lung rads degree 4B. Subsequently she underwent biopsy of right upper lobe lung nodule on 1/16/19. 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 CT PET done showed abnormal FDG uptake in the right upper lobe nodule. There was also abnormal FDG uptake in the right lower lobe nodule. Additionally right hilar lymph node were also FDG avid. No bony lesion was reported. MRI brain for staging workup did not show any evidence of intracranial metastasis.   Tip of the odontoid process was ill-defined and metastasis could not be ruled out. Clinically patient does give history of trauma to the neck area any years ago however denies any surgery history. Bone scan did not reveal any metastasis     Patient continues to smoke but has cut down quite a bit. He works full-time in a [address] Patient has good performance status, ECOG 0. Patient's other medical problems include dyslipidemia and hypertension. He also has arthritis pain    Started patient on concurrent chemoradiation using cisplatin and etoposide with dose adjustment for renal dysfunction. Chemotherapy changed to carboplatin and etoposide from cycle #2 due to renal dysfunction    Received PCI in July 2019. Interim History:    Patient presents to the clinic for a follow-up visit and to discuss further treatment plan. Patient was earlier hospitalized due to a spontaneous pneumothorax. Patient was just discharged from the hospital yesterday. Patient repeat chest x-ray shows worsening of the pneumothorax and he earlier got a call from his primary care doctor to go back to the ER. At time of evaluation clinically patient is asymptomatic and resting comfortably. He wants to proceed with treatment if possible. During this visit patient's allergy, social, medical, surgical history and medications were reviewed and updated. Past Medical History:   Past Medical History:   Diagnosis Date    DDD (degenerative disc disease), cervical     Gout     Heart murmur     hx. of when younger.  Hyperlipidemia     Hypertension     Lung cancer ([address]     right lung    MI (myocardial infarction) ([address]     Skin cancer     face    Wears glasses        Past Surgical History:     Past Surgical History:   Procedure Laterality Date    APPENDECTOMY      CARDIAC CATHETERIZATION      w/stent    COLONOSCOPY      CYST INCISION AND DRAINAGE Right 05/14/2020    rt elbow I and D and left knee aspiration with cultures    FOOT SURGERY Right     2nd toe(bone spur).     FOREARM SURGERY Right 5/14/2020    RIGHT ELBOW IRRIGATION AND DEBRIDEMENT performed by Javier Ahn DO at [facility] Left 5/14/2020    KNEE ASPIRATION WITH CULTURES SENT performed by Javier Ahn DO at [facility]      face under lt. eye.  TONSILLECTOMY         Patient Family Social History:    Family History   Problem Relation Age of Onset    Cancer Father         lung cancer      Social History     Socioeconomic History    Marital status:      Spouse name: Not on file    Number of children: Not on file    Years of education: Not on file    Highest education level: Not on file   Occupational History    Not on file   Tobacco Use    Smoking status: Current Every Day Smoker     Packs/day: 0.50     Types: Cigarettes    Smokeless tobacco: Former User   Vaping Use    Vaping Use: Former   Substance and Sexual Activity    Alcohol use: No    Drug use: Yes     Frequency: 14.0 times per week     Types: Marijuana    Sexual activity: Not on file   Other Topics Concern    Not on file   Social History Narrative    Not on file     Social Determinants of Health     Financial Resource Strain:     Difficulty of Paying Living Expenses:    Food Insecurity:     Worried About [address] in the Last Year:     [facility] in the Last Year:    Transportation Needs:     Lack of Transportation (Medical):      Lack of Transportation (Non-Medical):    Physical Activity:     Days of Exercise per Week:     Minutes of Exercise per Session:    Stress:     Feeling of Stress :    Social Connections:     Frequency of Communication with Friends and Family:     Frequency of Social Gatherings with Friends and Family:     Attends Denominational Services:     Active Member of Clubs or Organizations:     Attends Club or Organization Meetings:     Marital Status:    Intimate Partner Violence:     Fear of Current or Ex-Partner:     Emotionally Abused:     Physically Abused:     Sexually Abused:       Current Medications:     Current Outpatient Medications   Medication Sig Dispense Refill    docusate sodium (COLACE) 100 MG capsule Take 100 mg by mouth 2 times daily      B Complex-C-Folic Acid (ANGELICA-HUGO) TABS TAKE 1 TABLET BY MOUTH DAILY. HEMATINIC VIT MINERALS      traZODone (DESYREL) 100 MG tablet TAKE 1 TABLET BY MOUTH EVERY DAY AT NIGHT      ipratropium-albuterol (DUONEB) 0.5-2.5 (3) MG/3ML SOLN nebulizer solution Inhale 3 mLs into the lungs 4 times daily      Melatonin 5 MG CHEW Take 5 mg by mouth nightly      potassium chloride (MICRO-K) 10 MEQ extended release capsule Take 20 mEq by mouth      Ferrous Fumarate (FERROCITE) 324 (106 Fe) MG TABS Take by mouth      apixaban (ELIQUIS) 5 MG TABS tablet Take 1 tablet by mouth 2 times daily 60 tablet 1    tamsulosin (FLOMAX) 0.4 MG capsule TAKE 1 CAPSULE BY MOUTH EVERY DAY 30 capsule 5    Handicap Placard MISC by Does not apply route 1 each 0    lidocaine-prilocaine (EMLA) 2.5-2.5 % cream Apply topically as needed. Apply a quarter size amount to port site 1 hour before chemotherapy. Cover with plastic wrap. 30 g 0    acetaminophen (TYLENOL) 325 MG tablet Take 650 mg by mouth every 6 hours as needed for Pain      allopurinol (ZYLOPRIM) 100 MG tablet Take 100 mg by mouth daily      simvastatin (ZOCOR) 40 MG tablet Take 40 mg by mouth daily      Multiple Vitamins-Minerals (THERAPEUTIC MULTIVITAMIN-MINERALS) tablet Take 1 tablet by mouth daily        No current facility-administered medications for this visit. Allergies:   Patient has no known allergies. Review of Systems:    Constitutional: No fever or chills. No night sweats, positive fatigue. +weight loss, dysgeusia, poor appetite  Eyes: No eye discharge, double vision, or eye pain   HEENT: negative for sore mouth, sore throat, hoarseness and voice change;    Respiratory: negative for cough, sputum, wheezing, hemoptysis, chest pain; +exertional shortness of breath  Cardiovascular: negative for chest pain, palpitations, orthopnea, PND   Gastrointestinal: negative for nausea, vomiting, diarrhea, constipation, abdominal pain, Dysphagia, hematemesis and hematochezia   Genitourinary: negative for frequency, dysuria, nocturia, urinary incontinence, and hematuria +urgency  Integument: Positive for easy bruising - stable +rasied area to the left of the spine that is tender  Hematologic/Lymphatic: negative for easy bleeding, lymphadenopathy, or petechiae   Endocrine: negative for heat or cold intolerance,weight changes, change in bowel habits and hair loss   Musculoskeletal: Positive joint pain. +right shoulder pain  Neurological: negative for headaches, dizziness, seizures, weakness; + poor balance; +neuropathy in left hand from shoulder +poor short term memory        Physical Exam:    Vitals: /81   Pulse 103   Temp 96.5 °F (35.8 °C) (Temporal)   Wt 177 lb (80.3 kg)   SpO2 99%   BMI 24.69 kg/m²   General appearance -patient not in acute distress  Mental status - AAO X3  Eyes - pupils equal and reactive, extraocular eye movements intact  Mouth - mucous membranes moist, pharynx normal without lesions  Neck - supple, no significant adenopathy  Lymphatics - no palpable lymphadenopathy, no hepatosplenomegaly  Chest - clear to auscultation, rales or rhonchi, symmetric air entry  Heart - normal rate, regular rhythm, normal S1, S2, no murmurs  Abdomen - soft, nontender, nondistended, no masses or organomegaly  Neurological - alert, oriented, normal speech, no focal findings or movement disorder noted  Extremities -+1 lower extremity pitting edema.  Right arm has bruising, no point tenderness or swelling  Skin - normal coloration and turgor, no rashes, no suspicious skin lesions noted; +raised area with tenderness to the left of the spine, no erythema        DATA:    Labs:   Lab Results   Component Value Date    WBC 3.0 (L) 09/01/2021    HGB 9.5 (L) 09/01/2021    HCT 28.5 (L) 09/01/2021    .5 (H) 09/01/2021     09/01/2021     Lab Results   Component Value Date    NEUTROABS 0.99 (L) 09/01/2021           Chemistry        Component Value Date/Time     (L) 09/01/2021 1127    K 4.6 09/01/2021 1127     09/01/2021 1127    CO2 22 09/01/2021 1127    BUN 15 09/01/2021 1127    CREATININE 1.13 09/01/2021 1127        Component Value Date/Time    CALCIUM 9.4 09/01/2021 1127    ALKPHOS 94 09/01/2021 1127    AST 20 09/01/2021 1127    ALT 21 09/01/2021 1127    BILITOT 0.20 (L) 09/01/2021 1127          XR CHEST (2 VW)    Result Date: 8/30/2021  EXAMINATION: TWO XRAY VIEWS OF THE CHEST 8/30/2021 11:18 am COMPARISON: Two-view chest from 08/29/2021 HISTORY: ORDERING SYSTEM PROVIDED HISTORY: Pneumothorax TECHNOLOGIST PROVIDED HISTORY: 2 VIEW XRAY PER DR. TOLEDO Pneumothorax Reason for Exam: checking lungs Acuity: Unknown Type of Exam: Unknown Additional history of hypertension, COPD, lung cancer, and MI. FINDINGS: Stable left IJ chemo port with tip position in the proximal SVC. Cardiomediastinal shadow unchanged. Persistent right suprahilar volume loss/ focal opacification and stable small right apical pneumothorax. Additional right perihilar and bibasilar parenchymal densities (interstitial/fibrotic and likely atelectatic changes) again seen, also unchanged. No new pulmonary or significant pleural abnormality. Bones unchanged. Stable findings, including right apical pneumothorax and right chest abnormalities     XR CHEST (2 VW)    Result Date: 8/29/2021  EXAMINATION: TWO XRAY VIEWS OF THE CHEST 8/29/2021 1:31 pm COMPARISON: 08/28/2020 HISTORY: Reason for Exam: ptx Acuity: Acute Type of Exam: Initial FINDINGS: Stable right-sided chest tube. Mild right apical pneumothorax. Persistent right suprahilar upper lobe opacification. Likely bibasilar atelectasis. No sizable effusion. Normal heart size. Stable left-sided Port-A-Cath. Osseous structures appear unchanged.      Mild right apical pneumothorax. Otherwise stable appearing chest as detailed above. The findings were sent to the Radiology Results [id number] at 5:27 pm on 8/29/2021to be communicated to a licensed caregiver. XR CHEST (2 VW)    Result Date: 8/28/2021  EXAMINATION: TWO XRAY VIEWS OF THE CHEST 8/28/2021 1:15 pm COMPARISON: Earlier exam of same date HISTORY: ORDERING SYSTEM PROVIDED HISTORY: PTX, chest tube clamped TECHNOLOGIST PROVIDED HISTORY: PTX, chest tube clamped Reason for Exam: PTX, chest tube clamped Acuity: Chronic Type of Exam: Ongoing FINDINGS/IMPRESSION: Stable right chest tube. No pneumothorax. No significant change from prior exam.     XR CHEST PORTABLE    Result Date: 8/28/2021  EXAMINATION: ONE X-RAY VIEW OF THE CHEST 8/28/2021 8:54 am COMPARISON: 08/27/2021 HISTORY: ORDERING SYSTEM PROVIDED HISTORY:  Pneumo TECHNOLOGIST PROVIDED HISTORY: Pneumo Reason for Exam:  Pneumo Acuity:  Unknown Type of Exam:  Unknown FINDINGS: Small bore right chest tube remains in place. No significant visible pneumothorax on today's study. Persistent right suprahilar opacity and interstitial thickening, which appears chronic. Left chest port remains in place with distal tip in the proximal SVC. Heart size is stable. Right chest tube in place with no significant pneumothorax visualized on today's study. XR CHEST PORTABLE    Result Date: 8/27/2021  EXAMINATION: ONE XRAY VIEW OF THE CHEST 8/27/2021 11:21 am COMPARISON: Earlier exam of same date HISTORY: [address] [facility] [address]: chest tube TECHNOLOGIST PROVIDED HISTORY: chest tube Reason for Exam: post chest tube insertion Acuity: Unknown Type of Exam: Unknown FINDINGS/IMPRESSION: Small chest tube in the upper right hemithorax. Nearly complete resolution of the right pneumothorax with tiny residual right apical pneumothorax remaining. No other significant changes are seen. .     XR CHEST PORTABLE    Result Date: 8/27/2021  EXAMINATION: ONE XRAY VIEW OF THE CHEST 8/27/2021 1:04 pm COMPARISON: None. HISTORY: ORDERING SYSTEM PROVIDED HISTORY: pneumothorax TECHNOLOGIST PROVIDED HISTORY: pneumothorax Reason for Exam: lung ca, pneumo Acuity: Unknown Type of Exam: Unknown FINDINGS: There are perihilar infiltrates. There is a right apical pneumothorax measuring 4.9 cm. There is no effusion. Mediastinal structures are unremarkable. There is a left internal jugular port with the tip in the proximal SVC. Right apical pneumothorax measuring 4.9 cm. Perihilar infiltrates. CT CHEST ABDOMEN PELVIS W CONTRAST    Result Date: 8/26/2021  EXAMINATION: CT OF THE CHEST, ABDOMEN, AND PELVIS WITH CONTRAST 8/26/2021 10:51 am TECHNIQUE: CT of the chest, abdomen and pelvis was performed with the administration of intravenous contrast. Multiplanar reformatted images are provided for review. Dose modulation, iterative reconstruction, and/or weight based adjustment of the mA/kV was utilized to reduce the radiation dose to as low as reasonably achievable. COMPARISON: CT exams 06/16/2021, 03/02/2021, 11/06/2020, 06/26/2020. MRI abdomen 07/01/2020. HISTORY: ORDERING SYSTEM PROVIDED HISTORY: Malignant neoplasm of right lung, unspecified part of lung ([address] TECHNOLOGIST PROVIDED HISTORY: assess responce Reason for Exam: assess response to malignant neoplasm of rt lung Acuity: Chronic Type of Exam: Subsequent/Follow-up FINDINGS: Chest: Mediastinum: Right paratracheal and right paraesophageal lymph nodes at the thoracic inlet appears stable measuring up to 1.3 x 0.7 cm. The right paraesophageal lymph node distally on axial CT image 84 now measures 2.1 x 1.4 cm (most recently 1.9 x 1.3 cm). No new lymphadenopathy identified. No pericardial effusion. Calcified atheromatous plaque and coronary calcification. Lungs/pleura: New moderate size right pneumothorax. The medial right lung opacities with associated bronchiectasis, consistent with scarring is again demonstrated.   The definable nodule that appears in the right middle lobe measuring 1 cm appears larger in the interval, previously 0.8 cm. Previously described 1 cm nodule medially in the right upper lobe is not clearly identified on this exam.  Emphysematous change and right apical bulla again noted. No new airspace disease identified on the left. No effusion. The central airway is patent. Soft Tissues/Bones: The paraspinal soft tissue abnormality along the right lateral aspect of the T10 vertebral body now measures 3.8 x 1.4 cm (previously 3.3 x 1.6 cm). A soft tissue nodule in the extrapleural space near the head of the right 8th rib measures 1.2 cm, previously 0.8 cm. Subtle sclerotic bone lesion in the posterior T11 and T12 vertebral body is again demonstrated. No acute osseous abnormality identified. Abdomen/Pelvis: Organs: Hypoattenuating liver lesions are again demonstrated. The previously measured nodule in the dome of segment 7 on axial image 20 now measures up to 2.7 cm, previously 4.5 cm. A notable residual nodule in the inferior right hepatic lobe on axial image 65 now measures up to 2.2 cm, previously 4.4 cm. Several of the smaller liver lesions are no longer identifiable. Stable right 1.3 cm adrenal nodule. The left adrenal nodule appears stable. The gallbladder, pancreas, spleen, and kidneys appear stable. GI/Bowel: There is no bowel dilatation or wall thickening identified. Pelvis: No acute findings. Peritoneum/Retroperitoneum: Infrarenal aortic aneurysm measures up to 3 cm. No free air or ascites. Bones/Soft Tissues: No acute osseous abnormality identified in this region. 1.  New moderate size right pneumothorax. Findings were discussed with Demi Martinez at 3:42 pm on 8/26/2021. 2.  Overall positive response to therapy. Notable decrease in size of liver metastases.   Small mediastinal lymph nodes are slightly larger in the interval.  Previously noted nodule in the right middle lobe appears slightly larger. 3.  No significant change in right adrenal nodule and bone lesions at T11 and T12. Little interval change in paraspinal soft tissue adjacent to the T10 vertebral body. 4.  Additional stable chronic and benign findings, including infrarenal aortic aneurysm measuring up to 3 cm, as described above. RECOMMENDATIONS: For management of fusiform AAA: 3.0-3.4 cm AAA, recommend follow-up every 3 years. Note: Recommend Vascular consultation if a fusiform AAA enlarges by >0.5 cm in 6 months or >1 cm in 1 year or for a saccular AAA of any size. References: Daniellerayna Goss Radiol 2013; 33(29):727-982; J Vasc Surg. 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 We will hold treatment due to cytopenias. Patient bone marrow has not recovered adequately. Patient was advised to go back to the ER as directed by his primary care physician  Once patient discharged from the hospital and is medically stable we will proceed with treatment as planned. CT scans show response to treatment  Reviewed goals and expectations. Patient wishes to continue treatment especially since his cancer responding    Patient's conditions were taken into consideration when deciding risk-benefit for therapy. Patient is at high risk for drug interaction risk of complications.  Given multiple comorbid conditions patient is at high risk for complication from intervention, risk of hospitalization, medical interaction overall life expectancy. NCCN guidelines were reviewed and discussed with the patient. The diagnosis and care plan were discussed with the patient in detail. I discussed the natural history of the disease, prognosis, risks and goals of therapy and answered all the patients questions to the best of my ability. Patient expressed understanding and was in agreement. Gisselle Garrett MD        This note is created with the assistance of a speech recognition program.  While intending to generate a document that actually reflects the content of the visit, the document can still have some errors including those of syntax and sound a like substitutions which may escape proof reading. It such instances, actual meaning can be extrapolated by contextual diversion.

## 2021-09-01 NOTE — ED PROVIDER NOTES
[address]  EMERGENCY DEPARTMENT ENCOUNTER    Pt Name: Mliadis Mena  MRN: [id number]  YOB: 1950  Date of evaluation:9/1/21  PCP: Natasha Rodarte MD    CHIEF COMPLAINT       Chief Complaint   Patient presents with    Abnormal Chest X-ray       HISTORY OF PRESENT ILLNESS    Miladis Mena is a 80 y.o. male who presents with an abnormal chest x-ray. Patient had an outpatient chest x-ray done which showed a worsening right-sided pneumothorax. He was admitted on August 27 with a spontaneous right-sided pneumothorax, had a small bore chest tube placed at that time which was removed on August 30. He apparently had a residual small right-sided pneumothorax but he had a repeat chest x-ray today which showed a bigger pneumothorax mostly in the right upper lobe of his lung. He denies any complaints. No chest pain, difficulty breathing or increased cough. No fevers or chills. He does have a significant history of lung cancer. Symptoms are acute. Symptoms are moderate. Nothing else make symptoms better or worse. Patient has no other complaints at this time. REVIEW OF SYSTEMS       Review of Systems   Constitutional: Negative for chills, fatigue and fever. HENT: Negative for congestion, ear pain, sore throat and trouble swallowing. Eyes: Negative for visual disturbance. Respiratory: Negative for cough and shortness of breath. Cardiovascular: Negative for chest pain, palpitations and leg swelling. Gastrointestinal: Negative for abdominal pain, blood in stool, constipation, diarrhea, nausea and vomiting. Genitourinary: Negative for dysuria and flank pain. Musculoskeletal: Negative for arthralgias, back pain, myalgias and neck pain. Skin: Negative for color change, rash and wound. Neurological: Negative for dizziness, weakness, light-headedness, numbness and headaches. Psychiatric/Behavioral: Negative for confusion.    All other systems reviewed and are negative. Negative in 10 essential Systems except as mentioned above and in the HPI. PAST MEDICAL HISTORY     Past Medical History:   Diagnosis Date    DDD (degenerative disc disease), cervical     Gout     Heart murmur     hx. of when younger.  Hyperlipidemia     Hypertension     Lung [facility] ([facility]     right lung    MI (myocardial infarction) ([facility]     Skin cancer     face    Wears glasses          SURGICAL HISTORY      has a past surgical history that includes Appendectomy; Tonsillectomy; skin biopsy; skin biopsy; Colonoscopy; Foot surgery (Right); Cardiac catheterization; cyst incision and drainage (Right, 05/14/2020); Forearm surgery (Right, 5/14/2020); and knee surgery (Left, 5/14/2020). CURRENT MEDICATIONS       Discharge Medication List as of 9/1/2021  3:45 PM      CONTINUE these medications which have NOT CHANGED    Details   docusate sodium (COLACE) 100 MG capsule Take 100 mg by mouth 2 times dailyHistorical Med      B Complex-C-Folic Acid (ANGELICA-HUGO) TABS TAKE 1 TABLET BY MOUTH DAILY. HEMATINIC VIT MINERALSHistorical Med      traZODone (DESYREL) 100 MG tablet TAKE 1 TABLET BY MOUTH EVERY DAY AT NIGHTHistorical Med      ipratropium-albuterol (DUONEB) 0.5-2.5 (3) MG/3ML SOLN nebulizer solution Inhale 3 mLs into the lungs 4 times dailyHistorical Med      Melatonin 5 MG CHEW Take 5 mg by mouth nightlyHistorical Med      potassium chloride (MICRO-K) 10 MEQ extended release capsule Take 20 mEq by mouthHistorical Med      Ferrous Fumarate (FERROCITE) 324 (106 Fe) MG TABS Take by mouthHistorical Med      apixaban (ELIQUIS) 5 MG TABS tablet Take 1 tablet by mouth 2 times daily, Disp-60 tablet, R-1Normal      tamsulosin (FLOMAX) 0.4 MG capsule TAKE 1 CAPSULE BY MOUTH EVERY DAY, Disp-30 capsule, R-5Normal      [place]icap Placard [facility] [place], Disp-1 each, R-0, Print      lidocaine-prilocaine (EMLA) 2.5-2.5 % cream Apply topically as needed.   Apply a quarter size amount to port site 1 hour before chemotherapy. Cover with plastic wrap., Disp-30 g, R-0, Normal      acetaminophen (TYLENOL) 325 MG tablet Take 650 mg by mouth every 6 hours as needed for PainHistorical Med      allopurinol (ZYLOPRIM) 100 MG tablet Take 100 mg by mouth dailyHistorical Med      simvastatin (ZOCOR) 40 MG tablet Take 40 mg by mouth dailyHistorical Med      Multiple Vitamins-Minerals (THERAPEUTIC MULTIVITAMIN-MINERALS) tablet Take 1 tablet by mouth daily Historical Med             ALLERGIES     has No Known Allergies. FAMILY HISTORY     He indicated that his mother is . He indicated that his father is . family history includes Cancer in his father. SOCIAL HISTORY      reports that he has been smoking cigarettes. He has been smoking about 0.50 packs per day. He has quit using smokeless tobacco. He reports current drug use. Frequency: 14.00 times per week. Drug: Marijuana. He reports that he does not drink alcohol. PHYSICAL EXAM     INITIAL VITALS:  temporal temperature is 97.6 °F (36.4 °C). His blood pressure is 102/60 and his pulse is 110. His respiration is 17 and oxygen saturation is 99%. Physical Exam  Vitals and nursing note reviewed. Constitutional:       General: He is not in acute distress. HENT:      Head: Normocephalic and atraumatic. Eyes:      Conjunctiva/sclera: Conjunctivae normal.      Pupils: Pupils are equal, round, and reactive to light. Cardiovascular:      Rate and Rhythm: Normal rate and regular rhythm. Heart sounds: Normal heart sounds. No murmur heard. Pulmonary:      Effort: Pulmonary effort is normal. No respiratory distress. Breath sounds: Normal breath sounds. Abdominal:      General: Bowel sounds are normal. There is no distension. Palpations: Abdomen is soft. Tenderness: There is no abdominal tenderness. Musculoskeletal:         General: No tenderness. Cervical back: Neck supple.    Lymphadenopathy:      Cervical: No cervical adenopathy. Skin:     General: Skin is warm and dry. Findings: No rash. Neurological:      Mental Status: He is alert and oriented to person, place, and time. Psychiatric:         Judgment: Judgment normal.           DIFFERENTIAL DIAGNOSIS/MDM:   28-year-old male presents with abnormal chest x-ray showing a right-sided pneumothorax worse than prior. He is afebrile, nontoxic, mildly tachycardic otherwise vital signs normal.  No acute distress. He really has no complaints. His oxygen saturation is 99% on room air. He had lab work today. He does have a pancytopenia likely consistent with his lung cancer. We will speak with his pulmonologist to see if they want us to place another chest tube. DIAGNOSTIC RESULTS     EKG: All EKG's are interpreted by the Emergency Department Physician who either signs or Co-signs this chart in the absence of a cardiologist.        RADIOLOGY:   I directly visualized the following  images and reviewed the radiologist interpretations:  No orders to display           ED BEDSIDE ULTRASOUND:      LABS:  Labs Reviewed - No data to display      EMERGENCY DEPARTMENT COURSE:   Vitals:    Vitals:    09/01/21 1357   BP: 102/60   Pulse: 110   Resp: 17   Temp: 97.6 °F (36.4 °C)   TempSrc: Temporal   SpO2: 99%     2:46 PM EDT  I spoke with patient's pulmonologist Dr. Tessa Lopez about his enlarging pneumothorax on his x-ray today. He is recommending no intervention at this time. He is recommending since the patient is asymptomatic, hemodynamically stable that he is okay to go home, follow-up with him in a week and get a repeat chest x-ray in a week. He thinks this is likely a chronic issue. I received a call from Dr. Shaye Weber his PCP about the case as well. She is concerned that he may need a pleurodesis procedure and is recommending that we send the patient [place] [facility] for evaluation by cardiothoracic surgery.     I spoke with the patient and his wife for a long completed with a voice recognition program.  Efforts were made to edit the dictations but occasionally words are mis-transcribed.)    Sherley Polk DO  Attending Emergency Physician          Sherley Polk DO  09/01/21 [id number]

## 2021-09-01 NOTE — ED TRIAGE NOTES
Mode of arrival (squad #, walk in, police, etc) : Walk in         Chief complaint(s): Abnormal XR of chest       Arrival Note (brief scenario, treatment PTA, etc). : Pt had an XR done today of his chest which showed 50% of RT lung collapsed. Pt was instructed by this  To come to the ER. Pt states overall he does not feel bad. Pt recently had a chest tube placed and removed. Pt is A&Ox4, in no acute distress, respirations even and unlabored, ambulatory with steady gait. C= \"Have you ever felt that you should Cut down on your drinking? \"  No  A= \"Have people Annoyed you by criticizing your drinking? \"  No  G= \"Have you ever felt bad or Guilty about your drinking? \"  No  E= \"Have you ever had a drink as an Eye-opener first thing in the morning to steady your nerves or to help a hangover? \"  No      Deferred []      Reason for deferring: N/A    *If yes to two or more: probable alcohol abuse. *

## 2021-09-01 NOTE — TELEPHONE ENCOUNTER
AVS from 9/1/21    Postpone chemo until pt out of hospital    PT going to [facility] ER    PT was given AVS     Electronically signed by Dakota Brown on 9/1/2021 at 2:07 PM

## 2021-09-01 NOTE — TELEPHONE ENCOUNTER
Pt calls in stating needs chest xray completed. Wants to know if should have done prior to appt today. Recommended to complete xray 1st then come to [facility] for appt.

## 2021-09-02 ENCOUNTER — TELEPHONE (OUTPATIENT)
Dept: INFUSION THERAPY | Age: 71
End: 2021-09-02

## 2021-09-02 NOTE — TELEPHONE ENCOUNTER
Nitza Jama from Dr Victor Manuel Lockett office called and states that [facility] can resume treatment. Will let Nghia Valadez know to put him on the schedule.

## 2021-09-04 LAB
EKG ATRIAL RATE: 98 BPM
EKG P AXIS: 28 DEGREES
EKG P-R INTERVAL: 166 MS
EKG Q-T INTERVAL: 348 MS
EKG QRS DURATION: 86 MS
EKG QTC CALCULATION (BAZETT): 444 MS
EKG R AXIS: 72 DEGREES
EKG T AXIS: 36 DEGREES
EKG VENTRICULAR RATE: 98 BPM

## 2021-09-04 PROCEDURE — 93010 ELECTROCARDIOGRAM REPORT: CPT | Performed by: INTERNAL MEDICINE

## 2021-09-10 ENCOUNTER — HOSPITAL ENCOUNTER (OUTPATIENT)
Dept: INFUSION THERAPY | Age: 71
Discharge: HOME | End: 2021-09-10
Payer: MEDICARE

## 2021-09-10 ENCOUNTER — OFFICE VISIT (OUTPATIENT)
Dept: ONCOLOGY | Age: 71
End: 2021-09-10
Payer: MEDICARE

## 2021-09-10 ENCOUNTER — HOSPITAL ENCOUNTER (OUTPATIENT)
Age: 71
Discharge: HOME | End: 2021-09-12
Payer: MEDICARE

## 2021-09-10 ENCOUNTER — HOSPITAL ENCOUNTER (OUTPATIENT)
Dept: GENERAL RADIOLOGY | Age: 71
Discharge: HOME | End: 2021-09-12
Payer: MEDICARE

## 2021-09-10 VITALS
BODY MASS INDEX: 24.69 KG/M2 | DIASTOLIC BLOOD PRESSURE: 75 MMHG | WEIGHT: 177 LBS | SYSTOLIC BLOOD PRESSURE: 113 MMHG | HEART RATE: 97 BPM | TEMPERATURE: 97.6 F

## 2021-09-10 VITALS
TEMPERATURE: 97.6 F | SYSTOLIC BLOOD PRESSURE: 113 MMHG | BODY MASS INDEX: 24.71 KG/M2 | RESPIRATION RATE: 18 BRPM | DIASTOLIC BLOOD PRESSURE: 75 MMHG | HEART RATE: 97 BPM | WEIGHT: 177.2 LBS

## 2021-09-10 DIAGNOSIS — J93.9 PNEUMOTHORAX ON RIGHT: ICD-10-CM

## 2021-09-10 DIAGNOSIS — D64.81 ANEMIA ASSOCIATED WITH CHEMOTHERAPY: ICD-10-CM

## 2021-09-10 DIAGNOSIS — C34.91 PRIMARY MALIGNANT NEOPLASM OF RIGHT LUNG METASTATIC TO OTHER SITE (HCC): ICD-10-CM

## 2021-09-10 DIAGNOSIS — J93.9 PNEUMOTHORAX, UNSPECIFIED TYPE: ICD-10-CM

## 2021-09-10 DIAGNOSIS — C34.91 PRIMARY LUNG CANCER WITH METASTASIS FROM LUNG TO OTHER SITE, RIGHT (HCC): Primary | ICD-10-CM

## 2021-09-10 DIAGNOSIS — T45.1X5A ANEMIA ASSOCIATED WITH CHEMOTHERAPY: ICD-10-CM

## 2021-09-10 DIAGNOSIS — R53.1 ASTHENIA: ICD-10-CM

## 2021-09-10 LAB
ABSOLUTE EOS #: 0.2 K/UL (ref 0–0.4)
ABSOLUTE IMMATURE GRANULOCYTE: ABNORMAL K/UL (ref 0–0.3)
ABSOLUTE LYMPH #: 0.9 K/UL (ref 1–4.8)
ABSOLUTE MONO #: 1.1 K/UL (ref 0.1–1.2)
ALBUMIN SERPL-MCNC: 4.3 G/DL (ref 3.5–5.2)
ALBUMIN/GLOBULIN RATIO: 1.3 (ref 1–2.5)
ALP BLD-CCNC: 88 U/L (ref 40–129)
ALT SERPL-CCNC: 17 U/L (ref 5–41)
ANION GAP SERPL CALCULATED.3IONS-SCNC: 10 MMOL/L (ref 9–17)
AST SERPL-CCNC: 22 U/L
BASOPHILS # BLD: 1 % (ref 0–2)
BASOPHILS ABSOLUTE: 0.1 K/UL (ref 0–0.2)
BILIRUB SERPL-MCNC: 0.27 MG/DL (ref 0.3–1.2)
BUN BLDV-MCNC: 13 MG/DL (ref 8–23)
BUN/CREAT BLD: ABNORMAL (ref 9–20)
CALCIUM SERPL-MCNC: 9.5 MG/DL (ref 8.6–10.4)
CHLORIDE BLD-SCNC: 102 MMOL/L (ref 98–107)
CO2: 23 MMOL/L (ref 20–31)
CREAT SERPL-MCNC: 0.98 MG/DL (ref 0.7–1.2)
DIFFERENTIAL TYPE: ABNORMAL
EOSINOPHILS RELATIVE PERCENT: 3 % (ref 1–4)
GFR AFRICAN AMERICAN: >60 ML/MIN
GFR NON-AFRICAN AMERICAN: >60 ML/MIN
GFR SERPL CREATININE-BSD FRML MDRD: ABNORMAL ML/MIN/{1.73_M2}
GFR SERPL CREATININE-BSD FRML MDRD: ABNORMAL ML/MIN/{1.73_M2}
GLUCOSE BLD-MCNC: 110 MG/DL (ref 70–99)
HCT VFR BLD CALC: 31.2 % (ref 41–53)
HEMOGLOBIN: 10.4 G/DL (ref 13.5–17.5)
IMMATURE GRANULOCYTES: ABNORMAL %
LYMPHOCYTES # BLD: 15 % (ref 24–44)
MCH RBC QN AUTO: 34.5 PG (ref 26–34)
MCHC RBC AUTO-ENTMCNC: 33.5 G/DL (ref 31–37)
MCV RBC AUTO: 103.2 FL (ref 80–100)
MONOCYTES # BLD: 18 % (ref 2–11)
NRBC AUTOMATED: ABNORMAL PER 100 WBC
PDW BLD-RTO: 17.8 % (ref 12.5–15.4)
PLATELET # BLD: 297 K/UL (ref 140–450)
PLATELET ESTIMATE: ABNORMAL
PMV BLD AUTO: 7 FL (ref 6–12)
POTASSIUM SERPL-SCNC: 4.5 MMOL/L (ref 3.7–5.3)
RBC # BLD: 3.02 M/UL (ref 4.5–5.9)
RBC # BLD: ABNORMAL 10*6/UL
SEG NEUTROPHILS: 63 % (ref 36–66)
SEGMENTED NEUTROPHILS ABSOLUTE COUNT: 3.9 K/UL (ref 1.8–7.7)
SODIUM BLD-SCNC: 135 MMOL/L (ref 135–144)
TOTAL PROTEIN: 7.5 G/DL (ref 6.4–8.3)
WBC # BLD: 6.1 K/UL (ref 3.5–11)
WBC # BLD: ABNORMAL 10*3/UL

## 2021-09-10 PROCEDURE — 4004F PT TOBACCO SCREEN RCVD TLK: CPT | Performed by: INTERNAL MEDICINE

## 2021-09-10 PROCEDURE — 6360000002 HC RX W HCPCS: Performed by: INTERNAL MEDICINE

## 2021-09-10 PROCEDURE — 36591 DRAW BLOOD OFF VENOUS DEVICE: CPT

## 2021-09-10 PROCEDURE — 99214 OFFICE O/P EST MOD 30 MIN: CPT | Performed by: INTERNAL MEDICINE

## 2021-09-10 PROCEDURE — 71046 X-RAY EXAM CHEST 2 VIEWS: CPT

## 2021-09-10 PROCEDURE — 85025 COMPLETE CBC W/AUTO DIFF WBC: CPT

## 2021-09-10 PROCEDURE — G8420 CALC BMI NORM PARAMETERS: HCPCS | Performed by: INTERNAL MEDICINE

## 2021-09-10 PROCEDURE — 2580000003 HC RX 258: Performed by: INTERNAL MEDICINE

## 2021-09-10 PROCEDURE — 4040F PNEUMOC VAC/ADMIN/RCVD: CPT | Performed by: INTERNAL MEDICINE

## 2021-09-10 PROCEDURE — G8427 DOCREV CUR MEDS BY ELIG CLIN: HCPCS | Performed by: INTERNAL MEDICINE

## 2021-09-10 PROCEDURE — 80053 COMPREHEN METABOLIC PANEL: CPT

## 2021-09-10 PROCEDURE — 96413 CHEMO IV INFUSION 1 HR: CPT

## 2021-09-10 PROCEDURE — 99211 OFF/OP EST MAY X REQ PHY/QHP: CPT | Performed by: INTERNAL MEDICINE

## 2021-09-10 PROCEDURE — 96375 TX/PRO/DX INJ NEW DRUG ADDON: CPT

## 2021-09-10 PROCEDURE — 1111F DSCHRG MED/CURRENT MED MERGE: CPT | Performed by: INTERNAL MEDICINE

## 2021-09-10 PROCEDURE — 1123F ACP DISCUSS/DSCN MKR DOCD: CPT | Performed by: INTERNAL MEDICINE

## 2021-09-10 PROCEDURE — 3017F COLORECTAL CA SCREEN DOC REV: CPT | Performed by: INTERNAL MEDICINE

## 2021-09-10 RX ORDER — DEXAMETHASONE SODIUM PHOSPHATE 10 MG/ML
10 INJECTION INTRAMUSCULAR; INTRAVENOUS ONCE
Status: COMPLETED | OUTPATIENT
Start: 2021-09-10 | End: 2021-09-10

## 2021-09-10 RX ORDER — DIPHENHYDRAMINE HYDROCHLORIDE 50 MG/ML
50 INJECTION INTRAMUSCULAR; INTRAVENOUS ONCE
Status: CANCELLED | OUTPATIENT
Start: 2021-10-01 | End: 2021-10-01

## 2021-09-10 RX ORDER — PALONOSETRON 0.05 MG/ML
0.25 INJECTION, SOLUTION INTRAVENOUS ONCE
Status: CANCELLED | OUTPATIENT
Start: 2021-10-01

## 2021-09-10 RX ORDER — SODIUM CHLORIDE 9 MG/ML
20 INJECTION, SOLUTION INTRAVENOUS ONCE
Status: CANCELLED | OUTPATIENT
Start: 2021-10-01 | End: 2021-10-01

## 2021-09-10 RX ORDER — SODIUM CHLORIDE 9 MG/ML
INJECTION, SOLUTION INTRAVENOUS CONTINUOUS
Status: CANCELLED | OUTPATIENT
Start: 2021-10-01

## 2021-09-10 RX ORDER — SODIUM CHLORIDE 0.9 % (FLUSH) 0.9 %
5-40 SYRINGE (ML) INJECTION PRN
Status: CANCELLED | OUTPATIENT
Start: 2021-10-01

## 2021-09-10 RX ORDER — EPINEPHRINE 1 MG/ML
0.3 INJECTION, SOLUTION, CONCENTRATE INTRAVENOUS PRN
Status: CANCELLED | OUTPATIENT
Start: 2021-10-01

## 2021-09-10 RX ORDER — PALONOSETRON 0.05 MG/ML
0.25 INJECTION, SOLUTION INTRAVENOUS ONCE
Status: COMPLETED | OUTPATIENT
Start: 2021-09-10 | End: 2021-09-10

## 2021-09-10 RX ORDER — SODIUM CHLORIDE 9 MG/ML
20 INJECTION, SOLUTION INTRAVENOUS ONCE
Status: COMPLETED | OUTPATIENT
Start: 2021-09-10 | End: 2021-09-10

## 2021-09-10 RX ORDER — SODIUM CHLORIDE 0.9 % (FLUSH) 0.9 %
5-40 SYRINGE (ML) INJECTION PRN
Status: DISCONTINUED | OUTPATIENT
Start: 2021-09-10 | End: 2021-09-11 | Stop reason: HOSPADM

## 2021-09-10 RX ORDER — HEPARIN SODIUM (PORCINE) LOCK FLUSH IV SOLN 100 UNIT/ML 100 UNIT/ML
500 SOLUTION INTRAVENOUS PRN
Status: DISCONTINUED | OUTPATIENT
Start: 2021-09-10 | End: 2021-09-11 | Stop reason: HOSPADM

## 2021-09-10 RX ORDER — METHYLPREDNISOLONE SODIUM SUCCINATE 125 MG/2ML
125 INJECTION, POWDER, LYOPHILIZED, FOR SOLUTION INTRAMUSCULAR; INTRAVENOUS ONCE
Status: CANCELLED | OUTPATIENT
Start: 2021-10-01 | End: 2021-10-01

## 2021-09-10 RX ORDER — SODIUM CHLORIDE 9 MG/ML
25 INJECTION, SOLUTION INTRAVENOUS PRN
Status: CANCELLED | OUTPATIENT
Start: 2021-10-01

## 2021-09-10 RX ORDER — HEPARIN SODIUM (PORCINE) LOCK FLUSH IV SOLN 100 UNIT/ML 100 UNIT/ML
500 SOLUTION INTRAVENOUS PRN
Status: CANCELLED | OUTPATIENT
Start: 2021-10-01

## 2021-09-10 RX ADMIN — PALONOSETRON 0.25 MG: 0.05 INJECTION, SOLUTION INTRAVENOUS at 12:45

## 2021-09-10 RX ADMIN — SODIUM CHLORIDE, PRESERVATIVE FREE 10 ML: 5 INJECTION INTRAVENOUS at 14:35

## 2021-09-10 RX ADMIN — LURBINECTEDIN 6.5 MG: 0.5 INJECTION, POWDER, LYOPHILIZED, FOR SOLUTION INTRAVENOUS at 13:17

## 2021-09-10 RX ADMIN — HEPARIN 500 UNITS: 100 SYRINGE at 14:35

## 2021-09-10 RX ADMIN — SODIUM CHLORIDE, PRESERVATIVE FREE 10 ML: 5 INJECTION INTRAVENOUS at 12:22

## 2021-09-10 RX ADMIN — DEXAMETHASONE SODIUM PHOSPHATE 10 MG: 10 INJECTION INTRAMUSCULAR; INTRAVENOUS at 12:46

## 2021-09-10 RX ADMIN — SODIUM CHLORIDE 20 ML/HR: 9 INJECTION, SOLUTION INTRAVENOUS at 12:45

## 2021-09-10 NOTE — PROGRESS NOTES
Pt here for [address]. Denies any new complaints. Labs drawn from port and results reviewed. Pt see by Dr Jazmine Saunders at chair side for follow up, refer to his note. Pt was treated without incident and d/c'd in stable condition. Pt will return on 9-29-21 for MD follow up and C5D1.

## 2021-09-10 NOTE — PROGRESS NOTES
Miladis Mena                                                                                                                  9/10/2021  MRN:   [id number]  YOB: 1950  PCP:                           Natasha Rodarte MD  Referring Physician: No ref. 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 Maintenance Tecentriq, 3/2021  Lurbenectidin-7/2/2021    Summary of Case/History:    Miladis Mena a 80 y.o.male is a patient diagnosed with lung cancer with the component of small cell as well as squamous cell carcinoma    Patient has a significant history of tobacco dependence and underwent CT lung screening in December 2018. CT scan was read as lung rads degree 4B. Subsequently she underwent biopsy of right upper lobe lung nodule on 1/16/19. 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 CT PET done showed abnormal FDG uptake in the right upper lobe nodule. There was also abnormal FDG uptake in the right lower lobe nodule. Additionally right hilar lymph node were also FDG avid. No bony lesion was reported. MRI brain for staging workup did not show any evidence of intracranial metastasis.   Tip of the odontoid process was ill-defined and metastasis could not be ruled out. Clinically patient does give history of trauma to the neck area any years ago however denies any surgery history. Bone scan did not reveal any metastasis     Patient continues to smoke but has cut down quite a bit. He works full-time in a [address] Patient has good performance status, ECOG 0. Patient's other medical problems include dyslipidemia and hypertension. He also has arthritis pain    Started patient on concurrent chemoradiation using cisplatin and etoposide with dose adjustment for renal dysfunction. Chemotherapy changed to carboplatin and etoposide from cycle #2 due to renal dysfunction    Received PCI in July 2019. Interim History:    Patient presents to the clinic for a follow-up visit and for toxicity check and to review results of her blood work-up. Patient has been tolerating treatment without any unexpected or severe side effects. Denies hospitalization or ER visit. Appetite is good. Weight is stable. Nausea is controlled. Patient pneumothorax is improving. During this visit patient's allergy, social, medical, surgical history and medications were reviewed and updated. Past Medical History:   Past Medical History:   Diagnosis Date    DDD (degenerative disc disease), cervical     Gout     Heart murmur     hx. of when younger.  Hyperlipidemia     Hypertension     Lung cancer ([address]     right lung    MI (myocardial infarction) ([address]     Skin cancer     face    Wears glasses        Past Surgical History:     Past Surgical History:   Procedure Laterality Date    APPENDECTOMY      CARDIAC CATHETERIZATION      w/stent    COLONOSCOPY      CYST INCISION AND DRAINAGE Right 05/14/2020    rt elbow I and D and left knee aspiration with cultures    FOOT SURGERY Right     2nd toe(bone spur).     FOREARM SURGERY Right 5/14/2020    RIGHT ELBOW IRRIGATION AND DEBRIDEMENT performed by De Devine DO at [facility] Left 5/14/2020    KNEE ASPIRATION WITH CULTURES SENT performed by Loree Kussmaul, DO at [address]      face under lt. eye.  TONSILLECTOMY         Patient Family Social History:    Family History   Problem Relation Age of Onset    Cancer Father         lung cancer      Social History     Socioeconomic History    Marital status:      Spouse name: Not on file    Number of children: Not on file    Years of education: Not on file    Highest education level: Not on file   Occupational History    Not on file   Tobacco Use    Smoking status: Current Every Day Smoker     Packs/day: 0.50     Types: Cigarettes    Smokeless tobacco: Former User   Vaping Use    Vaping Use: Former   Substance and Sexual Activity    Alcohol use: No    Drug use: Yes     Frequency: 14.0 times per week     Types: Marijuana    Sexual activity: Not on file   Other Topics Concern    Not on file   Social History Narrative    Not on file     Social Determinants of Health     Financial Resource Strain:     Difficulty of Paying Living Expenses:    Food Insecurity:     Worried About [address] in the Last Year:     [address] [employer] in the Last Year:    Transportation Needs:     Lack of Transportation (Medical):      Lack of Transportation (Non-Medical):    Physical Activity:     Days of Exercise per Week:     Minutes of Exercise per Session:    Stress:     Feeling of Stress :    Social Connections:     Frequency of Communication with Friends and Family:     Frequency of Social Gatherings with Friends and Family:     Attends Temple Services:     Active Member of Clubs or Organizations:     Attends Club or Organization Meetings:     Marital Status:    Intimate Partner Violence:     Fear of Current or Ex-Partner:     Emotionally Abused:     Physically Abused:     Sexually Abused:       Current Medications:     Current Outpatient Medications   Medication Sig Dispense Refill    docusate sodium (COLACE) 100 MG capsule (Order-Specific) IntraVENous Once [facility], MD           Allergies:   Patient has no known allergies. Review of Systems:    Constitutional: No fever or chills. No night sweats, positive fatigue. +weight loss, dysgeusia, poor appetite  Eyes: No eye discharge, double vision, or eye pain   HEENT: negative for sore mouth, sore throat, hoarseness and voice change; Respiratory: negative for cough, sputum, wheezing, hemoptysis, chest pain; +exertional shortness of breath  Cardiovascular: negative for chest pain, palpitations, orthopnea, PND   Gastrointestinal: negative for nausea, vomiting, diarrhea, constipation, abdominal pain, Dysphagia, hematemesis and hematochezia   Genitourinary: negative for frequency, dysuria, nocturia, urinary incontinence, and hematuria +urgency  Integument: Positive for easy bruising - stable +rasied area to the left of the spine that is tender  Hematologic/Lymphatic: negative for easy bleeding, lymphadenopathy, or petechiae   Endocrine: negative for heat or cold intolerance,weight changes, change in bowel habits and hair loss   Musculoskeletal: Positive joint pain.  +right shoulder pain  Neurological: negative for headaches, dizziness, seizures, weakness; + poor balance; +neuropathy in left hand from shoulder +poor short term memory        Physical Exam:    Vitals: /75   Pulse 97   Temp 97.6 °F (36.4 °C) (Oral)   Wt 177 lb (80.3 kg)   BMI 24.69 kg/m²   General appearance -patient not in acute distress  Mental status - AAO X3  Eyes - pupils equal and reactive, extraocular eye movements intact  Mouth - mucous membranes moist, pharynx normal without lesions  Neck - supple, no significant adenopathy  Lymphatics - no palpable lymphadenopathy, no hepatosplenomegaly  Chest - clear to auscultation, rales or rhonchi, symmetric air entry  Heart - normal rate, regular rhythm, normal S1, S2, no murmurs  Abdomen - soft, nontender, nondistended, no masses or organomegaly  Neurological - alert, oriented, normal speech, no focal findings or movement disorder noted  Extremities -+1 lower extremity pitting edema. Right arm has bruising, no point tenderness or swelling  Skin - normal coloration and turgor, no rashes, no suspicious skin lesions noted; +raised area with tenderness to the left of the spine, no erythema        DATA:    Labs:   Lab Results   Component Value Date    WBC 6.1 09/10/2021    HGB 10.4 (L) 09/10/2021    HCT 31.2 (L) 09/10/2021    .2 (H) 09/10/2021     09/10/2021     Lab Results   Component Value Date    NEUTROABS 3.90 09/10/2021           Chemistry        Component Value Date/Time     09/10/2021 1225    K 4.5 09/10/2021 1225     09/10/2021 1225    CO2 23 09/10/2021 1225    BUN 13 09/10/2021 1225    CREATININE 0.98 09/10/2021 1225        Component Value Date/Time    CALCIUM 9.5 09/10/2021 1225    ALKPHOS 88 09/10/2021 1225    AST 22 09/10/2021 1225    ALT 17 09/10/2021 1225    BILITOT 0.27 (L) 09/10/2021 1225        MRI BRAIN W WO CONTRAST    Result Date: 7/8/2021  EXAMINATION: MRI OF THE BRAIN WITHOUT AND WITH CONTRAST  7/8/2021 10:31 am TECHNIQUE: Multiplanar multisequence MRI of the head/brain was performed without and with the administration of intravenous contrast. COMPARISON: MRI brain performed 03/02/2021. HISTORY: ORDERING SYSTEM PROVIDED HISTORY: Primary lung cancer with metastasis from lung to other site, right [facility]) TECHNOLOGIST PROVIDED HISTORY: small cell lung cancer Reason for Exam: small cell lung cancer, Primary lung cancer with metastasis from lung to other site, right [facility]) Acuity: Unknown Type of Exam: Subsequent/Follow-up Additional signs and symptoms: no current symptoms FINDINGS: INTRACRANIAL STRUCTURES/VENTRICLES:  The sellar and suprasellar structures, optic chiasm, corpus callosum, pineal gland, tectum, and midline brainstem structures are unremarkable. The craniocervical junction is unremarkable.  There is no acute hemorrhage, mass effect, or midline shift. There is satisfactory overall gray-white matter differentiation. There is extensive chronic microvascular disease. The ventricular structures are symmetric and unremarkable. The infratentorial structures including the cerebellopontine angles and internal auditory canals are unremarkable. There is no abnormal restricted diffusion. There is no abnormal blooming artifact on susceptibility weighted imaging. No abnormal postcontrast enhancement. ORBITS: The visualized portion of the orbits demonstrate no acute abnormality. SINUSES: The visualized paranasal sinuses and mastoid air cells are well aerated. BONES/SOFT TISSUES: The bone marrow signal intensity appears normal. The soft tissues demonstrate no acute abnormality. Extensive chronic microvascular disease without acute intracranial abnormality. No abnormal postcontrast enhancement.        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 Patient is tolerating treatment without unexpected side effects  Labs are adequate for treatment. We will proceed with treatment   Reiterated treatment plan. Reviewed risk benefit profile and reiterated potential side-effects of ongoing treatment  CT scans show response to treatment. Continue conservative management of pneumothorax. Patient is following up with pulmonary team.  Reviewed goals and expectations. Discussed with patient wife at bedside  NCCN guidelines were reviewed and discussed with the patient.   The diagnosis and care plan were discussed with the patient in detail. I discussed the natural history of the disease, prognosis, risks and goals of therapy and answered all the patients questions to the best of my ability. Patient expressed understanding and was in agreement. Mando Marquez MD        This note is created with the assistance of a speech recognition program.  While intending to generate a document that actually reflects the content of the visit, the document can still have some errors including those of syntax and sound a like substitutions which may escape proof reading. It such instances, actual meaning can be extrapolated by contextual diversion.

## 2021-09-29 ENCOUNTER — OFFICE VISIT (OUTPATIENT)
Dept: ONCOLOGY | Age: 71
End: 2021-09-29
Payer: MEDICARE

## 2021-09-29 ENCOUNTER — TELEPHONE (OUTPATIENT)
Dept: ONCOLOGY | Age: 71
End: 2021-09-29

## 2021-09-29 ENCOUNTER — HOSPITAL ENCOUNTER (OUTPATIENT)
Dept: INFUSION THERAPY | Age: 71
Discharge: HOME | End: 2021-09-29
Payer: MEDICARE

## 2021-09-29 VITALS
TEMPERATURE: 96.5 F | WEIGHT: 175.7 LBS | BODY MASS INDEX: 24.51 KG/M2 | HEART RATE: 98 BPM | DIASTOLIC BLOOD PRESSURE: 70 MMHG | SYSTOLIC BLOOD PRESSURE: 104 MMHG

## 2021-09-29 DIAGNOSIS — R53.1 ASTHENIA: ICD-10-CM

## 2021-09-29 DIAGNOSIS — C34.91 PRIMARY LUNG CANCER WITH METASTASIS FROM LUNG TO OTHER SITE, RIGHT (HCC): Primary | ICD-10-CM

## 2021-09-29 DIAGNOSIS — G89.3 CANCER ASSOCIATED PAIN: ICD-10-CM

## 2021-09-29 DIAGNOSIS — T45.1X5A ANEMIA ASSOCIATED WITH CHEMOTHERAPY: ICD-10-CM

## 2021-09-29 DIAGNOSIS — D64.81 ANEMIA ASSOCIATED WITH CHEMOTHERAPY: ICD-10-CM

## 2021-09-29 DIAGNOSIS — J93.9 PNEUMOTHORAX, UNSPECIFIED TYPE: ICD-10-CM

## 2021-09-29 LAB
ABSOLUTE EOS #: 0.24 K/UL (ref 0–0.4)
ABSOLUTE IMMATURE GRANULOCYTE: ABNORMAL K/UL (ref 0–0.3)
ABSOLUTE LYMPH #: 0.66 K/UL (ref 1–4.8)
ABSOLUTE MONO #: 1.02 K/UL (ref 0.1–0.8)
ALBUMIN SERPL-MCNC: 4.1 G/DL (ref 3.5–5.2)
ALBUMIN/GLOBULIN RATIO: 1.5 (ref 1–2.5)
ALP BLD-CCNC: 82 U/L (ref 40–129)
ALT SERPL-CCNC: 14 U/L (ref 5–41)
ANION GAP SERPL CALCULATED.3IONS-SCNC: 11 MMOL/L (ref 9–17)
AST SERPL-CCNC: 21 U/L
BASOPHILS # BLD: 1 % (ref 0–2)
BASOPHILS ABSOLUTE: 0.03 K/UL (ref 0–0.2)
BILIRUB SERPL-MCNC: 0.15 MG/DL (ref 0.3–1.2)
BUN BLDV-MCNC: 15 MG/DL (ref 8–23)
BUN/CREAT BLD: ABNORMAL (ref 9–20)
CALCIUM SERPL-MCNC: 9.6 MG/DL (ref 8.6–10.4)
CHLORIDE BLD-SCNC: 104 MMOL/L (ref 98–107)
CO2: 22 MMOL/L (ref 20–31)
CREAT SERPL-MCNC: 1.14 MG/DL (ref 0.7–1.2)
DIFFERENTIAL TYPE: ABNORMAL
EOSINOPHILS RELATIVE PERCENT: 8 % (ref 1–4)
GFR AFRICAN AMERICAN: >60 ML/MIN
GFR NON-AFRICAN AMERICAN: >60 ML/MIN
GFR SERPL CREATININE-BSD FRML MDRD: ABNORMAL ML/MIN/{1.73_M2}
GFR SERPL CREATININE-BSD FRML MDRD: ABNORMAL ML/MIN/{1.73_M2}
GLUCOSE BLD-MCNC: 113 MG/DL (ref 70–99)
HCT VFR BLD CALC: 29 % (ref 41–53)
HEMOGLOBIN: 9.9 G/DL (ref 13.5–17.5)
IMMATURE GRANULOCYTES: ABNORMAL %
LYMPHOCYTES # BLD: 22 % (ref 24–44)
MCH RBC QN AUTO: 35.9 PG (ref 26–34)
MCHC RBC AUTO-ENTMCNC: 34.1 G/DL (ref 31–37)
MCV RBC AUTO: 105.4 FL (ref 80–100)
MONOCYTES # BLD: 34 % (ref 1–7)
MORPHOLOGY: NORMAL
NRBC AUTOMATED: ABNORMAL PER 100 WBC
PDW BLD-RTO: 17.6 % (ref 12.5–15.4)
PLATELET # BLD: 261 K/UL (ref 140–450)
PLATELET ESTIMATE: ABNORMAL
PMV BLD AUTO: 7.1 FL (ref 6–12)
POTASSIUM SERPL-SCNC: 4.5 MMOL/L (ref 3.7–5.3)
RBC # BLD: 2.75 M/UL (ref 4.5–5.9)
RBC # BLD: ABNORMAL 10*6/UL
SEG NEUTROPHILS: 35 % (ref 36–66)
SEGMENTED NEUTROPHILS ABSOLUTE COUNT: 1.05 K/UL (ref 1.8–7.7)
SODIUM BLD-SCNC: 137 MMOL/L (ref 135–144)
TOTAL PROTEIN: 6.8 G/DL (ref 6.4–8.3)
WBC # BLD: 3 K/UL (ref 3.5–11)
WBC # BLD: ABNORMAL 10*3/UL

## 2021-09-29 PROCEDURE — 99211 OFF/OP EST MAY X REQ PHY/QHP: CPT | Performed by: INTERNAL MEDICINE

## 2021-09-29 PROCEDURE — 96375 TX/PRO/DX INJ NEW DRUG ADDON: CPT

## 2021-09-29 PROCEDURE — 6360000002 HC RX W HCPCS: Performed by: INTERNAL MEDICINE

## 2021-09-29 PROCEDURE — 96413 CHEMO IV INFUSION 1 HR: CPT

## 2021-09-29 PROCEDURE — 4004F PT TOBACCO SCREEN RCVD TLK: CPT | Performed by: INTERNAL MEDICINE

## 2021-09-29 PROCEDURE — 3017F COLORECTAL CA SCREEN DOC REV: CPT | Performed by: INTERNAL MEDICINE

## 2021-09-29 PROCEDURE — 2580000003 HC RX 258: Performed by: INTERNAL MEDICINE

## 2021-09-29 PROCEDURE — G8427 DOCREV CUR MEDS BY ELIG CLIN: HCPCS | Performed by: INTERNAL MEDICINE

## 2021-09-29 PROCEDURE — 99214 OFFICE O/P EST MOD 30 MIN: CPT | Performed by: INTERNAL MEDICINE

## 2021-09-29 PROCEDURE — 1111F DSCHRG MED/CURRENT MED MERGE: CPT | Performed by: INTERNAL MEDICINE

## 2021-09-29 PROCEDURE — 1123F ACP DISCUSS/DSCN MKR DOCD: CPT | Performed by: INTERNAL MEDICINE

## 2021-09-29 PROCEDURE — 36591 DRAW BLOOD OFF VENOUS DEVICE: CPT

## 2021-09-29 PROCEDURE — 85025 COMPLETE CBC W/AUTO DIFF WBC: CPT

## 2021-09-29 PROCEDURE — 4040F PNEUMOC VAC/ADMIN/RCVD: CPT | Performed by: INTERNAL MEDICINE

## 2021-09-29 PROCEDURE — G8420 CALC BMI NORM PARAMETERS: HCPCS | Performed by: INTERNAL MEDICINE

## 2021-09-29 PROCEDURE — 80053 COMPREHEN METABOLIC PANEL: CPT

## 2021-09-29 RX ORDER — DIPHENHYDRAMINE HYDROCHLORIDE 50 MG/ML
50 INJECTION INTRAMUSCULAR; INTRAVENOUS ONCE
Status: CANCELLED | OUTPATIENT
Start: 2021-10-22 | End: 2021-10-22

## 2021-09-29 RX ORDER — SODIUM CHLORIDE 9 MG/ML
INJECTION, SOLUTION INTRAVENOUS CONTINUOUS
Status: CANCELLED | OUTPATIENT
Start: 2021-10-22

## 2021-09-29 RX ORDER — METHYLPREDNISOLONE SODIUM SUCCINATE 125 MG/2ML
125 INJECTION, POWDER, LYOPHILIZED, FOR SOLUTION INTRAMUSCULAR; INTRAVENOUS ONCE
Status: CANCELLED | OUTPATIENT
Start: 2021-10-22 | End: 2021-10-22

## 2021-09-29 RX ORDER — SODIUM CHLORIDE 9 MG/ML
25 INJECTION, SOLUTION INTRAVENOUS PRN
Status: CANCELLED | OUTPATIENT
Start: 2021-10-22

## 2021-09-29 RX ORDER — SODIUM CHLORIDE 9 MG/ML
20 INJECTION, SOLUTION INTRAVENOUS ONCE
Status: CANCELLED | OUTPATIENT
Start: 2021-10-22 | End: 2021-10-22

## 2021-09-29 RX ORDER — DEXAMETHASONE SODIUM PHOSPHATE 10 MG/ML
10 INJECTION INTRAMUSCULAR; INTRAVENOUS ONCE
Status: COMPLETED | OUTPATIENT
Start: 2021-09-29 | End: 2021-09-29

## 2021-09-29 RX ORDER — HEPARIN SODIUM (PORCINE) LOCK FLUSH IV SOLN 100 UNIT/ML 100 UNIT/ML
500 SOLUTION INTRAVENOUS PRN
Status: DISCONTINUED | OUTPATIENT
Start: 2021-09-29 | End: 2021-09-30 | Stop reason: HOSPADM

## 2021-09-29 RX ORDER — HEPARIN SODIUM (PORCINE) LOCK FLUSH IV SOLN 100 UNIT/ML 100 UNIT/ML
500 SOLUTION INTRAVENOUS PRN
Status: CANCELLED | OUTPATIENT
Start: 2021-10-22

## 2021-09-29 RX ORDER — SODIUM CHLORIDE 9 MG/ML
20 INJECTION, SOLUTION INTRAVENOUS ONCE
Status: COMPLETED | OUTPATIENT
Start: 2021-09-29 | End: 2021-09-29

## 2021-09-29 RX ORDER — HYDROCODONE BITARTRATE AND ACETAMINOPHEN 5; 325 MG/1; MG/1
1 TABLET ORAL EVERY 8 HOURS PRN
Qty: 45 TABLET | Refills: 0 | Status: SHIPPED | OUTPATIENT
Start: 2021-09-29 | End: 2021-10-14

## 2021-09-29 RX ORDER — EPINEPHRINE 1 MG/ML
0.3 INJECTION, SOLUTION, CONCENTRATE INTRAVENOUS PRN
Status: CANCELLED | OUTPATIENT
Start: 2021-10-22

## 2021-09-29 RX ORDER — SODIUM CHLORIDE 0.9 % (FLUSH) 0.9 %
5-40 SYRINGE (ML) INJECTION PRN
Status: DISCONTINUED | OUTPATIENT
Start: 2021-09-29 | End: 2021-09-30 | Stop reason: HOSPADM

## 2021-09-29 RX ORDER — PALONOSETRON 0.05 MG/ML
0.25 INJECTION, SOLUTION INTRAVENOUS ONCE
Status: CANCELLED | OUTPATIENT
Start: 2021-10-22

## 2021-09-29 RX ORDER — PALONOSETRON 0.05 MG/ML
0.25 INJECTION, SOLUTION INTRAVENOUS ONCE
Status: COMPLETED | OUTPATIENT
Start: 2021-09-29 | End: 2021-09-29

## 2021-09-29 RX ORDER — SODIUM CHLORIDE 0.9 % (FLUSH) 0.9 %
5-40 SYRINGE (ML) INJECTION PRN
Status: CANCELLED | OUTPATIENT
Start: 2021-10-22

## 2021-09-29 RX ADMIN — SODIUM CHLORIDE, PRESERVATIVE FREE 20 ML: 5 INJECTION INTRAVENOUS at 12:30

## 2021-09-29 RX ADMIN — SODIUM CHLORIDE, PRESERVATIVE FREE 10 ML: 5 INJECTION INTRAVENOUS at 10:05

## 2021-09-29 RX ADMIN — SODIUM CHLORIDE 20 ML/HR: 9 INJECTION, SOLUTION INTRAVENOUS at 11:05

## 2021-09-29 RX ADMIN — LURBINECTEDIN 6.5 MG: 0.5 INJECTION, POWDER, LYOPHILIZED, FOR SOLUTION INTRAVENOUS at 11:27

## 2021-09-29 RX ADMIN — PALONOSETRON 0.25 MG: 0.05 INJECTION, SOLUTION INTRAVENOUS at 11:07

## 2021-09-29 RX ADMIN — HEPARIN 500 UNITS: 100 SYRINGE at 12:31

## 2021-09-29 RX ADMIN — DEXAMETHASONE SODIUM PHOSPHATE 10 MG: 10 INJECTION INTRAMUSCULAR; INTRAVENOUS at 11:08

## 2021-09-29 RX ADMIN — SODIUM CHLORIDE, PRESERVATIVE FREE 10 ML: 5 INJECTION INTRAVENOUS at 10:06

## 2021-09-29 NOTE — PROGRESS NOTES
Pt here for C5D1 zepzelca. Denies any new complaints. Labs drawn from port and results reviewed. Pt was seen by Dr. Rancho Chaparro prior to treatment. ANC 1.05 discussed with him and order received to proceed with chemo. Pt was treated without incident and d/c'd in stable condition. Returns 10/20/21 for C6D1 and MD follow up.

## 2021-09-29 NOTE — PROGRESS NOTES
Landon Duncan                                                                                                                  9/29/2021  MRN:   [id number]  YOB: 1950  PCP:                           Jenniffer Orosco MD  Referring Physician: No ref. 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 Chemotherapy changed to carboplatin and etoposide due to renal insufficiency from cycle #2  PCI 7/2019  SRS to adrenal gland metastasis, completed 07/2020  systemic palliative chemoimmunotherapy with carboplatin etoposide and Tecentriq, 12/2020 x4 cycles. Maintenance Tecentriq, 3/2021  Lurbenectidin7/2/2021    Summary of Case/History:    Landon Duncan a 80 y.o.male is a patient diagnosed with lung cancer with the component of small cell as well as squamous cell carcinoma    Patient has a significant history of tobacco dependence and underwent CT lung screening in December 2018. CT scan was read as lung rads degree 4B. Subsequently she underwent biopsy of right upper lobe lung nodule on 1/16/19. 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 CT PET done showed abnormal FDG uptake in the right upper lobe nodule. There was also abnormal FDG uptake in the right lower lobe nodule. Additionally right hilar lymph node were also FDG avid. No bony lesion was reported. MRI brain for staging workup did not show any evidence of intracranial metastasis.   Tip of the odontoid process was ill-defined and metastasis could not be performed by Steve Arnold DO at [address] [facility]      face under lt. eye.  TONSILLECTOMY         Patient Family Social History:    Family History   Problem Relation Age of Onset    Cancer Father         lung cancer      Social History     Socioeconomic History    Marital status:      Spouse name: Not on file    Number of children: Not on file    Years of education: Not on file    Highest education level: Not on file   Occupational History    Not on file   Tobacco Use    Smoking status: Current Every Day Smoker     Packs/day: 0.50     Types: Cigarettes    Smokeless tobacco: Former User   Vaping Use    Vaping Use: Former   Substance and Sexual Activity    Alcohol use: No    Drug use: Yes     Frequency: 14.0 times per week     Types: Marijuana    Sexual activity: Not on file   Other Topics Concern    Not on file   Social History Narrative    Not on file     Social Determinants of Health     Financial Resource Strain:     Difficulty of Paying Living Expenses:    Food Insecurity:     Worried About [address] in the Last Year:     [address] in the Last Year:    Transportation Needs:     Lack of Transportation (Medical):      Lack of Transportation (Non-Medical):    Physical Activity:     Days of Exercise per Week:     Minutes of Exercise per Session:    Stress:     Feeling of Stress :    Social Connections:     Frequency of Communication with Friends and Family:     Frequency of Social Gatherings with Friends and Family:     Attends Denominational Services:     Active Member of Clubs or Organizations:     Attends Club or Organization Meetings:     Marital Status:    Intimate Partner Violence:     Fear of Current or Ex-Partner:     Emotionally Abused:     Physically Abused:     Sexually Abused:       Current Medications:     Current Outpatient Medications   Medication Sig Dispense Refill    docusate sodium (COLACE) 100 MG capsule Take 100 mg by mouth 2 times daily      B Complex-C-Folic Acid (ANGELICA-HUGO) TABS TAKE 1 TABLET BY MOUTH DAILY. HEMATINIC VIT MINERALS      traZODone (DESYREL) 100 MG tablet TAKE 1 TABLET BY MOUTH EVERY DAY AT NIGHT      ipratropium-albuterol (DUONEB) 0.5-2.5 (3) MG/3ML SOLN nebulizer solution Inhale 3 mLs into the lungs 4 times daily      Melatonin 5 MG CHEW Take 5 mg by mouth nightly      potassium chloride (MICRO-K) 10 MEQ extended release capsule Take 20 mEq by mouth      Ferrous Fumarate (FERROCITE) 324 (106 Fe) MG TABS Take by mouth      apixaban (ELIQUIS) 5 MG TABS tablet Take 1 tablet by mouth 2 times daily 60 tablet 1    tamsulosin (FLOMAX) 0.4 MG capsule TAKE 1 CAPSULE BY MOUTH EVERY DAY 30 capsule 5    Handicap Placard MISC by Does not apply route 1 each 0    lidocaine-prilocaine (EMLA) 2.5-2.5 % cream Apply topically as needed. Apply a quarter size amount to port site 1 hour before chemotherapy. Cover with plastic wrap. 30 g 0    acetaminophen (TYLENOL) 325 MG tablet Take 650 mg by mouth every 6 hours as needed for Pain      allopurinol (ZYLOPRIM) 100 MG tablet Take 100 mg by mouth daily      simvastatin (ZOCOR) 40 MG tablet Take 40 mg by mouth daily      Multiple Vitamins-Minerals (THERAPEUTIC MULTIVITAMIN-MINERALS) tablet Take 1 tablet by mouth daily        No current facility-administered medications for this visit. Allergies:   Patient has no known allergies. Review of Systems:    Constitutional: No fever or chills. No night sweats, positive fatigue. Weight now stable. Loss of appetite. Eyes: No eye discharge, double vision, or eye pain   HEENT: negative for sore mouth, sore throat, hoarseness and voice change;    Respiratory: negative for cough, sputum, wheezing, hemoptysis, chest pain; +exertional shortness of breath  Cardiovascular: negative for chest pain, palpitations, orthopnea, PND   Gastrointestinal: negative for nausea, vomiting, diarrhea, constipation, abdominal pain, Dysphagia, hematemesis and hematochezia   Genitourinary: negative for frequency, dysuria, nocturia, urinary incontinence, and hematuria +urgency  Integument: Positive for easy bruising - stable +rasied area to the left of the spine that is tender  Hematologic/Lymphatic: negative for easy bleeding, lymphadenopathy, or petechiae   Endocrine: negative for heat or cold intolerance,weight changes, change in bowel habits and hair loss   Musculoskeletal: Positive joint pain.  +right shoulder pain  Neurological: negative for headaches, dizziness, seizures, weakness; + poor balance; +neuropathy in left hand from shoulder +poor short term memory        Physical Exam:    Vitals: /70   Pulse 98   Temp 96.5 °F (35.8 °C) (Temporal)   Wt 175 lb 11.2 oz (79.7 kg)   BMI 24.51 kg/m²   General appearance -patient not in acute distress  Mental status - AAO X3  Eyes - pupils equal and reactive, extraocular eye movements intact  Mouth - mucous membranes moist, pharynx normal without lesions  Neck - supple, no significant adenopathy  Lymphatics - no palpable lymphadenopathy, no hepatosplenomegaly  Chest - clear to auscultation, rales or rhonchi, symmetric air entry  Heart - normal rate, regular rhythm, normal S1, S2, no murmurs  Abdomen - soft, nontender, nondistended, no masses or organomegaly  Neurological - alert, oriented, normal speech, no focal findings or movement disorder noted  Extremities -positive lower extremity edema  Skin - normal coloration and turgor, no rashes, no suspicious skin lesions noted; +raised area with tenderness to the left of the spine, no erythema        DATA:    Labs:   Lab Results   Component Value Date    WBC 3.0 (L) 09/29/2021    HGB 9.9 (L) 09/29/2021    HCT 29.0 (L) 09/29/2021    .4 (H) 09/29/2021     09/29/2021     Lab Results   Component Value Date    NEUTROABS 1.05 (L) 09/29/2021           Chemistry        Component Value Date/Time     09/29/2021 0935    K 4.5 09/29/2021 0935     09/29/2021 0935    CO2 22 09/29/2021 0935    BUN 15 09/29/2021 0935    CREATININE 1.14 09/29/2021 0935        Component Value Date/Time    CALCIUM 9.6 09/29/2021 0935    ALKPHOS 82 09/29/2021 0935    AST 21 09/29/2021 0935    ALT 14 09/29/2021 0935    BILITOT 0.15 (L) 09/29/2021 0935        MRI BRAIN W WO CONTRAST    Result Date: 7/8/2021  EXAMINATION: MRI OF THE BRAIN WITHOUT AND WITH CONTRAST  7/8/2021 10:31 am TECHNIQUE: Multiplanar multisequence MRI of the head/brain was performed without and with the administration of intravenous contrast. COMPARISON: MRI brain performed 03/02/2021. HISTORY: ORDERING SYSTEM PROVIDED HISTORY: Primary lung cancer with metastasis from lung to other site, right [facility]) TECHNOLOGIST PROVIDED HISTORY: small cell lung cancer Reason for Exam: small cell lung cancer, Primary lung cancer with metastasis from lung to other site, [facility]) Acuity: Unknown Type of Exam: Subsequent/Follow-up Additional signs and symptoms: no current symptoms FINDINGS: INTRACRANIAL STRUCTURES/VENTRICLES:  The sellar and suprasellar structures, optic chiasm, corpus callosum, pineal gland, tectum, and midline brainstem structures are unremarkable. The craniocervical junction is unremarkable. There is no acute hemorrhage, mass effect, or midline shift. There is satisfactory overall gray-white matter differentiation. There is extensive chronic microvascular disease. The ventricular structures are symmetric and unremarkable. The infratentorial structures including the cerebellopontine angles and internal auditory canals are unremarkable. There is no abnormal restricted diffusion. There is no abnormal blooming artifact on susceptibility weighted imaging. No abnormal postcontrast enhancement. ORBITS: The visualized portion of the orbits demonstrate no acute abnormality. SINUSES: The visualized paranasal sinuses and mastoid air cells are well aerated.  BONES/SOFT TISSUES: The bone marrow signal intensity appears normal. The soft tissues demonstrate no acute abnormality. Extensive chronic microvascular disease without acute intracranial abnormality. No abnormal postcontrast enhancement.        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 Patient is tolerating treatment without unexpected side effects  Labs are adequate for treatment. We will proceed with treatment   Reiterated treatment plan. Reviewed risk benefit profile and reiterated potential side-effects of ongoing treatment  Recent CT scans show response to treatment  Reviewed goals and expectation  Patient was given refill on pain medication. Continue treatment until disease progression or intolerable toxicity. NCCN guidelines were reviewed and discussed with the patient. The diagnosis and care plan were discussed with the patient in detail. I discussed the natural history of the disease, prognosis, risks and goals of therapy and answered all the patients questions to the best of my ability. Patient expressed understanding and was in agreement. Scott Cesar MD        This note is created with the assistance of a speech recognition program.  While intending to generate a document that actually reflects the content of the visit, the document can still have some errors including those of syntax and sound a like substitutions which may escape proof reading. It such instances, actual meaning can be extrapolated by contextual diversion.

## 2021-10-01 ENCOUNTER — TELEPHONE (OUTPATIENT)
Dept: INFUSION THERAPY | Age: 71
End: 2021-10-01

## 2021-10-01 NOTE — TELEPHONE ENCOUNTER
Additional info needed for pt's [facility] claim. MD notes and tx plan faxed with [facility] form to [phone], confirmation rec'd. Pt notified by Mana Garcia RN that this was completed. [employer] forms left to be scanned in chart.

## 2021-10-20 ENCOUNTER — OFFICE VISIT (OUTPATIENT)
Dept: ONCOLOGY | Age: 71
End: 2021-10-20
Payer: MEDICARE

## 2021-10-20 ENCOUNTER — HOSPITAL ENCOUNTER (OUTPATIENT)
Dept: INFUSION THERAPY | Age: 71
Discharge: HOME | End: 2021-10-20
Payer: MEDICARE

## 2021-10-20 ENCOUNTER — TELEPHONE (OUTPATIENT)
Dept: ONCOLOGY | Age: 71
End: 2021-10-20

## 2021-10-20 ENCOUNTER — TELEPHONE (OUTPATIENT)
Dept: INFUSION THERAPY | Age: 71
End: 2021-10-20

## 2021-10-20 VITALS
BODY MASS INDEX: 24.18 KG/M2 | TEMPERATURE: 97.1 F | HEART RATE: 97 BPM | SYSTOLIC BLOOD PRESSURE: 100 MMHG | DIASTOLIC BLOOD PRESSURE: 64 MMHG | WEIGHT: 173.4 LBS

## 2021-10-20 VITALS — BODY MASS INDEX: 23.49 KG/M2 | HEIGHT: 72 IN | WEIGHT: 173.4 LBS

## 2021-10-20 DIAGNOSIS — C34.91 PRIMARY LUNG CANCER WITH METASTASIS FROM LUNG TO OTHER SITE, RIGHT (HCC): Primary | ICD-10-CM

## 2021-10-20 DIAGNOSIS — C34.91 PRIMARY MALIGNANT NEOPLASM OF RIGHT LUNG METASTATIC TO OTHER SITE (HCC): ICD-10-CM

## 2021-10-20 LAB
ABSOLUTE EOS #: 0.17 K/UL (ref 0–0.4)
ABSOLUTE IMMATURE GRANULOCYTE: ABNORMAL K/UL (ref 0–0.3)
ABSOLUTE LYMPH #: 0.79 K/UL (ref 1–4.8)
ABSOLUTE MONO #: 1.09 K/UL (ref 0.1–0.8)
ALBUMIN SERPL-MCNC: 4.2 G/DL (ref 3.5–5.2)
ALBUMIN/GLOBULIN RATIO: 1.6 (ref 1–2.5)
ALP BLD-CCNC: 83 U/L (ref 40–129)
ALT SERPL-CCNC: 16 U/L (ref 5–41)
ANION GAP SERPL CALCULATED.3IONS-SCNC: 9 MMOL/L (ref 9–17)
AST SERPL-CCNC: 22 U/L
BASOPHILS # BLD: 0 % (ref 0–2)
BASOPHILS ABSOLUTE: 0 K/UL (ref 0–0.2)
BILIRUB SERPL-MCNC: 0.26 MG/DL (ref 0.3–1.2)
BUN BLDV-MCNC: 12 MG/DL (ref 8–23)
BUN/CREAT BLD: ABNORMAL (ref 9–20)
CALCIUM SERPL-MCNC: 9.2 MG/DL (ref 8.6–10.4)
CHLORIDE BLD-SCNC: 102 MMOL/L (ref 98–107)
CO2: 23 MMOL/L (ref 20–31)
CREAT SERPL-MCNC: 1.04 MG/DL (ref 0.7–1.2)
DIFFERENTIAL TYPE: ABNORMAL
EOSINOPHILS RELATIVE PERCENT: 5 % (ref 1–4)
GFR AFRICAN AMERICAN: >60 ML/MIN
GFR NON-AFRICAN AMERICAN: >60 ML/MIN
GFR SERPL CREATININE-BSD FRML MDRD: ABNORMAL ML/MIN/{1.73_M2}
GFR SERPL CREATININE-BSD FRML MDRD: ABNORMAL ML/MIN/{1.73_M2}
GLUCOSE BLD-MCNC: 117 MG/DL (ref 70–99)
HCT VFR BLD CALC: 28.8 % (ref 41–53)
HEMOGLOBIN: 9.9 G/DL (ref 13.5–17.5)
IMMATURE GRANULOCYTES: ABNORMAL %
LYMPHOCYTES # BLD: 24 % (ref 24–44)
MCH RBC QN AUTO: 36.6 PG (ref 26–34)
MCHC RBC AUTO-ENTMCNC: 34.4 G/DL (ref 31–37)
MCV RBC AUTO: 106.4 FL (ref 80–100)
MONOCYTES # BLD: 33 % (ref 1–7)
MORPHOLOGY: ABNORMAL
MORPHOLOGY: ABNORMAL
NRBC AUTOMATED: ABNORMAL PER 100 WBC
PDW BLD-RTO: 16.7 % (ref 12.5–15.4)
PLATELET # BLD: 295 K/UL (ref 140–450)
PLATELET ESTIMATE: ABNORMAL
PMV BLD AUTO: 6.9 FL (ref 6–12)
POTASSIUM SERPL-SCNC: 4.7 MMOL/L (ref 3.7–5.3)
RBC # BLD: 2.7 M/UL (ref 4.5–5.9)
RBC # BLD: ABNORMAL 10*6/UL
SEG NEUTROPHILS: 38 % (ref 36–66)
SEGMENTED NEUTROPHILS ABSOLUTE COUNT: 1.25 K/UL (ref 1.8–7.7)
SODIUM BLD-SCNC: 134 MMOL/L (ref 135–144)
TOTAL PROTEIN: 6.8 G/DL (ref 6.4–8.3)
WBC # BLD: 3.3 K/UL (ref 3.5–11)
WBC # BLD: ABNORMAL 10*3/UL

## 2021-10-20 PROCEDURE — 3017F COLORECTAL CA SCREEN DOC REV: CPT | Performed by: INTERNAL MEDICINE

## 2021-10-20 PROCEDURE — 2580000003 HC RX 258: Performed by: INTERNAL MEDICINE

## 2021-10-20 PROCEDURE — 6360000002 HC RX W HCPCS: Performed by: INTERNAL MEDICINE

## 2021-10-20 PROCEDURE — 1123F ACP DISCUSS/DSCN MKR DOCD: CPT | Performed by: INTERNAL MEDICINE

## 2021-10-20 PROCEDURE — G8484 FLU IMMUNIZE NO ADMIN: HCPCS | Performed by: INTERNAL MEDICINE

## 2021-10-20 PROCEDURE — 36591 DRAW BLOOD OFF VENOUS DEVICE: CPT

## 2021-10-20 PROCEDURE — 4040F PNEUMOC VAC/ADMIN/RCVD: CPT | Performed by: INTERNAL MEDICINE

## 2021-10-20 PROCEDURE — 96413 CHEMO IV INFUSION 1 HR: CPT

## 2021-10-20 PROCEDURE — 85025 COMPLETE CBC W/AUTO DIFF WBC: CPT

## 2021-10-20 PROCEDURE — 96375 TX/PRO/DX INJ NEW DRUG ADDON: CPT

## 2021-10-20 PROCEDURE — G8427 DOCREV CUR MEDS BY ELIG CLIN: HCPCS | Performed by: INTERNAL MEDICINE

## 2021-10-20 PROCEDURE — 99211 OFF/OP EST MAY X REQ PHY/QHP: CPT | Performed by: INTERNAL MEDICINE

## 2021-10-20 PROCEDURE — 80053 COMPREHEN METABOLIC PANEL: CPT

## 2021-10-20 PROCEDURE — G8420 CALC BMI NORM PARAMETERS: HCPCS | Performed by: INTERNAL MEDICINE

## 2021-10-20 PROCEDURE — 99215 OFFICE O/P EST HI 40 MIN: CPT | Performed by: INTERNAL MEDICINE

## 2021-10-20 PROCEDURE — 4004F PT TOBACCO SCREEN RCVD TLK: CPT | Performed by: INTERNAL MEDICINE

## 2021-10-20 RX ORDER — HEPARIN SODIUM (PORCINE) LOCK FLUSH IV SOLN 100 UNIT/ML 100 UNIT/ML
500 SOLUTION INTRAVENOUS PRN
Status: DISCONTINUED | OUTPATIENT
Start: 2021-10-20 | End: 2021-10-21 | Stop reason: HOSPADM

## 2021-10-20 RX ORDER — SODIUM CHLORIDE 9 MG/ML
INJECTION, SOLUTION INTRAVENOUS CONTINUOUS
Status: CANCELLED | OUTPATIENT
Start: 2021-11-12

## 2021-10-20 RX ORDER — SODIUM CHLORIDE 0.9 % (FLUSH) 0.9 %
5-40 SYRINGE (ML) INJECTION PRN
Status: CANCELLED | OUTPATIENT
Start: 2021-11-12

## 2021-10-20 RX ORDER — SODIUM CHLORIDE 0.9 % (FLUSH) 0.9 %
5-40 SYRINGE (ML) INJECTION PRN
Status: DISCONTINUED | OUTPATIENT
Start: 2021-10-20 | End: 2021-10-21 | Stop reason: HOSPADM

## 2021-10-20 RX ORDER — PALONOSETRON 0.05 MG/ML
0.25 INJECTION, SOLUTION INTRAVENOUS ONCE
Status: COMPLETED | OUTPATIENT
Start: 2021-10-20 | End: 2021-10-20

## 2021-10-20 RX ORDER — EPINEPHRINE 1 MG/ML
0.3 INJECTION, SOLUTION, CONCENTRATE INTRAVENOUS PRN
Status: CANCELLED | OUTPATIENT
Start: 2021-11-12

## 2021-10-20 RX ORDER — METHYLPREDNISOLONE SODIUM SUCCINATE 125 MG/2ML
125 INJECTION, POWDER, LYOPHILIZED, FOR SOLUTION INTRAMUSCULAR; INTRAVENOUS ONCE
Status: CANCELLED | OUTPATIENT
Start: 2021-11-12 | End: 2021-11-12

## 2021-10-20 RX ORDER — DIPHENHYDRAMINE HYDROCHLORIDE 50 MG/ML
50 INJECTION INTRAMUSCULAR; INTRAVENOUS ONCE
Status: CANCELLED | OUTPATIENT
Start: 2021-11-12 | End: 2021-11-12

## 2021-10-20 RX ORDER — SODIUM CHLORIDE 9 MG/ML
20 INJECTION, SOLUTION INTRAVENOUS ONCE
Status: CANCELLED | OUTPATIENT
Start: 2021-11-12 | End: 2021-11-12

## 2021-10-20 RX ORDER — DEXAMETHASONE SODIUM PHOSPHATE 10 MG/ML
10 INJECTION INTRAMUSCULAR; INTRAVENOUS ONCE
Status: COMPLETED | OUTPATIENT
Start: 2021-10-20 | End: 2021-10-20

## 2021-10-20 RX ORDER — PALONOSETRON 0.05 MG/ML
0.25 INJECTION, SOLUTION INTRAVENOUS ONCE
Status: CANCELLED | OUTPATIENT
Start: 2021-11-12

## 2021-10-20 RX ORDER — HEPARIN SODIUM (PORCINE) LOCK FLUSH IV SOLN 100 UNIT/ML 100 UNIT/ML
500 SOLUTION INTRAVENOUS PRN
Status: CANCELLED | OUTPATIENT
Start: 2021-11-12

## 2021-10-20 RX ORDER — SODIUM CHLORIDE 9 MG/ML
20 INJECTION, SOLUTION INTRAVENOUS ONCE
Status: COMPLETED | OUTPATIENT
Start: 2021-10-20 | End: 2021-10-20

## 2021-10-20 RX ORDER — SODIUM CHLORIDE 9 MG/ML
25 INJECTION, SOLUTION INTRAVENOUS PRN
Status: CANCELLED | OUTPATIENT
Start: 2021-11-12

## 2021-10-20 RX ADMIN — PALONOSETRON 0.25 MG: 0.05 INJECTION, SOLUTION INTRAVENOUS at 13:29

## 2021-10-20 RX ADMIN — SODIUM CHLORIDE 20 ML/HR: 9 INJECTION, SOLUTION INTRAVENOUS at 13:27

## 2021-10-20 RX ADMIN — HEPARIN 500 UNITS: 100 SYRINGE at 15:19

## 2021-10-20 RX ADMIN — SODIUM CHLORIDE, PRESERVATIVE FREE 10 ML: 5 INJECTION INTRAVENOUS at 15:19

## 2021-10-20 RX ADMIN — LURBINECTEDIN 6.5 MG: 0.5 INJECTION, POWDER, LYOPHILIZED, FOR SOLUTION INTRAVENOUS at 14:11

## 2021-10-20 RX ADMIN — DEXAMETHASONE SODIUM PHOSPHATE 10 MG: 10 INJECTION INTRAMUSCULAR; INTRAVENOUS at 13:30

## 2021-10-20 NOTE — PROGRESS NOTES
was ill-defined and metastasis could not be ruled out. Clinically patient does give history of trauma to the neck area any years ago however denies any surgery history. Bone scan did not reveal any metastasis     Patient continues to smoke but has cut down quite a bit. He works full-time in a [address] Patient has good performance status, ECOG 0. Patient's other medical problems include dyslipidemia and hypertension. He also has arthritis pain    Started patient on concurrent chemoradiation using cisplatin and etoposide with dose adjustment for renal dysfunction. Chemotherapy changed to carboplatin and etoposide from cycle #2 due to renal dysfunction    Received PCI in July 2019. Interim History:    Patient presents to the clinic for a follow-up visit and to discuss further treatment plan and for toxicity check. Patient continues to be on treatment without any severe or unexpected side effects. Denies any hospitalization or ER visits since last treatment. Complains of cough on and off. Also had an episode of self-limiting hemoptysis which has now resolved. He has lost couple of pounds in weight. Nausea is controlled. Complains of fatigue but it has been stable. During this visit patient's allergy, social, medical, surgical history and medications were reviewed and updated. Past Medical History:   Past Medical History:   Diagnosis Date    DDD (degenerative disc disease), cervical     Gout     Heart murmur     hx. of when younger.  Hyperlipidemia     Hypertension     Lung cancer ([address]     right lung    MI (myocardial infarction) ([address]     Skin cancer     face    Wears glasses        Past Surgical History:     Past Surgical History:   Procedure Laterality Date    APPENDECTOMY      CARDIAC CATHETERIZATION      w/stent    COLONOSCOPY      CYST INCISION AND DRAINAGE Right 05/14/2020    rt elbow I and D and left knee aspiration with cultures    FOOT SURGERY Right     2nd toe(bone spur).  FOREARM SURGERY Right 5/14/2020    RIGHT ELBOW IRRIGATION AND DEBRIDEMENT [facility] Left 5/14/2020    KNEE ASPIRATION WITH CULTURES SENT performed by Bhavani Lizarraga DO at [facility] under lt. eye.  TONSILLECTOMY         Patient Family Social History:    Family History   Problem Relation Age of Onset    Cancer Father         lung cancer      Social History     Socioeconomic History    Marital status:      Spouse name: Not on file    Number of children: Not on file    Years of education: Not on file    Highest education level: Not on file   Occupational History    Not on file   Tobacco Use    Smoking status: Current Every Day Smoker     Packs/day: 0.50     Types: Cigarettes    Smokeless tobacco: Former User   Vaping Use    Vaping Use: Former   Substance and Sexual Activity    Alcohol use: No    Drug use: Yes     Frequency: 14.0 times per week     Types: Marijuana    Sexual activity: Not on file   Other Topics Concern    Not on file   Social History Narrative    Not on file     Social Determinants of Health     Financial Resource Strain:     Difficulty of Paying Living Expenses:    Food Insecurity:     Worried About [address] in the Last Year:     [facility] in the Last Year:    Transportation Needs:     Lack of Transportation (Medical):      Lack of Transportation (Non-Medical):    Physical Activity:     Days of Exercise per Week:     Minutes of Exercise per Session:    Stress:     Feeling of Stress :    Social Connections:     Frequency of Communication with Friends and Family:     Frequency of Social Gatherings with Friends and Family:     Attends Worship Services:     Active Member of Clubs or Organizations:     Attends Club or Organization Meetings:     Marital Status:    Intimate Partner Violence:     Fear of Current or Ex-Partner:     Emotionally Abused:     pain;  Cardiovascular: negative for chest pain, palpitations, orthopnea, PND   Gastrointestinal: negative for nausea, vomiting, diarrhea, constipation, abdominal pain, Dysphagia, hematemesis and hematochezia   Genitourinary: negative for frequency, dysuria, nocturia, urinary incontinence, and hematuria   Integument: Positive for easy bruising - stable +rasied area to the left of the spine that is tender  Hematologic/Lymphatic: negative for easy bleeding, lymphadenopathy, or petechiae   Endocrine: negative for heat or cold intolerance,weight changes, change in bowel habits and hair loss   Musculoskeletal: Positive joint pain.  +right shoulder pain  Neurological: negative for headaches, dizziness, seizures, weakness; + poor balance; +neuropathy in left hand from shoulder +poor short term memory        Physical Exam:    Vitals: /64   Pulse 97   Temp 97.1 °F (36.2 °C) (Temporal)   Wt 173 lb 6.4 oz (78.7 kg)   BMI 24.18 kg/m²   General appearance -patient not in acute distress  Mental status - AAO X3  Eyes - pupils equal and reactive, extraocular eye movements intact  Mouth - mucous membranes moist, pharynx normal without lesions  Neck - supple, no significant adenopathy  Lymphatics - no palpable lymphadenopathy, no hepatosplenomegaly  Chest -decreased breathing sounds bilaterally  Heart - normal rate, regular rhythm, normal S1, S2, no murmurs  Abdomen - soft, nontender, nondistended, no masses or organomegaly  Neurological - alert, oriented, normal speech, no focal findings or movement disorder noted  Extremities -positive lower extremity edema  Skin - normal coloration and turgor, no rashes, no suspicious skin lesions noted; +raised area with tenderness to the left of the spine, no erythema        DATA:    Labs:   Lab Results   Component Value Date    WBC 3.3 (L) 10/20/2021    HGB 9.9 (L) 10/20/2021    HCT 28.8 (L) 10/20/2021    .4 (H) 10/20/2021     10/20/2021     Lab Results   Component Value Date    NEUTROABS 1.25 (L) 10/20/2021           Chemistry        Component Value Date/Time     (L) 10/20/2021 1200    K 4.7 10/20/2021 1200     10/20/2021 1200    CO2 23 10/20/2021 1200    BUN 12 10/20/2021 1200    CREATININE 1.04 10/20/2021 1200        Component Value Date/Time    CALCIUM 9.2 10/20/2021 1200    ALKPHOS 83 10/20/2021 1200    AST 22 10/20/2021 1200    ALT 16 10/20/2021 1200    BILITOT 0.26 (L) 10/20/2021 1200        MRI BRAIN W WO CONTRAST    Result Date: 7/8/2021  EXAMINATION: MRI OF THE BRAIN WITHOUT AND WITH CONTRAST  7/8/2021 10:31 am TECHNIQUE: Multiplanar multisequence MRI of the head/brain was performed without and with the administration of intravenous contrast. COMPARISON: MRI brain performed 03/02/2021. HISTORY: ORDERING SYSTEM PROVIDED HISTORY: Primary lung cancer with metastasis from lung to other site, right [facility]) TECHNOLOGIST PROVIDED HISTORY: small cell lung cancer Reason for Exam: small cell lung cancer, Primary lung cancer with metastasis from lung to other site, right [facility]) Acuity: Unknown Type of Exam: Subsequent/Follow-up Additional signs and symptoms: no current symptoms FINDINGS: INTRACRANIAL STRUCTURES/VENTRICLES:  The sellar and suprasellar structures, optic chiasm, corpus callosum, pineal gland, tectum, and midline brainstem structures are unremarkable. The craniocervical junction is unremarkable. There is no acute hemorrhage, mass effect, or midline shift. There is satisfactory overall gray-white matter differentiation. There is extensive chronic microvascular disease. The ventricular structures are symmetric and unremarkable. The infratentorial structures including the cerebellopontine angles and internal auditory canals are unremarkable. There is no abnormal restricted diffusion. There is no abnormal blooming artifact on susceptibility weighted imaging. No abnormal postcontrast enhancement.  ORBITS: The visualized portion of the orbits demonstrate no acute abnormality. SINUSES: The visualized paranasal sinuses and mastoid air cells are well aerated. BONES/SOFT TISSUES: The bone marrow signal intensity appears normal. The soft tissues demonstrate no acute abnormality. Extensive chronic microvascular disease without acute intracranial abnormality. No abnormal postcontrast enhancement.        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 Discussed with patient's wife   Labs adequate for treatment  Reviewed goals and expectation. Patient understands his disease not curable. Goals of treatment are to prolong life improve quality of life and control symptoms. Patient counseled on use of pain medication  Patient encouraged to participate in aerobic sizes to help with the fatigue  Reiterated potential side effects of ongoing treatment. Continue treatment until disease progression or intolerable toxicity. NCCN guidelines were reviewed and discussed with the patient. The diagnosis and care plan were discussed with the patient in detail. I discussed the natural history of the disease, prognosis, risks and goals of therapy and answered all the patients questions to the best of my ability. Patient expressed understanding and was in agreement. Scott Cesar MD        I spent more than 40 minutes examining, evaluating, reviewing data, counseling the patient and coordinating care.   Greater than 50% of time was spent face-to-face with the patient this note is created with the assistance of a speech recognition program.  While intending to generate a document that actually reflects the content of the visit, the document can still have some errors including those of syntax and sound a like substitutions which may escape proof reading. It such instances, actual meaning can be extrapolated by contextual diversion.

## 2021-10-21 ENCOUNTER — TELEPHONE (OUTPATIENT)
Dept: CASE MANAGEMENT | Age: 71
End: 2021-10-21

## 2021-10-21 NOTE — TELEPHONE ENCOUNTER
Message from Faith x 3, returned call, Faith states his [employer] form needs a physician statement indicting how the drug Lurbinectdin is being administered, 1. To restore the ability of the immune sstem to fight infectio or disease 2.  To activate cells in the body to kill the cancerous cells     Placed information in Dr Debby Holman rack for him to review and make an addendum to his notes   Notes will need to be faxed to [address]

## 2021-10-21 NOTE — TELEPHONE ENCOUNTER
Name: Malia Rodriguez  : 1950  MRN: [id number]    Oncology Navigation Follow-Up Note  Navigator reviewing chart and removing Moreno Morris From care team and added self to care team. Plan to follow.    Electronically signed by Tim Gutierrez RN on 10/21/2021 at 3:14 PM

## 2021-10-27 ENCOUNTER — TELEPHONE (OUTPATIENT)
Dept: ONCOLOGY | Age: 71
End: 2021-10-27

## 2021-10-27 NOTE — TELEPHONE ENCOUNTER
Lurbinectedin is an alkylating agent and a selective inhibitor of oncogenic transcription which binds preferentially to guanine residues in the minor groove of DNA; this forms adducts and bends the DNA helix towards the major groove. Adduct formation affects the activities of DNA binding proteins, including some transcription factors and DNA repair pathways.  Inhibition of oncogenic transcription results in tumor cell apoptosis

## 2021-10-28 ENCOUNTER — TELEPHONE (OUTPATIENT)
Dept: INFUSION THERAPY | Age: 71
End: 2021-10-28

## 2021-10-28 NOTE — TELEPHONE ENCOUNTER
Updated information for [employer] sent as requested. Spoke with Faith and informed him that the paperwork was sent. He will be in for a copy of paperwork. Confirmation received that they have paperwork.

## 2021-11-10 ENCOUNTER — HOSPITAL ENCOUNTER (OUTPATIENT)
Dept: INFUSION THERAPY | Age: 71
Discharge: HOME | End: 2021-11-10
Payer: MEDICARE

## 2021-11-10 ENCOUNTER — TELEPHONE (OUTPATIENT)
Dept: ONCOLOGY | Age: 71
End: 2021-11-10

## 2021-11-10 ENCOUNTER — OFFICE VISIT (OUTPATIENT)
Dept: ONCOLOGY | Age: 71
End: 2021-11-10
Payer: MEDICARE

## 2021-11-10 VITALS
SYSTOLIC BLOOD PRESSURE: 100 MMHG | WEIGHT: 173 LBS | TEMPERATURE: 97.9 F | BODY MASS INDEX: 23.43 KG/M2 | DIASTOLIC BLOOD PRESSURE: 67 MMHG | RESPIRATION RATE: 18 BRPM | HEIGHT: 72 IN | HEART RATE: 94 BPM

## 2021-11-10 DIAGNOSIS — R63.0 POOR APPETITE: ICD-10-CM

## 2021-11-10 DIAGNOSIS — C34.91 PRIMARY MALIGNANT NEOPLASM OF RIGHT LUNG METASTATIC TO OTHER SITE (HCC): ICD-10-CM

## 2021-11-10 DIAGNOSIS — C34.91 PRIMARY LUNG CANCER WITH METASTASIS FROM LUNG TO OTHER SITE, RIGHT (HCC): Primary | ICD-10-CM

## 2021-11-10 DIAGNOSIS — R53.1 ASTHENIA: ICD-10-CM

## 2021-11-10 DIAGNOSIS — T45.1X5A ANEMIA ASSOCIATED WITH CHEMOTHERAPY: ICD-10-CM

## 2021-11-10 DIAGNOSIS — G89.3 CANCER ASSOCIATED PAIN: ICD-10-CM

## 2021-11-10 DIAGNOSIS — D64.81 ANEMIA ASSOCIATED WITH CHEMOTHERAPY: ICD-10-CM

## 2021-11-10 LAB
ABSOLUTE EOS #: 0 K/UL (ref 0–0.4)
ABSOLUTE IMMATURE GRANULOCYTE: ABNORMAL K/UL (ref 0–0.3)
ABSOLUTE LYMPH #: 0.58 K/UL (ref 1–4.8)
ABSOLUTE MONO #: 0.95 K/UL (ref 0.1–0.8)
ALBUMIN SERPL-MCNC: 4.1 G/DL (ref 3.5–5.2)
ALBUMIN/GLOBULIN RATIO: 1.6 (ref 1–2.5)
ALP BLD-CCNC: 86 U/L (ref 40–129)
ALT SERPL-CCNC: 14 U/L (ref 5–41)
ANION GAP SERPL CALCULATED.3IONS-SCNC: 12 MMOL/L (ref 9–17)
AST SERPL-CCNC: 23 U/L
BASOPHILS # BLD: 0 % (ref 0–2)
BASOPHILS ABSOLUTE: 0 K/UL (ref 0–0.2)
BILIRUB SERPL-MCNC: 0.21 MG/DL (ref 0.3–1.2)
BUN BLDV-MCNC: 16 MG/DL (ref 8–23)
BUN/CREAT BLD: ABNORMAL (ref 9–20)
CALCIUM SERPL-MCNC: 9 MG/DL (ref 8.6–10.4)
CHLORIDE BLD-SCNC: 102 MMOL/L (ref 98–107)
CO2: 22 MMOL/L (ref 20–31)
CREAT SERPL-MCNC: 1 MG/DL (ref 0.7–1.2)
DIFFERENTIAL TYPE: ABNORMAL
EOSINOPHILS RELATIVE PERCENT: 0 % (ref 1–4)
GFR AFRICAN AMERICAN: >60 ML/MIN
GFR NON-AFRICAN AMERICAN: >60 ML/MIN
GFR SERPL CREATININE-BSD FRML MDRD: ABNORMAL ML/MIN/{1.73_M2}
GFR SERPL CREATININE-BSD FRML MDRD: ABNORMAL ML/MIN/{1.73_M2}
GLUCOSE BLD-MCNC: 145 MG/DL (ref 70–99)
HCT VFR BLD CALC: 28.2 % (ref 41–53)
HEMOGLOBIN: 9.6 G/DL (ref 13.5–17.5)
IMMATURE GRANULOCYTES: ABNORMAL %
LYMPHOCYTES # BLD: 17 % (ref 24–44)
MCH RBC QN AUTO: 36.2 PG (ref 26–34)
MCHC RBC AUTO-ENTMCNC: 34 G/DL (ref 31–37)
MCV RBC AUTO: 106.4 FL (ref 80–100)
MONOCYTES # BLD: 28 % (ref 1–7)
MORPHOLOGY: NORMAL
NRBC AUTOMATED: ABNORMAL PER 100 WBC
PDW BLD-RTO: 16.4 % (ref 12.5–15.4)
PLATELET # BLD: 316 K/UL (ref 140–450)
PLATELET ESTIMATE: ABNORMAL
PMV BLD AUTO: 7 FL (ref 6–12)
POTASSIUM SERPL-SCNC: 4.4 MMOL/L (ref 3.7–5.3)
RBC # BLD: 2.65 M/UL (ref 4.5–5.9)
RBC # BLD: ABNORMAL 10*6/UL
SEG NEUTROPHILS: 55 % (ref 36–66)
SEGMENTED NEUTROPHILS ABSOLUTE COUNT: 1.87 K/UL (ref 1.8–7.7)
SODIUM BLD-SCNC: 136 MMOL/L (ref 135–144)
TOTAL PROTEIN: 6.7 G/DL (ref 6.4–8.3)
WBC # BLD: 3.4 K/UL (ref 3.5–11)
WBC # BLD: ABNORMAL 10*3/UL

## 2021-11-10 PROCEDURE — 80053 COMPREHEN METABOLIC PANEL: CPT

## 2021-11-10 PROCEDURE — 4040F PNEUMOC VAC/ADMIN/RCVD: CPT | Performed by: INTERNAL MEDICINE

## 2021-11-10 PROCEDURE — 3017F COLORECTAL CA SCREEN DOC REV: CPT | Performed by: INTERNAL MEDICINE

## 2021-11-10 PROCEDURE — 4004F PT TOBACCO SCREEN RCVD TLK: CPT | Performed by: INTERNAL MEDICINE

## 2021-11-10 PROCEDURE — 1123F ACP DISCUSS/DSCN MKR DOCD: CPT | Performed by: INTERNAL MEDICINE

## 2021-11-10 PROCEDURE — G8428 CUR MEDS NOT DOCUMENT: HCPCS | Performed by: INTERNAL MEDICINE

## 2021-11-10 PROCEDURE — G8484 FLU IMMUNIZE NO ADMIN: HCPCS | Performed by: INTERNAL MEDICINE

## 2021-11-10 PROCEDURE — 2580000003 HC RX 258: Performed by: INTERNAL MEDICINE

## 2021-11-10 PROCEDURE — 99214 OFFICE O/P EST MOD 30 MIN: CPT | Performed by: INTERNAL MEDICINE

## 2021-11-10 PROCEDURE — 96375 TX/PRO/DX INJ NEW DRUG ADDON: CPT

## 2021-11-10 PROCEDURE — G8420 CALC BMI NORM PARAMETERS: HCPCS | Performed by: INTERNAL MEDICINE

## 2021-11-10 PROCEDURE — 6360000002 HC RX W HCPCS: Performed by: INTERNAL MEDICINE

## 2021-11-10 PROCEDURE — 85025 COMPLETE CBC W/AUTO DIFF WBC: CPT

## 2021-11-10 PROCEDURE — 96413 CHEMO IV INFUSION 1 HR: CPT

## 2021-11-10 RX ORDER — SODIUM CHLORIDE 0.9 % (FLUSH) 0.9 %
5-40 SYRINGE (ML) INJECTION PRN
Status: DISCONTINUED | OUTPATIENT
Start: 2021-11-10 | End: 2021-11-11 | Stop reason: HOSPADM

## 2021-11-10 RX ORDER — SODIUM CHLORIDE 9 MG/ML
INJECTION, SOLUTION INTRAVENOUS CONTINUOUS
Status: CANCELLED | OUTPATIENT
Start: 2021-12-03

## 2021-11-10 RX ORDER — METHYLPREDNISOLONE SODIUM SUCCINATE 125 MG/2ML
125 INJECTION, POWDER, LYOPHILIZED, FOR SOLUTION INTRAMUSCULAR; INTRAVENOUS ONCE
Status: CANCELLED | OUTPATIENT
Start: 2021-12-03 | End: 2021-12-03

## 2021-11-10 RX ORDER — DEXAMETHASONE SODIUM PHOSPHATE 10 MG/ML
10 INJECTION INTRAMUSCULAR; INTRAVENOUS ONCE
Status: COMPLETED | OUTPATIENT
Start: 2021-11-10 | End: 2021-11-10

## 2021-11-10 RX ORDER — PALONOSETRON 0.05 MG/ML
0.25 INJECTION, SOLUTION INTRAVENOUS ONCE
Status: COMPLETED | OUTPATIENT
Start: 2021-11-10 | End: 2021-11-10

## 2021-11-10 RX ORDER — SODIUM CHLORIDE 0.9 % (FLUSH) 0.9 %
5-40 SYRINGE (ML) INJECTION PRN
Status: CANCELLED | OUTPATIENT
Start: 2021-12-03

## 2021-11-10 RX ORDER — PALONOSETRON 0.05 MG/ML
0.25 INJECTION, SOLUTION INTRAVENOUS ONCE
Status: CANCELLED | OUTPATIENT
Start: 2021-12-03

## 2021-11-10 RX ORDER — HEPARIN SODIUM (PORCINE) LOCK FLUSH IV SOLN 100 UNIT/ML 100 UNIT/ML
500 SOLUTION INTRAVENOUS PRN
Status: DISCONTINUED | OUTPATIENT
Start: 2021-11-10 | End: 2021-11-11 | Stop reason: HOSPADM

## 2021-11-10 RX ORDER — DIPHENHYDRAMINE HYDROCHLORIDE 50 MG/ML
50 INJECTION INTRAMUSCULAR; INTRAVENOUS ONCE
Status: CANCELLED | OUTPATIENT
Start: 2021-12-03 | End: 2021-12-03

## 2021-11-10 RX ORDER — SODIUM CHLORIDE 9 MG/ML
20 INJECTION, SOLUTION INTRAVENOUS ONCE
Status: COMPLETED | OUTPATIENT
Start: 2021-11-10 | End: 2021-11-10

## 2021-11-10 RX ORDER — SODIUM CHLORIDE 9 MG/ML
25 INJECTION, SOLUTION INTRAVENOUS PRN
Status: CANCELLED | OUTPATIENT
Start: 2021-12-03

## 2021-11-10 RX ORDER — SODIUM CHLORIDE 9 MG/ML
20 INJECTION, SOLUTION INTRAVENOUS ONCE
Status: CANCELLED | OUTPATIENT
Start: 2021-12-03 | End: 2021-12-03

## 2021-11-10 RX ORDER — HEPARIN SODIUM (PORCINE) LOCK FLUSH IV SOLN 100 UNIT/ML 100 UNIT/ML
500 SOLUTION INTRAVENOUS PRN
Status: CANCELLED | OUTPATIENT
Start: 2021-12-03

## 2021-11-10 RX ORDER — EPINEPHRINE 1 MG/ML
0.3 INJECTION, SOLUTION, CONCENTRATE INTRAVENOUS PRN
Status: CANCELLED | OUTPATIENT
Start: 2021-12-03

## 2021-11-10 RX ADMIN — PALONOSETRON 0.25 MG: 0.05 INJECTION, SOLUTION INTRAVENOUS at 13:52

## 2021-11-10 RX ADMIN — SODIUM CHLORIDE, PRESERVATIVE FREE 10 ML: 5 INJECTION INTRAVENOUS at 13:11

## 2021-11-10 RX ADMIN — DEXAMETHASONE SODIUM PHOSPHATE 10 MG: 10 INJECTION INTRAMUSCULAR; INTRAVENOUS at 13:52

## 2021-11-10 RX ADMIN — LURBINECTEDIN 6.5 MG: 0.5 INJECTION, POWDER, LYOPHILIZED, FOR SOLUTION INTRAVENOUS at 14:17

## 2021-11-10 RX ADMIN — SODIUM CHLORIDE, PRESERVATIVE FREE 10 ML: 5 INJECTION INTRAVENOUS at 15:17

## 2021-11-10 RX ADMIN — SODIUM CHLORIDE 20 ML/HR: 9 INJECTION, SOLUTION INTRAVENOUS at 13:51

## 2021-11-10 RX ADMIN — SODIUM CHLORIDE, PRESERVATIVE FREE 10 ML: 5 INJECTION INTRAVENOUS at 13:13

## 2021-11-10 RX ADMIN — HEPARIN 500 UNITS: 100 SYRINGE at 15:17

## 2021-11-10 NOTE — PROGRESS NOTES
Patient here for C7D1 zepzelca. Labs reviewed and patient tolerated treatment w/o incident. Dr. Winifred Shahid saw patient at chair side, please refer to his notes from today. Patient left in stable condition.

## 2021-11-10 NOTE — PROGRESS NOTES
Yinka Chaudhry                                                                                                                  11/10/2021  MRN:   [id number]  YOB: 1950  PCP:                           Ashia Davis MD  Referring Physician: No ref. provider found  Treating Physician Name: Deysi Teixeira MD      Reason for visit:  Toxicity check. Discussed treatment plan. 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 Maintenance Tecentriq, 3/2021  Lurbenectidin-7/2/2021    Summary of Case/History:    Yinka Chaudhry a 80 y.o.male is a patient diagnosed with lung cancer with the component of small cell as well as squamous cell carcinoma    Patient has a significant history of tobacco dependence and underwent CT lung screening in December 2018. CT scan was read as lung rads degree 4B. Subsequently she underwent biopsy of right upper lobe lung nodule on 1/16/19. 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 CT PET done showed abnormal FDG uptake in the right upper lobe nodule. There was also abnormal FDG uptake in the right lower lobe nodule. Additionally right hilar lymph node were also FDG avid. No bony lesion was reported. MRI brain for staging workup did not show any evidence of intracranial metastasis. Tip of the odontoid process was ill-defined and metastasis could not be ruled out.   Clinically patient does give history of trauma to the neck area any years ago however denies any surgery history. Bone scan did not reveal any metastasis     Patient continues to smoke but has cut down quite a bit. He works full-time in a [address] Patient has good performance status, ECOG 0. Patient's other medical problems include dyslipidemia and hypertension. He also has arthritis pain    Started patient on concurrent chemoradiation using cisplatin and etoposide with dose adjustment for renal dysfunction. Chemotherapy changed to carboplatin and etoposide from cycle #2 due to renal dysfunction    Received PCI in July 2019. Interim History:    Patient presents to the clinic for a follow-up visit and for toxicity check and to review results of her blood work-up. Patient has been tolerating treatment without any unexpected or severe side effects. Denies hospitalization or ER visit. Appetite is good. Weight is stable. Nausea is controlled. Does complain of fatigue but continues to be able to perform active daily life by himself    During this visit patient's allergy, social, medical, surgical history and medications were reviewed and updated. Past Medical History:   Past Medical History:   Diagnosis Date    DDD (degenerative disc disease), cervical     Gout     Heart murmur     hx. of when younger.  Hyperlipidemia     Hypertension     Lung cancer ([address]     right lung    MI (myocardial infarction) ([address]     Skin cancer     face    Wears glasses        Past Surgical History:     Past Surgical History:   Procedure Laterality Date    APPENDECTOMY      CARDIAC CATHETERIZATION      w/stent    COLONOSCOPY      CYST INCISION AND DRAINAGE Right 05/14/2020    rt elbow I and D and left knee aspiration with cultures    FOOT SURGERY Right     2nd toe(bone spur).     FOREARM SURGERY Right 5/14/2020    RIGHT ELBOW IRRIGATION AND DEBRIDEMENT performed [facility] Alicia Albright DO [facility] Left 5/14/2020    KNEE Abused: Not on file    Physically Abused: Not on file    Sexually Abused: Not on file   Housing Stability:     Unable to Pay for Housing in the Last Year: Not on file    Number of Places Lived in the Last Year: Not on file    Unstable Housing in the Last Year: Not on file      Current Medications:     Current Outpatient Medications   Medication Sig Dispense Refill    docusate sodium (COLACE) 100 MG capsule Take 100 mg by mouth 2 times daily      B Complex-C-Folic Acid (ANGELICA-HUGO) TABS TAKE 1 TABLET BY MOUTH DAILY. HEMATINIC VIT MINERALS      traZODone (DESYREL) 100 MG tablet TAKE 1 TABLET BY MOUTH EVERY DAY AT NIGHT      ipratropium-albuterol (DUONEB) 0.5-2.5 (3) MG/3ML SOLN nebulizer solution Inhale 3 mLs into the lungs 4 times daily      potassium chloride (MICRO-K) 10 MEQ extended release capsule Take 20 mEq by mouth      Ferrous Fumarate (FERROCITE) 324 (106 Fe) MG TABS Take by mouth      apixaban (ELIQUIS) 5 MG TABS tablet Take 1 tablet by mouth 2 times daily 60 tablet 1    tamsulosin (FLOMAX) 0.4 MG capsule TAKE 1 CAPSULE BY MOUTH EVERY DAY 30 capsule 5    lidocaine-prilocaine (EMLA) 2.5-2.5 % cream Apply topically as needed. Apply a quarter size amount to port site 1 hour before chemotherapy. Cover with plastic wrap. 30 g 0    acetaminophen (TYLENOL) 325 MG tablet Take 650 mg by mouth every 6 hours as needed for Pain      allopurinol (ZYLOPRIM) 100 MG tablet Take 100 mg by mouth daily      simvastatin (ZOCOR) 40 MG tablet Take 40 mg by mouth daily      Handicap Placard MISC by Does not apply route 1 each 0     No current facility-administered medications for this visit.      Facility-Administered Medications Ordered in Other Visits   Medication Dose Route Frequency Provider Last Rate Last Admin    sodium chloride flush 0.9 % injection 5-40 mL  5-40 mL IntraVENous PRN Wyatt Chow MD   10 mL at 11/10/21 1313    heparin flush 100 UNIT/ML injection 500 Units  500 Units IntraCATHeter PRN Wyatt Chow MD        0.9 % sodium chloride infusion  20 mL/hr IntraVENous Once Wyatt Chow MD 20 mL/hr at 11/10/21 1351 20 mL/hr at 11/10/21 1351    lurbinectedin (ZEPZELCA) 6.5 mg in sodium chloride 0.9 % 250 mL chemo IVBP  3.2 mg/m2 (Order-Specific) IntraVENous Once Wyatt Chow  mL/hr at 11/10/21 1417 6.5 mg at 11/10/21 1417       Allergies:   Patient has no known allergies. Review of Systems:    Constitutional: No fever or chills. No night sweats. Positive for fatigue. Positive for appetite  Eyes: No eye discharge, double vision, or eye pain   HEENT: negative for sore mouth, sore throat, hoarseness and voice change; Respiratory: negative for cough, sputum, wheezing, hemoptysis, chest pain;  Cardiovascular: negative for chest pain, palpitations, orthopnea, PND   Gastrointestinal: negative for nausea, vomiting, diarrhea, constipation, abdominal pain, Dysphagia, hematemesis and hematochezia   Genitourinary: negative for frequency, dysuria, nocturia, urinary incontinence, and hematuria   Integument: Positive for easy bruising - stable +rasied area to the left of the spine that is tender  Hematologic/Lymphatic: negative for easy bleeding, lymphadenopathy, or petechiae   Endocrine: negative for heat or cold intolerance,weight changes, change in bowel habits and hair loss   Musculoskeletal: Positive joint pain.  +right shoulder pain  Neurological: negative for headaches, dizziness, seizures, weakness; + poor balance; +neuropathy in left hand from shoulder +poor short term memory        Physical Exam:    Vitals: /67   Pulse 94   Temp 97.9 °F (36.6 °C)   Resp 18   Ht 5' 11.5\" (1.816 m)   Wt 173 lb (78.5 kg)   BMI 23.79 kg/m²   General appearance -patient not in acute distress  Mental status - AAO X3  Eyes - pupils equal and reactive, extraocular eye movements intact  Mouth - mucous membranes moist, pharynx normal without lesions  Neck - supple, no significant adenopathy  Lymphatics - no palpable lymphadenopathy, no hepatosplenomegaly  Chest -decreased breathing sounds bilaterally  Heart - normal rate, regular rhythm, normal S1, S2, no murmurs  Abdomen - soft, nontender, nondistended, no masses or organomegaly  Neurological - alert, oriented, normal speech, no focal findings or movement disorder noted  Extremities -positive lower extremity edema  Skin - normal coloration and turgor, no rashes, no suspicious skin lesions noted; +raised area with tenderness to the left of the spine, no erythema        DATA:    Labs:   Lab Results   Component Value Date    WBC 3.4 (L) 11/10/2021    HGB 9.6 (L) 11/10/2021    HCT 28.2 (L) 11/10/2021    .4 (H) 11/10/2021     11/10/2021     Lab Results   Component Value Date    NEUTROABS 1.87 11/10/2021           Chemistry        Component Value Date/Time     11/10/2021 1310    K 4.4 11/10/2021 1310     11/10/2021 1310    CO2 22 11/10/2021 1310    BUN 16 11/10/2021 1310    CREATININE 1.00 11/10/2021 1310        Component Value Date/Time    CALCIUM 9.0 11/10/2021 1310    ALKPHOS 86 11/10/2021 1310    AST 23 11/10/2021 1310    ALT 14 11/10/2021 1310    BILITOT 0.21 (L) 11/10/2021 1310        MRI BRAIN W WO CONTRAST    Result Date: 7/8/2021  EXAMINATION: MRI OF THE BRAIN WITHOUT AND WITH CONTRAST  7/8/2021 10:31 am TECHNIQUE: Multiplanar multisequence MRI of the head/brain was performed without and with the administration of intravenous contrast. COMPARISON: MRI brain performed 03/02/2021.  HISTORY: ORDERING SYSTEM PROVIDED HISTORY: Primary lung cancer with metastasis from lung to other site, [facility]) TECHNOLOGIST PROVIDED HISTORY: small cell lung cancer Reason for Exam: small cell lung cancer, Primary lung cancer with metastasis from lung to other site, [facility]) Acuity: Unknown Type of Exam: Subsequent/Follow-up Additional signs and symptoms: no current symptoms FINDINGS: INTRACRANIAL STRUCTURES/VENTRICLES:  The sellar and suprasellar structures, optic chiasm, corpus callosum, pineal gland, tectum, and midline brainstem structures are unremarkable. The craniocervical junction is unremarkable. There is no acute hemorrhage, mass effect, or midline shift. There is satisfactory overall gray-white matter differentiation. There is extensive chronic microvascular disease. The ventricular structures are symmetric and unremarkable. The infratentorial structures including the cerebellopontine angles and internal auditory canals are unremarkable. There is no abnormal restricted diffusion. There is no abnormal blooming artifact on susceptibility weighted imaging. No abnormal postcontrast enhancement. ORBITS: The visualized portion of the orbits demonstrate no acute abnormality. SINUSES: The visualized paranasal sinuses and mastoid air cells are well aerated. BONES/SOFT TISSUES: The bone marrow signal intensity appears normal. The soft tissues demonstrate no acute abnormality. Extensive chronic microvascular disease without acute intracranial abnormality. No abnormal postcontrast enhancement.        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 Patient is tolerating treatment without unexpected side effects  Labs are adequate for treatment. We will proceed with treatment   Reiterated treatment plan. Reviewed risk benefit profile and reiterated potential side-effects of ongoing treatment  Will obtain scans to assess disease status in December  Reviewed goals and expectations.   Patient wishes to continue ongoing treatment. NCCN guidelines were reviewed and discussed with the patient. The diagnosis and care plan were discussed with the patient in detail. I discussed the natural history of the disease, prognosis, risks and goals of therapy and answered all the patients questions to the best of my ability. Patient expressed understanding and was in agreement. Nathan Garcia MD      This note is created with the assistance of a speech recognition program.  While intending to generate a document that actually reflects the content of the visit, the document can still have some errors including those of syntax and sound a like substitutions which may escape proof reading. It such instances, actual meaning can be extrapolated by contextual diversion.

## 2021-11-10 NOTE — TELEPHONE ENCOUNTER
AVS from 11/10/21    rv in 3 weeks S  tx today    PT will be seen for RV during tx 12/1/21    PT was given AVS and an appt schedule    Electronically signed by Lodema Nageotte on 11/10/2021 at 3:03 PM

## 2021-11-19 ENCOUNTER — TELEPHONE (OUTPATIENT)
Dept: CASE MANAGEMENT | Age: 71
End: 2021-11-19

## 2021-11-19 NOTE — TELEPHONE ENCOUNTER
Name: Shelton Gilman  : 1950  MRN: [id number]    Oncology Navigation Follow-Up Note  Navigator reaching out to pt. Offering assistance if needed. Writer explaining navigation to pt. And sharing that writer is part of his care team. Pt. denies needs, will plan to follow.  Electronically signed by Kip Baker RN on 2021 at 3:02 PM

## 2021-12-01 ENCOUNTER — OFFICE VISIT (OUTPATIENT)
Dept: ONCOLOGY | Age: 71
End: 2021-12-01
Payer: MEDICARE

## 2021-12-01 ENCOUNTER — HOSPITAL ENCOUNTER (OUTPATIENT)
Dept: INFUSION THERAPY | Age: 71
Discharge: HOME | End: 2021-12-01
Payer: MEDICARE

## 2021-12-01 ENCOUNTER — TELEPHONE (OUTPATIENT)
Dept: ONCOLOGY | Age: 71
End: 2021-12-01

## 2021-12-01 VITALS
SYSTOLIC BLOOD PRESSURE: 126 MMHG | TEMPERATURE: 97.3 F | WEIGHT: 165 LBS | DIASTOLIC BLOOD PRESSURE: 79 MMHG | HEART RATE: 111 BPM | BODY MASS INDEX: 22.69 KG/M2

## 2021-12-01 DIAGNOSIS — C34.91 PRIMARY MALIGNANT NEOPLASM OF RIGHT LUNG METASTATIC TO OTHER SITE (HCC): Primary | ICD-10-CM

## 2021-12-01 DIAGNOSIS — R63.0 POOR APPETITE: ICD-10-CM

## 2021-12-01 DIAGNOSIS — R53.1 ASTHENIA: ICD-10-CM

## 2021-12-01 DIAGNOSIS — D64.81 ANEMIA ASSOCIATED WITH CHEMOTHERAPY: ICD-10-CM

## 2021-12-01 DIAGNOSIS — T45.1X5A ANEMIA ASSOCIATED WITH CHEMOTHERAPY: ICD-10-CM

## 2021-12-01 DIAGNOSIS — G89.3 CANCER ASSOCIATED PAIN: ICD-10-CM

## 2021-12-01 DIAGNOSIS — C34.91 PRIMARY LUNG CANCER WITH METASTASIS FROM LUNG TO OTHER SITE, RIGHT (HCC): Primary | ICD-10-CM

## 2021-12-01 LAB
ABSOLUTE EOS #: 0.07 K/UL (ref 0–0.4)
ABSOLUTE IMMATURE GRANULOCYTE: ABNORMAL K/UL (ref 0–0.3)
ABSOLUTE LYMPH #: 0.7 K/UL (ref 1–4.8)
ABSOLUTE MONO #: 1.11 K/UL (ref 0.1–0.8)
ALBUMIN SERPL-MCNC: 4.1 G/DL (ref 3.5–5.2)
ALBUMIN/GLOBULIN RATIO: 1.3 (ref 1–2.5)
ALP BLD-CCNC: 106 U/L (ref 40–129)
ALT SERPL-CCNC: 17 U/L (ref 5–41)
ANION GAP SERPL CALCULATED.3IONS-SCNC: 10 MMOL/L (ref 9–17)
AST SERPL-CCNC: 21 U/L
BASOPHILS # BLD: 0 % (ref 0–2)
BASOPHILS ABSOLUTE: 0 K/UL (ref 0–0.2)
BILIRUB SERPL-MCNC: 0.27 MG/DL (ref 0.3–1.2)
BUN BLDV-MCNC: 14 MG/DL (ref 8–23)
BUN/CREAT BLD: ABNORMAL (ref 9–20)
CALCIUM SERPL-MCNC: 9.4 MG/DL (ref 8.6–10.4)
CHLORIDE BLD-SCNC: 101 MMOL/L (ref 98–107)
CO2: 21 MMOL/L (ref 20–31)
CREAT SERPL-MCNC: 1.06 MG/DL (ref 0.7–1.2)
DIFFERENTIAL TYPE: ABNORMAL
EOSINOPHILS RELATIVE PERCENT: 2 % (ref 1–4)
GFR AFRICAN AMERICAN: >60 ML/MIN
GFR NON-AFRICAN AMERICAN: >60 ML/MIN
GFR SERPL CREATININE-BSD FRML MDRD: ABNORMAL ML/MIN/{1.73_M2}
GFR SERPL CREATININE-BSD FRML MDRD: ABNORMAL ML/MIN/{1.73_M2}
GLUCOSE BLD-MCNC: 115 MG/DL (ref 70–99)
HCT VFR BLD CALC: 31.6 % (ref 41–53)
HEMOGLOBIN: 10.6 G/DL (ref 13.5–17.5)
IMMATURE GRANULOCYTES: ABNORMAL %
LYMPHOCYTES # BLD: 19 % (ref 24–44)
MCH RBC QN AUTO: 35.7 PG (ref 26–34)
MCHC RBC AUTO-ENTMCNC: 33.5 G/DL (ref 31–37)
MCV RBC AUTO: 106.4 FL (ref 80–100)
MONOCYTES # BLD: 30 % (ref 1–7)
MORPHOLOGY: ABNORMAL
NRBC AUTOMATED: ABNORMAL PER 100 WBC
PDW BLD-RTO: 16.5 % (ref 12.5–15.4)
PLATELET # BLD: 344 K/UL (ref 140–450)
PLATELET ESTIMATE: ABNORMAL
PMV BLD AUTO: 7.2 FL (ref 6–12)
POTASSIUM SERPL-SCNC: 4.7 MMOL/L (ref 3.7–5.3)
RBC # BLD: 2.97 M/UL (ref 4.5–5.9)
RBC # BLD: ABNORMAL 10*6/UL
SEG NEUTROPHILS: 49 % (ref 36–66)
SEGMENTED NEUTROPHILS ABSOLUTE COUNT: 1.82 K/UL (ref 1.8–7.7)
SODIUM BLD-SCNC: 132 MMOL/L (ref 135–144)
TOTAL PROTEIN: 7.2 G/DL (ref 6.4–8.3)
WBC # BLD: 3.7 K/UL (ref 3.5–11)
WBC # BLD: ABNORMAL 10*3/UL

## 2021-12-01 PROCEDURE — 4004F PT TOBACCO SCREEN RCVD TLK: CPT | Performed by: INTERNAL MEDICINE

## 2021-12-01 PROCEDURE — 96413 CHEMO IV INFUSION 1 HR: CPT

## 2021-12-01 PROCEDURE — 1123F ACP DISCUSS/DSCN MKR DOCD: CPT | Performed by: INTERNAL MEDICINE

## 2021-12-01 PROCEDURE — 3017F COLORECTAL CA SCREEN DOC REV: CPT | Performed by: INTERNAL MEDICINE

## 2021-12-01 PROCEDURE — 4040F PNEUMOC VAC/ADMIN/RCVD: CPT | Performed by: INTERNAL MEDICINE

## 2021-12-01 PROCEDURE — G8427 DOCREV CUR MEDS BY ELIG CLIN: HCPCS | Performed by: INTERNAL MEDICINE

## 2021-12-01 PROCEDURE — 36591 DRAW BLOOD OFF VENOUS DEVICE: CPT

## 2021-12-01 PROCEDURE — 99211 OFF/OP EST MAY X REQ PHY/QHP: CPT | Performed by: INTERNAL MEDICINE

## 2021-12-01 PROCEDURE — 96375 TX/PRO/DX INJ NEW DRUG ADDON: CPT

## 2021-12-01 PROCEDURE — G8420 CALC BMI NORM PARAMETERS: HCPCS | Performed by: INTERNAL MEDICINE

## 2021-12-01 PROCEDURE — 6360000002 HC RX W HCPCS: Performed by: INTERNAL MEDICINE

## 2021-12-01 PROCEDURE — 80053 COMPREHEN METABOLIC PANEL: CPT

## 2021-12-01 PROCEDURE — 2580000003 HC RX 258: Performed by: INTERNAL MEDICINE

## 2021-12-01 PROCEDURE — 99214 OFFICE O/P EST MOD 30 MIN: CPT | Performed by: INTERNAL MEDICINE

## 2021-12-01 PROCEDURE — G8484 FLU IMMUNIZE NO ADMIN: HCPCS | Performed by: INTERNAL MEDICINE

## 2021-12-01 PROCEDURE — 85025 COMPLETE CBC W/AUTO DIFF WBC: CPT

## 2021-12-01 RX ORDER — DEXAMETHASONE 2 MG/1
2 TABLET ORAL 2 TIMES DAILY WITH MEALS
Qty: 30 TABLET | Refills: 0 | Status: ON HOLD | OUTPATIENT
Start: 2021-12-01 | End: 2021-12-17 | Stop reason: HOSPADM

## 2021-12-01 RX ORDER — SODIUM CHLORIDE 9 MG/ML
20 INJECTION, SOLUTION INTRAVENOUS ONCE
Status: COMPLETED | OUTPATIENT
Start: 2021-12-01 | End: 2021-12-01

## 2021-12-01 RX ORDER — SODIUM CHLORIDE 0.9 % (FLUSH) 0.9 %
5-40 SYRINGE (ML) INJECTION PRN
Status: DISCONTINUED | OUTPATIENT
Start: 2021-12-01 | End: 2021-12-02 | Stop reason: HOSPADM

## 2021-12-01 RX ORDER — HEPARIN SODIUM (PORCINE) LOCK FLUSH IV SOLN 100 UNIT/ML 100 UNIT/ML
500 SOLUTION INTRAVENOUS PRN
Status: DISCONTINUED | OUTPATIENT
Start: 2021-12-01 | End: 2021-12-02 | Stop reason: HOSPADM

## 2021-12-01 RX ORDER — DEXAMETHASONE SODIUM PHOSPHATE 10 MG/ML
10 INJECTION INTRAMUSCULAR; INTRAVENOUS ONCE
Status: COMPLETED | OUTPATIENT
Start: 2021-12-01 | End: 2021-12-01

## 2021-12-01 RX ORDER — PALONOSETRON 0.05 MG/ML
0.25 INJECTION, SOLUTION INTRAVENOUS ONCE
Status: COMPLETED | OUTPATIENT
Start: 2021-12-01 | End: 2021-12-01

## 2021-12-01 RX ORDER — OXYCODONE HYDROCHLORIDE AND ACETAMINOPHEN 5; 325 MG/1; MG/1
1 TABLET ORAL EVERY 6 HOURS PRN
Qty: 60 TABLET | Refills: 0 | Status: ON HOLD | OUTPATIENT
Start: 2021-12-01 | End: 2021-12-17 | Stop reason: HOSPADM

## 2021-12-01 RX ADMIN — SODIUM CHLORIDE 20 ML/HR: 9 INJECTION, SOLUTION INTRAVENOUS at 12:09

## 2021-12-01 RX ADMIN — HEPARIN 500 UNITS: 100 SYRINGE at 13:46

## 2021-12-01 RX ADMIN — SODIUM CHLORIDE, PRESERVATIVE FREE 10 ML: 5 INJECTION INTRAVENOUS at 13:45

## 2021-12-01 RX ADMIN — LURBINECTEDIN 6.5 MG: 0.5 INJECTION, POWDER, LYOPHILIZED, FOR SOLUTION INTRAVENOUS at 12:40

## 2021-12-01 RX ADMIN — PALONOSETRON 0.25 MG: 0.05 INJECTION, SOLUTION INTRAVENOUS at 12:09

## 2021-12-01 RX ADMIN — SODIUM CHLORIDE, PRESERVATIVE FREE 10 ML: 5 INJECTION INTRAVENOUS at 12:09

## 2021-12-01 RX ADMIN — DEXAMETHASONE SODIUM PHOSPHATE 10 MG: 10 INJECTION INTRAMUSCULAR; INTRAVENOUS at 12:09

## 2021-12-01 NOTE — PROGRESS NOTES
Cynthia Bolds                                                                                                                  12/1/2021  MRN:   [id number]  YOB: 1950  PCP:                           Melvin Stein MD  Referring Physician: No ref. provider found  Treating Physician Name: Jody Little MD      Reason for visit:  Toxicity check. Discussed treatment plan. 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 Maintenance Tecentriq, 3/2021  Lurbenectidin-7/2/2021    Summary of Case/History:    Cynthia Bolds a 80 y.o.male is a patient diagnosed with lung cancer with the component of small cell as well as squamous cell carcinoma    Patient has a significant history of tobacco dependence and underwent CT lung screening in December 2018. CT scan was read as lung rads degree 4B. Subsequently she underwent biopsy of right upper lobe lung nodule on 1/16/19. 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 CT PET done showed abnormal FDG uptake in the right upper lobe nodule. There was also abnormal FDG uptake in the right lower lobe nodule. Additionally right hilar lymph node were also FDG avid. No bony lesion was reported. MRI brain for staging workup did not show any evidence of intracranial metastasis. Tip of the odontoid process was ill-defined and metastasis could not be ruled out.   Clinically patient does give history of trauma to the neck area any years ago however denies any surgery history. Bone scan did not reveal any metastasis     Patient continues to smoke but has cut down quite a bit. He works full-time in a [address] Patient has good performance status, ECOG 0. Patient's other medical problems include dyslipidemia and hypertension. He also has arthritis pain    Started patient on concurrent chemoradiation using cisplatin and etoposide with dose adjustment for renal dysfunction. Chemotherapy changed to carboplatin and etoposide from cycle #2 due to renal dysfunction    Received PCI in July 2019. Interim History:    Patient presents to the clinic for a follow-up visit and to discuss results of lab work-up and other relevant clinical data. Patient complains of being weak. He has lost weight. Complains of being more short of breath. Denies hospitalization or ER visit. During this visit patient's allergy, social, medical, surgical history and medications were reviewed and updated. Past Medical History:   Past Medical History:   Diagnosis Date    DDD (degenerative disc disease), cervical     Gout     Heart murmur     hx. of when younger.  Hyperlipidemia     Hypertension     Lung [facility] [address]     right lung    MI (myocardial infarction) ([facility] [address] [facility]     Skin cancer     face    Wears glasses        Past Surgical History:     Past Surgical History:   Procedure Laterality Date    APPENDECTOMY      CARDIAC CATHETERIZATION      w/stent    COLONOSCOPY      CYST INCISION AND DRAINAGE Right 05/14/2020    rt elbow I and D and left knee aspiration with cultures    FOOT SURGERY Right     2nd toe(bone spur).  FOREARM SURGERY Right 5/14/2020    RIGHT ELBOW IRRIGATION AND DEBRIDEMENT performed by Polly Doe DO at [place] [id number] Left 5/14/2020    KNEE ASPIRATION WITH CULTURES SENT performed by Polly Doe DO at [address] [place] [address]      face under lt. eye.     TONSILLECTOMY         Patient Family Social History:    Family History   Problem Relation Age of Onset    Cancer Father         lung cancer      Social History     Socioeconomic History    Marital status:      Spouse name: Not on file    Number of children: Not on file    Years of education: Not on file    Highest education level: Not on file   Occupational History    Not on file   Tobacco Use    Smoking status: Current Every Day Smoker     Packs/day: 0.50     Types: Cigarettes    Smokeless tobacco: Former User   Vaping Use    Vaping Use: Former   Substance and Sexual Activity    Alcohol use: No    Drug use: Yes     Frequency: 14.0 times per week     Types: Marijuana Uribe Kugel)    Sexual activity: Not on file   Other Topics Concern    Not on file   Social History Narrative    Not on file     Social Determinants of Health     Financial Resource Strain:     Difficulty of Paying Living Expenses: Not on file   Food Insecurity:     Worried About [address] in the Last Year: Not on file    [address] in the Last Year: Not on file   Transportation Needs:     Lack of Transportation (Medical): Not on file    Lack of Transportation (Non-Medical):  Not on file   Physical Activity:     Days of Exercise per Week: Not on file    Minutes of Exercise per Session: Not on file   Stress:     Feeling of Stress : Not on file   Social Connections:     Frequency of Communication with Friends and Family: Not on file    Frequency of Social Gatherings with Friends and Family: Not on file    Attends Confucianist Services: Not on file    Active Member of Clubs or Organizations: Not on file    Attends Club or Organization Meetings: Not on file    Marital Status: Not on file   Intimate Partner Violence:     Fear of Current or Ex-Partner: Not on file    Emotionally Abused: Not on file    Physically Abused: Not on file    Sexually Abused: Not on file   Housing Stability:     Unable to Pay for Housing in the Last Year: Not on file    Number of Places Lived in the Last Year: Not on file    Unstable Housing in the Last Year: Not on file      Current Medications:     Current Outpatient Medications   Medication Sig Dispense Refill    docusate sodium (COLACE) 100 MG capsule Take 100 mg by mouth 2 times daily      B Complex-C-Folic Acid (ANGELICA-HUGO) TABS TAKE 1 TABLET BY MOUTH DAILY. HEMATINIC VIT MINERALS      traZODone (DESYREL) 100 MG tablet TAKE 1 TABLET BY MOUTH EVERY DAY AT NIGHT      ipratropium-albuterol (DUONEB) 0.5-2.5 (3) MG/3ML SOLN nebulizer solution Inhale 3 mLs into the lungs 4 times daily      potassium chloride (MICRO-K) 10 MEQ extended release capsule Take 20 mEq by mouth      Ferrous Fumarate (FERROCITE) 324 (106 Fe) MG TABS Take by mouth      apixaban (ELIQUIS) 5 MG TABS tablet Take 1 tablet by mouth 2 times daily 60 tablet 1    tamsulosin (FLOMAX) 0.4 MG capsule TAKE 1 CAPSULE BY MOUTH EVERY DAY 30 capsule 5    Handicap Placard MISC by Does not apply route 1 each 0    lidocaine-prilocaine (EMLA) 2.5-2.5 % cream Apply topically as needed. Apply a quarter size amount to port site 1 hour before chemotherapy. Cover with plastic wrap. 30 g 0    acetaminophen (TYLENOL) 325 MG tablet Take 650 mg by mouth every 6 hours as needed for Pain      allopurinol (ZYLOPRIM) 100 MG tablet Take 100 mg by mouth daily      simvastatin (ZOCOR) 40 MG tablet Take 40 mg by mouth daily       No current facility-administered medications for this visit. Allergies:   Patient has no known allergies. Review of Systems:    Constitutional: No fever or chills. No night sweats. Positive for fatigue. Positive for appetite  Eyes: No eye discharge, double vision, or eye pain   HEENT: negative for sore mouth, sore throat, hoarseness and voice change;    Respiratory: negative for cough, sputum, wheezing, hemoptysis, chest pain;  Cardiovascular: negative for chest pain, palpitations, orthopnea, PND Gastrointestinal: negative for nausea, vomiting, diarrhea, constipation, abdominal pain, Dysphagia, hematemesis and hematochezia   Genitourinary: negative for frequency, dysuria, nocturia, urinary incontinence, and hematuria   Integument: Positive for easy bruising - stable +rasied area to the left of the spine that is tender  Hematologic/Lymphatic: negative for easy bleeding, lymphadenopathy, or petechiae   Endocrine: negative for heat or cold intolerance,weight changes, change in bowel habits and hair loss   Musculoskeletal: Positive joint pain.  +right shoulder pain  Neurological: negative for headaches, dizziness, seizures, weakness; + poor balance; +neuropathy in left hand from shoulder +poor short term memory        Physical Exam:    Vitals: /79   Pulse 111   Temp 97.3 °F (36.3 °C) (Temporal)   Wt 165 lb (74.8 kg)   BMI 22.69 kg/m²   General appearance -patient not in acute distress  Mental status - AAO X3  Eyes - pupils equal and reactive, extraocular eye movements intact  Mouth - mucous membranes moist, pharynx normal without lesions  Neck - supple, no significant adenopathy  Lymphatics - no palpable lymphadenopathy, no hepatosplenomegaly  Chest -decreased breathing sounds bilaterally  Heart - normal rate, regular rhythm, normal S1, S2, no murmurs  Abdomen - soft, nontender, nondistended, no masses or organomegaly  Neurological - alert, oriented, normal speech, no focal findings or movement disorder noted  Extremities -positive lower extremity edema  Skin - normal coloration and turgor, no rashes, no suspicious skin lesions noted; +raised area with tenderness to the left of the spine, no erythema        DATA:    Labs:   Lab Results   Component Value Date    WBC 3.7 12/01/2021    HGB 10.6 (L) 12/01/2021    HCT 31.6 (L) 12/01/2021    .4 (H) 12/01/2021     12/01/2021     Lab Results   Component Value Date    NEUTROABS PENDING 12/01/2021           Chemistry        Component Value Date/Time     (L) 12/01/2021 1105    K 4.7 12/01/2021 1105     12/01/2021 1105    CO2 21 12/01/2021 1105    BUN 14 12/01/2021 1105    CREATININE 1.06 12/01/2021 1105        Component Value Date/Time    CALCIUM 9.4 12/01/2021 1105    ALKPHOS 106 12/01/2021 1105    AST 21 12/01/2021 1105    ALT 17 12/01/2021 1105    BILITOT 0.27 (L) 12/01/2021 1105        No results found.         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 Patient looks more short of breath he has lost weight. Labs are adequate to treatment. Proceed with treatment  We will obtain scans before next treatment  Discussed natural history of small cell lung cancer. Discussed with patient wife  Reviewed goals and expectations. Patient wishes to continue ongoing treatment. NCCN guidelines were reviewed and discussed with the patient. The diagnosis and care plan were discussed with the patient in detail. I discussed the natural history of the disease, prognosis, risks and goals of therapy and answered all the patients questions to the best of my ability. Patient expressed understanding and was in agreement. Woo Mera MD      This note is created with the assistance of a speech recognition program.  While intending to generate a document that actually reflects the content of the visit, the document can still have some errors including those of syntax and sound a like substitutions which may escape proof reading.   It such instances, actual meaning can be extrapolated by contextual

## 2021-12-01 NOTE — PROGRESS NOTES
Pt here for C.8D.1 Margaret Dena. Arrives ambulatory. Denies any new complaints. Labs drawn from port at MD visit, results reviewed. Pt was seen by Dr. Duncan Freeman, order rec'd to proceed with tx. Tx complete without incident. Pt d/c'd in stable condition. Returns 12/8/21 for Port access for scans.

## 2021-12-08 ENCOUNTER — HOSPITAL ENCOUNTER (INPATIENT)
Age: 71
LOS: 11 days | Discharge: HOME HEALTH | DRG: 199 | End: 2021-12-20
Attending: EMERGENCY MEDICINE | Admitting: INTERNAL MEDICINE
Payer: MEDICARE

## 2021-12-08 ENCOUNTER — HOSPITAL ENCOUNTER (OUTPATIENT)
Dept: INFUSION THERAPY | Age: 71
Discharge: HOME | End: 2021-12-08
Payer: MEDICARE

## 2021-12-08 ENCOUNTER — HOSPITAL ENCOUNTER (OUTPATIENT)
Dept: CT IMAGING | Age: 71
Discharge: HOME | End: 2021-12-10
Payer: MEDICARE

## 2021-12-08 ENCOUNTER — TELEPHONE (OUTPATIENT)
Dept: INFUSION THERAPY | Age: 71
End: 2021-12-08

## 2021-12-08 ENCOUNTER — HOSPITAL ENCOUNTER (OUTPATIENT)
Dept: MRI IMAGING | Age: 71
Discharge: HOME | End: 2021-12-10
Payer: MEDICARE

## 2021-12-08 ENCOUNTER — APPOINTMENT (OUTPATIENT)
Dept: GENERAL RADIOLOGY | Age: 71
DRG: 199 | End: 2021-12-08
Payer: MEDICARE

## 2021-12-08 DIAGNOSIS — J93.9 PNEUMOTHORAX, UNSPECIFIED TYPE: ICD-10-CM

## 2021-12-08 DIAGNOSIS — J93.81 CHRONIC PNEUMOTHORAX: Primary | ICD-10-CM

## 2021-12-08 DIAGNOSIS — C34.91 PRIMARY MALIGNANT NEOPLASM OF RIGHT LUNG METASTATIC TO OTHER SITE (HCC): ICD-10-CM

## 2021-12-08 DIAGNOSIS — C34.91 PRIMARY LUNG CANCER WITH METASTASIS FROM LUNG TO OTHER SITE, RIGHT (HCC): Primary | ICD-10-CM

## 2021-12-08 LAB
ABSOLUTE EOS #: 0.1 K/UL (ref 0–0.4)
ABSOLUTE IMMATURE GRANULOCYTE: ABNORMAL K/UL (ref 0–0.3)
ABSOLUTE LYMPH #: 0.7 K/UL (ref 1–4.8)
ABSOLUTE MONO #: 0.3 K/UL (ref 0.1–1.3)
ANION GAP SERPL CALCULATED.3IONS-SCNC: 9 MMOL/L (ref 9–17)
BASOPHILS # BLD: 0 % (ref 0–2)
BASOPHILS ABSOLUTE: 0 K/UL (ref 0–0.2)
BUN BLDV-MCNC: 16 MG/DL (ref 8–23)
BUN/CREAT BLD: ABNORMAL (ref 9–20)
CALCIUM SERPL-MCNC: 9.3 MG/DL (ref 8.6–10.4)
CHLORIDE BLD-SCNC: 100 MMOL/L (ref 98–107)
CO2: 23 MMOL/L (ref 20–31)
CREAT SERPL-MCNC: 1.05 MG/DL (ref 0.7–1.2)
DIFFERENTIAL TYPE: ABNORMAL
EOSINOPHILS RELATIVE PERCENT: 3 % (ref 0–4)
GFR AFRICAN AMERICAN: >60 ML/MIN
GFR NON-AFRICAN AMERICAN: >60 ML/MIN
GFR SERPL CREATININE-BSD FRML MDRD: ABNORMAL ML/MIN/{1.73_M2}
GFR SERPL CREATININE-BSD FRML MDRD: ABNORMAL ML/MIN/{1.73_M2}
GLUCOSE BLD-MCNC: 113 MG/DL (ref 70–99)
HCT VFR BLD CALC: 29.9 % (ref 41–53)
HEMOGLOBIN: 10.1 G/DL (ref 13.5–17.5)
IMMATURE GRANULOCYTES: ABNORMAL %
INR BLD: 1.1
LYMPHOCYTES # BLD: 16 % (ref 24–44)
MCH RBC QN AUTO: 35.5 PG (ref 26–34)
MCHC RBC AUTO-ENTMCNC: 34 G/DL (ref 31–37)
MCV RBC AUTO: 104.6 FL (ref 80–100)
MONOCYTES # BLD: 6 % (ref 1–7)
NRBC AUTOMATED: ABNORMAL PER 100 WBC
PARTIAL THROMBOPLASTIN TIME: 33.7 SEC (ref 24–36)
PDW BLD-RTO: 15.8 % (ref 11.5–14.9)
PLATELET # BLD: 191 K/UL (ref 150–450)
PLATELET ESTIMATE: ABNORMAL
PMV BLD AUTO: 7 FL (ref 6–12)
POTASSIUM SERPL-SCNC: 4.5 MMOL/L (ref 3.7–5.3)
PROTHROMBIN TIME: 14.6 SEC (ref 11.8–14.6)
RBC # BLD: 2.85 M/UL (ref 4.5–5.9)
RBC # BLD: ABNORMAL 10*6/UL
SEG NEUTROPHILS: 75 % (ref 36–66)
SEGMENTED NEUTROPHILS ABSOLUTE COUNT: 3.4 K/UL (ref 1.3–9.1)
SODIUM BLD-SCNC: 132 MMOL/L (ref 135–144)
WBC # BLD: 4.5 K/UL (ref 3.5–11)
WBC # BLD: ABNORMAL 10*3/UL

## 2021-12-08 PROCEDURE — 71045 X-RAY EXAM CHEST 1 VIEW: CPT

## 2021-12-08 PROCEDURE — 85025 COMPLETE CBC W/AUTO DIFF WBC: CPT

## 2021-12-08 PROCEDURE — 32551 INSERTION OF CHEST TUBE: CPT

## 2021-12-08 PROCEDURE — 6360000004 HC RX CONTRAST MEDICATION: Performed by: INTERNAL MEDICINE

## 2021-12-08 PROCEDURE — 96374 THER/PROPH/DIAG INJ IV PUSH: CPT

## 2021-12-08 PROCEDURE — 74177 CT ABD & PELVIS W/CONTRAST: CPT

## 2021-12-08 PROCEDURE — 80048 BASIC METABOLIC PNL TOTAL CA: CPT

## 2021-12-08 PROCEDURE — 99285 EMERGENCY DEPT VISIT HI MDM: CPT

## 2021-12-08 PROCEDURE — 36415 COLL VENOUS BLD VENIPUNCTURE: CPT

## 2021-12-08 PROCEDURE — 6360000002 HC RX W HCPCS: Performed by: EMERGENCY MEDICINE

## 2021-12-08 PROCEDURE — 2580000003 HC RX 258: Performed by: INTERNAL MEDICINE

## 2021-12-08 PROCEDURE — 85730 THROMBOPLASTIN TIME PARTIAL: CPT

## 2021-12-08 PROCEDURE — A9579 GAD-BASE MR CONTRAST NOS,1ML: HCPCS | Performed by: INTERNAL MEDICINE

## 2021-12-08 PROCEDURE — 2580000003 HC RX 258: Performed by: EMERGENCY MEDICINE

## 2021-12-08 PROCEDURE — 70553 MRI BRAIN STEM W/O & W/DYE: CPT

## 2021-12-08 PROCEDURE — 85610 PROTHROMBIN TIME: CPT

## 2021-12-08 PROCEDURE — 6360000002 HC RX W HCPCS: Performed by: INTERNAL MEDICINE

## 2021-12-08 RX ORDER — SODIUM CHLORIDE 0.9 % (FLUSH) 0.9 %
10 SYRINGE (ML) INJECTION PRN
Status: CANCELLED | OUTPATIENT
Start: 2021-12-08

## 2021-12-08 RX ORDER — 0.9 % SODIUM CHLORIDE 0.9 %
1000 INTRAVENOUS SOLUTION INTRAVENOUS ONCE
Status: COMPLETED | OUTPATIENT
Start: 2021-12-08 | End: 2021-12-09

## 2021-12-08 RX ORDER — SODIUM CHLORIDE 0.9 % (FLUSH) 0.9 %
10 SYRINGE (ML) INJECTION ONCE
Status: COMPLETED | OUTPATIENT
Start: 2021-12-08 | End: 2021-12-08

## 2021-12-08 RX ORDER — LIDOCAINE HYDROCHLORIDE 10 MG/ML
20 INJECTION, SOLUTION INFILTRATION; PERINEURAL ONCE
Status: DISCONTINUED | OUTPATIENT
Start: 2021-12-08 | End: 2021-12-20 | Stop reason: HOSPADM

## 2021-12-08 RX ORDER — SODIUM CHLORIDE 0.9 % (FLUSH) 0.9 %
20 SYRINGE (ML) INJECTION PRN
Status: CANCELLED | OUTPATIENT
Start: 2021-12-08

## 2021-12-08 RX ORDER — SODIUM CHLORIDE 0.9 % (FLUSH) 0.9 %
10 SYRINGE (ML) INJECTION PRN
Status: DISCONTINUED | OUTPATIENT
Start: 2021-12-08 | End: 2021-12-11 | Stop reason: HOSPADM

## 2021-12-08 RX ORDER — 0.9 % SODIUM CHLORIDE 0.9 %
80 INTRAVENOUS SOLUTION INTRAVENOUS ONCE
Status: COMPLETED | OUTPATIENT
Start: 2021-12-08 | End: 2021-12-08

## 2021-12-08 RX ORDER — HEPARIN SODIUM (PORCINE) LOCK FLUSH IV SOLN 100 UNIT/ML 100 UNIT/ML
500 SOLUTION INTRAVENOUS PRN
Status: DISCONTINUED | OUTPATIENT
Start: 2021-12-08 | End: 2021-12-09 | Stop reason: HOSPADM

## 2021-12-08 RX ORDER — FENTANYL CITRATE 50 UG/ML
100 INJECTION, SOLUTION INTRAMUSCULAR; INTRAVENOUS ONCE
Status: COMPLETED | OUTPATIENT
Start: 2021-12-08 | End: 2021-12-08

## 2021-12-08 RX ORDER — HEPARIN SODIUM (PORCINE) LOCK FLUSH IV SOLN 100 UNIT/ML 100 UNIT/ML
500 SOLUTION INTRAVENOUS PRN
Status: CANCELLED | OUTPATIENT
Start: 2021-12-08

## 2021-12-08 RX ORDER — SODIUM CHLORIDE 0.9 % (FLUSH) 0.9 %
10 SYRINGE (ML) INJECTION PRN
Status: DISCONTINUED | OUTPATIENT
Start: 2021-12-08 | End: 2021-12-09 | Stop reason: HOSPADM

## 2021-12-08 RX ADMIN — SODIUM CHLORIDE, PRESERVATIVE FREE 10 ML: 5 INJECTION INTRAVENOUS at 11:30

## 2021-12-08 RX ADMIN — FENTANYL CITRATE 100 MCG: 50 INJECTION, SOLUTION INTRAMUSCULAR; INTRAVENOUS at 21:20

## 2021-12-08 RX ADMIN — SODIUM CHLORIDE 1000 ML: 9 INJECTION, SOLUTION INTRAVENOUS at 23:10

## 2021-12-08 RX ADMIN — SODIUM CHLORIDE, PRESERVATIVE FREE 10 ML: 5 INJECTION INTRAVENOUS at 12:11

## 2021-12-08 RX ADMIN — SODIUM CHLORIDE 80 ML: 9 INJECTION, SOLUTION INTRAVENOUS at 13:11

## 2021-12-08 RX ADMIN — SODIUM CHLORIDE, PRESERVATIVE FREE 10 ML: 5 INJECTION INTRAVENOUS at 13:27

## 2021-12-08 RX ADMIN — HEPARIN 500 UNITS: 100 SYRINGE at 13:27

## 2021-12-08 RX ADMIN — GADOTERIDOL 15 ML: 279.3 INJECTION, SOLUTION INTRAVENOUS at 12:11

## 2021-12-08 RX ADMIN — IOPAMIDOL 100 ML: 755 INJECTION, SOLUTION INTRAVENOUS at 13:11

## 2021-12-08 RX ADMIN — SODIUM CHLORIDE, PRESERVATIVE FREE 10 ML: 5 INJECTION INTRAVENOUS at 13:11

## 2021-12-08 ASSESSMENT — ENCOUNTER SYMPTOMS
COLOR CHANGE: 0
CHEST TIGHTNESS: 0
VOMITING: 0
DIARRHEA: 0
CONSTIPATION: 0
SHORTNESS OF BREATH: 0
ABDOMINAL PAIN: 0
NAUSEA: 0

## 2021-12-08 NOTE — TELEPHONE ENCOUNTER
Called patient to discuss results of scans  Personally reviewed images. Patient has significant right-sided pneumothorax   He states he feels fine but he is agreeable to go to the ER. Patient be going to [facility] ER.

## 2021-12-09 ENCOUNTER — APPOINTMENT (OUTPATIENT)
Dept: GENERAL RADIOLOGY | Age: 71
DRG: 199 | End: 2021-12-09
Payer: MEDICARE

## 2021-12-09 PROCEDURE — 6360000002 HC RX W HCPCS: Performed by: FAMILY MEDICINE

## 2021-12-09 PROCEDURE — 6370000000 HC RX 637 (ALT 250 FOR IP): Performed by: INTERNAL MEDICINE

## 2021-12-09 PROCEDURE — 2060000000 HC ICU INTERMEDIATE R&B

## 2021-12-09 PROCEDURE — 71045 X-RAY EXAM CHEST 1 VIEW: CPT

## 2021-12-09 PROCEDURE — 2580000003 HC RX 258: Performed by: FAMILY MEDICINE

## 2021-12-09 PROCEDURE — 6370000000 HC RX 637 (ALT 250 FOR IP): Performed by: FAMILY MEDICINE

## 2021-12-09 PROCEDURE — 99223 1ST HOSP IP/OBS HIGH 75: CPT | Performed by: INTERNAL MEDICINE

## 2021-12-09 PROCEDURE — 2580000003 HC RX 258: Performed by: INTERNAL MEDICINE

## 2021-12-09 RX ORDER — ONDANSETRON 4 MG/1
4 TABLET, ORALLY DISINTEGRATING ORAL EVERY 8 HOURS PRN
Status: DISCONTINUED | OUTPATIENT
Start: 2021-12-09 | End: 2021-12-20 | Stop reason: HOSPADM

## 2021-12-09 RX ORDER — SODIUM CHLORIDE 0.9 % (FLUSH) 0.9 %
5-40 SYRINGE (ML) INJECTION EVERY 12 HOURS SCHEDULED
Status: DISCONTINUED | OUTPATIENT
Start: 2021-12-09 | End: 2021-12-20 | Stop reason: HOSPADM

## 2021-12-09 RX ORDER — ONDANSETRON 2 MG/ML
4 INJECTION INTRAMUSCULAR; INTRAVENOUS EVERY 6 HOURS PRN
Status: DISCONTINUED | OUTPATIENT
Start: 2021-12-09 | End: 2021-12-20 | Stop reason: HOSPADM

## 2021-12-09 RX ORDER — ATORVASTATIN CALCIUM 20 MG/1
10 TABLET, FILM COATED ORAL DAILY
Status: DISCONTINUED | OUTPATIENT
Start: 2021-12-09 | End: 2021-12-20 | Stop reason: HOSPADM

## 2021-12-09 RX ORDER — MIDODRINE HYDROCHLORIDE 5 MG/1
5 TABLET ORAL
Status: DISCONTINUED | OUTPATIENT
Start: 2021-12-09 | End: 2021-12-11

## 2021-12-09 RX ORDER — POTASSIUM CHLORIDE 20 MEQ/1
40 TABLET, EXTENDED RELEASE ORAL PRN
Status: DISCONTINUED | OUTPATIENT
Start: 2021-12-09 | End: 2021-12-20 | Stop reason: HOSPADM

## 2021-12-09 RX ORDER — IPRATROPIUM BROMIDE AND ALBUTEROL SULFATE 2.5; .5 MG/3ML; MG/3ML
3 SOLUTION RESPIRATORY (INHALATION) EVERY 4 HOURS PRN
Status: DISCONTINUED | OUTPATIENT
Start: 2021-12-09 | End: 2021-12-10

## 2021-12-09 RX ORDER — SODIUM CHLORIDE 9 MG/ML
25 INJECTION, SOLUTION INTRAVENOUS PRN
Status: DISCONTINUED | OUTPATIENT
Start: 2021-12-09 | End: 2021-12-20 | Stop reason: HOSPADM

## 2021-12-09 RX ORDER — FERROUS SULFATE 325(65) MG
325 TABLET ORAL DAILY
Status: DISCONTINUED | OUTPATIENT
Start: 2021-12-09 | End: 2021-12-20 | Stop reason: HOSPADM

## 2021-12-09 RX ORDER — ACETAMINOPHEN 325 MG/1
650 TABLET ORAL EVERY 4 HOURS PRN
Status: DISCONTINUED | OUTPATIENT
Start: 2021-12-09 | End: 2021-12-20 | Stop reason: HOSPADM

## 2021-12-09 RX ORDER — DOXYCYCLINE 100 MG/1
100 CAPSULE ORAL EVERY 12 HOURS SCHEDULED
Status: DISCONTINUED | OUTPATIENT
Start: 2021-12-09 | End: 2021-12-20 | Stop reason: HOSPADM

## 2021-12-09 RX ORDER — SODIUM CHLORIDE 0.9 % (FLUSH) 0.9 %
5-40 SYRINGE (ML) INJECTION PRN
Status: DISCONTINUED | OUTPATIENT
Start: 2021-12-09 | End: 2021-12-20 | Stop reason: HOSPADM

## 2021-12-09 RX ORDER — SODIUM CHLORIDE 9 MG/ML
INJECTION, SOLUTION INTRAVENOUS CONTINUOUS
Status: DISCONTINUED | OUTPATIENT
Start: 2021-12-09 | End: 2021-12-10

## 2021-12-09 RX ORDER — POTASSIUM CHLORIDE 7.45 MG/ML
10 INJECTION INTRAVENOUS PRN
Status: DISCONTINUED | OUTPATIENT
Start: 2021-12-09 | End: 2021-12-20 | Stop reason: HOSPADM

## 2021-12-09 RX ADMIN — ATORVASTATIN CALCIUM 10 MG: 20 TABLET, FILM COATED ORAL at 10:51

## 2021-12-09 RX ADMIN — SODIUM CHLORIDE, PRESERVATIVE FREE 10 ML: 5 INJECTION INTRAVENOUS at 21:47

## 2021-12-09 RX ADMIN — ACETAMINOPHEN 650 MG: 325 TABLET, FILM COATED ORAL at 10:51

## 2021-12-09 RX ADMIN — FERROUS SULFATE TAB 325 MG (65 MG ELEMENTAL FE) 325 MG: 325 (65 FE) TAB at 08:55

## 2021-12-09 RX ADMIN — ACETAMINOPHEN 650 MG: 325 TABLET, FILM COATED ORAL at 23:55

## 2021-12-09 RX ADMIN — CEFTRIAXONE SODIUM 1000 MG: 1 INJECTION, POWDER, FOR SOLUTION INTRAMUSCULAR; INTRAVENOUS at 08:52

## 2021-12-09 RX ADMIN — SODIUM CHLORIDE: 9 INJECTION, SOLUTION INTRAVENOUS at 08:49

## 2021-12-09 RX ADMIN — DOXYCYCLINE 100 MG: 100 CAPSULE ORAL at 08:54

## 2021-12-09 RX ADMIN — MIDODRINE HYDROCHLORIDE 5 MG: 5 TABLET ORAL at 17:25

## 2021-12-09 RX ADMIN — MIDODRINE HYDROCHLORIDE 5 MG: 5 TABLET ORAL at 10:51

## 2021-12-09 RX ADMIN — DOXYCYCLINE 100 MG: 100 CAPSULE ORAL at 21:47

## 2021-12-09 ASSESSMENT — ENCOUNTER SYMPTOMS
PHOTOPHOBIA: 0
BACK PAIN: 0
COUGH: 1
ANAL BLEEDING: 0
VOICE CHANGE: 0
RECTAL PAIN: 0
COLOR CHANGE: 0
STRIDOR: 0
CHOKING: 0
TROUBLE SWALLOWING: 0
ABDOMINAL DISTENTION: 0
SHORTNESS OF BREATH: 1
BLOOD IN STOOL: 0

## 2021-12-09 ASSESSMENT — PAIN SCALES - GENERAL
PAINLEVEL_OUTOF10: 3
PAINLEVEL_OUTOF10: 4

## 2021-12-09 NOTE — ED PROVIDER NOTES
EMERGENCY DEPARTMENT ENCOUNTER   ATTENDING ATTESTATION     Pt Name: Basim Mccarthy  MRN: [id number]  [place] 1950  Date of evaluation: 12/8/21       Basim Mccarthy is a 80 y.o. male who presents with Shortness of Breath      MDM:   32-year-old male history of lung cancer, A. fib with RVR, spontaneous pneumothorax presents for evaluation with pneumothorax on right side detected on routine screening CT exam today. Large pneumothorax with almost entirety of right lung collapse. Patient asymptomatic. Discussed with pulmonology. 30 F Chest tube placed with improvement in PTX on postprocedural x-ray. Will admit to intermediate ICU for pulmonology consult and monitoring. Critical care  30 minutes for management of large pneumothorax requiring chest tube placement, intermediate ICU admission, critical care consultation    Vitals:   Vitals:    12/08/21 2330 12/08/21 2345 12/09/21 0000 12/09/21 0030   BP: 92/69  93/65 110/65   Pulse: 93 94 96 95   Resp: 23 22 22 24   Temp:       TempSrc:       SpO2: 94% 95% 96% 94%   Weight:       Height:             I personally evaluated and examined the patient in conjunction with the resident and agree with the assessment, treatment plan, and disposition of the patient as recorded by the resident. I performed a history and physical examination of the patient and discussed management with the resident. I reviewed the residents note and agree with the documented findings and plan of care. Any areas of disagreement are noted on the chart. I was personally present for the key portions of any procedures. I have documented in the chart those procedures where I was not present during the key portions. I have personally reviewed all images and agree with the resident's interpretation. I have reviewed the emergency nurses triage note.  I agree with the chief complaint, past medical history, past surgical history, allergies, medications, social and family history as documented unless otherwise noted. The care is provided during an unprecedented national emergency due to the novel coronavirus, COVID 19.   Kary Villalpando MD  Attending Emergency Physician            Kary Villalpando MD  12/09/21 5901

## 2021-12-09 NOTE — PROGRESS NOTES
Medication History completed:    No changes to medication list at this encounter. Medications confirmed with [facility].      Thank you,  Greg MonroeD, BCPS  [phone]

## 2021-12-09 NOTE — ED PROVIDER NOTES
I assigned myself to the case in error. I did not supervise the resident in the care of this patient.        Lolis Krause MD  12/08/21 [place]

## 2021-12-09 NOTE — CONSULTS
Today's Date: 12/9/2021  Patient Name: Lakeisha Harman  Date of admission: 12/8/2021  7:03 PM  Patient's age: 80 y.o., 1950  Admission Dx: Pneumothorax [J93.9]    Reason for Consult: Known lung cancer patient  Requesting Physician: Darryle Pancake, MD    CHIEF COMPLAINT:    Chief Complaint   Patient presents with    Shortness of Breath       History Obtained From:  patient and chart    HISTORY OF PRESENT ILLNESS:      Lakeisha Harman is a 80 y.o. male who is admitted to the hospital on 12/8/2021  for SOB. He had a restaging scan with oncology and his CT scan showed worsening pneumothorax and mediastinal shift. Therefore he was sent to ER for further management. He denies any fever chills. He had chest tube in place. He is currently receiving Lurbinectedin for his recurrent small cell lung cancer and most recent treatment was on 12/1/2021. He has a diagnosis of small cell lung cancer with squamous component and has been following with Dr. Eugenia Ormond as outpatient and his brief oncologic history as follows. 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 Chemotherapy changed to carboplatin and etoposide due to renal insufficiency from cycle #2  PCI 7/2019  SRS to adrenal gland metastasis, completed 07/2020  systemic palliative chemoimmunotherapy with carboplatin etoposide and Tecentriq, 12/2020 x4 cycles. Maintenance Tecentriq, 3/2021  Lurbenectidin7/2/2021      Past Medical History:   has a past medical history of DDD (degenerative disc disease), cervical, Gout, Heart murmur, Hyperlipidemia, Hypertension, Lung cancer ([address] MI (myocardial infarction) ([address] Skin cancer, and Wears glasses.     Past Surgical History:   has a past surgical history that includes Appendectomy; Tonsillectomy; skin biopsy; skin biopsy; Colonoscopy; Foot surgery (Right); Cardiac catheterization; cyst incision and drainage (Right, 05/14/2020); Forearm surgery (Right, 5/14/2020); and knee surgery (Left, 5/14/2020). Medications:    Prior to Admission medications    Medication Sig Start Date End Date Taking? Authorizing Provider   oxyCODONE-acetaminophen (PERCOCET) 5-325 MG per tablet Take 1 tablet by mouth every 6 hours as needed for Pain for up to 15 days. 12/1/21 12/16/21 Yes Lorraine Hou MD   dexamethasone (DECADRON) 2 MG tablet Take 1 tablet by mouth 2 times daily (with meals) for 15 days 12/1/21 12/16/21 Yes Lorraine Hou MD   docusate sodium (COLACE) 100 MG capsule Take 100 mg by mouth 2 times daily   Yes Historical Provider, MD   B Complex-C-Folic Acid (ANGELICA-HUGO) TABS TAKE 1 TABLET BY MOUTH DAILY. HEMATINIC VIT MINERALS 7/21/20  Yes Historical Provider, MD   traZODone (DESYREL) 100 MG tablet Take 100 mg by mouth nightly  7/20/20  Yes Historical Provider, MD   ipratropium-albuterol (DUONEB) 0.5-2.5 (3) MG/3ML SOLN nebulizer solution Inhale 3 mLs into the lungs 4 times daily   Yes Historical Provider, MD   potassium chloride (MICRO-K) 10 MEQ extended release capsule Take 20 mEq by mouth daily  11/19/19  Yes Historical Provider, MD   Ferrous Fumarate (FERROCITE) 324 (106 Fe) MG TABS Take 324 mg by mouth daily    Yes Historical Provider, MD   apixaban (ELIQUIS) 5 MG TABS tablet Take 1 tablet by mouth 2 times daily 5/21/20  Yes Burleigh Bernheim, MD   tamsulosin (FLOMAX) 0.4 MG capsule TAKE 1 CAPSULE BY MOUTH EVERY DAY 5/4/20  Yes Lorraine Hou MD   lidocaine-prilocaine (EMLA) 2.5-2.5 % cream Apply topically as needed. Apply a quarter size amount to port site 1 hour before chemotherapy. Cover with plastic wrap.  3/7/19  Yes Lorraine Hou MD   acetaminophen (TYLENOL) 325 MG tablet Take 650 mg by mouth every 6 hours as needed for Pain   Yes Historical Provider, MD allopurinol (ZYLOPRIM) 100 MG tablet Take 100 mg by mouth daily 12/4/18  Yes Historical Provider, MD   simvastatin (ZOCOR) 40 MG tablet Take 40 mg by mouth daily 11/27/18  Yes Historical Provider, MD   Handicap [facility] [facility] by Does not apply route 7/30/19   Sam Taylor MD     Current Facility-Administered Medications   Medication Dose Route Frequency Provider Last Rate Last Admin    sodium chloride flush 0.9 % injection 5-40 mL  5-40 mL IntraVENous 2 times per day Bertram Herman MD        sodium chloride flush 0.9 % injection 5-40 mL  5-40 mL IntraVENous PRN Bertram Herman MD        0.9 % sodium chloride infusion  25 mL IntraVENous PRN Bertram Herman MD        acetaminophen (TYLENOL) tablet 650 mg  650 mg Oral Q4H PRN Bertram Herman MD   650 mg at 12/09/21 1051    ondansetron (ZOFRAN-ODT) disintegrating tablet 4 mg  4 mg Oral Q8H PRN Bertram Herman MD        Or    ondansetron (ZOFRAN) injection 4 mg  4 mg IntraVENous Q6H PRN Bertram Herman MD        potassium chloride (KLOR-CON M) extended release tablet 40 mEq  40 mEq Oral PRN Day Alejandre MD        Or    potassium bicarb-citric acid (EFFER-K) effervescent tablet 40 mEq  40 mEq Oral PRN Day Alejandre MD        Or    potassium chloride 10 mEq/100 mL IVPB (Peripheral Line)  10 mEq IntraVENous PRN Day Alejandre MD        ipratropium-albuterol (DUONEB) nebulizer solution 3 mL  3 mL Inhalation Q4H PRN Day Alejandre MD        ferrous sulfate (IRON 325) tablet 325 mg  325 mg Oral Daily Day Alejandre MD   325 mg at 12/09/21 0855    atorvastatin (LIPITOR) tablet 10 mg  10 mg Oral Daily Day Alejandre MD   10 mg at 12/09/21 1051    midodrine (PROAMATINE) tablet 5 mg  5 mg Oral TID WC Day Alejandre MD   5 mg at 12/09/21 1051    0.9 % sodium chloride infusion   IntraVENous Continuous Day Alejandre MD 50 mL/hr at 12/09/21 0849 New Bag at 12/09/21 0849    cefTRIAXone (ROCEPHIN) 1000 mg IVPB in 48 Facility-Administered Medications Ordered in Other Encounters   Medication Dose Route Frequency Provider Last Rate Last Admin    sodium chloride flush 0.9 % injection 10 mL  10 mL IntraVENous PRN Lorraine Hou MD   10 mL at 21 1311       Allergies:  Patient has no known allergies. Social History:   reports that he has been smoking cigarettes. He has been smoking about 0.50 packs per day. He has quit using smokeless tobacco. He reports current drug use. Frequency: 14.00 times per week. Drug: Marijuana Charmayne Stai). He reports that he does not drink alcohol. Family History: family history includes Cancer in his father. REVIEW OF SYSTEMS:    Constitutional: No fever or chills. No night sweats, no weight loss   Eyes: No eye discharge, double vision, or eye pain   HEENT: negative for sore mouth, sore throat, hoarseness and voice change   Respiratory: negative for cough , sputum, ++dyspnea, wheezing, hemoptysis, chest pain   Cardiovascular: negative for chest pain, ++dyspnea, palpitations, orthopnea, PND   Gastrointestinal: negative for nausea, vomiting, diarrhea, constipation, abdominal pain, Dysphagia, hematemesis and hematochezia   Genitourinary: negative for frequency, dysuria, nocturia, urinary incontinence, and hematuria   Integument: negative for rash, skin lesions, bruises.    Hematologic/Lymphatic: negative for easy bruising, bleeding, lymphadenopathy, or petechiae   Endocrine: negative for heat or cold intolerance,weight changes, change in bowel habits and hair loss   Musculoskeletal: negative for myalgias, arthralgias, pain, joint swelling,and bone pain   Neurological: negative for headaches, dizziness, seizures, weakness, numbness    PHYSICAL EXAM:      /74   Pulse 95   Temp 98 °F (36.7 °C) (Oral)   Resp 26   Ht 5' 11\" (1.803 m)   Wt 165 lb (74.8 kg)   SpO2 96%   BMI 23.01 kg/m²    Temp (24hrs), Av °F (36.7 °C), Min:98 °F (36.7 °C), Max:98 °F (36.7 °C)    General appearance - well appearing, no in pain or distress   Mental status - alert and cooperative   Eyes - pupils equal and reactive, extraocular eye movements intact   Ears - bilateral TM's and external ear canals normal   Mouth - mucous membranes moist, pharynx normal without lesions   Neck - supple, no significant adenopathy   Lymphatics - no palpable lymphadenopathy, no hepatosplenomegaly   Chest - clear to auscultation, no wheezes, rales or rhonchi, hest tube in place  Heart - normal rate, regular rhythm, normal S1, S2, no murmurs  Abdomen - soft, nontender, nondistended, no masses or organomegaly   Neurological - alert, oriented, normal speech, no focal findings or movement disorder noted   Musculoskeletal - no joint tenderness, deformity or swelling   Extremities - peripheral pulses normal, no pedal edema, no clubbing or cyanosis   Skin - normal coloration and turgor, no rashes, no suspicious skin lesions noted ,    DATA:    Labs:   CBC:   Recent Labs     12/08/21 1915   WBC 4.5   HGB 10.1*   HCT 29.9*        BMP:   Recent Labs     12/08/21 1915   *   K 4.5   CO2 23   BUN 16   CREATININE 1.05   LABGLOM >60   GLUCOSE 113*     PT/INR:   Recent Labs     12/08/21 1915   PROTIME 14.6   INR 1.1       IMAGING DATA:  XR CHEST PORTABLE   Final Result   Stable examination. XR CHEST PORTABLE   Final Result   Addendum 1 of 1   ADDENDUM:   Compared with the chest x-ray there is been significant interval    improvement. Please disregard original impression. Discussed with Dr. Erlin Graham at 10:20    p.m. Final   Increased right pneumothorax despite new chest tube. Increased airspace   disease on the right. RECOMMENDATION:   The findings were sent to the Radiology Results Communication Center at 10:19   pm on 12/8/2021to be communicated to a licensed caregiver.          XR CHEST PORTABLE    (Results Pending)       Primary Problem  Pneumothorax    Active Hospital Problems    Diagnosis Date Noted   

## 2021-12-09 NOTE — ED PROVIDER NOTES
[address] [facility]  Emergency Department Encounter  EmergencyMedicine Resident     Pt Name:Surendra Damon  MRN: [id number]  Birthdate 1950  Date of evaluation: 12/8/21  PCP:  Esdras Mccormack MD    CHIEF COMPLAINT       Chief Complaint   Patient presents with    Shortness of Breath       HISTORY OF PRESENT ILLNESS  (Location/Symptom, Timing/Onset, Context/Setting, Quality, Duration, Modifying Factors, Severity.)      Jamaica Grossman is a 80 y.o. male who presents with pneumothorax on the right side. Patient has had pneumothorax since september. No worsening shortness of breath, no pain. NO traumatic injury. Hx of small cell lung cancer. No complaints. Was seen for routine CT today with noted worsenign pneumothorax. Patient's been following with oncology, utilizing chemotherapy. Patient has had a cough. Patient denies any lightheadedness, dizziness, chest pain, abdominal pain, change in urination or bowel habits. PAST MEDICAL / SURGICAL / SOCIAL / FAMILY HISTORY      has a past medical history of DDD (degenerative disc disease), cervical, Gout, Heart murmur, Hyperlipidemia, Hypertension, Lung cancer ([address] MI (myocardial infarction) ([address] Skin cancer, and Wears glasses. No additional pertinent.      has a past surgical history that includes Appendectomy; Tonsillectomy; skin biopsy; skin biopsy; Colonoscopy; Foot surgery (Right); Cardiac catheterization; cyst incision and drainage (Right, 05/14/2020); Forearm surgery (Right, 5/14/2020); and knee surgery (Left, 5/14/2020).   No additional pertinent     Social History     Socioeconomic History    Marital status:      Spouse name: Not on file    Number of children: Not on file    Years of education: Not on file    Highest education level: Not on file   Occupational History    Not on file   Tobacco Use    Smoking status: Current Every Day Smoker     Packs/day: 0.50     Types: Cigarettes    Smokeless tobacco: Former User   Vaping Use    Vaping Use: Former   Substance and Sexual Activity    Alcohol use: No    Drug use: Yes     Frequency: 14.0 times per week     Types: Marijuana Kassidy Jan)    Sexual activity: Not on file   Other Topics Concern    Not on file   Social History Narrative    Not on file     Social Determinants of Health     Financial Resource Strain:     Difficulty of Paying Living Expenses: Not on file   Food Insecurity:     Worried About [address] [employer] in the Last Year: Not on file    Dandre of Food in the Last Year: Not on file   Transportation Needs:     Lack of Transportation (Medical): Not on file    Lack of Transportation (Non-Medical): Not on file   Physical Activity:     Days of Exercise per Week: Not on file    Minutes of Exercise per Session: Not on file   Stress:     Feeling of Stress : Not on file   Social Connections:     Frequency of Communication with Friends and Family: Not on file    Frequency of Social Gatherings with Friends and Family: Not on file    Attends Yarsani Services: Not on file    Active Member of [address] [place] [address] or Organizations: Not on file    Attends Club or Organization Meetings: Not on file    Marital Status: Not on file   Intimate Partner Violence:     Fear of Current or Ex-Partner: Not on file    Emotionally Abused: Not on file    Physically Abused: Not on file    Sexually Abused: Not on file   Housing Stability:     Unable to Pay for Housing in the Last Year: Not on file    Number of [place] in the Last Year: Not on file    Unstable Housing in the Last Year: Not on file       Family History   Problem Relation Age of Onset    Cancer Father         lung cancer       Allergies:  Patient has no known allergies. Home Medications:  Prior to Admission medications    Medication Sig Start Date End Date Taking? Authorizing Provider   oxyCODONE-acetaminophen (PERCOCET) 5-325 MG per tablet Take 1 tablet by mouth every 6 hours as needed for Pain for up to 15 days.  12/1/21 12/16/21 Yes Lorraine Hou MD   dexamethasone (DECADRON) 2 MG tablet Take 1 tablet by mouth 2 times daily (with meals) for 15 days 12/1/21 12/16/21 Yes Lorraine Hou MD   docusate sodium (COLACE) 100 MG capsule Take 100 mg by mouth 2 times daily   Yes Historical Provider, MD   B Complex-C-Folic Acid (ANGELICA-HUGO) TABS TAKE 1 TABLET BY MOUTH DAILY. HEMATINIC VIT MINERALS 7/21/20  Yes Historical Provider, MD   traZODone (DESYREL) 100 MG tablet Take 100 mg by mouth nightly  7/20/20  Yes Historical Provider, MD   ipratropium-albuterol (DUONEB) 0.5-2.5 (3) MG/3ML SOLN nebulizer solution Inhale 3 mLs into the lungs 4 times daily   Yes Historical Provider, MD   potassium chloride (MICRO-K) 10 MEQ extended release capsule Take 20 mEq by mouth daily  11/19/19  Yes Historical Provider, MD   Ferrous Fumarate (FERROCITE) 324 (106 Fe) MG TABS Take 324 mg by mouth daily    Yes Historical Provider, MD   apixaban (ELIQUIS) 5 MG TABS tablet Take 1 tablet by mouth 2 times daily 5/21/20  Yes Burleigh Bernheim, MD   tamsulosin (FLOMAX) 0.4 MG capsule TAKE 1 CAPSULE BY MOUTH EVERY DAY 5/4/20  Yes Lorraine Hou MD   lidocaine-prilocaine (EMLA) 2.5-2.5 % cream Apply topically as needed. Apply a quarter size amount to port site 1 hour before chemotherapy. Cover with plastic wrap. 3/7/19  Yes Lorraine Hou MD   acetaminophen (TYLENOL) 325 MG tablet Take 650 mg by mouth every 6 hours as needed for Pain   Yes Historical Provider, MD   allopurinol (ZYLOPRIM) 100 MG tablet Take 100 mg by mouth daily 12/4/18  Yes Historical Provider, MD   simvastatin (ZOCOR) 40 MG tablet Take 40 mg by mouth daily 11/27/18  Yes Historical Provider, MD   Handicap Placard [address] [facility] by Does not apply route 7/30/19   Lorraine Hou MD       REVIEW OF SYSTEMS    (2-9 systems for level 4, 10 or more for level 5)      Review of Systems   Constitutional: Negative for chills and fever. HENT: Negative for congestion.     Respiratory: Negative for chest tightness and shortness of breath. Cardiovascular: Negative for chest pain and leg swelling. Gastrointestinal: Negative for abdominal pain, constipation, diarrhea, nausea and vomiting. Endocrine: Negative for polyuria. Genitourinary: Negative for difficulty urinating. Skin: Negative for color change. Neurological: Negative for dizziness, weakness, light-headedness and headaches. Psychiatric/Behavioral: Negative for confusion. Chronic shortness of breath, no acute change. PHYSICAL EXAM   (up to 7 for level 4, 8 or more for level 5)      INITIAL VITALS:   BP 92/69   Pulse 94   Temp 98 °F (36.7 °C) (Oral)   Resp 22   Ht 5' 11\" (1.803 m)   Wt 165 lb (74.8 kg)   SpO2 95%   BMI 23.01 kg/m²     Physical Exam  Constitutional:       Appearance: Normal appearance. HENT:      Head: Normocephalic and atraumatic. Mouth/Throat:      Mouth: Mucous membranes are moist.      Pharynx: Oropharynx is clear. Eyes:      Extraocular Movements: Extraocular movements intact. Conjunctiva/sclera: Conjunctivae normal.   Cardiovascular:      Rate and Rhythm: Normal rate and regular rhythm. Pulses: Normal pulses. Heart sounds: Normal heart sounds. No murmur heard. Pulmonary:      Effort: Pulmonary effort is normal. No tachypnea or respiratory distress. Breath sounds: Examination of the right-upper field reveals decreased breath sounds. Examination of the right-middle field reveals decreased breath sounds. Examination of the right-lower field reveals decreased breath sounds. Decreased breath sounds present. Abdominal:      General: Bowel sounds are normal. There is no distension. Tenderness: There is no abdominal tenderness. There is no guarding. Musculoskeletal:         General: Normal range of motion. Comments: Range of motion noted to be normal with patient's natural movements   Skin:     General: Skin is warm and dry. Findings: No rash (On exposed skin).    Neurological:      General: No focal deficit present. Mental Status: He is alert and oriented to person, place, and time.    Psychiatric:         Mood and Affect: Mood normal.         Behavior: Behavior normal.         DIFFERENTIAL  DIAGNOSIS     PLAN (LABS / IMAGING / EKG):  Orders Placed This Encounter   Procedures    XR CHEST PORTABLE    CBC Auto Differential    Basic Metabolic Panel w/ Reflex to MG    Protime-INR    APTT    Inpatient consult to Pulmonology    Inpatient consult to Primary Care Provider    PATIENT STATUS (FROM ED OR OR/PROCEDURAL) Inpatient       MEDICATIONS ORDERED:  Orders Placed This Encounter   Medications    lidocaine 1 % injection 20 mL    fentaNYL (SUBLIMAZE) injection 100 mcg    0.9 % sodium chloride bolus         DIAGNOSTIC RESULTS / EMERGENCY DEPARTMENT COURSE / MDM   LAB RESULTS:  Results for orders placed or performed during the hospital encounter of 12/08/21   CBC Auto Differential   Result Value Ref Range    WBC 4.5 3.5 - 11.0 k/uL    RBC 2.85 (L) 4.5 - 5.9 m/uL    Hemoglobin 10.1 (L) 13.5 - 17.5 g/dL    Hematocrit 29.9 (L) 41 - 53 %    .6 (H) 80 - 100 fL    MCH 35.5 (H) 26 - 34 pg    MCHC 34.0 31 - 37 g/dL    RDW 15.8 (H) 11.5 - 14.9 %    Platelets 909 109 - 716 k/uL    MPV 7.0 6.0 - 12.0 fL    NRBC Automated NOT REPORTED per 100 WBC    Differential Type NOT REPORTED     Seg Neutrophils 75 (H) 36 - 66 %    Lymphocytes 16 (L) 24 - 44 %    Monocytes 6 1 - 7 %    Eosinophils % 3 0 - 4 %    Basophils 0 0 - 2 %    Immature Granulocytes NOT REPORTED 0 %    Segs Absolute 3.40 1.3 - 9.1 k/uL    Absolute Lymph # 0.70 (L) 1.0 - 4.8 k/uL    Absolute Mono # 0.30 0.1 - 1.3 k/uL    Absolute Eos # 0.10 0.0 - 0.4 k/uL    Basophils Absolute 0.00 0.0 - 0.2 k/uL    Absolute Immature Granulocyte NOT REPORTED 0.00 - 0.30 k/uL    WBC Morphology NOT REPORTED     RBC Morphology NOT REPORTED     Platelet Estimate NOT REPORTED    Basic Metabolic Panel w/ Reflex to MG   Result Value Ref Range    Glucose 113 (H) 70 - 99 mg/dL    BUN 16 8 - 23 mg/dL    CREATININE 1.05 0.70 - 1.20 mg/dL    Bun/Cre Ratio NOT REPORTED 9 - 20    Calcium 9.3 8.6 - 10.4 mg/dL    Sodium 132 (L) 135 - 144 mmol/L    Potassium 4.5 3.7 - 5.3 mmol/L    Chloride 100 98 - 107 mmol/L    CO2 23 20 - 31 mmol/L    Anion Gap 9 9 - 17 mmol/L    GFR Non-African American >60 >60 mL/min    GFR African American >60 >60 mL/min    GFR Comment          GFR Staging NOT REPORTED    Protime-INR   Result Value Ref Range    Protime 14.6 11.8 - 14.6 sec    INR 1.1    APTT   Result Value Ref Range    PTT 33.7 24.0 - 36.0 sec       RADIOLOGY:  XR CHEST PORTABLE   Final Result   Addendum 1 of 1   ADDENDUM:   Compared with the chest x-ray there is been significant interval    improvement. Please disregard original impression. Discussed with Dr. Marshal Babinski at 10:20    p.m. Final   Increased right pneumothorax despite new chest tube. Increased airspace   disease on the right. RECOMMENDATION:   The findings were sent to the Radiology Results Communication Center at 10:19   pm on 12/8/2021to be communicated to a licensed caregiver. PROCEDURES:      PROCEDURE NOTE - CHEST TUBE PLACEMENT     PATIENT NAME: Trevin Bourgeois [address] [place] [address] Way RECORD NO. [id number]  DATE: 12/9/2021  ATTENDING PHYSICIAN: eva    PREOPERATIVE DIAGNOSIS:  pneumothorax  POSTOPERATIVE DIAGNOSIS:  Same  PROCEDURE PERFORMED:  Emergency Chest Tube insertion  PERFORMING PHYSICIAN: Dallin Staton DO  COMPLICATIONS:  None      DISCUSSION:  Hollis Rizo is a 80y.o.-year-old male who requires chest tube placement due to  pneumothorax. The history and physical examination were reviewed and confirmed. CONSENT: The patient provided verbal consent for this procedure. PROCEDURE:  The patient was placed in a semirecumbent position with the head of the bed at 30 degrees. The right side was prepped with betadine and draped in a sterile fashion.   Local anesthesia over the insertion site was obtained by infiltration using 10.0 cc of 1% Lidocaine without epinephrine. An incision was made laterally in the midaxillary line. Blunt dissection up and over the rib was performed until access was obtained into the pleural cavity. A 28 [place] chest tube was placed and connected to a pleurivac at 20 cm H2O. Initial output from the tube was air and 100 cc of serosanguinous fluid. The tube was sutured in place and the site was covered with an occlusive dressing. All connections were banded. Breath sounds after the procedure were normal.  A chest x-ray was obtained to evaluate placement and showed partial expansion of the lung. The patient tolerated the procedure well. Dallin Stallings DO  12:13 AM, 12/8/21            CONSULTS:  IP CONSULT TO PULMONOLOGY  IP CONSULT TO PRIMARY CARE PROVIDER    MEDICAL DECISION MAKING:  Patient presenting with right-sided pneumothorax identified on CT scan that was routine today. Dr. Jesus Oconnell was consulted with pulmonology, wanted chest tube placed to suction placed 20 cm. Chest tube was placed, see procedure note above. Partial expansion of the lung was noted, tube was migrated more midline and inferiorly in a fissure, patient had initially 100 cc of fluid out. At this point writing the note patient has had 300 cc of pleural fluid. Patient did become slightly hypotensive during the chest tube procedure after receiving fentanyl. Patient was given a liter of fluid. Patient's blood pressures rebounded to 90 systolic. Patient resting comfortably in bed. Patient tolerated procedure well. Patient admitted to intermediate ICU for further management and care of chest tube. CRITICAL CARE:  Please see attending note    FINAL IMPRESSION      1. Chronic pneumothorax          DISPOSITION / PLAN     DISPOSITION Admitted 12/09/2021 12:01:00 AM      PATIENT REFERRED TO:  No follow-up provider specified.     DISCHARGE MEDICATIONS:  New Prescriptions    No medications on file       Savanna Berkowitz Crews, DO  Emergency Medicine Resident    (Please note that portions of thisnote were completed with a voice recognition program.  Efforts were made to edit the dictations but occasionally words are mis-transcribed.)        Clare Phillips DO  Resident  12/09/21 [id number]

## 2021-12-09 NOTE — H&P
History and Physical      Name: Darcus Sandifer  MRN: [id number]     Acct: [de-identified]  Room: 09/09    Admit Date: 12/8/2021  PCP: Elise Cavanaugh MD      Chief Complaint:     Chief Complaint   Patient presents with    Shortness of Breath       History Obtained From:     patient, electronic medical record    History of Present Illness:      Darcus Sandifer is a  80 y.o.  male medical history of lung carcinoma on chemotherapy, pneumothorax presents with Shortness of Breath  Patient presented to the ER with increased shortness of breath. CT abdominal pelvis performed by his oncologist showed worsening pneumothorax with mediastinal shift. Patient was advised to report to ER. Patient states that he has shortness of breath with cough. Patient denies chest pain diaphoresis lightheadedness nausea vomiting abdominal pain hemoptysis fever or chills. Patient received chest tube in the ER. Past Medical History:     Past Medical History:   Diagnosis Date    DDD (degenerative disc disease), cervical     Gout     Heart murmur     hx. of when younger.  Hyperlipidemia     Hypertension     Lung cancer ([address]     right lung    MI [facility]) [facility] [address] [facility]     Skin cancer     face    Wears glasses         Past Surgical History:     Past Surgical History:   Procedure Laterality Date    APPENDECTOMY      CARDIAC CATHETERIZATION      w/stent    COLONOSCOPY      CYST INCISION AND DRAINAGE Right 05/14/2020    rt elbow I and D and left knee aspiration with cultures    FOOT SURGERY Right     2nd toe(bone spur).  FOREARM SURGERY Right 5/14/2020    RIGHT ELBOW IRRIGATION AND DEBRIDEMENT performed by Alicia Albright DO at [facility] Left 5/14/2020    KNEE ASPIRATION WITH CULTURES SENT performed by Alicia Albright DO at [facility]      face under lt. eye.     TONSILLECTOMY          Medications Prior to Admission:       Prior to Admission medications Medication Sig Start Date End Date Taking? Authorizing Provider   oxyCODONE-acetaminophen (PERCOCET) 5-325 MG per tablet Take 1 tablet by mouth every 6 hours as needed for Pain for up to 15 days. 12/1/21 12/16/21 Yes Cherri Padilla MD   dexamethasone (DECADRON) 2 MG tablet Take 1 tablet by mouth 2 times daily (with meals) for 15 days 12/1/21 12/16/21 Yes Cherri Padilla MD   docusate sodium (COLACE) 100 MG capsule Take 100 mg by mouth 2 times daily   Yes Historical Provider, MD   B Complex-C-Folic Acid (ANGELICA-HUGO) TABS TAKE 1 TABLET BY MOUTH DAILY. HEMATINIC VIT MINERALS 7/21/20  Yes Historical Provider, MD   traZODone (DESYREL) 100 MG tablet Take 100 mg by mouth nightly  7/20/20  Yes Historical Provider, MD   ipratropium-albuterol (DUONEB) 0.5-2.5 (3) MG/3ML SOLN nebulizer solution Inhale 3 mLs into the lungs 4 times daily   Yes Historical Provider, MD   potassium chloride (MICRO-K) 10 MEQ extended release capsule Take 20 mEq by mouth daily  11/19/19  Yes Historical Provider, MD   Ferrous Fumarate (FERROCITE) 324 (106 Fe) MG TABS Take 324 mg by mouth daily    Yes Historical Provider, MD   apixaban (ELIQUIS) 5 MG TABS tablet Take 1 tablet by mouth 2 times daily 5/21/20  Yes Marychuy Fam MD   tamsulosin (FLOMAX) 0.4 MG capsule TAKE 1 CAPSULE BY MOUTH EVERY DAY 5/4/20  Yes Cherri Padilla MD   lidocaine-prilocaine (EMLA) 2.5-2.5 % cream Apply topically as needed. Apply a quarter size amount to port site 1 hour before chemotherapy. Cover with plastic wrap.  3/7/19  Yes Cherri Padilla MD   acetaminophen (TYLENOL) 325 MG tablet Take 650 mg by mouth every 6 hours as needed for Pain   Yes Historical Provider, MD   allopurinol (ZYLOPRIM) 100 MG tablet Take 100 mg by mouth daily 12/4/18  Yes Historical Provider, MD   simvastatin (ZOCOR) 40 MG tablet Take 40 mg by mouth daily 11/27/18  Yes Historical Provider, MD   Handicap Placard [facility] by Does not apply route 7/30/19   Cherri Padilla MD        Allergies:       Patient has no known allergies. Social History:     Tobacco:    reports that he has been smoking cigarettes. He has been smoking about 0.50 packs per day. He has quit using smokeless tobacco.  Alcohol:      reports no history of alcohol use. Drug Use:  reports current drug use. Frequency: 14.00 times per week. Drug: Marijuana Mcmillan Bandar). Family History:     Family History   Problem Relation Age of Onset    Cancer Father         lung cancer       Review of Systems:     Positive and Negative as described in HPI   all 10 systems are reviewed and negative except as Noted      Review of Systems   Constitutional: Negative for appetite change, chills and diaphoresis. HENT: Negative for drooling, ear pain, trouble swallowing and voice change. Eyes: Negative for photophobia and visual disturbance. Respiratory: Positive for cough and shortness of breath. Negative for choking and stridor. Cardiovascular: Negative for chest pain and palpitations. Gastrointestinal: Negative for abdominal distention, anal bleeding, blood in stool and rectal pain. Endocrine: Negative for polyphagia and polyuria. Genitourinary: Negative for dysuria, flank pain, hematuria and urgency. Musculoskeletal: Negative for back pain, myalgias and neck stiffness. Skin: Negative for color change, pallor and rash. Allergic/Immunologic: Negative for environmental allergies and food allergies. Neurological: Negative for tremors, seizures, facial asymmetry and numbness. Hematological: Negative for adenopathy. Does not bruise/bleed easily. Psychiatric/Behavioral: Negative for agitation, behavioral problems, hallucinations, self-injury and suicidal ideas.        Code Status:  Full Code    Physical Exam:     Vitals:  /74   Pulse 95   Temp 98 °F (36.7 °C) (Oral)   Resp 26   Ht 5' 11\" (1.803 m)   Wt 165 lb (74.8 kg)   SpO2 96%   BMI 23.01 kg/m²   Temp (24hrs), Av °F (36.7 °C), Min:98 °F (36.7 °C), Max:98 °F (36.7 °C)        Physical Exam  Vitals reviewed. Constitutional:       Appearance: Normal appearance. He is not diaphoretic. HENT:      Head: Normocephalic and atraumatic. Right Ear: External ear normal.      Left Ear: External ear normal.      Nose: Nose normal.      Mouth/Throat:      Mouth: Mucous membranes are moist.      Pharynx: Oropharynx is clear. Eyes:      Conjunctiva/sclera: Conjunctivae normal.   Cardiovascular:      Rate and Rhythm: Normal rate and regular rhythm. Pulses: Normal pulses. Heart sounds: Normal heart sounds. Pulmonary:      Effort: Pulmonary effort is normal. No respiratory distress. Breath sounds: Examination of the right-upper field reveals decreased breath sounds. Decreased breath sounds present. Comments: No paradoxical chest cage movement . Right-sided chest tube in place  Chest:      Chest wall: No tenderness. Abdominal:      General: Bowel sounds are normal. There is no distension. Palpations: Abdomen is soft. Musculoskeletal:         General: No tenderness or deformity. Normal range of motion. Cervical back: Normal range of motion and neck supple. No rigidity. Right lower leg: No edema. Left lower leg: No edema. Skin:     General: Skin is warm and dry. Capillary Refill: Capillary refill takes less than 2 seconds. Coloration: Skin is not jaundiced. Neurological:      General: No focal deficit present. Mental Status: Mental status is at baseline.    Psychiatric:         Mood and Affect: Mood normal.         Behavior: Behavior normal.               Data:     Recent Results (from the past 24 hour(s))   CBC Auto Differential    Collection Time: 12/08/21  7:15 PM   Result Value Ref Range    WBC 4.5 3.5 - 11.0 k/uL    RBC 2.85 (L) 4.5 - 5.9 m/uL    Hemoglobin 10.1 (L) 13.5 - 17.5 g/dL    Hematocrit 29.9 (L) 41 - 53 %    .6 (H) 80 - 100 fL    MCH 35.5 (H) 26 - 34 pg    MCHC 34.0 31 - 37 g/dL    RDW 15.8 (H) 11.5 - 14.9 %    Platelets 209 720 - 190 k/uL    MPV 7.0 6.0 - 12.0 fL    NRBC Automated NOT REPORTED per 100 WBC    Differential Type NOT REPORTED     Seg Neutrophils 75 (H) 36 - 66 %    Lymphocytes 16 (L) 24 - 44 %    Monocytes 6 1 - 7 %    Eosinophils % 3 0 - 4 %    Basophils 0 0 - 2 %    Immature Granulocytes NOT REPORTED 0 %    Segs Absolute 3.40 1.3 - 9.1 k/uL    Absolute Lymph # 0.70 (L) 1.0 - 4.8 k/uL    Absolute Mono # 0.30 0.1 - 1.3 k/uL    Absolute Eos # 0.10 0.0 - 0.4 k/uL    Basophils Absolute 0.00 0.0 - 0.2 k/uL    Absolute Immature Granulocyte NOT REPORTED 0.00 - 0.30 k/uL    WBC Morphology NOT REPORTED     RBC Morphology NOT REPORTED     Platelet Estimate NOT REPORTED    Basic Metabolic Panel w/ Reflex to MG    Collection Time: 12/08/21  7:15 PM   Result Value Ref Range    Glucose 113 (H) 70 - 99 mg/dL    BUN 16 8 - 23 mg/dL    CREATININE 1.05 0.70 - 1.20 mg/dL    Bun/Cre Ratio NOT REPORTED 9 - 20    Calcium 9.3 8.6 - 10.4 mg/dL    Sodium 132 (L) 135 - 144 mmol/L    Potassium 4.5 3.7 - 5.3 mmol/L    Chloride 100 98 - 107 mmol/L    CO2 23 20 - 31 mmol/L    Anion Gap 9 9 - 17 mmol/L    GFR Non-African American >60 >60 mL/min    GFR African American >60 >60 mL/min    GFR Comment          GFR Staging NOT REPORTED    Protime-INR    Collection Time: 12/08/21  7:15 PM   Result Value Ref Range    Protime 14.6 11.8 - 14.6 sec    INR 1.1    APTT    Collection Time: 12/08/21  7:15 PM   Result Value Ref Range    PTT 33.7 24.0 - 36.0 sec       Assesment:     Primary Problem  Pneumothorax    Principal Problem:    Pneumothorax  Active Problems:    Lung cancer, primary, with metastasis from lung to other site (HCC)    PAF (paroxysmal atrial fibrillation) (HCC)    COPD (chronic obstructive pulmonary disease) (HCC)    Iron deficiency anemia  Resolved Problems:    * No resolved hospital problems. *      Plan:     1. Rocephin 1 g IV daily  2. Doxycycline 100 mg p.o. twice daily  3.  Start IV normal saline at 50 mL/h  4. Start midodrine 5 mg p.o. 3 times a day with holding parameters  5. DVT prophylaxis Eliquis on hold due to chest tube placement. Will resume Eliquis in a.m. 6. Chest x-ray report 12/9/2021 shows moderate right pneumothorax unchanged from earlier. 7. Consult pulmonology  8. Consult oncology  9. CBC, CMP  10.   EPCs  11.  check and replace electrolytes per sliding scale  12.  restart home medications        Electronically signed by Khoa Bradford MD     Copy sent to Dr. Justin De Jesus MD

## 2021-12-09 NOTE — ED TRIAGE NOTES
Mode of arrival (squad #, walk in, police, etc) : walk-in        Chief complaint(s): Shortness of breath. Arrival Note (brief scenario, treatment PTA, etc). : Patient reports SOB and states that a scan was done today that showed a collapsed lung. C= \"Have you ever felt that you should Cut down on your drinking? \"  No  A= \"Have people Annoyed you by criticizing your drinking? \"  No  G= \"Have you ever felt bad or Guilty about your drinking? \"  No  E= \"Have you ever had a drink as an Eye-opener first thing in the morning to steady your nerves or to help a hangover? \"  No      Deferred []      Reason for deferring: N/A    *If yes to two or more: probable alcohol abuse. *

## 2021-12-09 NOTE — CONSULTS
Current Facility-Administered Medications   Medication Dose Route Frequency Provider Last Rate Last Admin    sodium chloride flush 0.9 % injection 5-40 mL  5-40 mL IntraVENous 2 times per day Sunita Marrero MD        sodium chloride flush 0.9 % injection 5-40 mL  5-40 mL IntraVENous PRN Sunita Marrero MD        0.9 % sodium chloride infusion  25 mL IntraVENous PRN Sunita Marrero MD        acetaminophen (TYLENOL) tablet 650 mg  650 mg Oral Q4H PRN Sunita Marrero MD   650 mg at 12/09/21 1051    ondansetron (ZOFRAN-ODT) disintegrating tablet 4 mg  4 mg Oral Q8H PRN Sunita Marrero MD        Or    ondansetron (ZOFRAN) injection 4 mg  4 mg IntraVENous Q6H PRN Sunita Marrero MD        potassium chloride (KLOR-CON M) extended release tablet 40 mEq  40 mEq Oral PRN Bladimir Gagnon MD        Or    potassium bicarb-citric acid (EFFER-K) effervescent tablet 40 mEq  40 mEq Oral PRN Bladimir Gagnon MD        Or    potassium chloride 10 mEq/100 mL IVPB (Peripheral Line)  10 mEq IntraVENous PRN Bladimir Gagnon MD        ipratropium-albuterol (DUONEB) nebulizer solution 3 mL  3 mL Inhalation Q4H PRN Bladimir Gagnon MD        ferrous sulfate (IRON 325) tablet 325 mg  325 mg Oral Daily Bladimir Gagnon MD   325 mg at 12/09/21 0855    atorvastatin (LIPITOR) tablet 10 mg  10 mg Oral Daily Bladimir Gagnon MD   10 mg at 12/09/21 1051    midodrine (PROAMATINE) tablet 5 mg  5 mg Oral TID WC Bladimir Gagnon MD   5 mg at 12/09/21 1051    0.9 % sodium chloride infusion   IntraVENous Continuous Bladimir Gagnon MD 50 mL/hr at 12/09/21 0849 New Bag at 12/09/21 0849    cefTRIAXone (ROCEPHIN) 1000 mg IVPB in 50 mL D5W minibag  1,000 mg IntraVENous Q24H Bladimir Gagnon MD   Stopped at 12/09/21 1045    doxycycline monohydrate (MONODOX) capsule 100 mg  100 mg Oral 2 times per day Bladimir Gagnon MD   100 mg at 12/09/21 0854    [START ON 12/10/2021] apixaban (ELIQUIS) tablet 5 mg  5 mg Oral BID Melanie Fernandez MD        lidocaine 1 % injection 20 mL  20 mL IntraDERmal Once Marie Leija MD         Current Outpatient Medications   Medication Sig Dispense Refill    oxyCODONE-acetaminophen (PERCOCET) 5-325 MG per tablet Take 1 tablet by mouth every 6 hours as needed for Pain for up to 15 days. 60 tablet 0    dexamethasone (DECADRON) 2 MG tablet Take 1 tablet by mouth 2 times daily (with meals) for 15 days 30 tablet 0    docusate sodium (COLACE) 100 MG capsule Take 100 mg by mouth 2 times daily      B Complex-C-Folic Acid (ANGELICA-HUGO) TABS TAKE 1 TABLET BY MOUTH DAILY. HEMATINIC VIT MINERALS      traZODone (DESYREL) 100 MG tablet Take 100 mg by mouth nightly       ipratropium-albuterol (DUONEB) 0.5-2.5 (3) MG/3ML SOLN nebulizer solution Inhale 3 mLs into the lungs 4 times daily      potassium chloride (MICRO-K) 10 MEQ extended release capsule Take 20 mEq by mouth daily       Ferrous Fumarate (FERROCITE) 324 (106 Fe) MG TABS Take 324 mg by mouth daily       apixaban (ELIQUIS) 5 MG TABS tablet Take 1 tablet by mouth 2 times daily 60 tablet 1    tamsulosin (FLOMAX) 0.4 MG capsule TAKE 1 CAPSULE BY MOUTH EVERY DAY 30 capsule 5    lidocaine-prilocaine (EMLA) 2.5-2.5 % cream Apply topically as needed. Apply a quarter size amount to port site 1 hour before chemotherapy. Cover with plastic wrap.  30 g 0    acetaminophen (TYLENOL) 325 MG tablet Take 650 mg by mouth every 6 hours as needed for Pain      allopurinol (ZYLOPRIM) 100 MG tablet Take 100 mg by mouth daily      simvastatin (ZOCOR) 40 MG tablet Take 40 mg by mouth daily      Handicap Placard MISC by Does not apply route 1 each 0     Facility-Administered Medications Ordered in Other Encounters   Medication Dose Route Frequency Provider Last Rate Last Admin    sodium chloride flush 0.9 % injection 10 mL  10 mL IntraVENous PRN Charla Guerra MD   10 mL at 12/08/21 1311      PULMONARY  CONSULT NOTE      Date of Admission: 12/8/2021  7:03 PM    Reason for Consult: Pneumothorax    Referring Physician: DR Coco Dempsey  PCP: Franck Cutler MD     History of Present Illness:     80 y.o. male who presents with pneumothorax on the right side. Patient has had pneumothorax since september. No worsening shortness of breath, no pain. NO traumatic injury. Hx of small cell lung cancer. No complaints. Was seen for routine CT today with noted worsenign pneumothorax. Patient's been following with oncology, utilizing chemotherapy. Patient has had a cough. Patient denies any lightheadedness, dizziness, chest pain, abdominal pain, change in urination or bowel habits    Chest tube was placed to suction  No change in symptoms    Problem:  Principal Problem:     PMH:   Past Medical History:   Diagnosis Date    DDD (degenerative disc disease), cervical     Gout     Heart murmur     hx. of when younger.  Hyperlipidemia     Hypertension     [facility] [address] [address] [address]     right lung    MI (myocardial infarction) [address] [address]     Skin cancer     face    Wears glasses        PSH:   Past Surgical History:   Procedure Laterality Date    APPENDECTOMY      CARDIAC CATHETERIZATION      w/stent    COLONOSCOPY      CYST INCISION AND DRAINAGE Right 05/14/2020    rt elbow I and D and left knee aspiration with cultures    FOOT SURGERY Right     2nd toe(bone spur).  FOREARM SURGERY Right 5/14/2020    RIGHT ELBOW IRRIGATION AND DEBRIDEMENT performed by [facility] Salinas DO at [facility] Left 5/14/2020    KNEE ASPIRATION WITH CULTURES SENT performed by Nancy Salinas DO at [address] [place] [address] under lt. eye.  TONSILLECTOMY         Allergies: No Known Allergies    Home Meds:  Not in a hospital admission.     Social History:   Social History     Socioeconomic History    Marital status:      Spouse name: Not on file    Number of children: Not on file    Years of education: Not on file    Highest education level: Not on file   Occupational History    Not on file   Tobacco Use    Smoking status: Current Every Day Smoker     Packs/day: 0.50     Types: Cigarettes    Smokeless tobacco: Former User   Vaping Use    Vaping Use: Former   Substance and Sexual Activity    Alcohol use: No    Drug use: Yes     Frequency: 14.0 times per week     Types: Marijuana Yung Con)    Sexual activity: Not on file   Other Topics Concern    Not on file   Social History Narrative    Not on file     Social Determinants of Health     Financial Resource Strain:     Difficulty of Paying Living Expenses: Not on file   Food Insecurity:     Worried About [facility] in the Last Year: Not on file    Dandre of Food in the Last Year: Not on file   Transportation Needs:     Lack of Transportation (Medical): Not on file    Lack of Transportation (Non-Medical):  Not on file   Physical Activity:     Days of Exercise per Week: Not on file    Minutes of Exercise per Session: Not on file   Stress:     Feeling of Stress : Not on file   Social Connections:     Frequency of Communication with Friends and Family: Not on file    Frequency of Social Gatherings with Friends and Family: Not on file    Attends Caodaism Services: Not on file    Active Member of [address] [place] [address] [employer] or Organizations: Not on file    Attends Club or Organization Meetings: Not on file    Marital Status: Not on file   Intimate Partner Violence:     Fear of Current or Ex-Partner: Not on file    Emotionally Abused: Not on file    Physically Abused: Not on file    Sexually Abused: Not on file   Housing Stability:     Unable to Pay for Housing in the Last Year: Not on file    Number of [place] in the Last Year: Not on file    Unstable Housing in the Last Year: Not on file       Family History:   Family History   Problem Relation Age of Onset    Cancer Father         lung cancer       Review of Systems  Fever/ chills - no  Chest pain - no  Cough - occasional  Expectoration /

## 2021-12-10 ENCOUNTER — APPOINTMENT (OUTPATIENT)
Dept: GENERAL RADIOLOGY | Age: 71
DRG: 199 | End: 2021-12-10
Payer: MEDICARE

## 2021-12-10 LAB
ABSOLUTE EOS #: 0.1 K/UL (ref 0–0.4)
ABSOLUTE IMMATURE GRANULOCYTE: ABNORMAL K/UL (ref 0–0.3)
ABSOLUTE LYMPH #: 0.6 K/UL (ref 1–4.8)
ABSOLUTE MONO #: 0.4 K/UL (ref 0.1–1.3)
ALBUMIN SERPL-MCNC: 3.2 G/DL (ref 3.5–5.2)
ALBUMIN/GLOBULIN RATIO: ABNORMAL (ref 1–2.5)
ALP BLD-CCNC: 96 U/L (ref 40–129)
ALT SERPL-CCNC: 19 U/L (ref 5–41)
ANION GAP SERPL CALCULATED.3IONS-SCNC: 7 MMOL/L (ref 9–17)
AST SERPL-CCNC: 22 U/L
BASOPHILS # BLD: 0 % (ref 0–2)
BASOPHILS ABSOLUTE: 0 K/UL (ref 0–0.2)
BILIRUB SERPL-MCNC: 0.19 MG/DL (ref 0.3–1.2)
BUN BLDV-MCNC: 11 MG/DL (ref 8–23)
BUN/CREAT BLD: ABNORMAL (ref 9–20)
CALCIUM SERPL-MCNC: 8.3 MG/DL (ref 8.6–10.4)
CHLORIDE BLD-SCNC: 104 MMOL/L (ref 98–107)
CO2: 20 MMOL/L (ref 20–31)
CREAT SERPL-MCNC: 0.78 MG/DL (ref 0.7–1.2)
DIFFERENTIAL TYPE: ABNORMAL
EOSINOPHILS RELATIVE PERCENT: 4 % (ref 0–4)
GFR AFRICAN AMERICAN: >60 ML/MIN
GFR NON-AFRICAN AMERICAN: >60 ML/MIN
GFR SERPL CREATININE-BSD FRML MDRD: ABNORMAL ML/MIN/{1.73_M2}
GFR SERPL CREATININE-BSD FRML MDRD: ABNORMAL ML/MIN/{1.73_M2}
GLUCOSE BLD-MCNC: 92 MG/DL (ref 70–99)
HCT VFR BLD CALC: 27.6 % (ref 41–53)
HEMOGLOBIN: 9.4 G/DL (ref 13.5–17.5)
IMMATURE GRANULOCYTES: ABNORMAL %
LYMPHOCYTES # BLD: 17 % (ref 24–44)
MCH RBC QN AUTO: 35.4 PG (ref 26–34)
MCHC RBC AUTO-ENTMCNC: 33.9 G/DL (ref 31–37)
MCV RBC AUTO: 104.5 FL (ref 80–100)
MONOCYTES # BLD: 13 % (ref 1–7)
NRBC AUTOMATED: ABNORMAL PER 100 WBC
PDW BLD-RTO: 15.7 % (ref 11.5–14.9)
PLATELET # BLD: 134 K/UL (ref 150–450)
PLATELET ESTIMATE: ABNORMAL
PMV BLD AUTO: 7.5 FL (ref 6–12)
POTASSIUM SERPL-SCNC: 3.9 MMOL/L (ref 3.7–5.3)
RBC # BLD: 2.65 M/UL (ref 4.5–5.9)
RBC # BLD: ABNORMAL 10*6/UL
SEG NEUTROPHILS: 66 % (ref 36–66)
SEGMENTED NEUTROPHILS ABSOLUTE COUNT: 2.3 K/UL (ref 1.3–9.1)
SODIUM BLD-SCNC: 131 MMOL/L (ref 135–144)
TOTAL PROTEIN: 5.8 G/DL (ref 6.4–8.3)
WBC # BLD: 3.5 K/UL (ref 3.5–11)
WBC # BLD: ABNORMAL 10*3/UL

## 2021-12-10 PROCEDURE — 6370000000 HC RX 637 (ALT 250 FOR IP): Performed by: FAMILY MEDICINE

## 2021-12-10 PROCEDURE — 36415 COLL VENOUS BLD VENIPUNCTURE: CPT

## 2021-12-10 PROCEDURE — 6370000000 HC RX 637 (ALT 250 FOR IP): Performed by: INTERNAL MEDICINE

## 2021-12-10 PROCEDURE — 71045 X-RAY EXAM CHEST 1 VIEW: CPT

## 2021-12-10 PROCEDURE — 2060000000 HC ICU INTERMEDIATE R&B

## 2021-12-10 PROCEDURE — 80053 COMPREHEN METABOLIC PANEL: CPT

## 2021-12-10 PROCEDURE — 85025 COMPLETE CBC W/AUTO DIFF WBC: CPT

## 2021-12-10 PROCEDURE — 99232 SBSQ HOSP IP/OBS MODERATE 35: CPT | Performed by: INTERNAL MEDICINE

## 2021-12-10 PROCEDURE — 94640 AIRWAY INHALATION TREATMENT: CPT

## 2021-12-10 PROCEDURE — 6360000002 HC RX W HCPCS: Performed by: FAMILY MEDICINE

## 2021-12-10 PROCEDURE — 2580000003 HC RX 258: Performed by: INTERNAL MEDICINE

## 2021-12-10 PROCEDURE — 2580000003 HC RX 258: Performed by: FAMILY MEDICINE

## 2021-12-10 RX ORDER — IPRATROPIUM BROMIDE AND ALBUTEROL SULFATE 2.5; .5 MG/3ML; MG/3ML
3 SOLUTION RESPIRATORY (INHALATION)
Status: DISCONTINUED | OUTPATIENT
Start: 2021-12-10 | End: 2021-12-20 | Stop reason: HOSPADM

## 2021-12-10 RX ADMIN — APIXABAN 5 MG: 5 TABLET, FILM COATED ORAL at 08:07

## 2021-12-10 RX ADMIN — IPRATROPIUM BROMIDE AND ALBUTEROL SULFATE 3 ML: .5; 2.5 SOLUTION RESPIRATORY (INHALATION) at 15:46

## 2021-12-10 RX ADMIN — SODIUM CHLORIDE, PRESERVATIVE FREE 10 ML: 5 INJECTION INTRAVENOUS at 21:00

## 2021-12-10 RX ADMIN — ACETAMINOPHEN 650 MG: 325 TABLET, FILM COATED ORAL at 20:59

## 2021-12-10 RX ADMIN — MIDODRINE HYDROCHLORIDE 5 MG: 5 TABLET ORAL at 17:20

## 2021-12-10 RX ADMIN — MIDODRINE HYDROCHLORIDE 5 MG: 5 TABLET ORAL at 12:00

## 2021-12-10 RX ADMIN — FERROUS SULFATE TAB 325 MG (65 MG ELEMENTAL FE) 325 MG: 325 (65 FE) TAB at 08:07

## 2021-12-10 RX ADMIN — DOXYCYCLINE 100 MG: 100 CAPSULE ORAL at 08:07

## 2021-12-10 RX ADMIN — APIXABAN 5 MG: 5 TABLET, FILM COATED ORAL at 20:59

## 2021-12-10 RX ADMIN — ATORVASTATIN CALCIUM 10 MG: 20 TABLET, FILM COATED ORAL at 08:07

## 2021-12-10 RX ADMIN — IPRATROPIUM BROMIDE AND ALBUTEROL SULFATE 3 ML: .5; 2.5 SOLUTION RESPIRATORY (INHALATION) at 20:22

## 2021-12-10 RX ADMIN — MIDODRINE HYDROCHLORIDE 5 MG: 5 TABLET ORAL at 08:07

## 2021-12-10 RX ADMIN — CEFTRIAXONE SODIUM 1000 MG: 1 INJECTION, POWDER, FOR SOLUTION INTRAMUSCULAR; INTRAVENOUS at 08:04

## 2021-12-10 RX ADMIN — SODIUM CHLORIDE: 9 INJECTION, SOLUTION INTRAVENOUS at 00:13

## 2021-12-10 RX ADMIN — DOXYCYCLINE 100 MG: 100 CAPSULE ORAL at 20:59

## 2021-12-10 RX ADMIN — IPRATROPIUM BROMIDE AND ALBUTEROL SULFATE 3 ML: .5; 2.5 SOLUTION RESPIRATORY (INHALATION) at 11:10

## 2021-12-10 RX ADMIN — ACETAMINOPHEN 650 MG: 325 TABLET, FILM COATED ORAL at 06:26

## 2021-12-10 ASSESSMENT — PAIN SCALES - GENERAL
PAINLEVEL_OUTOF10: 0
PAINLEVEL_OUTOF10: 0
PAINLEVEL_OUTOF10: 3
PAINLEVEL_OUTOF10: 5
PAINLEVEL_OUTOF10: 0
PAINLEVEL_OUTOF10: 0
PAINLEVEL_OUTOF10: 5
PAINLEVEL_OUTOF10: 0

## 2021-12-10 ASSESSMENT — ENCOUNTER SYMPTOMS
PHOTOPHOBIA: 0
ANAL BLEEDING: 0
SHORTNESS OF BREATH: 0
VOICE CHANGE: 0
COUGH: 1
CHOKING: 0
BACK PAIN: 0
ABDOMINAL DISTENTION: 0
STRIDOR: 0
TROUBLE SWALLOWING: 0
BLOOD IN STOOL: 0
COLOR CHANGE: 0
RECTAL PAIN: 0

## 2021-12-10 NOTE — CARE COORDINATION
CASE MANAGEMENT NOTE:    Admission Date:  12/8/2021 Enzo Cedillo is a 80 y.o.  male    Admitted for : Pneumothorax [J93.9]  Chronic pneumothorax [J93.81]    Met with:  Patient    PCP:  Dr. Leatha Zelaya:  Medicare      Is patient alert and oriented at time of discussion:  Yes    Current Residence/ Living Arrangements:  independently at home             Current Services PTA:  Yes - [facility]    Does patient go to outpatient dialysis: No  If yes, location and chair time: N/A    Is patient agreeable to VNS: No    Freedom of choice provided:  Yes    List of [address] provided: No    VNS chosen:  No    DME:  straight cane and walker    Home Oxygen: No    Nebulizer: No    CPAP/BIPAP: No    Supplier: N/A    Potential Assistance Needed: No    SNF needed: No    Freedom of choice and list provided: NA    Pharmacy:  [address] [place] [address]       Does Patient want to use MEDS to BEDS? No    Is patient currently receiving oral anticoagulation therapy? No    Is the Patient an [facility] with Readmission Risk Score greater than 14%? No  If yes, pt needs a follow up appointment made within 7 days. Family Members/Caregivers that pt would like involved in their care:    Yes    If yes, list name here: Wife Cynthia Ludwig    Transportation Provider:  Family             Discharge Plan:  12/10: MEDICARE - From 2-story home with livable 1st floor with wife. Patient states he is pretty independent. DME - cane and walker. Declines VNS. Current with [facility] for lung [facility]. On IV rocephinm, PO doxy. Has right chest tube to suction.  //CRISS                 Electronically signed by: Cecelia Jiménez RN on 12/10/2021 at 12:07 PM

## 2021-12-10 NOTE — PLAN OF CARE
Problem: Skin Integrity:  Goal: Will show no infection signs and symptoms  Description: Will show no infection signs and symptoms  12/10/2021 1402 by Melody Martinez RN  Outcome: Ongoing  Note: Patient turned and repositioned every 2 hours and as needed for comfort. Skin remains dry and intact. No new skin breakdown noted. 12/10/2021 0320 by Suman Richter RN  Outcome: Ongoing  Goal: Absence of new skin breakdown  Description: Absence of new skin breakdown  Outcome: Ongoing     Problem: Infection:  Goal: Will remain free from infection  Description: Will remain free from infection  12/10/2021 1402 by Melody Martinez RN  Outcome: Ongoing  Note: Patient remains afebrile during shift. Vitals and lab results within normal limits. Will continue to monitor. 12/10/2021 0320 by Suman Richter RN  Outcome: Ongoing     Problem: Safety:  Goal: Free from accidental physical injury  Description: Free from accidental physical injury  12/10/2021 1402 by Melody Martinez RN  Outcome: Ongoing  Note: Fall assessment performed and appropriate measures implemented. Room freed from clutter. Bed in lowest position with wheels locked. Call light in place. ID band in place. 12/10/2021 0320 by Suman Richter RN  Outcome: Ongoing  Goal: Free from intentional harm  Description: Free from intentional harm  Outcome: Ongoing     Problem: Daily Care:  Goal: Daily care needs are met  Description: Daily care needs are met  12/10/2021 1402 by Melody Martinez RN  Outcome: Ongoing  Note: Daily care needs have been met this shift. Bathroom needs have been assessed every 2 hours. Hygiene care needs has been assessed this shift. Will continue to monitor. 12/10/2021 0320 by Suman Richter RN  Outcome: Ongoing     Problem: Pain:  Goal: Patient's pain/discomfort is manageable  Description: Patient's pain/discomfort is manageable  12/10/2021 1402 by Melody Martinez RN  Outcome: Ongoing  Note: Pain assessed at regular intervals. Medications administered as requested for comfort. Pain remains at a tolerable level.    12/10/2021 0320 by Linda Rodríguez RN  Outcome: Ongoing     Problem: Skin Integrity:  Goal: Skin integrity will stabilize  Description: Skin integrity will stabilize  12/10/2021 1402 by Margaret Mei RN  Outcome: Ongoing  12/10/2021 0320 by Linda Rodríguez RN  Outcome: Ongoing     Problem: Discharge Planning:  Goal: Patients continuum of care needs are met  Description: Patients continuum of care needs are met  12/10/2021 1402 by Margaret Mei RN  Outcome: Ongoing  12/10/2021 0320 by Linda Rodríguez RN  Outcome: Ongoing

## 2021-12-10 NOTE — ED NOTES
Report given to Ander Ruano RN from ICU. Report method in person   The following was reviewed with receiving RN:   Current vital signs:  /75   Pulse 83   Temp 98 °F (36.7 °C) (Oral)   Resp 19   Ht 5' 11\" (1.803 m)   Wt 165 lb (74.8 kg)   SpO2 96%   BMI 23.01 kg/m²                MEWS Score: 2     Any medication or safety alerts were reviewed. Any pending diagnostics and notifications were also reviewed, as well as any safety concerns or issues, abnormal labs, abnormal imaging, and abnormal assessment findings. Questions were answered.             James Kaye RN  12/09/21 2012

## 2021-12-10 NOTE — PROGRESS NOTES
PULMONARY PROGRESS NOTE:    REASON FOR VISIT: PTX, lung cancer  Interval History:    Shortness of Breath: no  Cough: no  Sputum: no          Hemoptysis: no  Chest Pain: no  Fever: no                   Swelling Feet: no  Headache: no                                           Nausea, Emesis, Abdominal Pain: no  Diarrhea: no         Constipation: no    Events since last visit: none    PAST MEDICAL HISTORY:      Scheduled Meds:   ipratropium-albuterol  3 mL Inhalation Q4H WA    sodium chloride flush  5-40 mL IntraVENous 2 times per day    ferrous sulfate  325 mg Oral Daily    atorvastatin  10 mg Oral Daily    midodrine  5 mg Oral TID WC    cefTRIAXone (ROCEPHIN) IV  1,000 mg IntraVENous Q24H    doxycycline monohydrate  100 mg Oral 2 times per day    apixaban  5 mg Oral BID    lidocaine  20 mL IntraDERmal Once     Continuous Infusions:   sodium chloride      sodium chloride 50 mL/hr at 12/10/21 0013     PRN Meds:sodium chloride flush, sodium chloride, acetaminophen, ondansetron **OR** ondansetron, potassium chloride **OR** potassium alternative oral replacement **OR** potassium chloride        PHYSICAL EXAMINATION:  BP 91/63   Pulse 87   Temp 98.5 °F (36.9 °C) (Oral)   Resp 24   Ht 5' 11\" (1.803 m)   Wt 165 lb (74.8 kg)   SpO2 97%   BMI 23.01 kg/m²     General : Awake, alert,   Neck  supple, no lymphadenopathy, JVD not raised  Heart  regular rhythm, S1 and S2 normal; no additional sounds heard  Lungs  Air Entry- fair bilaterally; breath sounds : vesicular;  rales/crackles - absent; chest tube - air leak +  Abdomen  soft, no tenderness  Upper Extremities  - no cyanosis, mottling; edema : absent  Lower Extremities: no cyanosis, mottling; edema : absent    Current Laboratory, Radiologic, Microbiologic, and Diagnostic studies reviewed  Data ReviewCBC:   Recent Labs     12/08/21  1915 12/10/21  0529   WBC 4.5 3.5   RBC 2.85* 2.65*   HGB 10.1* 9.4*   HCT 29.9* 27.6*    134*     BMP:   Recent Labs     12/08/21  1915 12/10/21  0529   GLUCOSE 113* 92   * 131*   K 4.5 3.9   BUN 16 11   CREATININE 1.05 0.78   CALCIUM 9.3 8.3*     ABGs: No results for input(s): PHART, PO2ART, UUE4VLG, GYC8OHB, BEART, N8LSYOSU, SBZ6AEC in the last 72 hours.    PT/INR:  No results found for: PTINR    ASSESSMENT / PLAN:    Lung cancer - per oncology  PTX - right chest tube - to suction, monitor- suction to -40   CXR  OK for transfer out of ICU    Electronically signed by Meryle Bi, MD on 12/10/21 at 12:37 PM.

## 2021-12-10 NOTE — PROGRESS NOTES
Today's Date: 12/10/2021  Patient Name: Sagrario Mott  Date of admission: 12/8/2021  7:03 PM  Patient's age: 80 y.o., 1950  Admission Dx: Pneumothorax [J93.9]  Chronic pneumothorax [J93.81]    Reason for Consult: Known lung cancer patient  Requesting Physician: Janis Prince MD    CHIEF COMPLAINT:    Chief Complaint   Patient presents with    Shortness of Breath       INTERVAL HISTORY:    Patient seen and examined  Shortness of breath better  Denies any fever chills  Hemoglobin stable  Denies any nausea matting  HISTORY OF PRESENT ILLNESS:    Sagrario Mott is a 80 y.o. male who is admitted to the hospital on 12/8/2021  for SOB. He had a restaging scan with oncology and his CT scan showed worsening pneumothorax and mediastinal shift. Therefore he was sent to ER for further management. He denies any fever chills. He had chest tube in place. He is currently receiving Lurbinectedin for his recurrent small cell lung cancer and most recent treatment was on 12/1/2021. He has a diagnosis of small cell lung cancer with squamous component and has been following with Dr. Jason Weaver as outpatient and his brief oncologic history as follows. 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 Chemotherapy changed to carboplatin and etoposide due to renal insufficiency from cycle #2  PCI 7/2019  SRS to adrenal gland metastasis, completed 07/2020  systemic palliative chemoimmunotherapy with carboplatin etoposide and Tecentriq, 12/2020 x4 cycles.   Maintenance Tecentriq, 3/2021  Lurbenectidin7/2/2021      Past Medical History:   has a past medical history of DDD (degenerative disc disease), cervical, Gout, Heart murmur, Hyperlipidemia, Hypertension, Lung cancer [facility]), MI (myocardial infarction) ([facility] [address] Skin cancer, and Wears glasses. Past Surgical History:   has a past surgical history that includes Appendectomy; Tonsillectomy; skin biopsy; skin biopsy; Colonoscopy; Foot surgery (Right); Cardiac catheterization; cyst incision and drainage (Right, 05/14/2020); Forearm surgery (Right, 5/14/2020); and knee surgery (Left, 5/14/2020). Medications:    Prior to Admission medications    Medication Sig Start Date End Date Taking? Authorizing Provider   oxyCODONE-acetaminophen (PERCOCET) 5-325 MG per tablet Take 1 tablet by mouth every 6 hours as needed for Pain for up to 15 days. 12/1/21 12/16/21 Yes Rosa Fuentes MD   dexamethasone (DECADRON) 2 MG tablet Take 1 tablet by mouth 2 times daily (with meals) for 15 days 12/1/21 12/16/21 Yes Rosa Fuentes MD   docusate sodium (COLACE) 100 MG capsule Take 100 mg by mouth 2 times daily   Yes Historical Provider, MD   B Complex-C-Folic Acid (ANGELICA-HUGO) TABS TAKE 1 TABLET BY MOUTH DAILY. HEMATINIC VIT MINERALS 7/21/20  Yes Historical Provider, MD   traZODone (DESYREL) 100 MG tablet Take 100 mg by mouth nightly  7/20/20  Yes Historical Provider, MD   ipratropium-albuterol (DUONEB) 0.5-2.5 (3) MG/3ML SOLN nebulizer solution Inhale 3 mLs into the lungs 4 times daily   Yes Historical Provider, MD   potassium chloride (MICRO-K) 10 MEQ extended release capsule Take 20 mEq by mouth daily  11/19/19  Yes Historical Provider, MD   Ferrous Fumarate (FERROCITE) 324 (106 Fe) MG TABS Take 324 mg by mouth daily    Yes Historical Provider, MD   apixaban (ELIQUIS) 5 MG TABS tablet Take 1 tablet by mouth 2 times daily 5/21/20  Yes Jonas Bentley MD   tamsulosin (FLOMAX) 0.4 MG capsule TAKE 1 CAPSULE BY MOUTH EVERY DAY 5/4/20  Yes Rosa Fuentes MD   lidocaine-prilocaine (EMLA) 2.5-2.5 % cream Apply topically as needed. Apply a quarter size amount to port site 1 hour before chemotherapy. Cover with plastic wrap.  3/7/19  Yes Rosa Fuentes MD acetaminophen (TYLENOL) 325 MG tablet Take 650 mg by mouth every 6 hours as needed for Pain   Yes Historical Provider, MD   allopurinol (ZYLOPRIM) 100 MG tablet Take 100 mg by mouth daily 12/4/18  Yes Historical Provider, MD   simvastatin (ZOCOR) 40 MG tablet Take 40 mg by mouth daily 11/27/18  Yes Historical Provider, MD   Handicap Placard [address] [facility] by Does not apply route 7/30/19   Shyann Escobar MD     Current Facility-Administered Medications   Medication Dose Route Frequency Provider Last Rate Last Admin    ipratropium-albuterol (DUONEB) nebulizer solution 3 mL  3 mL Inhalation Q4H WA Mayte Alba MD   3 mL at 12/10/21 1110    sodium chloride flush 0.9 % injection 5-40 mL  5-40 mL IntraVENous 2 times per day Baldemar Fernández MD   10 mL at 12/09/21 2147    sodium chloride flush 0.9 % injection 5-40 mL  5-40 mL IntraVENous PRN Baldemar Fernández MD        0.9 % sodium chloride infusion  25 mL IntraVENous PRN Baldemar Fernández MD        acetaminophen (TYLENOL) tablet 650 mg  650 mg Oral Q4H PRN Baldemra Fernández MD   650 mg at 12/10/21 0626    ondansetron (ZOFRAN-ODT) disintegrating tablet 4 mg  4 mg Oral Q8H PRN Baldemar Fernández MD        Or    ondansetron (ZOFRAN) injection 4 mg  4 mg IntraVENous Q6H PRN Baldemar Fernández MD        potassium chloride (KLOR-CON M) extended release tablet 40 mEq  40 mEq Oral PRN Mayte Alba MD        Or    potassium bicarb-citric acid (EFFER-K) effervescent tablet 40 mEq  40 mEq Oral PRN Mayte Alba MD        Or    potassium chloride 10 mEq/100 mL IVPB (Peripheral Line)  10 mEq IntraVENous PRN Mayte Alba MD        ferrous sulfate (IRON 325) tablet 325 mg  325 mg Oral Daily Mayte Alba MD   325 mg at 12/10/21 0807    atorvastatin (LIPITOR) tablet 10 mg  10 mg Oral Daily Mayte Alba MD   10 mg at 12/10/21 0807    midodrine (PROAMATINE) tablet 5 mg  5 mg Oral TID  Mayte Alba MD   5 mg at 12/10/21 1200    0.9 % sodium chloride infusion   IntraVENous Continuous Mayte Alba MD 50 mL/hr at 12/10/21 0013 New Bag at 12/10/21 0013    cefTRIAXone (ROCEPHIN) 1000 mg IVPB in 50 mL D5W minibag  1,000 mg IntraVENous Q24H Mayte Alba MD   Stopped at 12/10/21 0847    doxycycline monohydrate (MONODOX) capsule 100 mg  100 mg Oral 2 times per day Mayte Alba MD   100 mg at 12/10/21 4647    apixaban (ELIQUIS) tablet 5 mg  5 mg Oral BID Mayte Alba MD   5 mg at 12/10/21 0807    lidocaine 1 % injection 20 mL  20 mL IntraDERmal Once Igor Carrion MD         Facility-Administered Medications Ordered in Other Encounters   Medication Dose Route Frequency Provider Last Rate Last Admin    sodium chloride flush 0.9 % injection 10 mL  10 mL IntraVENous PRN Shyann Escobar MD   10 mL at 12/08/21 1311       Allergies:  Patient has no known allergies. Social History:   reports that he has been smoking cigarettes. He has been smoking about 0.50 packs per day. He has quit using smokeless tobacco. He reports current drug use. Frequency: 14.00 times per week. Drug: Marijuana [employer]). He reports that he does not drink alcohol. Family History: family history includes Cancer in his father. REVIEW OF SYSTEMS:    Constitutional: No fever or chills. No night sweats, no weight loss   Eyes: No eye discharge, double vision, or eye pain   HEENT: negative for sore mouth, sore throat, hoarseness and voice change   Respiratory: negative for cough , sputum, ++dyspnea, wheezing, hemoptysis, chest pain   Cardiovascular: negative for chest pain, ++dyspnea, palpitations, orthopnea, PND   Gastrointestinal: negative for nausea, vomiting, diarrhea, constipation, abdominal pain, Dysphagia, hematemesis and hematochezia   Genitourinary: negative for frequency, dysuria, nocturia, urinary incontinence, and hematuria   Integument: negative for rash, skin lesions, bruises.    Hematologic/Lymphatic: negative for easy bruising, bleeding, lymphadenopathy, or petechiae   Endocrine: negative for heat or cold intolerance,weight changes, change in bowel habits and hair loss   Musculoskeletal: negative for myalgias, arthralgias, pain, joint swelling,and bone pain   Neurological: negative for headaches, dizziness, seizures, weakness, numbness    PHYSICAL EXAM:      /71   Pulse 88   Temp 98.5 °F (36.9 °C) (Oral)   Resp 19   Ht 5' 11\" (1.803 m)   Wt 165 lb (74.8 kg)   SpO2 97%   BMI 23.01 kg/m²    Temp (24hrs), Av.9 °F (37.2 °C), Min:98.4 °F (36.9 °C), Max:99.3 °F (37.4 °C)    General appearance - well appearing, no in pain or distress   Mental status - alert and cooperative   Eyes - pupils equal and reactive, extraocular eye movements intact   Ears - bilateral TM's and external ear canals normal   Mouth - mucous membranes moist, pharynx normal without lesions   Neck - supple, no significant adenopathy   Lymphatics - no palpable lymphadenopathy, no hepatosplenomegaly   Chest - clear to auscultation, no wheezes, rales or rhonchi, hest tube in place  Heart - normal rate, regular rhythm, normal S1, S2, no murmurs  Abdomen - soft, nontender, nondistended, no masses or organomegaly   Neurological - alert, oriented, normal speech, no focal findings or movement disorder noted   Musculoskeletal - no joint tenderness, deformity or swelling   Extremities - peripheral pulses normal, no pedal edema, no clubbing or cyanosis   Skin - normal coloration and turgor, no rashes, no suspicious skin lesions noted ,    DATA:    Labs:   CBC:   Recent Labs     12/08/21  1915 12/10/21  0529   WBC 4.5 3.5   HGB 10.1* 9.4*   HCT 29.9* 27.6*    134*     BMP:   Recent Labs     12/08/21  1915 12/10/21  0529   * 131*   K 4.5 3.9   CO2 23 20   BUN 16 11   CREATININE 1.05 0.78   LABGLOM >60 >60   GLUCOSE 113* 92     PT/INR:   Recent Labs     21   PROTIME 14.6   INR 1.1       IMAGING DATA:  XR CHEST PORTABLE   Final Result   Slight improvement of large right pneumothorax with chest tube in place. XR CHEST PORTABLE   Final Result   Stable examination. XR CHEST PORTABLE   Final Result   Addendum 1 of 1   ADDENDUM:   Compared with the chest x-ray there is been significant interval    improvement. Please disregard original impression. Discussed with Dr. Marshal Babinski at 10:20    p.m. Final   Increased right pneumothorax despite new chest tube. Increased airspace   disease on the right. RECOMMENDATION:   The findings were sent to the Radiology [facility] at 10:19   pm on 12/8/2021to be communicated to a licensed caregiver. Primary Problem  Pneumothorax    Active Hospital Problems    Diagnosis Date Noted    Pneumothorax [J93.9] 08/27/2021    [facility]ch[facility] [J44.9] 08/27/2021    Iron deficiency anemia [D50.9] 08/27/2021    PA[facility] (paroxysmal atrial fibrillation) ([facility] [I48.0] 08/27/2021    Lung cancer, primary, with metastasis from lung to other site [facility]) [C34.90]      IMPRESSION:   1. Metastatic small cell cancer currently on Lurbinectedin  2. Left adrenal gland metastasis status post SBRT  3. Large right pneumothorax, with recent CT scan showing increase compared to the prior scan, s/p chest tube placed  4. COPD  5. Anemia    RECOMMENDATIONS:  1. I reviewed laboratory data, imaging studies, diagnosis, treatment recommendations  2. Continue management of pneumothorax as per pulmonary  3. Other management as per primary team  4. His recent scan showing stable disease. 5. Patient will continue his treatment as outpatient with Dr. Steph Cohen after discharge  6. We will follow    Discussed with patient and Nurse. Thank you for asking us to see this patient. Micha Hardy MD  Hematologist/Medical Oncologist    Cell: [phone]    This note is created with the assistance of a speech recognition program.  While intending to generate a document that actually reflects the content of the visit, the document can still have some errors including those of syntax and sound a like substitutions which may escape proof reading. It such instances, actual meaning can be extrapolated by contextual diversion. \

## 2021-12-10 NOTE — PROGRESS NOTES
Progress Note    12/10/2021   3:07 PM    Name:  Ramiro Peterson  MRN:    [id number]     Acct:     [de-identified]   Room:  2094/2094-01   Day: 1     Admit Date: 12/8/2021  7:03 PM  PCP: Gisell Pagan MD    Subjective:     C/C:   Chief Complaint   Patient presents with    Shortness of Breath       Interval History: Status: not changed. Patient's chest is well controlled has cough. Vital signs reviewed and stable patient oxygen saturations 97% on room air. Recent labs reviewed sodium 131, hemoglobin 9.4    ROS:   all 10 systems reviewed and are negative except as noted    Review of Systems   Constitutional: Negative for appetite change, chills and diaphoresis. HENT: Negative for drooling, ear pain, trouble swallowing and voice change. Eyes: Negative for photophobia and visual disturbance. Respiratory: Positive for cough. Negative for choking, shortness of breath and stridor. Cardiovascular: Negative for chest pain and palpitations. Gastrointestinal: Negative for abdominal distention, anal bleeding, blood in stool and rectal pain. Endocrine: Negative for polyphagia and polyuria. Genitourinary: Negative for dysuria, flank pain, hematuria and urgency. Musculoskeletal: Negative for back pain, myalgias and neck stiffness. Skin: Negative for color change, pallor and rash. Allergic/Immunologic: Negative for environmental allergies and food allergies. Neurological: Negative for tremors, seizures, facial asymmetry and numbness. Hematological: Negative for adenopathy. Does not bruise/bleed easily. Psychiatric/Behavioral: Negative for agitation, behavioral problems, hallucinations, self-injury and suicidal ideas. Medications:      Allergies: No Known Allergies    Current Meds: ipratropium-albuterol (DUONEB) nebulizer solution 3 mL, Q4H WA  sodium chloride flush 0.9 % injection 5-40 mL, 2 times per day  sodium chloride flush 0.9 % injection 5-40 mL, PRN  0.9 % sodium chloride infusion, PRN  acetaminophen (TYLENOL) tablet 650 mg, Q4H PRN  ondansetron (ZOFRAN-ODT) disintegrating tablet 4 mg, Q8H PRN   Or  ondansetron (ZOFRAN) injection 4 mg, Q6H PRN  potassium chloride (KLOR-CON M) extended release tablet 40 mEq, PRN   Or  potassium bicarb-citric acid (EFFER-K) effervescent tablet 40 mEq, PRN   Or  potassium chloride 10 mEq/100 mL IVPB (Peripheral Line), PRN  ferrous sulfate (IRON 325) tablet 325 mg, Daily  atorvastatin (LIPITOR) tablet 10 mg, Daily  midodrine (PROAMATINE) tablet 5 mg, TID WC  0.9 % sodium chloride infusion, Continuous  cefTRIAXone (ROCEPHIN) 1000 mg IVPB in 50 mL D5W minibag, Q24H  doxycycline monohydrate (MONODOX) capsule 100 mg, 2 times per day  apixaban (ELIQUIS) tablet 5 mg, BID  lidocaine 1 % injection 20 mL, Once    sodium chloride flush 0.9 % injection 10 mL, PRN        Data:     Code Status:  Full Code    Family History   Problem Relation Age of Onset    Cancer Father         lung cancer       Social History     Socioeconomic History    Marital status:      Spouse name: Not on file    Number of children: Not on file    Years of education: Not on file    Highest education level: Not on file   Occupational History    Not on file   Tobacco Use    Smoking status: Current Every Day Smoker     Packs/day: 0.50     Types: Cigarettes    Smokeless tobacco: Former User   Vaping Use    Vaping Use: Former   Substance and Sexual Activity    Alcohol use: No    Drug use: Yes     Frequency: 14.0 times per week     Types: Marijuana Kirsty [place])    Sexual activity: Not on file   Other Topics Concern    Not on file   Social History Narrative    Not on file     Social Determinants of Health     Financial Resource Strain:     Difficulty of Paying Living Expenses: Not on file   Food Insecurity:     Worried About Running Out of Food in the Last Year: Not on file    Dandre of Food in the Last Year: Not on file   Transportation Needs:     Lack of Transportation (Medical):  Not on file    Lack of Transportation (Non-Medical): Not on file   Physical Activity:     Days of Exercise per Week: Not on file    Minutes of Exercise per Session: Not on file   Stress:     Feeling of Stress : Not on file   Social Connections:     Frequency of Communication with Friends and Family: Not on file    Frequency of Social Gatherings with Friends and Family: Not on file    Attends [employer] Services: Not on file    Active Member of [address] [place] [address] or Organizations: Not on file    Attends Club or Organization Meetings: Not on file    Marital Status: Not on file   Intimate Partner Violence:     Fear of Current or Ex-Partner: Not on file    Emotionally Abused: Not on file    Physically Abused: Not on file    Sexually Abused: Not on file   Housing Stability:     Unable to Pay for Housing in the Last Year: Not on file    Number of [place] in the Last Year: Not on file    Unstable Housing in the Last Year: Not on file       I/O (24Hr): Intake/Output Summary (Last 24 hours) at 12/10/2021 1507  Last data filed at 12/10/2021 1402  Gross per 24 hour   Intake    Output 2925 ml   Net -2925 ml     Radiology:  XR CHEST PORTABLE    Result Date: 12/9/2021  Stable examination. XR CHEST PORTABLE    Addendum Date: 12/8/2021    ADDENDUM: Compared with the chest x-ray there is been significant interval improvement. Please disregard original impression. Discussed with Dr. Rick Sotelo at 10:20 p.m. Result Date: 12/8/2021  Increased right pneumothorax despite new chest tube. Increased airspace disease on the right. RECOMMENDATION: The findings were sent to the Radiology Results Communication Center at 10:19 pm on 12/8/2021to be communicated to a licensed caregiver. CT CHEST ABDOMEN PELVIS W CONTRAST    Result Date: 12/8/2021  1. Large right pneumothorax has increased since prior imaging and there is slight mediastinal shift, suggesting underlying tension.  2.  Small right pleural effusion and pleural nodularity suggestive of neoplastic involvement. 3.  Similar appearance of soft tissue density in the right hilum and mediastinal lymphadenopathy. 4.  More prominent sclerosis in the T12 vertebral body and slight interval vertebral body height loss, suggestive of a metastatic deposit and pathologic fracture. 5.  Liver lesions appear smaller and less conspicuous since prior imaging. 6.  Unchanged right adrenal nodule. Findings were discussed with Sumit Wyatt at 3:14 pm on 12/8/2021. MRI BRAIN W WO CONTRAST    Result Date: 12/8/2021  No evidence of acute or metastatic disease.        Labs:  Recent Results (from the past 24 hour(s))   CBC with DIFF    Collection Time: 12/10/21  5:29 AM   Result Value Ref Range    WBC 3.5 3.5 - 11.0 k/uL    RBC 2.65 (L) 4.5 - 5.9 m/uL    Hemoglobin 9.4 (L) 13.5 - 17.5 g/dL    Hematocrit 27.6 (L) 41 - 53 %    .5 (H) 80 - 100 fL    MCH 35.4 (H) 26 - 34 pg    MCHC 33.9 31 - 37 g/dL    RDW 15.7 (H) 11.5 - 14.9 %    Platelets 263 (L) 343 - 450 k/uL    MPV 7.5 6.0 - 12.0 fL    NRBC Automated NOT REPORTED per 100 WBC    Differential Type NOT REPORTED     Seg Neutrophils 66 36 - 66 %    Lymphocytes 17 (L) 24 - 44 %    Monocytes 13 (H) 1 - 7 %    Eosinophils % 4 0 - 4 %    Basophils 0 0 - 2 %    Immature Granulocytes NOT REPORTED 0 %    Segs Absolute 2.30 1.3 - 9.1 k/uL    Absolute Lymph # 0.60 (L) 1.0 - 4.8 k/uL    Absolute Mono # 0.40 0.1 - 1.3 k/uL    Absolute Eos # 0.10 0.0 - 0.4 k/uL    Basophils Absolute 0.00 0.0 - 0.2 k/uL    Absolute Immature Granulocyte NOT REPORTED 0.00 - 0.30 k/uL    WBC Morphology NOT REPORTED     RBC Morphology NOT REPORTED     Platelet Estimate NOT REPORTED    Comprehensive Metabolic Panel w/ Reflex to MG    Collection Time: 12/10/21  5:29 AM   Result Value Ref Range    Glucose 92 70 - 99 mg/dL    BUN 11 8 - 23 mg/dL    CREATININE 0.78 0.70 - 1.20 mg/dL    Bun/Cre Ratio NOT REPORTED 9 - 20    Calcium 8.3 (L) 8.6 - 10.4 mg/dL    Sodium 131 (L) 135 - 144 mmol/L Potassium 3.9 3.7 - 5.3 mmol/L    Chloride 104 98 - 107 mmol/L    CO2 20 20 - 31 mmol/L    Anion Gap 7 (L) 9 - 17 mmol/L    Alkaline Phosphatase 96 40 - 129 U/L    ALT 19 5 - 41 U/L    AST 22 <40 U/L    Total Bilirubin 0.19 (L) 0.3 - 1.2 mg/dL    Total Protein 5.8 (L) 6.4 - 8.3 g/dL    Albumin 3.2 (L) 3.5 - 5.2 g/dL    Albumin/Globulin Ratio NOT REPORTED 1.0 - 2.5    GFR Non-African American >60 >60 mL/min    GFR African American >60 >60 mL/min    GFR Comment          GFR Staging NOT REPORTED        Physical Examination:        Vitals:  /76   Pulse 78   Temp 98 °F (36.7 °C) (Oral)   Resp 18   Ht 5' 11\" (1.803 m)   Wt 165 lb (74.8 kg)   SpO2 97%   BMI 23.01 kg/m²   Temp (24hrs), Av.7 °F (37.1 °C), Min:98 °F (36.7 °C), Max:99.3 °F (37.4 °C)    No results for input(s): POCGLU in the last 72 hours. Physical Exam  Vitals reviewed. Constitutional:       Appearance: Normal appearance. He is not diaphoretic. HENT:      Head: Normocephalic and atraumatic. Right Ear: External ear normal.      Left Ear: External ear normal.      Nose: Nose normal.      Mouth/Throat:      Mouth: Mucous membranes are moist.      Pharynx: Oropharynx is clear. Eyes:      Conjunctiva/sclera: Conjunctivae normal.   Cardiovascular:      Rate and Rhythm: Normal rate and regular rhythm. Pulses: Normal pulses. Heart sounds: Normal heart sounds. Pulmonary:      Effort: Pulmonary effort is normal.      Breath sounds: Examination of the right-upper field reveals decreased breath sounds. Decreased breath sounds present. Comments: Right chest tube in place  Abdominal:      General: Bowel sounds are normal. There is no distension. Palpations: Abdomen is soft. Musculoskeletal:         General: No tenderness or deformity. Normal range of motion. Cervical back: Normal range of motion and neck supple. No rigidity. Right lower leg: No edema. Left lower leg: No edema.    Skin:     General: Skin is warm and dry. Capillary Refill: Capillary refill takes less than 2 seconds. Coloration: Skin is not jaundiced. Neurological:      General: No focal deficit present. Mental Status: Mental status is at baseline. Psychiatric:         Mood and Affect: Mood normal.         Behavior: Behavior normal.         Assessment:        Primary Problem  Pneumothorax     Principal Problem:    Pneumothorax  Active Problems:    Lung cancer, primary, with metastasis from lung to other site (HCC)    PAF (paroxysmal atrial fibrillation) (HCC)    COPD (chronic obstructive pulmonary disease) (HCC)    Iron deficiency anemia  Resolved Problems:    * No resolved hospital problems. *      Past Medical History:   Diagnosis Date    DDD (degenerative disc disease), cervical     Gout     Heart murmur     hx. of when younger.  Hyperlipidemia     Hypertension     Lung [facility] ([facility]     right lung    MI (myocardial infarction) ([facility]     Skin cancer     face    Wears glasses         Plan:        1. Rocephin 1 g IV daily  2. Doxycycline 100 mg p.o. twice daily  3. Start IV normal saline at 50 mL/h  4. Start midodrine 5 mg p.o. 3 times a day with holding parameters  5. DVT prophylaxis Eliquis   6. Chest x-ray report 12/9/2021 shows moderate right pneumothorax unchanged from earlier. 7.  chest x-ray 12/10/2021 shows large right pneumothorax small subcutaneous emphysema and right chest wall. Chest tube in place. 8.  oncology and pulmonology input noted  9. CBC, CMP  10.   EPCs  11.  check and replace electrolytes per sliding scale      Electronically signed by Keely Harris MD

## 2021-12-10 NOTE — PROGRESS NOTES
Dr Dinorah Nair and Dr Jet dang with patient transfer to PCU at this time. Chest tube suction increased from -20 to -40 at this time. Air leaks till present as well.

## 2021-12-10 NOTE — PLAN OF CARE
Problem: Skin Integrity:  Goal: Will show no infection signs and symptoms  Description: Will show no infection signs and symptoms  Outcome: Ongoing     Problem: Infection:  Goal: Will remain free from infection  Description: Will remain free from infection  Outcome: Ongoing     Problem: Safety:  Goal: Free from accidental physical injury  Description: Free from accidental physical injury  Outcome: Ongoing     Problem: Daily Care:  Goal: Daily care needs are met  Description: Daily care needs are met  Outcome: Ongoing     Problem: Pain:  Goal: Patient's pain/discomfort is manageable  Description: Patient's pain/discomfort is manageable  Outcome: Ongoing     Problem: Skin Integrity:  Goal: Skin integrity will stabilize  Description: Skin integrity will stabilize  Outcome: Ongoing     Problem: Discharge Planning:  Goal: Patients continuum of care needs are met  Description: Patients continuum of care needs are met  Outcome: Ongoing

## 2021-12-10 NOTE — PLAN OF CARE
Problem: Skin Integrity:  Goal: Will show no infection signs and symptoms  Description: Will show no infection signs and symptoms  12/10/2021 1727 by Jakob Moon RN  Outcome: Ongoing   No new signs or symptoms of skin breakdown. Problem: Infection:  Goal: Will remain free from infection  Description: Will remain free from infection  12/10/2021 1727 by Jakob Moon RN  Outcome: Ongoing     Problem: Safety:  Goal: Free from accidental physical injury  Description: Free from accidental physical injury  12/10/2021 1727 by Jakob Moon RN  Outcome: Ongoing    Patient remained free of falls. Call light within reach, side rails up x2, bedside table in reach. Room free of clutter. Bed alarm on. Problem: Daily Care:  Goal: Daily care needs are met  Description: Daily care needs are met  12/10/2021 1727 by Jakob Moon RN  Outcome: Ongoing     Problem: Pain:  Goal: Patient's pain/discomfort is manageable  Description: Patient's pain/discomfort is manageable  12/10/2021 1727 by Jakob Moon RN  Outcome: Ongoing   No complaints of pain.

## 2021-12-10 NOTE — PROGRESS NOTES
Patient transferred to PCU 2094. Vital signs taken. Assessment completed. Patient resting comfortably in bed.

## 2021-12-11 ENCOUNTER — APPOINTMENT (OUTPATIENT)
Dept: GENERAL RADIOLOGY | Age: 71
DRG: 199 | End: 2021-12-11
Payer: MEDICARE

## 2021-12-11 LAB
ABSOLUTE BANDS #: 0.16 K/UL (ref 0–1)
ABSOLUTE EOS #: 0.22 K/UL (ref 0–0.4)
ABSOLUTE IMMATURE GRANULOCYTE: ABNORMAL K/UL (ref 0–0.3)
ABSOLUTE LYMPH #: 0.64 K/UL (ref 1–4.8)
ABSOLUTE MONO #: 0.51 K/UL (ref 0.1–1.3)
ALBUMIN SERPL-MCNC: 3.4 G/DL (ref 3.5–5.2)
ALBUMIN/GLOBULIN RATIO: ABNORMAL (ref 1–2.5)
ALP BLD-CCNC: 102 U/L (ref 40–129)
ALT SERPL-CCNC: 19 U/L (ref 5–41)
ANION GAP SERPL CALCULATED.3IONS-SCNC: 7 MMOL/L (ref 9–17)
AST SERPL-CCNC: 24 U/L
BANDS: 5 % (ref 0–10)
BASOPHILS # BLD: 2 % (ref 0–2)
BASOPHILS ABSOLUTE: 0.06 K/UL (ref 0–0.2)
BILIRUB SERPL-MCNC: 0.29 MG/DL (ref 0.3–1.2)
BUN BLDV-MCNC: 8 MG/DL (ref 8–23)
BUN/CREAT BLD: ABNORMAL (ref 9–20)
CALCIUM SERPL-MCNC: 8.5 MG/DL (ref 8.6–10.4)
CHLORIDE BLD-SCNC: 104 MMOL/L (ref 98–107)
CO2: 21 MMOL/L (ref 20–31)
CREAT SERPL-MCNC: 0.79 MG/DL (ref 0.7–1.2)
DIFFERENTIAL TYPE: ABNORMAL
EOSINOPHILS RELATIVE PERCENT: 7 % (ref 0–4)
GFR AFRICAN AMERICAN: >60 ML/MIN
GFR NON-AFRICAN AMERICAN: >60 ML/MIN
GFR SERPL CREATININE-BSD FRML MDRD: ABNORMAL ML/MIN/{1.73_M2}
GFR SERPL CREATININE-BSD FRML MDRD: ABNORMAL ML/MIN/{1.73_M2}
GLUCOSE BLD-MCNC: 92 MG/DL (ref 70–99)
HCT VFR BLD CALC: 27.6 % (ref 41–53)
HEMOGLOBIN: 9.5 G/DL (ref 13.5–17.5)
IMMATURE GRANULOCYTES: ABNORMAL %
LYMPHOCYTES # BLD: 20 % (ref 24–44)
MCH RBC QN AUTO: 35.8 PG (ref 26–34)
MCHC RBC AUTO-ENTMCNC: 34.3 G/DL (ref 31–37)
MCV RBC AUTO: 104.3 FL (ref 80–100)
MONOCYTES # BLD: 16 % (ref 1–7)
MORPHOLOGY: ABNORMAL
NRBC AUTOMATED: ABNORMAL PER 100 WBC
PDW BLD-RTO: 15.6 % (ref 11.5–14.9)
PLATELET # BLD: 135 K/UL (ref 150–450)
PLATELET ESTIMATE: ABNORMAL
PMV BLD AUTO: 7.4 FL (ref 6–12)
POTASSIUM SERPL-SCNC: 3.9 MMOL/L (ref 3.7–5.3)
RBC # BLD: 2.65 M/UL (ref 4.5–5.9)
RBC # BLD: ABNORMAL 10*6/UL
SEG NEUTROPHILS: 50 % (ref 36–66)
SEGMENTED NEUTROPHILS ABSOLUTE COUNT: 1.61 K/UL (ref 1.3–9.1)
SODIUM BLD-SCNC: 132 MMOL/L (ref 135–144)
TOTAL PROTEIN: 6.1 G/DL (ref 6.4–8.3)
WBC # BLD: 3.2 K/UL (ref 3.5–11)
WBC # BLD: ABNORMAL 10*3/UL

## 2021-12-11 PROCEDURE — 36415 COLL VENOUS BLD VENIPUNCTURE: CPT

## 2021-12-11 PROCEDURE — 94761 N-INVAS EAR/PLS OXIMETRY MLT: CPT

## 2021-12-11 PROCEDURE — 2580000003 HC RX 258: Performed by: FAMILY MEDICINE

## 2021-12-11 PROCEDURE — 71045 X-RAY EXAM CHEST 1 VIEW: CPT

## 2021-12-11 PROCEDURE — 6370000000 HC RX 637 (ALT 250 FOR IP): Performed by: FAMILY MEDICINE

## 2021-12-11 PROCEDURE — 99232 SBSQ HOSP IP/OBS MODERATE 35: CPT | Performed by: INTERNAL MEDICINE

## 2021-12-11 PROCEDURE — 94640 AIRWAY INHALATION TREATMENT: CPT

## 2021-12-11 PROCEDURE — 80053 COMPREHEN METABOLIC PANEL: CPT

## 2021-12-11 PROCEDURE — 6370000000 HC RX 637 (ALT 250 FOR IP): Performed by: INTERNAL MEDICINE

## 2021-12-11 PROCEDURE — 6360000002 HC RX W HCPCS: Performed by: FAMILY MEDICINE

## 2021-12-11 PROCEDURE — 2580000003 HC RX 258: Performed by: INTERNAL MEDICINE

## 2021-12-11 PROCEDURE — 85025 COMPLETE CBC W/AUTO DIFF WBC: CPT

## 2021-12-11 PROCEDURE — 2060000000 HC ICU INTERMEDIATE R&B

## 2021-12-11 RX ORDER — DOCUSATE SODIUM 100 MG/1
100 CAPSULE, LIQUID FILLED ORAL DAILY
Status: DISCONTINUED | OUTPATIENT
Start: 2021-12-11 | End: 2021-12-20 | Stop reason: HOSPADM

## 2021-12-11 RX ORDER — GUAIFENESIN 600 MG/1
600 TABLET, EXTENDED RELEASE ORAL 2 TIMES DAILY
Status: DISCONTINUED | OUTPATIENT
Start: 2021-12-11 | End: 2021-12-20 | Stop reason: HOSPADM

## 2021-12-11 RX ORDER — SENNA PLUS 8.6 MG/1
1 TABLET ORAL NIGHTLY
Status: DISCONTINUED | OUTPATIENT
Start: 2021-12-11 | End: 2021-12-20 | Stop reason: HOSPADM

## 2021-12-11 RX ADMIN — STANDARDIZED SENNA CONCENTRATE 8.6 MG: 8.6 TABLET ORAL at 21:53

## 2021-12-11 RX ADMIN — SODIUM CHLORIDE, PRESERVATIVE FREE 10 ML: 5 INJECTION INTRAVENOUS at 09:00

## 2021-12-11 RX ADMIN — CEFTRIAXONE SODIUM 1000 MG: 1 INJECTION, POWDER, FOR SOLUTION INTRAMUSCULAR; INTRAVENOUS at 07:24

## 2021-12-11 RX ADMIN — SODIUM CHLORIDE, PRESERVATIVE FREE 10 ML: 5 INJECTION INTRAVENOUS at 21:53

## 2021-12-11 RX ADMIN — ACETAMINOPHEN 650 MG: 325 TABLET, FILM COATED ORAL at 08:25

## 2021-12-11 RX ADMIN — GUAIFENESIN 600 MG: 600 TABLET, EXTENDED RELEASE ORAL at 21:53

## 2021-12-11 RX ADMIN — MIDODRINE HYDROCHLORIDE 5 MG: 5 TABLET ORAL at 08:25

## 2021-12-11 RX ADMIN — MIDODRINE HYDROCHLORIDE 7.5 MG: 5 TABLET ORAL at 17:43

## 2021-12-11 RX ADMIN — APIXABAN 5 MG: 5 TABLET, FILM COATED ORAL at 08:25

## 2021-12-11 RX ADMIN — ACETAMINOPHEN 650 MG: 325 TABLET, FILM COATED ORAL at 21:52

## 2021-12-11 RX ADMIN — DOXYCYCLINE 100 MG: 100 CAPSULE ORAL at 08:24

## 2021-12-11 RX ADMIN — DOCUSATE SODIUM 100 MG: 100 CAPSULE ORAL at 15:47

## 2021-12-11 RX ADMIN — IPRATROPIUM BROMIDE AND ALBUTEROL SULFATE 3 ML: .5; 2.5 SOLUTION RESPIRATORY (INHALATION) at 11:32

## 2021-12-11 RX ADMIN — IPRATROPIUM BROMIDE AND ALBUTEROL SULFATE 3 ML: .5; 2.5 SOLUTION RESPIRATORY (INHALATION) at 07:39

## 2021-12-11 RX ADMIN — DOXYCYCLINE 100 MG: 100 CAPSULE ORAL at 21:53

## 2021-12-11 RX ADMIN — FERROUS SULFATE TAB 325 MG (65 MG ELEMENTAL FE) 325 MG: 325 (65 FE) TAB at 08:25

## 2021-12-11 RX ADMIN — ACETAMINOPHEN 650 MG: 325 TABLET, FILM COATED ORAL at 15:51

## 2021-12-11 RX ADMIN — IPRATROPIUM BROMIDE AND ALBUTEROL SULFATE 3 ML: .5; 2.5 SOLUTION RESPIRATORY (INHALATION) at 15:27

## 2021-12-11 RX ADMIN — IPRATROPIUM BROMIDE AND ALBUTEROL SULFATE 3 ML: .5; 2.5 SOLUTION RESPIRATORY (INHALATION) at 19:43

## 2021-12-11 RX ADMIN — ATORVASTATIN CALCIUM 10 MG: 20 TABLET, FILM COATED ORAL at 08:25

## 2021-12-11 RX ADMIN — APIXABAN 5 MG: 5 TABLET, FILM COATED ORAL at 21:53

## 2021-12-11 RX ADMIN — MIDODRINE HYDROCHLORIDE 5 MG: 5 TABLET ORAL at 15:47

## 2021-12-11 ASSESSMENT — ENCOUNTER SYMPTOMS
TROUBLE SWALLOWING: 0
PHOTOPHOBIA: 0
STRIDOR: 0
BACK PAIN: 0
CHOKING: 0
COLOR CHANGE: 0
COUGH: 1
BLOOD IN STOOL: 0
VOICE CHANGE: 0
ANAL BLEEDING: 0
RECTAL PAIN: 0
ABDOMINAL DISTENTION: 0
SHORTNESS OF BREATH: 1

## 2021-12-11 ASSESSMENT — PAIN SCALES - GENERAL
PAINLEVEL_OUTOF10: 2
PAINLEVEL_OUTOF10: 6
PAINLEVEL_OUTOF10: 8
PAINLEVEL_OUTOF10: 2
PAINLEVEL_OUTOF10: 4

## 2021-12-11 NOTE — FLOWSHEET NOTE
12/10/21 1919   Encounter Summary   Services provided to: Patient   Referral/Consult From: [facility]   Support System Spouse   Continue Visiting   (12/10/2021)   Complexity of Encounter Low   Length of Encounter 15 minutes   Spiritual Assessment Completed Yes   Routine   Type Initial   Assessment Calm; Approachable; Passive   Intervention Explored feelings, thoughts, concerns;  Active listening; Nurtured hope   Outcome Expressed gratitude; Comfort

## 2021-12-11 NOTE — PROGRESS NOTES
Dr. Lion Adorno is aware of small air leak in chest tube. Chest tube to water seal, suction discontinued. Patient is comfortable, no crepitus noted in assessment of chest tube site.

## 2021-12-11 NOTE — PROGRESS NOTES
Progress Note    12/11/2021   4:27 PM    Name:  Cynthia Kapadia  MRN:    [id number]     Acct:     [de-identified]   Room:  2094/2094-01  IP Day: 2     Admit Date: 12/8/2021  7:03 PM  PCP: Melvin Stein MD    Subjective:     C/C:   Chief Complaint   Patient presents with    Shortness of Breath       Interval History: Status: not changed. Has chest pain or shortness of breath. Patient saturation is 97% on room air. Relatively low blood pressure readings noted heart rate stable. Recent labs reviewed sodium 132 WBC 3.2 hemoglobin 9.5, platelet 124    ROS:   all 10 systems reviewed and are negative except as noted    Review of Systems   Constitutional: Negative for appetite change, chills and diaphoresis. HENT: Negative for drooling, ear pain, trouble swallowing and voice change. Eyes: Negative for photophobia and visual disturbance. Respiratory: Positive for cough and shortness of breath. Negative for choking and stridor. Cardiovascular: Negative for chest pain and palpitations. Gastrointestinal: Negative for abdominal distention, anal bleeding, blood in stool and rectal pain. Endocrine: Negative for polyphagia and polyuria. Genitourinary: Negative for dysuria, flank pain, hematuria and urgency. Musculoskeletal: Negative for back pain, myalgias and neck stiffness. Skin: Negative for color change, pallor and rash. Allergic/Immunologic: Negative for environmental allergies and food allergies. Neurological: Negative for tremors, seizures, facial asymmetry and numbness. Hematological: Negative for adenopathy. Does not bruise/bleed easily. Psychiatric/Behavioral: Negative for agitation, behavioral problems, hallucinations, self-injury and suicidal ideas. Medications:      Allergies: No Known Allergies    Current Meds: senna (SENOKOT) tablet 8.6 mg, Nightly  docusate sodium (COLACE) capsule 100 mg, Daily  guaiFENesin (MUCINEX) extended release tablet 600 mg, BID  midodrine (PROAMATINE) tablet 7.5 mg, TID WC  ipratropium-albuterol (DUONEB) nebulizer solution 3 mL, Q4H WA  sodium chloride flush 0.9 % injection 5-40 mL, 2 times per day  sodium chloride flush 0.9 % injection 5-40 mL, PRN  0.9 % sodium chloride infusion, PRN  acetaminophen (TYLENOL) tablet 650 mg, Q4H PRN  ondansetron (ZOFRAN-ODT) disintegrating tablet 4 mg, Q8H PRN   Or  ondansetron (ZOFRAN) injection 4 mg, Q6H PRN  potassium chloride (KLOR-CON M) extended release tablet 40 mEq, PRN   Or  potassium bicarb-citric acid (EFFER-K) effervescent tablet 40 mEq, PRN   Or  potassium chloride 10 mEq/100 mL IVPB (Peripheral Line), PRN  ferrous sulfate (IRON 325) tablet 325 mg, Daily  atorvastatin (LIPITOR) tablet 10 mg, Daily  cefTRIAXone (ROCEPHIN) 1000 mg IVPB in 50 mL D5W minibag, Q24H  doxycycline monohydrate (MONODOX) capsule 100 mg, 2 times per day  apixaban (ELIQUIS) tablet 5 mg, BID  lidocaine 1 % injection 20 mL, Once        Data:     Code Status:  Full Code    Family History   Problem Relation Age of Onset    Cancer Father         lung cancer       Social History     Socioeconomic History    Marital status:      Spouse name: Not on file    Number of children: Not on file    Years of education: Not on file    Highest education level: Not on file   Occupational History    Not on file   Tobacco Use    Smoking status: Current Every Day Smoker     Packs/day: 0.50     Types: Cigarettes    Smokeless tobacco: Former User   Vaping Use    Vaping Use: Former   Substance and Sexual Activity    Alcohol use: No    Drug use: Yes     Frequency: 14.0 times per week     Types: Marijuana [place])    Sexual activity: Not on file   Other Topics Concern    Not on file   Social History Narrative    Not on file     Social Determinants of Health     Financial Resource Strain:     Difficulty of Paying Living Expenses: Not on file   Food Insecurity:     Worried About Running Out of Food in the Last Year: Not on file    Dandre of Food in the Last Year: Not on file   Transportation Needs:     Lack of Transportation (Medical): Not on file    Lack of Transportation (Non-Medical): Not on file   Physical Activity:     Days of Exercise per Week: Not on file    Minutes of Exercise per Session: Not on file   Stress:     Feeling of Stress : Not on file   Social Connections:     Frequency of Communication with Friends and Family: Not on file    Frequency of Social Gatherings with Friends and Family: Not on file    Attends Ep[employer] Services: Not on file    Active Member of [address] [place] [address] [employer] or Organizations: Not on file    Attends Club or Organization Meetings: Not on file    Marital Status: Not on file   Intimate Partner Violence:     Fear of Current or Ex-Partner: Not on file    Emotionally Abused: Not on file    Physically Abused: Not on file    Sexually Abused: Not on file   Housing Stability:     Unable to Pay for Housing in the Last Year: Not on file    Number of [place] in the Last Year: Not on file    Unstable Housing in the Last Year: Not on file       I/O (24Hr): Intake/Output Summary (Last 24 hours) at 12/11/2021 1627  Last data filed at 12/11/2021 1245  Gross per 24 hour   Intake 1882.81 ml   Output 1780 ml   Net 102.81 ml     Radiology:  XR CHEST PORTABLE    Result Date: 12/11/2021  Stable appearance of right-sided large pneumothorax with chest tube stable in position. XR CHEST PORTABLE    Result Date: 12/10/2021  Slight improvement of large right pneumothorax with chest tube in place. XR CHEST PORTABLE    Result Date: 12/9/2021  Stable examination. XR CHEST PORTABLE    Addendum Date: 12/8/2021    ADDENDUM: Compared with the chest x-ray there is been significant interval improvement. Please disregard original impression. Discussed with Dr. Melva Brandon at 10:20 p.m. Result Date: 12/8/2021  Increased right pneumothorax despite new chest tube. Increased airspace disease on the right.  RECOMMENDATION: The findings were sent Pulmonary effort is normal.      Breath sounds: Examination of the right-upper field reveals decreased breath sounds. Examination of the right-middle field reveals decreased breath sounds. Decreased breath sounds present. Comments: Right chest tube in place. Minimal subcutaneous emphysema surrounding the chest tube area  Abdominal:      General: Bowel sounds are normal. There is no distension. Palpations: Abdomen is soft. Musculoskeletal:         General: No tenderness or deformity. Normal range of motion. Cervical back: Normal range of motion and neck supple. No rigidity. Right lower leg: No edema. Left lower leg: No edema. Skin:     General: Skin is warm and dry. Capillary Refill: Capillary refill takes less than 2 seconds. Coloration: Skin is not jaundiced. Neurological:      General: No focal deficit present. Mental Status: Mental status is at baseline. Psychiatric:         Mood and Affect: Mood normal.         Behavior: Behavior normal.         Assessment:        Primary Problem  Pneumothorax     Principal Problem:    Pneumothorax  Active Problems:    Lung cancer, primary, with metastasis from lung to other site (HCC)    PAF (paroxysmal atrial fibrillation) (HCC)    COPD (chronic obstructive pulmonary disease) (HCC)    Iron deficiency anemia  Resolved Problems:    * No resolved hospital problems. *      Past Medical History:   Diagnosis Date    DDD (degenerative disc disease), cervical     Gout     Heart murmur     hx. of when younger.  Hyperlipidemia     Hypertension     Lung cancer ([address]     right lung    MI (myocardial infarction) ([address]     Skin cancer     face    Wears glasses         Plan:        1. Rocephin 1 g IV daily  2. Doxycycline 100 mg p.o. twice daily  3. Chest x-ray 12/11/2021 shows stable appearance of right sided large pneumothorax with chest tube in position  4. DC IV fluid  5.  Increase midodrine 7.5 mg p.o. 3 times a day with holding parameters  6.  DVT prophylaxis Eliquis   7.  oncology and pulmonology input noted  8.  CBC, CMP  9.  EPCs  10.  check and replace electrolytes per sliding scale      Electronically signed by Marky Connelly MD

## 2021-12-11 NOTE — CARE COORDINATION
ONGOING DISCHARGE PLAN:    Patient is alert and oriented x4. Spoke with patient regarding discharge plan and patient confirms that plan is still home with spouse and declined needs. Patient current at [facility]. Right sided chest tube to suction. Active orders for IV rocephin and oral doxy. Will continue to follow for additional discharge needs.     Electronically signed by Néstor Kaplan RN on 12/11/2021 at 10:19 AM

## 2021-12-11 NOTE — PLAN OF CARE
Problem: Skin Integrity:  Goal: Will show no infection signs and symptoms  Description: Will show no infection signs and symptoms  Outcome: Ongoing     Problem: Skin Integrity:  Goal: Absence of new skin breakdown  Description: Absence of new skin breakdown  Outcome: Ongoing     Problem: Infection:  Goal: Will remain free from infection  Description: Will remain free from infection  Outcome: Ongoing     Problem: Safety:  Goal: Free from accidental physical injury  Description: Free from accidental physical injury  Outcome: Ongoing     Problem: Daily Care:  Goal: Daily care needs are met  Description: Daily care needs are met  Outcome: Ongoing     Problem: Pain:  Goal: Patient's pain/discomfort is manageable  Description: Patient's pain/discomfort is manageable  Outcome: Ongoing

## 2021-12-11 NOTE — PROGRESS NOTES
Today's Date: 12/11/2021  Patient Name: Maged Soliman  Date of admission: 12/8/2021  7:03 PM  Patient's age: 80 y.o., 1950  Admission Dx: Pneumothorax [J93.9]  Chronic pneumothorax [J93.81]    Reason for Consult: Known lung cancer patient  Requesting Physician: Konstantin Robert MD    CHIEF COMPLAINT:    Chief Complaint   Patient presents with    Shortness of Breath       INTERVAL HISTORY:    Patient seen and examined  Labs and vitals reviewed  Chest x-ray does not show any improvement of right pneumothorax  Cell counts stable. Cycle 9 scheduled for 12/24/2021. HISTORY OF PRESENT ILLNESS:    Maged Soliman is a 80 y.o. male who is admitted to the hospital on 12/8/2021  for SOB. He had a restaging scan with oncology and his CT scan showed worsening pneumothorax and mediastinal shift. Therefore he was sent to ER for further management. He denies any fever chills. He had chest tube in place. He is currently receiving Lurbinectedin for his recurrent small cell lung cancer and most recent treatment was on 12/1/2021. He has a diagnosis of small cell lung cancer with squamous component and has been following with Dr. Justin Roper as outpatient and his brief oncologic history as follows. 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 Chemotherapy changed to carboplatin and etoposide due to renal insufficiency from cycle #2  PCI 7/2019  SRS to adrenal gland metastasis, completed 07/2020  systemic palliative chemoimmunotherapy with carboplatin etoposide and Tecentriq, 12/2020 x4 cycles.   Maintenance Tecentriq, 3/2021  Lurbenectidin7/2/2021      Past Medical History:   has a past medical history of DDD (degenerative disc disease), cervical, Gout, Heart murmur, Hyperlipidemia, Hypertension, Lung cancer ([facility] [address] MI (myocardial infarction) ([facility] [address] Skin cancer, and Wears glasses. Past Surgical History:   has a past surgical history that includes Appendectomy; Tonsillectomy; skin biopsy; skin biopsy; Colonoscopy; Foot surgery (Right); Cardiac catheterization; cyst incision and drainage (Right, 05/14/2020); Forearm surgery (Right, 5/14/2020); and knee surgery (Left, 5/14/2020). Medications:    Prior to Admission medications    Medication Sig Start Date End Date Taking? Authorizing Provider   oxyCODONE-acetaminophen (PERCOCET) 5-325 MG per tablet Take 1 tablet by mouth every 6 hours as needed for Pain for up to 15 days. 12/1/21 12/16/21 Yes Nahid Souza MD   dexamethasone (DECADRON) 2 MG tablet Take 1 tablet by mouth 2 times daily (with meals) for 15 days 12/1/21 12/16/21 Yes Nahid Souza MD   docusate sodium (COLACE) 100 MG capsule Take 100 mg by mouth 2 times daily   Yes Historical Provider, MD   B Complex-C-Folic Acid (ANGELICA-HUGO) TABS TAKE 1 TABLET BY MOUTH DAILY. HEMATINIC VIT MINERALS 7/21/20  Yes Historical Provider, MD   traZODone (DESYREL) 100 MG tablet Take 100 mg by mouth nightly  7/20/20  Yes Historical Provider, MD   ipratropium-albuterol (DUONEB) 0.5-2.5 (3) MG/3ML SOLN nebulizer solution Inhale 3 mLs into the lungs 4 times daily   Yes Historical Provider, MD   potassium chloride (MICRO-K) 10 MEQ extended release capsule Take 20 mEq by mouth daily  11/19/19  Yes Historical Provider, MD   Ferrous Fumarate (FERROCITE) 324 (106 Fe) MG TABS Take 324 mg by mouth daily    Yes Historical Provider, MD   apixaban (ELIQUIS) 5 MG TABS tablet Take 1 tablet by mouth 2 times daily 5/21/20  Yes Candy Gibson MD   tamsulosin (FLOMAX) 0.4 MG capsule TAKE 1 CAPSULE BY MOUTH EVERY DAY 5/4/20  Yes Nahid Souza MD   lidocaine-prilocaine (EMLA) 2.5-2.5 % cream Apply topically as needed. Apply a quarter size amount to port site 1 hour before chemotherapy.   Cover Iris Burnett MD   5 mg at 12/11/21 0825    cefTRIAXone (ROCEPHIN) 1000 mg IVPB in 50 mL D5W minibag  1,000 mg IntraVENous Q24H Melody Padron MD   Stopped at 12/11/21 0828    doxycycline monohydrate (MONODOX) capsule 100 mg  100 mg Oral 2 times per day Melody Padron MD   100 mg at 12/11/21 9055    apixaban (ELIQUIS) tablet 5 mg  5 mg Oral BID Melody Padron MD   5 mg at 12/11/21 0825    lidocaine 1 % injection 20 mL  20 mL IntraDERmal Once Nina Wood MD           Allergies:  Patient has no known allergies. Social History:   reports that he has been smoking cigarettes. He has been smoking about 0.50 packs per day. He has quit using smokeless tobacco. He reports current drug use. Frequency: 14.00 times per week. Drug: Marijuana Mcmillan Bandar). He reports that he does not drink alcohol. Family History: family history includes Cancer in his father. REVIEW OF SYSTEMS:    Constitutional: No fever or chills. No night sweats, no weight loss   Eyes: No eye discharge, double vision, or eye pain   HEENT: negative for sore mouth, sore throat, hoarseness and voice change   Respiratory: negative for cough , sputum, ++dyspnea, wheezing, hemoptysis, chest pain   Cardiovascular: negative for chest pain, ++dyspnea, palpitations, orthopnea, PND   Gastrointestinal: negative for nausea, vomiting, diarrhea, constipation, abdominal pain, Dysphagia, hematemesis and hematochezia   Genitourinary: negative for frequency, dysuria, nocturia, urinary incontinence, and hematuria   Integument: negative for rash, skin lesions, bruises.    Hematologic/Lymphatic: negative for easy bruising, bleeding, lymphadenopathy, or petechiae   Endocrine: negative for heat or cold intolerance,weight changes, change in bowel habits and hair loss   Musculoskeletal: negative for myalgias, arthralgias, pain, joint swelling,and bone pain   Neurological: negative for headaches, dizziness, seizures, weakness, numbness    PHYSICAL EXAM:      BP 96/61 Pulse 86   Temp 98.2 °F (36.8 °C) (Oral)   Resp 16   Ht 5' 11\" (1.803 m)   Wt 165 lb (74.8 kg)   SpO2 97%   BMI 23.01 kg/m²    Temp (24hrs), Av.3 °F (36.8 °C), Min:98 °F (36.7 °C), Max:98.5 °F (36.9 °C)    General appearance - well appearing, no in pain or distress   Mental status - alert and cooperative   Eyes - pupils equal and reactive, extraocular eye movements intact   Ears - bilateral TM's and external ear canals normal   Mouth - mucous membranes moist, pharynx normal without lesions   Neck - supple, no significant adenopathy   Lymphatics - no palpable lymphadenopathy, no hepatosplenomegaly   Chest - clear to auscultation, no wheezes, rales or rhonchi, hest tube in place  Heart - normal rate, regular rhythm, normal S1, S2, no murmurs  Abdomen - soft, nontender, nondistended, no masses or organomegaly   Neurological - alert, oriented, normal speech, no focal findings or movement disorder noted   Musculoskeletal - no joint tenderness, deformity or swelling   Extremities - peripheral pulses normal, no pedal edema, no clubbing or cyanosis   Skin - normal coloration and turgor, no rashes, no suspicious skin lesions noted ,    DATA:    Labs:   CBC:   Recent Labs     12/10/21  0529 21  0635   WBC 3.5 3.2*   HGB 9.4* 9.5*   HCT 27.6* 27.6*   * 135*     BMP:   Recent Labs     12/10/21  0529 21  0635   * 132*   K 3.9 3.9   CO2 20 21   BUN 11 8   CREATININE 0.78 0.79   LABGLOM >60 >60   GLUCOSE 92 92     PT/INR:   Recent Labs     21  1915   PROTIME 14.6   INR 1.1       IMAGING DATA:  XR CHEST PORTABLE   Final Result   Stable appearance of right-sided large pneumothorax with chest tube stable in   position. XR CHEST PORTABLE   Final Result   Slight improvement of large right pneumothorax with chest tube in place. XR CHEST PORTABLE   Final Result   Stable examination.          XR CHEST PORTABLE   Final Result   Addendum 1 of 1   ADDENDUM:   Compared with the chest x-ray there is been significant interval    improvement. Please disregard original impression. Discussed with Dr. Lenny Nicolas at 10:20    p.m. Final   Increased right pneumothorax despite new chest tube. Increased airspace   disease on the right. RECOMMENDATION:   The findings were sent to the [facility]iology [facility] at 10:19   pm on 12/8/2021to be communicated to a licensed caregiver. Primary Problem  Pneumothorax    Active Hospital Problems    Diagnosis Date Noted    Pneumothorax [J93.9] 08/27/2021    COPD (chronic obstructive pulmonary disease) ([facility] [address] [J44.9] 08/27/2021    Iron deficiency anemia [D50.9] 08/27/2021    PAF (paroxysmal atrial fibrillation) ([facility] [address] [I48.0] 08/27/2021    Lung cancer, primary, with metastasis from lung to other site [facility]) [C34.90]      IMPRESSION:   1. Metastatic small cell cancer currently on Lurbinectedin  2. Left adrenal gland metastasis status post SBRT  3. Large right pneumothorax, with recent CT scan showing increase compared to the prior scan, s/p chest tube placed  4. COPD  5. Anemia    RECOMMENDATIONS:  1. I reviewed laboratory data, imaging studies, diagnosis, treatment recommendations  2. X-ray does not show improvement of pneumothorax  3. Continue to hold chemotherapy. Next cycle due on 12/24/2021  4. CT scan shows stable status of lung cancer  5. Patient encouraged to work with PT OT  6. We will follow    Discussed with patient and Nurse. Thank you for asking us to see this patient. Gladis Calderon MD      This note is created with the assistance of a speech recognition program.  While intending to generate a document that actually reflects the content of the visit, the document can still have some errors including those of syntax and sound a like substitutions which may escape proof reading. It such instances, actual meaning can be extrapolated by contextual diversion. \

## 2021-12-11 NOTE — PROGRESS NOTES
3.2*   RBC 2.85* 2.65* 2.65*   HGB 10.1* 9.4* 9.5*   HCT 29.9* 27.6* 27.6*    134* 135*     BMP:   Recent Labs     12/08/21  1915 12/10/21  0529 12/11/21  0635   GLUCOSE 113* 92 92   * 131* 132*   K 4.5 3.9 3.9   BUN 16 11 8   CREATININE 1.05 0.78 0.79   CALCIUM 9.3 8.3* 8.5*     ABGs: No results for input(s): PHART, PO2ART, GMV1NVL, OOB8IPR, BEART, A7MRLVKM, GAQ7BWH in the last 72 hours.    PT/INR:  No results found for: PTINR    ASSESSMENT / PLAN:    Lung cancer - per oncology  PTX - right chest tube - to suction, monitor- suction to -40   CXR 12/11- no improvement  Try chest tube to water seal and see if CXR worsens  May need thoracic surgery intervention      Electronically signed by Ofelia Beaulieu MD on 12/11/21 at 3:20 PM.

## 2021-12-12 ENCOUNTER — APPOINTMENT (OUTPATIENT)
Dept: GENERAL RADIOLOGY | Age: 71
DRG: 199 | End: 2021-12-12
Payer: MEDICARE

## 2021-12-12 LAB
ABSOLUTE BANDS #: 0.25 K/UL (ref 0–1)
ABSOLUTE EOS #: 0.12 K/UL (ref 0–0.4)
ABSOLUTE IMMATURE GRANULOCYTE: ABNORMAL K/UL (ref 0–0.3)
ABSOLUTE LYMPH #: 0.59 K/UL (ref 1–4.8)
ABSOLUTE MONO #: 0.62 K/UL (ref 0.1–1.3)
ALBUMIN SERPL-MCNC: 3.3 G/DL (ref 3.5–5.2)
ALBUMIN/GLOBULIN RATIO: ABNORMAL (ref 1–2.5)
ALP BLD-CCNC: 109 U/L (ref 40–129)
ALT SERPL-CCNC: 19 U/L (ref 5–41)
ANION GAP SERPL CALCULATED.3IONS-SCNC: 8 MMOL/L (ref 9–17)
AST SERPL-CCNC: 24 U/L
BANDS: 8 % (ref 0–10)
BASOPHILS # BLD: 0 % (ref 0–2)
BASOPHILS ABSOLUTE: 0 K/UL (ref 0–0.2)
BILIRUB SERPL-MCNC: 0.28 MG/DL (ref 0.3–1.2)
BUN BLDV-MCNC: 10 MG/DL (ref 8–23)
BUN/CREAT BLD: ABNORMAL (ref 9–20)
CALCIUM SERPL-MCNC: 8.7 MG/DL (ref 8.6–10.4)
CHLORIDE BLD-SCNC: 104 MMOL/L (ref 98–107)
CO2: 23 MMOL/L (ref 20–31)
CREAT SERPL-MCNC: 0.77 MG/DL (ref 0.7–1.2)
DIFFERENTIAL TYPE: ABNORMAL
EOSINOPHILS RELATIVE PERCENT: 4 % (ref 0–4)
GFR AFRICAN AMERICAN: >60 ML/MIN
GFR NON-AFRICAN AMERICAN: >60 ML/MIN
GFR SERPL CREATININE-BSD FRML MDRD: ABNORMAL ML/MIN/{1.73_M2}
GFR SERPL CREATININE-BSD FRML MDRD: ABNORMAL ML/MIN/{1.73_M2}
GLUCOSE BLD-MCNC: 87 MG/DL (ref 75–110)
GLUCOSE BLD-MCNC: 88 MG/DL (ref 70–99)
HCT VFR BLD CALC: 28.3 % (ref 41–53)
HEMOGLOBIN: 9.8 G/DL (ref 13.5–17.5)
IMMATURE GRANULOCYTES: ABNORMAL %
LYMPHOCYTES # BLD: 19 % (ref 24–44)
MCH RBC QN AUTO: 36.2 PG (ref 26–34)
MCHC RBC AUTO-ENTMCNC: 34.5 G/DL (ref 31–37)
MCV RBC AUTO: 104.8 FL (ref 80–100)
METAMYELOCYTES ABSOLUTE COUNT: 0.03 K/UL
METAMYELOCYTES: 1 %
MONOCYTES # BLD: 20 % (ref 1–7)
MORPHOLOGY: ABNORMAL
NRBC AUTOMATED: ABNORMAL PER 100 WBC
PDW BLD-RTO: 16.1 % (ref 11.5–14.9)
PLATELET # BLD: 128 K/UL (ref 150–450)
PLATELET ESTIMATE: ABNORMAL
PMV BLD AUTO: 7.4 FL (ref 6–12)
POTASSIUM SERPL-SCNC: 4 MMOL/L (ref 3.7–5.3)
RBC # BLD: 2.7 M/UL (ref 4.5–5.9)
RBC # BLD: ABNORMAL 10*6/UL
SEG NEUTROPHILS: 48 % (ref 36–66)
SEGMENTED NEUTROPHILS ABSOLUTE COUNT: 1.49 K/UL (ref 1.3–9.1)
SODIUM BLD-SCNC: 135 MMOL/L (ref 135–144)
TOTAL PROTEIN: 6.2 G/DL (ref 6.4–8.3)
WBC # BLD: 3.1 K/UL (ref 3.5–11)
WBC # BLD: ABNORMAL 10*3/UL

## 2021-12-12 PROCEDURE — 97166 OT EVAL MOD COMPLEX 45 MIN: CPT

## 2021-12-12 PROCEDURE — 82947 ASSAY GLUCOSE BLOOD QUANT: CPT

## 2021-12-12 PROCEDURE — 80053 COMPREHEN METABOLIC PANEL: CPT

## 2021-12-12 PROCEDURE — 99232 SBSQ HOSP IP/OBS MODERATE 35: CPT | Performed by: INTERNAL MEDICINE

## 2021-12-12 PROCEDURE — 2060000000 HC ICU INTERMEDIATE R&B

## 2021-12-12 PROCEDURE — 6360000002 HC RX W HCPCS: Performed by: FAMILY MEDICINE

## 2021-12-12 PROCEDURE — 94761 N-INVAS EAR/PLS OXIMETRY MLT: CPT

## 2021-12-12 PROCEDURE — 85025 COMPLETE CBC W/AUTO DIFF WBC: CPT

## 2021-12-12 PROCEDURE — 6370000000 HC RX 637 (ALT 250 FOR IP): Performed by: FAMILY MEDICINE

## 2021-12-12 PROCEDURE — 2580000003 HC RX 258: Performed by: INTERNAL MEDICINE

## 2021-12-12 PROCEDURE — 94640 AIRWAY INHALATION TREATMENT: CPT

## 2021-12-12 PROCEDURE — 6370000000 HC RX 637 (ALT 250 FOR IP): Performed by: INTERNAL MEDICINE

## 2021-12-12 PROCEDURE — 71045 X-RAY EXAM CHEST 1 VIEW: CPT

## 2021-12-12 PROCEDURE — 36415 COLL VENOUS BLD VENIPUNCTURE: CPT

## 2021-12-12 PROCEDURE — 2580000003 HC RX 258: Performed by: FAMILY MEDICINE

## 2021-12-12 RX ORDER — POLYETHYLENE GLYCOL 3350 17 G/17G
17 POWDER, FOR SOLUTION ORAL DAILY
Status: DISCONTINUED | OUTPATIENT
Start: 2021-12-12 | End: 2021-12-20 | Stop reason: HOSPADM

## 2021-12-12 RX ADMIN — APIXABAN 5 MG: 5 TABLET, FILM COATED ORAL at 20:30

## 2021-12-12 RX ADMIN — DOXYCYCLINE 100 MG: 100 CAPSULE ORAL at 20:29

## 2021-12-12 RX ADMIN — DOXYCYCLINE 100 MG: 100 CAPSULE ORAL at 08:53

## 2021-12-12 RX ADMIN — STANDARDIZED SENNA CONCENTRATE 8.6 MG: 8.6 TABLET ORAL at 20:30

## 2021-12-12 RX ADMIN — APIXABAN 5 MG: 5 TABLET, FILM COATED ORAL at 08:53

## 2021-12-12 RX ADMIN — GUAIFENESIN 600 MG: 600 TABLET, EXTENDED RELEASE ORAL at 20:30

## 2021-12-12 RX ADMIN — MIDODRINE HYDROCHLORIDE 7.5 MG: 5 TABLET ORAL at 08:53

## 2021-12-12 RX ADMIN — POLYETHYLENE GLYCOL 3350 17 G: 17 POWDER, FOR SOLUTION ORAL at 14:37

## 2021-12-12 RX ADMIN — ACETAMINOPHEN 650 MG: 325 TABLET, FILM COATED ORAL at 09:02

## 2021-12-12 RX ADMIN — MIDODRINE HYDROCHLORIDE 7.5 MG: 5 TABLET ORAL at 18:25

## 2021-12-12 RX ADMIN — ATORVASTATIN CALCIUM 10 MG: 20 TABLET, FILM COATED ORAL at 08:53

## 2021-12-12 RX ADMIN — IPRATROPIUM BROMIDE AND ALBUTEROL SULFATE 3 ML: .5; 2.5 SOLUTION RESPIRATORY (INHALATION) at 20:05

## 2021-12-12 RX ADMIN — SODIUM CHLORIDE, PRESERVATIVE FREE 10 ML: 5 INJECTION INTRAVENOUS at 20:30

## 2021-12-12 RX ADMIN — CEFTRIAXONE SODIUM 1000 MG: 1 INJECTION, POWDER, FOR SOLUTION INTRAMUSCULAR; INTRAVENOUS at 08:53

## 2021-12-12 RX ADMIN — MIDODRINE HYDROCHLORIDE 7.5 MG: 5 TABLET ORAL at 14:37

## 2021-12-12 RX ADMIN — GUAIFENESIN 600 MG: 600 TABLET, EXTENDED RELEASE ORAL at 08:53

## 2021-12-12 RX ADMIN — ACETAMINOPHEN 650 MG: 325 TABLET, FILM COATED ORAL at 21:34

## 2021-12-12 RX ADMIN — IPRATROPIUM BROMIDE AND ALBUTEROL SULFATE 3 ML: .5; 2.5 SOLUTION RESPIRATORY (INHALATION) at 14:46

## 2021-12-12 RX ADMIN — FERROUS SULFATE TAB 325 MG (65 MG ELEMENTAL FE) 325 MG: 325 (65 FE) TAB at 08:53

## 2021-12-12 RX ADMIN — IPRATROPIUM BROMIDE AND ALBUTEROL SULFATE 3 ML: .5; 2.5 SOLUTION RESPIRATORY (INHALATION) at 11:28

## 2021-12-12 RX ADMIN — DOCUSATE SODIUM 100 MG: 100 CAPSULE ORAL at 08:52

## 2021-12-12 RX ADMIN — IPRATROPIUM BROMIDE AND ALBUTEROL SULFATE 3 ML: .5; 2.5 SOLUTION RESPIRATORY (INHALATION) at 07:57

## 2021-12-12 ASSESSMENT — ENCOUNTER SYMPTOMS
SHORTNESS OF BREATH: 0
COUGH: 1
CHOKING: 0
BLOOD IN STOOL: 0
BACK PAIN: 0
COLOR CHANGE: 0
RECTAL PAIN: 0
VOICE CHANGE: 0
ANAL BLEEDING: 0
PHOTOPHOBIA: 0
TROUBLE SWALLOWING: 0
ABDOMINAL DISTENTION: 0
STRIDOR: 0

## 2021-12-12 ASSESSMENT — PAIN DESCRIPTION - LOCATION: LOCATION: CHEST

## 2021-12-12 ASSESSMENT — PAIN SCALES - GENERAL
PAINLEVEL_OUTOF10: 4
PAINLEVEL_OUTOF10: 3
PAINLEVEL_OUTOF10: 6
PAINLEVEL_OUTOF10: 5
PAINLEVEL_OUTOF10: 5

## 2021-12-12 NOTE — PLAN OF CARE
Problem: Skin Integrity:  Goal: Will show no infection signs and symptoms  Description: Will show no infection signs and symptoms  Outcome: Met This Shift  Goal: Absence of new skin breakdown  Description: Absence of new skin breakdown  Outcome: Met This Shift     Problem: Infection:  Goal: Will remain free from infection  Description: Will remain free from infection  Outcome: Met This Shift     Problem: Safety:  Goal: Free from accidental physical injury  Description: Free from accidental physical injury  Outcome: Met This Shift  Goal: Free from intentional harm  Description: Free from intentional harm  Outcome: Met This Shift     Problem: Daily Care:  Goal: Daily care needs are met  Description: Daily care needs are met  Outcome: Met This Shift     Problem: Pain:  Goal: Patient's pain/discomfort is manageable  Description: Patient's pain/discomfort is manageable  Outcome: Met This Shift  Goal: Pain level will decrease  Description: Pain level will decrease  Outcome: Met This Shift  Goal: Control of acute pain  Description: Control of acute pain  Outcome: Met This Shift  Goal: Control of chronic pain  Description: Control of chronic pain  Outcome: Met This Shift     Problem: Skin Integrity:  Goal: Skin integrity will stabilize  Description: Skin integrity will stabilize  Outcome: Met This Shift     Problem: Discharge Planning:  Goal: Patients continuum of care needs are met  Description: Patients continuum of care needs are met  Outcome: Met This Shift     Problem: Falls - Risk of:  Goal: Will remain free from falls  Description: Will remain free from falls  Outcome: Met This Shift  Goal: Absence of physical injury  Description: Absence of physical injury  Outcome: Met This Shift

## 2021-12-12 NOTE — CARE COORDINATION
ONGOING DISCHARGE PLAN:    Discharge plan for the patient is home with spouse and declined discharge needs. Patient current at [facility].     Right sided chest tube.    Active orders for IV rocephin and oral doxy. Will continue to follow for additional discharge needs.     Electronically signed by Tomasa Foss RN on 12/12/2021 at 11:01 AM

## 2021-12-12 NOTE — PLAN OF CARE
Problem: Skin Integrity:  Goal: Will show no infection signs and symptoms  Description: Will show no infection signs and symptoms  12/12/2021 0251 by Edda Vázquez RN  Outcome: Ongoing     Problem: Infection:  Goal: Will remain free from infection  Description: Will remain free from infection  12/12/2021 0251 by Edda Vázquez RN  Outcome: Ongoing     Problem: Daily Care:  Goal: Daily care needs are met  Description: Daily care needs are met  12/12/2021 0251 by Edda Vázquez RN  Outcome: Ongoing     Problem: Skin Integrity:  Goal: Skin integrity will stabilize  Description: Skin integrity will stabilize  Outcome: Ongoing     Problem: Discharge Planning:  Goal: Patients continuum of care needs are met  Description: Patients continuum of care needs are met  Outcome: Ongoing     Problem: Falls - Risk of:  Goal: Will remain free from falls  Description: Will remain free from falls  Outcome: Ongoing

## 2021-12-12 NOTE — PROGRESS NOTES
PULMONARY PROGRESS NOTE:    REASON FOR VISIT: PTX, lung cancer  Interval History:    Shortness of Breath: no  Cough: no  Sputum: no          Hemoptysis: no  Chest Pain: no  Fever: no                   Swelling Feet: no  Headache: no                                           Nausea, Emesis, Abdominal Pain: no  Diarrhea: no         Constipation: no    Events since last visit: none    PAST MEDICAL HISTORY:      Scheduled Meds:   polyethylene glycol  17 g Oral Daily    senna  1 tablet Oral Nightly    docusate sodium  100 mg Oral Daily    guaiFENesin  600 mg Oral BID    midodrine  7.5 mg Oral TID WC    ipratropium-albuterol  3 mL Inhalation Q4H WA    sodium chloride flush  5-40 mL IntraVENous 2 times per day    ferrous sulfate  325 mg Oral Daily    atorvastatin  10 mg Oral Daily    cefTRIAXone (ROCEPHIN) IV  1,000 mg IntraVENous Q24H    doxycycline monohydrate  100 mg Oral 2 times per day    apixaban  5 mg Oral BID    lidocaine  20 mL IntraDERmal Once     Continuous Infusions:   sodium chloride       PRN Meds:sodium chloride flush, sodium chloride, acetaminophen, ondansetron **OR** ondansetron, potassium chloride **OR** potassium alternative oral replacement **OR** potassium chloride        PHYSICAL EXAMINATION:  /66   Pulse 89   Temp 97.5 °F (36.4 °C) (Oral)   Resp 18   Ht 5' 11\" (1.803 m)   Wt 165 lb (74.8 kg)   SpO2 98%   BMI 23.01 kg/m²     General : Awake, alert,   Neck  supple, no lymphadenopathy, JVD not raised  Heart  regular rhythm, S1 and S2 normal; no additional sounds heard  Lungs  Air Entry- fair bilaterally; breath sounds : vesicular;  rales/crackles - absent; chest tube - air leak + intermittent  Abdomen  soft, no tenderness  Upper Extremities  - no cyanosis, mottling; edema : absent  Lower Extremities: no cyanosis, mottling; edema : absent    Current Laboratory, Radiologic, Microbiologic, and Diagnostic studies reviewed  Data ReviewCBC:   Recent Labs     12/10/21  0501

## 2021-12-12 NOTE — PROGRESS NOTES
[facility]    Occupational Therapy Evaluation  Date: 21  Patient Name: Emery Peralta       Room: 6005/7150-63  MRN: [id number]  Account: [de-identified]   : 1950  (80 y.o.) Gender: male     Discharge Recommendations: The patient would benefit from additional Occupational Therapy after discharge from the facility upon return to their Home. OT Equipment Recommendations  Equipment Needed: Yes  Mobility Devices: ADL Assistive Devices    Referring Practitioner: Dr Augustus Reese  Diagnosis: Pnuemothorax with chest tube  Additional Pertinent Hx: Current with [place]    Treatment Diagnosis: Altered Tolerance to care tasks  Past Medical History:  has a past medical history of DDD (degenerative disc disease), cervical, Gout, Heart murmur, Hyperlipidemia, Hypertension, Lung cancer ([address] MI (myocardial infarction) ([facility] [address] Skin cancer, and Wears glasses. Past Surgical History:   has a past surgical history that includes Appendectomy; Tonsillectomy; skin biopsy; skin biopsy; Colonoscopy; Foot surgery (Right); Cardiac catheterization; cyst incision and drainage (Right, 2020); Forearm surgery (Right, 2020); and knee surgery (Left, 2020).     Restrictions  Restrictions/Precautions: General Precautions, Surgical Protocols (Chest tube to water seal)  Other position/activity restrictions: Chest Tube     Vitals  Temp: 97.5 °F (36.4 °C)  Pulse: 89  Resp: 18  BP: 100/66  Height: 5' 11\" (180.3 cm)  Weight: 165 lb (74.8 kg)  BMI (Calculated): 23.1  Oxygen Therapy  SpO2: 98 %  Pulse Oximeter Device Mode: Intermittent  Pulse Oximeter Device Location: Finger  O2 Device: None (Room air)  Blood Gas  Performed?: No  Level of Consciousness: Alert (0)    Subjective  Subjective: feels ok - reports he has been up to the chair a few times  Overall Orientation Status: Within Functional Limits  Vision  Vision: Within Functional Limits  Hearing  Hearing: Within functional limits  Social/Functional History  Lives With: Spouse  Type of Home: House  Home Layout: Two level, Performs ADL's on one level, Able to Live on Main level with bedroom/bathroom  Home Access: Stairs to enter without rails  Entrance Stairs - Number of Steps: One step at the entrance  Bathroom Shower/Tub: Tub/Shower unit, Walk-in shower  Bathroom Toilet: Handicap height  Bathroom Equipment: Grab bars in shower, Grab bars around toilet  Bathroom Accessibility: [place] Help From: Family  ADL Assistance: [address]: [address] Responsibilities: Yes (shared)  Ambulation Assistance: Independent  Transfer Assistance: Independent  Mode of Transportation: Truck, SUV  Occupation: Retired  Pain Assessment  Pain Assessment: 0-10  Pain Level: 4    Objective      Cognition  Overall Cognitive Status: WFL  Cognition Comment: Patient's cognition Brightens during conversation - appears reasonable and understandiong of what is going on medically       ADL  Feeding: Increased time to complete, Setup  Grooming: Contact guard assistance, Setup  UE Bathing: Minimal assistance (chest tube)  LE Bathing: Minimal assistance (chest tube)  UE Dressing: Minimal assistance  LE Dressing: Minimal assistance  Toileting: Minimal assistance  Additional Comments: precatuions related to chest tube limits activity level    UE Function           LUE Strength  Gross LUE Strength: WFL     LUE Tone: Normotonic     LUE AROM (degrees)  LUE AROM : WFL     Left Hand AROM (degrees)  Left Hand AROM: WFL         RUE Tone: Normotonic     RUE AROM (degrees)  RUE AROM : WFL     Right Hand AROM (degrees)  Right Hand AROM: WFL    Fine Motor Skills  Coordination  Movements Are Fluid And Coordinated:  Yes                           Assessment  Assessment  Performance deficits / Impairments: Decreased ADL status, Decreased safe awareness, Decreased endurance, Decreased functional mobility Treatment Diagnosis: Altered Tolerance to care tasks  Prognosis: Good, Fair  Decision Making: Medium Complexity  History:  Moderate Chart Review  Exam: 4 areas pf altered performance  REQUIRES OT FOLLOW UP: Yes  Discharge Recommendations: Patient would benefit from continued therapy after discharge  Activity Tolerance: Patient limited by fatigue, Patient limited by pain, Treatment limited secondary to medical complications (free text)  Activity Tolerance: Chest Tube         Functional Outcome Measures  AM-PAC Daily Activity Inpatient   How much help for putting on and taking off regular lower body clothing?: A Little  How much help for Bathing?: A Little  How much help for Toileting?: A Little  How much help for putting on and taking off regular upper body clothing?: A Little  How much help for taking care of personal grooming?: A Little  How much help for eating meals?: None  AM-[facility] Inpatient Daily Activity Raw Score: 19  AM-PAC Inpatient ADL T-Scale Score : 40.22  ADL Inpatient CMS 0-100% Score: 42.8  ADL Inpatient CMS G-Code Modifier : CK       Goals  Patient Goals   Patient goals : return home  Short term goals  Time Frame for Short term goals: by discharge  Short term goal 1: Tolerate seated self care tasks with infrequent rest breaks, using safe techniques for care task performance  Short term goal 2: D/V understanding of Modified care techiques including use of Adpative devices and energy conservatoin strategies  Short term goal 3: D/V understanding of Pacing / phasing techniques and matching breathing with the activity being performed  Short term goal 4: Participate in care using safe techiques for self caer and functional mobility tasks    Plan  Safety Devices  Safety Devices in place: Yes  Type of devices: Left in bed, Call light within reach     Plan  Times per week: 2-4 sessions  Times per day: Daily  Current Treatment Recommendations: Endurance Training, Functional Mobility Training, Safety Education & Training, Patient/Caregiver Education & Training, Self-Care / ADL  OT Education  OT Education: OT Role, Plan of Care, Energy Conservation, ADL Adaptive Strategies  Patient Education: Respiratroy rehab concepts, Energy conservvation, safety, modified care techniques    OT Equipment Recommendations  Equipment Needed: Yes  Mobility Devices: ADL Assistive Devices  OT Individual Minutes  Time In: 0261  Time Out: 1550  Minutes: 20    Electronically signed by Anahy Lopez OT on 12/12/21 at 4:24 PM EST

## 2021-12-12 NOTE — PROGRESS NOTES
Progress Note    12/12/2021   2:58 PM    Name:  Lainey Black  MRN:    [id number]     Acct:     [de-identified]   Room:  2094/2094-01   Day: 3     Admit Date: 12/8/2021  7:03 PM  PCP: Franck Cutler MD    Subjective:     C/C:   Chief Complaint   Patient presents with    Shortness of Breath       Interval History: Status: not changed. Patient chest wall pain is stable. Has an intermittent cough denies hemoptysis. Patient patient had adequate drainage from chest tube. blood pressure and heart rate stable patient oxygen saturation is 98% on room air. Patient labs reviewed hemoglobin 9.8 platelets 719    ROS:   all 10 systems reviewed and are negative except as noted    Review of Systems   Constitutional: Negative for appetite change, chills and diaphoresis. HENT: Negative for drooling, ear pain, trouble swallowing and voice change. Eyes: Negative for photophobia and visual disturbance. Respiratory: Positive for cough. Negative for choking, shortness of breath and stridor. Cardiovascular: Negative for chest pain and palpitations. Gastrointestinal: Negative for abdominal distention, anal bleeding, blood in stool and rectal pain. Endocrine: Negative for polyphagia and polyuria. Genitourinary: Negative for dysuria, flank pain, hematuria and urgency. Musculoskeletal: Negative for back pain, myalgias and neck stiffness. Skin: Negative for color change, pallor and rash. Allergic/Immunologic: Negative for environmental allergies and food allergies. Neurological: Negative for tremors, seizures, facial asymmetry and numbness. Hematological: Negative for adenopathy. Does not bruise/bleed easily. Psychiatric/Behavioral: Negative for agitation, behavioral problems, hallucinations, self-injury and suicidal ideas. Medications:      Allergies: No Known Allergies    Current Meds: polyethylene glycol (GLYCOLAX) packet 17 g, Daily  senna (SENOKOT) tablet 8.6 mg, Nightly  docusate sodium (COLACE) Insecurity:     Worried About Running Out of Food in the Last Year: Not on file    Dandre of Food in the Last Year: Not on file   Transportation Needs:     Lack of Transportation (Medical): Not on file    Lack of Transportation (Non-Medical): Not on file   Physical Activity:     Days of Exercise per Week: Not on file    Minutes of Exercise per Session: Not on file   Stress:     Feeling of Stress : Not on file   Social Connections:     Frequency of Communication with Friends and Family: Not on file    Frequency of Social Gatherings with Friends and Family: Not on file    Attends Worship Services: Not on file    Active Member of [address] [place] [address] [employer] or Organizations: Not on file    Attends Club or Organization Meetings: Not on file    Marital Status: Not on file   Intimate Partner Violence:     Fear of Current or Ex-Partner: Not on file    Emotionally Abused: Not on file    Physically Abused: Not on file    Sexually Abused: Not on file   Housing Stability:     Unable to Pay for Housing in the Last Year: Not on file    Number of [place] in the Last Year: Not on file    Unstable Housing in the Last Year: Not on file       I/O (24Hr): Intake/Output Summary (Last 24 hours) at 12/12/2021 1458  Last data filed at 12/12/2021 1222  Gross per 24 hour   Intake 740 ml   Output 1650 ml   Net -910 ml     Radiology:  XR CHEST PORTABLE    Result Date: 12/12/2021  Stable moderate right pneumothorax and right subcutaneous emphysema. XR CHEST PORTABLE    Result Date: 12/11/2021  Stable appearance of right-sided large pneumothorax with chest tube stable in position. XR CHEST PORTABLE    Result Date: 12/10/2021  Slight improvement of large right pneumothorax with chest tube in place. XR CHEST PORTABLE    Result Date: 12/9/2021  Stable examination. XR CHEST PORTABLE    Addendum Date: 12/8/2021    ADDENDUM: Compared with the chest x-ray there is been significant interval improvement.  Please disregard original impression. Discussed with Dr. Rick Sotelo at 10:20 p.m. Result Date: 12/8/2021  Increased right pneumothorax despite new chest tube. Increased airspace disease on the right. RECOMMENDATION: The findings were sent to the Radiology Results Communication Center at 10:19 pm on 12/8/2021to be communicated to a licensed caregiver. CT CHEST ABDOMEN PELVIS W CONTRAST    Result Date: 12/8/2021  1. Large right pneumothorax has increased since prior imaging and there is slight mediastinal shift, suggesting underlying tension. 2.  Small right pleural effusion and pleural nodularity suggestive of neoplastic involvement. 3.  Similar appearance of soft tissue density in the right hilum and mediastinal lymphadenopathy. 4.  More prominent sclerosis in the T12 vertebral body and slight interval vertebral body height loss, suggestive of a metastatic deposit and pathologic fracture. 5.  Liver lesions appear smaller and less conspicuous since prior imaging. 6.  Unchanged right adrenal nodule. Findings were discussed with Giancarlo Choudhury at 3:14 pm on 12/8/2021. MRI BRAIN W WO CONTRAST    Result Date: 12/8/2021  No evidence of acute or metastatic disease.        Labs:  Recent Results (from the past 24 hour(s))   CBC with DIFF    Collection Time: 12/12/21  7:02 AM   Result Value Ref Range    WBC 3.1 (L) 3.5 - 11.0 k/uL    RBC 2.70 (L) 4.5 - 5.9 m/uL    Hemoglobin 9.8 (L) 13.5 - 17.5 g/dL    Hematocrit 28.3 (L) 41 - 53 %    .8 (H) 80 - 100 fL    MCH 36.2 (H) 26 - 34 pg    MCHC 34.5 31 - 37 g/dL    RDW 16.1 (H) 11.5 - 14.9 %    Platelets 380 (L) 377 - 450 k/uL    MPV 7.4 6.0 - 12.0 fL    NRBC Automated NOT REPORTED per 100 WBC    Differential Type NOT REPORTED     Immature Granulocytes NOT REPORTED 0 %    Absolute Immature Granulocyte NOT REPORTED 0.00 - 0.30 k/uL    WBC Morphology NOT REPORTED     RBC Morphology NOT REPORTED     Platelet Estimate NOT REPORTED     Seg Neutrophils 48 36 - 66 %    Lymphocytes 19 (L) 24 - 44 %    Monocytes 20 (H) 1 - 7 %    Eosinophils % 4 0 - 4 %    Basophils 0 0 - 2 %    Bands 8 0 - 10 %    Metamyelocytes 1 (H) 0 %    Segs Absolute 1.49 1.3 - 9.1 k/uL    Absolute Lymph # 0.59 (L) 1.0 - 4.8 k/uL    Absolute Mono # 0.62 0.1 - 1.3 k/uL    Absolute Eos # 0.12 0.0 - 0.4 k/uL    Basophils Absolute 0.00 0.0 - 0.2 k/uL    Absolute Bands # 0.25 0.0 - 1.0 k/uL    Metamyelocytes Absolute 0.03 (H) 0 k/uL    Morphology ANISOCYTOSIS PRESENT     Morphology MACROCYTOSIS PRESENT     Morphology FEW ELLIPTOCYTES    Comprehensive Metabolic Panel w/ Reflex to MG    Collection Time: 21  7:02 AM   Result Value Ref Range    Glucose 88 70 - 99 mg/dL    BUN 10 8 - 23 mg/dL    CREATININE 0.77 0.70 - 1.20 mg/dL    Bun/Cre Ratio NOT REPORTED 9 - 20    Calcium 8.7 8.6 - 10.4 mg/dL    Sodium 135 135 - 144 mmol/L    Potassium 4.0 3.7 - 5.3 mmol/L    Chloride 104 98 - 107 mmol/L    CO2 23 20 - 31 mmol/L    Anion Gap 8 (L) 9 - 17 mmol/L    Alkaline Phosphatase 109 40 - 129 U/L    ALT 19 5 - 41 U/L    AST 24 <40 U/L    Total Bilirubin 0.28 (L) 0.3 - 1.2 mg/dL    Total Protein 6.2 (L) 6.4 - 8.3 g/dL    Albumin 3.3 (L) 3.5 - 5.2 g/dL    Albumin/Globulin Ratio NOT REPORTED 1.0 - 2.5    GFR Non-African American >60 >60 mL/min    GFR African American >60 >60 mL/min    GFR Comment          GFR Staging NOT REPORTED    POC Glucose Fingerstick    Collection Time: 21  8:30 AM   Result Value Ref Range    POC Glucose 87 75 - 110 mg/dL       Physical Examination:        Vitals:  /66   Pulse 89   Temp 97.5 °F (36.4 °C) (Oral)   Resp 18   Ht 5' 11\" (1.803 m)   Wt 165 lb (74.8 kg)   SpO2 98%   BMI 23.01 kg/m²   Temp (24hrs), Av °F (36.7 °C), Min:97.5 °F (36.4 °C), Max:98.4 °F (36.9 °C)    Recent Labs     21  0830   POCGLU 87         Physical Exam  Vitals reviewed. Constitutional:       Appearance: Normal appearance. He is not diaphoretic. HENT:      Head: Normocephalic and atraumatic.       Right Ear: External ear normal.      Left Ear: External ear normal.      Nose: Nose normal.      Mouth/Throat:      Mouth: Mucous membranes are moist.      Pharynx: Oropharynx is clear. Eyes:      Conjunctiva/sclera: Conjunctivae normal.   Cardiovascular:      Rate and Rhythm: Normal rate and regular rhythm. Pulses: Normal pulses. Heart sounds: Normal heart sounds. Pulmonary:      Effort: Pulmonary effort is normal.      Breath sounds: Examination of the right-upper field reveals decreased breath sounds. Decreased breath sounds present. No wheezing or rales. Abdominal:      General: Bowel sounds are normal. There is no distension. Palpations: Abdomen is soft. Musculoskeletal:         General: No tenderness or deformity. Normal range of motion. Cervical back: Normal range of motion and neck supple. No rigidity. Right lower leg: No edema. Left lower leg: No edema. Skin:     General: Skin is warm and dry. Capillary Refill: Capillary refill takes less than 2 seconds. Coloration: Skin is not jaundiced. Neurological:      General: No focal deficit present. Mental Status: Mental status is at baseline. Psychiatric:         Mood and Affect: Mood normal.         Behavior: Behavior normal.         Assessment:        Primary Problem  Pneumothorax     Principal Problem:    Pneumothorax  Active Problems:    Lung cancer, primary, with metastasis from lung to other site (HCC)    PAF (paroxysmal atrial fibrillation) (HCC)    COPD (chronic obstructive pulmonary disease) (HCC)    Iron deficiency anemia  Resolved Problems:    * No resolved hospital problems. *      Past Medical History:   Diagnosis Date    DDD (degenerative disc disease), cervical     Gout     Heart murmur     hx. of when younger.  Hyperlipidemia     Hypertension     Lung cancer ([address]     right lung    MI (myocardial infarction) ([address]     Skin cancer     face    Wears glasses         Plan:        1.  Rocephin 1 g IV daily  2. Doxycycline 100 mg p.o. twice daily  3. Chest x-ray 12/12/2021 shows stable moderate right pneumothorax. 4. Consult cardiothoracic surgery for persistent right pneumothorax  5. Discussed with patient wife and nurse  6. DC IV fluid  7. Increase midodrine 7.5 mg p.o. 3 times a day with holding parameters  8.  DVT prophylaxis Eliquis   9.  oncology and pulmonology input noted  10.  CBC, CMP  11.  EPCs  12.  check and replace electrolytes per sliding scale     Electronically signed by Mary Orozco MD

## 2021-12-13 ENCOUNTER — APPOINTMENT (OUTPATIENT)
Dept: GENERAL RADIOLOGY | Age: 71
DRG: 199 | End: 2021-12-13
Payer: MEDICARE

## 2021-12-13 ENCOUNTER — APPOINTMENT (OUTPATIENT)
Dept: INTERVENTIONAL RADIOLOGY/VASCULAR | Age: 71
DRG: 199 | End: 2021-12-13
Payer: MEDICARE

## 2021-12-13 ENCOUNTER — APPOINTMENT (OUTPATIENT)
Dept: CT IMAGING | Age: 71
DRG: 199 | End: 2021-12-13
Payer: MEDICARE

## 2021-12-13 LAB
ABSOLUTE EOS #: 0.32 K/UL (ref 0–0.4)
ABSOLUTE IMMATURE GRANULOCYTE: ABNORMAL K/UL (ref 0–0.3)
ABSOLUTE LYMPH #: 1.22 K/UL (ref 1–4.8)
ABSOLUTE MONO #: 0.26 K/UL (ref 0.1–1.3)
ALBUMIN SERPL-MCNC: 3.4 G/DL (ref 3.5–5.2)
ALBUMIN/GLOBULIN RATIO: ABNORMAL (ref 1–2.5)
ALP BLD-CCNC: 110 U/L (ref 40–129)
ALT SERPL-CCNC: 20 U/L (ref 5–41)
ANION GAP SERPL CALCULATED.3IONS-SCNC: 8 MMOL/L (ref 9–17)
AST SERPL-CCNC: 22 U/L
BASOPHILS # BLD: 0 % (ref 0–2)
BASOPHILS ABSOLUTE: 0 K/UL (ref 0–0.2)
BILIRUB SERPL-MCNC: 0.29 MG/DL (ref 0.3–1.2)
BUN BLDV-MCNC: 10 MG/DL (ref 8–23)
BUN/CREAT BLD: ABNORMAL (ref 9–20)
CALCIUM SERPL-MCNC: 9 MG/DL (ref 8.6–10.4)
CHLORIDE BLD-SCNC: 104 MMOL/L (ref 98–107)
CO2: 24 MMOL/L (ref 20–31)
CREAT SERPL-MCNC: 0.9 MG/DL (ref 0.7–1.2)
DIFFERENTIAL TYPE: ABNORMAL
EOSINOPHILS RELATIVE PERCENT: 10 % (ref 0–4)
GFR AFRICAN AMERICAN: >60 ML/MIN
GFR NON-AFRICAN AMERICAN: >60 ML/MIN
GFR SERPL CREATININE-BSD FRML MDRD: ABNORMAL ML/MIN/{1.73_M2}
GFR SERPL CREATININE-BSD FRML MDRD: ABNORMAL ML/MIN/{1.73_M2}
GLUCOSE BLD-MCNC: 86 MG/DL (ref 70–99)
HCT VFR BLD CALC: 28.3 % (ref 41–53)
HEMOGLOBIN: 9.9 G/DL (ref 13.5–17.5)
IMMATURE GRANULOCYTES: ABNORMAL %
LYMPHOCYTES # BLD: 38 % (ref 24–44)
MCH RBC QN AUTO: 36.7 PG (ref 26–34)
MCHC RBC AUTO-ENTMCNC: 35 G/DL (ref 31–37)
MCV RBC AUTO: 105 FL (ref 80–100)
MONOCYTES # BLD: 8 % (ref 1–7)
MORPHOLOGY: ABNORMAL
MORPHOLOGY: ABNORMAL
NRBC AUTOMATED: ABNORMAL PER 100 WBC
PDW BLD-RTO: 16.3 % (ref 11.5–14.9)
PLATELET # BLD: 138 K/UL (ref 150–450)
PLATELET ESTIMATE: ABNORMAL
PMV BLD AUTO: 7.2 FL (ref 6–12)
POTASSIUM SERPL-SCNC: 4.2 MMOL/L (ref 3.7–5.3)
RBC # BLD: 2.69 M/UL (ref 4.5–5.9)
RBC # BLD: ABNORMAL 10*6/UL
SEG NEUTROPHILS: 44 % (ref 36–66)
SEGMENTED NEUTROPHILS ABSOLUTE COUNT: 1.4 K/UL (ref 1.3–9.1)
SODIUM BLD-SCNC: 136 MMOL/L (ref 135–144)
TOTAL PROTEIN: 6.4 G/DL (ref 6.4–8.3)
WBC # BLD: 3.2 K/UL (ref 3.5–11)
WBC # BLD: ABNORMAL 10*3/UL

## 2021-12-13 PROCEDURE — 99222 1ST HOSP IP/OBS MODERATE 55: CPT | Performed by: NURSE PRACTITIONER

## 2021-12-13 PROCEDURE — 6370000000 HC RX 637 (ALT 250 FOR IP): Performed by: FAMILY MEDICINE

## 2021-12-13 PROCEDURE — 2580000003 HC RX 258: Performed by: INTERNAL MEDICINE

## 2021-12-13 PROCEDURE — 99232 SBSQ HOSP IP/OBS MODERATE 35: CPT | Performed by: INTERNAL MEDICINE

## 2021-12-13 PROCEDURE — 6370000000 HC RX 637 (ALT 250 FOR IP): Performed by: INTERNAL MEDICINE

## 2021-12-13 PROCEDURE — 71045 X-RAY EXAM CHEST 1 VIEW: CPT

## 2021-12-13 PROCEDURE — 0W9930Z DRAINAGE OF RIGHT PLEURAL CAVITY WITH DRAINAGE DEVICE, PERCUTANEOUS APPROACH: ICD-10-PCS | Performed by: RADIOLOGY

## 2021-12-13 PROCEDURE — 85025 COMPLETE CBC W/AUTO DIFF WBC: CPT

## 2021-12-13 PROCEDURE — 94761 N-INVAS EAR/PLS OXIMETRY MLT: CPT

## 2021-12-13 PROCEDURE — 32557 INSERT CATH PLEURA W/ IMAGE: CPT

## 2021-12-13 PROCEDURE — 94640 AIRWAY INHALATION TREATMENT: CPT

## 2021-12-13 PROCEDURE — 71270 CT THORAX DX C-/C+: CPT

## 2021-12-13 PROCEDURE — 80053 COMPREHEN METABOLIC PANEL: CPT

## 2021-12-13 PROCEDURE — 36415 COLL VENOUS BLD VENIPUNCTURE: CPT

## 2021-12-13 PROCEDURE — 6360000002 HC RX W HCPCS: Performed by: FAMILY MEDICINE

## 2021-12-13 PROCEDURE — 6360000002 HC RX W HCPCS: Performed by: RADIOLOGY

## 2021-12-13 PROCEDURE — 2060000000 HC ICU INTERMEDIATE R&B

## 2021-12-13 PROCEDURE — 2709999900 IR CHEST TUBE INSERTION

## 2021-12-13 PROCEDURE — 2580000003 HC RX 258: Performed by: FAMILY MEDICINE

## 2021-12-13 RX ORDER — FENTANYL CITRATE 50 UG/ML
INJECTION, SOLUTION INTRAMUSCULAR; INTRAVENOUS
Status: COMPLETED | OUTPATIENT
Start: 2021-12-13 | End: 2021-12-13

## 2021-12-13 RX ADMIN — MIDODRINE HYDROCHLORIDE 7.5 MG: 5 TABLET ORAL at 08:45

## 2021-12-13 RX ADMIN — DOXYCYCLINE 100 MG: 100 CAPSULE ORAL at 08:45

## 2021-12-13 RX ADMIN — IPRATROPIUM BROMIDE AND ALBUTEROL SULFATE 3 ML: .5; 2.5 SOLUTION RESPIRATORY (INHALATION) at 15:31

## 2021-12-13 RX ADMIN — SODIUM CHLORIDE, PRESERVATIVE FREE 10 ML: 5 INJECTION INTRAVENOUS at 08:46

## 2021-12-13 RX ADMIN — GUAIFENESIN 600 MG: 600 TABLET, EXTENDED RELEASE ORAL at 08:45

## 2021-12-13 RX ADMIN — POLYETHYLENE GLYCOL 3350 17 G: 17 POWDER, FOR SOLUTION ORAL at 20:23

## 2021-12-13 RX ADMIN — FENTANYL CITRATE 50 MCG: 50 INJECTION, SOLUTION INTRAMUSCULAR; INTRAVENOUS at 13:33

## 2021-12-13 RX ADMIN — GUAIFENESIN 600 MG: 600 TABLET, EXTENDED RELEASE ORAL at 19:49

## 2021-12-13 RX ADMIN — ACETAMINOPHEN 650 MG: 325 TABLET, FILM COATED ORAL at 17:22

## 2021-12-13 RX ADMIN — DOCUSATE SODIUM 100 MG: 100 CAPSULE ORAL at 08:46

## 2021-12-13 RX ADMIN — DOXYCYCLINE 100 MG: 100 CAPSULE ORAL at 19:48

## 2021-12-13 RX ADMIN — SODIUM CHLORIDE 25 ML: 9 INJECTION, SOLUTION INTRAVENOUS at 08:51

## 2021-12-13 RX ADMIN — IPRATROPIUM BROMIDE AND ALBUTEROL SULFATE 3 ML: .5; 2.5 SOLUTION RESPIRATORY (INHALATION) at 11:01

## 2021-12-13 RX ADMIN — ATORVASTATIN CALCIUM 10 MG: 20 TABLET, FILM COATED ORAL at 08:46

## 2021-12-13 RX ADMIN — CEFTRIAXONE SODIUM 1000 MG: 1 INJECTION, POWDER, FOR SOLUTION INTRAMUSCULAR; INTRAVENOUS at 08:51

## 2021-12-13 RX ADMIN — SODIUM CHLORIDE, PRESERVATIVE FREE 10 ML: 5 INJECTION INTRAVENOUS at 19:49

## 2021-12-13 RX ADMIN — IPRATROPIUM BROMIDE AND ALBUTEROL SULFATE 3 ML: .5; 2.5 SOLUTION RESPIRATORY (INHALATION) at 07:12

## 2021-12-13 RX ADMIN — MIDODRINE HYDROCHLORIDE 7.5 MG: 5 TABLET ORAL at 17:19

## 2021-12-13 RX ADMIN — IPRATROPIUM BROMIDE AND ALBUTEROL SULFATE 3 ML: .5; 2.5 SOLUTION RESPIRATORY (INHALATION) at 19:50

## 2021-12-13 RX ADMIN — FERROUS SULFATE TAB 325 MG (65 MG ELEMENTAL FE) 325 MG: 325 (65 FE) TAB at 08:46

## 2021-12-13 RX ADMIN — APIXABAN 5 MG: 5 TABLET, FILM COATED ORAL at 19:49

## 2021-12-13 RX ADMIN — STANDARDIZED SENNA CONCENTRATE 8.6 MG: 8.6 TABLET ORAL at 19:48

## 2021-12-13 ASSESSMENT — ENCOUNTER SYMPTOMS
BACK PAIN: 0
DIARRHEA: 0
RECTAL PAIN: 0
TROUBLE SWALLOWING: 0
COUGH: 1
ANAL BLEEDING: 0
SHORTNESS OF BREATH: 1
ABDOMINAL DISTENTION: 0
COLOR CHANGE: 0
CONSTIPATION: 0
BLOOD IN STOOL: 0
VOICE CHANGE: 0
PHOTOPHOBIA: 0
STRIDOR: 0
SHORTNESS OF BREATH: 0
CHOKING: 0
ABDOMINAL PAIN: 0

## 2021-12-13 ASSESSMENT — PAIN SCALES - GENERAL
PAINLEVEL_OUTOF10: 0
PAINLEVEL_OUTOF10: 6
PAINLEVEL_OUTOF10: 7
PAINLEVEL_OUTOF10: 0
PAINLEVEL_OUTOF10: 5

## 2021-12-13 NOTE — PROGRESS NOTES
PULMONARY PROGRESS NOTE:    REASON FOR VISIT: PTX, lung cancer  Interval History:    Shortness of Breath: no  Cough: no  Sputum: no          Hemoptysis: no  Chest Pain: no  Fever: no                   Swelling Feet: no  Headache: no                                           Nausea, Emesis, Abdominal Pain: no  Diarrhea: no         Constipation: no    Events since last visit: none    PAST MEDICAL HISTORY:      Scheduled Meds:   polyethylene glycol  17 g Oral Daily    senna  1 tablet Oral Nightly    docusate sodium  100 mg Oral Daily    guaiFENesin  600 mg Oral BID    midodrine  7.5 mg Oral TID WC    ipratropium-albuterol  3 mL Inhalation Q4H WA    sodium chloride flush  5-40 mL IntraVENous 2 times per day    ferrous sulfate  325 mg Oral Daily    atorvastatin  10 mg Oral Daily    cefTRIAXone (ROCEPHIN) IV  1,000 mg IntraVENous Q24H    doxycycline monohydrate  100 mg Oral 2 times per day    apixaban  5 mg Oral BID    lidocaine  20 mL IntraDERmal Once     Continuous Infusions:   sodium chloride Stopped (12/13/21 0944)     PRN Meds:sodium chloride flush, sodium chloride, acetaminophen, ondansetron **OR** ondansetron, potassium chloride **OR** potassium alternative oral replacement **OR** potassium chloride        PHYSICAL EXAMINATION:  BP 96/74   Pulse 87   Temp 97.7 °F (36.5 °C) (Oral)   Resp 18   Ht 5' 11\" (1.803 m)   Wt 165 lb (74.8 kg)   SpO2 96%   BMI 23.01 kg/m²     General : Awake, alert,   Neck  supple, no lymphadenopathy, JVD not raised  Heart  regular rhythm, S1 and S2 normal; no additional sounds heard  Lungs  Air Entry- fair bilaterally; breath sounds : vesicular, chest tube - air leak +, to 40 of suction, 96% 02 sat on RA  Abdomen  soft, no tenderness  Upper Extremities  - no cyanosis, mottling; edema : absent  Lower Extremities: no cyanosis, mottling; edema : absent    Current Laboratory, Radiologic, Microbiologic, and Diagnostic studies reviewed  Data ReviewCBC:   Recent Labs 12/11/21  0635 12/12/21  0702 12/13/21  0653   WBC 3.2* 3.1* 3.2*   RBC 2.65* 2.70* 2.69*   HGB 9.5* 9.8* 9.9*   HCT 27.6* 28.3* 28.3*   * 128* 138*     BMP:   Recent Labs     12/11/21  0635 12/12/21  0702 12/13/21  0653   GLUCOSE 92 88 86   * 135 136   K 3.9 4.0 4.2   BUN 8 10 10   CREATININE 0.79 0.77 0.90   CALCIUM 8.5* 8.7 9.0     ABGs: No results for input(s): PHART, PO2ART, XSL1YAK, EGX0OXP, BEART, L4VAVBTY, CRF9CLQ in the last 72 hours.    PT/INR:  No results found for: PTINR    ASSESSMENT / PLAN:    Lung cancer - per oncology  PTX - right chest tube -    CXR 12/11- no improvement  CT back to suction this morning     further recs per CT surgery    Plan of care discussed with Dr Tung Winslow  Electronically signed by AZEB Oneill - CNP on 12/13/21 at 11:23 AM.

## 2021-12-13 NOTE — PROGRESS NOTES
[facility]   OCCUPATIONAL THERAPY MISSED TREATMENT NOTE   INPATIENT   Date: 21  Patient Name: Lainey Black       Room: 7634/7251-56  MRN: [id number]   Account #: [de-identified]    : 1950  (80 y.o.)  Gender: male   Referring Practitioner: Dr Lashonda Ambrosio  Diagnosis: Pnuemothorax with chest tube             REASON FOR MISSED TREATMENT:  Patient at testing and/or off the floor   -   Medical Procedure  Resume OT POC next date        MO Olmos

## 2021-12-13 NOTE — PROGRESS NOTES
RN rounded with cardiothoracic surgery NP; plan for IR to place a pigtail chest tube today, will need to remain to suction for 24 hours prior to attempting water seal. Once pt stable on water seal, will be OK to discharge with pigtail. Also discussed with Dr. Isela Tiwari.  Electronically signed by Jl Manning RN on 12/13/2021 at 11:25 AM

## 2021-12-13 NOTE — PROGRESS NOTES
Physical Therapy        Physical Therapy Cancel Note      DATE: 2021    NAME: Hollis Rizo  MRN: [id number]   : 1950      Patient not seen this date for Physical Therapy due to: Other: Off the floor to IR for pig tail cathetor, will check tomorrow.       Electronically signed by Liana Plunkett PT on 2021 at 2:54 PM

## 2021-12-13 NOTE — PLAN OF CARE
Problem: Skin Integrity:  Goal: Will show no infection signs and symptoms  Description: Will show no infection signs and symptoms  12/13/2021 1603 by Neeraj Omalley RN  Outcome: Ongoing  Note: Skin assessment complete. Pt turned and repositioned per self. Area kept free from moisture. Proper nourishment and fluids encouraged, as appropriate. Skin remains clean, dry, and intact. Will continue to monitor for additional needs and changes in skin breakdown. Problem: Infection:  Goal: Will remain free from infection  Description: Will remain free from infection  Outcome: Ongoing  Note: Pt shows no signs or symptoms of infection at this time. VS stable, alert and oriented x4. Hand hygiene utilized upon entry and exit. Will continue to monitor. Problem: Pain:  Goal: Patient's pain/discomfort is manageable  Description: Patient's pain/discomfort is manageable  12/13/2021 1603 by Neeraj Omalley RN  Outcome: Ongoing  Note: Pt medicated with pain medication prn. Assessed all pain characteristics including level, type, location, frequency, and onset. Non-pharmacologic interventions offered to pt as well. Pt states pain is tolerable at this time.  Will continue to monitor

## 2021-12-13 NOTE — PROGRESS NOTES
Today's Date: 12/13/2021  Patient Name: Jaylin Rahman  Date of admission: 12/8/2021  7:03 PM  Patient's age: 80 y.o., 1950  Admission Dx: Pneumothorax [J93.9]  Chronic pneumothorax [J93.81]    Reason for Consult: Known lung cancer patient  Requesting Physician: Matthew Hernandez MD    CHIEF COMPLAINT:    Chief Complaint   Patient presents with    Shortness of Breath       INTERVAL HISTORY:    Patient seen and examined  Labs and vitals reviewed  Patient resting comfortably  Complains of some discomfort at the chest tube insertion site  Underwent placement of second chest tube  Denies fever chills      HISTORY OF PRESENT ILLNESS:    Jaylin Rahman is a 80 y.o. male who is admitted to the hospital on 12/8/2021  for SOB. He had a restaging scan with oncology and his CT scan showed worsening pneumothorax and mediastinal shift. Therefore he was sent to ER for further management. He denies any fever chills. He had chest tube in place. He is currently receiving Lurbinectedin for his recurrent small cell lung cancer and most recent treatment was on 12/1/2021. He has a diagnosis of small cell lung cancer with squamous component and has been following with Dr. Milan Diggs as outpatient and his brief oncologic history as follows. 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 Chemotherapy changed to carboplatin and etoposide due to renal insufficiency from cycle #2  PCI 7/2019  SRS to adrenal gland metastasis, completed 07/2020  systemic palliative chemoimmunotherapy with carboplatin etoposide and Tecentriq, 12/2020 x4 cycles.   Maintenance Tecentriq, 3/2021  Lurbenectidin7/2/2021      Past Medical History:   has a past medical history of DDD (degenerative disc disease), cervical, Gout, Heart murmur, Hyperlipidemia, Hypertension, Lung cancer ([facility] [address] MI (myocardial infarction) ([facility] [address] Skin cancer, and Wears glasses. Past Surgical History:   has a past surgical history that includes Appendectomy; Tonsillectomy; skin biopsy; skin biopsy; Colonoscopy; Foot surgery (Right); Cardiac catheterization; cyst incision and drainage (Right, 05/14/2020); Forearm surgery (Right, 5/14/2020); knee surgery (Left, 5/14/2020); and IR CHEST TUBE INSERTION (12/13/2021). Medications:    Prior to Admission medications    Medication Sig Start Date End Date Taking? Authorizing Provider   oxyCODONE-acetaminophen (PERCOCET) 5-325 MG per tablet Take 1 tablet by mouth every 6 hours as needed for Pain for up to 15 days. 12/1/21 12/16/21 Yes Lucy Douglass MD   dexamethasone (DECADRON) 2 MG tablet Take 1 tablet by mouth 2 times daily (with meals) for 15 days 12/1/21 12/16/21 Yes Lucy Douglass MD   docusate sodium (COLACE) 100 MG capsule Take 100 mg by mouth 2 times daily   Yes Historical Provider, MD   B Complex-C-Folic Acid (ANGELICA-HUGO) TABS TAKE 1 TABLET BY MOUTH DAILY. HEMATINIC VIT MINERALS 7/21/20  Yes Historical Provider, MD   traZODone (DESYREL) 100 MG tablet Take 100 mg by mouth nightly  7/20/20  Yes Historical Provider, MD   ipratropium-albuterol (DUONEB) 0.5-2.5 (3) MG/3ML SOLN nebulizer solution Inhale 3 mLs into the lungs 4 times daily   Yes Historical Provider, MD   potassium chloride (MICRO-K) 10 MEQ extended release capsule Take 20 mEq by mouth daily  11/19/19  Yes Historical Provider, MD   Ferrous Fumarate (FERROCITE) 324 (106 Fe) MG TABS Take 324 mg by mouth daily    Yes Historical Provider, MD   apixaban (ELIQUIS) 5 MG TABS tablet Take 1 tablet by mouth 2 times daily 5/21/20  Yes Radha Cooney MD   tamsulosin (FLOMAX) 0.4 MG capsule TAKE 1 CAPSULE BY MOUTH EVERY DAY 5/4/20  Yes Lucy Douglass MD   lidocaine-prilocaine (EMLA) 2.5-2.5 % cream Apply topically as needed.   Apply Bc Arguelles MD        potassium chloride (KLOR-CON M) extended release tablet 40 mEq  40 mEq Oral PRN Al Oh MD        Or    potassium bicarb-citric acid (EFFER-K) effervescent tablet 40 mEq  40 mEq Oral PRN Al Oh MD        Or    potassium chloride 10 mEq/100 mL IVPB (Peripheral Line)  10 mEq IntraVENous PRN Al Oh MD        ferrous sulfate (IRON 325) tablet 325 mg  325 mg Oral Daily Al Oh MD   325 mg at 12/13/21 0846    atorvastatin (LIPITOR) tablet 10 mg  10 mg Oral Daily Al Oh MD   10 mg at 12/13/21 0846    cefTRIAXone (ROCEPHIN) 1000 mg IVPB in 50 mL D5W minibag  1,000 mg IntraVENous Q24H Al Oh MD   Stopped at 12/13/21 0023    doxycycline monohydrate (MONODOX) capsule 100 mg  100 mg Oral 2 times per day Al Oh MD   100 mg at 12/13/21 0845    apixaban (ELIQUIS) tablet 5 mg  5 mg Oral BID Al Oh MD   5 mg at 12/12/21 2030    lidocaine 1 % injection 20 mL  20 mL IntraDERmal Once Jose Maria Jeff MD           Allergies:  Patient has no known allergies. Social History:   reports that he has been smoking cigarettes. He has been smoking about 0.50 packs per day. He has quit using smokeless tobacco. He reports current drug use. Frequency: 14.00 times per week. Drug: Marijuana Kirsty Earl). He reports that he does not drink alcohol. Family History: family history includes Cancer in his father. REVIEW OF SYSTEMS:    Constitutional: No fever or chills.  No night sweats, no weight loss   Eyes: No eye discharge, double vision, or eye pain   HEENT: negative for sore mouth, sore throat, hoarseness and voice change   Respiratory: negative for cough , sputum, ++dyspnea, wheezing, hemoptysis, chest pain   Cardiovascular: negative for chest pain, ++dyspnea, palpitations, orthopnea, PND   Gastrointestinal: negative for nausea, vomiting, diarrhea, constipation, abdominal pain, Dysphagia, hematemesis and hematochezia Genitourinary: negative for frequency, dysuria, nocturia, urinary incontinence, and hematuria   Integument: negative for rash, skin lesions, bruises. Hematologic/Lymphatic: negative for easy bruising, bleeding, lymphadenopathy, or petechiae   Endocrine: negative for heat or cold intolerance,weight changes, change in bowel habits and hair loss   Musculoskeletal: negative for myalgias, arthralgias, pain, joint swelling,and bone pain   Neurological: negative for headaches, dizziness, seizures, weakness, numbness    PHYSICAL EXAM:      BP 98/76   Pulse 84   Temp 98 °F (36.7 °C) (Oral)   Resp 18   Ht 5' 11\" (1.803 m)   Wt 165 lb (74.8 kg)   SpO2 99%   BMI 23.01 kg/m²    Temp (24hrs), Av °F (36.7 °C), Min:97.7 °F (36.5 °C), Max:98.4 °F (36.9 °C)    General appearance - well appearing, no in pain or distress   Mental status - alert and cooperative   Eyes - pupils equal and reactive, extraocular eye movements intact   Ears - bilateral TM's and external ear canals normal   Mouth - mucous membranes moist, pharynx normal without lesions   Neck - supple, no significant adenopathy   Lymphatics - no palpable lymphadenopathy, no hepatosplenomegaly   Chest -decreased breathing sound.   Heart - normal rate, regular rhythm, normal S1, S2, no murmurs  Abdomen - soft, nontender, nondistended, no masses or organomegaly   Neurological - alert, oriented, normal speech, no focal findings or movement disorder noted   Musculoskeletal - no joint tenderness, deformity or swelling   Extremities - peripheral pulses normal, no pedal edema, no clubbing or cyanosis   Skin - normal coloration and turgor, no rashes, no suspicious skin lesions noted ,    DATA:    Labs:   CBC:   Recent Labs     21  0702 21  0653   WBC 3.1* 3.2*   HGB 9.8* 9.9*   HCT 28.3* 28.3*   * 138*     BMP:   Recent Labs     21  0702 21  0653    136   K 4.0 4.2   CO2 23 24   BUN 10 10   CREATININE 0.77 0.90   LABGLOM >60 >60 GLUCOSE 88 86     PT/INR:   No results for input(s): PROTIME, INR in the last 72 hours. IMAGING DATA:  IR CHEST TUBE INSERTION   Final Result   Successful fluoroscopic guided placement of a 10 [place] right pleural pigtail   chest tube         CT CHEST W WO CONTRAST   Preliminary Result   Similar moderate to large right pneumothorax. Subsequent pigtail chest tube   placed using fluoroscopic guidance. RECOMMENDATIONS:   Unavailable         XR CHEST PORTABLE   Final Result   Overall no significant interval change. Right chest tube remains in place   with persistent moderate right pneumothorax. XR CHEST PORTABLE   Final Result   Stable moderate right pneumothorax and right subcutaneous emphysema. XR CHEST PORTABLE   Final Result   Stable appearance of right-sided large pneumothorax with chest tube stable in   position. XR CHEST PORTABLE   Final Result   Slight improvement of large right pneumothorax with chest tube in place. XR CHEST PORTABLE   Final Result   Stable examination. XR CHEST PORTABLE   Final Result   Addendum 1 of 1   ADDENDUM:   Compared with the chest x-ray there is been significant interval    improvement. Please disregard original impression. Discussed with Dr. Aris Wolfe at 10:20    p.m. Final   Increased right pneumothorax despite new chest tube. Increased airspace   disease on the right. RECOMMENDATION:   The findings were sent to the Radiology Results Communication Center at 10:19   pm on 12/8/2021to be communicated to a licensed caregiver.          XR CHEST PORTABLE    (Results Pending)       Primary Problem  Pneumothorax    Active Hospital Problems    Diagnosis Date Noted    Pneumothorax [J93.9] 08/27/2021    COPD (chronic obstructive pulmonary disease) ([email or web address] [J44.9] 08/27/2021    Iron deficiency anemia [D50.9] 08/27/2021    PAF (paroxysmal atrial fibrillation) ([email or web address] [I48.0] 08/27/2021    Lung cancer, primary, with metastasis from lung to other site [facility]) [C34.90]      IMPRESSION:   1. Metastatic small cell cancer currently on Lurbinectedin  2. Left adrenal gland metastasis status post SBRT  3. Large right pneumothorax, with recent CT scan showing increase compared to the prior scan, s/p chest tube placed  4. COPD  5. Anemia    RECOMMENDATIONS:  1. I reviewed laboratory data, imaging studies, diagnosis, treatment recommendations  2. S/p placement of second chest tube by interventional etiology  3. Review results of CT chest  4. No plans for in-house chemotherapy. 5. CT scans show stable status of patient small cell lung cancer  6. Patient encouraged to work with PT OT  7. We will follow    Discussed with patient and Nurse. Thank you for asking us to see this patient. Giancarlo Choudhury MD      This note is created with the assistance of a speech recognition program.  While intending to generate a document that actually reflects the content of the visit, the document can still have some errors including those of syntax and sound a like substitutions which may escape proof reading. It such instances, actual meaning can be extrapolated by contextual diversion. \

## 2021-12-13 NOTE — CARE COORDINATION
ONGOING DISCHARGE PLAN:    Patient is alert and oriented x4. Spoke with patient regarding discharge plan and patient confirms that plan is still to return to home w/ Wife. She does work. He States, \" he does ok by himself\"    Denies VNS, at this time, will follow. Chest tube continues, Now placed back to Suction. CXR today. Cardiothoracic following, Per Notes, May need New Chest Tube placed in IR. Remains on IV Rocephin & Oral Doxy. Pulmonary continues to follow. PT/OT on board, will follow for any rec/needs. Will continue to follow for additional discharge needs.     Electronically signed by Eryn Khan RN on 12/13/2021 at 10:32 AM

## 2021-12-13 NOTE — PROGRESS NOTES
Original chest tube is noted at this time to not have bubbling or tidaling hooked to suction. [facility] changed to trouble shoot; ct continues to not have bubbling or tidaling. Second chest tube has bubbling intermittently as well as occasionally continuously. Will continue to monitor. 1612: MARYAM called VIRGINIE Overton on call to update CT surgery. No answer, VM box not set up, unable to leave message. 1734: MARYAM Greer [place] with update on chest tubes. No change since original note. No output has been noted. Dressing is intact with tegaderm. Awaiting response.

## 2021-12-13 NOTE — PLAN OF CARE
Problem: Skin Integrity:  Goal: Will show no infection signs and symptoms  Description: Will show no infection signs and symptoms  12/13/2021 0608 by Phoenix Corea RN  Outcome: Ongoing  12/12/2021 1745 by Brandon Hoover RN  Outcome: Met This Shift  Goal: Absence of new skin breakdown  Description: Absence of new skin breakdown  12/12/2021 1745 by Brandon Hoover RN  Outcome: Met This Shift     Problem: Infection:  Goal: Will remain free from infection  Description: Will remain free from infection  12/12/2021 1745 by Brandon Hoover RN  Outcome: Met This Shift     Problem: Safety:  Goal: Free from accidental physical injury  Description: Free from accidental physical injury  12/13/2021 0608 by Phoenix Corea RN  Outcome: Ongoing  12/12/2021 1745 by Brandon Hoover RN  Outcome: Met This Shift  Goal: Free from intentional harm  Description: Free from intentional harm  12/12/2021 1745 by Brandon Hoover RN  Outcome: Met This Shift     Problem: Daily Care:  Goal: Daily care needs are met  Description: Daily care needs are met  12/13/2021 0608 by Phoenix Corea RN  Outcome: Ongoing  12/12/2021 1745 by Brandon Hoover RN  Outcome: Met This Shift

## 2021-12-13 NOTE — FLOWSHEET NOTE
12/13/21 1053   Encounter Summary   Services provided to: Patient   Referral/Consult From: Rounding   Continue Visiting   (12/13/21V)   Volunteer Visit Yes   Complexity of Encounter Low   Length of Encounter 15 minutes   Spiritual/Adventism   Type Spiritual support   Intervention Communion   Sacraments   Communion Patient received communion

## 2021-12-13 NOTE — CONSULTS
[facility] Cardiothoracic Surgery  CONSULTATION      CC: Shortness of breath r/t recurrent pneumothorax    HPI:  Mr. Huyen Stephens is a 80 y.o.  male who presents with a recurrent pneumothorax on the right. Pertinent medical history includes lung cancer with metastasis, a-fib, COPD, hypertension, hyperlipidemia, and Gout. Outpatient CT was ordered by his Oncologist and revealed increasing pneumothorax with mediastinal shift on 12/8/2021 prompting recommendation to seek further treatment. Pneumothorax has been a chronic issue since this summer and required intervention with chest tube placement in August.    Upon arrival to [facility] emergency department a chest tube was placed without significant improvement and he was admitted to ICU. Initial chest tube dressing remains in place. Dressing removed, area cleaned with chlora-prep, occlusive dressing applied. Persistent air leak remains. Patient admits to mild shortness of breath, currently on room air without s/s of distress. He denies chest pain, fever and chills. He has been referred for further evaluation and chest tube managment. Chart reviewed and discussed with Dr Bert Burns. Due to progressed cancer and scarring to the lung, patient would likely not functionally benefit from surgical intervention. Therefore continued conservative management is the recommend course of treatment. Case discussed with Dr Koko Davila, Interventional Radiology. Plan for secondary pigtail catheter placed. Maintain both chest tubes to wall suction for 24 hours following pigtail placement. Cardiothoracic team will continue to follow. PMH:   has a past medical history of DDD (degenerative disc disease), cervical, Gout, Heart murmur, Hyperlipidemia, Hypertension, Lung cancer ([address] MI (myocardial infarction) ([address] Skin cancer, and Wears glasses. PSH:   has a past surgical history that includes Appendectomy; Tonsillectomy; skin biopsy; skin biopsy; Colonoscopy;  Foot surgery (Right); Cardiac catheterization; cyst incision and drainage (Right, 05/14/2020); Forearm surgery (Right, 5/14/2020); and knee surgery (Left, 5/14/2020).     Allergies:  No Known Allergies    Medications:   Current Facility-Administered Medications:     polyethylene glycol (GLYCOLAX) packet 17 g, 17 g, Oral, Daily, Al Oh MD, 17 g at 12/12/21 1437    senna (SENOKOT) tablet 8.6 mg, 1 tablet, Oral, Nightly, Al Oh MD, 8.6 mg at 12/12/21 2030    docusate sodium (COLACE) capsule 100 mg, 100 mg, Oral, Daily, Al Oh MD, 100 mg at 12/12/21 0852    guaiFENesin (MUCINEX) extended release tablet 600 mg, 600 mg, Oral, BID, Al Oh MD, 600 mg at 12/12/21 2030    midodrine (PROAMATINE) tablet 7.5 mg, 7.5 mg, Oral, TID WC, Al Oh MD, 7.5 mg at 12/12/21 1825    ipratropium-albuterol (DUONEB) nebulizer solution 3 mL, 3 mL, Inhalation, Q4H WA, Al Oh MD, 3 mL at 12/13/21 0712    sodium chloride flush 0.9 % injection 5-40 mL, 5-40 mL, IntraVENous, 2 times per day, Bc Arguelles MD, 10 mL at 12/12/21 2030    sodium chloride flush 0.9 % injection 5-40 mL, 5-40 mL, IntraVENous, PRN, Bc Arguelles MD    0.9 % sodium chloride infusion, 25 mL, IntraVENous, PRN, Bc Arguelles MD    acetaminophen (TYLENOL) tablet 650 mg, 650 mg, Oral, Q4H PRN, Bc Arguelles MD, 650 mg at 12/12/21 2134    ondansetron (ZOFRAN-ODT) disintegrating tablet 4 mg, 4 mg, Oral, Q8H PRN **OR** ondansetron (ZOFRAN) injection 4 mg, 4 mg, IntraVENous, Q6H PRN, Bc Arguelles MD    potassium chloride (KLOR-CON M) extended release tablet 40 mEq, 40 mEq, Oral, PRN **OR** potassium bicarb-citric acid (EFFER-K) effervescent tablet 40 mEq, 40 mEq, Oral, PRN **OR** potassium chloride 10 mEq/100 mL IVPB (Peripheral Line), 10 mEq, IntraVENous, PRN, Al Oh MD    ferrous sulfate (IRON 325) tablet 325 mg, 325 mg, Oral, Daily, Al Oh MD, 325 mg at 12/12/21 8965    atorvastatin (LIPITOR) tablet 10 mg, 10 mg, Oral, Daily, Elsi Maurice MD, 10 mg at 12/12/21 0853    cefTRIAXone (ROCEPHIN) 1000 mg IVPB in 50 mL D5W minibag, 1,000 mg, IntraVENous, Q24H, Elsi Maurice MD, Stopped at 12/12/21 0940    doxycycline monohydrate (MONODOX) capsule 100 mg, 100 mg, Oral, 2 times per day, Elsi Maurice MD, 100 mg at 12/12/21 2029    apixaban (ELIQUIS) tablet 5 mg, 5 mg, Oral, BID, Elsi Maurice MD, 5 mg at 12/12/21 2030    lidocaine 1 % injection 20 mL, 20 mL, IntraDERmal, Once, Elmira Samuel MD    Social Hx:   reports that he has been smoking cigarettes. He has been smoking about 0.50 packs per day. He has quit using smokeless tobacco.    Family Hx: family history includes Cancer in his father. ROS:    Review of Systems   Constitutional: Positive for activity change. Negative for appetite change, chills, fatigue and fever. HENT: Negative for congestion. Eyes: Negative for visual disturbance. Respiratory: Positive for shortness of breath. Cardiovascular: Positive for chest pain. CP related to chest tube   Gastrointestinal: Negative for abdominal pain, constipation and diarrhea. Genitourinary: Negative for dysuria. Musculoskeletal: Negative for gait problem. Skin: Negative for color change. Neurological: Positive for weakness. Negative for dizziness. Psychiatric/Behavioral: Negative for suicidal ideas. The patient is not nervous/anxious. Physical Examination   Vitals:  Vitals:    12/13/21 0730   BP: 96/74   Pulse: 87   Resp: 18   Temp: 97.7 °F (36.5 °C)   SpO2: 98%     Physical Exam  Vitals reviewed. Constitutional:       General: He is not in acute distress. Appearance: Normal appearance. He is cachectic. He is ill-appearing. HENT:      Head: Normocephalic. Nose: Nose normal.      Mouth/Throat:      Mouth: Mucous membranes are moist.      Pharynx: Oropharynx is clear.    Eyes:      Extraocular Movements: Extraocular movements intact. Conjunctiva/sclera: Conjunctivae normal.      Pupils: Pupils are equal, round, and reactive to light. Cardiovascular:      Rate and Rhythm: Normal rate and regular rhythm. Pulses: Normal pulses. Heart sounds: Normal heart sounds. Pulmonary:      Effort: Pulmonary effort is normal.      Breath sounds: Examination of the right-upper field reveals decreased breath sounds. Examination of the right-middle field reveals decreased breath sounds. Examination of the right-lower field reveals decreased breath sounds. Examination of the left-lower field reveals decreased breath sounds. Decreased breath sounds present. Comments: Chest tube in place, right lateral. New occlusive dressing applied. Persistent air leak noted. Abdominal:      Palpations: Abdomen is soft. Tenderness: There is no abdominal tenderness. Musculoskeletal:         General: Normal range of motion. Cervical back: Normal range of motion. Right lower leg: No edema. Left lower leg: No edema. Skin:     General: Skin is warm and dry. Capillary Refill: Capillary refill takes less than 2 seconds. Neurological:      General: No focal deficit present. Mental Status: He is alert and oriented to person, place, and time. Psychiatric:         Mood and Affect: Mood normal.         Behavior: Behavior normal.         Labs:   CBC:   Recent Labs     12/11/21  0635 12/12/21  0702 12/13/21  0653   WBC 3.2* 3.1* 3.2*   HGB 9.5* 9.8* 9.9*   HCT 27.6* 28.3* 28.3*   .3* 104.8* 105.0*   * 128* 138*     BMP:  Recent Labs     12/11/21  0635 12/12/21  0702 12/13/21  0653   * 135 136   K 3.9 4.0 4.2    104 104   CO2 21 23 24   BUN 8 10 10   CREATININE 0.79 0.77 0.90     Accucheck Glucoses:  Recent Labs     12/12/21  0830   POCGLU 87     Cardiac Enzymes: No results for input(s): CKTOTAL, CKMB, CKMBINDEX, TROPONINI in the last 72 hours.   PT/INR: No results for input(s): PROTIME, INR in the last 72 hours. APTT: No results for input(s): APTT in the last 72 hours. Liver Profile:  Lab Results   Component Value Date    AST 22 12/13/2021    ALT 20 12/13/2021    BILIDIR 0.25 05/07/2020    BILITOT 0.29 12/13/2021    ALKPHOS 110 12/13/2021   No results found for: CHOL, HDL, TRIG  TSH:   Lab Results   Component Value Date    TSH 0.96 06/02/2021     UA:   Lab Results   Component Value Date    COLORU YELLOW 05/12/2020    PHUR 6.0 05/12/2020    WBCUA None 05/12/2020    RBCUA 2 TO 5 05/12/2020    MUCUS NOT REPORTED 05/12/2020    TRICHOMONAS NOT REPORTED 05/12/2020    YEAST NOT REPORTED 05/12/2020    BACTERIA NOT REPORTED 05/12/2020    SPECGRAV 1.011 05/12/2020    LEUKOCYTESUR NEGATIVE 05/12/2020    UROBILINOGEN Normal 05/12/2020    BILIRUBINUR NEGATIVE 05/12/2020    GLUCOSEU NEGATIVE 05/12/2020    AMORPHOUS NOT REPORTED 05/12/2020         Testing:  CXR:   ONE XRAY VIEW OF THE CHEST       12/13/2021 10:09 am       COMPARISON:   12/12/2021, 12/11/2021       HISTORY:   ORDERING SYSTEM PROVIDED HISTORY: recurrent pneumothorax   TECHNOLOGIST PROVIDED HISTORY:   recurrent pneumothorax   Reason for Exam: recurrent pneumothorax       FINDINGS:   Left jugular chest port remains in place with the catheter tip in the SVC.    Similar right perihilar parenchymal opacities which may be related to the   patient's known lung cancer.  Right chest tube remains in place, perhaps   pulled back slightly with similar moderate right pneumothorax.  Soft tissue   emphysema right chest wall is similar to slightly decreased.  No new focal   infiltrates on the left.  Stable cardiomediastinal silhouette.           Impression   Overall no significant interval change.  Right chest tube remains in place   with persistent moderate right pneumothorax.             CT scan:   CT OF THE CHEST, ABDOMEN, AND PELVIS WITH CONTRAST 12/8/2021 1:07 pm       TECHNIQUE:   CT of the chest, abdomen and pelvis was performed with the administration of   intravenous contrast. Multiplanar reformatted images are provided for review. Dose modulation, iterative reconstruction, and/or weight based adjustment of   the mA/kV was utilized to reduce the radiation dose to as low as reasonably   achievable.       COMPARISON:   08/26/2021, 06/16/2021, 03/02/2021       HISTORY:   ORDERING SYSTEM PROVIDED HISTORY: Primary malignant neoplasm of right lung   metastatic to other site [facility])   TECHNOLOGIST PROVIDED HISTORY:       assess disease status   Reason for Exam: follow up to lung cancer       FINDINGS:       Chest:       Mediastinum: Slight mediastinal shift.  Right hilar and subcarinal   lymphadenopathy is grossly similar in appearance.       Lungs/pleura: Large right pneumothorax has increased in size compared to   prior CT exam and most recent chest radiograph in September.  Slight   mediastinal shift.  Small right pleural effusion.  Nodular areas of pleural   thickening are now evident.  No new findings identified in the left lung.       Soft Tissues/Bones: Sclerosis in the T12 vertebral body appears more   prominent in the interval and there is new mild vertebral body height loss.           Abdomen/Pelvis:       Organs: Liver lesions are again demonstrated, which appears smaller and less   conspicuous in the interval.  Previously measured lesion in the inferior   right hepatic lobe measures 1.5 cm on image 73, previously 2.2 cm.  No new   liver lesion identified.       The gallbladder, pancreas, spleen reveal no significant findings.  Right   adrenal nodule measuring 1.3 cm appears unchanged.  The kidneys reveal no   acute findings.       GI/Bowel:  There is no bowel dilatation or wall thickening identified.       Pelvis: No acute findings.       Peritoneum/Retroperitoneum: No free air or free fluid.  The aorta is normal   in caliber.  The visceral branches are patent.  No lymphadenopathy.       Bones/Soft Tissues: No new findings.         Impression   1. Large right pneumothorax has increased since prior imaging and there is   slight mediastinal shift, suggesting underlying tension.       2.  Small right pleural effusion and pleural nodularity suggestive of   neoplastic involvement.       3.  Similar appearance of soft tissue density in the right hilum and   mediastinal lymphadenopathy.       4.  More prominent sclerosis in the T12 vertebral body and slight interval   vertebral body height loss, suggestive of a metastatic deposit and pathologic   fracture.       5.  Liver lesions appear smaller and less conspicuous since prior imaging.       6.  Unchanged right adrenal nodule.       Findings were discussed with LEV NESS at 3:14 pm on 12/8/2021. Imaging Studies:  I have personally reviewed the testing/imaging above with Dr Meghan Huagn, he is in agreement with the findings listed above. In summary, Mr. Sukhjinder Weston is a 80y.o. year-old male with recurrent pneuomothorax. 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 Sukhjinder Weston would benefit optimally from secondary chest tube placement via Interventional Radiology. Maintain current chest tube to strict continuous wall suction. On this date 12/13/2021 I have spent 50 minutes reviewing previous notes, test results and face to face with the patient discussing the diagnosis and importance of compliance with the treatment plan as well as documenting on the day of the visit.  At least 50% of the time documented was spent with the patient to provide counseling and/or coordination of care.     AZEB Fitzpatrick - CNP

## 2021-12-13 NOTE — PROGRESS NOTES
Pt back from IR with 2 chest tubes to the right side. RN spoke with Dr. Gregory Alcazar who informed RN that per CT surgery, this is the plan. Flower with CT surgery also has in her note the plan for a secondary tube placement. Dr. Gregory Alcazar was informed these should both be hooked to suction.

## 2021-12-13 NOTE — CARE COORDINATION
Cardiothoracic team aware of consult. Imaging and chart reviewed. Spoke to [employer], bedside nurse. Chest tube is currently to water seal, no known air leak. Instructed RN to place tube back to continuous wall suction. Patient may benefit from new chest tube placed via IR for better positioning. Will see patient today.     Barbara Xiong, AZEB - CNP

## 2021-12-13 NOTE — PROGRESS NOTES
Progress Note    12/13/2021   1:47 PM    Name:  Basim Mccarthy  MRN:    [id number]     Acct:     [de-identified]   Room:  2094/2094-01   Day: 4     Admit Date: 12/8/2021  7:03 PM  PCP: Sunny Heredia MD    Subjective:     C/C:   Chief Complaint   Patient presents with    Shortness of Breath       Interval History: Status: not changed. Patient chest wall pain is well controlled. Patient oxygen saturation is 92% on 2 L of oxygen via nasal cannula blood pressure and heart rate stable. Patient labs reviewed hemoglobin 9.9    ROS:   all 10 systems reviewed and are negative except as noted    Review of Systems   Constitutional: Negative for appetite change, chills and diaphoresis. HENT: Negative for drooling, ear pain, trouble swallowing and voice change. Eyes: Negative for photophobia and visual disturbance. Respiratory: Positive for cough. Negative for choking, shortness of breath and stridor. Cardiovascular: Negative for chest pain and palpitations. Gastrointestinal: Negative for abdominal distention, anal bleeding, blood in stool and rectal pain. Endocrine: Negative for polyphagia and polyuria. Genitourinary: Negative for dysuria, flank pain, hematuria and urgency. Musculoskeletal: Negative for back pain, myalgias and neck stiffness. Skin: Negative for color change, pallor and rash. Allergic/Immunologic: Negative for environmental allergies and food allergies. Neurological: Negative for tremors, seizures, facial asymmetry and numbness. Hematological: Negative for adenopathy. Does not bruise/bleed easily. Psychiatric/Behavioral: Negative for agitation, behavioral problems, hallucinations, self-injury and suicidal ideas. Medications:      Allergies: No Known Allergies    Current Meds: polyethylene glycol (GLYCOLAX) packet 17 g, Daily  senna (SENOKOT) tablet 8.6 mg, Nightly  docusate sodium (COLACE) capsule 100 mg, Daily  guaiFENesin (MUCINEX) extended release tablet 600 mg, BID  midodrine (PROAMATINE) tablet 7.5 mg, TID WC  ipratropium-albuterol (DUONEB) nebulizer solution 3 mL, Q4H WA  sodium chloride flush 0.9 % injection 5-40 mL, 2 times per day  sodium chloride flush 0.9 % injection 5-40 mL, PRN  0.9 % sodium chloride infusion, PRN  acetaminophen (TYLENOL) tablet 650 mg, Q4H PRN  ondansetron (ZOFRAN-ODT) disintegrating tablet 4 mg, Q8H PRN   Or  ondansetron (ZOFRAN) injection 4 mg, Q6H PRN  potassium chloride (KLOR-CON M) extended release tablet 40 mEq, PRN   Or  potassium bicarb-citric acid (EFFER-K) effervescent tablet 40 mEq, PRN   Or  potassium chloride 10 mEq/100 mL IVPB (Peripheral Line), PRN  ferrous sulfate (IRON 325) tablet 325 mg, Daily  atorvastatin (LIPITOR) tablet 10 mg, Daily  cefTRIAXone (ROCEPHIN) 1000 mg IVPB in 50 mL D5W minibag, Q24H  doxycycline monohydrate (MONODOX) capsule 100 mg, 2 times per day  apixaban (ELIQUIS) tablet 5 mg, BID  lidocaine 1 % injection 20 mL, Once        Data:     Code Status:  Full Code    Family History   Problem Relation Age of Onset    Cancer Father         lung cancer       Social History     Socioeconomic History    Marital status:      Spouse name: Not on file    Number of children: Not on file    Years of education: Not on file    Highest education level: Not on file   Occupational History    Not on file   Tobacco Use    Smoking status: Current Every Day Smoker     Packs/day: 0.50     Types: Cigarettes    Smokeless tobacco: Former User   Vaping Use    Vaping Use: Former   Substance and Sexual Activity    Alcohol use: No    Drug use: Yes     Frequency: 14.0 times per week     Types: Marijuana [place])    Sexual activity: Not on file   Other Topics Concern    Not on file   Social History Narrative    Not on file     Social Determinants of Health     Financial Resource Strain:     Difficulty of Paying Living Expenses: Not on file   Food Insecurity:     Worried About Running Out of Food in the Last Year: Not on file    [address] in the Last Year: Not on file   Transportation Needs:     Lack of Transportation (Medical): Not on file    Lack of Transportation (Non-Medical): Not on file   Physical Activity:     Days of Exercise per Week: Not on file    Minutes of Exercise per Session: Not on file   Stress:     Feeling of Stress : Not on file   Social Connections:     Frequency of Communication with Friends and Family: Not on file    Frequency of Social Gatherings with Friends and Family: Not on file    Attends [employer] Services: Not on file    Active Member of [address] [place] [address] [employer] or Organizations: Not on file    Attends Club or Organization Meetings: Not on file    Marital Status: Not on file   Intimate Partner Violence:     Fear of Current or Ex-Partner: Not on file    Emotionally Abused: Not on file    Physically Abused: Not on file    Sexually Abused: Not on file   Housing Stability:     Unable to Pay for Housing in the Last Year: Not on file    Number of [place] in the Last Year: Not on file    Unstable Housing in the Last Year: Not on file       I/O (24Hr): Intake/Output Summary (Last 24 hours) at 12/13/2021 1347  Last data filed at 12/13/2021 1156  Gross per 24 hour   Intake 480 ml   Output 2849 ml   Net -2369 ml     Radiology:  XR CHEST PORTABLE    Result Date: 12/13/2021  Overall no significant interval change. Right chest tube remains in place with persistent moderate right pneumothorax. XR CHEST PORTABLE    Result Date: 12/12/2021  Stable moderate right pneumothorax and right subcutaneous emphysema. XR CHEST PORTABLE    Result Date: 12/11/2021  Stable appearance of right-sided large pneumothorax with chest tube stable in position. XR CHEST PORTABLE    Result Date: 12/10/2021  Slight improvement of large right pneumothorax with chest tube in place. XR CHEST PORTABLE    Result Date: 12/9/2021  Stable examination.      XR CHEST PORTABLE    Addendum Date: 12/8/2021    ADDENDUM: Compared with the chest x-ray there is been significant interval improvement. Please disregard original impression. Discussed with Dr. Rancho Rasmussen at 10:20 p.m. Result Date: 12/8/2021  Increased right pneumothorax despite new chest tube. Increased airspace disease on the right. RECOMMENDATION: The findings were sent to the Radiology Results Communication Center at 10:19 pm on 12/8/2021to be communicated to a licensed caregiver. CT CHEST ABDOMEN PELVIS W CONTRAST    Result Date: 12/8/2021  1. Large right pneumothorax has increased since prior imaging and there is slight mediastinal shift, suggesting underlying tension. 2.  Small right pleural effusion and pleural nodularity suggestive of neoplastic involvement. 3.  Similar appearance of soft tissue density in the right hilum and mediastinal lymphadenopathy. 4.  More prominent sclerosis in the T12 vertebral body and slight interval vertebral body height loss, suggestive of a metastatic deposit and pathologic fracture. 5.  Liver lesions appear smaller and less conspicuous since prior imaging. 6.  Unchanged right adrenal nodule. Findings were discussed with Millie Urena at 3:14 pm on 12/8/2021. MRI BRAIN W WO CONTRAST    Result Date: 12/8/2021  No evidence of acute or metastatic disease.        Labs:  Recent Results (from the past 24 hour(s))   CBC with DIFF    Collection Time: 12/13/21  6:53 AM   Result Value Ref Range    WBC 3.2 (L) 3.5 - 11.0 k/uL    RBC 2.69 (L) 4.5 - 5.9 m/uL    Hemoglobin 9.9 (L) 13.5 - 17.5 g/dL    Hematocrit 28.3 (L) 41 - 53 %    .0 (H) 80 - 100 fL    MCH 36.7 (H) 26 - 34 pg    MCHC 35.0 31 - 37 g/dL    RDW 16.3 (H) 11.5 - 14.9 %    Platelets 807 (L) 662 - 450 k/uL    MPV 7.2 6.0 - 12.0 fL    NRBC Automated NOT REPORTED per 100 WBC    Differential Type NOT REPORTED     Immature Granulocytes NOT REPORTED 0 %    Absolute Immature Granulocyte NOT REPORTED 0.00 - 0.30 k/uL    WBC Morphology NOT REPORTED     RBC Morphology NOT REPORTED Platelet Estimate NOT REPORTED     Seg Neutrophils 44 36 - 66 %    Lymphocytes 38 24 - 44 %    Monocytes 8 (H) 1 - 7 %    Eosinophils % 10 (H) 0 - 4 %    Basophils 0 0 - 2 %    Segs Absolute 1.40 1.3 - 9.1 k/uL    Absolute Lymph # 1.22 1.0 - 4.8 k/uL    Absolute Mono # 0.26 0.1 - 1.3 k/uL    Absolute Eos # 0.32 0.0 - 0.4 k/uL    Basophils Absolute 0.00 0.0 - 0.2 k/uL    Morphology HYPOCHROMIA PRESENT     Morphology ANISOCYTOSIS PRESENT    Comprehensive Metabolic Panel w/ Reflex to MG    Collection Time: 21  6:53 AM   Result Value Ref Range    Glucose 86 70 - 99 mg/dL    BUN 10 8 - 23 mg/dL    CREATININE 0.90 0.70 - 1.20 mg/dL    Bun/Cre Ratio NOT REPORTED 9 - 20    Calcium 9.0 8.6 - 10.4 mg/dL    Sodium 136 135 - 144 mmol/L    Potassium 4.2 3.7 - 5.3 mmol/L    Chloride 104 98 - 107 mmol/L    CO2 24 20 - 31 mmol/L    Anion Gap 8 (L) 9 - 17 mmol/L    Alkaline Phosphatase 110 40 - 129 U/L    ALT 20 5 - 41 U/L    AST 22 <40 U/L    Total Bilirubin 0.29 (L) 0.3 - 1.2 mg/dL    Total Protein 6.4 6.4 - 8.3 g/dL    Albumin 3.4 (L) 3.5 - 5.2 g/dL    Albumin/Globulin Ratio NOT REPORTED 1.0 - 2.5    GFR Non-African American >60 >60 mL/min    GFR African American >60 >60 mL/min    GFR Comment          GFR Staging NOT REPORTED        Physical Examination:        Vitals:  /80   Pulse 89   Temp 97.7 °F (36.5 °C) (Oral)   Resp 17   Ht 5' 11\" (1.803 m)   Wt 165 lb (74.8 kg)   SpO2 92%   BMI 23.01 kg/m²   Temp (24hrs), Av °F (36.7 °C), Min:97.7 °F (36.5 °C), Max:98.4 °F (36.9 °C)    Recent Labs     21  0830   POCGLU 87         Physical Exam  Vitals reviewed. Constitutional:       Appearance: Normal appearance. He is not diaphoretic. HENT:      Head: Normocephalic and atraumatic. Right Ear: External ear normal.      Left Ear: External ear normal.      Nose: Nose normal.      Mouth/Throat:      Mouth: Mucous membranes are moist.      Pharynx: Oropharynx is clear.    Eyes:      Conjunctiva/sclera: Conjunctivae normal.   Cardiovascular:      Rate and Rhythm: Normal rate and regular rhythm. Pulses: Normal pulses. Heart sounds: Normal heart sounds. Pulmonary:      Effort: Pulmonary effort is normal.      Breath sounds: Examination of the right-upper field reveals decreased breath sounds. Examination of the right-middle field reveals decreased breath sounds. Decreased breath sounds present. Abdominal:      General: Bowel sounds are normal. There is no distension. Palpations: Abdomen is soft. Musculoskeletal:         General: No tenderness or deformity. Normal range of motion. Cervical back: Normal range of motion and neck supple. No rigidity. Right lower leg: No edema. Left lower leg: No edema. Skin:     General: Skin is warm and dry. Capillary Refill: Capillary refill takes less than 2 seconds. Coloration: Skin is not jaundiced. Neurological:      General: No focal deficit present. Mental Status: Mental status is at baseline. Psychiatric:         Mood and Affect: Mood normal.         Behavior: Behavior normal.         Assessment:        Primary Problem  Pneumothorax     Principal Problem:    Pneumothorax  Active Problems:    Lung cancer, primary, with metastasis from lung to other site (HCC)    PAF (paroxysmal atrial fibrillation) (HCC)    COPD (chronic obstructive pulmonary disease) (HCC)    Iron deficiency anemia  Resolved Problems:    * No resolved hospital problems. *      Past Medical History:   Diagnosis Date    DDD (degenerative disc disease), cervical     Gout     Heart murmur     hx. of when younger.  Hyperlipidemia     Hypertension     Lung cancer ([address]     right lung    MI (myocardial infarction) ([address]     Skin cancer     face    Wears glasses         Plan:        1. Rocephin 1 g IV daily  2. Doxycycline 100 mg p.o. twice daily  3. Pigtail chest tube placement per IR today   4.  Chest x-ray 12/13/2021 shows persistent moderate right pneumothorax. 5. Consult cardiothoracic surgery for persistent right pneumothorax  6. Discussed with pulmonologist Dr. Toan Steen and cardiothoracic nurse practitioner Natalia Holloway on the floor  7. DC IV fluid  8. Increase midodrine 7.5 mg p.o. 3 times a day with holding parameters  9.  DVT prophylaxis Eliquis   10.  oncology and pulmonology input noted  11.  CBC, CMP  12.  EPCs  13.  check and replace electrolytes per sliding scale        Electronically signed by Brie Coulter MD

## 2021-12-14 ENCOUNTER — APPOINTMENT (OUTPATIENT)
Dept: GENERAL RADIOLOGY | Age: 71
DRG: 199 | End: 2021-12-14
Payer: MEDICARE

## 2021-12-14 LAB
ABSOLUTE EOS #: 0.23 K/UL (ref 0–0.4)
ABSOLUTE IMMATURE GRANULOCYTE: ABNORMAL K/UL (ref 0–0.3)
ABSOLUTE LYMPH #: 0.59 K/UL (ref 1–4.8)
ABSOLUTE MONO #: 0.76 K/UL (ref 0.1–1.3)
ALBUMIN SERPL-MCNC: 3.5 G/DL (ref 3.5–5.2)
ALBUMIN/GLOBULIN RATIO: ABNORMAL (ref 1–2.5)
ALP BLD-CCNC: 109 U/L (ref 40–129)
ALT SERPL-CCNC: 18 U/L (ref 5–41)
ANION GAP SERPL CALCULATED.3IONS-SCNC: 10 MMOL/L (ref 9–17)
AST SERPL-CCNC: 20 U/L
BASOPHILS # BLD: 3 % (ref 0–2)
BASOPHILS ABSOLUTE: 0.1 K/UL (ref 0–0.2)
BILIRUB SERPL-MCNC: 0.29 MG/DL (ref 0.3–1.2)
BUN BLDV-MCNC: 9 MG/DL (ref 8–23)
BUN/CREAT BLD: ABNORMAL (ref 9–20)
CALCIUM SERPL-MCNC: 9.1 MG/DL (ref 8.6–10.4)
CHLORIDE BLD-SCNC: 101 MMOL/L (ref 98–107)
CO2: 23 MMOL/L (ref 20–31)
CREAT SERPL-MCNC: 0.88 MG/DL (ref 0.7–1.2)
DIFFERENTIAL TYPE: ABNORMAL
EOSINOPHILS RELATIVE PERCENT: 7 % (ref 0–4)
GFR AFRICAN AMERICAN: >60 ML/MIN
GFR NON-AFRICAN AMERICAN: >60 ML/MIN
GFR SERPL CREATININE-BSD FRML MDRD: ABNORMAL ML/MIN/{1.73_M2}
GFR SERPL CREATININE-BSD FRML MDRD: ABNORMAL ML/MIN/{1.73_M2}
GLUCOSE BLD-MCNC: 90 MG/DL (ref 70–99)
HCT VFR BLD CALC: 28.9 % (ref 41–53)
HEMOGLOBIN: 10.1 G/DL (ref 13.5–17.5)
IMMATURE GRANULOCYTES: ABNORMAL %
LYMPHOCYTES # BLD: 18 % (ref 24–44)
MCH RBC QN AUTO: 36.7 PG (ref 26–34)
MCHC RBC AUTO-ENTMCNC: 35.1 G/DL (ref 31–37)
MCV RBC AUTO: 104.7 FL (ref 80–100)
MONOCYTES # BLD: 23 % (ref 1–7)
MORPHOLOGY: ABNORMAL
NRBC AUTOMATED: ABNORMAL PER 100 WBC
PDW BLD-RTO: 15.8 % (ref 11.5–14.9)
PLATELET # BLD: 146 K/UL (ref 150–450)
PLATELET ESTIMATE: ABNORMAL
PMV BLD AUTO: 7.3 FL (ref 6–12)
POTASSIUM SERPL-SCNC: 4.1 MMOL/L (ref 3.7–5.3)
RBC # BLD: 2.76 M/UL (ref 4.5–5.9)
RBC # BLD: ABNORMAL 10*6/UL
SEG NEUTROPHILS: 49 % (ref 36–66)
SEGMENTED NEUTROPHILS ABSOLUTE COUNT: 1.62 K/UL (ref 1.3–9.1)
SODIUM BLD-SCNC: 134 MMOL/L (ref 135–144)
TOTAL PROTEIN: 6.6 G/DL (ref 6.4–8.3)
WBC # BLD: 3.3 K/UL (ref 3.5–11)
WBC # BLD: ABNORMAL 10*3/UL

## 2021-12-14 PROCEDURE — 2580000003 HC RX 258: Performed by: FAMILY MEDICINE

## 2021-12-14 PROCEDURE — 94761 N-INVAS EAR/PLS OXIMETRY MLT: CPT

## 2021-12-14 PROCEDURE — 85025 COMPLETE CBC W/AUTO DIFF WBC: CPT

## 2021-12-14 PROCEDURE — 80053 COMPREHEN METABOLIC PANEL: CPT

## 2021-12-14 PROCEDURE — 71045 X-RAY EXAM CHEST 1 VIEW: CPT

## 2021-12-14 PROCEDURE — 6360000002 HC RX W HCPCS: Performed by: FAMILY MEDICINE

## 2021-12-14 PROCEDURE — 6370000000 HC RX 637 (ALT 250 FOR IP): Performed by: FAMILY MEDICINE

## 2021-12-14 PROCEDURE — 94640 AIRWAY INHALATION TREATMENT: CPT

## 2021-12-14 PROCEDURE — 99232 SBSQ HOSP IP/OBS MODERATE 35: CPT | Performed by: INTERNAL MEDICINE

## 2021-12-14 PROCEDURE — 2060000000 HC ICU INTERMEDIATE R&B

## 2021-12-14 PROCEDURE — 6370000000 HC RX 637 (ALT 250 FOR IP): Performed by: INTERNAL MEDICINE

## 2021-12-14 PROCEDURE — 36415 COLL VENOUS BLD VENIPUNCTURE: CPT

## 2021-12-14 PROCEDURE — 2580000003 HC RX 258: Performed by: INTERNAL MEDICINE

## 2021-12-14 RX ADMIN — DOCUSATE SODIUM 100 MG: 100 CAPSULE ORAL at 09:13

## 2021-12-14 RX ADMIN — IPRATROPIUM BROMIDE AND ALBUTEROL SULFATE 3 ML: .5; 2.5 SOLUTION RESPIRATORY (INHALATION) at 18:53

## 2021-12-14 RX ADMIN — SODIUM CHLORIDE, PRESERVATIVE FREE 10 ML: 5 INJECTION INTRAVENOUS at 13:43

## 2021-12-14 RX ADMIN — MIDODRINE HYDROCHLORIDE 7.5 MG: 5 TABLET ORAL at 09:14

## 2021-12-14 RX ADMIN — IPRATROPIUM BROMIDE AND ALBUTEROL SULFATE 3 ML: .5; 2.5 SOLUTION RESPIRATORY (INHALATION) at 15:54

## 2021-12-14 RX ADMIN — MIDODRINE HYDROCHLORIDE 7.5 MG: 5 TABLET ORAL at 13:43

## 2021-12-14 RX ADMIN — DOXYCYCLINE 100 MG: 100 CAPSULE ORAL at 21:13

## 2021-12-14 RX ADMIN — APIXABAN 5 MG: 5 TABLET, FILM COATED ORAL at 21:13

## 2021-12-14 RX ADMIN — DOXYCYCLINE 100 MG: 100 CAPSULE ORAL at 09:14

## 2021-12-14 RX ADMIN — GUAIFENESIN 600 MG: 600 TABLET, EXTENDED RELEASE ORAL at 21:13

## 2021-12-14 RX ADMIN — FERROUS SULFATE TAB 325 MG (65 MG ELEMENTAL FE) 325 MG: 325 (65 FE) TAB at 09:13

## 2021-12-14 RX ADMIN — CEFTRIAXONE SODIUM 1000 MG: 1 INJECTION, POWDER, FOR SOLUTION INTRAMUSCULAR; INTRAVENOUS at 07:30

## 2021-12-14 RX ADMIN — ATORVASTATIN CALCIUM 10 MG: 20 TABLET, FILM COATED ORAL at 09:13

## 2021-12-14 RX ADMIN — IPRATROPIUM BROMIDE AND ALBUTEROL SULFATE 3 ML: .5; 2.5 SOLUTION RESPIRATORY (INHALATION) at 06:53

## 2021-12-14 RX ADMIN — POLYETHYLENE GLYCOL 3350 17 G: 17 POWDER, FOR SOLUTION ORAL at 09:13

## 2021-12-14 RX ADMIN — MIDODRINE HYDROCHLORIDE 7.5 MG: 5 TABLET ORAL at 16:45

## 2021-12-14 RX ADMIN — SODIUM CHLORIDE, PRESERVATIVE FREE 10 ML: 5 INJECTION INTRAVENOUS at 21:13

## 2021-12-14 RX ADMIN — GUAIFENESIN 600 MG: 600 TABLET, EXTENDED RELEASE ORAL at 09:14

## 2021-12-14 RX ADMIN — ACETAMINOPHEN 650 MG: 325 TABLET, FILM COATED ORAL at 14:55

## 2021-12-14 RX ADMIN — APIXABAN 5 MG: 5 TABLET, FILM COATED ORAL at 09:14

## 2021-12-14 RX ADMIN — STANDARDIZED SENNA CONCENTRATE 8.6 MG: 8.6 TABLET ORAL at 21:13

## 2021-12-14 RX ADMIN — IPRATROPIUM BROMIDE AND ALBUTEROL SULFATE 3 ML: .5; 2.5 SOLUTION RESPIRATORY (INHALATION) at 10:47

## 2021-12-14 ASSESSMENT — ENCOUNTER SYMPTOMS
BACK PAIN: 0
ANAL BLEEDING: 0
BLOOD IN STOOL: 0
CHOKING: 0
STRIDOR: 0
COLOR CHANGE: 0
PHOTOPHOBIA: 0
ABDOMINAL DISTENTION: 0
TROUBLE SWALLOWING: 0
VOICE CHANGE: 0
RECTAL PAIN: 0
COUGH: 1
SHORTNESS OF BREATH: 0

## 2021-12-14 ASSESSMENT — PAIN SCALES - GENERAL: PAINLEVEL_OUTOF10: 5

## 2021-12-14 NOTE — PROGRESS NOTES
Today's Date: 12/14/2021  Patient Name: Ruthie Farris  Date of admission: 12/8/2021  7:03 PM  Patient's age: 80 y.o., 1950  Admission Dx: Pneumothorax [J93.9]  Chronic pneumothorax [J93.81]    Reason for Consult: Known lung cancer patient  Requesting Physician: Awilda Cobian MD    CHIEF COMPLAINT:    Chief Complaint   Patient presents with    Shortness of Breath       INTERVAL HISTORY:    Patient seen and examined  Labs and vitals reviewed  Resting comfortably  Pain is controlled  Wants to know when he can go home  Pneumothorax not significantly changed      HISTORY OF PRESENT ILLNESS:    Ruthie Farris is a 80 y.o. male who is admitted to the hospital on 12/8/2021  for SOB. He had a restaging scan with oncology and his CT scan showed worsening pneumothorax and mediastinal shift. Therefore he was sent to ER for further management. He denies any fever chills. He had chest tube in place. He is currently receiving Lurbinectedin for his recurrent small cell lung cancer and most recent treatment was on 12/1/2021. He has a diagnosis of small cell lung cancer with squamous component and has been following with Dr. Dino Jansen as outpatient and his brief oncologic history as follows. 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 Chemotherapy changed to carboplatin and etoposide due to renal insufficiency from cycle #2  PCI 7/2019  SRS to adrenal gland metastasis, completed 07/2020  systemic palliative chemoimmunotherapy with carboplatin etoposide and Tecentriq, 12/2020 x4 cycles.   Maintenance Tecentriq, 3/2021  Lurbenectidin7/2/2021      Past Medical History:   has a past medical history of DDD (degenerative disc disease), cervical, Gout, Heart murmur, Hyperlipidemia, Hypertension, Lung cancer ([facility] [address] MI (myocardial infarction) ([facility] Skin cancer, and Wears glasses. Past Surgical History:   has a past surgical history that includes Appendectomy; Tonsillectomy; skin biopsy; skin biopsy; Colonoscopy; Foot surgery (Right); Cardiac catheterization; cyst incision and drainage (Right, 05/14/2020); Forearm surgery (Right, 5/14/2020); knee surgery (Left, 5/14/2020); and IR CHEST TUBE INSERTION (12/13/2021). Medications:    Prior to Admission medications    Medication Sig Start Date End Date Taking? Authorizing Provider   oxyCODONE-acetaminophen (PERCOCET) 5-325 MG per tablet Take 1 tablet by mouth every 6 hours as needed for Pain for up to 15 days. 12/1/21 12/16/21 Yes Gayle Cardenas MD   dexamethasone (DECADRON) 2 MG tablet Take 1 tablet by mouth 2 times daily (with meals) for 15 days 12/1/21 12/16/21 Yes Gayel Cardenas MD   docusate sodium (COLACE) 100 MG capsule Take 100 mg by mouth 2 times daily   Yes Historical Provider, MD   B Complex-C-Folic Acid (ANGELICA-HUGO) TABS TAKE 1 TABLET BY MOUTH DAILY. HEMATINIC VIT MINERALS 7/21/20  Yes Historical Provider, MD   traZODone (DESYREL) 100 MG tablet Take 100 mg by mouth nightly  7/20/20  Yes Historical Provider, MD   ipratropium-albuterol (DUONEB) 0.5-2.5 (3) MG/3ML SOLN nebulizer solution Inhale 3 mLs into the lungs 4 times daily   Yes Historical Provider, MD   potassium chloride (MICRO-K) 10 MEQ extended release capsule Take 20 mEq by mouth daily  11/19/19  Yes Historical Provider, MD   Ferrous Fumarate (FERROCITE) 324 (106 Fe) MG TABS Take 324 mg by mouth daily    Yes Historical Provider, MD   apixaban (ELIQUIS) 5 MG TABS tablet Take 1 tablet by mouth 2 times daily 5/21/20  Yes Janell Brown MD   tamsulosin (FLOMAX) 0.4 MG capsule TAKE 1 CAPSULE BY MOUTH EVERY DAY 5/4/20  Yes Gayle Cardenas MD   lidocaine-prilocaine (EMLA) 2.5-2.5 % cream Apply topically as needed.   Apply a quarter size amount to port site 1 hour before chemotherapy. Cover with plastic wrap.  3/7/19  Yes 211 Yumiko Shrestha MD   acetaminophen (TYLENOL) 325 MG tablet Take 650 mg by mouth every 6 hours as needed for Pain   Yes Historical Provider, MD   allopurinol (ZYLOPRIM) 100 MG tablet Take 100 mg by mouth daily 12/4/18  Yes Historical Provider, MD   simvastatin (ZOCOR) 40 MG tablet Take 40 mg by mouth daily 11/27/18  Yes Historical Provider, MD   Handicap Placard [address] [facility] by Does not apply route 7/30/19   211 Yumiko Shrestha MD     Current Facility-Administered Medications   Medication Dose Route Frequency Provider Last Rate Last Admin    polyethylene glycol (GLYCOLAX) packet 17 g  17 g Oral Daily Mat Williamson MD   17 g at 12/14/21 0913    senna (SENOKOT) tablet 8.6 mg  1 tablet Oral Nightly Mat Williamson MD   8.6 mg at 12/13/21 1948    docusate sodium (COLACE) capsule 100 mg  100 mg Oral Daily Mat Williamson MD   100 mg at 12/14/21 0913    guaiFENesin (MUCINEX) extended release tablet 600 mg  600 mg Oral BID Mat Williamson MD   600 mg at 12/14/21 0914    midodrine (PROAMATINE) tablet 7.5 mg  7.5 mg Oral TID WC Mat Williamson MD   7.5 mg at 12/14/21 1645    ipratropium-albuterol (DUONEB) nebulizer solution 3 mL  3 mL Inhalation Q4H WA Mat Williamson MD   3 mL at 12/14/21 1853    sodium chloride flush 0.9 % injection 5-40 mL  5-40 mL IntraVENous 2 times per day Matthew Hernandez MD   10 mL at 12/14/21 1343    sodium chloride flush 0.9 % injection 5-40 mL  5-40 mL IntraVENous PRN Matthew Hernandez MD        0.9 % sodium chloride infusion  25 mL IntraVENous PRN Matthew Hernandez MD   Stopped at 12/13/21 0944    acetaminophen (TYLENOL) tablet 650 mg  650 mg Oral Q4H PRN Matthew Hernandez MD   650 mg at 12/14/21 1455    ondansetron (ZOFRAN-ODT) disintegrating tablet 4 mg  4 mg Oral Q8H PRN Orlando Johnson MD        Or    ondansetron (ZOFRAN) injection 4 mg  4 mg IntraVENous Q6H PRN Matthew Hernandez MD        potassium chloride (KLOR-CON M) extended release tablet 40 mEq  40 mEq Oral PRN La Ni MD        Or    potassium bicarb-citric acid (EFFER-K) effervescent tablet 40 mEq  40 mEq Oral PRN La Ni MD        Or    potassium chloride 10 mEq/100 mL IVPB (Peripheral Line)  10 mEq IntraVENous PRN La Ni MD        ferrous sulfate (IRON 325) tablet 325 mg  325 mg Oral Daily La Ni MD   325 mg at 12/14/21 0913    atorvastatin (LIPITOR) tablet 10 mg  10 mg Oral Daily La Ni MD   10 mg at 12/14/21 0913    cefTRIAXone (ROCEPHIN) 1000 mg IVPB in 50 mL D5W minibag  1,000 mg IntraVENous Q24H La Ni MD   Stopped at 12/14/21 1211    doxycycline monohydrate (MONODOX) capsule 100 mg  100 mg Oral 2 times per day La Ni MD   100 mg at 12/14/21 0914    apixaban (ELIQUIS) tablet 5 mg  5 mg Oral BID La Ni MD   5 mg at 12/14/21 0914    lidocaine 1 % injection 20 mL  20 mL IntraDERmal Once Mikey Gentile MD           Allergies:  Patient has no known allergies. Social History:   reports that he has been smoking cigarettes. He has been smoking about 0.50 packs per day. He has quit using smokeless tobacco. He reports current drug use. Frequency: 14.00 times per week. Drug: Marijuana Milind Mustard). He reports that he does not drink alcohol. Family History: family history includes Cancer in his father. REVIEW OF SYSTEMS:    Constitutional: No fever or chills.  No night sweats, no weight loss   Eyes: No eye discharge, double vision, or eye pain   HEENT: negative for sore mouth, sore throat, hoarseness and voice change   Respiratory: negative for cough , sputum, ++dyspnea, wheezing, hemoptysis, chest pain   Cardiovascular: negative for chest pain, ++dyspnea, palpitations, orthopnea, PND   Gastrointestinal: negative for nausea, vomiting, diarrhea, constipation, abdominal pain, Dysphagia, hematemesis and hematochezia   Genitourinary: negative for frequency, for input(s): PROTIME, INR in the last 72 hours. IMAGING DATA:  XR CHEST PORTABLE   Final Result   New pleural pigtail catheter in the upper right chest.  Unchanged large bore   right chest tube. Moderate right pneumothorax is not significantly changed. IR CHEST TUBE INSERTION   Final Result   Successful fluoroscopic guided placement of a 10 Vietnamese right pleural pigtail   chest tube         CT CHEST W WO CONTRAST   Final Result   Similar moderate to large right pneumothorax. Subsequent pigtail chest tube   placed using fluoroscopic guidance. RECOMMENDATIONS:   Unavailable         XR CHEST PORTABLE   Final Result   Overall no significant interval change. Right chest tube remains in place   with persistent moderate right pneumothorax. XR CHEST PORTABLE   Final Result   Stable moderate right pneumothorax and right subcutaneous emphysema. XR CHEST PORTABLE   Final Result   Stable appearance of right-sided large pneumothorax with chest tube stable in   position. XR CHEST PORTABLE   Final Result   Slight improvement of large right pneumothorax with chest tube in place. XR CHEST PORTABLE   Final Result   Stable examination. XR CHEST PORTABLE   Final Result   Addendum 1 of 1   ADDENDUM:   Compared with the chest x-ray there is been significant interval    improvement. Please disregard original impression. Discussed with Dr. Aris Wolfe at 10:20    p.m. Final   Increased right pneumothorax despite new chest tube. Increased airspace   disease on the right. RECOMMENDATION:   The findings were sent to the Radiology Results Communication Center at 10:19   pm on 12/8/2021to be communicated to a licensed caregiver.          XR CHEST PORTABLE    (Results Pending)       Primary Problem  Pneumothorax    Active Hospital Problems    Diagnosis Date Noted    Pneumothorax [J93.9] 08/27/2021    COPD (chronic obstructive pulmonary disease) ([facility] [address] [J44.9] 08/27/2021   

## 2021-12-14 NOTE — PROGRESS NOTES
Progress Note    12/15/2021   12:29 PM    Name:  Justen Gomez  MRN:    [id number]     Acct:     [de-identified]   Room:  2094/2094-01  IP Day: 6     Admit Date: 12/8/2021  7:03 PM  PCP: Prashant Schofield MD    Subjective:     C/C:   Chief Complaint   Patient presents with    Shortness of Breath       Interval History: Status: not changed. Patient denies chest pain or shortness of breath. Patient has left-sided chest tube in place. Patient has pigtail catheter in right upper chest. patient oxygen saturation is 99% on room air blood pressure heart rate stable afebrile. Patient labs reviewed hemoglobin 10.1, platelets 879. Chest x-ray report 12/14/2021 shows moderate right pneumothorax with no significant change    ROS:   all 10 systems reviewed and are negative except as noted    Review of Systems   Constitutional: Negative for appetite change, chills and diaphoresis. HENT: Negative for drooling, ear pain, trouble swallowing and voice change. Eyes: Negative for photophobia and visual disturbance. Respiratory: Positive for cough. Negative for choking, shortness of breath and stridor. Cardiovascular: Negative for chest pain and palpitations. Gastrointestinal: Negative for abdominal distention, anal bleeding, blood in stool and rectal pain. Endocrine: Negative for polyphagia and polyuria. Genitourinary: Negative for dysuria, flank pain, hematuria and urgency. Musculoskeletal: Negative for back pain, myalgias and neck stiffness. Skin: Negative for color change, pallor and rash. Allergic/Immunologic: Negative for environmental allergies and food allergies. Neurological: Negative for tremors, seizures, facial asymmetry and numbness. Hematological: Negative for adenopathy. Does not bruise/bleed easily. Psychiatric/Behavioral: Negative for agitation, behavioral problems, hallucinations, self-injury and suicidal ideas. Medications:      Allergies: No Known Allergies    Current Meds: polyethylene glycol (GLYCOLAX) packet 17 g, Daily  senna (SENOKOT) tablet 8.6 mg, Nightly  docusate sodium (COLACE) capsule 100 mg, Daily  guaiFENesin (MUCINEX) extended release tablet 600 mg, BID  midodrine (PROAMATINE) tablet 7.5 mg, TID WC  ipratropium-albuterol (DUONEB) nebulizer solution 3 mL, Q4H WA  sodium chloride flush 0.9 % injection 5-40 mL, 2 times per day  sodium chloride flush 0.9 % injection 5-40 mL, PRN  0.9 % sodium chloride infusion, PRN  acetaminophen (TYLENOL) tablet 650 mg, Q4H PRN  ondansetron (ZOFRAN-ODT) disintegrating tablet 4 mg, Q8H PRN   Or  ondansetron (ZOFRAN) injection 4 mg, Q6H PRN  potassium chloride (KLOR-CON M) extended release tablet 40 mEq, PRN   Or  potassium bicarb-citric acid (EFFER-K) effervescent tablet 40 mEq, PRN   Or  potassium chloride 10 mEq/100 mL IVPB (Peripheral Line), PRN  ferrous sulfate (IRON 325) tablet 325 mg, Daily  atorvastatin (LIPITOR) tablet 10 mg, Daily  cefTRIAXone (ROCEPHIN) 1000 mg IVPB in 50 mL D5W minibag, Q24H  doxycycline monohydrate (MONODOX) capsule 100 mg, 2 times per day  apixaban (ELIQUIS) tablet 5 mg, BID  lidocaine 1 % injection 20 mL, Once        Data:     Code Status:  Full Code    Family History   Problem Relation Age of Onset    Cancer Father         lung cancer       Social History     Socioeconomic History    Marital status:      Spouse name: Not on file    Number of children: Not on file    Years of education: Not on file    Highest education level: Not on file   Occupational History    Not on file   Tobacco Use    Smoking status: Current Every Day Smoker     Packs/day: 0.50     Types: Cigarettes    Smokeless tobacco: Former User   Vaping Use    Vaping Use: Former   Substance and Sexual Activity    Alcohol use: No    Drug use: Yes     Frequency: 14.0 times per week     Types: Marijuana Hollie Dodge)    Sexual activity: Not on file   Other Topics Concern    Not on file   Social History Narrative    Not on file     Social Determinants of Health     Financial Resource Strain:     Difficulty of Paying Living Expenses: Not on file   Food Insecurity:     Worried About Running Out of Food in the Last Year: Not on file    Dandre of Food in the Last Year: Not on file   Transportation Needs:     Lack of Transportation (Medical): Not on file    Lack of Transportation (Non-Medical): Not on file   Physical Activity:     Days of Exercise per Week: Not on file    Minutes of Exercise per Session: Not on file   Stress:     Feeling of Stress : Not on file   Social Connections:     Frequency of Communication with Friends and Family: Not on file    Frequency of Social Gatherings with Friends and Family: Not on file    Attends Gnosticism Services: Not on file    Active Member of [address] [place] [address] [employer] or Organizations: Not on file    Attends Club or Organization Meetings: Not on file    Marital Status: Not on file   Intimate Partner Violence:     Fear of Current or Ex-Partner: Not on file    Emotionally Abused: Not on file    Physically Abused: Not on file    Sexually Abused: Not on file   Housing Stability:     Unable to Pay for Housing in the Last Year: Not on file    Number of [place] in the Last Year: Not on file    Unstable Housing in the Last Year: Not on file       I/O (24Hr): Intake/Output Summary (Last 24 hours) at 12/15/2021 1229  Last data filed at 12/15/2021 2580  Gross per 24 hour   Intake 556 ml   Output 1385 ml   Net -829 ml     Radiology:  CT CHEST W WO CONTRAST    Result Date: 12/13/2021  Similar moderate to large right pneumothorax. Subsequent pigtail chest tube placed using fluoroscopic guidance. RECOMMENDATIONS: Unavailable     XR CHEST PORTABLE    Result Date: 12/14/2021  New pleural pigtail catheter in the upper right chest.  Unchanged large bore right chest tube. Moderate right pneumothorax is not significantly changed. XR CHEST PORTABLE    Result Date: 12/13/2021  Overall no significant interval change.   Right chest tube remains in place with persistent moderate right pneumothorax. XR CHEST PORTABLE    Result Date: 12/12/2021  Stable moderate right pneumothorax and right subcutaneous emphysema. XR CHEST PORTABLE    Result Date: 12/11/2021  Stable appearance of right-sided large pneumothorax with chest tube stable in position. XR CHEST PORTABLE    Result Date: 12/10/2021  Slight improvement of large right pneumothorax with chest tube in place. XR CHEST PORTABLE    Result Date: 12/9/2021  Stable examination. XR CHEST PORTABLE    Addendum Date: 12/8/2021    ADDENDUM: Compared with the chest x-ray there is been significant interval improvement. Please disregard original impression. Discussed with Dr. Shauna Fournier at 10:20 p.m. Result Date: 12/8/2021  Increased right pneumothorax despite new chest tube. Increased airspace disease on the right. RECOMMENDATION: The findings were sent to the Radiology Results Communication Center at 10:19 pm on 12/8/2021to be communicated to a licensed caregiver. IR CHEST TUBE INSERTION    Result Date: 12/13/2021  Successful fluoroscopic guided placement of a 10 Burmese right pleural pigtail chest tube     CT CHEST ABDOMEN PELVIS W CONTRAST    Result Date: 12/8/2021  1. Large right pneumothorax has increased since prior imaging and there is slight mediastinal shift, suggesting underlying tension. 2.  Small right pleural effusion and pleural nodularity suggestive of neoplastic involvement. 3.  Similar appearance of soft tissue density in the right hilum and mediastinal lymphadenopathy. 4.  More prominent sclerosis in the T12 vertebral body and slight interval vertebral body height loss, suggestive of a metastatic deposit and pathologic fracture. 5.  Liver lesions appear smaller and less conspicuous since prior imaging. 6.  Unchanged right adrenal nodule. Findings were discussed with Eron Freitas at 3:14 pm on 12/8/2021.      MRI BRAIN W WO CONTRAST    Result Date: 12/8/2021  No evidence of acute or metastatic disease.        Labs:  Recent Results (from the past 24 hour(s))   CBC with DIFF    Collection Time: 12/15/21  6:36 AM   Result Value Ref Range    WBC 3.1 (L) 3.5 - 11.0 k/uL    RBC 2.82 (L) 4.5 - 5.9 m/uL    Hemoglobin 10.1 (L) 13.5 - 17.5 g/dL    Hematocrit 29.8 (L) 41 - 53 %    .7 (H) 80 - 100 fL    MCH 35.8 (H) 26 - 34 pg    MCHC 33.8 31 - 37 g/dL    RDW 16.0 (H) 11.5 - 14.9 %    Platelets 121 353 - 787 k/uL    MPV 7.4 6.0 - 12.0 fL    NRBC Automated NOT REPORTED per 100 WBC    Differential Type NOT REPORTED     Immature Granulocytes NOT REPORTED 0 %    Absolute Immature Granulocyte NOT REPORTED 0.00 - 0.30 k/uL    WBC Morphology NOT REPORTED     RBC Morphology NOT REPORTED     Platelet Estimate NOT REPORTED     Seg Neutrophils 37 36 - 66 %    Lymphocytes 44 24 - 44 %    Monocytes 16 (H) 1 - 7 %    Eosinophils % 2 0 - 4 %    Basophils 0 0 - 2 %    Bands 1 0 - 10 %    Segs Absolute 1.15 (L) 1.3 - 9.1 k/uL    Absolute Lymph # 1.36 1.0 - 4.8 k/uL    Absolute Mono # 0.50 0.1 - 1.3 k/uL    Absolute Eos # 0.06 0.0 - 0.4 k/uL    Basophils Absolute 0.00 0.0 - 0.2 k/uL    Absolute Bands # 0.03 0.0 - 1.0 k/uL    Morphology HYPOCHROMIA PRESENT     Morphology ANISOCYTOSIS PRESENT    Comprehensive Metabolic Panel w/ Reflex to MG    Collection Time: 12/15/21  6:36 AM   Result Value Ref Range    Glucose 91 70 - 99 mg/dL    BUN 13 8 - 23 mg/dL    CREATININE 1.08 0.70 - 1.20 mg/dL    Bun/Cre Ratio NOT REPORTED 9 - 20    Calcium 9.0 8.6 - 10.4 mg/dL    Sodium 131 (L) 135 - 144 mmol/L    Potassium 4.3 3.7 - 5.3 mmol/L    Chloride 98 98 - 107 mmol/L    CO2 24 20 - 31 mmol/L    Anion Gap 9 9 - 17 mmol/L    Alkaline Phosphatase 107 40 - 129 U/L    ALT 16 5 - 41 U/L    AST 18 <40 U/L    Total Bilirubin 0.25 (L) 0.3 - 1.2 mg/dL    Total Protein 6.4 6.4 - 8.3 g/dL    Albumin 3.5 3.5 - 5.2 g/dL    Albumin/Globulin Ratio NOT REPORTED 1.0 - 2.5    GFR Non-African American >60 >60 mL/min GFR African American >60 >60 mL/min    GFR Comment          GFR Staging NOT REPORTED        Physical Examination:        Vitals:  /72   Pulse 89   Temp 98.5 °F (36.9 °C) (Oral)   Resp 18   Ht 5' 11\" (1.803 m)   Wt 162 lb (73.5 kg)   SpO2 100%   BMI 22.59 kg/m²   Temp (24hrs), Av.9 °F (36.6 °C), Min:96.7 °F (35.9 °C), Max:98.5 °F (36.9 °C)    No results for input(s): POCGLU in the last 72 hours. Physical Exam  Vitals reviewed. Constitutional:       Appearance: Normal appearance. He is not diaphoretic. HENT:      Head: Normocephalic and atraumatic. Right Ear: External ear normal.      Left Ear: External ear normal.      Nose: Nose normal.      Mouth/Throat:      Mouth: Mucous membranes are moist.      Pharynx: Oropharynx is clear. Eyes:      Conjunctiva/sclera: Conjunctivae normal.   Cardiovascular:      Rate and Rhythm: Normal rate and regular rhythm. Pulses: Normal pulses. Heart sounds: Normal heart sounds. Pulmonary:      Effort: Pulmonary effort is normal.      Breath sounds: Examination of the right-upper field reveals decreased breath sounds. Examination of the right-middle field reveals decreased breath sounds. Decreased breath sounds present. Comments: 2 right chest tubes in place  Abdominal:      General: Bowel sounds are normal. There is no distension. Palpations: Abdomen is soft. Musculoskeletal:         General: No tenderness or deformity. Normal range of motion. Cervical back: Normal range of motion and neck supple. No rigidity. Right lower leg: No edema. Left lower leg: No edema. Skin:     General: Skin is warm and dry. Capillary Refill: Capillary refill takes less than 2 seconds. Coloration: Skin is not jaundiced. Neurological:      General: No focal deficit present. Mental Status: Mental status is at baseline.    Psychiatric:         Mood and Affect: Mood normal.         Behavior: Behavior normal.

## 2021-12-14 NOTE — PROGRESS NOTES
[facility]: TINY CABRERA   OCCUPATIONAL THERAPY MISSED TREATMENT NOTE   INPATIENT   Date: 21  Patient Name: Justen Gomez       Room: 1777/4427-35  MRN: [id number]   Account #: [de-identified]    : 1950  (80 y.o.)  Gender: male   Referring Practitioner: Dr Amaya Memory  Diagnosis: Pnuemothorax with chest tube             REASON FOR MISSED TREATMENT:  Patient unable to participate   -   Respiratory in with patient       Mitrose Valdez, [place]

## 2021-12-14 NOTE — CARE COORDINATION
ONGOING DISCHARGE PLAN:    Patient is alert and oriented x4. Spoke with patient regarding discharge plan and patient confirms that plan is still  to return to home w/ Wife. She does work. Pt. Now has 2 Chest Tubes. Cardiothoracic/Pulmonary following. Per Nursing, Pt. May need to go home w/ Pigtail Cath. Pt. Agreeable to VNS, Pardeep Clay, is following, Will accept. Remains on IV Rocephin & Oral Doxy.     PT/OT on board, will follow for any rec/needs. Pt Follows at [facility] [place], receives Chemo for Lung CA. Hemoc on board. Will continue to follow for additional discharge needs.     Electronically signed by Vera Pritchett RN on 12/14/2021 at 9:54 AM

## 2021-12-14 NOTE — DISCHARGE INSTR - COC
Continuity of Care Form    Patient Name: Shelton Gilman   :  1950  MRN:  [id number]    Admit date:  2021  Discharge date:  2021    Code Status Order: Full Code   Advance Directives:      Admitting Physician:  Jason Teague MD  PCP: Rodolfo Carr MD    Discharging Nurse: [facility] Unit/Room#: 2094/2094-01  Discharging Unit Phone Number: [phone]    Emergency Contact:   Extended Emergency Contact Information  Primary Emergency Contact: Alvarez Fletcher [facility]BKGGB   [address] Phone: [phone]  Work Phone: [phone]  Mobile Phone: [phone]  Relation: Spouse  Secondary Emergency Contact: Mouna Fernandes  Home Phone: [phone]  Work Phone: [phone]  Mobile Phone: [phone]  Relation: Child    Past Surgical History:  Past Surgical History:   Procedure Laterality Date    APPENDECTOMY      CARDIAC CATHETERIZATION      w/stent    COLONOSCOPY      CYST INCISION AND DRAINAGE Right 2020    rt elbow I and D and left knee aspiration with cultures    FOOT SURGERY Right     2nd toe(bone spur).     FOREARM SURGERY Right 2020    RIGHT ELBOW IRRIGATION AND DEBRIDEMENT performed by Klever Beaulieu DO at [address]  2021    IR CHEST TUBE INSERTION 2021 [facility]    KNEE SURGERY Left 2020    KNEE ASPIRATION WITH CULTURES SENT performed by Klever Beaulieu DO at [facility]      face under lt. eye.    TONSILLECTOMY         Immunization History:   Immunization History   Administered Date(s) Administered    Influenza, High Dose (Fluzone 65 yrs and older) 10/07/2019    Influenza, Quadv, adjuvanted, 65 yrs +, IM, PF (Fluad) 2020       Active Problems:  Patient Active Problem List   Diagnosis Code    Primary lung cancer with metastasis from lung to other site, right ([facility] [address] C34.91    Hyperlipidemia E78.5    Hypertension I10    Lung cancer, primary, with metastasis from lung to other site [facility]) C34.90    Sepsis ([address] A41.9    Atrial fibrillation with RVR (HCC) I48.91    Gout M10.9    Pyogenic arthritis of right elbow (HCC) M00.9    Pneumothorax on right J93.9    PAF (paroxysmal atrial fibrillation) (HCC) I48.0    Tobacco dependence F17.200    COPD (chronic obstructive pulmonary disease) (HCC) J44.9    Iron deficiency anemia D50.9    Pneumothorax J93.9    Thrombocytopenia (HCC) D69.6    Leukopenia D72.819       Isolation/Infection:   Isolation            No Isolation          Patient Infection Status       Infection Onset Added Last Indicated Last Indicated By Review Planned Expiration Resolved Resolved By    None active    Resolved    COVID-19 (Rule Out) 05/14/20 05/14/20 05/14/20 COVID-19 (Ordered)   05/14/20 Rule-Out Test Resulted    COVID-19 (Rule Out) 05/07/20 05/07/20 05/07/20 COVID-19 (Ordered)   05/07/20 Rule-Out Test Resulted            Nurse Assessment:  Last Vital Signs: /73   Pulse 93   Temp 97.3 °F (36.3 °C) (Oral)   Resp 18   Ht 5' 11\" (1.803 m)   Wt 165 lb (74.8 kg)   SpO2 98%   BMI 23.01 kg/m²     Last documented pain score (0-10 scale): Pain Level: 6  Last Weight:   Wt Readings from Last 1 Encounters:   12/08/21 165 lb (74.8 kg)     Mental Status:  oriented and alert    IV Access:  - None    Nursing Mobility/ADLs:  Walking   Independent  Transfer  Independent  Bathing  Independent  Dressing  Independent  Toileting  Independent  Feeding  Independent  Med Admin  Independent  Med Delivery   whole    Wound Care Documentation and Therapy:        Elimination:  Continence:    Bowel: Yes  Bladder: Yes  Urinary Catheter: None   Colostomy/Ileostomy/Ileal Conduit: No       Date of Last BM: 12/17/2021    Intake/Output Summary (Last 24 hours) at 12/14/2021 1009  Last data filed at 12/14/2021 0813  Gross per 24 hour   Intake 360 ml   Output 1170 ml   Net -810 ml     I/O last 3 completed shifts:  In: -   Out: [address] [Urine:1075; Chest Tube:195]    Safety Concerns:     None    Impairments/Disabilities:      None    Nutrition Therapy:  Current Nutrition Therapy:   - Oral Diet:  General    Routes of Feeding: Oral  Liquids: No Restrictions  Daily Fluid Restriction: no  Last Modified Barium Swallow with Video (Video Swallowing Test): not done    Treatments at the Time of Hospital Discharge:   Respiratory Treatments: N/A  Oxygen Therapy:  is not on home oxygen therapy. Ventilator:    - No ventilator support    Rehab Therapies: Physical Therapy and Occupational Therapy  Weight Bearing Status/Restrictions: No weight bearing restirctions  Other Medical Equipment (for information only, NOT a DME order):  ***  Other Treatments: Skilled Nursing Assessment Per Protocol. Medication Education & Monitoring. Chest tube care per protocol    Patient's personal belongings (please select all that are sent with patient):  Dentures upper and lower    RN SIGNATURE:  Electronically signed by Saniya Candelario RN on 12/20/21 at 5:46 PM EST    CASE MANAGEMENT/SOCIAL WORK SECTION    Inpatient Status Date: 12/9/2021    Readmission Risk Assessment Score:  Readmission Risk              Risk of Unplanned Readmission:  22           Discharging to Facility/ [address]  P: [phone]  F: [phone]       / signature: Electronically signed by Bozena Andres RN on 12/14/21 at 10:09 AM EST    PHYSICIAN SECTION    Prognosis: Fair    Condition at Discharge: Stable    Rehab Potential (if transferring to Rehab): Fair    Recommended Labs or Other Treatments After Discharge:     Physician Certification: I certify the above information and transfer of Lorraine Villanueva  is necessary for the continuing treatment of the diagnosis listed and that he requires [address] for less 30 days.      Update Admission H&P: No change in H&P    PHYSICIAN SIGNATURE:  Electronically signed by Chris Alamo MD on 12/14/21 at 11:06 AM EST

## 2021-12-14 NOTE — PLAN OF CARE
Problem: Infection:  Goal: Will remain free from infection  Description: Will remain free from infection  Outcome: Ongoing     Problem: Safety:  Goal: Free from accidental physical injury  Description: Free from accidental physical injury  Outcome: Ongoing  Goal: Free from intentional harm  Description: Free from intentional harm  Outcome: Ongoing     Problem: Daily Care:  Goal: Daily care needs are met  Description: Daily care needs are met  Outcome: Ongoing     Problem: Pain:  Goal: Patient's pain/discomfort is manageable  Description: Patient's pain/discomfort is manageable  Outcome: Ongoing  Goal: Pain level will decrease  Description: Pain level will decrease  Outcome: Ongoing  Goal: Control of acute pain  Description: Control of acute pain  Outcome: Ongoing  Goal: Control of chronic pain  Description: Control of chronic pain  Outcome: Ongoing     Problem: Falls - Risk of:  Goal: Will remain free from falls  Description: Will remain free from falls  Outcome: Ongoing  Goal: Absence of physical injury  Description: Absence of physical I Pt assessed as a fall risk this shift. Remains free from falls and accidental injury at this time. Fall precautions in place, including falling star sign and fall risk band on pt. Floor free from obstacles, and bed is locked and in lowest position. Adequate lighting provided. Pt encouraged to call before getting OOB for any need. Bed alarm activated. Will continue to monitor needs during hourly rounding, and reinforce education on use of call light.   njury  Outcome: Ongoing

## 2021-12-14 NOTE — PLAN OF CARE
Problem: Skin Integrity:  Goal: Will show no infection signs and symptoms  Description: Will show no infection signs and symptoms  12/14/2021 0423 by Isaiah Honeycutt RN  Outcome: Ongoing     Problem: Skin Integrity:  Goal: Absence of new skin breakdown  Description: Absence of new skin breakdown  Outcome: Ongoing     Problem: Infection:  Goal: Will remain free from infection  Description: Will remain free from infection  12/14/2021 0423 by Isaiah Honeycutt RN  Outcome: Ongoing     Problem: Safety:  Goal: Free from accidental physical injury  Description: Free from accidental physical injury  Outcome: Ongoing     Problem: Safety:  Goal: Free from intentional harm  Description: Free from intentional harm  Outcome: Ongoing     Problem: Daily Care:  Goal: Daily care needs are met  Description: Daily care needs are met  Outcome: Ongoing     Problem: Pain:  Goal: Patient's pain/discomfort is manageable  Description: Patient's pain/discomfort is manageable  12/14/2021 0423 by Isaiah Honeycutt RN  Outcome: Ongoing     Problem: Pain:  Goal: Pain level will decrease  Description: Pain level will decrease  Outcome: Ongoing     Problem: Pain:  Goal: Control of acute pain  Description: Control of acute pain  Outcome: Ongoing     Problem: Pain:  Goal: Control of chronic pain  Description: Control of chronic pain  Outcome: Ongoing     Problem: Skin Integrity:  Goal: Skin integrity will stabilize  Description: Skin integrity will stabilize  Outcome: Ongoing     Problem: Discharge Planning:  Goal: Patients continuum of care needs are met  Description: Patients continuum of care needs are met  Outcome: Ongoing     Problem: Falls - Risk of:  Goal: Will remain free from falls  Description: Will remain free from falls  Outcome: Ongoing     Problem: Falls - Risk of:  Goal: Absence of physical injury  Description: Absence of physical injury  Outcome: Ongoing

## 2021-12-14 NOTE — PROGRESS NOTES
PULMONARY PROGRESS NOTE:    REASON FOR VISIT: PTX, lung cancer  Interval History:    Shortness of Breath: no  Cough: no  Sputum: no          Hemoptysis: no  Chest Pain: no  Fever: no                   Swelling Feet: no  Headache: no                                           Nausea, Emesis, Abdominal Pain: no  Diarrhea: no         Constipation: no    Events since last visit: had 2nd chest tube put in; no change with PTX    PAST MEDICAL HISTORY:      Scheduled Meds:   polyethylene glycol  17 g Oral Daily    senna  1 tablet Oral Nightly    docusate sodium  100 mg Oral Daily    guaiFENesin  600 mg Oral BID    midodrine  7.5 mg Oral TID WC    ipratropium-albuterol  3 mL Inhalation Q4H WA    sodium chloride flush  5-40 mL IntraVENous 2 times per day    ferrous sulfate  325 mg Oral Daily    atorvastatin  10 mg Oral Daily    cefTRIAXone (ROCEPHIN) IV  1,000 mg IntraVENous Q24H    doxycycline monohydrate  100 mg Oral 2 times per day    apixaban  5 mg Oral BID    lidocaine  20 mL IntraDERmal Once     Continuous Infusions:   sodium chloride Stopped (12/13/21 0944)     PRN Meds:sodium chloride flush, sodium chloride, acetaminophen, ondansetron **OR** ondansetron, potassium chloride **OR** potassium alternative oral replacement **OR** potassium chloride        PHYSICAL EXAMINATION:  /73   Pulse 93   Temp 97.3 °F (36.3 °C) (Oral)   Resp 18   Ht 5' 11\" (1.803 m)   Wt 165 lb (74.8 kg)   SpO2 100%   BMI 23.01 kg/m²     General : Awake, alert,   Neck  supple, no lymphadenopathy, JVD not raised  Heart  regular rhythm, S1 and S2 normal; no additional sounds heard  Lungs  Air Entry- fair bilaterally; breath sounds : vesicular, chest tube X2 - air leak +, to 40 of suction, 96% 02 sat on RA  Abdomen  soft, no tenderness  Upper Extremities  - no cyanosis, mottling; edema : absent  Lower Extremities: no cyanosis, mottling; edema : absent    Current Laboratory, Radiologic, Microbiologic, and Diagnostic studies reviewed  Data ReviewCBC:   Recent Labs     12/12/21  0702 12/13/21  0653 12/14/21  0538   WBC 3.1* 3.2* 3.3*   RBC 2.70* 2.69* 2.76*   HGB 9.8* 9.9* 10.1*   HCT 28.3* 28.3* 28.9*   * 138* 146*     BMP:   Recent Labs     12/12/21  0702 12/13/21  0653 12/14/21  0538   GLUCOSE 88 86 90    136 134*   K 4.0 4.2 4.1   BUN 10 10 9   CREATININE 0.77 0.90 0.88   CALCIUM 8.7 9.0 9.1     ABGs: No results for input(s): PHART, PO2ART, ZDJ9FHP, HMB7ZEO, BEART, P7OWGJPS, QJB3OOW in the last 72 hours.    PT/INR:  No results found for: PTINR    ASSESSMENT / PLAN:    Lung cancer - per oncology  PTX - right chest tube -    CXR 12/11- no improvement  CT back to suction this morning     further recs per CT surgery - in my opinion this is loculated PTX and will persist    Electronically signed by Janet Andrews MD on 12/14/21 at 11:19 AM

## 2021-12-15 ENCOUNTER — APPOINTMENT (OUTPATIENT)
Dept: GENERAL RADIOLOGY | Age: 71
DRG: 199 | End: 2021-12-15
Payer: MEDICARE

## 2021-12-15 PROBLEM — E43 SEVERE MALNUTRITION (HCC): Chronic | Status: ACTIVE | Noted: 2021-12-15

## 2021-12-15 LAB
ABSOLUTE BANDS #: 0.03 K/UL (ref 0–1)
ABSOLUTE EOS #: 0.06 K/UL (ref 0–0.4)
ABSOLUTE IMMATURE GRANULOCYTE: ABNORMAL K/UL (ref 0–0.3)
ABSOLUTE LYMPH #: 1.36 K/UL (ref 1–4.8)
ABSOLUTE MONO #: 0.5 K/UL (ref 0.1–1.3)
ALBUMIN SERPL-MCNC: 3.5 G/DL (ref 3.5–5.2)
ALBUMIN/GLOBULIN RATIO: ABNORMAL (ref 1–2.5)
ALP BLD-CCNC: 107 U/L (ref 40–129)
ALT SERPL-CCNC: 16 U/L (ref 5–41)
ANION GAP SERPL CALCULATED.3IONS-SCNC: 9 MMOL/L (ref 9–17)
AST SERPL-CCNC: 18 U/L
BANDS: 1 % (ref 0–10)
BASOPHILS # BLD: 0 % (ref 0–2)
BASOPHILS ABSOLUTE: 0 K/UL (ref 0–0.2)
BILIRUB SERPL-MCNC: 0.25 MG/DL (ref 0.3–1.2)
BUN BLDV-MCNC: 13 MG/DL (ref 8–23)
BUN/CREAT BLD: ABNORMAL (ref 9–20)
CALCIUM SERPL-MCNC: 9 MG/DL (ref 8.6–10.4)
CHLORIDE BLD-SCNC: 98 MMOL/L (ref 98–107)
CO2: 24 MMOL/L (ref 20–31)
CREAT SERPL-MCNC: 1.08 MG/DL (ref 0.7–1.2)
DIFFERENTIAL TYPE: ABNORMAL
EOSINOPHILS RELATIVE PERCENT: 2 % (ref 0–4)
GFR AFRICAN AMERICAN: >60 ML/MIN
GFR NON-AFRICAN AMERICAN: >60 ML/MIN
GFR SERPL CREATININE-BSD FRML MDRD: ABNORMAL ML/MIN/{1.73_M2}
GFR SERPL CREATININE-BSD FRML MDRD: ABNORMAL ML/MIN/{1.73_M2}
GLUCOSE BLD-MCNC: 91 MG/DL (ref 70–99)
HCT VFR BLD CALC: 29.8 % (ref 41–53)
HEMOGLOBIN: 10.1 G/DL (ref 13.5–17.5)
IMMATURE GRANULOCYTES: ABNORMAL %
LYMPHOCYTES # BLD: 44 % (ref 24–44)
MCH RBC QN AUTO: 35.8 PG (ref 26–34)
MCHC RBC AUTO-ENTMCNC: 33.8 G/DL (ref 31–37)
MCV RBC AUTO: 105.7 FL (ref 80–100)
MONOCYTES # BLD: 16 % (ref 1–7)
MORPHOLOGY: ABNORMAL
MORPHOLOGY: ABNORMAL
NRBC AUTOMATED: ABNORMAL PER 100 WBC
PDW BLD-RTO: 16 % (ref 11.5–14.9)
PLATELET # BLD: 165 K/UL (ref 150–450)
PLATELET ESTIMATE: ABNORMAL
PMV BLD AUTO: 7.4 FL (ref 6–12)
POTASSIUM SERPL-SCNC: 4.3 MMOL/L (ref 3.7–5.3)
RBC # BLD: 2.82 M/UL (ref 4.5–5.9)
RBC # BLD: ABNORMAL 10*6/UL
SEG NEUTROPHILS: 37 % (ref 36–66)
SEGMENTED NEUTROPHILS ABSOLUTE COUNT: 1.15 K/UL (ref 1.3–9.1)
SODIUM BLD-SCNC: 131 MMOL/L (ref 135–144)
TOTAL PROTEIN: 6.4 G/DL (ref 6.4–8.3)
WBC # BLD: 3.1 K/UL (ref 3.5–11)
WBC # BLD: ABNORMAL 10*3/UL

## 2021-12-15 PROCEDURE — 6370000000 HC RX 637 (ALT 250 FOR IP): Performed by: FAMILY MEDICINE

## 2021-12-15 PROCEDURE — 97162 PT EVAL MOD COMPLEX 30 MIN: CPT

## 2021-12-15 PROCEDURE — 97116 GAIT TRAINING THERAPY: CPT

## 2021-12-15 PROCEDURE — 6360000002 HC RX W HCPCS: Performed by: FAMILY MEDICINE

## 2021-12-15 PROCEDURE — 94640 AIRWAY INHALATION TREATMENT: CPT

## 2021-12-15 PROCEDURE — 2580000003 HC RX 258: Performed by: FAMILY MEDICINE

## 2021-12-15 PROCEDURE — 94761 N-INVAS EAR/PLS OXIMETRY MLT: CPT

## 2021-12-15 PROCEDURE — 97530 THERAPEUTIC ACTIVITIES: CPT

## 2021-12-15 PROCEDURE — 71045 X-RAY EXAM CHEST 1 VIEW: CPT

## 2021-12-15 PROCEDURE — 97535 SELF CARE MNGMENT TRAINING: CPT

## 2021-12-15 PROCEDURE — 6370000000 HC RX 637 (ALT 250 FOR IP): Performed by: INTERNAL MEDICINE

## 2021-12-15 PROCEDURE — 2580000003 HC RX 258: Performed by: INTERNAL MEDICINE

## 2021-12-15 PROCEDURE — 80053 COMPREHEN METABOLIC PANEL: CPT

## 2021-12-15 PROCEDURE — 2060000000 HC ICU INTERMEDIATE R&B

## 2021-12-15 PROCEDURE — 99232 SBSQ HOSP IP/OBS MODERATE 35: CPT | Performed by: NURSE PRACTITIONER

## 2021-12-15 PROCEDURE — 36415 COLL VENOUS BLD VENIPUNCTURE: CPT

## 2021-12-15 PROCEDURE — 85025 COMPLETE CBC W/AUTO DIFF WBC: CPT

## 2021-12-15 RX ADMIN — STANDARDIZED SENNA CONCENTRATE 8.6 MG: 8.6 TABLET ORAL at 21:12

## 2021-12-15 RX ADMIN — MIDODRINE HYDROCHLORIDE 7.5 MG: 5 TABLET ORAL at 12:45

## 2021-12-15 RX ADMIN — DOXYCYCLINE 100 MG: 100 CAPSULE ORAL at 08:49

## 2021-12-15 RX ADMIN — DOCUSATE SODIUM 100 MG: 100 CAPSULE ORAL at 08:43

## 2021-12-15 RX ADMIN — SODIUM CHLORIDE, PRESERVATIVE FREE 10 ML: 5 INJECTION INTRAVENOUS at 08:43

## 2021-12-15 RX ADMIN — IPRATROPIUM BROMIDE AND ALBUTEROL SULFATE 3 ML: .5; 2.5 SOLUTION RESPIRATORY (INHALATION) at 07:24

## 2021-12-15 RX ADMIN — APIXABAN 5 MG: 5 TABLET, FILM COATED ORAL at 08:45

## 2021-12-15 RX ADMIN — ACETAMINOPHEN 650 MG: 325 TABLET, FILM COATED ORAL at 08:49

## 2021-12-15 RX ADMIN — IPRATROPIUM BROMIDE AND ALBUTEROL SULFATE 3 ML: .5; 2.5 SOLUTION RESPIRATORY (INHALATION) at 11:50

## 2021-12-15 RX ADMIN — APIXABAN 5 MG: 5 TABLET, FILM COATED ORAL at 21:12

## 2021-12-15 RX ADMIN — CEFTRIAXONE SODIUM 1000 MG: 1 INJECTION, POWDER, FOR SOLUTION INTRAMUSCULAR; INTRAVENOUS at 08:58

## 2021-12-15 RX ADMIN — GUAIFENESIN 600 MG: 600 TABLET, EXTENDED RELEASE ORAL at 08:49

## 2021-12-15 RX ADMIN — POLYETHYLENE GLYCOL 3350 17 G: 17 POWDER, FOR SOLUTION ORAL at 08:45

## 2021-12-15 RX ADMIN — IPRATROPIUM BROMIDE AND ALBUTEROL SULFATE 3 ML: .5; 2.5 SOLUTION RESPIRATORY (INHALATION) at 15:45

## 2021-12-15 RX ADMIN — ACETAMINOPHEN 650 MG: 325 TABLET, FILM COATED ORAL at 21:12

## 2021-12-15 RX ADMIN — DOXYCYCLINE 100 MG: 100 CAPSULE ORAL at 21:12

## 2021-12-15 RX ADMIN — SODIUM CHLORIDE, PRESERVATIVE FREE 10 ML: 5 INJECTION INTRAVENOUS at 21:13

## 2021-12-15 RX ADMIN — MIDODRINE HYDROCHLORIDE 7.5 MG: 5 TABLET ORAL at 08:44

## 2021-12-15 RX ADMIN — GUAIFENESIN 600 MG: 600 TABLET, EXTENDED RELEASE ORAL at 21:12

## 2021-12-15 RX ADMIN — IPRATROPIUM BROMIDE AND ALBUTEROL SULFATE 3 ML: .5; 2.5 SOLUTION RESPIRATORY (INHALATION) at 19:53

## 2021-12-15 RX ADMIN — FERROUS SULFATE TAB 325 MG (65 MG ELEMENTAL FE) 325 MG: 325 (65 FE) TAB at 08:45

## 2021-12-15 RX ADMIN — ATORVASTATIN CALCIUM 10 MG: 20 TABLET, FILM COATED ORAL at 08:45

## 2021-12-15 ASSESSMENT — PAIN SCALES - GENERAL
PAINLEVEL_OUTOF10: 5
PAINLEVEL_OUTOF10: 5

## 2021-12-15 ASSESSMENT — ENCOUNTER SYMPTOMS
ABDOMINAL DISTENTION: 0
TROUBLE SWALLOWING: 0
COUGH: 1
BACK PAIN: 0
STRIDOR: 0
CHOKING: 0
RECTAL PAIN: 0
ANAL BLEEDING: 0
SHORTNESS OF BREATH: 0
PHOTOPHOBIA: 0
BLOOD IN STOOL: 0
VOICE CHANGE: 0
COLOR CHANGE: 0

## 2021-12-15 ASSESSMENT — PAIN DESCRIPTION - LOCATION
LOCATION: CHEST
LOCATION: CHEST

## 2021-12-15 NOTE — PROGRESS NOTES
Progress Note    12/15/2021   12:27 PM    Name:  Loco Cardoza  MRN:    [id number]     Acct:     [de-identified]   Room:  2094/2094-01  IP Day: 6     Admit Date: 12/8/2021  7:03 PM  PCP: Shila Machado MD    Subjective:     C/C:   Chief Complaint   Patient presents with    Shortness of Breath       Interval History: Status: not changed. Patient denies shortness of breath palpitations or chest pain. Patient has a right-sided chest tube in right upper chest pigtail catheter in place. Vital signs reviewed patient oxygen saturation is 100% on room air blood pressure and heart rate stable afebrile. Recent labs reviewed sodium 131, WBC 3.1 hemoglobin 10.1, platelets 826. Chest x-ray report 12/15/2021 shows slight decrease in the size of pneumothorax. ROS:   all 10 systems reviewed and are negative except as noted    Review of Systems   Constitutional: Negative for appetite change, chills and diaphoresis. HENT: Negative for drooling, ear pain, trouble swallowing and voice change. Eyes: Negative for photophobia and visual disturbance. Respiratory: Positive for cough. Negative for choking, shortness of breath and stridor. Cardiovascular: Negative for chest pain and palpitations. Gastrointestinal: Negative for abdominal distention, anal bleeding, blood in stool and rectal pain. Endocrine: Negative for polyphagia and polyuria. Genitourinary: Negative for dysuria, flank pain, hematuria and urgency. Musculoskeletal: Negative for back pain, myalgias and neck stiffness. Skin: Negative for color change, pallor and rash. Allergic/Immunologic: Negative for environmental allergies and food allergies. Neurological: Negative for tremors, seizures, facial asymmetry and numbness. Hematological: Negative for adenopathy. Does not bruise/bleed easily. Psychiatric/Behavioral: Negative for agitation, behavioral problems, hallucinations, self-injury and suicidal ideas. Medications:      Allergies: No Known Allergies    Current Meds: polyethylene glycol (GLYCOLAX) packet 17 g, Daily  senna (SENOKOT) tablet 8.6 mg, Nightly  docusate sodium (COLACE) capsule 100 mg, Daily  guaiFENesin (MUCINEX) extended release tablet 600 mg, BID  midodrine (PROAMATINE) tablet 7.5 mg, TID WC  ipratropium-albuterol (DUONEB) nebulizer solution 3 mL, Q4H WA  sodium chloride flush 0.9 % injection 5-40 mL, 2 times per day  sodium chloride flush 0.9 % injection 5-40 mL, PRN  0.9 % sodium chloride infusion, PRN  acetaminophen (TYLENOL) tablet 650 mg, Q4H PRN  ondansetron (ZOFRAN-ODT) disintegrating tablet 4 mg, Q8H PRN   Or  ondansetron (ZOFRAN) injection 4 mg, Q6H PRN  potassium chloride (KLOR-CON M) extended release tablet 40 mEq, PRN   Or  potassium bicarb-citric acid (EFFER-K) effervescent tablet 40 mEq, PRN   Or  potassium chloride 10 mEq/100 mL IVPB (Peripheral Line), PRN  ferrous sulfate (IRON 325) tablet 325 mg, Daily  atorvastatin (LIPITOR) tablet 10 mg, Daily  cefTRIAXone (ROCEPHIN) 1000 mg IVPB in 50 mL D5W minibag, Q24H  doxycycline monohydrate (MONODOX) capsule 100 mg, 2 times per day  apixaban (ELIQUIS) tablet 5 mg, BID  lidocaine 1 % injection 20 mL, Once        Data:     Code Status:  Full Code    Family History   Problem Relation Age of Onset    Cancer Father         lung cancer       Social History     Socioeconomic History    Marital status:      Spouse name: Not on file    Number of children: Not on file    Years of education: Not on file    Highest education level: Not on file   Occupational History    Not on file   Tobacco Use    Smoking status: Current Every Day Smoker     Packs/day: 0.50     Types: Cigarettes    Smokeless tobacco: Former User   Vaping Use    Vaping Use: Former   Substance and Sexual Activity    Alcohol use: No    Drug use: Yes     Frequency: 14.0 times per week     Types: Marijuana Praveena Justen)    Sexual activity: Not on file   Other Topics Concern    Not on file   Social History Narrative    Not on file     Social Determinants of Health     Financial Resource Strain:     Difficulty of Paying Living Expenses: Not on file   Food Insecurity:     Worried About Running Out of Food in the Last Year: Not on file    Dandre of Food in the Last Year: Not on file   Transportation Needs:     Lack of Transportation (Medical): Not on file    Lack of Transportation (Non-Medical): Not on file   Physical Activity:     Days of Exercise per Week: Not on file    Minutes of Exercise per Session: Not on file   Stress:     Feeling of Stress : Not on file   Social Connections:     Frequency of Communication with Friends and Family: Not on file    Frequency of Social Gatherings with Friends and Family: Not on file    Attends [employer] Services: Not on file    Active Member of [address] [place] [address] [employer] or Organizations: Not on file    Attends Club or Organization Meetings: Not on file    Marital Status: Not on file   Intimate Partner Violence:     Fear of Current or Ex-Partner: Not on file    Emotionally Abused: Not on file    Physically Abused: Not on file    Sexually Abused: Not on file   Housing Stability:     Unable to Pay for Housing in the Last Year: Not on file    Number of [place] in the Last Year: Not on file    Unstable Housing in the Last Year: Not on file       I/O (24Hr): Intake/Output Summary (Last 24 hours) at 12/15/2021 1227  Last data filed at 12/15/2021 8909  Gross per 24 hour   Intake 556 ml   Output 1385 ml   Net -829 ml     Radiology:  CT CHEST W WO CONTRAST    Result Date: 12/14/2021  Similar moderate to large right pneumothorax. Subsequent pigtail chest tube placed using fluoroscopic guidance. RECOMMENDATIONS: Unavailable     XR CHEST PORTABLE    Result Date: 12/14/2021  New pleural pigtail catheter in the upper right chest.  Unchanged large bore right chest tube. Moderate right pneumothorax is not significantly changed.      XR CHEST PORTABLE    Result Date: 12/13/2021  Overall no significant interval change. Right chest tube remains in place with persistent moderate right pneumothorax. XR CHEST PORTABLE    Result Date: 12/12/2021  Stable moderate right pneumothorax and right subcutaneous emphysema. XR CHEST PORTABLE    Result Date: 12/11/2021  Stable appearance of right-sided large pneumothorax with chest tube stable in position. XR CHEST PORTABLE    Result Date: 12/10/2021  Slight improvement of large right pneumothorax with chest tube in place. XR CHEST PORTABLE    Result Date: 12/9/2021  Stable examination. XR CHEST PORTABLE    Addendum Date: 12/8/2021    ADDENDUM: Compared with the chest x-ray there is been significant interval improvement. Please disregard original impression. Discussed with Dr. Maite Blunt at 10:20 p.m. Result Date: 12/8/2021  Increased right pneumothorax despite new chest tube. Increased airspace disease on the right. RECOMMENDATION: The findings were sent to the Radiology Results Communication Center at 10:19 pm on 12/8/2021to be communicated to a licensed caregiver. IR CHEST TUBE INSERTION    Result Date: 12/13/2021  Successful fluoroscopic guided placement of a 10 [place] right pleural pigtail chest tube     CT CHEST ABDOMEN PELVIS W CONTRAST    Result Date: 12/8/2021  1. Large right pneumothorax has increased since prior imaging and there is slight mediastinal shift, suggesting underlying tension. 2.  Small right pleural effusion and pleural nodularity suggestive of neoplastic involvement. 3.  Similar appearance of soft tissue density in the right hilum and mediastinal lymphadenopathy. 4.  More prominent sclerosis in the T12 vertebral body and slight interval vertebral body height loss, suggestive of a metastatic deposit and pathologic fracture. 5.  Liver lesions appear smaller and less conspicuous since prior imaging. 6.  Unchanged right adrenal nodule. Findings were discussed with Didi Newsome at 3:14 pm on 12/8/2021.      MRI BRAIN W WO CONTRAST    Result Date: 12/8/2021  No evidence of acute or metastatic disease.        Labs:  Recent Results (from the past 24 hour(s))   CBC with DIFF    Collection Time: 12/15/21  6:36 AM   Result Value Ref Range    WBC 3.1 (L) 3.5 - 11.0 k/uL    RBC 2.82 (L) 4.5 - 5.9 m/uL    Hemoglobin 10.1 (L) 13.5 - 17.5 g/dL    Hematocrit 29.8 (L) 41 - 53 %    .7 (H) 80 - 100 fL    MCH 35.8 (H) 26 - 34 pg    MCHC 33.8 31 - 37 g/dL    RDW 16.0 (H) 11.5 - 14.9 %    Platelets 066 852 - 830 k/uL    MPV 7.4 6.0 - 12.0 fL    NRBC Automated NOT REPORTED per 100 WBC    Differential Type NOT REPORTED     Immature Granulocytes NOT REPORTED 0 %    Absolute Immature Granulocyte NOT REPORTED 0.00 - 0.30 k/uL    WBC Morphology NOT REPORTED     RBC Morphology NOT REPORTED     Platelet Estimate NOT REPORTED     Seg Neutrophils 37 36 - 66 %    Lymphocytes 44 24 - 44 %    Monocytes 16 (H) 1 - 7 %    Eosinophils % 2 0 - 4 %    Basophils 0 0 - 2 %    Bands 1 0 - 10 %    Segs Absolute 1.15 (L) 1.3 - 9.1 k/uL    Absolute Lymph # 1.36 1.0 - 4.8 k/uL    Absolute Mono # 0.50 0.1 - 1.3 k/uL    Absolute Eos # 0.06 0.0 - 0.4 k/uL    Basophils Absolute 0.00 0.0 - 0.2 k/uL    Absolute Bands # 0.03 0.0 - 1.0 k/uL    Morphology HYPOCHROMIA PRESENT     Morphology ANISOCYTOSIS PRESENT    Comprehensive Metabolic Panel w/ Reflex to MG    Collection Time: 12/15/21  6:36 AM   Result Value Ref Range    Glucose 91 70 - 99 mg/dL    BUN 13 8 - 23 mg/dL    CREATININE 1.08 0.70 - 1.20 mg/dL    Bun/Cre Ratio NOT REPORTED 9 - 20    Calcium 9.0 8.6 - 10.4 mg/dL    Sodium 131 (L) 135 - 144 mmol/L    Potassium 4.3 3.7 - 5.3 mmol/L    Chloride 98 98 - 107 mmol/L    CO2 24 20 - 31 mmol/L    Anion Gap 9 9 - 17 mmol/L    Alkaline Phosphatase 107 40 - 129 U/L    ALT 16 5 - 41 U/L    AST 18 <40 U/L    Total Bilirubin 0.25 (L) 0.3 - 1.2 mg/dL    Total Protein 6.4 6.4 - 8.3 g/dL    Albumin 3.5 3.5 - 5.2 g/dL    Albumin/Globulin Ratio NOT REPORTED 1.0 - 2.5    GFR Non- Pneumothorax  Active Problems:    Lung cancer, primary, with metastasis from lung to other site ([facility] [address]    PAF (paroxysmal atrial fibrillation) (HCC)    COPD (chronic obstructive pulmonary disease) (HCC)    Iron deficiency anemia  Resolved Problems:    * No resolved hospital problems. *      Past Medical History:   Diagnosis Date    DDD (degenerative disc disease), cervical     Gout     Heart murmur     hx. of when younger.  Hyperlipidemia     Hypertension     Lung cancer ([facility] [address]     right lung    [facility]     Skin cancer     face    Wears glasses         Plan:        1. Rocephin 1 g IV daily  2. Doxycycline 100 mg p.o. twice daily  3. Increase midodrine 7.5 mg p.o. 3 times a day with holding parameters  4.  DVT prophylaxis Eliquis   5.  oncology, cardiothoracic and pulmonology input noted  6.  CBC, CMP  7.  EPCs  8.  check and replace electrolytes per sliding scale      Electronically signed by Lexi Egan MD

## 2021-12-15 NOTE — PLAN OF CARE
Problem: Skin Integrity:  Goal: Absence of new skin breakdown  Description: Absence of new skin breakdown  12/15/2021 0342 by Kennedi Nguyen RN  Outcome: Ongoing  Note: Pt absent from any new skin breakdown       Problem: Safety:  Goal: Free from intentional harm  Description: Free from intentional harm  12/15/2021 0342 by Kennedi Nguyen RN  Outcome: Ongoing  Note: Pt free from intentional harm     Problem: Skin Integrity:  Goal: Skin integrity will stabilize  Description: Skin integrity will stabilize  12/15/2021 0342 by Kennedi Nguyen RN  Outcome: Ongoing  Note: Pts skin integrity stable. Chest tube x2     Problem: Falls - Risk of:  Goal: Will remain free from falls  Description: Will remain free from falls  12/15/2021 0342 by Kennedi Nguyen RN  Outcome: Ongoing  Note: Pt free of falls. Call light and bedside table within reach. Bed locked and in lowest position.

## 2021-12-15 NOTE — FLOWSHEET NOTE
12/15/21 1436   Encounter Summary   Services provided to: Patient   Referral/Consult From: Charissa   Continue Visiting   (12/15/21V)   Volunteer Visit Yes   Complexity of Encounter Low   Length of Encounter 15 minutes   Spiritual/Mandaen   Type Spiritual support   Intervention Communion; Prayer   Sacraments   Communion Patient received communion

## 2021-12-15 NOTE — PROGRESS NOTES
Laboratory, Radiologic, Microbiologic, and Diagnostic studies reviewed  Data ReviewCBC:   Recent Labs     12/13/21  0653 12/14/21  0538 12/15/21  0636   WBC 3.2* 3.3* 3.1*   RBC 2.69* 2.76* 2.82*   HGB 9.9* 10.1* 10.1*   HCT 28.3* 28.9* 29.8*   * 146* 165     BMP:   Recent Labs     12/13/21  0653 12/14/21  0538 12/15/21  0636   GLUCOSE 86 90 91    134* 131*   K 4.2 4.1 4.3   BUN 10 9 13   CREATININE 0.90 0.88 1.08   CALCIUM 9.0 9.1 9.0     ABGs: No results for input(s): PHART, PO2ART, BTJ5HOG, EHE8QUY, BEART, H2SKOOWY, WEX2UNC in the last 72 hours.    PT/INR:  No results found for: PTINR    ASSESSMENT / PLAN:    Lung cancer - per oncology  PTX - right chest tube -    CXR 12/11- no improvement  CT back to suction this morning     further recs per CT surgery - in my opinion this is loculated PTX and will persist  Large bore chest tube removed 12/15/21    Electronically signed by Savi Merino MD on 12/15/21 at 5:19 PM

## 2021-12-15 NOTE — PROGRESS NOTES
RN spoke with Kasey Mcghee from [address] [place] [address]. He reviewed CXR and requested that we leave the chest tubes in place. He said someone will come see the patient later today.

## 2021-12-15 NOTE — PROGRESS NOTES
PULMONARY PROGRESS NOTE:    REASON FOR VISIT: PTX, lung cancer  Interval History:    Shortness of Breath: no  Cough: no  Sputum: no          Hemoptysis: no  Chest Pain: no  Fever: no                   Swelling Feet: no  Headache: no                                           Nausea, Emesis, Abdominal Pain: no  Diarrhea: no         Constipation: no    Events since last visit:     PAST MEDICAL HISTORY: 195 recorded output from chest tube yesterday      Scheduled Meds:   polyethylene glycol  17 g Oral Daily    senna  1 tablet Oral Nightly    docusate sodium  100 mg Oral Daily    guaiFENesin  600 mg Oral BID    midodrine  7.5 mg Oral TID WC    ipratropium-albuterol  3 mL Inhalation Q4H WA    sodium chloride flush  5-40 mL IntraVENous 2 times per day    ferrous sulfate  325 mg Oral Daily    atorvastatin  10 mg Oral Daily    cefTRIAXone (ROCEPHIN) IV  1,000 mg IntraVENous Q24H    doxycycline monohydrate  100 mg Oral 2 times per day    apixaban  5 mg Oral BID    lidocaine  20 mL IntraDERmal Once     Continuous Infusions:   sodium chloride Stopped (12/13/21 0944)     PRN Meds:sodium chloride flush, sodium chloride, acetaminophen, ondansetron **OR** ondansetron, potassium chloride **OR** potassium alternative oral replacement **OR** potassium chloride        PHYSICAL EXAMINATION:  /76   Pulse 84   Temp 98.1 °F (36.7 °C) (Oral)   Resp 18   Ht 5' 11\" (1.803 m)   Wt 162 lb (73.5 kg)   SpO2 99%   BMI 22.59 kg/m²     General : Awake, alert,   Neck  supple, no lymphadenopathy, JVD not raised  Heart  regular rhythm, S1 and S2 normal; no additional sounds heard  Lungs  Air Entry- fair bilaterally; breath sounds : vesicular, chest tube X2 - air leak +, 40 of suction, 97% 02 sat on RA  Abdomen  soft, no tenderness  Upper Extremities  - no cyanosis, mottling; edema : absent  Lower Extremities: no cyanosis, mottling; edema : absent    Current Laboratory, Radiologic, Microbiologic, and Diagnostic studies reviewed  Data ReviewCBC:   Recent Labs     12/13/21  0653 12/14/21  0538 12/15/21  0636   WBC 3.2* 3.3* 3.1*   RBC 2.69* 2.76* 2.82*   HGB 9.9* 10.1* 10.1*   HCT 28.3* 28.9* 29.8*   * 146* 165     BMP:   Recent Labs     12/13/21  0653 12/14/21  0538 12/15/21  0636   GLUCOSE 86 90 91    134* 131*   K 4.2 4.1 4.3   BUN 10 9 13   CREATININE 0.90 0.88 1.08   CALCIUM 9.0 9.1 9.0     ABGs: No results for input(s): PHART, PO2ART, GIJ7PEZ, QIR2ZJH, BEART, Y1RKBEID, DUT6XZN in the last 72 hours.    PT/INR:  No results found for: PTINR    ASSESSMENT / PLAN:    Lung cancer - per oncology  PTX - right chest tube x 2   CXR 12/11- no improvement    further recs per CT surgery - in my opinion this is loculated PTX and will persist    Plan of care discussed with Dr Copeland Prior  Electronically signed by AZEB Estrada - CNP on 12/15/21 at 1:14 PM

## 2021-12-15 NOTE — PROGRESS NOTES
[facility]    INPATIENT OCCUPATIONAL THERAPY  PROGRESS NOTE  Date: 12/15/2021  Patient Name: Sagrario Mott      Room: 8902/8948-97  MRN: [id number]    : 1950  (80 y.o.) Gender: male     Discharge Recommendations:  Further Occupational Therapy is recommended upon facility discharge. OT Equipment Recommendations  Equipment Needed: Yes  Mobility Devices: ADL Assistive Devices    Referring Practitioner: Dr Laura Liu  Diagnosis: Pnuemothorax with chest tube  General  Chart Reviewed: Yes, Orders, Progress Notes, History and Physical, Imaging, Other (comment), Previous Admission  Patient assessed for rehabilitation services?: Yes  Additional Pertinent Hx: Current with [place]  Referring Practitioner: Dr Laura Liu  Diagnosis: Pnuemothorax with chest tube    Restrictions  Restrictions/Precautions: General Precautions, Surgical Protocols (Chest tube to water seal)  Other position/activity restrictions: Chest Tube      Subjective  Subjective: pt states that he is having some discomfort where tape is around his drainage tubes. Comments: pt alert  Patient Currently in Pain: Yes  Pain Location: Chest  Overall Orientation Status: Within Functional Limits     Pain Assessment  Pain Assessment:  (does not rate. \"just discomfort\")  Pain Location: Chest    Objective     Bed mobility  Rolling to Right: Stand by assistance  Supine to Sit: Stand by assistance  Scooting: Stand by assistance  Comment: pt utilizes rails as needed  Balance  Sitting Balance: Independent  Standing Balance: Stand by assistance  Standing Balance  Time: 1-2 min x 3 without device  Activity: bed<>chair transfer, standing to utilize [facility] urinal  Comment: pt unsteady at times but no LOB noted, pt demo G safety awareness c chest tubes.   Functional Mobility  Functional - Mobility Device: No device  Activity: Other (functional transfers)  Assist Level: Stand by assistance  Functional Mobility Comments: pt unsteady at times but no LOB noted, pt rochelle DEGROOT safety awareness c chest tubes. ADL  Feeding: Increased time to complete; Setup  Grooming: Contact guard assistance; Setup  UE Bathing: Setup; Increased time to complete (chest tube)  LE Bathing: Minimal assistance (chest tube)  UE Dressing: Minimal assistance  LE Dressing: Minimal assistance (utilizes figure 4 method to sangeeta socks)  Toileting: Minimal assistance (assist to empty HH urinal 2* chest tubes)  Additional Comments: precatuions related to chest tube limits activity level, pt educates on AE/DME to increase EC during ADLs. ie shower chair, LH sponge, taking RB as needed     Transfers  Sit to stand: Stand by assistance  Stand to sit: Stand by assistance  Transfer Comments: pt rochelle DEGROOT hand placement       Assessment  Performance deficits / Impairments: Decreased ADL status;  Decreased safe awareness; Decreased endurance; Decreased functional mobility   Prognosis: Good; Fair  Discharge Recommendations: Patient would benefit from continued therapy after discharge  Activity Tolerance: Patient limited by fatigue; Patient limited by pain; Treatment limited secondary to medical complications (free text)  Activity Tolerance: Chest Tube  Safety Devices in place: Yes  Type of devices: Left in bed; Call light within reach             Patient Education:  Patient Education: OT POC, home safety/fall prevention, AE/DME, EC during ADLs  Learner:patient  Method: demonstration, explanation and handout       Outcome: acknowledged understanding and demonstrated understanding     Plan  Safety Devices  Safety Devices in place: Yes  Type of devices: Left in bed, Call light within reach  Plan  Times per week: 2-4 sessions  Times per day: Daily  Current Treatment Recommendations: Endurance Training, Functional Mobility Training, Safety Education & Training, Patient/Caregiver Education & Training, Self-Care / ADL      Goals  Short term goals  Time Frame for Short term goals: by discharge  Short term goal 1: Tolerate seated self care tasks with infrequent rest breaks, using safe techniques for care task performance  Short term goal 2: D/V understanding of Modified care techiques including use of Adpative devices and energy conservatoin strategies  Short term goal 3: D/V understanding of Pacing / phasing techniques and matching breathing with the activity being performed  Short term goal 4: Participate in care using safe techiques for self caer and functional mobility tasks    OT Individual Minutes  Time In: 1506  Time Out: 1538  Minutes: 32      Electronically signed by MO Fierro on 12/15/21 at 4:39 PM EST

## 2021-12-15 NOTE — PROGRESS NOTES
Physical Therapy    Facility/Department: [address] [place] [address] CARE  Initial Assessment    NAME: Yinka Chaudhry  : 1950  MRN: [id number]    Date of Service: 12/15/2021    Discharge Recommendations:  Patient would benefit from continued therapy after discharge        Assessment   Body structures, Functions, Activity limitations: Decreased functional mobility ; Decreased safe awareness; Decreased balance; Decreased strength; Decreased posture; Decreased endurance  Assessment: Pt presents with R pneumothorax s/p chest tubes. Pt demo's balance and endurance deficits, weakness, and needs assist with mobiltiy. Cont per POC to prepare for d/c. Treatment Diagnosis: impaired mobility d/t R pneumothorax  Prognosis: Good  Decision Making: Medium Complexity  History: 71-year-old male history of lung cancer, A. fib with RVR, spontaneous pneumothorax presents for evaluation with pneumothorax on right side detected on routine screening CT exam today. Large pneumothorax with almost entirety of right lung collapse. Patient asymptomatic. Discussed with pulmonology. 30 F Chest tube placed with improvement in PTX on postprocedural x-ray. Will admit to intermediate ICU for pulmonology consult and monitoring. Exam: ROM, MMT, social hx, balance assessment, mobiilty  Clinical Presentation: copoerative  PT Education: PT Role; General Safety; Precautions; Functional Mobility Training  Barriers to Learning: none  REQUIRES PT FOLLOW UP: Yes  Activity Tolerance  Activity Tolerance: Patient limited by endurance       Patient Diagnosis(es): The encounter diagnosis was Chronic pneumothorax.     has a past medical history of DDD (degenerative disc disease), cervical, Gout, Heart murmur, Hyperlipidemia, Hypertension, Lung cancer ([address] MI (myocardial infarction) ([address] Skin cancer, and Wears glasses. has a past surgical history that includes Appendectomy; Tonsillectomy; skin biopsy; skin biopsy; Colonoscopy;  Foot surgery (Right); Cardiac catheterization; cyst incision and drainage (Right, 05/14/2020); Forearm surgery (Right, 5/14/2020); knee surgery (Left, 5/14/2020); and IR CHEST TUBE INSERTION (12/13/2021). Restrictions  Restrictions/Precautions  Restrictions/Precautions: General Precautions, Fall Risk (chst tube to wall suction)  Required Braces or Orthoses?: No  Implants present? : Metal implants (cardiac stent)  Position Activity Restriction  Other position/activity restrictions: 2 right sided chest tubes, IV, up with assist, lung CA with liver mets. Per CT: T12 finding suggestive of bone mets and pathological fx 12/8/21  Vision/Hearing  Vision: Within Functional Limits  Hearing: Within functional limits (denies deficits)     Subjective  General  Chart Reviewed: Yes  Patient assessed for rehabilitation services?: Yes  Additional Pertinent Hx: Chest tube placement 12/8/21 and 12/13/21 d/t persistent air leak. Pt has h/o R sided pneumothorax Sept 2021 and currently. H/o lung CA  Response To Previous Treatment: Not applicable  Family / Caregiver Present: No  Diagnosis: impaired mobility d/t pneumothorax  Follows Commands: Within Functional Limits  General Comment  Comments: RN ok'session, states pt has been standing to use urinal without assistance  Subjective  Subjective: Pt is resting in bed and agreeable to PT. States he has been standing up in room without assistance.   Pain Screening  Patient Currently in Pain: Other (comment) (reports as discomfort, not pain)  Pain Assessment  Pain Level:  (does not rate)  Patient's Stated Pain Goal: No pain  Pain Location: Chest (at chest tube site)  Vital Signs  Patient Currently in Pain: Other (comment) (reports as discomfort, not pain)  Pre Treatment Pain Screening  Intervention List: Patient able to continue with treatment    Orientation  Orientation  Overall Orientation Status: Within Functional Limits  Social/Functional History  Social/Functional History  Lives With: Spouse  Type of Home: House  Home Layout: Two level, Performs ADL's on one level, Able to Live on Main level with bedroom/bathroom  Home Access: Stairs to enter without rails  Entrance Stairs - Number of Steps:  One step at the entrance  Bathroom Shower/Tub: Tub/Shower unit, Walk-in shower  Bathroom Toilet: Handicap height  Bathroom Equipment: Grab bars in shower, Grab bars around toilet  Bathroom Accessibility: [facility]: [place], [facility] Help From: Family  ADL Assistance: [address]: [address] [facility] Responsibilities: Yes (shared with wife)  Ambulation Assistance: Independent  Transfer Assistance: Independent  Mode of Transportation: Truck, SUV  Occupation: Retired  Cognition        Objective     Observation/Palpation  Posture: Fair  Observation: 2 chest tubes, IV, appears uncomfortable at times d/t chest tubes  Edema: none noted    AROM RLE (degrees)  RLE AROM: WFL  AROM LLE (degrees)  LLE AROM : WFL  AROM RUE (degrees)  RUE AROM : WFL  RUE General AROM: lacks overhead shldr flex d/t chest tube site  AROM LUE (degrees)  LUE AROM : WFL  Strength RLE  Comment: grossly 4/5  Strength LLE  Comment: grossly 4/5  Strength RUE  Comment: at least 3/5  Strength LUE  Comment: at least 3/5        Bed mobility  Supine to Sit: Minimal assistance  Scooting: Stand by assistance (scoot to EOB while seated)  Transfers  Sit to Stand: Contact guard assistance  Stand to sit: Minimal Assistance (to ease decent)  Ambulation  Ambulation?: Yes  More Ambulation?: No  Ambulation 1  Surface: level tile  Device: No Device  Assistance: Contact guard assistance  Quality of Gait: no LOB, weakness and unsteadiness noted  Distance: 4 ft fwd/back 5x  Comments: limited by chest tubes connected to wall suction  Stairs/Curb  Stairs?: Yes  Stairs  # Steps : 5  Stairs Height: 4\"  Rails: Right ascending  Device: No Device  Assistance: Contact guard assistance  Wheelchair Activities  Wheelchair Parts Management: No  Propulsion: No     Balance  Posture: Fair  Sitting - Static: Fair; +  Sitting - Dynamic: Fair; +  Standing - Static: Fair  Standing - Dynamic: Fair  Comments: no device        Plan   Plan  Times per week: 6-7x/wk  Times per day: Daily  Current Treatment Recommendations: Strengthening, Transfer Training, Endurance Training, Patient/Caregiver Education & Training, Balance Training, Functional Mobility Training, Stair training, Safety Education & Training, Gait Training, Home Exercise Program, Equipment Evaluation, Education, & procurement  Safety Devices  Type of devices: Call light within reach, Left in chair, Nurse notified, Patient at risk for falls (RN and PCT notified pt is sitting in chair and is agreeable to sit until 1115.)  Restraints  Initially in place: No    G-Code       OutComes Score                                                  AM-PAC Score  AM-PAC Inpatient Mobility Raw Score : 19 (12/15/21 1230)  AM-PAC Inpatient T-Scale Score : 45.44 (12/15/21 1230)  Mobility Inpatient CMS 0-100% Score: 41.77 (12/15/21 1230)  Mobility Inpatient CMS G-Code Modifier : CK (12/15/21 1230)          Goals  Short term goals  Time Frame for Short term goals: 1 week  Short term goal 1: Pt will increase LE strength to 5/5  Short term goal 2: Ptwill increase endurance to tolerate PT sessions for 30 mins  Short term goal 3: Pt will improve standing and ambulation balance to good  Short term goal 4: Pt will perform bed mobility IND  Short term goal 5: Ptwill transfer with least restrictive device MOD IND  Short term goal 6: Pt will ambulate with least restrictive device 50 ft MOD IND  Short term goal 7: Pt will ascend/descend 5 steps without rail SBA       Therapy Time   Individual Concurrent Group Co-treatment   Time In 0922         Time Out 0951         Minutes [address], PT

## 2021-12-15 NOTE — PROGRESS NOTES
[facility]  Daily Progress Note    Surgeon: Dr Rocio Mendez   CC: Recurrent pneumothorax s/p chest tube placement      Subjective:  Mr. Ene Garcia feels well today with no acute complaints. Pain is controlled on current medication regimen. Chest tubes remain in place to wall suction with minimal output overnight. Will have large bore chest tube removed. Pigtail to remain to suction overnight with plan to transition to water seal after morning x-ray with repeat x-ray 3 hours later. Plan of care reviewed and questions answered. Physical Exam  Vital Signs: /76   Pulse 84   Temp 98.1 °F (36.7 °C) (Oral)   Resp 18   Ht 5' 11\" (1.803 m)   Wt 162 lb (73.5 kg)   SpO2 99%   BMI 22.59 kg/m²  O2 Flow Rate (L/min): 0 L/min   Admit Weight: Weight: 165 lb (74.8 kg)   WEIGHTWeight: 162 lb (73.5 kg)     General: alert and oriented to person, place and time, well-developed and well-nourished, in no acute distress, frail-appearing and cachectic. Up in chair  Heart: Normal S1 and S2.  Regular rhythm. No murmurs, gallops, or rubs.     Lungs: clear to auscultation bilaterally and diminished breath sounds bilaterally  Abdomen: soft, non tender, non distended, BSx4  Extremities: thin with scattered bruising  Wounds: clean and dry,      Scheduled Meds:    polyethylene glycol  17 g Oral Daily    senna  1 tablet Oral Nightly    docusate sodium  100 mg Oral Daily    guaiFENesin  600 mg Oral BID    midodrine  7.5 mg Oral TID WC    ipratropium-albuterol  3 mL Inhalation Q4H WA    sodium chloride flush  5-40 mL IntraVENous 2 times per day    ferrous sulfate  325 mg Oral Daily    atorvastatin  10 mg Oral Daily    cefTRIAXone (ROCEPHIN) IV  1,000 mg IntraVENous Q24H    doxycycline monohydrate  100 mg Oral 2 times per day    apixaban  5 mg Oral BID    lidocaine  20 mL IntraDERmal Once     Continuous Infusions:    sodium chloride Stopped (12/13/21 0944)       Data:  CBC:   Recent Labs 12/13/21  0653 12/14/21  0538 12/15/21  0636   WBC 3.2* 3.3* 3.1*   HGB 9.9* 10.1* 10.1*   HCT 28.3* 28.9* 29.8*   .0* 104.7* 105.7*   * 146* 165     BMP:   Recent Labs     12/13/21  0653 12/14/21  0538 12/15/21  0636    134* 131*   K 4.2 4.1 4.3    101 98   CO2 24 23 24   BUN 10 9 13   CREATININE 0.90 0.88 1.08     PT/INR: No results for input(s): PROTIME, INR in the last 72 hours. APTT: No results for input(s): APTT in the last 72 hours. Chest X-Ray:   ONE XRAY VIEW OF THE CHEST       12/15/2021 7:52 am       COMPARISON:   Several priors, most recent 12/14/2021       HISTORY:   ORDERING SYSTEM PROVIDED HISTORY: recurrent pneumothorax   TECHNOLOGIST PROVIDED HISTORY:   recurrent pneumothorax   Reason for Exam: pneumothorax       FINDINGS:   Left jugular chest port catheter tip remains in the superior vena cava. Large bore chest tube in the right lower chest and pigtail catheter in the   right upper chest are again seen, similar in position.  Small to moderate   residual right pneumothorax which is best seen at the apex, decreased since   the prior study.  Soft tissue emphysema right lateral chest wall.  No   significant change in right hilar and perihilar opacity.  Stable   cardiomediastinal silhouette.  Left lung remains grossly clear.  Monitor   leads overlie the chest.           Impression   Similar appearance of 2 right chest tubes with slight decrease in size of a   small to moderate pneumothorax.             I/O:  I/O last 3 completed shifts: In: 5 [P.O.:736]  Out: 1325 [Urine:1325]      Assessment:  Principal Problem:    Pneumothorax  Active Problems:    Lung cancer, primary, with metastasis from lung to other site ([address]    PAF (paroxysmal atrial fibrillation) (HCC)    COPD (chronic obstructive pulmonary disease) (HCC)    Iron deficiency anemia  Resolved Problems:    * No resolved hospital problems.  *          Plan:    Remains inpatient on telemetry,    Monitor vitals closely including continuous pulse oximetry   Oxygen as needed via nasal cannula to maintain SpO2 > 92%   Chest x-ray daily   Maintain pigtail chest tubes to continuous wall suction  o Plan to transition to water seal after tomorrow morning's chest x-ray with repeat CXR 3 hours after   Remove large bore chest tube today  o Recommend repeat CXR 4 hours after chest tube removal.   Medical management per primary team      The above recommendations including medications and orders were discussed and agreed upon with Dr. Kel Graham, the attending on service for the cardiothoracic surgery group today. Electronically signed by AZEB Renae CNP on 12/15/2021 at 1:57 PM    On this date 12/15/2021 I have spent 24 minutes reviewing previous notes, test results and face to face with the patient discussing the diagnosis and importance of compliance with the treatment plan as well as documenting on the day of the visit. At least 50% of the time documented was spent with the patient to provide counseling and/or coordination of care. This note was created with the assistance of a speech-recognition program.  Although the intention is to generate a document that actually reflects the content of the visit, no guarantees can be provided that every mistake has been identified and corrected by editing. Note was updated later by me after  physical examination and  completion of the assessment.

## 2021-12-15 NOTE — PROGRESS NOTES
RN notified Dr. Di Christianson that CT surgery recommended to d/c the large bore chest tube and keep in the pigtail in. He informed RN that he would take care of it.

## 2021-12-15 NOTE — CARE COORDINATION
ONGOING DISCHARGE PLAN:    Patient is alert and oriented x4. Spoke with patient regarding discharge plan and patient confirms that plan is still to return to home and wants [facility] - They are following. Still have bilateral chest tubes - Following in case patient goes home with them - Awaiting CT surgery input. Eliquis PTA    Will continue to follow for additional discharge needs.     Electronically signed by Morelia Humphrey RN on 12/15/2021 at 1:13 PM

## 2021-12-15 NOTE — FLOWSHEET NOTE
12/14/21 1859   Handoff   Communication Given Shift Handoff   Oncoming Nurse/Offgoing Nurse jb/denae   Handoff Communication At bedside   Time Handoff Given 56   Scott Molina rn agrees with tae rn charting.

## 2021-12-16 ENCOUNTER — APPOINTMENT (OUTPATIENT)
Dept: GENERAL RADIOLOGY | Age: 71
DRG: 199 | End: 2021-12-16
Payer: MEDICARE

## 2021-12-16 LAB
ABSOLUTE BANDS #: 0.05 K/UL (ref 0–1)
ABSOLUTE EOS #: 0.14 K/UL (ref 0–0.4)
ABSOLUTE IMMATURE GRANULOCYTE: ABNORMAL K/UL (ref 0–0.3)
ABSOLUTE LYMPH #: 0.49 K/UL (ref 1–4.8)
ABSOLUTE MONO #: 0.76 K/UL (ref 0.1–1.3)
ALBUMIN SERPL-MCNC: 3.7 G/DL (ref 3.5–5.2)
ALBUMIN/GLOBULIN RATIO: ABNORMAL (ref 1–2.5)
ALP BLD-CCNC: 117 U/L (ref 40–129)
ALT SERPL-CCNC: 17 U/L (ref 5–41)
ANION GAP SERPL CALCULATED.3IONS-SCNC: 7 MMOL/L (ref 9–17)
AST SERPL-CCNC: 21 U/L
ATYPICAL LYMPHOCYTE ABSOLUTE COUNT: 0.03 K/UL
ATYPICAL LYMPHOCYTES: 1 %
BANDS: 2 % (ref 0–10)
BASOPHILS # BLD: 1 % (ref 0–2)
BASOPHILS ABSOLUTE: 0.03 K/UL (ref 0–0.2)
BILIRUB SERPL-MCNC: 0.22 MG/DL (ref 0.3–1.2)
BUN BLDV-MCNC: 12 MG/DL (ref 8–23)
BUN/CREAT BLD: ABNORMAL (ref 9–20)
CALCIUM SERPL-MCNC: 9.5 MG/DL (ref 8.6–10.4)
CHLORIDE BLD-SCNC: 102 MMOL/L (ref 98–107)
CO2: 27 MMOL/L (ref 20–31)
CREAT SERPL-MCNC: 0.92 MG/DL (ref 0.7–1.2)
DIFFERENTIAL TYPE: ABNORMAL
EOSINOPHILS RELATIVE PERCENT: 5 % (ref 0–4)
GFR AFRICAN AMERICAN: >60 ML/MIN
GFR NON-AFRICAN AMERICAN: >60 ML/MIN
GFR SERPL CREATININE-BSD FRML MDRD: ABNORMAL ML/MIN/{1.73_M2}
GFR SERPL CREATININE-BSD FRML MDRD: ABNORMAL ML/MIN/{1.73_M2}
GLUCOSE BLD-MCNC: 96 MG/DL (ref 70–99)
HCT VFR BLD CALC: 30 % (ref 41–53)
HEMOGLOBIN: 10.3 G/DL (ref 13.5–17.5)
IMMATURE GRANULOCYTES: ABNORMAL %
LYMPHOCYTES # BLD: 18 % (ref 24–44)
MCH RBC QN AUTO: 35.8 PG (ref 26–34)
MCHC RBC AUTO-ENTMCNC: 34.2 G/DL (ref 31–37)
MCV RBC AUTO: 104.8 FL (ref 80–100)
MONOCYTES # BLD: 28 % (ref 1–7)
MORPHOLOGY: ABNORMAL
MORPHOLOGY: ABNORMAL
NRBC AUTOMATED: ABNORMAL PER 100 WBC
PDW BLD-RTO: 15.8 % (ref 11.5–14.9)
PLATELET # BLD: 190 K/UL (ref 150–450)
PLATELET ESTIMATE: ABNORMAL
PMV BLD AUTO: 7.1 FL (ref 6–12)
POTASSIUM SERPL-SCNC: 4.2 MMOL/L (ref 3.7–5.3)
RBC # BLD: 2.86 M/UL (ref 4.5–5.9)
RBC # BLD: ABNORMAL 10*6/UL
SEG NEUTROPHILS: 45 % (ref 36–66)
SEGMENTED NEUTROPHILS ABSOLUTE COUNT: 1.2 K/UL (ref 1.3–9.1)
SODIUM BLD-SCNC: 136 MMOL/L (ref 135–144)
TOTAL PROTEIN: 6.9 G/DL (ref 6.4–8.3)
WBC # BLD: 2.7 K/UL (ref 3.5–11)
WBC # BLD: ABNORMAL 10*3/UL

## 2021-12-16 PROCEDURE — 2580000003 HC RX 258: Performed by: FAMILY MEDICINE

## 2021-12-16 PROCEDURE — 71045 X-RAY EXAM CHEST 1 VIEW: CPT

## 2021-12-16 PROCEDURE — 99232 SBSQ HOSP IP/OBS MODERATE 35: CPT | Performed by: INTERNAL MEDICINE

## 2021-12-16 PROCEDURE — 97110 THERAPEUTIC EXERCISES: CPT

## 2021-12-16 PROCEDURE — 36415 COLL VENOUS BLD VENIPUNCTURE: CPT

## 2021-12-16 PROCEDURE — 6370000000 HC RX 637 (ALT 250 FOR IP): Performed by: FAMILY MEDICINE

## 2021-12-16 PROCEDURE — 6360000002 HC RX W HCPCS: Performed by: FAMILY MEDICINE

## 2021-12-16 PROCEDURE — 2580000003 HC RX 258: Performed by: INTERNAL MEDICINE

## 2021-12-16 PROCEDURE — 6370000000 HC RX 637 (ALT 250 FOR IP): Performed by: INTERNAL MEDICINE

## 2021-12-16 PROCEDURE — 94761 N-INVAS EAR/PLS OXIMETRY MLT: CPT

## 2021-12-16 PROCEDURE — 85025 COMPLETE CBC W/AUTO DIFF WBC: CPT

## 2021-12-16 PROCEDURE — 97535 SELF CARE MNGMENT TRAINING: CPT

## 2021-12-16 PROCEDURE — 80053 COMPREHEN METABOLIC PANEL: CPT

## 2021-12-16 PROCEDURE — 2060000000 HC ICU INTERMEDIATE R&B

## 2021-12-16 PROCEDURE — 94640 AIRWAY INHALATION TREATMENT: CPT

## 2021-12-16 RX ADMIN — ACETAMINOPHEN 650 MG: 325 TABLET, FILM COATED ORAL at 11:08

## 2021-12-16 RX ADMIN — IPRATROPIUM BROMIDE AND ALBUTEROL SULFATE 3 ML: .5; 2.5 SOLUTION RESPIRATORY (INHALATION) at 14:54

## 2021-12-16 RX ADMIN — MIDODRINE HYDROCHLORIDE 7.5 MG: 5 TABLET ORAL at 12:12

## 2021-12-16 RX ADMIN — GUAIFENESIN 600 MG: 600 TABLET, EXTENDED RELEASE ORAL at 07:44

## 2021-12-16 RX ADMIN — GUAIFENESIN 600 MG: 600 TABLET, EXTENDED RELEASE ORAL at 22:19

## 2021-12-16 RX ADMIN — ACETAMINOPHEN 650 MG: 325 TABLET, FILM COATED ORAL at 18:30

## 2021-12-16 RX ADMIN — ACETAMINOPHEN 650 MG: 325 TABLET, FILM COATED ORAL at 22:19

## 2021-12-16 RX ADMIN — APIXABAN 5 MG: 5 TABLET, FILM COATED ORAL at 07:44

## 2021-12-16 RX ADMIN — APIXABAN 5 MG: 5 TABLET, FILM COATED ORAL at 22:15

## 2021-12-16 RX ADMIN — DOXYCYCLINE 100 MG: 100 CAPSULE ORAL at 22:15

## 2021-12-16 RX ADMIN — IPRATROPIUM BROMIDE AND ALBUTEROL SULFATE 3 ML: .5; 2.5 SOLUTION RESPIRATORY (INHALATION) at 11:00

## 2021-12-16 RX ADMIN — FERROUS SULFATE TAB 325 MG (65 MG ELEMENTAL FE) 325 MG: 325 (65 FE) TAB at 07:44

## 2021-12-16 RX ADMIN — MIDODRINE HYDROCHLORIDE 7.5 MG: 5 TABLET ORAL at 07:44

## 2021-12-16 RX ADMIN — SODIUM CHLORIDE, PRESERVATIVE FREE 10 ML: 5 INJECTION INTRAVENOUS at 22:21

## 2021-12-16 RX ADMIN — IPRATROPIUM BROMIDE AND ALBUTEROL SULFATE 3 ML: .5; 2.5 SOLUTION RESPIRATORY (INHALATION) at 07:44

## 2021-12-16 RX ADMIN — MIDODRINE HYDROCHLORIDE 7.5 MG: 5 TABLET ORAL at 16:51

## 2021-12-16 RX ADMIN — STANDARDIZED SENNA CONCENTRATE 8.6 MG: 8.6 TABLET ORAL at 22:19

## 2021-12-16 RX ADMIN — ATORVASTATIN CALCIUM 10 MG: 20 TABLET, FILM COATED ORAL at 07:44

## 2021-12-16 RX ADMIN — ACETAMINOPHEN 650 MG: 325 TABLET, FILM COATED ORAL at 05:33

## 2021-12-16 RX ADMIN — CEFTRIAXONE SODIUM 1000 MG: 1 INJECTION, POWDER, FOR SOLUTION INTRAMUSCULAR; INTRAVENOUS at 07:50

## 2021-12-16 RX ADMIN — IPRATROPIUM BROMIDE AND ALBUTEROL SULFATE 3 ML: .5; 2.5 SOLUTION RESPIRATORY (INHALATION) at 19:26

## 2021-12-16 RX ADMIN — DOXYCYCLINE 100 MG: 100 CAPSULE ORAL at 07:44

## 2021-12-16 ASSESSMENT — ENCOUNTER SYMPTOMS
COLOR CHANGE: 0
RECTAL PAIN: 0
PHOTOPHOBIA: 0
TROUBLE SWALLOWING: 0
ABDOMINAL DISTENTION: 0
BLOOD IN STOOL: 0
VOICE CHANGE: 0
COUGH: 1
CHOKING: 0
ANAL BLEEDING: 0
STRIDOR: 0
SHORTNESS OF BREATH: 0
BACK PAIN: 0

## 2021-12-16 ASSESSMENT — PAIN DESCRIPTION - LOCATION
LOCATION: SHOULDER
LOCATION: SHOULDER

## 2021-12-16 ASSESSMENT — PAIN SCALES - GENERAL
PAINLEVEL_OUTOF10: 5
PAINLEVEL_OUTOF10: 6
PAINLEVEL_OUTOF10: 3
PAINLEVEL_OUTOF10: 6
PAINLEVEL_OUTOF10: 3

## 2021-12-16 ASSESSMENT — PAIN DESCRIPTION - PAIN TYPE
TYPE: CHRONIC PAIN
TYPE: ACUTE PAIN

## 2021-12-16 ASSESSMENT — PAIN DESCRIPTION - ORIENTATION: ORIENTATION: RIGHT

## 2021-12-16 ASSESSMENT — PAIN DESCRIPTION - DESCRIPTORS: DESCRIPTORS: ACHING

## 2021-12-16 NOTE — PROGRESS NOTES
PULMONARY PROGRESS NOTE:    REASON FOR VISIT: PTX, lung cancer  Interval History:    Shortness of Breath: no  Cough: no  Sputum: no          Hemoptysis: no  Chest Pain: no  Fever: no                   Swelling Feet: no  Headache: no                                           Nausea, Emesis, Abdominal Pain: no  Diarrhea: no         Constipation: no    Events since last visit: none;     PAST MEDICAL HISTORY:      Scheduled Meds:   polyethylene glycol  17 g Oral Daily    senna  1 tablet Oral Nightly    docusate sodium  100 mg Oral Daily    guaiFENesin  600 mg Oral BID    midodrine  7.5 mg Oral TID WC    ipratropium-albuterol  3 mL Inhalation Q4H WA    sodium chloride flush  5-40 mL IntraVENous 2 times per day    ferrous sulfate  325 mg Oral Daily    atorvastatin  10 mg Oral Daily    cefTRIAXone (ROCEPHIN) IV  1,000 mg IntraVENous Q24H    doxycycline monohydrate  100 mg Oral 2 times per day    apixaban  5 mg Oral BID    lidocaine  20 mL IntraDERmal Once     Continuous Infusions:   sodium chloride Stopped (12/13/21 0944)     PRN Meds:sodium chloride flush, sodium chloride, acetaminophen, ondansetron **OR** ondansetron, potassium chloride **OR** potassium alternative oral replacement **OR** potassium chloride        PHYSICAL EXAMINATION:  /74   Pulse 86   Temp 97.7 °F (36.5 °C) (Oral)   Resp 14   Ht 5' 11\" (1.803 m)   Wt 158 lb 11.7 oz (72 kg)   SpO2 98%   BMI 22.14 kg/m²     General : Awake, alert,   Neck  supple, no lymphadenopathy, JVD not raised  Heart  regular rhythm, S1 and S2 normal; no additional sounds heard  Lungs  Air Entry- fair bilaterally; breath sounds : vesicular, chest tube X2 -  96% 02 sat on RA   Abdomen  soft, no tenderness  Upper Extremities  - no cyanosis, mottling; edema : absent  Lower Extremities: no cyanosis, mottling; edema : absent    Current Laboratory, Radiologic, Microbiologic, and Diagnostic studies reviewed  Data ReviewCBC:   Recent Labs     12/14/21  2862 12/15/21  0636 12/16/21  0533   WBC 3.3* 3.1* 2.7*   RBC 2.76* 2.82* 2.86*   HGB 10.1* 10.1* 10.3*   HCT 28.9* 29.8* 30.0*   * 165 190     BMP:   Recent Labs     12/14/21  0538 12/15/21  0636 12/16/21  0533   GLUCOSE 90 91 96   * 131* 136   K 4.1 4.3 4.2   BUN 9 13 12   CREATININE 0.88 1.08 0.92   CALCIUM 9.1 9.0 9.5     ABGs: No results for input(s): PHART, PO2ART, VSY3QMD, UCQ5PVP, BEART, N9DSEBGO, EFW4BSM in the last 72 hours.    PT/INR:  No results found for: PTINR    ASSESSMENT / PLAN:    Lung cancer - per oncology  PTX - right chest tube -    CXR 12/11- no improvement  CT back to suction this morning     further recs per CT surgery - in my opinion this is loculated PTX and will persist  Large bore chest tube removed 12/15/21  Await CT surgery recommendations    Electronically signed by Bebe Okeefe MD on 12/16/21 at 11:37 AM

## 2021-12-16 NOTE — PROGRESS NOTES
[address]    INPATIENT OCCUPATIONAL THERAPY  PROGRESS NOTE  Date: 2021  Patient Name: Hollis Rizo      Room: 3392/4596-85  MRN: [id number]    : 1950  (80 y.o.) Gender: male     Discharge Recommendations:  Further Occupational Therapy is recommended upon facility discharge. OT Equipment Recommendations  Equipment Needed: Yes  Mobility Devices: ADL Assistive Devices    Referring Practitioner: Dr Alexander Redd  Diagnosis: Pnuemothorax with chest tube  General  Chart Reviewed: Yes, Orders, Progress Notes, History and Physical, Imaging, Other (comment), Previous Admission  Patient assessed for rehabilitation services?: Yes  Additional Pertinent Hx: Current with [place]  Referring Practitioner: Dr Alexander Redd  Diagnosis: Pnuemothorax with chest tube    Restrictions  Restrictions/Precautions: General Precautions, Surgical Protocols (chest tube)  Implants present? : Metal implants (cardiac stent. )  Other position/activity restrictions: Chest Tube  Required Braces or Orthoses?: No      Subjective  Subjective: \"I'm just so frustrated with this place\" Pt states apology towards writer. Writer assists in pt comfort and needs as best she can, pt thankful. Comments: Pt with irritation this AM due to still being in hospital and hooked up to tube/IV. Pt agreeable to basic self care tasks and transferring to bedside chair. Patient Currently in Pain: Yes  Pain Level: 6  Pain Location: Shoulder  Pain Orientation: Right  Overall Orientation Status: Within Functional Limits     Pain Assessment  Pain Assessment: 0-10  Pain Level: 6  Pain Type: Chronic pain  Pain Location: Shoulder  Pain Orientation: Right  Pain Descriptors: Aching (\"Arthritis pain\" )    Objective  Cognition  Overall Cognitive Status: WFL  Bed mobility  Supine to Sit: Supervision  Sit to Supine: Supervision  Comment: pt david good awareness of chest tube line.    Balance  Sitting Balance: Independent  Standing Balance: Supervision  Standing Balance  Time: <1 min   Functional Mobility  Functional - Mobility Device: No device  Activity: Other  Assist Level: Stand by assistance (due to tubing)  Functional Mobility Comments: stand step transfer EOB<>bedside chair, pt with noted slight unsteadiness however no LOB. Pt impulsive for transfer out of bed however writer observed from [employer]. SBA recommended, call light and safety reinforced to writer with fair carryover. ADL  Grooming: Setup  UE Bathing: Setup  LE Bathing: Stand by assistance; Setup (pt declines; per clinical reasoning )  UE Dressing: Minimal assistance (pt declines; per clinical reasoning for gown/lines. )  LE Dressing: Stand by assistance; Setup (pt declines, per clinical reasoning/pt report. )  Toileting: Setup (hand held urinal use per pt report. )     Transfers  Sit to stand: Stand by assistance  Stand to sit: Stand by assistance  Type of ROM/Therapeutic Exercise  Type of ROM/Therapeutic Exercise: Resistive Bands (green theraband)  Comment: Pt engaged in various UB ex's per pt tolerance to improve overall strength and endurance for functional care tasks. Pt reports assisting with R shoulder pain, pt demonstrates fair awarness of exercises, verbal cues for proper technique. Assessment  Performance deficits / Impairments: Decreased ADL status; Decreased safe awareness; Decreased endurance; Decreased functional mobility   Prognosis: Good; Fair  Discharge Recommendations: Patient would benefit from continued therapy after discharge  Activity Tolerance: Patient limited by pain; Treatment limited secondary to agitation  Safety Devices in place: Yes  Type of devices: Call light within reach; Left in bed;  All fall risk precautions in place            Plan  Safety Devices  Safety Devices in place: Yes  Type of devices: Call light within reach, Left in bed, All fall risk precautions in place  Plan  Times per week: 2-4 sessions  Times per day: Daily  Current Treatment Recommendations: Endurance Training, Functional Mobility Training, Safety Education & Training, Patient/Caregiver Education & Training, Self-Care / ADL      Goals  Short term goals  Time Frame for Short term goals: by discharge  Short term goal 1: Tolerate seated self care tasks with infrequent rest breaks, using safe techniques for care task performance  Short term goal 2: D/V understanding of Modified care techiques including use of Adpative devices and energy conservatoin strategies  Short term goal 3: D/V understanding of Pacing / phasing techniques and matching breathing with the activity being performed  Short term goal 4: Participate in care using safe techiques for self caer and functional mobility tasks    OT Individual Minutes  Time In: 7184  Time Out: 1378  Minutes: 34      Electronically signed by COLLEEN Butler on 12/16/21 at 11:14 AM EST

## 2021-12-16 NOTE — PLAN OF CARE
Problem: Infection:  Goal: Will remain free from infection  Description: Will remain free from infection  Outcome: Ongoing   Afebrile this shift    Problem: Safety:  Goal: Free from accidental physical injury  Description: Free from accidental physical injury  Outcome: Ongoing  Pt up self, steady gait. Problem: Daily Care:  Goal: Daily care needs are met  Description: Daily care needs are met  Outcome: Ongoing   RN rounding on pt    Problem: Pain:  Goal: Patient's pain/discomfort is manageable  Description: Patient's pain/discomfort is manageable  Outcome: Ongoing   Non pharm and tylenol utilized     Problem: Skin Integrity:  Goal: Skin integrity will stabilize  Description: Skin integrity will stabilize  Outcome: Ongoing   Head to toe charted.  No new signs of breakdown

## 2021-12-16 NOTE — PLAN OF CARE
Problem: Pain:  Goal: Pain level will decrease  Description: Pain level will decrease  Outcome: Ongoing  Note: Pt given medication to help decrease pain. See MAR. Problem: Skin Integrity:  Goal: Skin integrity will stabilize  Description: Skin integrity will stabilize  Outcome: Ongoing  Note: Pts skin integrity stable. Pt has chest tube to R lobe. Problem: Falls - Risk of:  Goal: Will remain free from falls  Description: Will remain free from falls  Outcome: Ongoing  Note: Pt free of falls. Call light and bedside table within reach. Bed locked and in lowest position, nonskid socks on feet.

## 2021-12-16 NOTE — PROGRESS NOTES
Progress Note    12/16/2021   1:51 PM    Name:  Maged Soliman  MRN:    [id number]     Acct:     [de-identified]   Room:  2094/2094-01  IP Day: 7     Admit Date: 12/8/2021  7:03 PM  PCP: Devi Murrell MD    Subjective:     C/C:   Chief Complaint   Patient presents with    Shortness of Breath       Interval History: Status: not changed. Patient has an intermittent cough denies chest pain. Patient is on room air with an oxygen saturation of 97% blood pressure and heart rate stable afebrile. Recent labs reviewed WBC count 2.7 hemoglobin 10.3, sodium 136, plate count 081. Chest x-ray 12/16/2021 shows right pleural pigtail catheter in place with decrease in size of right pneumothorax. ROS:   all 10 systems reviewed and are negative except as noted    Review of Systems   Constitutional: Negative for appetite change, chills and diaphoresis. HENT: Negative for drooling, ear pain, trouble swallowing and voice change. Eyes: Negative for photophobia and visual disturbance. Respiratory: Positive for cough. Negative for choking, shortness of breath and stridor. Cardiovascular: Negative for chest pain and palpitations. Gastrointestinal: Negative for abdominal distention, anal bleeding, blood in stool and rectal pain. Endocrine: Negative for polyphagia and polyuria. Genitourinary: Negative for dysuria, flank pain, hematuria and urgency. Musculoskeletal: Negative for back pain, myalgias and neck stiffness. Skin: Negative for color change, pallor and rash. Allergic/Immunologic: Negative for environmental allergies and food allergies. Neurological: Negative for tremors, seizures, facial asymmetry and numbness. Hematological: Negative for adenopathy. Does not bruise/bleed easily. Psychiatric/Behavioral: Negative for agitation, behavioral problems, hallucinations, self-injury and suicidal ideas. Medications:      Allergies: No Known Allergies    Current Meds: polyethylene glycol (GLYCOLAX) packet 17 g, Daily  senna (SENOKOT) tablet 8.6 mg, Nightly  docusate sodium (COLACE) capsule 100 mg, Daily  guaiFENesin (MUCINEX) extended release tablet 600 mg, BID  midodrine (PROAMATINE) tablet 7.5 mg, TID WC  ipratropium-albuterol (DUONEB) nebulizer solution 3 mL, Q4H WA  sodium chloride flush 0.9 % injection 5-40 mL, 2 times per day  sodium chloride flush 0.9 % injection 5-40 mL, PRN  0.9 % sodium chloride infusion, PRN  acetaminophen (TYLENOL) tablet 650 mg, Q4H PRN  ondansetron (ZOFRAN-ODT) disintegrating tablet 4 mg, Q8H PRN   Or  ondansetron (ZOFRAN) injection 4 mg, Q6H PRN  potassium chloride (KLOR-CON M) extended release tablet 40 mEq, PRN   Or  potassium bicarb-citric acid (EFFER-K) effervescent tablet 40 mEq, PRN   Or  potassium chloride 10 mEq/100 mL IVPB (Peripheral Line), PRN  ferrous sulfate (IRON 325) tablet 325 mg, Daily  atorvastatin (LIPITOR) tablet 10 mg, Daily  cefTRIAXone (ROCEPHIN) 1000 mg IVPB in 50 mL D5W minibag, Q24H  doxycycline monohydrate (MONODOX) capsule 100 mg, 2 times per day  apixaban (ELIQUIS) tablet 5 mg, BID  lidocaine 1 % injection 20 mL, Once        Data:     Code Status:  Full Code    Family History   Problem Relation Age of Onset    Cancer Father         lung cancer       Social History     Socioeconomic History    Marital status:      Spouse name: Not on file    Number of children: Not on file    Years of education: Not on file    Highest education level: Not on file   Occupational History    Not on file   Tobacco Use    Smoking status: Current Every Day Smoker     Packs/day: 0.50     Types: Cigarettes    Smokeless tobacco: Former User   Vaping Use    Vaping Use: Former   Substance and Sexual Activity    Alcohol use: No    Drug use: Yes     Frequency: 14.0 times per week     Types: Marijuana Juanetta Weakley)    Sexual activity: Not on file   Other Topics Concern    Not on file   Social History Narrative    Not on file     Social Determinants of Health Financial Resource Strain:     Difficulty of Paying Living Expenses: Not on file   Food Insecurity:     Worried About Running Out of Food in the Last Year: Not on file    Dandre of Food in the Last Year: Not on file   Transportation Needs:     Lack of Transportation (Medical): Not on file    Lack of Transportation (Non-Medical): Not on file   Physical Activity:     Days of Exercise per Week: Not on file    Minutes of Exercise per Session: Not on file   Stress:     Feeling of Stress : Not on file   Social Connections:     Frequency of Communication with Friends and Family: Not on file    Frequency of Social Gatherings with Friends and Family: Not on file    Attends Confucianist Services: Not on file    Active Member of [address] [place] [address] [employer] or Organizations: Not on file    Attends Club or Organization Meetings: Not on file    Marital Status: Not on file   Intimate Partner Violence:     Fear of Current or Ex-Partner: Not on file    Emotionally Abused: Not on file    Physically Abused: Not on file    Sexually Abused: Not on file   Housing Stability:     Unable to Pay for Housing in the Last Year: Not on file    Number of [place] in the Last Year: Not on file    Unstable Housing in the Last Year: Not on file       I/O (24Hr): Intake/Output Summary (Last 24 hours) at 12/16/2021 1351  Last data filed at 12/16/2021 1213  Gross per 24 hour   Intake 735 ml   Output 745 ml   Net -10 ml     Radiology:  CT CHEST W WO CONTRAST    Result Date: 12/14/2021  Similar moderate to large right pneumothorax. Subsequent pigtail chest tube placed using fluoroscopic guidance. RECOMMENDATIONS: Unavailable     XR CHEST PORTABLE    Result Date: 12/16/2021  Right pleural pigtail catheter remains in place with slight decrease in size of the right pneumothorax. XR CHEST PORTABLE    Result Date: 12/15/2021  A left Zbdndt-Z-Puar was noted with the distal tip in the superior vena cava.  Mild volume loss of the right hemithorax Differential Type NOT REPORTED     Immature Granulocytes NOT REPORTED 0 %    Absolute Immature Granulocyte NOT REPORTED 0.00 - 0.30 k/uL    WBC Morphology NOT REPORTED     RBC Morphology NOT REPORTED     Platelet Estimate NOT REPORTED     Seg Neutrophils 45 36 - 66 %    Lymphocytes 18 (L) 24 - 44 %    Atypical Lymphocytes 1 %    Monocytes 28 (H) 1 - 7 %    Eosinophils % 5 (H) 0 - 4 %    Basophils 1 0 - 2 %    Bands 2 0 - 10 %    Segs Absolute 1.20 (L) 1.3 - 9.1 k/uL    Absolute Lymph # 0.49 (L) 1.0 - 4.8 k/uL    Atypical Lymphocytes Absolute 0.03 k/uL    Absolute Mono # 0.76 0.1 - 1.3 k/uL    Absolute Eos # 0.14 0.0 - 0.4 k/uL    Basophils Absolute 0.03 0.0 - 0.2 k/uL    Absolute Bands # 0.05 0.0 - 1.0 k/uL    Morphology ANISOCYTOSIS PRESENT     Morphology MACROCYTOSIS PRESENT    Comprehensive Metabolic Panel w/ Reflex to MG    Collection Time: 21  5:33 AM   Result Value Ref Range    Glucose 96 70 - 99 mg/dL    BUN 12 8 - 23 mg/dL    CREATININE 0.92 0.70 - 1.20 mg/dL    Bun/Cre Ratio NOT REPORTED 9 - 20    Calcium 9.5 8.6 - 10.4 mg/dL    Sodium 136 135 - 144 mmol/L    Potassium 4.2 3.7 - 5.3 mmol/L    Chloride 102 98 - 107 mmol/L    CO2 27 20 - 31 mmol/L    Anion Gap 7 (L) 9 - 17 mmol/L    Alkaline Phosphatase 117 40 - 129 U/L    ALT 17 5 - 41 U/L    AST 21 <40 U/L    Total Bilirubin 0.22 (L) 0.3 - 1.2 mg/dL    Total Protein 6.9 6.4 - 8.3 g/dL    Albumin 3.7 3.5 - 5.2 g/dL    Albumin/Globulin Ratio NOT REPORTED 1.0 - 2.5    GFR Non-African American >60 >60 mL/min    GFR African American >60 >60 mL/min    GFR Comment          GFR Staging NOT REPORTED        Physical Examination:        Vitals:  BP 95/70   Pulse 99   Temp 98 °F (36.7 °C) (Oral)   Resp 16   Ht 5' 11\" (1.803 m)   Wt 158 lb 11.7 oz (72 kg)   SpO2 97%   BMI 22.14 kg/m²   Temp (24hrs), Av.2 °F (36.8 °C), Min:97.7 °F (36.5 °C), Max:98.6 °F (37 °C)    No results for input(s): POCGLU in the last 72 hours. Physical Exam  Vitals reviewed. Constitutional:       Appearance: Normal appearance. He is not diaphoretic. HENT:      Head: Normocephalic and atraumatic. Right Ear: External ear normal.      Left Ear: External ear normal.      Nose: Nose normal.      Mouth/Throat:      Mouth: Mucous membranes are moist.      Pharynx: Oropharynx is clear. Eyes:      Conjunctiva/sclera: Conjunctivae normal.   Cardiovascular:      Rate and Rhythm: Normal rate and regular rhythm. Pulses: Normal pulses. Heart sounds: Normal heart sounds. Pulmonary:      Effort: Pulmonary effort is normal.      Breath sounds: Examination of the right-upper field reveals decreased breath sounds. Examination of the right-middle field reveals decreased breath sounds. Decreased breath sounds present. Comments: Right chest wall pigtail catheter in place  Abdominal:      General: Bowel sounds are normal. There is no distension. Palpations: Abdomen is soft. Musculoskeletal:         General: No tenderness or deformity. Normal range of motion. Cervical back: Normal range of motion and neck supple. No rigidity. Right lower leg: No edema. Left lower leg: No edema. Skin:     General: Skin is warm and dry. Capillary Refill: Capillary refill takes less than 2 seconds. Coloration: Skin is not jaundiced. Neurological:      General: No focal deficit present. Mental Status: Mental status is at baseline. Psychiatric:         Mood and Affect: Mood normal.         Behavior: Behavior normal.         Assessment:        Primary Problem  Pneumothorax     Principal Problem:    Pneumothorax  Active Problems:    Lung cancer, primary, with metastasis from lung to other site (HCC)    PAF (paroxysmal atrial fibrillation) (HCC)    COPD (chronic obstructive pulmonary disease) (HCC)    Iron deficiency anemia    Severe malnutrition (HCC)  Resolved Problems:    * No resolved hospital problems.  *      Past Medical History:   Diagnosis Date    DDD (degenerative disc disease), cervical     Gout     Heart murmur     hx. of when younger.  Hyperlipidemia     Hypertension     Lung cancer ([facility]     right lung    MI (myocardial infarction) ([facility] [address]     Skin cancer     face    Wears glasses         Plan:        1. Rocephin 1 g IV daily  2. Doxycycline 100 mg p.o. twice daily  3. Increase midodrine 7.5 mg p.o. 3 times a day with holding parameters  4.  DVT prophylaxis Eliquis   5.  oncology, cardiothoracic and pulmonology input noted  6.  CBC, CMP  7.  EPCs  8.  check and replace electrolytes per sliding scale        Electronically signed by Hui Cisneros MD

## 2021-12-16 NOTE — CARE COORDINATION
ONGOING DISCHARGE PLAN:    Patient is alert and oriented x4. Spoke with patient regarding discharge plan and patient confirms that plan is still to return to home. KEEGAN Carrillo is following in case patient discharges to home with pigtail. Currently patient does not have Aspira drain in place. On IV rocephin and PO doxy. Will continue to follow for additional discharge needs.     Electronically signed by Дмитрий Donohue RN on 12/16/2021 at 2:13 PM

## 2021-12-16 NOTE — CARE COORDINATION
Large bore chest tube removed with subsequent increase in size of pneumothorax on repeat chest x-ray. Spoke with bedside RN with instructions to maintain pigtail to strict, continuous wall suction at -20. Will review morning x-ray when available.     AZEB Galloway - CNP

## 2021-12-17 ENCOUNTER — APPOINTMENT (OUTPATIENT)
Dept: GENERAL RADIOLOGY | Age: 71
DRG: 199 | End: 2021-12-17
Payer: MEDICARE

## 2021-12-17 LAB
ABSOLUTE BANDS #: 0.06 K/UL (ref 0–1)
ABSOLUTE EOS #: 0.19 K/UL (ref 0–0.4)
ABSOLUTE IMMATURE GRANULOCYTE: ABNORMAL K/UL (ref 0–0.3)
ABSOLUTE LYMPH #: 0.71 K/UL (ref 1–4.8)
ABSOLUTE MONO #: 0.71 K/UL (ref 0.1–1.3)
ALBUMIN SERPL-MCNC: 3.5 G/DL (ref 3.5–5.2)
ALBUMIN/GLOBULIN RATIO: ABNORMAL (ref 1–2.5)
ALP BLD-CCNC: 109 U/L (ref 40–129)
ALT SERPL-CCNC: 17 U/L (ref 5–41)
ANION GAP SERPL CALCULATED.3IONS-SCNC: 8 MMOL/L (ref 9–17)
AST SERPL-CCNC: 21 U/L
BANDS: 2 % (ref 0–10)
BASOPHILS # BLD: 0 % (ref 0–2)
BASOPHILS ABSOLUTE: 0 K/UL (ref 0–0.2)
BILIRUB SERPL-MCNC: 0.22 MG/DL (ref 0.3–1.2)
BUN BLDV-MCNC: 11 MG/DL (ref 8–23)
BUN/CREAT BLD: ABNORMAL (ref 9–20)
CALCIUM SERPL-MCNC: 9.4 MG/DL (ref 8.6–10.4)
CHLORIDE BLD-SCNC: 100 MMOL/L (ref 98–107)
CO2: 26 MMOL/L (ref 20–31)
CREAT SERPL-MCNC: 0.78 MG/DL (ref 0.7–1.2)
DIFFERENTIAL TYPE: ABNORMAL
EOSINOPHILS RELATIVE PERCENT: 6 % (ref 0–4)
GFR AFRICAN AMERICAN: >60 ML/MIN
GFR NON-AFRICAN AMERICAN: >60 ML/MIN
GFR SERPL CREATININE-BSD FRML MDRD: ABNORMAL ML/MIN/{1.73_M2}
GFR SERPL CREATININE-BSD FRML MDRD: ABNORMAL ML/MIN/{1.73_M2}
GLUCOSE BLD-MCNC: 103 MG/DL (ref 70–99)
HCT VFR BLD CALC: 29.3 % (ref 41–53)
HEMOGLOBIN: 10.1 G/DL (ref 13.5–17.5)
IMMATURE GRANULOCYTES: ABNORMAL %
LYMPHOCYTES # BLD: 23 % (ref 24–44)
MCH RBC QN AUTO: 35.9 PG (ref 26–34)
MCHC RBC AUTO-ENTMCNC: 34.3 G/DL (ref 31–37)
MCV RBC AUTO: 104.5 FL (ref 80–100)
MONOCYTES # BLD: 23 % (ref 1–7)
MORPHOLOGY: ABNORMAL
NRBC AUTOMATED: ABNORMAL PER 100 WBC
PDW BLD-RTO: 15.5 % (ref 11.5–14.9)
PLATELET # BLD: 219 K/UL (ref 150–450)
PLATELET ESTIMATE: ABNORMAL
PMV BLD AUTO: 6.9 FL (ref 6–12)
POTASSIUM SERPL-SCNC: 4.4 MMOL/L (ref 3.7–5.3)
RBC # BLD: 2.8 M/UL (ref 4.5–5.9)
RBC # BLD: ABNORMAL 10*6/UL
SEG NEUTROPHILS: 46 % (ref 36–66)
SEGMENTED NEUTROPHILS ABSOLUTE COUNT: 1.43 K/UL (ref 1.3–9.1)
SODIUM BLD-SCNC: 134 MMOL/L (ref 135–144)
TOTAL PROTEIN: 6.7 G/DL (ref 6.4–8.3)
WBC # BLD: 3.1 K/UL (ref 3.5–11)
WBC # BLD: ABNORMAL 10*3/UL

## 2021-12-17 PROCEDURE — 94640 AIRWAY INHALATION TREATMENT: CPT

## 2021-12-17 PROCEDURE — 6360000002 HC RX W HCPCS: Performed by: FAMILY MEDICINE

## 2021-12-17 PROCEDURE — 2580000003 HC RX 258: Performed by: FAMILY MEDICINE

## 2021-12-17 PROCEDURE — 94761 N-INVAS EAR/PLS OXIMETRY MLT: CPT

## 2021-12-17 PROCEDURE — 97530 THERAPEUTIC ACTIVITIES: CPT

## 2021-12-17 PROCEDURE — 36415 COLL VENOUS BLD VENIPUNCTURE: CPT

## 2021-12-17 PROCEDURE — 97110 THERAPEUTIC EXERCISES: CPT

## 2021-12-17 PROCEDURE — 97535 SELF CARE MNGMENT TRAINING: CPT

## 2021-12-17 PROCEDURE — 6370000000 HC RX 637 (ALT 250 FOR IP): Performed by: FAMILY MEDICINE

## 2021-12-17 PROCEDURE — 71045 X-RAY EXAM CHEST 1 VIEW: CPT

## 2021-12-17 PROCEDURE — 97116 GAIT TRAINING THERAPY: CPT

## 2021-12-17 PROCEDURE — 2060000000 HC ICU INTERMEDIATE R&B

## 2021-12-17 PROCEDURE — 99232 SBSQ HOSP IP/OBS MODERATE 35: CPT | Performed by: INTERNAL MEDICINE

## 2021-12-17 PROCEDURE — 85025 COMPLETE CBC W/AUTO DIFF WBC: CPT

## 2021-12-17 PROCEDURE — 80053 COMPREHEN METABOLIC PANEL: CPT

## 2021-12-17 PROCEDURE — 2580000003 HC RX 258: Performed by: INTERNAL MEDICINE

## 2021-12-17 PROCEDURE — 6370000000 HC RX 637 (ALT 250 FOR IP): Performed by: INTERNAL MEDICINE

## 2021-12-17 RX ORDER — MIDODRINE HYDROCHLORIDE 5 MG/1
5 TABLET ORAL 3 TIMES DAILY
Qty: 90 TABLET | Refills: 3 | Status: ON HOLD | OUTPATIENT
Start: 2021-12-17 | End: 2022-05-07 | Stop reason: HOSPADM

## 2021-12-17 RX ADMIN — SODIUM CHLORIDE, PRESERVATIVE FREE 10 ML: 5 INJECTION INTRAVENOUS at 09:03

## 2021-12-17 RX ADMIN — ATORVASTATIN CALCIUM 10 MG: 20 TABLET, FILM COATED ORAL at 09:02

## 2021-12-17 RX ADMIN — CEFTRIAXONE SODIUM 1000 MG: 1 INJECTION, POWDER, FOR SOLUTION INTRAMUSCULAR; INTRAVENOUS at 09:06

## 2021-12-17 RX ADMIN — GUAIFENESIN 600 MG: 600 TABLET, EXTENDED RELEASE ORAL at 21:42

## 2021-12-17 RX ADMIN — MIDODRINE HYDROCHLORIDE 7.5 MG: 5 TABLET ORAL at 18:20

## 2021-12-17 RX ADMIN — IPRATROPIUM BROMIDE AND ALBUTEROL SULFATE 3 ML: .5; 2.5 SOLUTION RESPIRATORY (INHALATION) at 15:36

## 2021-12-17 RX ADMIN — DOXYCYCLINE 100 MG: 100 CAPSULE ORAL at 21:42

## 2021-12-17 RX ADMIN — STANDARDIZED SENNA CONCENTRATE 8.6 MG: 8.6 TABLET ORAL at 21:43

## 2021-12-17 RX ADMIN — MIDODRINE HYDROCHLORIDE 7.5 MG: 5 TABLET ORAL at 13:08

## 2021-12-17 RX ADMIN — SODIUM CHLORIDE, PRESERVATIVE FREE 10 ML: 5 INJECTION INTRAVENOUS at 21:43

## 2021-12-17 RX ADMIN — GUAIFENESIN 600 MG: 600 TABLET, EXTENDED RELEASE ORAL at 09:02

## 2021-12-17 RX ADMIN — IPRATROPIUM BROMIDE AND ALBUTEROL SULFATE 3 ML: .5; 2.5 SOLUTION RESPIRATORY (INHALATION) at 08:42

## 2021-12-17 RX ADMIN — MIDODRINE HYDROCHLORIDE 7.5 MG: 5 TABLET ORAL at 09:02

## 2021-12-17 RX ADMIN — APIXABAN 5 MG: 5 TABLET, FILM COATED ORAL at 09:03

## 2021-12-17 RX ADMIN — APIXABAN 5 MG: 5 TABLET, FILM COATED ORAL at 21:42

## 2021-12-17 RX ADMIN — ACETAMINOPHEN 650 MG: 325 TABLET, FILM COATED ORAL at 04:34

## 2021-12-17 RX ADMIN — IPRATROPIUM BROMIDE AND ALBUTEROL SULFATE 3 ML: .5; 2.5 SOLUTION RESPIRATORY (INHALATION) at 20:00

## 2021-12-17 RX ADMIN — FERROUS SULFATE TAB 325 MG (65 MG ELEMENTAL FE) 325 MG: 325 (65 FE) TAB at 09:02

## 2021-12-17 RX ADMIN — DOXYCYCLINE 100 MG: 100 CAPSULE ORAL at 09:03

## 2021-12-17 ASSESSMENT — ENCOUNTER SYMPTOMS
CHOKING: 0
SHORTNESS OF BREATH: 0
TROUBLE SWALLOWING: 0
STRIDOR: 0
BACK PAIN: 0
COUGH: 1
VOICE CHANGE: 0
BLOOD IN STOOL: 0
RECTAL PAIN: 0
COLOR CHANGE: 0
ABDOMINAL DISTENTION: 0
ANAL BLEEDING: 0
PHOTOPHOBIA: 0

## 2021-12-17 ASSESSMENT — PAIN DESCRIPTION - LOCATION
LOCATION: SHOULDER
LOCATION: SHOULDER

## 2021-12-17 ASSESSMENT — PAIN SCALES - GENERAL
PAINLEVEL_OUTOF10: 6
PAINLEVEL_OUTOF10: 6

## 2021-12-17 ASSESSMENT — PAIN DESCRIPTION - ORIENTATION
ORIENTATION: RIGHT
ORIENTATION: RIGHT

## 2021-12-17 ASSESSMENT — PAIN DESCRIPTION - PAIN TYPE
TYPE: CHRONIC PAIN
TYPE: CHRONIC PAIN

## 2021-12-17 NOTE — PROGRESS NOTES
RN notified Tyshawn Vincent NP with CT surgery of repeat chest xtray results. Orders received to place chest tube back to suction. NP states that she will update Dr. Connie Murillo.

## 2021-12-17 NOTE — PROGRESS NOTES
Physical Therapy  Facility/Department: [facility]  Daily Treatment Note  NAME: Shelton Gilman  : 1950  MRN: [id number]    Date of Service: 2021    Discharge Recommendations:  Patient would benefit from continued therapy after discharge        Assessment   Body structures, Functions, Activity limitations: Decreased functional mobility ; Decreased safe awareness; Decreased balance; Decreased strength; Decreased posture; Decreased endurance  Assessment: Pt presents with R pneumothorax s/p chest tubes. Pt demo's balance and endurance deficits, weakness, and needs assist with mobiltiy. Cont per POC to prepare for d/c. Treatment Diagnosis: impaired mobility d/t R pneumothorax  Prognosis: Good  Decision Making: Medium Complexity  History: 67-year-old male history of lung cancer, A. fib with RVR, spontaneous pneumothorax presents for evaluation with pneumothorax on right side detected on routine screening CT exam today. Large pneumothorax with almost entirety of right lung collapse. Patient asymptomatic. Discussed with pulmonology. 30 F Chest tube placed with improvement in PTX on postprocedural x-ray. Will admit to intermediate ICU for pulmonology consult and monitoring. Exam: ROM, MMT, social hx, balance assessment, mobiilty  Clinical Presentation: copoerative  Barriers to Learning: none  REQUIRES PT FOLLOW UP: Yes  Activity Tolerance  Activity Tolerance: Patient limited by endurance     Patient Diagnosis(es): The primary encounter diagnosis was Chronic pneumothorax. Diagnoses of Pneumothorax, unspecified type and Primary malignant neoplasm of right lung metastatic to other site [facility]) were also pertinent to this visit. has a past medical history of DDD (degenerative disc disease), cervical, Gout, Heart murmur, Hyperlipidemia, Hypertension, Lung cancer ([address] MI (myocardial infarction) ([address] Skin cancer, and Wears glasses.    has a past surgical history that includes Appendectomy; Tonsillectomy; skin biopsy; skin biopsy; Colonoscopy; Foot surgery (Right); Cardiac catheterization; cyst incision and drainage (Right, 05/14/2020); Forearm surgery (Right, 5/14/2020); knee surgery (Left, 5/14/2020); and IR CHEST TUBE INSERTION (12/13/2021). Restrictions  Restrictions/Precautions  Restrictions/Precautions: General Precautions, Surgical Protocols  Required Braces or Orthoses?: No  Implants present? : Metal implants (Cardiac stent)  Position Activity Restriction  Other position/activity restrictions: Chest Tube  Subjective   General  Chart Reviewed: Yes  Additional Pertinent Hx: Chest tube placement 12/8/21 and 12/13/21 d/t persistent air leak. Pt has h/o R sided pneumothorax Sept 2021 and currently. H/o lung CA  Response To Previous Treatment: Not applicable  Family / Caregiver Present: No  Subjective  Subjective: Pt is in bed upon arrival. PT is agreeable to PT. General Comment  Comments: MARYAM rachel's pt for PT. RN reports pt does not need chest tube hooked up to suction. RN allows increase amb if pt tolerates. Pain Screening  Patient Currently in Pain: Yes  Pain Assessment  Pain Assessment: 0-10  Pain Level: 6  Pain Type: Chronic pain  Pain Location: Shoulder  Pain Orientation: Right  Vital Signs  Patient Currently in Pain: Yes  Oxygen Therapy  O2 Device: None (Room air)  Patient Observation  Observations: calm  Pre Treatment Pain Screening  Intervention List: Patient able to continue with treatment    Orientation  Orientation  Overall Orientation Status: Within Functional Limits  Objective   Bed mobility  Rolling to Right: Modified independent  Supine to Sit: Modified independent  Sit to Supine: Modified independent  Scooting: Modified independent  Comment: no difficulties noted. Transfers  Sit to Stand: Supervision  Stand to sit: Supervision  Comment: Pt requires min cues for eccentric control and line management with chest tube.    Ambulation  Ambulation?: Yes  More Ambulation?: No  Ambulation 1  Surface: level tile  Device: No Device  Assistance: Contact guard assistance  Quality of Gait: Pt demos a lateral sway and reaching for RUE support on objects in [place]. PT reports he has a RW/SPC at home. Recommending pt to use RW/SPC to increase safety with amb at this time. Gait Deviations: Slow Brittani; Decreased step length; Decreased step height  Distance: 80'x2  Comments: Chest tube, SPO2 is % and HR is 120 with amb  Stairs/Curb  Stairs?: No  Wheelchair Activities  Wheelchair Parts Management: No  Propulsion: No     Balance  Posture: Fair  Sitting - Static: Fair; +  Sitting - Dynamic: Fair; +  Standing - Static: Fair  Standing - Dynamic: Fair  Comments: no device  Other exercises  Other exercises?: Yes  Other exercises 2: Standing BLE ex's with UE support. Reps: 10 each. Seated/supine rest breaks PRN due to \"lower back pain\" without any support  Other exercises 3: Seated HS stretches x10 with 3-5 sec holds. Other exercises 4: Edu on the importance of continued ther ex to increase strength, endurance, and safety with functional mobility. Other exercises 5: Encouraged pt to complete seated/supine ex's independently throughout the day. Comment: rest breaks PRN.                Goals  Short term goals  Time Frame for Short term goals: 1 week  Short term goal 1: Pt will increase LE strength to 5/5  Short term goal 2: Ptwill increase endurance to tolerate PT sessions for 30 mins  Short term goal 3: Pt will improve standing and ambulation balance to good  Short term goal 4: Pt will perform bed mobility IND  Short term goal 5: Ptwill transfer with least restrictive device MOD IND  Short term goal 6: Pt will ambulate with least restrictive device 50 ft MOD IND  Short term goal 7: Pt will ascend/descend 5 steps without rail SBA    Plan    Plan  Times per week: 6-7x/wk  Times per day: Daily  Current Treatment Recommendations: Strengthening, Transfer Training, Endurance Training, Patient/Caregiver Education & Training, Balance Training, Functional Mobility Training, Stair training, Safety Education & Training, Gait Training, Home Exercise Program, Equipment Evaluation, Education, & procurement  Safety Devices  Type of devices: Call light within reach, Left in bed, Nurse notified, Patient at risk for falls   Restraints  Initially in place: No     Therapy Time   Individual Concurrent Group Co-treatment   Time In 1315         Time Out 1341         Minutes Caryl  [address] [place]

## 2021-12-17 NOTE — PROGRESS NOTES
RN received call from [place], NP that patient's chest tube is to be placed to water seal and repeat a chest xray in a few hours. Patient educated on the plan.

## 2021-12-17 NOTE — PROGRESS NOTES
Progress Note    12/17/2021   6:54 PM    Name:  Ramu Dunlap  MRN:    [id number]     Acct:     [de-identified]   Room:  2094/2094-01   Day: 8     Admit Date: 12/8/2021  7:03 PM  PCP: Letty Maxwell MD    Subjective:     C/C:   Chief Complaint   Patient presents with    Shortness of Breath       Interval History: Status: not changed. Patient Has right pleural pigtail catheter in place. Denies chest pain shortness of breath. Has intermittent cough. Patient oxygen saturations 100% on room air blood pressure and heart rate stable afebrile. Recent labs reviewed sodium 134, WBC 3.1, hemoglobin 10.1, platelet count 038. Chest x-ray from 12/17/2021 shows progression of right pneumothorax    ROS:   all 10 systems reviewed and are negative except as noted    Review of Systems   Constitutional: Negative for appetite change, chills and diaphoresis. HENT: Negative for drooling, ear pain, trouble swallowing and voice change. Eyes: Negative for photophobia and visual disturbance. Respiratory: Positive for cough. Negative for choking, shortness of breath and stridor. Cardiovascular: Negative for chest pain and palpitations. Gastrointestinal: Negative for abdominal distention, anal bleeding, blood in stool and rectal pain. Endocrine: Negative for polyphagia and polyuria. Genitourinary: Negative for dysuria, flank pain, hematuria and urgency. Musculoskeletal: Negative for back pain, myalgias and neck stiffness. Skin: Negative for color change, pallor and rash. Allergic/Immunologic: Negative for environmental allergies and food allergies. Neurological: Negative for tremors, seizures, facial asymmetry and numbness. Hematological: Negative for adenopathy. Does not bruise/bleed easily. Psychiatric/Behavioral: Negative for agitation, behavioral problems, hallucinations, self-injury and suicidal ideas. Medications:      Allergies: No Known Allergies    Current Meds: polyethylene glycol (GLYCOLAX) packet 17 g, Daily  senna (SENOKOT) tablet 8.6 mg, Nightly  docusate sodium (COLACE) capsule 100 mg, Daily  guaiFENesin (MUCINEX) extended release tablet 600 mg, BID  midodrine (PROAMATINE) tablet 7.5 mg, TID WC  ipratropium-albuterol (DUONEB) nebulizer solution 3 mL, Q4H WA  sodium chloride flush 0.9 % injection 5-40 mL, 2 times per day  sodium chloride flush 0.9 % injection 5-40 mL, PRN  0.9 % sodium chloride infusion, PRN  acetaminophen (TYLENOL) tablet 650 mg, Q4H PRN  ondansetron (ZOFRAN-ODT) disintegrating tablet 4 mg, Q8H PRN   Or  ondansetron (ZOFRAN) injection 4 mg, Q6H PRN  potassium chloride (KLOR-CON M) extended release tablet 40 mEq, PRN   Or  potassium bicarb-citric acid (EFFER-K) effervescent tablet 40 mEq, PRN   Or  potassium chloride 10 mEq/100 mL IVPB (Peripheral Line), PRN  ferrous sulfate (IRON 325) tablet 325 mg, Daily  atorvastatin (LIPITOR) tablet 10 mg, Daily  cefTRIAXone (ROCEPHIN) 1000 mg IVPB in 50 mL D5W minibag, Q24H  doxycycline monohydrate (MONODOX) capsule 100 mg, 2 times per day  apixaban (ELIQUIS) tablet 5 mg, BID  lidocaine 1 % injection 20 mL, Once        Data:     Code Status:  Full Code    Family History   Problem Relation Age of Onset    Cancer Father         lung cancer       Social History     Socioeconomic History    Marital status:      Spouse name: Not on file    Number of children: Not on file    Years of education: Not on file    Highest education level: Not on file   Occupational History    Not on file   Tobacco Use    Smoking status: Current Every Day Smoker     Packs/day: 0.50     Types: Cigarettes    Smokeless tobacco: Former User   Vaping Use    Vaping Use: Former   Substance and Sexual Activity    Alcohol use: No    Drug use: Yes     Frequency: 14.0 times per week     Types: Marijuana [place])    Sexual activity: Not on file   Other Topics Concern    Not on file   Social History Narrative    Not on file     Social Determinants of Health Financial Resource Strain:     Difficulty of Paying Living Expenses: Not on file   Food Insecurity:     Worried About Running Out of Food in the Last Year: Not on file    Dandre of Food in the Last Year: Not on file   Transportation Needs:     Lack of Transportation (Medical): Not on file    Lack of Transportation (Non-Medical): Not on file   Physical Activity:     Days of Exercise per Week: Not on file    Minutes of Exercise per Session: Not on file   Stress:     Feeling of Stress : Not on file   Social Connections:     Frequency of Communication with Friends and Family: Not on file    Frequency of Social Gatherings with Friends and Family: Not on file    Attends Hinduism Services: Not on file    Active Member of [address] [place] [address] [employer] or Organizations: Not on file    Attends Club or Organization Meetings: Not on file    Marital Status: Not on file   Intimate Partner Violence:     Fear of Current or Ex-Partner: Not on file    Emotionally Abused: Not on file    Physically Abused: Not on file    Sexually Abused: Not on file   Housing Stability:     Unable to Pay for Housing in the Last Year: Not on file    Number of [place] in the Last Year: Not on file    Unstable Housing in the Last Year: Not on file       I/O (24Hr): Intake/Output Summary (Last 24 hours) at 12/17/2021 1854  Last data filed at 12/17/2021 1708  Gross per 24 hour   Intake 940 ml   Output 1555 ml   Net -615 ml     Radiology:  CT CHEST W WO CONTRAST    Result Date: 12/14/2021  Similar moderate to large right pneumothorax. Subsequent pigtail chest tube placed using fluoroscopic guidance. RECOMMENDATIONS: Unavailable     XR CHEST PORTABLE    Result Date: 12/17/2021  Similar right pneumothorax with pigtail catheter in place. XR CHEST PORTABLE    Result Date: 12/16/2021  Right pleural pigtail catheter remains in place with slight decrease in size of the right pneumothorax.      XR CHEST PORTABLE    Result Date: 12/15/2021  A left [id number] was noted with the distal tip in the superior vena cava. Mild volume loss of the right hemithorax secondary to a right pneumothorax with 4.2 cm of separation at the right apex. The pneumothorax has progressed in size from 12/15/2021, 0806 hours. Previously, it measured 3.6 cm of separation. Unchanged right hilar mass was noted with post obstructive pneumonia-atelectasis. The pigtail catheter is unchanged on the right. The other lower right-sided chest tube has been removed since the earlier exam.     XR CHEST PORTABLE    Result Date: 12/15/2021  Similar appearance of 2 right chest tubes with slight decrease in size of a small to moderate pneumothorax. XR CHEST PORTABLE    Result Date: 12/14/2021  New pleural pigtail catheter in the upper right chest.  Unchanged large bore right chest tube. Moderate right pneumothorax is not significantly changed. XR CHEST PORTABLE    Result Date: 12/13/2021  Overall no significant interval change. Right chest tube remains in place with persistent moderate right pneumothorax. XR CHEST PORTABLE    Result Date: 12/12/2021  Stable moderate right pneumothorax and right subcutaneous emphysema. XR CHEST PORTABLE    Result Date: 12/11/2021  Stable appearance of right-sided large pneumothorax with chest tube stable in position.      IR CHEST TUBE INSERTION    Result Date: 12/13/2021  Successful fluoroscopic guided placement of a 10 Brazilian right pleural pigtail chest tube       Labs:  Recent Results (from the past 24 hour(s))   CBC with DIFF    Collection Time: 12/17/21  6:51 AM   Result Value Ref Range    WBC 3.1 (L) 3.5 - 11.0 k/uL    RBC 2.80 (L) 4.5 - 5.9 m/uL    Hemoglobin 10.1 (L) 13.5 - 17.5 g/dL    Hematocrit 29.3 (L) 41 - 53 %    .5 (H) 80 - 100 fL    MCH 35.9 (H) 26 - 34 pg    MCHC 34.3 31 - 37 g/dL    RDW 15.5 (H) 11.5 - 14.9 %    Platelets 045 110 - 287 k/uL    MPV 6.9 6.0 - 12.0 fL    NRBC Automated NOT REPORTED per 100 WBC    Differential Type NOT REPORTED     Immature Granulocytes NOT REPORTED 0 %    Absolute Immature Granulocyte NOT REPORTED 0.00 - 0.30 k/uL    WBC Morphology NOT REPORTED     RBC Morphology NOT REPORTED     Platelet Estimate NOT REPORTED     Seg Neutrophils 46 36 - 66 %    Lymphocytes 23 (L) 24 - 44 %    Monocytes 23 (H) 1 - 7 %    Eosinophils % 6 (H) 0 - 4 %    Basophils 0 0 - 2 %    Bands 2 0 - 10 %    Segs Absolute 1.43 1.3 - 9.1 k/uL    Absolute Lymph # 0.71 (L) 1.0 - 4.8 k/uL    Absolute Mono # 0.71 0.1 - 1.3 k/uL    Absolute Eos # 0.19 0.0 - 0.4 k/uL    Basophils Absolute 0.00 0.0 - 0.2 k/uL    Absolute Bands # 0.06 0.0 - 1.0 k/uL    Morphology HYPOCHROMIA PRESENT     Morphology MACROCYTOSIS PRESENT     Morphology ANISOCYTOSIS PRESENT    Comprehensive Metabolic Panel w/ Reflex to MG    Collection Time: 21  6:51 AM   Result Value Ref Range    Glucose 103 (H) 70 - 99 mg/dL    BUN 11 8 - 23 mg/dL    CREATININE 0.78 0.70 - 1.20 mg/dL    Bun/Cre Ratio NOT REPORTED 9 - 20    Calcium 9.4 8.6 - 10.4 mg/dL    Sodium 134 (L) 135 - 144 mmol/L    Potassium 4.4 3.7 - 5.3 mmol/L    Chloride 100 98 - 107 mmol/L    CO2 26 20 - 31 mmol/L    Anion Gap 8 (L) 9 - 17 mmol/L    Alkaline Phosphatase 109 40 - 129 U/L    ALT 17 5 - 41 U/L    AST 21 <40 U/L    Total Bilirubin 0.22 (L) 0.3 - 1.2 mg/dL    Total Protein 6.7 6.4 - 8.3 g/dL    Albumin 3.5 3.5 - 5.2 g/dL    Albumin/Globulin Ratio NOT REPORTED 1.0 - 2.5    GFR Non-African American >60 >60 mL/min    GFR African American >60 >60 mL/min    GFR Comment          GFR Staging NOT REPORTED        Physical Examination:        Vitals:  /68   Pulse 94   Temp 98.6 °F (37 °C) (Oral)   Resp 18   Ht 5' 11\" (1.803 m)   Wt 163 lb 2.3 oz (74 kg)   SpO2 100%   BMI 22.75 kg/m²   Temp (24hrs), Av.5 °F (36.9 °C), Min:98.3 °F (36.8 °C), Max:98.6 °F (37 °C)    No results for input(s): POCGLU in the last 72 hours. Physical Exam  Vitals reviewed.    Constitutional:       Appearance: Normal appearance. He is not diaphoretic. HENT:      Head: Normocephalic and atraumatic. Right Ear: External ear normal.      Left Ear: External ear normal.      Nose: Nose normal.      Mouth/Throat:      Mouth: Mucous membranes are moist.      Pharynx: Oropharynx is clear. Eyes:      Conjunctiva/sclera: Conjunctivae normal.   Cardiovascular:      Rate and Rhythm: Normal rate and regular rhythm. Pulses: Normal pulses. Heart sounds: Normal heart sounds. Pulmonary:      Effort: Pulmonary effort is normal.      Breath sounds: Examination of the right-upper field reveals decreased breath sounds. Examination of the right-middle field reveals decreased breath sounds. Decreased breath sounds present. Abdominal:      General: Bowel sounds are normal. There is no distension. Palpations: Abdomen is soft. Musculoskeletal:         General: No tenderness or deformity. Normal range of motion. Cervical back: Normal range of motion and neck supple. No rigidity. Right lower leg: No edema. Left lower leg: No edema. Skin:     General: Skin is warm and dry. Capillary Refill: Capillary refill takes less than 2 seconds. Coloration: Skin is not jaundiced. Neurological:      General: No focal deficit present. Mental Status: Mental status is at baseline. Psychiatric:         Mood and Affect: Mood normal.         Behavior: Behavior normal.         Assessment:        Primary Problem  Pneumothorax     Principal Problem:    Pneumothorax  Active Problems:    Lung cancer, primary, with metastasis from lung to other site (HCC)    PAF (paroxysmal atrial fibrillation) (HCC)    COPD (chronic obstructive pulmonary disease) (HCC)    Iron deficiency anemia    Severe malnutrition (HCC)  Resolved Problems:    * No resolved hospital problems. *      Past Medical History:   Diagnosis Date    DDD (degenerative disc disease), cervical     Gout     Heart murmur     hx. of when younger.     Hyperlipidemia     Hypertension     Lung cancer ([facility] [address]     right lung    MI (myocardial infarction) ([facility] [address]     Skin cancer     face    Wears glasses         Plan:        1. Rocephin 1 g IV daily  2. Doxycycline 100 mg p.o. twice daily  3. Increase midodrine 7.5 mg p.o. 3 times a day with holding parameters  4.  DVT prophylaxis Eliquis. 5.  per cardiothoracic surgery recommendation pleural pigtail catheter placed to suction  6.  oncology, cardiothoracic and pulmonology input noted  7.  CBC, CMP  8.  EPCs  9.  check and replace electrolytes per sliding scale      Electronically signed by Mary Orozco MD

## 2021-12-17 NOTE — PLAN OF CARE
Problem: Skin Integrity:  Goal: Will show no infection signs and symptoms  Description: Will show no infection signs and symptoms  Outcome: Ongoing     Problem: Skin Integrity:  Goal: Absence of new skin breakdown  Description: Absence of new skin breakdown  Outcome: Ongoing     Problem: Infection:  Goal: Will remain free from infection  Description: Will remain free from infection  Outcome: Ongoing     Problem: Safety:  Goal: Free from accidental physical injury  Description: Free from accidental physical injury  Outcome: Ongoing     Problem: Safety:  Goal: Free from intentional harm  Description: Free from intentional harm  Outcome: Ongoing     Problem: Daily Care:  Goal: Daily care needs are met  Description: Daily care needs are met  Outcome: Ongoing     Problem: Pain:  Goal: Patient's pain/discomfort is manageable  Description: Patient's pain/discomfort is manageable  Outcome: Ongoing     Problem: Pain:  Goal: Pain level will decrease  Description: Pain level will decrease  Outcome: Ongoing     Problem: Pain:  Goal: Control of acute pain  Description: Control of acute pain  Outcome: Ongoing     Problem: Pain:  Goal: Control of chronic pain  Description: Control of chronic pain  Outcome: Ongoing     Problem: Skin Integrity:  Goal: Skin integrity will stabilize  Description: Skin integrity will stabilize  Outcome: Ongoing     Problem: Discharge Planning:  Goal: Patients continuum of care needs are met  Description: Patients continuum of care needs are met  Outcome: Ongoing     Problem: Falls - Risk of:  Goal: Will remain free from falls  Description: Will remain free from falls  Outcome: Ongoing     Problem: Falls - Risk of:  Goal: Absence of physical injury  Description: Absence of physical injury  Outcome: Ongoing     Problem: Nutrition  Goal: Optimal nutrition therapy  Outcome: Ongoing       Patient remains free of falls. Calls out appropriately. Able to participate in discharge plan.   Patient denies severe pain this shift.

## 2021-12-17 NOTE — CARE COORDINATION
Morning x-ray reviewed with Dr Prashant Montes. Pneumothorax is stable and not likely to resolve due to hilar mass. Will transition to water seal today and repeat chest x-ray. If pneumothorax remains stable, patient may be discharged from Cardiothoracic standpoint with pigtail in place to Heimlich valve or mini atrium and return to clinic with repeat chest x-ray.      Deonte Coto, APRN - CNP

## 2021-12-17 NOTE — CARE COORDINATION
ONGOING DISCHARGE PLAN:    Patient is alert and oriented x4. Spoke with patient regarding discharge plan and patient confirms that plan is still to return to home with KEEGAN Dobbins. CXR done, awaiting results. Per CT surgery, if pneumo remains stable, then possible discharge later today with pigtail in place with valve and then can return to the outpatient clinic to manage pigtail. Hopeful for discharge later today. Will continue to follow for additional discharge needs.     Electronically signed by Zenon Little RN on 12/17/2021 at 1:35 PM

## 2021-12-17 NOTE — FLOWSHEET NOTE
12/17/21 1238   Encounter Summary   Services provided to: Patient   Referral/Consult From: Charissa   Continue Visiting   (12/17/21V)   Volunteer Visit Yes   Complexity of Encounter Low   Length of Encounter 15 minutes   Spiritual/Anabaptist   Type Spiritual support   Intervention Communion   Sacraments   Communion Patient received communion

## 2021-12-17 NOTE — PROGRESS NOTES
PULMONARY PROGRESS NOTE:    REASON FOR VISIT: PTX, lung cancer  Interval History:    Shortness of Breath: no  Cough: no  Sputum: no          Hemoptysis: no  Chest Pain: no  Fever: no                   Swelling Feet: no  Headache: no                                           Nausea, Emesis, Abdominal Pain: no  Diarrhea: no         Constipation: no    Events since last visit: none    PAST MEDICAL HISTORY:      Scheduled Meds:   polyethylene glycol  17 g Oral Daily    senna  1 tablet Oral Nightly    docusate sodium  100 mg Oral Daily    guaiFENesin  600 mg Oral BID    midodrine  7.5 mg Oral TID WC    ipratropium-albuterol  3 mL Inhalation Q4H WA    sodium chloride flush  5-40 mL IntraVENous 2 times per day    ferrous sulfate  325 mg Oral Daily    atorvastatin  10 mg Oral Daily    cefTRIAXone (ROCEPHIN) IV  1,000 mg IntraVENous Q24H    doxycycline monohydrate  100 mg Oral 2 times per day    apixaban  5 mg Oral BID    lidocaine  20 mL IntraDERmal Once     Continuous Infusions:   sodium chloride Stopped (12/13/21 0944)     PRN Meds:sodium chloride flush, sodium chloride, acetaminophen, ondansetron **OR** ondansetron, potassium chloride **OR** potassium alternative oral replacement **OR** potassium chloride        PHYSICAL EXAMINATION:  /77   Pulse 89   Temp 98.5 °F (36.9 °C) (Oral)   Resp 18   Ht 5' 11\" (1.803 m)   Wt 163 lb 2.3 oz (74 kg)   SpO2 95%   BMI 22.75 kg/m²     General : Awake, alert,   Neck  supple, no lymphadenopathy, JVD not raised  Heart  regular rhythm, S1 and S2 normal; no additional sounds heard  Lungs  Air Entry- fair bilaterally; breath sounds : vesicular, chest tube with +air leak -  95% 02 sat on RA   Abdomen  soft, no tenderness  Upper Extremities  - no cyanosis, mottling; edema : absent  Lower Extremities: no cyanosis, mottling; edema : absent    Current Laboratory, Radiologic, Microbiologic, and Diagnostic studies reviewed  Data ReviewCBC:   Recent Labs 12/15/21  0636 12/16/21  0533 12/17/21  0651   WBC 3.1* 2.7* 3.1*   RBC 2.82* 2.86* 2.80*   HGB 10.1* 10.3* 10.1*   HCT 29.8* 30.0* 29.3*    190 219     BMP:   Recent Labs     12/15/21  0636 12/16/21  0533 12/17/21  0651   GLUCOSE 91 96 103*   * 136 134*   K 4.3 4.2 4.4   BUN 13 12 11   CREATININE 1.08 0.92 0.78   CALCIUM 9.0 9.5 9.4     ABGs: No results for input(s): PHART, PO2ART, UDH2JPZ, ZYU7VMW, BEART, H8PAEOAU, MGZ0WUS in the last 72 hours.    PT/INR:  No results found for: PTINR    ASSESSMENT / PLAN:    Lung cancer - per oncology  PTX - right chest tube -    CXR 12/11- no improvement  CT back to suction this morning     further recs per CT surgery - in my opinion this is loculated PTX and will persist  Large bore chest tube removed 12/15/21  Await CT surgery recommendations    Plan of care discussed with Dr Marcos Buenrostro  Electronically signed by AZEB Busch - CNP on 12/17/21 at 9:52 AM

## 2021-12-17 NOTE — PROGRESS NOTES
Today's Date: 12/17/2021  Patient Name: Jamaica Grossman  Date of admission: 12/8/2021  7:03 PM  Patient's age: 80 y.o., 1950  Admission Dx: Pneumothorax [J93.9]  Chronic pneumothorax [J93.81]    Reason for Consult: Known lung cancer patient  Requesting Physician: Bc Arguelles MD    CHIEF COMPLAINT:    Chief Complaint   Patient presents with    Shortness of Breath       INTERVAL HISTORY:    Patient seen and examined  Labs and vitals reviewed  Resting comfortably  X-ray from afternoon shows worsening of pneumothorax  Patient very disappointed about worsening of pneumothorax        HISTORY OF PRESENT ILLNESS:    Jamaica Grossman is a 80 y.o. male who is admitted to the hospital on 12/8/2021  for SOB. He had a restaging scan with oncology and his CT scan showed worsening pneumothorax and mediastinal shift. Therefore he was sent to ER for further management. He denies any fever chills. He had chest tube in place. He is currently receiving Lurbinectedin for his recurrent small cell lung cancer and most recent treatment was on 12/1/2021. He has a diagnosis of small cell lung cancer with squamous component and has been following with Dr. Virgilio Kay as outpatient and his brief oncologic history as follows. 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 Chemotherapy changed to carboplatin and etoposide due to renal insufficiency from cycle #2  PCI 7/2019  SRS to adrenal gland metastasis, completed 07/2020  systemic palliative chemoimmunotherapy with carboplatin etoposide and Tecentriq, 12/2020 x4 cycles.   Maintenance Tecentriq, 3/2021  Lurbenectidin7/2/2021      Past Medical History:   has a past medical history of DDD (degenerative disc disease), Yes Historical Provider, MD   allopurinol (ZYLOPRIM) 100 MG tablet Take 100 mg by mouth daily 12/4/18  Yes Historical Provider, MD   simvastatin (ZOCOR) 40 MG tablet Take 40 mg by mouth daily 11/27/18  Yes Historical Provider, MD   Handicap Placard [address] [facility] by Does not apply route 7/30/19   Annemarie Alexander MD     Current Facility-Administered Medications   Medication Dose Route Frequency Provider Last Rate Last Admin    polyethylene glycol (GLYCOLAX) packet 17 g  17 g Oral Daily Grace De La Rosa MD   17 g at 12/15/21 0845    senna (SENOKOT) tablet 8.6 mg  1 tablet Oral Nightly Grace De La Rosa MD   8.6 mg at 12/16/21 2219    docusate sodium (COLACE) capsule 100 mg  100 mg Oral Daily Grace De La Rosa MD   100 mg at 12/15/21 0843    guaiFENesin (MUCINEX) extended release tablet 600 mg  600 mg Oral BID Grace De La Rosa MD   600 mg at 12/17/21 0902    midodrine (PROAMATINE) tablet 7.5 mg  7.5 mg Oral TID WC Grace De La Rosa MD   7.5 mg at 12/17/21 1308    ipratropium-albuterol (DUONEB) nebulizer solution 3 mL  3 mL Inhalation Q4H WA Grace De La Rosa MD   3 mL at 12/17/21 0842    sodium chloride flush 0.9 % injection 5-40 mL  5-40 mL IntraVENous 2 times per day Read MD Aleksandr   10 mL at 12/17/21 0903    sodium chloride flush 0.9 % injection 5-40 mL  5-40 mL IntraVENous PRN Read MD Aleksandr        0.9 % sodium chloride infusion  25 mL IntraVENous PRN Read MD Aleksandr   Stopped at 12/13/21 0944    acetaminophen (TYLENOL) tablet 650 mg  650 mg Oral Q4H PRN Read MD Aleksandr   650 mg at 12/17/21 0434    ondansetron (ZOFRAN-ODT) disintegrating tablet 4 mg  4 mg Oral Q8H PRN Read MD Aleksandr        Or    ondansetron (ZOFRAN) injection 4 mg  4 mg IntraVENous Q6H PRN Read MD Aleksandr        potassium chloride (KLOR-CON M) extended release tablet 40 mEq  40 mEq Oral PRN Grace [place], MD        Or    potassium bicarb-citric acid (EFFER-K) effervescent tablet 40 mEq  40 mEq lymphadenopathy, or petechiae   Endocrine: negative for heat or cold intolerance,weight changes, change in bowel habits and hair loss   Musculoskeletal: negative for myalgias, arthralgias, pain, joint swelling,and bone pain   Neurological: negative for headaches, dizziness, seizures, weakness, numbness    PHYSICAL EXAM:      /75   Pulse 98   Temp 98.6 °F (37 °C) (Oral)   Resp 18   Ht 5' 11\" (1.803 m)   Wt 163 lb 2.3 oz (74 kg)   SpO2 98%   BMI 22.75 kg/m²    Temp (24hrs), Av.5 °F (36.9 °C), Min:98.3 °F (36.8 °C), Max:98.6 °F (37 °C)    General appearance - well appearing, no in pain or distress   Mental status - alert and cooperative   Eyes - pupils equal and reactive, extraocular eye movements intact   Ears - bilateral TM's and external ear canals normal   Mouth - mucous membranes moist, pharynx normal without lesions   Neck - supple, no significant adenopathy   Lymphatics - no palpable lymphadenopathy, no hepatosplenomegaly   Chest -decreased breathing sound. Heart - normal rate, regular rhythm, normal S1, S2, no murmurs  Abdomen - soft, nontender, nondistended, no masses or organomegaly   Neurological - alert, oriented, normal speech, no focal findings or movement disorder noted   Musculoskeletal - no joint tenderness, deformity or swelling   Extremities - peripheral pulses normal, no pedal edema, no clubbing or cyanosis   Skin - normal coloration and turgor, no rashes, no suspicious skin lesions noted ,    DATA:    Labs:   CBC:   Recent Labs     21  0533 21  0651   WBC 2.7* 3.1*   HGB 10.3* 10.1*   HCT 30.0* 29.3*    219     BMP:   Recent Labs     21  0533 21  0651    134*   K 4.2 4.4   CO2 27 26   BUN 12 11   CREATININE 0.92 0.78   LABGLOM >60 >60   GLUCOSE 96 103*     PT/INR:   No results for input(s): PROTIME, INR in the last 72 hours. IMAGING DATA:  XR CHEST PORTABLE   Final Result   Left Mwaspw-B-Mxkp with the tip in the superior vena cava. Overall no significant interval change. Right chest tube remains in place   with persistent moderate right pneumothorax. XR CHEST PORTABLE   Final Result   Stable moderate right pneumothorax and right subcutaneous emphysema. XR CHEST PORTABLE   Final Result   Stable appearance of right-sided large pneumothorax with chest tube stable in   position. XR CHEST PORTABLE   Final Result   Slight improvement of large right pneumothorax with chest tube in place. XR CHEST PORTABLE   Final Result   Stable examination. XR CHEST PORTABLE   Final Result   Addendum 1 of 1   ADDENDUM:   Compared with the chest x-ray there is been significant interval    improvement. Please disregard original impression. Discussed with Dr. Elizabeth Mcdonough at 10:20    p.m. Final   Increased right pneumothorax despite new chest tube. Increased airspace   disease on the right. RECOMMENDATION:   The findings were sent to the [facility] at 10:19   pm on 12/8/2021to be communicated to a licensed caregiver. XR CHEST PORTABLE    (Results Pending)   XR CHEST STANDARD (2 VW)    (Results Pending)       Primary Problem  Pneumothorax    Active Hospital Problems    Diagnosis Date Noted    Severe malnutrition ([facility] [address] [E43] 12/15/2021    Pneumothorax [J93.9] 08/27/2021    COPD (chronic obstructive pulmonary disease) ([facility] [address] [J44.9] 08/27/2021    Iron deficiency anemia [D50.9] 08/27/2021    PAF (paroxysmal atrial fibrillation) ([address] [I48.0] 08/27/2021    Lung cancer, primary, with metastasis from lung to other site ([facility] [address] [C34.90]      IMPRESSION:   1. Metastatic small cell cancer currently on Lurbinectedin  2. Left adrenal gland metastasis status post SBRT  3. Large right pneumothorax, with recent CT scan showing increase compared to the prior scan, s/p chest tube placed  4. COPD  5.  Anemia    RECOMMENDATIONS:  1. I reviewed laboratory data, imaging studies, diagnosis, treatment recommendations  2. Discussed results of chest x-ray worsening of the pneumothorax  3. Discussed findings with CT surgery team over the phone. Unfortunately not many treatment options available for the patient given his not able to tolerate waterseal suggestive  4. Follow-up on CT surgery recommendation  5. Continue symptomatic supportive care  6. No plans for chemotherapy at this point  7. We will follow    Discussed with patient and Nurse. Thank you for asking us to see this patient. Alka Vargas MD      This note is created with the assistance of a speech recognition program.  While intending to generate a document that actually reflects the content of the visit, the document can still have some errors including those of syntax and sound a like substitutions which may escape proof reading. It such instances, actual meaning can be extrapolated by contextual diversion. \

## 2021-12-18 ENCOUNTER — APPOINTMENT (OUTPATIENT)
Dept: GENERAL RADIOLOGY | Age: 71
DRG: 199 | End: 2021-12-18
Payer: MEDICARE

## 2021-12-18 LAB
ABSOLUTE BANDS #: 0.1 K/UL (ref 0–1)
ABSOLUTE EOS #: 0.16 K/UL (ref 0–0.4)
ABSOLUTE IMMATURE GRANULOCYTE: ABNORMAL K/UL (ref 0–0.3)
ABSOLUTE LYMPH #: 0.93 K/UL (ref 1–4.8)
ABSOLUTE MONO #: 0.77 K/UL (ref 0.1–1.3)
ALBUMIN SERPL-MCNC: 3.4 G/DL (ref 3.5–5.2)
ALBUMIN/GLOBULIN RATIO: ABNORMAL (ref 1–2.5)
ALP BLD-CCNC: 107 U/L (ref 40–129)
ALT SERPL-CCNC: 15 U/L (ref 5–41)
ANION GAP SERPL CALCULATED.3IONS-SCNC: 9 MMOL/L (ref 9–17)
AST SERPL-CCNC: 19 U/L
BANDS: 3 % (ref 0–10)
BASOPHILS # BLD: 1 % (ref 0–2)
BASOPHILS ABSOLUTE: 0.03 K/UL (ref 0–0.2)
BILIRUB SERPL-MCNC: 0.19 MG/DL (ref 0.3–1.2)
BUN BLDV-MCNC: 14 MG/DL (ref 8–23)
BUN/CREAT BLD: ABNORMAL (ref 9–20)
CALCIUM SERPL-MCNC: 9.2 MG/DL (ref 8.6–10.4)
CHLORIDE BLD-SCNC: 98 MMOL/L (ref 98–107)
CO2: 25 MMOL/L (ref 20–31)
CREAT SERPL-MCNC: 0.91 MG/DL (ref 0.7–1.2)
DIFFERENTIAL TYPE: ABNORMAL
EOSINOPHILS RELATIVE PERCENT: 5 % (ref 0–4)
GFR AFRICAN AMERICAN: >60 ML/MIN
GFR NON-AFRICAN AMERICAN: >60 ML/MIN
GFR SERPL CREATININE-BSD FRML MDRD: ABNORMAL ML/MIN/{1.73_M2}
GFR SERPL CREATININE-BSD FRML MDRD: ABNORMAL ML/MIN/{1.73_M2}
GLUCOSE BLD-MCNC: 90 MG/DL (ref 70–99)
HCT VFR BLD CALC: 29.3 % (ref 41–53)
HEMOGLOBIN: 10 G/DL (ref 13.5–17.5)
IMMATURE GRANULOCYTES: ABNORMAL %
LYMPHOCYTES # BLD: 29 % (ref 24–44)
MCH RBC QN AUTO: 35.3 PG (ref 26–34)
MCHC RBC AUTO-ENTMCNC: 34 G/DL (ref 31–37)
MCV RBC AUTO: 103.9 FL (ref 80–100)
MONOCYTES # BLD: 24 % (ref 1–7)
MORPHOLOGY: ABNORMAL
MORPHOLOGY: ABNORMAL
NRBC AUTOMATED: ABNORMAL PER 100 WBC
PDW BLD-RTO: 15.7 % (ref 11.5–14.9)
PLATELET # BLD: 260 K/UL (ref 150–450)
PLATELET ESTIMATE: ABNORMAL
PMV BLD AUTO: 7.6 FL (ref 6–12)
POTASSIUM SERPL-SCNC: 4.2 MMOL/L (ref 3.7–5.3)
RBC # BLD: 2.82 M/UL (ref 4.5–5.9)
RBC # BLD: ABNORMAL 10*6/UL
SEG NEUTROPHILS: 38 % (ref 36–66)
SEGMENTED NEUTROPHILS ABSOLUTE COUNT: 1.21 K/UL (ref 1.3–9.1)
SODIUM BLD-SCNC: 132 MMOL/L (ref 135–144)
TOTAL PROTEIN: 6.6 G/DL (ref 6.4–8.3)
WBC # BLD: 3.2 K/UL (ref 3.5–11)
WBC # BLD: ABNORMAL 10*3/UL

## 2021-12-18 PROCEDURE — 6370000000 HC RX 637 (ALT 250 FOR IP): Performed by: INTERNAL MEDICINE

## 2021-12-18 PROCEDURE — 6370000000 HC RX 637 (ALT 250 FOR IP): Performed by: FAMILY MEDICINE

## 2021-12-18 PROCEDURE — 2580000003 HC RX 258: Performed by: INTERNAL MEDICINE

## 2021-12-18 PROCEDURE — 94640 AIRWAY INHALATION TREATMENT: CPT

## 2021-12-18 PROCEDURE — 94761 N-INVAS EAR/PLS OXIMETRY MLT: CPT

## 2021-12-18 PROCEDURE — 80053 COMPREHEN METABOLIC PANEL: CPT

## 2021-12-18 PROCEDURE — 2580000003 HC RX 258: Performed by: FAMILY MEDICINE

## 2021-12-18 PROCEDURE — 99232 SBSQ HOSP IP/OBS MODERATE 35: CPT | Performed by: INTERNAL MEDICINE

## 2021-12-18 PROCEDURE — 6360000002 HC RX W HCPCS: Performed by: FAMILY MEDICINE

## 2021-12-18 PROCEDURE — 36415 COLL VENOUS BLD VENIPUNCTURE: CPT

## 2021-12-18 PROCEDURE — 2060000000 HC ICU INTERMEDIATE R&B

## 2021-12-18 PROCEDURE — 85025 COMPLETE CBC W/AUTO DIFF WBC: CPT

## 2021-12-18 PROCEDURE — 71045 X-RAY EXAM CHEST 1 VIEW: CPT

## 2021-12-18 RX ADMIN — SODIUM CHLORIDE, PRESERVATIVE FREE 10 ML: 5 INJECTION INTRAVENOUS at 20:30

## 2021-12-18 RX ADMIN — APIXABAN 5 MG: 5 TABLET, FILM COATED ORAL at 09:05

## 2021-12-18 RX ADMIN — IPRATROPIUM BROMIDE AND ALBUTEROL SULFATE 3 ML: .5; 2.5 SOLUTION RESPIRATORY (INHALATION) at 18:15

## 2021-12-18 RX ADMIN — FERROUS SULFATE TAB 325 MG (65 MG ELEMENTAL FE) 325 MG: 325 (65 FE) TAB at 09:05

## 2021-12-18 RX ADMIN — MIDODRINE HYDROCHLORIDE 7.5 MG: 5 TABLET ORAL at 17:47

## 2021-12-18 RX ADMIN — ACETAMINOPHEN 650 MG: 325 TABLET, FILM COATED ORAL at 04:10

## 2021-12-18 RX ADMIN — MIDODRINE HYDROCHLORIDE 7.5 MG: 5 TABLET ORAL at 12:58

## 2021-12-18 RX ADMIN — IPRATROPIUM BROMIDE AND ALBUTEROL SULFATE 3 ML: .5; 2.5 SOLUTION RESPIRATORY (INHALATION) at 11:04

## 2021-12-18 RX ADMIN — ACETAMINOPHEN 650 MG: 325 TABLET, FILM COATED ORAL at 09:05

## 2021-12-18 RX ADMIN — GUAIFENESIN 600 MG: 600 TABLET, EXTENDED RELEASE ORAL at 20:29

## 2021-12-18 RX ADMIN — STANDARDIZED SENNA CONCENTRATE 8.6 MG: 8.6 TABLET ORAL at 20:29

## 2021-12-18 RX ADMIN — CEFTRIAXONE SODIUM 1000 MG: 1 INJECTION, POWDER, FOR SOLUTION INTRAMUSCULAR; INTRAVENOUS at 09:09

## 2021-12-18 RX ADMIN — IPRATROPIUM BROMIDE AND ALBUTEROL SULFATE 3 ML: .5; 2.5 SOLUTION RESPIRATORY (INHALATION) at 16:06

## 2021-12-18 RX ADMIN — APIXABAN 5 MG: 5 TABLET, FILM COATED ORAL at 20:29

## 2021-12-18 RX ADMIN — IPRATROPIUM BROMIDE AND ALBUTEROL SULFATE 3 ML: .5; 2.5 SOLUTION RESPIRATORY (INHALATION) at 08:16

## 2021-12-18 RX ADMIN — DOXYCYCLINE 100 MG: 100 CAPSULE ORAL at 20:29

## 2021-12-18 RX ADMIN — DOCUSATE SODIUM 100 MG: 100 CAPSULE ORAL at 09:04

## 2021-12-18 RX ADMIN — MIDODRINE HYDROCHLORIDE 7.5 MG: 5 TABLET ORAL at 09:04

## 2021-12-18 RX ADMIN — GUAIFENESIN 600 MG: 600 TABLET, EXTENDED RELEASE ORAL at 09:05

## 2021-12-18 RX ADMIN — DOXYCYCLINE 100 MG: 100 CAPSULE ORAL at 09:05

## 2021-12-18 RX ADMIN — ATORVASTATIN CALCIUM 10 MG: 20 TABLET, FILM COATED ORAL at 09:05

## 2021-12-18 ASSESSMENT — PAIN SCALES - GENERAL
PAINLEVEL_OUTOF10: 5
PAINLEVEL_OUTOF10: 3
PAINLEVEL_OUTOF10: 3

## 2021-12-18 NOTE — PLAN OF CARE
Safety maintained, call light is within reach, no s/s or c/o distress, bed is low/locked, side rails are up x2, current skin integrity maintained  Problem: Skin Integrity:  Goal: Will show no infection signs and symptoms  Description: Will show no infection signs and symptoms  12/18/2021 0329 by Raul Neal RN  Outcome: Ongoing  12/17/2021 1839 by Princess Marmolejo RN  Outcome: Ongoing  Goal: Absence of new skin breakdown  Description: Absence of new skin breakdown  12/18/2021 0329 by Raul Neal RN  Outcome: Ongoing  12/17/2021 1839 by Princess Marmolejo RN  Outcome: Ongoing     Problem: Infection:  Goal: Will remain free from infection  Description: Will remain free from infection  12/18/2021 0329 by Raul Neal RN  Outcome: Ongoing  12/17/2021 1839 by Princess Marmolejo RN  Outcome: Ongoing     Problem: Safety:  Goal: Free from accidental physical injury  Description: Free from accidental physical injury  12/18/2021 0329 by Raul Neal RN  Outcome: Ongoing  12/17/2021 1839 by Princess Marmolejo RN  Outcome: Ongoing  Goal: Free from intentional harm  Description: Free from intentional harm  12/18/2021 0329 by Raul Neal RN  Outcome: Ongoing  12/17/2021 1839 by Princess Marmolejo RN  Outcome: Ongoing     Problem: Daily Care:  Goal: Daily care needs are met  Description: Daily care needs are met  12/18/2021 0329 by Raul Neal RN  Outcome: Ongoing  12/17/2021 1839 by Princess Marmolejo RN  Outcome: Ongoing     Problem: Pain:  Goal: Patient's pain/discomfort is manageable  Description: Patient's pain/discomfort is manageable  12/18/2021 0329 by Raul Neal RN  Outcome: Ongoing  12/17/2021 1839 by Princess Marmolejo RN  Outcome: Ongoing  Goal: Pain level will decrease  Description: Pain level will decrease  12/18/2021 0329 by Raul Neal RN  Outcome: Ongoing  12/17/2021 1839 by Princess Marmolejo RN  Outcome: Ongoing  Goal: Control of acute pain  Description: Control of acute pain  12/18/2021 0329 by Jania Simpson RN  Outcome: Ongoing  12/17/2021 1839 by Eneida Maldonado RN  Outcome: Ongoing  Goal: Control of chronic pain  Description: Control of chronic pain  12/18/2021 0329 by Jania Simpson RN  Outcome: Ongoing  12/17/2021 1839 by Eneida Maldonado RN  Outcome: Ongoing     Problem: Skin Integrity:  Goal: Skin integrity will stabilize  Description: Skin integrity will stabilize  12/18/2021 0329 by Jania Simpson RN  Outcome: Ongoing  12/17/2021 1839 by Eneida Maldonado RN  Outcome: Ongoing     Problem: Discharge Planning:  Goal: Patients continuum of care needs are met  Description: Patients continuum of care needs are met  12/18/2021 0329 by Jania Simpson RN  Outcome: Ongoing  12/17/2021 1839 by Eneida Maldonado RN  Outcome: Ongoing     Problem: Falls - Risk of:  Goal: Will remain free from falls  Description: Will remain free from falls  12/18/2021 0329 by Jania Simpson RN  Outcome: Ongoing  12/17/2021 1839 by Eneida Maldonado RN  Outcome: Ongoing  Goal: Absence of physical injury  Description: Absence of physical injury  12/18/2021 0329 by Jania Simpson RN  Outcome: Ongoing  12/17/2021 1839 by Eneida Maldonado RN  Outcome: Ongoing     Problem: Nutrition  Goal: Optimal nutrition therapy  12/18/2021 0329 by Jania Simpson RN  Outcome: Ongoing  12/17/2021 1839 by Eneida Maldonado RN  Outcome: Ongoing     Problem: Infection:  Goal: Will remain free from infection  Description: Will remain free from infection  12/18/2021 0329 by Jania Simpson RN  Outcome: Ongoing  12/17/2021 1839 by Eneida Maldonado RN  Outcome: Ongoing     Problem: Safety:  Goal: Free from accidental physical injury  Description: Free from accidental physical injury  12/18/2021 0329 by Jania Simpson RN  Outcome: Ongoing  12/17/2021 1839 by Eneida Maldonado RN  Outcome: Ongoing     Problem: Daily Care:  Goal: Daily care needs are met  Description: Daily care needs are met  12/18/2021 0329 by Richard Wolff RN  Outcome: Ongoing  12/17/2021 1839 by Jaron Stearns RN  Outcome: Ongoing     Problem: Pain:  Goal: Patient's pain/discomfort is manageable  Description: Patient's pain/discomfort is manageable  12/18/2021 0329 by Richard Wolff RN  Outcome: Ongoing  12/17/2021 1839 by Jaron Stearns RN  Outcome: Ongoing

## 2021-12-18 NOTE — PROGRESS NOTES
PULMONARY PROGRESS NOTE:    REASON FOR VISIT: PTX, lung cancer  Interval History:    Shortness of Breath: no  Cough: no  Sputum: no          Hemoptysis: no  Chest Pain: no  Fever: no                   Swelling Feet: no  Headache: no                                           Nausea, Emesis, Abdominal Pain: no  Diarrhea: no         Constipation: no    Events since last visit: none    PAST MEDICAL HISTORY:      Scheduled Meds:   polyethylene glycol  17 g Oral Daily    senna  1 tablet Oral Nightly    docusate sodium  100 mg Oral Daily    guaiFENesin  600 mg Oral BID    midodrine  7.5 mg Oral TID WC    ipratropium-albuterol  3 mL Inhalation Q4H WA    sodium chloride flush  5-40 mL IntraVENous 2 times per day    ferrous sulfate  325 mg Oral Daily    atorvastatin  10 mg Oral Daily    cefTRIAXone (ROCEPHIN) IV  1,000 mg IntraVENous Q24H    doxycycline monohydrate  100 mg Oral 2 times per day    apixaban  5 mg Oral BID    lidocaine  20 mL IntraDERmal Once     Continuous Infusions:   sodium chloride Stopped (12/13/21 0944)     PRN Meds:sodium chloride flush, sodium chloride, acetaminophen, ondansetron **OR** ondansetron, potassium chloride **OR** potassium alternative oral replacement **OR** potassium chloride        PHYSICAL EXAMINATION:  /69   Pulse 90   Temp 97.7 °F (36.5 °C) (Oral)   Resp 18   Ht 5' 11\" (1.803 m)   Wt 162 lb 14.7 oz (73.9 kg)   SpO2 99%   BMI 22.72 kg/m²     General : Awake, alert,   Neck  supple, no lymphadenopathy, JVD not raised  Heart  regular rhythm, S1 and S2 normal; no additional sounds heard  Lungs  Air Entry- fair bilaterally; breath sounds : vesicular, chest tube with +air leak -  95% 02 sat on RA   Abdomen  soft, no tenderness  Upper Extremities  - no cyanosis, mottling; edema : absent  Lower Extremities: no cyanosis, mottling; edema : absent    Current Laboratory, Radiologic, Microbiologic, and Diagnostic studies reviewed  Data ReviewCBC:   Recent Labs 12/16/21  0533 12/17/21  0651 12/18/21  0649   WBC 2.7* 3.1* 3.2*   RBC 2.86* 2.80* 2.82*   HGB 10.3* 10.1* 10.0*   HCT 30.0* 29.3* 29.3*    219 260     BMP:   Recent Labs     12/16/21  0533 12/17/21  0651 12/18/21  0649   GLUCOSE 96 103* 90    134* 132*   K 4.2 4.4 4.2   BUN 12 11 14   CREATININE 0.92 0.78 0.91   CALCIUM 9.5 9.4 9.2     ABGs: No results for input(s): PHART, PO2ART, UDC5NLH, GBD1LUR, BEART, E7JFKAGP, MPX0QPG in the last 72 hours.    PT/INR:  No results found for: PTINR    ASSESSMENT / PLAN:    Lung cancer - per oncology  PTX - right chest tube -    CXR 12/11- no improvement  CT back to suction this morning     further recs per CT surgery - in my opinion this is loculated PTX and will persist  Large bore chest tube removed 12/15/21Heimlich applied to pig tail catheter; CXR 12/19/21    Electronically signed by Scott Flores MD on 12/18/21 at 1:46 PM

## 2021-12-18 NOTE — PROGRESS NOTES
Today's Date: 12/18/2021  Patient Name: Cecelia Askew  Date of admission: 12/8/2021  7:03 PM  Patient's age: 80 y.o., 1950  Admission Dx: Pneumothorax [J93.9]  Chronic pneumothorax [J93.81]    Reason for Consult: Known lung cancer patient  Requesting Physician: Amee Little MD    CHIEF COMPLAINT:    Chief Complaint   Patient presents with    Shortness of Breath       INTERVAL HISTORY:    Patient seen and examined  Labs and vitals reviewed  Patient resting comfortably  No new complaint interval event  Patient is disappointed and wants to go home  Chest x-ray shows slight decrease in size of the pneumothorax    HISTORY OF PRESENT ILLNESS:    Cecelia Askew is a 80 y.o. male who is admitted to the hospital on 12/8/2021  for SOB. He had a restaging scan with oncology and his CT scan showed worsening pneumothorax and mediastinal shift. Therefore he was sent to ER for further management. He denies any fever chills. He had chest tube in place. He is currently receiving Lurbinectedin for his recurrent small cell lung cancer and most recent treatment was on 12/1/2021. He has a diagnosis of small cell lung cancer with squamous component and has been following with Dr. Bre Guillen as outpatient and his brief oncologic history as follows. 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 Chemotherapy changed to carboplatin and etoposide due to renal insufficiency from cycle #2  PCI 7/2019  SRS to adrenal gland metastasis, completed 07/2020  systemic palliative chemoimmunotherapy with carboplatin etoposide and Tecentriq, 12/2020 x4 cycles.   Maintenance Tecentriq, 3/2021  Lurbenectidin7/2/2021      Past Medical History:   has a past medical history of DDD (degenerative disc disease), cervical, Gout, Heart murmur, Hyperlipidemia, Hypertension, Lung cancer ([facility] [address] MI (myocardial infarction) ([facility] [address] Skin cancer, and Wears glasses. Past Surgical History:   has a past surgical history that includes Appendectomy; Tonsillectomy; skin biopsy; skin biopsy; Colonoscopy; Foot surgery (Right); Cardiac catheterization; cyst incision and drainage (Right, 05/14/2020); Forearm surgery (Right, 5/14/2020); knee surgery (Left, 5/14/2020); and IR CHEST TUBE INSERTION (12/13/2021). Medications:    Prior to Admission medications    Medication Sig Start Date End Date Taking? Authorizing Provider   midodrine (PROAMATINE) 5 MG tablet Take 1 tablet by mouth 3 times daily 12/17/21  Yes Mayte Alba MD   docusate sodium (COLACE) 100 MG capsule Take 100 mg by mouth 2 times daily   Yes Historical Provider, MD   B Complex-C-Folic Acid (ANGELICA-HUGO) TABS TAKE 1 TABLET BY MOUTH DAILY. HEMATINIC VIT MINERALS 7/21/20  Yes Historical Provider, MD   traZODone (DESYREL) 100 MG tablet Take 100 mg by mouth nightly  7/20/20  Yes Historical Provider, MD   ipratropium-albuterol (DUONEB) 0.5-2.5 (3) MG/3ML SOLN nebulizer solution Inhale 3 mLs into the lungs 4 times daily   Yes Historical Provider, MD   potassium chloride (MICRO-K) 10 MEQ extended release capsule Take 20 mEq by mouth daily  11/19/19  Yes Historical Provider, MD   Ferrous Fumarate (FERROCITE) 324 (106 Fe) MG TABS Take 324 mg by mouth daily    Yes Historical Provider, MD   apixaban (ELIQUIS) 5 MG TABS tablet Take 1 tablet by mouth 2 times daily 5/21/20  Yes Alejandrina Blancas MD   tamsulosin (FLOMAX) 0.4 MG capsule TAKE 1 CAPSULE BY MOUTH EVERY DAY 5/4/20  Yes Shyann Escobar MD   lidocaine-prilocaine (EMLA) 2.5-2.5 % cream Apply topically as needed. Apply a quarter size amount to port site 1 hour before chemotherapy. Cover with plastic wrap.  3/7/19  Yes Shyann Escobar MD   acetaminophen (TYLENOL) 325 MG tablet Take 650 mg by mouth every 6 hours as needed for Pain   Yes Historical Provider, MD   allopurinol (ZYLOPRIM) 100 MG tablet Take 100 mg by mouth daily 12/4/18  Yes Historical Provider, MD   simvastatin (ZOCOR) 40 MG tablet Take 40 mg by mouth daily 11/27/18  Yes Historical Provider, MD   Handicap Placard [address] [facility] by Does not apply route 7/30/19   Nael Morales MD     Current Facility-Administered Medications   Medication Dose Route Frequency Provider Last Rate Last Admin    polyethylene glycol (GLYCOLAX) packet 17 g  17 g Oral Daily Terrie Alvarado MD   17 g at 12/15/21 0845    senna (SENOKOT) tablet 8.6 mg  1 tablet Oral Nightly Terrie Alvarado MD   8.6 mg at 12/17/21 2143    docusate sodium (COLACE) capsule 100 mg  100 mg Oral Daily Terrie Alvarado MD   100 mg at 12/18/21 0904    guaiFENesin (MUCINEX) extended release tablet 600 mg  600 mg Oral BID Terrie Alvarado MD   600 mg at 12/18/21 0905    midodrine (PROAMATINE) tablet 7.5 mg  7.5 mg Oral TID  Terrie Alvarado MD   7.5 mg at 12/18/21 1258    ipratropium-albuterol (DUONEB) nebulizer solution 3 mL  3 mL Inhalation Q4H WA Terrie Alvarado MD   3 mL at 12/18/21 1104    sodium chloride flush 0.9 % injection 5-40 mL  5-40 mL IntraVENous 2 times per day Epifanio Rayo MD   10 mL at 12/17/21 2143    sodium chloride flush 0.9 % injection 5-40 mL  5-40 mL IntraVENous PRN Epifanio Rayo MD        0.9 % sodium chloride infusion  25 mL IntraVENous PRN Epifanio Rayo MD   Stopped at 12/13/21 0944    acetaminophen (TYLENOL) tablet 650 mg  650 mg Oral Q4H PRN Epifanio Rayo MD   650 mg at 12/18/21 0905    ondansetron (ZOFRAN-ODT) disintegrating tablet 4 mg  4 mg Oral Q8H PRN Epifanio Rayo MD        Or    ondansetron (ZOFRAN) injection 4 mg  4 mg IntraVENous Q6H PRN Epifanio Rayo MD        potassium chloride (KLOR-CON M) extended release tablet 40 mEq  40 mEq Oral PRN Terrie Alvarado MD        Or    potassium bicarb-citric acid (EFFER-K) effervescent tablet 40 mEq  40 mEq Oral PRN Yina Rocha MD        Or    potassium chloride 10 mEq/100 mL IVPB (Peripheral Line)  10 mEq IntraVENous PRN Yina Rocha MD        ferrous sulfate (IRON 325) tablet 325 mg  325 mg Oral Daily Yina Rocha MD   325 mg at 12/18/21 0905    atorvastatin (LIPITOR) tablet 10 mg  10 mg Oral Daily Yina Rocha MD   10 mg at 12/18/21 0905    cefTRIAXone (ROCEPHIN) 1000 mg IVPB in 50 mL D5W minibag  1,000 mg IntraVENous Q24H Yina Rocha MD   Stopped at 12/18/21 0939    doxycycline monohydrate (MONODOX) capsule 100 mg  100 mg Oral 2 times per day Yina Rocha MD   100 mg at 12/18/21 4905    apixaban (ELIQUIS) tablet 5 mg  5 mg Oral BID Yina Rocha MD   5 mg at 12/18/21 0905    lidocaine 1 % injection 20 mL  20 mL IntraDERmal Once Kary Villalpando MD           Allergies:  Patient has no known allergies. Social History:   reports that he has been smoking cigarettes. He has been smoking about 0.50 packs per day. He has quit using smokeless tobacco. He reports current drug use. Frequency: 14.00 times per week. Drug: Marijuana Juanetta Custer). He reports that he does not drink alcohol. Family History: family history includes Cancer in his father. REVIEW OF SYSTEMS:    Constitutional: No fever or chills. No night sweats, no weight loss   Eyes: No eye discharge, double vision, or eye pain   HEENT: negative for sore mouth, sore throat, hoarseness and voice change   Respiratory: negative for cough , sputum, ++dyspnea, wheezing, hemoptysis, chest pain   Cardiovascular: negative for chest pain, ++dyspnea, palpitations, orthopnea, PND   Gastrointestinal: negative for nausea, vomiting, diarrhea, constipation, abdominal pain, Dysphagia, hematemesis and hematochezia   Genitourinary: negative for frequency, dysuria, nocturia, urinary incontinence, and hematuria   Integument: negative for rash, skin lesions, bruises.    Hematologic/Lymphatic: negative for easy bruising, bleeding, lymphadenopathy, or petechiae   Endocrine: negative for heat or cold intolerance,weight changes, change in bowel habits and hair loss   Musculoskeletal: negative for myalgias, arthralgias, pain, joint swelling,and bone pain   Neurological: negative for headaches, dizziness, seizures, weakness, numbness    PHYSICAL EXAM:      /69   Pulse 90   Temp 97.7 °F (36.5 °C) (Oral)   Resp 18   Ht 5' 11\" (1.803 m)   Wt 162 lb 14.7 oz (73.9 kg)   SpO2 99%   BMI 22.72 kg/m²    Temp (24hrs), Av °F (36.7 °C), Min:97.3 °F (36.3 °C), Max:98.7 °F (37.1 °C)    General appearance - well appearing, no in pain or distress   Mental status - alert and cooperative   Eyes - pupils equal and reactive, extraocular eye movements intact   Ears - bilateral TM's and external ear canals normal   Mouth - mucous membranes moist, pharynx normal without lesions   Neck - supple, no significant adenopathy   Lymphatics - no palpable lymphadenopathy, no hepatosplenomegaly   Chest -decreased breathing sound. Heart - normal rate, regular rhythm, normal S1, S2, no murmurs  Abdomen - soft, nontender, nondistended, no masses or organomegaly   Neurological - alert, oriented, normal speech, no focal findings or movement disorder noted   Musculoskeletal - no joint tenderness, deformity or swelling   Extremities - peripheral pulses normal, no pedal edema, no clubbing or cyanosis   Skin - normal coloration and turgor, no rashes, no suspicious skin lesions noted ,    DATA:    Labs:   CBC:   Recent Labs     21  0651 21  0649   WBC 3.1* 3.2*   HGB 10.1* 10.0*   HCT 29.3* 29.3*    260     BMP:   Recent Labs     21  0651 21  0649   * 132*   K 4.4 4.2   CO2 26 25   BUN 11 14   CREATININE 0.78 0.91   LABGLOM >60 >60   GLUCOSE 103* 90     PT/INR:   No results for input(s): PROTIME, INR in the last 72 hours.     IMAGING DATA:  XR CHEST PORTABLE   Final Result   Slight interval decrease in size of the right apical pneumothorax, now   small-to-moderate. The pigtail chest tube catheter is unchanged in position. XR CHEST PORTABLE   Final Result   Left Wsybbx-G-Hhep with the tip in the superior vena cava. Right-sided chest pigtail catheter was noted. The right pneumothorax has progressed in size from the earlier exam, now with   5.1 cm of separation at the right apex rather than 3.2 centimetres. No tracheal or mediastinal shift. No change has occurred in the right hilar mass with post obstructive   pneumonia and or atelectasis from the earlier exam.         XR CHEST PORTABLE   Final Result   Similar right pneumothorax with pigtail catheter in place. XR CHEST PORTABLE   Final Result   Right pleural pigtail catheter remains in place with slight decrease in size   of the right pneumothorax. XR CHEST PORTABLE   Final Result   A left Jagayx-E-Mhpq was noted with the distal tip in the superior vena cava. Mild volume loss of the right hemithorax secondary to a right pneumothorax   with 4.2 cm of separation at the right apex. The pneumothorax has progressed   in size from 12/15/2021, 0806 hours. Previously, it measured 3.6 cm of   separation. Unchanged right hilar mass was noted with post obstructive   pneumonia-atelectasis. The pigtail catheter is unchanged on the right. The other lower right-sided   chest tube has been removed since the earlier exam.         XR CHEST PORTABLE   Final Result   Similar appearance of 2 right chest tubes with slight decrease in size of a   small to moderate pneumothorax. XR CHEST PORTABLE   Final Result   New pleural pigtail catheter in the upper right chest.  Unchanged large bore   right chest tube. Moderate right pneumothorax is not significantly changed.          IR CHEST TUBE INSERTION   Final Result   Successful fluoroscopic guided placement of a 10 [place] right pleural pigtail   chest tube         CT CHEST W WO CONTRAST   Final Result   Similar moderate to large right pneumothorax. Subsequent pigtail chest tube   placed using fluoroscopic guidance. RECOMMENDATIONS:   Unavailable         XR CHEST PORTABLE   Final Result   Overall no significant interval change. Right chest tube remains in place   with persistent moderate right pneumothorax. XR CHEST PORTABLE   Final Result   Stable moderate right pneumothorax and right subcutaneous emphysema. XR CHEST PORTABLE   Final Result   Stable appearance of right-sided large pneumothorax with chest tube stable in   position. XR CHEST PORTABLE   Final Result   Slight improvement of large right pneumothorax with chest tube in place. XR CHEST PORTABLE   Final Result   Stable examination. XR CHEST PORTABLE   Final Result   Addendum 1 of 1   ADDENDUM:   Compared with the chest x-ray there is been significant interval    improvement. Please disregard original impression. Discussed with Dr. Marlys Mejia at 10:20    p.m. Final   Increased right pneumothorax despite new chest tube. Increased airspace   disease on the right. RECOMMENDATION:   The findings were sent to the [facility]iology [facility] at 10:19   pm on 12/8/2021to be communicated to a licensed caregiver. XR CHEST STANDARD (2 VW)    (Results Pending)   XR CHEST PORTABLE    (Results Pending)       Primary Problem  Pneumothorax    Active Hospital Problems    Diagnosis Date Noted    Severe malnutrition ([address] [E43] 12/15/2021    Pneumothorax [J93.9] 08/27/2021    COPD (chronic obstructive pulmonary disease) ([address] [J44.9] 08/27/2021    Iron deficiency anemia [D50.9] 08/27/2021    PAF (paroxysmal atrial fibrillation) ([address] [I48.0] 08/27/2021    Lung cancer, primary, with metastasis from lung to other site ([address] [C34.90]      IMPRESSION:   1. Metastatic small cell cancer currently on Lurbinectedin  2. Left adrenal gland metastasis status post SBRT  3.  Large right pneumothorax, with recent CT scan showing increase compared to the prior scan, s/p chest tube placed  4. COPD  5. Anemia    RECOMMENDATIONS:  1. I reviewed laboratory data, imaging studies, diagnosis, treatment recommendations  2. Discussed results of chest x-ray  3. Discussed with Dr. Stephanie Duarte. Patient likely has a loculated pleural effusion and trapped lung. Unlikely to expand possible going home with the chest tube in place  4. Recent CT scans show stable disease in regards to the small cell lung cancer  5. Continue symptomatic supportive care  6. No plans for chemotherapy at this point  7. We will follow    Discussed with patient and Nurse. Thank you for asking us to see this patient. Aleks Reyes MD      This note is created with the assistance of a speech recognition program.  While intending to generate a document that actually reflects the content of the visit, the document can still have some errors including those of syntax and sound a like substitutions which may escape proof reading. It such instances, actual meaning can be extrapolated by contextual diversion. \

## 2021-12-18 NOTE — PLAN OF CARE
Problem: Skin Integrity:  Goal: Will show no infection signs and symptoms  Description: Will show no infection signs and symptoms  Outcome: Ongoing   Head to toe charted.  Chest tube changed to one way valve this shift    Problem: Infection:  Goal: Will remain free from infection  Description: Will remain free from infection  Outcome: Ongoing   Afebrile this shift    Problem: Safety:  Goal: Free from accidental physical injury  Description: Free from accidental physical injury  Outcome: Ongoing   Met    Problem: Daily Care:  Goal: Daily care needs are met  Description: Daily care needs are met  Outcome: Ongoing   Cta and rn rounding    Problem: Pain:  Goal: Patient's pain/discomfort is manageable  Description: Patient's pain/discomfort is manageable  Outcome: Ongoing   Non pharm and meds utilized

## 2021-12-18 NOTE — PROGRESS NOTES
Dr Xiao Gottlieb placed a one way valve on chest tube. disconnected from oasis reservoir. Valve taped to pt abdomen. Dr encouraged pt to ambulate often to ensure he will not be SOB. Recheck chest xray in AM.     Final output charted for this shift before being disconnected. 120 ml output of serosanguinous fluid.

## 2021-12-18 NOTE — PROGRESS NOTES
Comprehensive Nutrition Assessment    Type and Reason for Visit:  Reassess    Nutrition Recommendations/Plan: Continue current diet and supplements    Nutrition Assessment:  Patient stable nutritionally, consuming % of meals and supplements, weight stable. Will continue current diet and supplements. Malnutrition Assessment:  Malnutrition Status:  Severe malnutrition    Context:  Chronic Illness     Findings of the 6 clinical characteristics of malnutrition:  Energy Intake:  7 - 75% or less estimated energy requirements for 1 month or longer  Weight Loss:  7 - Greater than 20% over 1 year     Body Fat Loss:  7 - Severe body fat loss Orbital,Triceps,Fat Overlying Ribs   Muscle Mass Loss:  7 - Severe muscle mass loss Temples (temporalis),Clavicles (pectoralis & deltoids),Thigh (quadraceps)  Fluid Accumulation:  No significant fluid accumulation     Strength:  Not Performed    Estimated Daily Nutrient Needs:  Energy (kcal):  2015-2348 kcal based on 30-33 kcal/kg admission; Weight Used for Energy Requirements:  Admission     Protein (g):   gm based on 1.2-1.4 gm/kg admission; Weight Used for Protein Requirements:             Nutrition Related Findings:  No edema. No GI issues. Labs and meds reviewed      Wounds:  Surgical Incision       Current Nutrition Therapies:    ADULT DIET;  Regular  ADULT ORAL NUTRITION SUPPLEMENT; Breakfast, Lunch; Standard High Calorie/High Protein Oral Supplement    Anthropometric Measures:  · Height: 5' 11\" (180.3 cm)  · Current Body Weight: 162 lb (73.5 kg)   · Admission Body Weight: 165 lb (74.8 kg)    · Usual Body Weight: 245 lb (111.1 kg) (stated per patient)     · Ideal Body Weight: 172 lbs; % Ideal Body Weight 94.2 %   · BMI: 22.6  · Adjusted Body Weight:  ; No Adjustment   · BMI Categories: Normal Weight (BMI 22.0 to 24.9) age over 72       Nutrition Diagnosis:   · Inadequate oral intake related to catabolic illness,impaired respiratory function as evidenced by

## 2021-12-18 NOTE — PROGRESS NOTES
Progress Note    Patient ID:Surendra Triplett is 80 y.o.   : 1950 MRN: [id number]    Admit date: 2021  Primary Care Physician: Chastity Nj MD   Patient Care Team:  Chastity Nj MD as PCP - General (Family Medicine)  Bang Bedoya MD as Consulting Physician (Hematology and Oncology)  Arnel Paul, RN as Nurse Navigator (Oncology)  Tel: [phone]  IP CONSULT TO PULMONOLOGY  IP CONSULT TO PRIMARY CARE PROVIDER  IP CONSULT TO ONCOLOGY  IP CONSULT TO [address] [place] [address]  Admitting Physician: Brigido Holman MD   Code Status:  Full Code  ADULT DIET; Regular  ADULT ORAL NUTRITION SUPPLEMENT; Breakfast, Lunch; Standard High Calorie/High Protein Oral Supplement          HPI: De Hughes is a 80 y.o.  male who presents with Shortness of Breath    Discussed with pulmonologist, Dr. Shun Nagel and nursing and the plan is for a repeat chest x-ray in the a.m. Patient presently denies any chest pain, shortness of breath or cough. Wife at bedside. Problem List: Principal Problem:    Pneumothorax  Active Problems:    Lung cancer, primary, with metastasis from lung to other site ([facility] [address]    PAF (paroxysmal atrial fibrillation) (HCC)    COPD (chronic obstructive pulmonary disease) (HCC)    Iron deficiency anemia    Severe malnutrition (HCC)  Resolved Problems:    * No resolved hospital problems. *      Pneumothorax    Assessment:    1. Lurbinectedin for his recurrent small cell lung cancer,  recent treatment on 2021.  1. 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 Large right pneumothorax, with recent CT scan showing increase compared to the prior scan, s/p chest tube placed  3. COPD  4. Coronary artery disease  5. Paroxysmal atrial fibrillation on Eliquis  6. Hypertension  7. Hyperlipidemia  8. Macrocytic anemia   9. Leukopenia       Plan:    Cardiothoracic, pulmonary and oncology following.   On ceftriaxone and doxycycline  DVT prophylaxis: On Eliquis    Anticipated Disposition upon discharge:     [] Home  [] Home with [facility] 79 ([facility])   [] SNF ([facility])  [] LTAC ([address])      [address]    has a past medical history of DDD (degenerative disc disease), cervical, Gout, Heart murmur, Hyperlipidemia, Hypertension, Lung cancer ([facility] [address] MI (myocardial infarction) ([facility] [address] Skin cancer, and Wears glasses. SURGICAL HISTORY      has a past surgical history that includes Appendectomy; Tonsillectomy; skin biopsy; skin biopsy; Colonoscopy; Foot surgery (Right); Cardiac catheterization; cyst incision and drainage (Right, 05/14/2020); Forearm surgery (Right, 5/14/2020); knee surgery (Left, 5/14/2020); and IR CHEST TUBE INSERTION (12/13/2021).     CURRENT MEDICATIONS        polyethylene glycol  17 g Oral Daily    senna  1 tablet Oral Nightly    docusate sodium  100 mg Oral Daily    guaiFENesin  600 mg Oral BID    midodrine  7.5 mg Oral TID WC    ipratropium-albuterol  3 mL Inhalation Q4H WA    sodium chloride flush  5-40 mL IntraVENous 2 times per day    ferrous sulfate  325 mg Oral Daily    atorvastatin  10 mg Oral Daily    cefTRIAXone (ROCEPHIN) IV  1,000 mg IntraVENous Q24H    doxycycline monohydrate  100 mg Oral 2 times per day    apixaban  5 mg Oral BID    lidocaine  20 mL IntraDERmal Once     PRN Meds sodium chloride flush, sodium chloride, acetaminophen, ondansetron **OR** ondansetron, potassium chloride **OR** potassium alternative oral replacement **OR** potassium chloride  Continuous Infusions:    sodium chloride Stopped (12/13/21 0944)       polyethylene glycol (GLYCOLAX) packet 17 g, Daily  senna (SENOKOT) tablet 8.6 mg, Nightly  docusate sodium (COLACE) capsule 100 mg, Daily  guaiFENesin (MUCINEX) extended release tablet 600 mg, BID  midodrine (PROAMATINE) tablet 7.5 mg, TID WC  ipratropium-albuterol (DUONEB) nebulizer solution 3 mL, Q4H WA  sodium chloride flush 0.9 % injection 5-40 mL, 2 times per day  sodium chloride flush 0.9 % injection 5-40 mL, PRN  0.9 % sodium chloride infusion, PRN  acetaminophen (TYLENOL) tablet 650 mg, Q4H PRN  ondansetron (ZOFRAN-ODT) disintegrating tablet 4 mg, Q8H PRN   Or  ondansetron (ZOFRAN) injection 4 mg, Q6H PRN  potassium chloride (KLOR-CON M) extended release tablet 40 mEq, PRN   Or  potassium bicarb-citric acid (EFFER-K) effervescent tablet 40 mEq, PRN   Or  potassium chloride 10 mEq/100 mL IVPB (Peripheral Line), PRN  ferrous sulfate (IRON 325) tablet 325 mg, Daily  atorvastatin (LIPITOR) tablet 10 mg, Daily  cefTRIAXone (ROCEPHIN) 1000 mg IVPB in 50 mL D5W minibag, Q24H  doxycycline monohydrate (MONODOX) capsule 100 mg, 2 times per day  apixaban (ELIQUIS) tablet 5 mg, BID  lidocaine 1 % injection 20 mL, Once          HOME MEDICATIONS     Medications Prior to Admission: docusate sodium (COLACE) 100 MG capsule, Take 100 mg by mouth 2 times daily  B Complex-C-Folic Acid (ANGELICA-HUGO) TABS, TAKE 1 TABLET BY MOUTH DAILY. HEMATINIC VIT MINERALS  traZODone (DESYREL) 100 MG tablet, Take 100 mg by mouth nightly   ipratropium-albuterol (DUONEB) 0.5-2.5 (3) MG/3ML SOLN nebulizer solution, Inhale 3 mLs into the lungs 4 times daily  potassium chloride (MICRO-K) 10 MEQ extended release capsule, Take 20 mEq by mouth daily   Ferrous Fumarate (FERROCITE) 324 (106 Fe) MG TABS, Take 324 mg by mouth daily   apixaban (ELIQUIS) 5 MG TABS tablet, Take 1 tablet by mouth 2 times daily  tamsulosin (FLOMAX) 0.4 MG capsule, TAKE 1 CAPSULE BY MOUTH EVERY DAY  lidocaine-prilocaine (EMLA) 2.5-2.5 % cream, Apply topically as needed. Apply a quarter size amount to port site 1 hour before chemotherapy.   Cover with plastic wrap.  acetaminophen (TYLENOL) 325 MG tablet, Take 650 mg by mouth every 6 hours as needed for Pain  allopurinol (ZYLOPRIM) 100 MG tablet, Take 100 mg by mouth daily  simvastatin (ZOCOR) 40 MG tablet, Take 40 mg by mouth daily  [DISCONTINUED] oxyCODONE-acetaminophen (PERCOCET) 5-325 MG per tablet, Take 1 tablet by mouth every 6 hours as needed for Pain for up to 15 days. [DISCONTINUED] dexamethasone (DECADRON) 2 MG tablet, Take 1 tablet by mouth 2 times daily (with meals) for 15 days  Handicap Placard MISC, by Does not apply route      ALLERGIES     has No Known Allergies. No Known Allergies    SOCIAL HISTORY      reports that he has been smoking cigarettes. He has been smoking about 0.50 packs per day. He has quit using smokeless tobacco. He reports current drug use. Frequency: 14.00 times per week. Drug: Marijuana Maurita Handsome). He reports that he does not drink alcohol. Vitals:    21 2330 21 0400 21 0745 21 0816   BP: 100/64  109/75    Pulse: 89  87    Resp: 16  18 18   Temp: 98.3 °F (36.8 °C)  97.3 °F (36.3 °C)    TempSrc: Oral  Oral    SpO2: 96%  97% 97%   Weight:  162 lb 14.7 oz (73.9 kg)     Height:         Body mass index is 22.72 kg/m². /75   Pulse 87   Temp 97.3 °F (36.3 °C) (Oral)   Resp 18   Ht 5' 11\" (1.803 m)   Wt 162 lb 14.7 oz (73.9 kg)   SpO2 97%   BMI 22.72 kg/m²   Temp (24hrs), Av.2 °F (36.8 °C), Min:97.3 °F (36.3 °C), Max:98.7 °F (37.1 °C)    Pulse Ox: SpO2  Av.4 %  Min: 96 %  Max: 100 %  Supplemental O2: O2 Flow Rate (L/min): 0 L/min   Oxygen Therapy  SpO2: 97 %  Pulse Oximeter Device Mode: Intermittent  Pulse Oximeter Device Location: Finger  O2 Device: None (Room air)  O2 Flow Rate (L/min): 0 L/min  Blood Gas  Performed?: No    Patient Vitals for the past 96 hrs (Last 3 readings):   Weight   21 0400 162 lb 14.7 oz (73.9 kg)   21 0015 163 lb 2.3 oz (74 kg)   21 0300 158 lb 11.7 oz (72 kg)     Admission weight: 165 lb (74.8 kg)  Wt Readings from Last 3 Encounters:   21 162 lb 14.7 oz (73.9 kg)   21 165 lb (74.8 kg)   11/10/21 173 lb (78.5 kg)    (encounters)    /O (24Hr):     Intake/Output Summary (Last 24 hours) at 2021 0959  Last data filed at 2021 0757  Gross per 24 hour   Intake 770 ml   Output 1350 ml Net -580 ml       Vitals reviewed. H&N: atraumatic, normocephalic, anicteric, no conjunctivitis, EOM normal, no lid lag or exophthalmos, nose and ears appear normal, trachea mid line, no stridor,  Chest: no respiratory distress, normal effort, fairly good air entry, no wheezes,    Heart: normal heart sounds, regular rate and rhythm, no murmurs. gallops or rubs,  Abdomen: BS present, non-tender, no masses or organomegaly detected,   Extremities: no peripheral edema, no cyanosis. no oncholysis. Skin: no rash, no erythema, no jaundice,   CNS: grossly normal without cranial nerve deficits, no abnormal coordination or tone, Psychiatric: normal mood, affect. LABS:    CBC:   Recent Labs     12/16/21  0533 12/17/21  0651 12/18/21  0649   WBC 2.7* 3.1* 3.2*   RBC 2.86* 2.80* 2.82*   HGB 10.3* 10.1* 10.0*   HCT 30.0* 29.3* 29.3*   .8* 104.5* 103.9*   MCH 35.8* 35.9* 35.3*   MCHC 34.2 34.3 34.0   RDW 15.8* 15.5* 15.7*    219 260   MPV 7.1 6.9 7.6       MAG: No results for input(s): MG in the last 72 hours. CMP:   Recent Labs     12/16/21  0533 12/17/21  0651 12/18/21  0649    134* 132*   K 4.2 4.4 4.2    100 98   CO2 27 26 25   BUN 12 11 14   CREATININE 0.92 0.78 0.91   GLUCOSE 96 103* 90   CALCIUM 9.5 9.4 9.2   PROT 6.9 6.7 6.6   LABALBU 3.7 3.5 3.4*   BILITOT 0.22* 0.22* 0.19*   ALKPHOS 117 109 107   AST 21 21 19   ALT 17 17 15      No results for input(s): LIPASE, AMYLASE in the last 72 hours. Phosphorus:  No results for input(s): PHOS in the last 72 hours.   Albumin:   Recent Labs     12/16/21  0533 12/17/21  0651 12/18/21  0649   LABALBU 3.7 3.5 3.4*       U/A:  Lab Results   Component Value Date    COLORU YELLOW 05/12/2020    PHUR 6.0 05/12/2020    WBCUA None 05/12/2020    RBCUA 2 TO 5 05/12/2020    MUCUS NOT REPORTED 05/12/2020    TRICHOMONAS NOT REPORTED 05/12/2020    YEAST NOT REPORTED 05/12/2020    BACTERIA NOT REPORTED 05/12/2020    SPECGRAV 1.011 05/12/2020    LEUKOCYTESUR NEGATIVE 05/12/2020    UROBILINOGEN Normal 05/12/2020    BILIRUBINUR NEGATIVE 05/12/2020    GLUCOSEU NEGATIVE 05/12/2020    AMORPHOUS NOT REPORTED 05/12/2020       No results for input(s): INR in the last 72 hours. No results found for: DDIMERNo results found for: BNP  troponinsNo results found for: TROPONINI      D Dimer: No results for input(s): DDIMER in the last 72 hours. Troponin T No results for input(s): TROPONINT in the last 72 hours. ProBNP   No results found for requested labs within last 30 days. ProBNP Invalid input(s): PRO-BNP  Lactic acid:Invalid input(s): LACTIC ACID      PT/INR: No results for input(s): PROTIME, INR in the last 72 hours. APTT: No results for input(s): APTT in the last 72 hours. ESR:   Lab Results   Component Value Date    SEDRATE 103 (H) 05/13/2020     CRP:   Lab Results   Component Value Date    .2 (H) 05/21/2020     Folate and B12:   Lab Results   Component Value Date    [id number] 2243 (H) 05/10/2020   ,   Lab Results   Component Value Date    FOLATE 8.9 05/10/2020     Thyroid Studies:   Lab Results   Component Value Date    TSH 0.96 06/02/2021     D-Dimer: No results found for: DDIMER    Lactic acid: No components found for: LACT     Troponin T No results for input(s): TROPHS in the last 72 hours. Troponins:     No components found for: TROP    No results found for: TROPONINI    No results found for requested labs within last 720 hours. Cardiac Enzymes:    No results found for requested labs within last 720 hours.     No results found for: CKMB    ProBNP:    No components found for: NTBNP      U/A:  Lab Results   Component Value Date    COLORU YELLOW 05/12/2020    PHUR 6.0 05/12/2020    WBCUA None 05/12/2020    RBCUA 2 TO 5 05/12/2020    MUCUS NOT REPORTED 05/12/2020    TRICHOMONAS NOT REPORTED 05/12/2020    YEAST NOT REPORTED 05/12/2020    BACTERIA NOT REPORTED 05/12/2020    SPECGRAV 1.011 05/12/2020    LEUKOCYTESUR NEGATIVE 05/12/2020    UROBILINOGEN Normal 05/12/2020    BILIRUBINUR NEGATIVE 05/12/2020    GLUCOSEU NEGATIVE 05/12/2020    AMORPHOUS NOT REPORTED 05/12/2020       Urine Sodium:     Lab Results   Component Value Date    BILL 41 05/16/2020     Urine Potassium:  No results found for: KUR  Urine Chloride:  No results found for: CLUR  Urine Osmolarity:   Lab Results   Component Value Date    OSMOU 459 05/16/2020     Urine Protein:   No results found for: TPU  Urine Creatinine:   No results found for: LABCREA  Urine Eosinophils:  No components found for: UEOS    Thyroid Studies:   No components found for: T4,  FREE  No results found for: T3  Lab Results   Component Value Date    TSH 0.96 06/02/2021       Lab Results   Component Value Date    TSH 0.96 06/02/2021       HbA1c  No components found for: HA1CC    Lipids:  No results found for: CHOLESTEROL, HDL, VLDL      CRP:   Lab Results   Component Value Date    .2 (H) 05/21/2020     ESR:   Lab Results   Component Value Date    SEDRATE 103 (H) 05/13/2020       Folate and B12:   Lab Results   Component Value Date    EWKATQJR52 1277 (H) 05/10/2020   ,   Lab Results   Component Value Date    FOLATE 8.9 05/10/2020       No components found for: 3WBC, 3RBC, 3HEMOGLOBIN, 3HEMATOCRIT, 3PLATELETS, 3MCV, 3SEGS, 3STAB, 3LYMP, 3MONOC, 3EOS, 3BASO, 3ASEG, 3MYEL, 3PRMY, 3BLAS, 3PLTE    Recent Results (from the past 24 hour(s))   CBC with DIFF    Collection Time: 12/18/21  6:49 AM   Result Value Ref Range    WBC 3.2 (L) 3.5 - 11.0 k/uL    RBC 2.82 (L) 4.5 - 5.9 m/uL    Hemoglobin 10.0 (L) 13.5 - 17.5 g/dL    Hematocrit 29.3 (L) 41 - 53 %    .9 (H) 80 - 100 fL    MCH 35.3 (H) 26 - 34 pg    MCHC 34.0 31 - 37 g/dL    RDW 15.7 (H) 11.5 - 14.9 %    Platelets 870 335 - 917 k/uL    MPV 7.6 6.0 - 12.0 fL    NRBC Automated NOT REPORTED per 100 WBC    Differential Type NOT REPORTED     Immature Granulocytes NOT REPORTED 0 %    Absolute Immature Granulocyte NOT REPORTED 0.00 - 0.30 k/uL    WBC Morphology NOT REPORTED administration of intravenous contrast. Multiplanar reformatted images are provided for review. Dose modulation, iterative reconstruction, and/or weight based adjustment of the mA/kV was utilized to reduce the radiation dose to as low as reasonably achievable. COMPARISON: 12/08/2021, chest x-ray 12/13/2021 HISTORY: ORDERING SYSTEM PROVIDED HISTORY: pneumothorax TECHNOLOGIST PROVIDED HISTORY: pneumothorax FINDINGS: Limited chest CT is performed without contrast for planned chest tube placement. There is a large bore right chest tube in place with its tip in the inferior medial chest.  Moderate to large residual right pneumothorax seen best at the apex. Perihilar airspace disease/volume loss with a small right effusion. Pleural based nodules as noted on prior CT. Partially visualized left chest port. Subsequent chest tube placement is performed using fluoroscopic guidance. Similar moderate to large right pneumothorax. Subsequent pigtail chest tube placed using fluoroscopic guidance. RECOMMENDATIONS: Unavailable     XR CHEST PORTABLE    Result Date: 12/18/2021  EXAMINATION: ONE XRAY VIEW OF THE CHEST 12/18/2021 6:13 am COMPARISON: December 17, 2021. HISTORY: ORDERING SYSTEM PROVIDED HISTORY: recurrent pneumothorax TECHNOLOGIST PROVIDED HISTORY: recurrent pneumothorax Reason for Exam: chest tube FINDINGS: A portable upright frontal view chest radiograph was obtained. The heart is normal in size. Central right upper lobe/paramediastinal prominence and right hilar prominence is unchanged. The left jugular vein chest port catheter is unchanged in position, as is the right apical pigtail chest tube catheter. The right pneumothorax is decreased in size slightly. A small amount of lateral chest wall subcutaneous emphysema is unchanged. Slight interval decrease in size of the right apical pneumothorax, now small-to-moderate. The pigtail chest tube catheter is unchanged in position.      XR CHEST PORTABLE    Result Date: 12/17/2021  EXAMINATION: ONE XRAY VIEW OF THE CHEST 12/17/2021 11:55 am COMPARISON: 12/17/2021, 0642 hours. HISTORY: ORDERING SYSTEM PROVIDED HISTORY: Repeat CXR approx 3 hours after transition to water seal TECHNOLOGIST PROVIDED HISTORY: Repeat CXR approx 3 hours after transition to water seal Reason for Exam: repeat CXR approx 3 hours after transition to water seal FINDINGS: A left Vxaiff-R-Swza was noted with the tip in the superior vena cava. No cardiomegaly or interstitial edema was identified. A right-sided chest pigtail catheter was noted. The right pneumothorax has progressed in size from the earlier exam now with 5.1 cm of separation at the right apex rather than 3.2 cm of separation. No change has occurred in the right hilar mass with post obstructive pneumonia and or atelectasis from the previous study. Left Koxuom-O-Zhaf with the tip in the superior vena cava. Right-sided chest pigtail catheter was noted. The right pneumothorax has progressed in size from the earlier exam, now with 5.1 cm of separation at the right apex rather than 3.2 centimetres. No tracheal or mediastinal shift. No change has occurred in the right hilar mass with post obstructive pneumonia and or atelectasis from the earlier exam.     XR CHEST PORTABLE    Result Date: 12/17/2021  EXAMINATION: ONE XRAY VIEW OF THE CHEST 12/17/2021 6:09 am COMPARISON: 12/16/2021, 12/15/2021 HISTORY: ORDERING SYSTEM PROVIDED HISTORY: recurrent pneumothorax TECHNOLOGIST PROVIDED HISTORY: recurrent pneumothorax Reason for Exam: CHEST TUBE FINDINGS: Stable appearance of a right pleural pigtail catheter and residual right pneumothorax seen best apically. Minimal soft tissue emphysema right chest wall. Left jugular chest port remains in place. Cardiomediastinal silhouette is within normal limits. Similar right hilar mass/adenopathy with postobstructive changes in the right lung.   Monitor leads overlie the chest. Similar right pneumothorax with pigtail catheter in place. XR CHEST PORTABLE    Result Date: 12/16/2021  EXAMINATION: ONE XRAY VIEW OF THE CHEST 12/16/2021 8:14 am COMPARISON: 12/15/2021 HISTORY: ORDERING SYSTEM PROVIDED HISTORY: recurrent pneumothorax TECHNOLOGIST PROVIDED HISTORY: recurrent pneumothorax Reason for Exam: recurrent pneumothorax FINDINGS: Left jugular chest port right pleural pigtail catheter remain in place, similar in position. Slight decrease in size of a right apical pneumothorax now measuring approximately 2.7 cm at the apex. Minimal soft tissue emphysema right chest wall. Stable cardiomediastinal silhouette. No significant change in right hilar mass and postobstructive changes in the right perihilar/upper lobe region. Left lung remains essentially clear. Right pleural pigtail catheter remains in place with slight decrease in size of the right pneumothorax. XR CHEST PORTABLE    Result Date: 12/15/2021  EXAMINATION: ONE XRAY VIEW OF THE CHEST 12/15/2021 7:44 pm COMPARISON: 12/15/2021, 0806 hours. HISTORY: ORDERING SYSTEM PROVIDED HISTORY: Repeat chest xray 4 hours after chest tube removal TECHNOLOGIST PROVIDED HISTORY: Repeat chest xray 4 hours after chest tube removal May be re-timed as needed Reason for Exam: Repeat chest xray 4 hours after chest tube removal; May be re-timed as needed FINDINGS: A left Boknde-V-Pvah was noted with the distal tip in the superior vena cava. Mild volume loss of the right hemithorax was noted resulting in mild tracheal-mediastinal shift to the right. An unchanged right-sided chest pigtail catheter was noted with the tip near the right apex. 1 other lower right-sided chest tube has been removed since the earlier exam.  A right pneumothorax was noted with 4.2 cm of separation at the right apex. The pneumothorax has progressed in size from 12/15/2021, 0806 hours. Previously, it measured 3.6 cm of separation.   An unchanged right hilar mass was noted with post obstructive pneumonia-atelectasis. No cardiomegaly or interstitial edema was noted. A left Tcxxlc-H-Khkw was noted with the distal tip in the superior vena cava. Mild volume loss of the right hemithorax secondary to a right pneumothorax with 4.2 cm of separation at the right apex. The pneumothorax has progressed in size from 12/15/2021, 0806 hours. Previously, it measured 3.6 cm of separation. Unchanged right hilar mass was noted with post obstructive pneumonia-atelectasis. The pigtail catheter is unchanged on the right. The other lower right-sided chest tube has been removed since the earlier exam.     XR CHEST PORTABLE    Result Date: 12/15/2021  EXAMINATION: ONE XRAY VIEW OF THE CHEST 12/15/2021 7:52 am COMPARISON: Several priors, most recent 12/14/2021 HISTORY: ORDERING SYSTEM PROVIDED HISTORY: recurrent pneumothorax TECHNOLOGIST PROVIDED HISTORY: recurrent pneumothorax Reason for Exam: pneumothorax FINDINGS: Left jugular chest port catheter tip remains in the superior vena cava. Large bore chest tube in the right lower chest and pigtail catheter in the right upper chest are again seen, similar in position. Small to moderate residual right pneumothorax which is best seen at the apex, decreased since the prior study. Soft tissue emphysema right lateral chest wall. No significant change in right hilar and perihilar opacity. Stable cardiomediastinal silhouette. Left lung remains grossly clear. Monitor leads overlie the chest.     Similar appearance of 2 right chest tubes with slight decrease in size of a small to moderate pneumothorax. XR CHEST PORTABLE    Result Date: 12/14/2021  EXAMINATION: ONE X-RAY VIEW OF THE CHEST 12/14/2021 6:41 am COMPARISON: 12/13/2021 HISTORY: ORDERING SYSTEM PROVIDED HISTORY: recurrent pneumothorax TECHNOLOGIST PROVIDED HISTORY: recurrent pneumothorax Reason for Exam: recurrent pneumothorax FINDINGS: Right-sided pleural pigtail catheter has been placed. Left chest port remains in place. There is also a large bore chest tube at the mid to lower right chest, unchanged. Moderate right pneumothorax persists, measuring approximately 4.5 cm at the apex. Left lung is relatively clear. New pleural pigtail catheter in the upper right chest.  Unchanged large bore right chest tube. Moderate right pneumothorax is not significantly changed. XR CHEST PORTABLE    Result Date: 12/13/2021  EXAMINATION: ONE XRAY VIEW OF THE CHEST 12/13/2021 10:09 am COMPARISON: 12/12/2021, 12/11/2021 HISTORY: ORDERING SYSTEM PROVIDED HISTORY: recurrent pneumothorax TECHNOLOGIST PROVIDED HISTORY: recurrent pneumothorax Reason for Exam: recurrent pneumothorax FINDINGS: Left jugular chest port remains in place with the catheter tip in the SVC. Similar right perihilar parenchymal opacities which may be related to the patient's known lung cancer. Right chest tube remains in place, perhaps pulled back slightly with similar moderate right pneumothorax. Soft tissue emphysema right chest wall is similar to slightly decreased. No new focal infiltrates on the left. Stable cardiomediastinal silhouette. Overall no significant interval change. Right chest tube remains in place with persistent moderate right pneumothorax. XR CHEST PORTABLE    Result Date: 12/12/2021  EXAMINATION: ONE XRAY VIEW OF THE CHEST 12/12/2021 8:47 am COMPARISON: 12/11/2021 HISTORY: ORDERING SYSTEM PROVIDED HISTORY: PTX TECHNOLOGIST PROVIDED HISTORY: PTX Reason for Exam: ptx FINDINGS: Stable moderate right-sided pneumothorax. Right perihilar parenchymal opacity is stable. Right-sided chest tube is present with its tip in the inferior medial right lung. Stable subcutaneous emphysema. Left lung is clear. Heart is normal in size. Unchanged implanted central venous access port on the left with its tip in the SVC. Stable moderate right pneumothorax and right subcutaneous emphysema.      XR CHEST PORTABLE    Result Date: 12/11/2021  EXAMINATION: ONE XRAY VIEW OF THE CHEST 12/11/2021 8:39 am COMPARISON: December 10, 2021 HISTORY: ORDERING SYSTEM PROVIDED HISTORY: PTX TECHNOLOGIST PROVIDED HISTORY: PTX Reason for Exam: PTX FINDINGS: Right side chest tube is stable. Large right pneumothorax also stable with pleuroparenchymal separation again approximately 4.8 cm. Pulmonary opacities right lung unchanged. Left lung clear with no new findings demonstrated. Port device unchanged. Subcutaneous emphysema right chest wall stable. Cardiac size stable. Mediastinum unremarkable. No acute osseous abnormality. Stable appearance of right-sided large pneumothorax with chest tube stable in position. XR CHEST PORTABLE    Result Date: 12/10/2021  EXAMINATION: ONE XRAY VIEW OF THE CHEST 12/10/2021 6:06 am COMPARISON: 12/09/2021, 12/08/2021 HISTORY: ORDERING SYSTEM PROVIDED HISTORY: pneumothorax TECHNOLOGIST PROVIDED HISTORY: pneumothorax Reason for Exam: pneumothorax Additional signs and symptoms: pneumothorax Relevant Medical/Surgical History: pneumothorax FINDINGS: Right chest tube remains in place. Large right pneumothorax remains, although slightly improved in the interval with 4.8 cm are pleural-parenchymal separation, most recently 6 cm. Diffuse opacities throughout the right lung are again demonstrated. No new findings identified on the left. Left internal jugular port in place. Small amount of subcutaneous emphysema in the right chest wall. Slight improvement of large right pneumothorax with chest tube in place. XR CHEST PORTABLE    Result Date: 12/9/2021  EXAMINATION: ONE XRAY VIEW OF THE CHEST 12/9/2021 6:24 am COMPARISON: Approximately 8 hours earlier. HISTORY: ORDERING SYSTEM PROVIDED HISTORY: Pneumothorax TECHNOLOGIST PROVIDED HISTORY: Pneumothorax Reason for Exam: right side chest tube FINDINGS: Support tubes and lines unchanged.   Heart size and pulmonary vasculature are normal. Airspace opacity throughout the aerated right lung. Moderate right pneumothorax unchanged from earlier. Surrounding structures are unremarkable. Stable examination. XR CHEST PORTABLE    Addendum Date: 12/8/2021    ADDENDUM: Compared with the chest x-ray there is been significant interval improvement. Please disregard original impression. Discussed with Dr. Daija Fields at 10:20 p.m. Result Date: 12/8/2021  EXAMINATION: ONE XRAY VIEW OF THE CHEST 12/8/2021 10:00 pm COMPARISON: September 10, 2021 HISTORY: ORDERING SYSTEM PROVIDED HISTORY: chest tube placement TECHNOLOGIST PROVIDED HISTORY: chest tube placement Reason for Exam: S.p right side chest tube placement Additional signs and symptoms: S.p right side chest tube placement FINDINGS: Right pneumothorax is larger measuring 52 mm. New chest tube right lung base. Left IJ MediPort unchanged. Increased infiltrates on the right. Heart and mediastinum normal.  Bony thorax intact. Increased right pneumothorax despite new chest tube. Increased airspace disease on the right. RECOMMENDATION: The findings were sent to the Radiology Results Communication Center at 10:19 pm on 12/8/2021to be communicated to a licensed caregiver. IR CHEST TUBE INSERTION    Result Date: 12/13/2021  PROCEDURE: FLUOROSCOPY GUIDED CHEST TUBE PLACEMENT 12/13/2021 HISTORY: ORDERING SYSTEM PROVIDED HISTORY: Pneumothorax TECHNOLOGIST PROVIDED HISTORY: Pneumothorax Which side should the procedure be performed?->Right Pneumothorax patient has large bore chest tube with a persistent moderate to large apical pneumothorax, please place a pigtail catheter for treatment of pneumothorax SEDATION: None TECHNIQUE: Informed consent was obtained after a detailed explanation of the procedure including risks, benefits, and alternatives. Universal protocol was followed. A suitable skin site was prepped and draped in sterile fashion following fluoroscopic localization.   Using trocar technique a 10 Turkmen pigtail catheter was advanced into the right chest/pneumothorax from an anterolateral intercostal approach along the superior margin of the 5th rib. Under fluoroscopy the catheter was directed cephalad such at the pigtail is near the apex. The catheter was sutured to the skin and the patient tolerated the procedure well. The pneumothorax was evacuated with a 60 cc syringe and three-way stopcock with improved aeration of the right upper lobe. The catheter was attached to atrium drainage system. EBL: Minimal FINDINGS: Post procedure images demonstrate the chest tube in good position     Successful fluoroscopic guided placement of a 10 Turkmen right pleural pigtail chest tube     CT CHEST ABDOMEN PELVIS W CONTRAST    Result Date: 12/8/2021  EXAMINATION: CT OF THE CHEST, ABDOMEN, AND PELVIS WITH CONTRAST 12/8/2021 1:07 pm TECHNIQUE: CT of the chest, abdomen and pelvis was performed with the administration of intravenous contrast. Multiplanar reformatted images are provided for review. Dose modulation, iterative reconstruction, and/or weight based adjustment of the mA/kV was utilized to reduce the radiation dose to as low as reasonably achievable. 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 Right hilar and subcarinal lymphadenopathy is grossly similar in appearance. Lungs/pleura: Large right pneumothorax has increased in size compared to prior CT exam and most recent chest radiograph in September. Slight mediastinal shift. Small right pleural effusion. Nodular areas of pleural thickening are now evident. No new findings identified in the left lung.  Soft Tissues/Bones: Sclerosis in the T12 vertebral body appears more prominent in the interval and there is new mild vertebral body height loss. Abdomen/Pelvis: Organs: Liver lesions are again demonstrated, which appears smaller and less conspicuous in the interval.  Previously measured lesion in the inferior right hepatic lobe measures 1.5 cm on image 73, previously 2.2 cm. No new liver lesion identified. The gallbladder, pancreas, spleen reveal no significant findings. Right adrenal nodule measuring 1.3 cm appears unchanged. The kidneys reveal no acute findings. GI/Bowel: There is no bowel dilatation or wall thickening identified. Pelvis: No acute findings. Peritoneum/Retroperitoneum: No free air or free fluid. The aorta is normal in caliber. The visceral branches are patent. No lymphadenopathy. Bones/Soft Tissues: No new findings. 1. Large right pneumothorax has increased since prior imaging and there is slight mediastinal shift, suggesting underlying tension. 2.  Small right pleural effusion and pleural nodularity suggestive of neoplastic involvement. 3.  Similar appearance of soft tissue density in the right hilum and mediastinal lymphadenopathy. 4.  More prominent sclerosis in the T12 vertebral body and slight interval vertebral body height loss, suggestive of a metastatic deposit and pathologic fracture. 5.  Liver lesions appear smaller and less conspicuous since prior imaging. 6.  Unchanged right adrenal nodule. Findings were discussed with Keshia Tamayo at 3:14 pm on 12/8/2021.      MRI BRAIN W WO CONTRAST    Result Date: 12/8/2021  EXAMINATION: MRI OF THE BRAIN WITHOUT AND WITH CONTRAST  12/8/2021 12:11 pm TECHNIQUE: Multiplanar multisequence MRI of the head/brain was performed without and with the administration of intravenous contrast. COMPARISON: MRI of the brain with and without contrast, 07/08/2021 HISTORY: ORDERING SYSTEM PROVIDED HISTORY: Primary malignant neoplasm of right lung metastatic to other site [facility]) TECHNOLOGIST PROVIDED HISTORY: assess disease status FINDINGS: INTRACRANIAL STRUCTURES/VENTRICLES:  There is no acute infarct. No mass, mass effect, edema or hemorrhage is seen. Moderate volume loss is seen in the cerebrum with moderate chronic microvascular ischemic changes. No hydrocephalus or extra-axial fluid is seen. Small developmental venous anomaly is seen in the left frontal lobe (incidental finding) ORBITS: The visualized portion of the orbits demonstrate no acute abnormality. SINUSES: The visualized paranasal sinuses and mastoid air cells are well aerated. BONES/SOFT TISSUES: The bone marrow signal intensity appears normal. The soft tissues demonstrate no acute abnormality. No evidence of acute or metastatic disease. This note was dictated using M*Modal Fluency Direct so please excuse any grammatical or syntax errors as no guarantees can be provided that every mistake has been identified and corrected by editing.       Electronically signed by Fiorella Vicente MD on 12/18/2021 at 9:59 AM   Answering service [phone] or [employer]

## 2021-12-18 NOTE — PLAN OF CARE
Nutrition Problem #1: Inadequate oral intake  Intervention: Food and/or Nutrient Delivery: Continue Current Diet,Continue Oral Nutrition Supplement  Nutritional Goals: Meet over 75% of estimated nutrition needs

## 2021-12-19 ENCOUNTER — APPOINTMENT (OUTPATIENT)
Dept: GENERAL RADIOLOGY | Age: 71
DRG: 199 | End: 2021-12-19
Payer: MEDICARE

## 2021-12-19 LAB
ABSOLUTE EOS #: 0.3 K/UL (ref 0–0.4)
ABSOLUTE IMMATURE GRANULOCYTE: ABNORMAL K/UL (ref 0–0.3)
ABSOLUTE LYMPH #: 0.95 K/UL (ref 1–4.8)
ABSOLUTE MONO #: 0.84 K/UL (ref 0.1–1.3)
ALBUMIN SERPL-MCNC: 3.4 G/DL (ref 3.5–5.2)
ALBUMIN/GLOBULIN RATIO: ABNORMAL (ref 1–2.5)
ALP BLD-CCNC: 102 U/L (ref 40–129)
ALT SERPL-CCNC: 16 U/L (ref 5–41)
ANION GAP SERPL CALCULATED.3IONS-SCNC: 6 MMOL/L (ref 9–17)
AST SERPL-CCNC: 23 U/L
BASOPHILS # BLD: 0 % (ref 0–2)
BASOPHILS ABSOLUTE: 0 K/UL (ref 0–0.2)
BILIRUB SERPL-MCNC: 0.16 MG/DL (ref 0.3–1.2)
BUN BLDV-MCNC: 15 MG/DL (ref 8–23)
BUN/CREAT BLD: ABNORMAL (ref 9–20)
CALCIUM SERPL-MCNC: 9.2 MG/DL (ref 8.6–10.4)
CHLORIDE BLD-SCNC: 95 MMOL/L (ref 98–107)
CO2: 29 MMOL/L (ref 20–31)
CREAT SERPL-MCNC: 0.94 MG/DL (ref 0.7–1.2)
DIFFERENTIAL TYPE: ABNORMAL
EOSINOPHILS RELATIVE PERCENT: 8 % (ref 0–4)
GFR AFRICAN AMERICAN: >60 ML/MIN
GFR NON-AFRICAN AMERICAN: >60 ML/MIN
GFR SERPL CREATININE-BSD FRML MDRD: ABNORMAL ML/MIN/{1.73_M2}
GFR SERPL CREATININE-BSD FRML MDRD: ABNORMAL ML/MIN/{1.73_M2}
GLUCOSE BLD-MCNC: 93 MG/DL (ref 70–99)
HCT VFR BLD CALC: 28.1 % (ref 41–53)
HEMOGLOBIN: 9.7 G/DL (ref 13.5–17.5)
IMMATURE GRANULOCYTES: ABNORMAL %
LYMPHOCYTES # BLD: 25 % (ref 24–44)
MCH RBC QN AUTO: 35.6 PG (ref 26–34)
MCHC RBC AUTO-ENTMCNC: 34.5 G/DL (ref 31–37)
MCV RBC AUTO: 103.2 FL (ref 80–100)
MONOCYTES # BLD: 22 % (ref 1–7)
MORPHOLOGY: ABNORMAL
MORPHOLOGY: ABNORMAL
NRBC AUTOMATED: ABNORMAL PER 100 WBC
PDW BLD-RTO: 15.7 % (ref 11.5–14.9)
PLATELET # BLD: 272 K/UL (ref 150–450)
PLATELET ESTIMATE: ABNORMAL
PMV BLD AUTO: 6.9 FL (ref 6–12)
POTASSIUM SERPL-SCNC: 4.2 MMOL/L (ref 3.7–5.3)
RBC # BLD: 2.72 M/UL (ref 4.5–5.9)
RBC # BLD: ABNORMAL 10*6/UL
SEG NEUTROPHILS: 45 % (ref 36–66)
SEGMENTED NEUTROPHILS ABSOLUTE COUNT: 1.71 K/UL (ref 1.3–9.1)
SODIUM BLD-SCNC: 130 MMOL/L (ref 135–144)
TOTAL PROTEIN: 6.4 G/DL (ref 6.4–8.3)
WBC # BLD: 3.8 K/UL (ref 3.5–11)
WBC # BLD: ABNORMAL 10*3/UL

## 2021-12-19 PROCEDURE — 2580000003 HC RX 258: Performed by: FAMILY MEDICINE

## 2021-12-19 PROCEDURE — 2060000000 HC ICU INTERMEDIATE R&B

## 2021-12-19 PROCEDURE — 94640 AIRWAY INHALATION TREATMENT: CPT

## 2021-12-19 PROCEDURE — 6360000002 HC RX W HCPCS: Performed by: FAMILY MEDICINE

## 2021-12-19 PROCEDURE — 80053 COMPREHEN METABOLIC PANEL: CPT

## 2021-12-19 PROCEDURE — 94761 N-INVAS EAR/PLS OXIMETRY MLT: CPT

## 2021-12-19 PROCEDURE — 6370000000 HC RX 637 (ALT 250 FOR IP): Performed by: FAMILY MEDICINE

## 2021-12-19 PROCEDURE — 85025 COMPLETE CBC W/AUTO DIFF WBC: CPT

## 2021-12-19 PROCEDURE — 71046 X-RAY EXAM CHEST 2 VIEWS: CPT

## 2021-12-19 PROCEDURE — 2580000003 HC RX 258: Performed by: INTERNAL MEDICINE

## 2021-12-19 PROCEDURE — 36415 COLL VENOUS BLD VENIPUNCTURE: CPT

## 2021-12-19 PROCEDURE — 99232 SBSQ HOSP IP/OBS MODERATE 35: CPT | Performed by: INTERNAL MEDICINE

## 2021-12-19 RX ADMIN — POLYETHYLENE GLYCOL 3350 17 G: 17 POWDER, FOR SOLUTION ORAL at 08:59

## 2021-12-19 RX ADMIN — MIDODRINE HYDROCHLORIDE 7.5 MG: 5 TABLET ORAL at 13:16

## 2021-12-19 RX ADMIN — IPRATROPIUM BROMIDE AND ALBUTEROL SULFATE 3 ML: .5; 2.5 SOLUTION RESPIRATORY (INHALATION) at 15:16

## 2021-12-19 RX ADMIN — APIXABAN 5 MG: 5 TABLET, FILM COATED ORAL at 19:37

## 2021-12-19 RX ADMIN — FERROUS SULFATE TAB 325 MG (65 MG ELEMENTAL FE) 325 MG: 325 (65 FE) TAB at 09:00

## 2021-12-19 RX ADMIN — STANDARDIZED SENNA CONCENTRATE 8.6 MG: 8.6 TABLET ORAL at 19:37

## 2021-12-19 RX ADMIN — CEFTRIAXONE SODIUM 1000 MG: 1 INJECTION, POWDER, FOR SOLUTION INTRAMUSCULAR; INTRAVENOUS at 07:40

## 2021-12-19 RX ADMIN — SODIUM CHLORIDE, PRESERVATIVE FREE 10 ML: 5 INJECTION INTRAVENOUS at 19:37

## 2021-12-19 RX ADMIN — ATORVASTATIN CALCIUM 10 MG: 20 TABLET, FILM COATED ORAL at 09:00

## 2021-12-19 RX ADMIN — IPRATROPIUM BROMIDE AND ALBUTEROL SULFATE 3 ML: .5; 2.5 SOLUTION RESPIRATORY (INHALATION) at 11:02

## 2021-12-19 RX ADMIN — GUAIFENESIN 600 MG: 600 TABLET, EXTENDED RELEASE ORAL at 19:37

## 2021-12-19 RX ADMIN — DOCUSATE SODIUM 100 MG: 100 CAPSULE ORAL at 09:00

## 2021-12-19 RX ADMIN — MIDODRINE HYDROCHLORIDE 7.5 MG: 5 TABLET ORAL at 09:00

## 2021-12-19 RX ADMIN — IPRATROPIUM BROMIDE AND ALBUTEROL SULFATE 3 ML: .5; 2.5 SOLUTION RESPIRATORY (INHALATION) at 07:02

## 2021-12-19 RX ADMIN — IPRATROPIUM BROMIDE AND ALBUTEROL SULFATE 3 ML: .5; 2.5 SOLUTION RESPIRATORY (INHALATION) at 19:26

## 2021-12-19 RX ADMIN — DOXYCYCLINE 100 MG: 100 CAPSULE ORAL at 09:00

## 2021-12-19 RX ADMIN — DOXYCYCLINE 100 MG: 100 CAPSULE ORAL at 19:37

## 2021-12-19 RX ADMIN — APIXABAN 5 MG: 5 TABLET, FILM COATED ORAL at 09:00

## 2021-12-19 RX ADMIN — SODIUM CHLORIDE, PRESERVATIVE FREE 10 ML: 5 INJECTION INTRAVENOUS at 09:00

## 2021-12-19 RX ADMIN — GUAIFENESIN 600 MG: 600 TABLET, EXTENDED RELEASE ORAL at 09:00

## 2021-12-19 NOTE — PLAN OF CARE
Problem: Skin Integrity:  Goal: Will show no infection signs and symptoms  Description: Will show no infection signs and symptoms  12/19/2021 0617 by Kenney Rivera RN  Outcome: Ongoing     Problem: Skin Integrity:  Goal: Absence of new skin breakdown  Description: Absence of new skin breakdown  12/19/2021 0617 by Kenney Rivera RN  Outcome: Ongoing     Problem: Infection:  Goal: Will remain free from infection  Description: Will remain free from infection  12/19/2021 0617 by Kenney Rivera RN  Outcome: Ongoing     Problem: Safety:  Goal: Free from accidental physical injury  Description: Free from accidental physical injury  12/19/2021 0617 by Kenney Rivera RN  Outcome: Ongoing     Problem: Safety:  Goal: Free from intentional harm  Description: Free from intentional harm  12/19/2021 0617 by Kenney Rivera RN  Outcome: Ongoing     Problem: Daily Care:  Goal: Daily care needs are met  Description: Daily care needs are met  12/19/2021 0617 by Kenney Rivera RN  Outcome: Ongoing     Problem: Pain:  Goal: Patient's pain/discomfort is manageable  Description: Patient's pain/discomfort is manageable  12/19/2021 0617 by Kenney Rivera RN  Outcome: Ongoing     Problem: Pain:  Goal: Pain level will decrease  Description: Pain level will decrease  12/19/2021 0617 by Kenney Rivera RN  Outcome: Ongoing     Problem: Pain:  Goal: Control of acute pain  Description: Control of acute pain  12/19/2021 0617 by Kenney Rivera RN  Outcome: Ongoing     Problem: Pain:  Goal: Control of chronic pain  Description: Control of chronic pain  12/19/2021 0617 by Kenney Rivera RN  Outcome: Ongoing     Problem: Skin Integrity:  Goal: Skin integrity will stabilize  Description: Skin integrity will stabilize  12/19/2021 0617 by Kenney Rivera RN  Outcome: Ongoing     Problem: Discharge Planning:  Goal: Patients continuum of care needs are met  Description: Patients continuum of care needs are met  12/19/2021 0617 by Kenney Rivera RN  Outcome: Ongoing     Problem: Falls - Risk of:  Goal: Will remain free from falls  Description: Will remain free from falls  12/19/2021 0617 by Matt Martinez RN  Outcome: Ongoing     Problem: Falls - Risk of:  Goal: Absence of physical injury  Description: Absence of physical injury  12/19/2021 0617 by Matt Martinez RN  Outcome: Ongoing     Problem: Nutrition  Goal: Optimal nutrition therapy  12/19/2021 0617 by Matt Martinez RN  Outcome: Ongoing

## 2021-12-19 NOTE — PLAN OF CARE
Problem: Skin Integrity:  Goal: Will show no infection signs and symptoms  Description: Will show no infection signs and symptoms  12/19/2021 1732 by Kate Vera RN  Outcome: Ongoing     Problem: Infection:  Goal: Will remain free from infection  Description: Will remain free from infection  12/19/2021 1732 by Kate Vera RN  Outcome: Ongoing     Problem: Safety:  Goal: Free from accidental physical injury  Description: Free from accidental physical injury  12/19/2021 1732 by Kate Vera RN  Outcome: Ongoing     Problem: Daily Care:  Goal: Daily care needs are met  Description: Daily care needs are met  12/19/2021 1732 by Kate Vera RN  Outcome: Ongoing

## 2021-12-19 NOTE — CARE COORDINATION
ONGOING DISCHARGE PLAN:    Patient is alert and oriented x4. Spoke with patient & Patient's Wife, regarding discharge plan and patient confirms that plan is still to return to home w/ KEEGAN, dEie Dobbins. Per Nursing, Pigtail is clamped, Repeat CXR carley. Pulmonary following. Will continue to follow for additional discharge needs.     Electronically signed by Brent Marrero RN on 12/19/2021 at 3:11 PM

## 2021-12-19 NOTE — PROGRESS NOTES
On Eliquis    Anticipated Disposition upon discharge:     [] Home  [] Home with [facility] AT [facility] ([address])   [] SNF ([facility])  [] LTAC ([address])      [address]    has a past medical history of DDD (degenerative disc disease), cervical, Gout, Heart murmur, Hyperlipidemia, Hypertension, Lung cancer ([facility] [address] MI (myocardial infarction) ([facility] [address] Skin cancer, and Wears glasses. SURGICAL HISTORY      has a past surgical history that includes Appendectomy; Tonsillectomy; skin biopsy; skin biopsy; Colonoscopy; Foot surgery (Right); Cardiac catheterization; cyst incision and drainage (Right, 05/14/2020); Forearm surgery (Right, 5/14/2020); knee surgery (Left, 5/14/2020); and IR CHEST TUBE INSERTION (12/13/2021).     CURRENT MEDICATIONS        polyethylene glycol  17 g Oral Daily    senna  1 tablet Oral Nightly    docusate sodium  100 mg Oral Daily    guaiFENesin  600 mg Oral BID    midodrine  7.5 mg Oral TID WC    ipratropium-albuterol  3 mL Inhalation Q4H WA    sodium chloride flush  5-40 mL IntraVENous 2 times per day    ferrous sulfate  325 mg Oral Daily    atorvastatin  10 mg Oral Daily    cefTRIAXone (ROCEPHIN) IV  1,000 mg IntraVENous Q24H    doxycycline monohydrate  100 mg Oral 2 times per day    apixaban  5 mg Oral BID    lidocaine  20 mL IntraDERmal Once     PRN Meds sodium chloride flush, sodium chloride, acetaminophen, ondansetron **OR** ondansetron, potassium chloride **OR** potassium alternative oral replacement **OR** potassium chloride  Continuous Infusions:    sodium chloride Stopped (12/13/21 0944)       polyethylene glycol (GLYCOLAX) packet 17 g, Daily  senna (SENOKOT) tablet 8.6 mg, Nightly  docusate sodium (COLACE) capsule 100 mg, Daily  guaiFENesin (MUCINEX) extended release tablet 600 mg, BID  midodrine (PROAMATINE) tablet 7.5 mg, TID WC  ipratropium-albuterol (DUONEB) nebulizer solution 3 mL, Q4H WA  sodium chloride flush 0.9 % injection 5-40 mL, 2 times per day  sodium chloride flush 0.9 % injection 5-40 mL, PRN  0.9 % sodium chloride infusion, PRN  acetaminophen (TYLENOL) tablet 650 mg, Q4H PRN  ondansetron (ZOFRAN-ODT) disintegrating tablet 4 mg, Q8H PRN   Or  ondansetron (ZOFRAN) injection 4 mg, Q6H PRN  potassium chloride (KLOR-CON M) extended release tablet 40 mEq, PRN   Or  potassium bicarb-citric acid (EFFER-K) effervescent tablet 40 mEq, PRN   Or  potassium chloride 10 mEq/100 mL IVPB (Peripheral Line), PRN  ferrous sulfate (IRON 325) tablet 325 mg, Daily  atorvastatin (LIPITOR) tablet 10 mg, Daily  cefTRIAXone (ROCEPHIN) 1000 mg IVPB in 50 mL D5W minibag, Q24H  doxycycline monohydrate (MONODOX) capsule 100 mg, 2 times per day  apixaban (ELIQUIS) tablet 5 mg, BID  lidocaine 1 % injection 20 mL, Once          HOME MEDICATIONS     Medications Prior to Admission: docusate sodium (COLACE) 100 MG capsule, Take 100 mg by mouth 2 times daily  B Complex-C-Folic Acid (ANGELICA-HUGO) TABS, TAKE 1 TABLET BY MOUTH DAILY. HEMATINIC VIT MINERALS  traZODone (DESYREL) 100 MG tablet, Take 100 mg by mouth nightly   ipratropium-albuterol (DUONEB) 0.5-2.5 (3) MG/3ML SOLN nebulizer solution, Inhale 3 mLs into the lungs 4 times daily  potassium chloride (MICRO-K) 10 MEQ extended release capsule, Take 20 mEq by mouth daily   Ferrous Fumarate (FERROCITE) 324 (106 Fe) MG TABS, Take 324 mg by mouth daily   apixaban (ELIQUIS) 5 MG TABS tablet, Take 1 tablet by mouth 2 times daily  tamsulosin (FLOMAX) 0.4 MG capsule, TAKE 1 CAPSULE BY MOUTH EVERY DAY  lidocaine-prilocaine (EMLA) 2.5-2.5 % cream, Apply topically as needed. Apply a quarter size amount to port site 1 hour before chemotherapy.   Cover with plastic wrap.  acetaminophen (TYLENOL) 325 MG tablet, Take 650 mg by mouth every 6 hours as needed for Pain  allopurinol (ZYLOPRIM) 100 MG tablet, Take 100 mg by mouth daily  simvastatin (ZOCOR) 40 MG tablet, Take 40 mg by mouth daily  [DISCONTINUED] oxyCODONE-acetaminophen (PERCOCET) 5-325 MG per tablet, Take 1 tablet by mouth every 6 hours as needed for Pain for up to 15 days. [DISCONTINUED] dexamethasone (DECADRON) 2 MG tablet, Take 1 tablet by mouth 2 times daily (with meals) for 15 days  Handicap Placard MISC, by Does not apply route      ALLERGIES     has No Known Allergies. No Known Allergies    SOCIAL HISTORY      reports that he has been smoking cigarettes. He has been smoking about 0.50 packs per day. He has quit using smokeless tobacco. He reports current drug use. Frequency: 14.00 times per week. Drug: Marijuana Karena Eze). He reports that he does not drink alcohol. Vitals:    21 1103 21 1300 21 1517 21 1700   BP:  112/65  116/75   Pulse:  85  79   Resp:  18  18   Temp:  98 °F (36.7 °C)     TempSrc:  Oral     SpO2: 100% 99% 98%    Weight:       Height:         Body mass index is 22.72 kg/m². /75   Pulse 79   Temp 98 °F (36.7 °C) (Oral)   Resp 18   Ht 5' 11\" (1.803 m)   Wt 162 lb 14.7 oz (73.9 kg)   SpO2 98%   BMI 22.72 kg/m²   Temp (24hrs), Av °F (36.7 °C), Min:97.6 °F (36.4 °C), Max:98.2 °F (36.8 °C)    Pulse Ox: SpO2  Av %  Min: 97 %  Max: 100 %  Supplemental O2: O2 Flow Rate (L/min): 0 L/min   Oxygen Therapy  SpO2: 98 %  Pulse Oximeter Device Mode: Intermittent  Pulse Oximeter Device Location: Finger  O2 Device: None (Room air)  O2 Flow Rate (L/min): 0 L/min  Blood Gas  Performed?: No    Patient Vitals for the past 96 hrs (Last 3 readings):   Weight   21 0400 162 lb 14.7 oz (73.9 kg)   21 0015 163 lb 2.3 oz (74 kg)   21 0300 158 lb 11.7 oz (72 kg)     Admission weight: 165 lb (74.8 kg)  Wt Readings from Last 3 Encounters:   21 162 lb 14.7 oz (73.9 kg)   21 165 lb (74.8 kg)   11/10/21 173 lb (78.5 kg)    (encounters)    /O (24Hr):     Intake/Output Summary (Last 24 hours) at 2021 1836  Last data filed at 2021 1813  Gross per 24 hour   Intake 1047 ml   Output 740 ml   Net 307 ml Vitals reviewed. H&N: atraumatic, normocephalic, anicteric, no conjunctivitis, no exophthalmos, nose and ears appear normal, trachea mid line, no stridor,  Chest:  fairly good air entry, no wheezes,    Heart: normal heart sounds, regular rate and rhythm, no murmurs. gallops or rubs,  Abdomen: BS present, non-tender, no masses or organomegaly detected,   Extremities: no peripheral edema, no cyanosis. no oncholysis. Skin: no rash, no erythema, no jaundice,   CNS: grossly normal without cranial nerve deficits, no abnormal coordination or tone, Psychiatric: normal mood, affect. LABS:    CBC:   Recent Labs     12/17/21  0651 12/18/21  0649 12/19/21  0530   WBC 3.1* 3.2* 3.8   RBC 2.80* 2.82* 2.72*   HGB 10.1* 10.0* 9.7*   HCT 29.3* 29.3* 28.1*   .5* 103.9* 103.2*   MCH 35.9* 35.3* 35.6*   MCHC 34.3 34.0 34.5   RDW 15.5* 15.7* 15.7*    260 272   MPV 6.9 7.6 6.9       MAG: No results for input(s): MG in the last 72 hours. CMP:   Recent Labs     12/17/21  0651 12/18/21  0649 12/19/21  0530   * 132* 130*   K 4.4 4.2 4.2    98 95*   CO2 26 25 29   BUN 11 14 15   CREATININE 0.78 0.91 0.94   GLUCOSE 103* 90 93   CALCIUM 9.4 9.2 9.2   PROT 6.7 6.6 6.4   LABALBU 3.5 3.4* 3.4*   BILITOT 0.22* 0.19* 0.16*   ALKPHOS 109 107 102   AST 21 19 23   ALT 17 15 16      No results for input(s): LIPASE, AMYLASE in the last 72 hours. Phosphorus:  No results for input(s): PHOS in the last 72 hours.   Albumin:   Recent Labs     12/17/21  0651 12/18/21  0649 12/19/21  0530   LABALBU 3.5 3.4* 3.4*       U/A:  Lab Results   Component Value Date    COLORU YELLOW 05/12/2020    PHUR 6.0 05/12/2020    WBCUA None 05/12/2020    RBCUA 2 TO 5 05/12/2020    MUCUS NOT REPORTED 05/12/2020    TRICHOMONAS NOT REPORTED 05/12/2020    YEAST NOT REPORTED 05/12/2020    BACTERIA NOT REPORTED 05/12/2020    SPECGRAV 1.011 05/12/2020    LEUKOCYTESUR NEGATIVE 05/12/2020    UROBILINOGEN Normal 05/12/2020    BILIRUBINUR 05/12/2020    AMORPHOUS NOT REPORTED 05/12/2020       Urine Sodium:     Lab Results   Component Value Date    BILL 41 05/16/2020     Urine Potassium:  No results found for: KUR  Urine Chloride:  No results found for: CLUR  Urine Osmolarity:   Lab Results   Component Value Date    OSMOU 459 05/16/2020     Urine Protein:   No results found for: TPU  Urine Creatinine:   No results found for: LABCREA  Urine Eosinophils:  No components found for: UEOS    Thyroid Studies:   No components found for: T4,  FREE  No results found for: T3  Lab Results   Component Value Date    TSH 0.96 06/02/2021       Lab Results   Component Value Date    TSH 0.96 06/02/2021       HbA1c  No components found for: HA1CC    Lipids:  No results found for: CHOLESTEROL, HDL, VLDL      CRP:   Lab Results   Component Value Date    .2 (H) 05/21/2020     ESR:   Lab Results   Component Value Date    SEDRATE 103 (H) 05/13/2020       Folate and B12:   Lab Results   Component Value Date    HOOXGYWC23 1277 (H) 05/10/2020   ,   Lab Results   Component Value Date    FOLATE 8.9 05/10/2020       No components found for: 3WBC, 3RBC, 3HEMOGLOBIN, 3HEMATOCRIT, 3PLATELETS, 3MCV, 3SEGS, 3STAB, 3LYMP, 3MONOC, 3EOS, 3BASO, 3ASEG, 3MYEL, 3PRMY, 3BLAS, 3PLTE    Recent Results (from the past 24 hour(s))   CBC with DIFF    Collection Time: 12/19/21  5:30 AM   Result Value Ref Range    WBC 3.8 3.5 - 11.0 k/uL    RBC 2.72 (L) 4.5 - 5.9 m/uL    Hemoglobin 9.7 (L) 13.5 - 17.5 g/dL    Hematocrit 28.1 (L) 41 - 53 %    .2 (H) 80 - 100 fL    MCH 35.6 (H) 26 - 34 pg    MCHC 34.5 31 - 37 g/dL    RDW 15.7 (H) 11.5 - 14.9 %    Platelets 538 815 - 157 k/uL    MPV 6.9 6.0 - 12.0 fL    NRBC Automated NOT REPORTED per 100 WBC    Differential Type NOT REPORTED     Immature Granulocytes NOT REPORTED 0 %    Absolute Immature Granulocyte NOT REPORTED 0.00 - 0.30 k/uL    WBC Morphology NOT REPORTED     RBC Morphology NOT REPORTED     Platelet Estimate NOT REPORTED     Seg Neutrophils 45 36 - 66 %    Lymphocytes 25 24 - 44 %    Monocytes 22 (H) 1 - 7 %    Eosinophils % 8 (H) 0 - 4 %    Basophils 0 0 - 2 %    Segs Absolute 1.71 1.3 - 9.1 k/uL    Absolute Lymph # 0.95 (L) 1.0 - 4.8 k/uL    Absolute Mono # 0.84 0.1 - 1.3 k/uL    Absolute Eos # 0.30 0.0 - 0.4 k/uL    Basophils Absolute 0.00 0.0 - 0.2 k/uL    Morphology ANISOCYTOSIS PRESENT     Morphology MACROCYTOSIS PRESENT    Comprehensive Metabolic Panel w/ Reflex to MG    Collection Time: 12/19/21  5:30 AM   Result Value Ref Range    Glucose 93 70 - 99 mg/dL    BUN 15 8 - 23 mg/dL    CREATININE 0.94 0.70 - 1.20 mg/dL    Bun/Cre Ratio NOT REPORTED 9 - 20    Calcium 9.2 8.6 - 10.4 mg/dL    Sodium 130 (L) 135 - 144 mmol/L    Potassium 4.2 3.7 - 5.3 mmol/L    Chloride 95 (L) 98 - 107 mmol/L    CO2 29 20 - 31 mmol/L    Anion Gap 6 (L) 9 - 17 mmol/L    Alkaline Phosphatase 102 40 - 129 U/L    ALT 16 5 - 41 U/L    AST 23 <40 U/L    Total Bilirubin 0.16 (L) 0.3 - 1.2 mg/dL    Total Protein 6.4 6.4 - 8.3 g/dL    Albumin 3.4 (L) 3.5 - 5.2 g/dL    Albumin/Globulin Ratio NOT REPORTED 1.0 - 2.5    GFR Non-African American >60 >60 mL/min    GFR African American >60 >60 mL/min    GFR Comment          GFR Staging NOT REPORTED        Blood Culture: No results for input(s): BC, BLOODCULT2, ORG in the last 72 hours. GRAM STAIN  No results for input(s): LABGRAM, LABANAE, ORG, WNDABS in the last 72 hours.     Resp Culture Brief : No results found for: CULTRESP    Body Fluid : No results found for: BFCX    MRSA : No results found for: [address]     Urine Culture Brief : No results found for: LABURIN     Organism Brief : No results found for: ORG    XR CHEST (2 VW)    Result Date: 12/19/2021  EXAMINATION: TWO XRAY VIEWS OF THE CHEST 12/19/2021 11:16 am COMPARISON: 12/18/2021 HISTORY: ORDERING SYSTEM PROVIDED HISTORY: PTX, heimlich valve TECHNOLOGIST PROVIDED HISTORY: PTX, heimlich valve Reason for Exam: PTX, heimlich valve FINDINGS: Cardiomediastinal silhouette is stable. Similar position of right loop pleural drainage catheter with increase in volume of right pneumothorax, now moderate. Similar masslike right perihilar opacity. No pleural effusion. No gross bony abnormality. Similar position of right loop pleural drainage catheter with increase in volume of right pneumothorax, now moderate. CT CHEST W WO CONTRAST    Result Date: 12/14/2021  EXAMINATION: CT OF THE CHEST WITH AND WITHOUT CONTRAST 12/13/2021 11:33 am TECHNIQUE: CT of the chest was performed without and with the administration of intravenous contrast. Multiplanar reformatted images are provided for review. Dose modulation, iterative reconstruction, and/or weight based adjustment of the mA/kV was utilized to reduce the radiation dose to as low as reasonably achievable. COMPARISON: 12/08/2021, chest x-ray 12/13/2021 HISTORY: ORDERING SYSTEM PROVIDED HISTORY: pneumothorax TECHNOLOGIST PROVIDED HISTORY: pneumothorax FINDINGS: Limited chest CT is performed without contrast for planned chest tube placement. There is a large bore right chest tube in place with its tip in the inferior medial chest.  Moderate to large residual right pneumothorax seen best at the apex. Perihilar airspace disease/volume loss with a small right effusion. Pleural based nodules as noted on prior CT. Partially visualized left chest port. Subsequent chest tube placement is performed using fluoroscopic guidance. Similar moderate to large right pneumothorax. Subsequent pigtail chest tube placed using fluoroscopic guidance. RECOMMENDATIONS: Unavailable     XR CHEST PORTABLE    Result Date: 12/18/2021  EXAMINATION: ONE XRAY VIEW OF THE CHEST 12/18/2021 6:13 am COMPARISON: December 17, 2021. HISTORY: ORDERING SYSTEM PROVIDED HISTORY: recurrent pneumothorax TECHNOLOGIST PROVIDED HISTORY: recurrent pneumothorax Reason for Exam: chest tube FINDINGS: A portable upright frontal view chest radiograph was obtained.  The heart is normal in size. Central right upper lobe/paramediastinal prominence and right hilar prominence is unchanged. The left jugular vein chest port catheter is unchanged in position, as is the right apical pigtail chest tube catheter. The right pneumothorax is decreased in size slightly. A small amount of lateral chest wall subcutaneous emphysema is unchanged. Slight interval decrease in size of the right apical pneumothorax, now small-to-moderate. The pigtail chest tube catheter is unchanged in position. XR CHEST PORTABLE    Result Date: 12/17/2021  EXAMINATION: ONE XRAY VIEW OF THE CHEST 12/17/2021 11:55 am COMPARISON: 12/17/2021, 0642 hours. HISTORY: ORDERING SYSTEM PROVIDED HISTORY: Repeat CXR approx 3 hours after transition to water seal TECHNOLOGIST PROVIDED HISTORY: Repeat CXR approx 3 hours after transition to water seal Reason for Exam: repeat CXR approx 3 hours after transition to water seal FINDINGS: A left Upipdz-C-Qhyf was noted with the tip in the superior vena cava. No cardiomegaly or interstitial edema was identified. A right-sided chest pigtail catheter was noted. The right pneumothorax has progressed in size from the earlier exam now with 5.1 cm of separation at the right apex rather than 3.2 cm of separation. No change has occurred in the right hilar mass with post obstructive pneumonia and or atelectasis from the previous study. Left Lybngz-B-Oqtg with the tip in the superior vena cava. Right-sided chest pigtail catheter was noted. The right pneumothorax has progressed in size from the earlier exam, now with 5.1 cm of separation at the right apex rather than 3.2 centimetres. No tracheal or mediastinal shift.  No change has occurred in the right hilar mass with post obstructive pneumonia and or atelectasis from the earlier exam.     XR CHEST PORTABLE    Result Date: 12/17/2021  EXAMINATION: ONE XRAY VIEW OF THE CHEST 12/17/2021 6:09 am COMPARISON: 12/16/2021, 12/15/2021 HISTORY: ORDERING SYSTEM PROVIDED HISTORY: recurrent pneumothorax TECHNOLOGIST PROVIDED HISTORY: recurrent pneumothorax Reason for Exam: CHEST TUBE FINDINGS: Stable appearance of a right pleural pigtail catheter and residual right pneumothorax seen best apically. Minimal soft tissue emphysema right chest wall. Left jugular chest port remains in place. Cardiomediastinal silhouette is within normal limits. Similar right hilar mass/adenopathy with postobstructive changes in the right lung. Monitor leads overlie the chest.     Similar right pneumothorax with pigtail catheter in place. XR CHEST PORTABLE    Result Date: 12/16/2021  EXAMINATION: ONE XRAY VIEW OF THE CHEST 12/16/2021 8:14 am COMPARISON: 12/15/2021 HISTORY: ORDERING SYSTEM PROVIDED HISTORY: recurrent pneumothorax TECHNOLOGIST PROVIDED HISTORY: recurrent pneumothorax Reason for Exam: recurrent pneumothorax FINDINGS: Left jugular chest port right pleural pigtail catheter remain in place, similar in position. Slight decrease in size of a right apical pneumothorax now measuring approximately 2.7 cm at the apex. Minimal soft tissue emphysema right chest wall. Stable cardiomediastinal silhouette. No significant change in right hilar mass and postobstructive changes in the right perihilar/upper lobe region. Left lung remains essentially clear. Right pleural pigtail catheter remains in place with slight decrease in size of the right pneumothorax. XR CHEST PORTABLE    Result Date: 12/15/2021  EXAMINATION: ONE XRAY VIEW OF THE CHEST 12/15/2021 7:44 pm COMPARISON: 12/15/2021, 0806 hours.  HISTORY: ORDERING SYSTEM PROVIDED HISTORY: Repeat chest xray 4 hours after chest tube removal TECHNOLOGIST PROVIDED HISTORY: Repeat chest xray 4 hours after chest tube removal May be re-timed as needed Reason for Exam: Repeat chest xray 4 hours after chest tube removal; May be re-timed as needed FINDINGS: A left Ouprqm-K-Arlg was noted with the distal tip in the superior vena cava. Mild volume loss of the right hemithorax was noted resulting in mild tracheal-mediastinal shift to the right. An unchanged right-sided chest pigtail catheter was noted with the tip near the right apex. 1 other lower right-sided chest tube has been removed since the earlier exam.  A right pneumothorax was noted with 4.2 cm of separation at the right apex. The pneumothorax has progressed in size from 12/15/2021, 0806 hours. Previously, it measured 3.6 cm of separation. An unchanged right hilar mass was noted with post obstructive pneumonia-atelectasis. No cardiomegaly or interstitial edema was noted. A left Vautyf-T-Thwy was noted with the distal tip in the superior vena cava. Mild volume loss of the right hemithorax secondary to a right pneumothorax with 4.2 cm of separation at the right apex. The pneumothorax has progressed in size from 12/15/2021, 0806 hours. Previously, it measured 3.6 cm of separation. Unchanged right hilar mass was noted with post obstructive pneumonia-atelectasis. The pigtail catheter is unchanged on the right. The other lower right-sided chest tube has been removed since the earlier exam.     XR CHEST PORTABLE    Result Date: 12/15/2021  EXAMINATION: ONE XRAY VIEW OF THE CHEST 12/15/2021 7:52 am COMPARISON: Several priors, most recent 12/14/2021 HISTORY: ORDERING SYSTEM PROVIDED HISTORY: recurrent pneumothorax TECHNOLOGIST PROVIDED HISTORY: recurrent pneumothorax Reason for Exam: pneumothorax FINDINGS: Left jugular chest port catheter tip remains in the superior vena cava. Large bore chest tube in the right lower chest and pigtail catheter in the right upper chest are again seen, similar in position. Small to moderate residual right pneumothorax which is best seen at the apex, decreased since the prior study. Soft tissue emphysema right lateral chest wall. No significant change in right hilar and perihilar opacity. Stable cardiomediastinal silhouette.   Left lung remains grossly clear. Monitor leads overlie the chest.     Similar appearance of 2 right chest tubes with slight decrease in size of a small to moderate pneumothorax. XR CHEST PORTABLE    Result Date: 12/14/2021  EXAMINATION: ONE X-RAY VIEW OF THE CHEST 12/14/2021 6:41 am COMPARISON: 12/13/2021 HISTORY: ORDERING SYSTEM PROVIDED HISTORY: recurrent pneumothorax TECHNOLOGIST PROVIDED HISTORY: recurrent pneumothorax Reason for Exam: recurrent pneumothorax FINDINGS: Right-sided pleural pigtail catheter has been placed. Left chest port remains in place. There is also a large bore chest tube at the mid to lower right chest, unchanged. Moderate right pneumothorax persists, measuring approximately 4.5 cm at the apex. Left lung is relatively clear. New pleural pigtail catheter in the upper right chest.  Unchanged large bore right chest tube. Moderate right pneumothorax is not significantly changed. XR CHEST PORTABLE    Result Date: 12/13/2021  EXAMINATION: ONE XRAY VIEW OF THE CHEST 12/13/2021 10:09 am COMPARISON: 12/12/2021, 12/11/2021 HISTORY: ORDERING SYSTEM PROVIDED HISTORY: recurrent pneumothorax TECHNOLOGIST PROVIDED HISTORY: recurrent pneumothorax Reason for Exam: recurrent pneumothorax FINDINGS: Left jugular chest port remains in place with the catheter tip in the SVC. Similar right perihilar parenchymal opacities which may be related to the patient's known lung cancer. Right chest tube remains in place, perhaps pulled back slightly with similar moderate right pneumothorax. Soft tissue emphysema right chest wall is similar to slightly decreased. No new focal infiltrates on the left. Stable cardiomediastinal silhouette. Overall no significant interval change. Right chest tube remains in place with persistent moderate right pneumothorax.      XR CHEST PORTABLE    Result Date: 12/12/2021  EXAMINATION: ONE XRAY VIEW OF THE CHEST 12/12/2021 8:47 am COMPARISON: 12/11/2021 HISTORY: ORDERING subcutaneous emphysema in the right chest wall. Slight improvement of large right pneumothorax with chest tube in place. XR CHEST PORTABLE    Result Date: 12/9/2021  EXAMINATION: ONE XRAY VIEW OF THE CHEST 12/9/2021 6:24 am COMPARISON: Approximately 8 hours earlier. HISTORY: ORDERING SYSTEM PROVIDED HISTORY: Pneumothorax TECHNOLOGIST PROVIDED HISTORY: Pneumothorax Reason for Exam: right side chest tube FINDINGS: Support tubes and lines unchanged. Heart size and pulmonary vasculature are normal.  Airspace opacity throughout the aerated right lung. Moderate right pneumothorax unchanged from earlier. Surrounding structures are unremarkable. Stable examination. XR CHEST PORTABLE    Addendum Date: 12/8/2021    ADDENDUM: Compared with the chest x-ray there is been significant interval improvement. Please disregard original impression. Discussed with Dr. Faustino Ocampo at 10:20 p.m. Result Date: 12/8/2021  EXAMINATION: ONE XRAY VIEW OF THE CHEST 12/8/2021 10:00 pm COMPARISON: September 10, 2021 HISTORY: ORDERING SYSTEM PROVIDED HISTORY: chest tube placement TECHNOLOGIST PROVIDED HISTORY: chest tube placement Reason for Exam: S.p right side chest tube placement Additional signs and symptoms: S.p right side chest tube placement FINDINGS: Right pneumothorax is larger measuring 52 mm. New chest tube right lung base. Left IJ MediPort unchanged. Increased infiltrates on the right. Heart and mediastinum normal.  Bony thorax intact. Increased right pneumothorax despite new chest tube. Increased airspace disease on the right. RECOMMENDATION: The findings were sent to the Radiology Results Communication Center at 10:19 pm on 12/8/2021to be communicated to a licensed caregiver.      IR CHEST TUBE INSERTION    Result Date: 12/13/2021  PROCEDURE: FLUOROSCOPY GUIDED CHEST TUBE PLACEMENT 12/13/2021 HISTORY: ORDERING SYSTEM PROVIDED HISTORY: Pneumothorax TECHNOLOGIST PROVIDED HISTORY: Pneumothorax Which side should the procedure be performed?->Right Pneumothorax patient has large bore chest tube with a persistent moderate to large apical pneumothorax, please place a pigtail catheter for treatment of pneumothorax SEDATION: None TECHNIQUE: Informed consent was obtained after a detailed explanation of the procedure including risks, benefits, and alternatives. Universal protocol was followed. A suitable skin site was prepped and draped in sterile fashion following fluoroscopic localization. Using trocar technique a 10 [place] pigtail catheter was advanced into the right chest/pneumothorax from an anterolateral intercostal approach along the superior margin of the 5th rib. Under fluoroscopy the catheter was directed cephalad such at the pigtail is near the apex. The catheter was sutured to the skin and the patient tolerated the procedure well. The pneumothorax was evacuated with a 60 cc syringe and three-way stopcock with improved aeration of the right upper lobe. The catheter was attached to atrium drainage system. EBL: Minimal FINDINGS: Post procedure images demonstrate the chest tube in good position     Successful fluoroscopic guided placement of a 10 [place] right pleural pigtail chest tube     CT CHEST ABDOMEN PELVIS W CONTRAST    Result Date: 12/8/2021  EXAMINATION: CT OF THE CHEST, ABDOMEN, AND PELVIS WITH CONTRAST 12/8/2021 1:07 pm TECHNIQUE: CT of the chest, abdomen and pelvis was performed with the administration of intravenous contrast. Multiplanar reformatted images are provided for review. Dose modulation, iterative reconstruction, and/or weight based adjustment of the mA/kV was utilized to reduce the radiation dose to as low as reasonably achievable.  COMPARISON: 08/26/2021, 06/16/2021, 03/02/2021 HISTORY: ORDERING SYSTEM PROVIDED HISTORY: Primary malignant neoplasm of right lung metastatic to other site [facility]) TECHNOLOGIST PROVIDED HISTORY: assess disease status Reason for Exam: follow up to lung cancer FINDINGS: Chest: Mediastinum: Slight mediastinal shift. Right hilar and subcarinal lymphadenopathy is grossly similar in appearance. Lungs/pleura: Large right pneumothorax has increased in size compared to prior CT exam and most recent chest radiograph in September. Slight mediastinal shift. Small right pleural effusion. Nodular areas of pleural thickening are now evident. No new findings identified in the left lung. Soft Tissues/Bones: Sclerosis in the T12 vertebral body appears more prominent in the interval and there is new mild vertebral body height loss. Abdomen/Pelvis: Organs: Liver lesions are again demonstrated, which appears smaller and less conspicuous in the interval.  Previously measured lesion in the inferior right hepatic lobe measures 1.5 cm on image 73, previously 2.2 cm. No new liver lesion identified. The gallbladder, pancreas, spleen reveal no significant findings. Right adrenal nodule measuring 1.3 cm appears unchanged. The kidneys reveal no acute findings. GI/Bowel: There is no bowel dilatation or wall thickening identified. Pelvis: No acute findings. Peritoneum/Retroperitoneum: No free air or free fluid. The aorta is normal in caliber. The visceral branches are patent. No lymphadenopathy. Bones/Soft Tissues: No new findings. 1. Large right pneumothorax has increased since prior imaging and there is slight mediastinal shift, suggesting underlying tension. 2.  Small right pleural effusion and pleural nodularity suggestive of neoplastic involvement. 3.  Similar appearance of soft tissue density in the right hilum and mediastinal lymphadenopathy. 4.  More prominent sclerosis in the T12 vertebral body and slight interval vertebral body height loss, suggestive of a metastatic deposit and pathologic fracture. 5.  Liver lesions appear smaller and less conspicuous since prior imaging. 6.  Unchanged right adrenal nodule. Findings were discussed with Jesenia Aguilar at 3:14 pm on 12/8/2021.      MRI BRAIN W WO CONTRAST    Result Date: 12/8/2021  EXAMINATION: MRI OF THE BRAIN WITHOUT AND WITH CONTRAST  12/8/2021 12:11 pm TECHNIQUE: Multiplanar multisequence MRI of the head/brain was performed without and with the administration of intravenous contrast. COMPARISON: MRI of the brain with and without contrast, 07/08/2021 HISTORY: ORDERING SYSTEM PROVIDED HISTORY: Primary malignant neoplasm of right lung metastatic to other site [facility]) TECHNOLOGIST PROVIDED HISTORY: assess disease status FINDINGS: INTRACRANIAL STRUCTURES/VENTRICLES:  There is no acute infarct. No mass, mass effect, edema or hemorrhage is seen. Moderate volume loss is seen in the cerebrum with moderate chronic microvascular ischemic changes. No hydrocephalus or extra-axial fluid is seen. Small developmental venous anomaly is seen in the left frontal lobe (incidental finding) ORBITS: The visualized portion of the orbits demonstrate no acute abnormality. SINUSES: The visualized paranasal sinuses and mastoid air cells are well aerated. BONES/SOFT TISSUES: The bone marrow signal intensity appears normal. The soft tissues demonstrate no acute abnormality. No evidence of acute or metastatic disease. This note was dictated using M*Modal Fluency Direct so please excuse any grammatical or syntax errors as no guarantees can be provided that every mistake has been identified and corrected by editing.       Electronically signed by Sundar Truong MD on 12/19/2021 at 6:36 PM   Answering service [phone] or [employer]

## 2021-12-19 NOTE — PROGRESS NOTES
PULMONARY PROGRESS NOTE:    REASON FOR VISIT: PTX, lung cancer  Interval History:    Shortness of Breath: no  Cough: no  Sputum: no          Hemoptysis: no  Chest Pain: no  Fever: no                   Swelling Feet: no  Headache: no                                           Nausea, Emesis, Abdominal Pain: no  Diarrhea: no         Constipation: no    Events since last visit: none    PAST MEDICAL HISTORY:      Scheduled Meds:   polyethylene glycol  17 g Oral Daily    senna  1 tablet Oral Nightly    docusate sodium  100 mg Oral Daily    guaiFENesin  600 mg Oral BID    midodrine  7.5 mg Oral TID WC    ipratropium-albuterol  3 mL Inhalation Q4H WA    sodium chloride flush  5-40 mL IntraVENous 2 times per day    ferrous sulfate  325 mg Oral Daily    atorvastatin  10 mg Oral Daily    cefTRIAXone (ROCEPHIN) IV  1,000 mg IntraVENous Q24H    doxycycline monohydrate  100 mg Oral 2 times per day    apixaban  5 mg Oral BID    lidocaine  20 mL IntraDERmal Once     Continuous Infusions:   sodium chloride Stopped (12/13/21 0944)     PRN Meds:sodium chloride flush, sodium chloride, acetaminophen, ondansetron **OR** ondansetron, potassium chloride **OR** potassium alternative oral replacement **OR** potassium chloride        PHYSICAL EXAMINATION:  /71   Pulse 87   Temp 97.6 °F (36.4 °C) (Oral)   Resp 18   Ht 5' 11\" (1.803 m)   Wt 162 lb 14.7 oz (73.9 kg)   SpO2 100%   BMI 22.72 kg/m²     General : Awake, alert,   Neck  supple, no lymphadenopathy, JVD not raised  Heart  regular rhythm, S1 and S2 normal; no additional sounds heard  Lungs  Air Entry- fair bilaterally; breath sounds : vesicular, chest tube with +air leak -  95% 02 sat on RA   Abdomen  soft, no tenderness  Upper Extremities  - no cyanosis, mottling; edema : absent  Lower Extremities: no cyanosis, mottling; edema : absent    Current Laboratory, Radiologic, Microbiologic, and Diagnostic studies reviewed  Data ReviewCBC:   Recent Labs 12/17/21  0651 12/18/21  0649 12/19/21  0530   WBC 3.1* 3.2* 3.8   RBC 2.80* 2.82* 2.72*   HGB 10.1* 10.0* 9.7*   HCT 29.3* 29.3* 28.1*    260 272     BMP:   Recent Labs     12/17/21  0651 12/18/21  0649 12/19/21  0530   GLUCOSE 103* 90 93   * 132* 130*   K 4.4 4.2 4.2   BUN 11 14 15   CREATININE 0.78 0.91 0.94   CALCIUM 9.4 9.2 9.2     ABGs: No results for input(s): PHART, PO2ART, URX8OZD, KWX4BZQ, BEART, X8ASHDIL, RGZ0EEQ in the last 72 hours.    PT/INR:  No results found for: PTINR    ASSESSMENT / PLAN:    Lung cancer - per oncology  PTX - right chest tube -    CXR 12/11- no improvement  CT back to suction this morning     further recs per CT surgery - in my opinion this is loculated PTX and will persist  Large bore chest tube removed 12/15/21Heimlich applied to pig tail catheter; CXR 12/19/21 -slightly worse PTX after Heimlich valve  Re check CXR 12/20 -if same OK for DC;  DW Dr Samuel Fajardo    Electronically signed by Dyan Panchal MD on 12/19/21 at 12:37 PM

## 2021-12-19 NOTE — PROGRESS NOTES
Today's Date: 12/19/2021  Patient Name: Darcus Sandifer  Date of admission: 12/8/2021  7:03 PM  Patient's age: 80 y.o., 1950  Admission Dx: Pneumothorax [J93.9]  Chronic pneumothorax [J93.81]    Reason for Consult: Known lung cancer patient  Requesting Physician: Karina Crisostomo MD    CHIEF COMPLAINT:    Chief Complaint   Patient presents with    Shortness of Breath       INTERVAL HISTORY:    Patient seen and examined  Labs and vitals reviewed  Patient resting comfortably  Using the incentive spirometer. Pulmonary input appreciated. Likely trapped lung and the plan is conservative follow-up. HISTORY OF PRESENT ILLNESS:    Darcus Sandifer is a 80 y.o. male who is admitted to the hospital on 12/8/2021  for SOB. He had a restaging scan with oncology and his CT scan showed worsening pneumothorax and mediastinal shift. Therefore he was sent to ER for further management. He denies any fever chills. He had chest tube in place. He is currently receiving Lurbinectedin for his recurrent small cell lung cancer and most recent treatment was on 12/1/2021. He has a diagnosis of small cell lung cancer with squamous component and has been following with Dr. Mary Arteaga as outpatient and his brief oncologic history as follows. 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 Chemotherapy changed to carboplatin and etoposide due to renal insufficiency from cycle #2  PCI 7/2019  SRS to adrenal gland metastasis, completed 07/2020  systemic palliative chemoimmunotherapy with carboplatin etoposide and Tecentriq, 12/2020 x4 cycles.   Maintenance Tecentriq, 3/2021  Lurbenectidin7/2/2021      Past Medical History:   has a past medical history of DDD (degenerative disc disease), cervical, Gout, Heart murmur, Hyperlipidemia, Hypertension, Lung cancer ([facility] [address] MI (myocardial infarction) ([facility] [address] Skin cancer, and Wears glasses. Past Surgical History:   has a past surgical history that includes Appendectomy; Tonsillectomy; skin biopsy; skin biopsy; Colonoscopy; Foot surgery (Right); Cardiac catheterization; cyst incision and drainage (Right, 05/14/2020); Forearm surgery (Right, 5/14/2020); knee surgery (Left, 5/14/2020); and IR CHEST TUBE INSERTION (12/13/2021). Medications:    Prior to Admission medications    Medication Sig Start Date End Date Taking? Authorizing Provider   midodrine (PROAMATINE) 5 MG tablet Take 1 tablet by mouth 3 times daily 12/17/21  Yes Aide Teran MD   docusate sodium (COLACE) 100 MG capsule Take 100 mg by mouth 2 times daily   Yes Historical Provider, MD   B Complex-C-Folic Acid (ANGELICA-HUGO) TABS TAKE 1 TABLET BY MOUTH DAILY. HEMATINIC VIT MINERALS 7/21/20  Yes Historical Provider, MD   traZODone (DESYREL) 100 MG tablet Take 100 mg by mouth nightly  7/20/20  Yes Historical Provider, MD   ipratropium-albuterol (DUONEB) 0.5-2.5 (3) MG/3ML SOLN nebulizer solution Inhale 3 mLs into the lungs 4 times daily   Yes Historical Provider, MD   potassium chloride (MICRO-K) 10 MEQ extended release capsule Take 20 mEq by mouth daily  11/19/19  Yes Historical Provider, MD   Ferrous Fumarate (FERROCITE) 324 (106 Fe) MG TABS Take 324 mg by mouth daily    Yes Historical Provider, MD   apixaban (ELIQUIS) 5 MG TABS tablet Take 1 tablet by mouth 2 times daily 5/21/20  Yes Lucia Curran MD   tamsulosin (FLOMAX) 0.4 MG capsule TAKE 1 CAPSULE BY MOUTH EVERY DAY 5/4/20  Yes Bang Bedoya MD   lidocaine-prilocaine (EMLA) 2.5-2.5 % cream Apply topically as needed. Apply a quarter size amount to port site 1 hour before chemotherapy. Cover with plastic wrap.  3/7/19  Yes Bang Bedoya MD   acetaminophen (TYLENOL) 325 MG tablet Take 650 mg by mouth every 6 hours as needed for Pain   Yes Historical Provider, MD   allopurinol (ZYLOPRIM) 100 MG tablet Take 100 mg by mouth daily 12/4/18  Yes Historical Provider, MD   simvastatin (ZOCOR) 40 MG tablet Take 40 mg by mouth daily 11/27/18  Yes Historical Provider, MD   Handicap Placard [address] [facility] by Does not apply route 7/30/19   Kristy Crockett MD     Current Facility-Administered Medications   Medication Dose Route Frequency Provider Last Rate Last Admin    polyethylene glycol (GLYCOLAX) packet 17 g  17 g Oral Daily Baljeet Garcia MD   17 g at 12/19/21 0859    senna (SENOKOT) tablet 8.6 mg  1 tablet Oral Nightly Baljeet Garcia MD   8.6 mg at 12/18/21 2029    docusate sodium (COLACE) capsule 100 mg  100 mg Oral Daily Baljeet Garcia MD   100 mg at 12/19/21 0900    guaiFENesin (MUCINEX) extended release tablet 600 mg  600 mg Oral BID Baljeet Garcia MD   600 mg at 12/19/21 0900    midodrine (PROAMATINE) tablet 7.5 mg  7.5 mg Oral TID WC Baljeet Garcia MD   7.5 mg at 12/19/21 0900    ipratropium-albuterol (DUONEB) nebulizer solution 3 mL  3 mL Inhalation Q4H WA Baljeet Garcia MD   3 mL at 12/19/21 1102    sodium chloride flush 0.9 % injection 5-40 mL  5-40 mL IntraVENous 2 times per day Keri Beatty MD   10 mL at 12/19/21 0900    sodium chloride flush 0.9 % injection 5-40 mL  5-40 mL IntraVENous PRN Keri Beatty MD        0.9 % sodium chloride infusion  25 mL IntraVENous PRN Keri Beatty MD   Stopped at 12/13/21 0944    acetaminophen (TYLENOL) tablet 650 mg  650 mg Oral Q4H PRN Keri Beatty MD   650 mg at 12/18/21 0905    ondansetron (ZOFRAN-ODT) disintegrating tablet 4 mg  4 mg Oral Q8H PRN Keri Beatty MD        Or    ondansetron (ZOFRAN) injection 4 mg  4 mg IntraVENous Q6H PRN Keri Beatty MD        potassium chloride (KLOR-CON M) extended release tablet 40 mEq  40 mEq Oral PRN Baljeet Garcia MD        Or    potassium bicarb-citric acid (EFFER-K) effervescent tablet 40 mEq  40 mEq Oral PRN Gwen Ray MD        Or    potassium chloride 10 mEq/100 mL IVPB (Peripheral Line)  10 mEq IntraVENous PRN Gwen Ray MD        ferrous sulfate (IRON 325) tablet 325 mg  325 mg Oral Daily Gwen Ray MD   325 mg at 12/19/21 0900    atorvastatin (LIPITOR) tablet 10 mg  10 mg Oral Daily Gwen Ray MD   10 mg at 12/19/21 0900    cefTRIAXone (ROCEPHIN) 1000 mg IVPB in 50 mL D5W minibag  1,000 mg IntraVENous Q24H Gwen Ray MD   Stopped at 12/19/21 0810    doxycycline monohydrate (MONODOX) capsule 100 mg  100 mg Oral 2 times per day Gwen Ray MD   100 mg at 12/19/21 0900    apixaban (ELIQUIS) tablet 5 mg  5 mg Oral BID Gwen Ray MD   5 mg at 12/19/21 0900    lidocaine 1 % injection 20 mL  20 mL IntraDERmal Once Wolf Ding MD           Allergies:  Patient has no known allergies. Social History:   reports that he has been smoking cigarettes. He has been smoking about 0.50 packs per day. He has quit using smokeless tobacco. He reports current drug use. Frequency: 14.00 times per week. Drug: Marijuana Valdene Courseload). He reports that he does not drink alcohol. Family History: family history includes Cancer in his father. REVIEW OF SYSTEMS:    Constitutional: No fever or chills. No night sweats, no weight loss   Eyes: No eye discharge, double vision, or eye pain   HEENT: negative for sore mouth, sore throat, hoarseness and voice change   Respiratory: negative for cough , sputum, ++dyspnea, wheezing, hemoptysis, chest pain   Cardiovascular: negative for chest pain, ++dyspnea, palpitations, orthopnea, PND   Gastrointestinal: negative for nausea, vomiting, diarrhea, constipation, abdominal pain, Dysphagia, hematemesis and hematochezia   Genitourinary: negative for frequency, dysuria, nocturia, urinary incontinence, and hematuria   Integument: negative for rash, skin lesions, bruises.    Hematologic/Lymphatic: negative for easy bruising, bleeding, lymphadenopathy, or petechiae   Endocrine: negative for heat or cold intolerance,weight changes, change in bowel habits and hair loss   Musculoskeletal: negative for myalgias, arthralgias, pain, joint swelling,and bone pain   Neurological: negative for headaches, dizziness, seizures, weakness, numbness    PHYSICAL EXAM:      /65   Pulse 85   Temp 98 °F (36.7 °C) (Oral)   Resp 18   Ht 5' 11\" (1.803 m)   Wt 162 lb 14.7 oz (73.9 kg)   SpO2 99%   BMI 22.72 kg/m²    Temp (24hrs), Av °F (36.7 °C), Min:97.6 °F (36.4 °C), Max:98.2 °F (36.8 °C)    General appearance - well appearing, no in pain or distress   Mental status - alert and cooperative   Eyes - pupils equal and reactive, extraocular eye movements intact   Ears - bilateral TM's and external ear canals normal   Mouth - mucous membranes moist, pharynx normal without lesions   Neck - supple, no significant adenopathy   Lymphatics - no palpable lymphadenopathy, no hepatosplenomegaly   Chest -decreased breathing sound. Heart - normal rate, regular rhythm, normal S1, S2, no murmurs  Abdomen - soft, nontender, nondistended, no masses or organomegaly   Neurological - alert, oriented, normal speech, no focal findings or movement disorder noted   Musculoskeletal - no joint tenderness, deformity or swelling   Extremities - peripheral pulses normal, no pedal edema, no clubbing or cyanosis   Skin - normal coloration and turgor, no rashes, no suspicious skin lesions noted ,    DATA:    Labs:   CBC:   Recent Labs     21  0649 21  0530   WBC 3.2* 3.8   HGB 10.0* 9.7*   HCT 29.3* 28.1*    272     BMP:   Recent Labs     21  0649 21  0530   * 130*   K 4.2 4.2   CO2 25 29   BUN 14 15   CREATININE 0.91 0.94   LABGLOM >60 >60   GLUCOSE 90 93     PT/INR:   No results for input(s): PROTIME, INR in the last 72 hours.     IMAGING DATA:  XR CHEST (2 VW)   Final Result   Similar position of right loop pleural drainage catheter with increase in   volume of right pneumothorax, now moderate. XR CHEST PORTABLE   Final Result   Slight interval decrease in size of the right apical pneumothorax, now   small-to-moderate. The pigtail chest tube catheter is unchanged in position. XR CHEST PORTABLE   Final Result   Left Qhywys-O-Xsxb with the tip in the superior vena cava. Right-sided chest pigtail catheter was noted. The right pneumothorax has progressed in size from the earlier exam, now with   5.1 cm of separation at the right apex rather than 3.2 centimetres. No tracheal or mediastinal shift. No change has occurred in the right hilar mass with post obstructive   pneumonia and or atelectasis from the earlier exam.         XR CHEST PORTABLE   Final Result   Similar right pneumothorax with pigtail catheter in place. XR CHEST PORTABLE   Final Result   Right pleural pigtail catheter remains in place with slight decrease in size   of the right pneumothorax. XR CHEST PORTABLE   Final Result   A left Dlyxxd-T-Rkqi was noted with the distal tip in the superior vena cava. Mild volume loss of the right hemithorax secondary to a right pneumothorax   with 4.2 cm of separation at the right apex. The pneumothorax has progressed   in size from 12/15/2021, 0806 hours. Previously, it measured 3.6 cm of   separation. Unchanged right hilar mass was noted with post obstructive   pneumonia-atelectasis. The pigtail catheter is unchanged on the right. The other lower right-sided   chest tube has been removed since the earlier exam.         XR CHEST PORTABLE   Final Result   Similar appearance of 2 right chest tubes with slight decrease in size of a   small to moderate pneumothorax. XR CHEST PORTABLE   Final Result   New pleural pigtail catheter in the upper right chest.  Unchanged large bore   right chest tube. Moderate right pneumothorax is not significantly changed.          IR CHEST TUBE INSERTION IMPRESSION:   1. Metastatic small cell cancer currently on Lurbinectedin  2. Left adrenal gland metastasis status post SBRT  3. Large right pneumothorax, with recent CT scan showing increase compared to the prior scan, s/p chest tube placed  4. COPD  5. Anemia    RECOMMENDATIONS:  1. I reviewed laboratory data, imaging studies, diagnosis, treatment recommendations  2. Discussed results of chest x-ray, slight worsening of pneumothorax. 3. Patient likely has a trapped lung. I do not anticipate significant improvement in his pneumothorax with drainage. 4. Recent CT scans show stable disease in regards to the small cell lung cancer  5. Continue symptomatic supportive care  6. We will plan to resume chemotherapy once stable. Okay to discharge from my perspective, defer further management of pneumothorax to Dr. Cullen Plascencia    Discussed with patient and Nurse. Thank you for asking us to see this patient. Althia Burkitt, MD      This note is created with the assistance of a speech recognition program.  While intending to generate a document that actually reflects the content of the visit, the document can still have some errors including those of syntax and sound a like substitutions which may escape proof reading. It such instances, actual meaning can be extrapolated by contextual diversion. \

## 2021-12-20 ENCOUNTER — APPOINTMENT (OUTPATIENT)
Dept: GENERAL RADIOLOGY | Age: 71
DRG: 199 | End: 2021-12-20
Payer: MEDICARE

## 2021-12-20 VITALS
WEIGHT: 162.92 LBS | OXYGEN SATURATION: 97 % | DIASTOLIC BLOOD PRESSURE: 75 MMHG | TEMPERATURE: 97.8 F | RESPIRATION RATE: 18 BRPM | BODY MASS INDEX: 22.81 KG/M2 | HEIGHT: 71 IN | SYSTOLIC BLOOD PRESSURE: 112 MMHG | HEART RATE: 92 BPM

## 2021-12-20 LAB
ABSOLUTE EOS #: 0.04 K/UL (ref 0–0.4)
ABSOLUTE IMMATURE GRANULOCYTE: ABNORMAL K/UL (ref 0–0.3)
ABSOLUTE LYMPH #: 0.84 K/UL (ref 1–4.8)
ABSOLUTE MONO #: 1.12 K/UL (ref 0.1–1.3)
ALBUMIN SERPL-MCNC: 3.5 G/DL (ref 3.5–5.2)
ALBUMIN/GLOBULIN RATIO: ABNORMAL (ref 1–2.5)
ALP BLD-CCNC: 102 U/L (ref 40–129)
ALT SERPL-CCNC: 17 U/L (ref 5–41)
ANION GAP SERPL CALCULATED.3IONS-SCNC: 8 MMOL/L (ref 9–17)
AST SERPL-CCNC: 24 U/L
BASOPHILS # BLD: 0 % (ref 0–2)
BASOPHILS ABSOLUTE: 0 K/UL (ref 0–0.2)
BILIRUB SERPL-MCNC: 0.17 MG/DL (ref 0.3–1.2)
BUN BLDV-MCNC: 15 MG/DL (ref 8–23)
BUN/CREAT BLD: ABNORMAL (ref 9–20)
CALCIUM SERPL-MCNC: 9.3 MG/DL (ref 8.6–10.4)
CHLORIDE BLD-SCNC: 95 MMOL/L (ref 98–107)
CO2: 26 MMOL/L (ref 20–31)
CREAT SERPL-MCNC: 0.89 MG/DL (ref 0.7–1.2)
DIFFERENTIAL TYPE: ABNORMAL
EOSINOPHILS RELATIVE PERCENT: 1 % (ref 0–4)
GFR AFRICAN AMERICAN: >60 ML/MIN
GFR NON-AFRICAN AMERICAN: >60 ML/MIN
GFR SERPL CREATININE-BSD FRML MDRD: ABNORMAL ML/MIN/{1.73_M2}
GFR SERPL CREATININE-BSD FRML MDRD: ABNORMAL ML/MIN/{1.73_M2}
GLUCOSE BLD-MCNC: 96 MG/DL (ref 70–99)
HCT VFR BLD CALC: 28.2 % (ref 41–53)
HEMOGLOBIN: 9.7 G/DL (ref 13.5–17.5)
IMMATURE GRANULOCYTES: ABNORMAL %
LYMPHOCYTES # BLD: 21 % (ref 24–44)
MCH RBC QN AUTO: 35.4 PG (ref 26–34)
MCHC RBC AUTO-ENTMCNC: 34.2 G/DL (ref 31–37)
MCV RBC AUTO: 103.5 FL (ref 80–100)
MONOCYTES # BLD: 28 % (ref 1–7)
MORPHOLOGY: ABNORMAL
NRBC AUTOMATED: ABNORMAL PER 100 WBC
PDW BLD-RTO: 15.7 % (ref 11.5–14.9)
PLATELET # BLD: 302 K/UL (ref 150–450)
PLATELET ESTIMATE: ABNORMAL
PMV BLD AUTO: 7.2 FL (ref 6–12)
POTASSIUM SERPL-SCNC: 4.2 MMOL/L (ref 3.7–5.3)
RBC # BLD: 2.73 M/UL (ref 4.5–5.9)
RBC # BLD: ABNORMAL 10*6/UL
SEG NEUTROPHILS: 50 % (ref 36–66)
SEGMENTED NEUTROPHILS ABSOLUTE COUNT: 2 K/UL (ref 1.3–9.1)
SODIUM BLD-SCNC: 129 MMOL/L (ref 135–144)
TOTAL PROTEIN: 6.4 G/DL (ref 6.4–8.3)
WBC # BLD: 4 K/UL (ref 3.5–11)
WBC # BLD: ABNORMAL 10*3/UL

## 2021-12-20 PROCEDURE — 97535 SELF CARE MNGMENT TRAINING: CPT

## 2021-12-20 PROCEDURE — 6370000000 HC RX 637 (ALT 250 FOR IP): Performed by: FAMILY MEDICINE

## 2021-12-20 PROCEDURE — 36415 COLL VENOUS BLD VENIPUNCTURE: CPT

## 2021-12-20 PROCEDURE — 85025 COMPLETE CBC W/AUTO DIFF WBC: CPT

## 2021-12-20 PROCEDURE — 2580000003 HC RX 258: Performed by: INTERNAL MEDICINE

## 2021-12-20 PROCEDURE — 97530 THERAPEUTIC ACTIVITIES: CPT

## 2021-12-20 PROCEDURE — 94761 N-INVAS EAR/PLS OXIMETRY MLT: CPT

## 2021-12-20 PROCEDURE — 6360000002 HC RX W HCPCS: Performed by: FAMILY MEDICINE

## 2021-12-20 PROCEDURE — 2580000003 HC RX 258: Performed by: FAMILY MEDICINE

## 2021-12-20 PROCEDURE — 94640 AIRWAY INHALATION TREATMENT: CPT

## 2021-12-20 PROCEDURE — 97116 GAIT TRAINING THERAPY: CPT

## 2021-12-20 PROCEDURE — 80053 COMPREHEN METABOLIC PANEL: CPT

## 2021-12-20 PROCEDURE — 71046 X-RAY EXAM CHEST 2 VIEWS: CPT

## 2021-12-20 PROCEDURE — 6370000000 HC RX 637 (ALT 250 FOR IP): Performed by: INTERNAL MEDICINE

## 2021-12-20 PROCEDURE — 97110 THERAPEUTIC EXERCISES: CPT

## 2021-12-20 RX ADMIN — IPRATROPIUM BROMIDE AND ALBUTEROL SULFATE 3 ML: .5; 2.5 SOLUTION RESPIRATORY (INHALATION) at 16:57

## 2021-12-20 RX ADMIN — CEFTRIAXONE SODIUM 1000 MG: 1 INJECTION, POWDER, FOR SOLUTION INTRAMUSCULAR; INTRAVENOUS at 07:12

## 2021-12-20 RX ADMIN — IPRATROPIUM BROMIDE AND ALBUTEROL SULFATE 3 ML: .5; 2.5 SOLUTION RESPIRATORY (INHALATION) at 08:28

## 2021-12-20 RX ADMIN — GUAIFENESIN 600 MG: 600 TABLET, EXTENDED RELEASE ORAL at 09:00

## 2021-12-20 RX ADMIN — ACETAMINOPHEN 650 MG: 325 TABLET, FILM COATED ORAL at 09:06

## 2021-12-20 RX ADMIN — DOXYCYCLINE 100 MG: 100 CAPSULE ORAL at 08:59

## 2021-12-20 RX ADMIN — SODIUM CHLORIDE, PRESERVATIVE FREE 10 ML: 5 INJECTION INTRAVENOUS at 09:12

## 2021-12-20 RX ADMIN — MIDODRINE HYDROCHLORIDE 7.5 MG: 5 TABLET ORAL at 18:14

## 2021-12-20 RX ADMIN — MIDODRINE HYDROCHLORIDE 7.5 MG: 5 TABLET ORAL at 08:59

## 2021-12-20 RX ADMIN — FERROUS SULFATE TAB 325 MG (65 MG ELEMENTAL FE) 325 MG: 325 (65 FE) TAB at 08:59

## 2021-12-20 RX ADMIN — APIXABAN 5 MG: 5 TABLET, FILM COATED ORAL at 09:00

## 2021-12-20 RX ADMIN — IPRATROPIUM BROMIDE AND ALBUTEROL SULFATE 3 ML: .5; 2.5 SOLUTION RESPIRATORY (INHALATION) at 11:24

## 2021-12-20 RX ADMIN — ATORVASTATIN CALCIUM 10 MG: 20 TABLET, FILM COATED ORAL at 08:59

## 2021-12-20 RX ADMIN — DOCUSATE SODIUM 100 MG: 100 CAPSULE ORAL at 08:59

## 2021-12-20 ASSESSMENT — PAIN SCALES - GENERAL
PAINLEVEL_OUTOF10: 5
PAINLEVEL_OUTOF10: 5

## 2021-12-20 NOTE — PROGRESS NOTES
[address] [place] [address] [address]   INPATIENT OCCUPATIONAL THERAPY  PROGRESS NOTE  Date: 2021  Patient Name: Carly Han      Room: 36/1647-90  MRN: [id number]    : 1950  (80 y.o.) Gender: male     Discharge Recommendations:  Further Occupational Therapy is recommended upon facility discharge. OT Equipment Recommendations  Equipment Needed: Yes  Mobility Devices: ADL Assistive Devices    Referring Practitioner: Dr Micaela Barroso  Diagnosis: Pnuemothorax with chest tube  General  Chart Reviewed: Yes  Patient assessed for rehabilitation services?: Yes  Additional Pertinent Hx: Current with [place]  Response to previous treatment: Patient with no complaints from previous session  Referring Practitioner: Dr Micaela Barroso  Diagnosis: Pnuemothorax with chest tube    Restrictions  Restrictions/Precautions: General Precautions,Surgical Protocols  Implants present? : Metal implants (Cardiac stent)  Other position/activity restrictions: Chest Tube  Required Braces or Orthoses?: No      Subjective  Subjective: \"I need to sit down\" Pt stated while completing grooming tasks standing at sink. Comments: Okay for OT per RN. Pt is agreeable and cooperative for OT tx.   Patient Currently in Pain: Denies             Objective     Bed mobility  Supine to Sit: Modified independent  Sit to Supine: Modified independent  Scooting: Modified independent  Balance  Sitting Balance: Independent  Standing Balance: Stand by assistance  Standing Balance  Time: 4 minutes, 30 seconds  Activity: functional mobility/transfers, self care  Comment: with RW for mobility; used sink while standing to complete self care  Functional Mobility  Functional - Mobility Device: Rolling Walker  Activity: To/from bathroom  Assist Level: Stand by assistance  Functional Mobility Comments: Impulsive, requires mod-max cues for safety with RW and navigating in environment   ADL  Equipment Provided: Sock aid;Long-handled shoe horn;Long-handled sponge  Grooming: Stand by assistance;Setup  Additional Comments: OT facilitated pt's engagement in today's session with max encouragement. Completed grooming tasks standing with unilateral support on sink. Required seated rest break after standing ~4 minutes. Introduced AE (see above), and pt verbalized understanding. Indicated he does not need equipment for lower body self care. Declined remaining self care. Transfers  Sit to stand: Stand by assistance  Stand to sit: Stand by assistance  Transfer Comments: Mod cues for hand placement with no carry over  Type of ROM/Therapeutic Exercise  Type of ROM/Therapeutic Exercise: Free weights (2#)  Comment: OT facilitated pt's engagement in BUE exercises (within surgical protocols) for proper technique, to promote safe and endurance for functional tasks. Exercises  Scapular Protraction: X  Scapular Retraction: X  Shoulder Flexion: X  Shoulder Extension: X  Shoulder ABduction: X  Shoulder ADduction: X  Elbow Flexion: X  Elbow Extension: X        Additional Activities: Re-educated on implementing energy conservation techniques throughout self care routine, in which pt demonstrated good understanding as evidenced by verbalizing need to sit down while grooming. Assessment  Performance deficits / Impairments: Decreased ADL status; Decreased safe awareness;Decreased endurance;Decreased functional mobility   Prognosis: Good  Discharge Recommendations: Patient would benefit from continued therapy after discharge  Activity Tolerance: Patient Tolerated treatment well  Activity Tolerance: Chest Tube  Safety Devices in place: Yes  Type of devices: All fall risk precautions in place;Call light within reach; Left in bed;Nurse notified             Patient Education: ed pt on OT POC, safe use of RW, safe transfers, EC techniques, AE for self care     Learner:patient  Method: demonstration and explanation       Outcome: needs reinforcement     Plan  Safety Devices  Safety Devices in place: Yes  Type of devices:  All fall risk precautions in place,Call light within reach,Left in bed,Nurse notified  Plan  Times per week: 2-4 sessions  Times per day: Daily  Current Treatment Recommendations: Endurance Training,Functional Mobility Training,Safety Education & Training,Patient/Caregiver Education & Training,Self-Care / ADL      Goals  Short term goals  Time Frame for Short term goals: by discharge  Short term goal 1: Tolerate seated self care tasks with infrequent rest breaks, using safe techniques for care task performance  Short term goal 2: D/V understanding of Modified care techiques including use of Adpative devices and energy conservatoin strategies  Short term goal 3: D/V understanding of Pacing / phasing techniques and matching breathing with the activity being performed  Short term goal 4: Participate in care using safe techiques for self caer and functional mobility tasks    OT Individual Minutes  Time In: 7725  Time Out: 1650 [facility]  Minutes: 22      Electronically signed by Luciana Rizzo OT on 12/20/21 at 4:16 PM EST        12/20/21 1615   OT Individual Minutes   Time In 1347   Time Out 1409   Minutes 22

## 2021-12-20 NOTE — CARE COORDINATION
ONGOING DISCHARGE PLAN:    Patient is alert and oriented x4. Spoke with patient regarding discharge plan and patient confirms that plan is still home with [facility] with chest tube. Bridgett Perry from [facility] notified that pt will likely be discharged home today. Per pulm, ok to be discharged. Chest tube is NOT to be to suction. Will continue to follow for additional discharge needs.     Electronically signed by Nakul Farmer RN on 12/20/2021 at 4:06 PM

## 2021-12-20 NOTE — FLOWSHEET NOTE
12/20/21 1053   Encounter Summary   Services provided to: Patient   Referral/Consult From: Charissa   Continue Visiting   (12/20/21V)   Volunteer Visit Yes   Complexity of Encounter Low   Length of Encounter 15 minutes   Spiritual/Taoist   Type Spiritual support   Intervention Communion   Sacraments   Communion Patient received communion

## 2021-12-20 NOTE — PLAN OF CARE
Please place chest tube to -40 suction. Daily chest x-rays to monitor for lung expansion. No plans for surgery. Small cell carcinoma try conservative interventions to reexpand lung.   Lung will likely not reexpand due to mass

## 2021-12-20 NOTE — PLAN OF CARE
Problem: Skin Integrity:  Goal: Will show no infection signs and symptoms  Description: Will show no infection signs and symptoms  12/20/2021 0414 by Candelaria Curry RN  Outcome: Ongoing     Problem: Skin Integrity:  Goal: Absence of new skin breakdown  Description: Absence of new skin breakdown  Outcome: Ongoing     Problem: Infection:  Goal: Will remain free from infection  Description: Will remain free from infection  12/20/2021 0414 by Candelaria Curry RN  Outcome: Ongoing     Problem: Safety:  Goal: Free from accidental physical injury  Description: Free from accidental physical injury  12/20/2021 0414 by Candelaria Curry RN  Outcome: Ongoing     Problem: Safety:  Goal: Free from intentional harm  Description: Free from intentional harm  Outcome: Ongoing     Problem: Daily Care:  Goal: Daily care needs are met  Description: Daily care needs are met  12/20/2021 0414 by Candelaria Curry RN  Outcome: Ongoing     Problem: Pain:  Goal: Patient's pain/discomfort is manageable  Description: Patient's pain/discomfort is manageable  12/20/2021 0414 by Candelaria Curry RN  Outcome: Ongoing     Problem: Pain:  Goal: Pain level will decrease  Description: Pain level will decrease  Outcome: Ongoing     Problem: Pain:  Goal: Control of acute pain  Description: Control of acute pain  Outcome: Ongoing     Problem: Pain:  Goal: Control of chronic pain  Description: Control of chronic pain  Outcome: Ongoing     Problem: Skin Integrity:  Goal: Skin integrity will stabilize  Description: Skin integrity will stabilize  Outcome: Ongoing     Problem: Discharge Planning:  Goal: Patients continuum of care needs are met  Description: Patients continuum of care needs are met  Outcome: Ongoing     Problem: Falls - Risk of:  Goal: Will remain free from falls  Description: Will remain free from falls  Outcome: Ongoing     Problem: Falls - Risk of:  Goal: Absence of physical injury  Description: Absence of physical injury  Outcome: Ongoing Problem: Nutrition  Goal: Optimal nutrition therapy  Outcome: Ongoing

## 2021-12-20 NOTE — PROGRESS NOTES
Physical Therapy  [facility]  Daily Treatment Note  NAME: Deidre Grimaldo  : 1950  MRN: [id number]    Date of Service: 2021    Discharge Recommendations:  Patient would benefit from continued therapy after discharge   PT Equipment Recommendations  Equipment Needed: No    Assessment   Body structures, Functions, Activity limitations: Decreased endurance  Assessment: Improved gait with use of RW  REQUIRES PT FOLLOW UP: Yes  Activity Tolerance  Activity Tolerance: Patient limited by endurance     Patient Diagnosis(es): The primary encounter diagnosis was Chronic pneumothorax. Diagnoses of Pneumothorax, unspecified type and Primary malignant neoplasm of right lung metastatic to other site [facility]) were also pertinent to this visit. has a past medical history of DDD (degenerative disc disease), cervical, Gout, Heart murmur, Hyperlipidemia, Hypertension, Lung cancer ([facility] [address] MI (myocardial infarction) ([facility] [address] Skin cancer, and Wears glasses. has a past surgical history that includes Appendectomy; Tonsillectomy; skin biopsy; skin biopsy; Colonoscopy; Foot surgery (Right); Cardiac catheterization; cyst incision and drainage (Right, 2020); Forearm surgery (Right, 2020); knee surgery (Left, 2020); and IR CHEST TUBE INSERTION (2021).     Restrictions  Restrictions/Precautions  Restrictions/Precautions: General Precautions,Surgical Protocols  Required Braces or Orthoses?: No  Implants present? : Metal implants (Cardiac stent)  Position Activity Restriction  Other position/activity restrictions: Chest Tube  Subjective   General  Family / Caregiver Present: No  Subjective  Subjective: pt pleasant and cooperative  Pain Screening  Patient Currently in Pain: Denies  Vital Signs  Patient Currently in Pain: Denies        Objective   Bed mobility  Supine to Sit: Modified independent  Sit to Supine: Modified independent  Scooting: Modified independent  Transfers  Sit to Stand: Supervision  Stand to sit: Supervision  Ambulation  Ambulation?: Yes  Ambulation 1  Device: Rolling Walker  Assistance: Stand by assistance  Quality of Gait: steady gait  Gait Deviations: Slow Brittani  Distance: 100ft x 2  Comments: no LOB  Stairs/Curb  Stairs?: No        Other exercises  Other exercises 1: AROM B LE in standing with RW            Goals  Short term goals  Time Frame for Short term goals: 1 week  Short term goal 1: Pt will increase LE strength to 5/5  Short term goal 2: Ptwill increase endurance to tolerate PT sessions for 30 mins  Short term goal 3: Pt will improve standing and ambulation balance to good  Short term goal 4: Pt will perform bed mobility IND  Short term goal 5: Ptwill transfer with least restrictive device MOD IND  Short term goal 6: Pt will ambulate with least restrictive device 50 ft MOD IND  Short term goal 7: Pt will ascend/descend 5 steps without rail SBA    Plan    Plan  Times per week: 6-7x/wk  Times per day: Daily  Current Treatment Recommendations: Strengthening,Transfer Training,Endurance Training,Patient/Caregiver Education & Training,Balance Training,Functional Mobility Training,Stair training,Safety Education & Training,Gait Training,Home Exercise Program,Equipment Evaluation, Education, & procurement  Safety Devices  Type of devices: Left in bed  Restraints  Initially in place: No     Therapy Time   Individual Concurrent Group Co-treatment   Time In 1105         Time Out 1130         Minutes [address] [place] [address],

## 2021-12-20 NOTE — PROGRESS NOTES
Patient educated on discharge instructions which include: medication schedule, reasons for new medications and some side effects and need to follow-up. Patient designated to get new prescription from pharmacy. Patient verbalizes understanding of discharge and states readiness for discharge. All belongings were gathered by patient and in patient's possession. No distress noted at this time.      Current vital signs:  /75   Pulse 92   Temp 97.8 °F (36.6 °C) (Oral)   Resp 18   Ht 5' 11\" (1.803 m)   Wt 162 lb 14.7 oz (73.9 kg)   SpO2 97%   BMI 22.72 kg/m²                MEWS Score: 1

## 2021-12-20 NOTE — PROGRESS NOTES
Microbiologic, and Diagnostic studies reviewed  Data ReviewCBC:   Recent Labs     12/18/21  0649 12/19/21  0530 12/20/21  0534   WBC 3.2* 3.8 4.0   RBC 2.82* 2.72* 2.73*   HGB 10.0* 9.7* 9.7*   HCT 29.3* 28.1* 28.2*    272 302     BMP:   Recent Labs     12/18/21  0649 12/19/21  0530 12/20/21  0534   GLUCOSE 90 93 96   * 130* 129*   K 4.2 4.2 4.2   BUN 14 15 15   CREATININE 0.91 0.94 0.89   CALCIUM 9.2 9.2 9.3     ABGs: No results for input(s): PHART, PO2ART, HVO0IOR, GMI5NRO, BEART, E0STPJFS, ZMC4MAF in the last 72 hours.    PT/INR:  No results found for: PTINR    ASSESSMENT / PLAN:    Lung cancer - per oncology  PTX - right chest tube     further recs per CT surgery - in my opinion this is loculated PTX and will persist  CXR 12/20 - persistent but improved PTX  okay for home with chest tube    Plan of care discussed with Dr Faith Martínez  Electronically signed by AZEB Eduardo CNP on 12/20/21 at 11:47 AM [facility] Pregnancy And Lactation Text: This medication is Pregnancy Category C and it isn't known if it is safe during pregnancy. It is unknown if it is excreted in breast milk.

## 2021-12-20 NOTE — PROGRESS NOTES
[facility]    Progress Note    12/20/2021    2:13 PM    Name:   Carly Han  MRN:     [id number]     Acct:      [de-identified]   Room:   2094/2094[facility] Day:  11  Admit Date:  12/8/2021  7:03 PM    PCP:   Kam Oakley MD  Code Status:  Full Code    Subjective:     C/C:   Chief Complaint   Patient presents with    Shortness of Breath     Interval History Status: improved. Afebrile  BP stable  Respiratory status stable on room air  No leucocytosis  Hemoglobin stable  Platelets stable  Renal function stable      Review of Systems:     Constitutional:  negative for chills, fevers, sweats  Respiratory:  negative for cough,  wheezing  Cardiovascular:  negative for  lower extremity edema, palpitations  Gastrointestinal:  negative for abdominal pain, constipation, diarrhea, nausea, vomiting      Medications:      Allergies:  No Known Allergies    Current Meds:   Scheduled Meds:    polyethylene glycol  17 g Oral Daily    senna  1 tablet Oral Nightly    docusate sodium  100 mg Oral Daily    guaiFENesin  600 mg Oral BID    midodrine  7.5 mg Oral TID WC    ipratropium-albuterol  3 mL Inhalation Q4H WA    sodium chloride flush  5-40 mL IntraVENous 2 times per day    ferrous sulfate  325 mg Oral Daily    atorvastatin  10 mg Oral Daily    cefTRIAXone (ROCEPHIN) IV  1,000 mg IntraVENous Q24H    doxycycline monohydrate  100 mg Oral 2 times per day    apixaban  5 mg Oral BID    lidocaine  20 mL IntraDERmal Once     Continuous Infusions:    sodium chloride Stopped (12/13/21 0944)     PRN Meds: sodium chloride flush, sodium chloride, acetaminophen, ondansetron **OR** ondansetron, potassium chloride **OR** potassium alternative oral replacement **OR** potassium chloride    Data:     Past Medical History:   has a past medical history of DDD (degenerative disc disease), cervical, Gout, Heart murmur, Hyperlipidemia, Hypertension, Lung cancer ([facility] [address] MI (myocardial infarction) [facility]), Skin cancer, and Wears glasses. Social History:   reports that he has been smoking cigarettes. He has been smoking about 0.50 packs per day. He has quit using smokeless tobacco. He reports current drug use. Frequency: 14.00 times per week. Drug: Marijuana [place]). He reports that he does not drink alcohol. Family History:   Family History   Problem Relation Age of Onset    Cancer Father         lung cancer       Vitals:  /73   Pulse 92   Temp 97.8 °F (36.6 °C) (Oral)   Resp 20   Ht 5' 11\" (1.803 m)   Wt 162 lb 14.7 oz (73.9 kg)   SpO2 97%   BMI 22.72 kg/m²   Temp (24hrs), Av °F (36.7 °C), Min:97.8 °F (36.6 °C), Max:98.1 °F (36.7 °C)    No results for input(s): POCGLU in the last 72 hours. I/O (24Hr):     Intake/Output Summary (Last 24 hours) at 2021 1413  Last data filed at 2021 0907  Gross per 24 hour   Intake 90 ml   Output 1045 ml   Net -955 ml       Labs:  Hematology:  Recent Labs     21  0649 21  0530 21  0534   WBC 3.2* 3.8 4.0   RBC 2.82* 2.72* 2.73*   HGB 10.0* 9.7* 9.7*   HCT 29.3* 28.1* 28.2*   .9* 103.2* 103.5*   MCH 35.3* 35.6* 35.4*   MCHC 34.0 34.5 34.2   RDW 15.7* 15.7* 15.7*    272 302   MPV 7.6 6.9 7.2     Chemistry:  Recent Labs     21  0621  0530 21  0534   * 130* 129*   K 4.2 4.2 4.2   CL 98 95* 95*   CO2 25 29 26   GLUCOSE 90 93 96   BUN 14 15 15   CREATININE 0.91 0.94 0.89   ANIONGAP 9 6* 8*   LABGLOM >60 >60 >60   GFRAA >60 >60 >60   CALCIUM 9.2 9.2 9.3     Recent Labs     21  0649 21  0530 21  0534   PROT 6.6 6.4 6.4   LABALBU 3.4* 3.4* 3.5   AST 19 23 24   ALT 15 16 17   ALKPHOS 107 102 102   BILITOT 0.19* 0.16* 0.17*     ABG:  Lab Results   Component Value Date    POCPH 7.43 2020    POCPCO2 22 2020    POCPO2 64 2020    POCHCO3 14.6 2020    NBEA 10 2020    PBEA NOT REPORTED 2020    MVD9XEN 15 2020    WQAA9MAN 93 05/07/2020    FIO2 UNKNOWN 05/12/2020     Lab Results   Component Value Date/Time    SPECIAL NOT REPORTED 08/27/2021 10:03 PM     Lab Results   Component Value Date/Time    CULTURE NO GROWTH 6 DAYS 08/27/2021 10:03 PM       Radiology:  XR CHEST (2 VW)    Result Date: 12/20/2021  Right pleural pigtail catheter remains in place with slight decrease in size of a right pneumothorax. XR CHEST (2 VW)    Result Date: 12/19/2021  Similar position of right loop pleural drainage catheter with increase in volume of right pneumothorax, now moderate. XR CHEST PORTABLE    Result Date: 12/18/2021  Slight interval decrease in size of the right apical pneumothorax, now small-to-moderate. The pigtail chest tube catheter is unchanged in position. XR CHEST PORTABLE    Result Date: 12/17/2021  Left Yylrgz-R-Iyfc with the tip in the superior vena cava. Right-sided chest pigtail catheter was noted. The right pneumothorax has progressed in size from the earlier exam, now with 5.1 cm of separation at the right apex rather than 3.2 centimetres. No tracheal or mediastinal shift. No change has occurred in the right hilar mass with post obstructive pneumonia and or atelectasis from the earlier exam.     XR CHEST PORTABLE    Result Date: 12/17/2021  Similar right pneumothorax with pigtail catheter in place. XR CHEST PORTABLE    Result Date: 12/16/2021  Right pleural pigtail catheter remains in place with slight decrease in size of the right pneumothorax. XR CHEST PORTABLE    Result Date: 12/15/2021  A left Ubcfgy-B-Xrrp was noted with the distal tip in the superior vena cava. Mild volume loss of the right hemithorax secondary to a right pneumothorax with 4.2 cm of separation at the right apex. The pneumothorax has progressed in size from 12/15/2021, 0806 hours. Previously, it measured 3.6 cm of separation. Unchanged right hilar mass was noted with post obstructive pneumonia-atelectasis.  The pigtail catheter is unchanged on

## 2021-12-23 NOTE — DISCHARGE SUMMARY
[facility]    Discharge Summary     Patient ID: Ruthie Farris  :  1950   MRN: [id number]     ACCOUNT:  [de-identified]   Patient's PCP: Delia Cerrato MD  Admit Date: 2021   Discharge Date: 2021   Length of Stay: 11  Code Status:  Prior  Admitting Physician: Awilda Cobian MD  Discharge Physician: Laura Mckeon MD     Active Discharge Diagnoses:     Hospital Problem Lists:  Principal Problem:    Pneumothorax  Active Problems:    Lung cancer, primary, with metastasis from lung to other site [facility])    PAF (paroxysmal atrial fibrillation) ([facility] [address]    COPD (chronic obstructive pulmonary disease) ([facility] [address]    Iron deficiency anemia    Severe malnutrition ([facility] [address]  Resolved Problems:    * No resolved hospital problems. *      Admission Condition:  poor     Discharged Condition: fair    Hospital Stay:     Hospital Course:  Ruthie Farris is a 80 y.o. male who was admitted for the management of   Pneumothorax , presented to ER with shortness of breath. Has history of lung cancer. Chest tube was placed. Pulmonology and oncology were consulted. Was also started on Rocephin and Doxycyline and also Midodrine. Pigtail cathter was placed by IR and CTS was consulted  Per CTS recommendation pleural pigtail cathter was placed to suction  Follow up CXR improved  Patient was discharged home with chest tube in stable condition. Needs outpatient follow up with pulm, CTS and oncology. Significant Diagnostic Studies:   Radiology:  XR CHEST (2 VW)    Result Date: 2021  Right pleural pigtail catheter remains in place with slight decrease in size of a right pneumothorax. XR CHEST (2 VW)    Result Date: 2021  Similar position of right loop pleural drainage catheter with increase in volume of right pneumothorax, now moderate.      XR CHEST PORTABLE    Result Date: 2021  Slight interval decrease in size of the right apical pneumothorax, medications    midodrine 5 MG tablet  Commonly known as: PROAMATINE  Take 1 tablet by mouth 3 times daily        CONTINUE taking these medications    acetaminophen 325 MG tablet  Commonly known as: TYLENOL     allopurinol 100 MG tablet  Commonly known as: ZYLOPRIM     apixaban 5 MG Tabs tablet  Commonly known as: ELIQUIS  Take 1 tablet by mouth 2 times daily     docusate sodium 100 MG capsule  Commonly known as: COLACE     Ferrocite 324 (106 Fe) MG Tabs  Generic drug: Ferrous Fumarate     Handicap Placard Misc  by Does not apply route     ipratropium-albuterol 0.5-2.5 (3) MG/3ML Soln nebulizer solution  Commonly known as: DUONEB     lidocaine-prilocaine 2.5-2.5 % cream  Commonly known as: EMLA  Apply topically as needed. Apply a quarter size amount to port site 1 hour before chemotherapy. Cover with plastic wrap.     potassium chloride 10 MEQ extended release capsule  Commonly known as: MICRO-K     concepcion-viral Tabs     simvastatin 40 MG tablet  Commonly known as: ZOCOR     tamsulosin 0.4 MG capsule  Commonly known as: FLOMAX  TAKE 1 CAPSULE BY MOUTH EVERY DAY     traZODone 100 MG tablet  Commonly known as: DESYREL        STOP taking these medications    dexamethasone 2 MG tablet  Commonly known as: DECADRON     oxyCODONE-acetaminophen 5-325 MG per tablet  Commonly known as: Percocet           Where to Get Your Medications      These medications were sent to [address] [place], [place] [address]angie Koenig [phone]  [address] [place] [id number]    Phone: [phone]   · midodrine 5 MG tablet         Discharge Procedure Orders   XR CHEST STANDARD (2 VW)   Standing Status: Future Standing Exp. Date: 12/19/22       Time Spent on discharge is  40 mins in patient examination, evaluation, counseling as well as medication reconciliation, prescriptions for required medications, discharge plan and follow up.     Electronically signed by   Kirstin Sellers MD  12/23/2021  12:27 AM      Thank you Dr. Delia Cerrato MD for the opportunity to be involved in this patient's care.

## 2021-12-29 DIAGNOSIS — J93.83 OTHER PNEUMOTHORAX: Primary | ICD-10-CM

## 2022-01-03 ENCOUNTER — HOSPITAL ENCOUNTER (OUTPATIENT)
Dept: GENERAL RADIOLOGY | Age: 72
Discharge: HOME | End: 2022-01-05
Payer: MEDICARE

## 2022-01-03 ENCOUNTER — HOSPITAL ENCOUNTER (OUTPATIENT)
Age: 72
Discharge: HOME | End: 2022-01-05
Payer: MEDICARE

## 2022-01-03 DIAGNOSIS — J93.9 PNEUMOTHORAX, UNSPECIFIED TYPE: ICD-10-CM

## 2022-01-03 PROCEDURE — 71046 X-RAY EXAM CHEST 2 VIEWS: CPT

## 2022-01-04 ENCOUNTER — TELEPHONE (OUTPATIENT)
Dept: CARDIOTHORACIC SURGERY | Age: 72
End: 2022-01-04

## 2022-01-04 NOTE — TELEPHONE ENCOUNTER
----- Message from AZEB Brown NP sent at 12/29/2021  8:03 AM EST -----  Hey, lets have this patient see us in 1 month with repeat Ct chest.   Pt to follow up on [place] day  Thanks Grover perla

## 2022-01-04 NOTE — TELEPHONE ENCOUNTER
Spoke to patient in regards to this. Pt was very confused and states he would like to follolw up with his Oncologist to get clarification on why he needs to follow up with us. Tried explaining to patient that he was seen in the hospital by our providers patient was still very confused.

## 2022-01-12 ENCOUNTER — TELEPHONE (OUTPATIENT)
Dept: CASE MANAGEMENT | Age: 72
End: 2022-01-12

## 2022-01-12 NOTE — TELEPHONE ENCOUNTER
Name: Majo Ponce  : 1950  MRN: [id number]    Oncology Navigation Follow-Up Note  Navigator spoke with pt. And pt. Sharing Dr. Jordi Curran wants CT to remain in for now. Pt. To see MO this week and plan to follow.    Electronically signed by Geovanna Chao RN on 2022 at 2:46 PM

## 2022-01-14 ENCOUNTER — HOSPITAL ENCOUNTER (OUTPATIENT)
Dept: INFUSION THERAPY | Age: 72
Discharge: HOME | End: 2022-01-14
Payer: MEDICARE

## 2022-01-14 ENCOUNTER — OFFICE VISIT (OUTPATIENT)
Dept: ONCOLOGY | Age: 72
End: 2022-01-14
Payer: MEDICARE

## 2022-01-14 ENCOUNTER — TELEPHONE (OUTPATIENT)
Dept: ONCOLOGY | Age: 72
End: 2022-01-14

## 2022-01-14 VITALS — BODY MASS INDEX: 22.82 KG/M2 | OXYGEN SATURATION: 96 % | HEIGHT: 71 IN | WEIGHT: 163 LBS

## 2022-01-14 VITALS
TEMPERATURE: 97.4 F | RESPIRATION RATE: 16 BRPM | BODY MASS INDEX: 22.73 KG/M2 | DIASTOLIC BLOOD PRESSURE: 68 MMHG | WEIGHT: 163 LBS | HEART RATE: 127 BPM | SYSTOLIC BLOOD PRESSURE: 99 MMHG

## 2022-01-14 DIAGNOSIS — C34.91 PRIMARY LUNG CANCER WITH METASTASIS FROM LUNG TO OTHER SITE, RIGHT (HCC): Primary | ICD-10-CM

## 2022-01-14 DIAGNOSIS — C34.91 PRIMARY MALIGNANT NEOPLASM OF RIGHT LUNG METASTATIC TO OTHER SITE (HCC): ICD-10-CM

## 2022-01-14 LAB
ABSOLUTE EOS #: 0.15 K/UL (ref 0–0.4)
ABSOLUTE IMMATURE GRANULOCYTE: ABNORMAL K/UL (ref 0–0.3)
ABSOLUTE LYMPH #: 0.52 K/UL (ref 1–4.8)
ABSOLUTE MONO #: 1.63 K/UL (ref 0.1–1.2)
ALBUMIN SERPL-MCNC: 3.1 G/DL (ref 3.5–5.2)
ALBUMIN/GLOBULIN RATIO: 0.8 (ref 1–2.5)
ALP BLD-CCNC: 210 U/L (ref 40–129)
ALT SERPL-CCNC: 66 U/L (ref 5–41)
ANION GAP SERPL CALCULATED.3IONS-SCNC: 10 MMOL/L (ref 9–17)
AST SERPL-CCNC: 73 U/L
BASOPHILS # BLD: 0 % (ref 0–2)
BASOPHILS ABSOLUTE: 0 K/UL (ref 0–0.2)
BILIRUB SERPL-MCNC: 0.28 MG/DL (ref 0.3–1.2)
BUN BLDV-MCNC: 11 MG/DL (ref 8–23)
BUN/CREAT BLD: ABNORMAL (ref 9–20)
CALCIUM SERPL-MCNC: 8.9 MG/DL (ref 8.6–10.4)
CHLORIDE BLD-SCNC: 93 MMOL/L (ref 98–107)
CO2: 23 MMOL/L (ref 20–31)
CREAT SERPL-MCNC: 0.74 MG/DL (ref 0.7–1.2)
DIFFERENTIAL TYPE: ABNORMAL
EOSINOPHILS RELATIVE PERCENT: 2 % (ref 1–4)
GFR AFRICAN AMERICAN: >60 ML/MIN
GFR NON-AFRICAN AMERICAN: >60 ML/MIN
GFR SERPL CREATININE-BSD FRML MDRD: ABNORMAL ML/MIN/{1.73_M2}
GFR SERPL CREATININE-BSD FRML MDRD: ABNORMAL ML/MIN/{1.73_M2}
GLUCOSE BLD-MCNC: 149 MG/DL (ref 70–99)
HCT VFR BLD CALC: 28.8 % (ref 41–53)
HEMOGLOBIN: 9.9 G/DL (ref 13.5–17.5)
IMMATURE GRANULOCYTES: ABNORMAL %
LYMPHOCYTES # BLD: 7 % (ref 24–44)
MCH RBC QN AUTO: 34.8 PG (ref 26–34)
MCHC RBC AUTO-ENTMCNC: 34.3 G/DL (ref 31–37)
MCV RBC AUTO: 101.5 FL (ref 80–100)
MONOCYTES # BLD: 22 % (ref 2–11)
MORPHOLOGY: NORMAL
NRBC AUTOMATED: ABNORMAL PER 100 WBC
PDW BLD-RTO: 15.4 % (ref 12.5–15.4)
PLATELET # BLD: 397 K/UL (ref 140–450)
PLATELET ESTIMATE: ABNORMAL
PMV BLD AUTO: 7.3 FL (ref 6–12)
POTASSIUM SERPL-SCNC: 4.3 MMOL/L (ref 3.7–5.3)
RBC # BLD: 2.84 M/UL (ref 4.5–5.9)
RBC # BLD: ABNORMAL 10*6/UL
SEG NEUTROPHILS: 69 % (ref 36–66)
SEGMENTED NEUTROPHILS ABSOLUTE COUNT: 5.1 K/UL (ref 1.8–7.7)
SODIUM BLD-SCNC: 126 MMOL/L (ref 135–144)
TOTAL PROTEIN: 6.9 G/DL (ref 6.4–8.3)
WBC # BLD: 7.4 K/UL (ref 3.5–11)
WBC # BLD: ABNORMAL 10*3/UL

## 2022-01-14 PROCEDURE — 85025 COMPLETE CBC W/AUTO DIFF WBC: CPT

## 2022-01-14 PROCEDURE — 96413 CHEMO IV INFUSION 1 HR: CPT | Performed by: NURSE PRACTITIONER

## 2022-01-14 PROCEDURE — 4004F PT TOBACCO SCREEN RCVD TLK: CPT | Performed by: INTERNAL MEDICINE

## 2022-01-14 PROCEDURE — 1123F ACP DISCUSS/DSCN MKR DOCD: CPT | Performed by: INTERNAL MEDICINE

## 2022-01-14 PROCEDURE — 36591 DRAW BLOOD OFF VENOUS DEVICE: CPT | Performed by: NURSE PRACTITIONER

## 2022-01-14 PROCEDURE — 2580000003 HC RX 258: Performed by: INTERNAL MEDICINE

## 2022-01-14 PROCEDURE — 6360000002 HC RX W HCPCS: Performed by: INTERNAL MEDICINE

## 2022-01-14 PROCEDURE — G8427 DOCREV CUR MEDS BY ELIG CLIN: HCPCS | Performed by: INTERNAL MEDICINE

## 2022-01-14 PROCEDURE — 80053 COMPREHEN METABOLIC PANEL: CPT

## 2022-01-14 PROCEDURE — 3017F COLORECTAL CA SCREEN DOC REV: CPT | Performed by: INTERNAL MEDICINE

## 2022-01-14 PROCEDURE — G8420 CALC BMI NORM PARAMETERS: HCPCS | Performed by: INTERNAL MEDICINE

## 2022-01-14 PROCEDURE — 1111F DSCHRG MED/CURRENT MED MERGE: CPT | Performed by: INTERNAL MEDICINE

## 2022-01-14 PROCEDURE — 99215 OFFICE O/P EST HI 40 MIN: CPT | Performed by: INTERNAL MEDICINE

## 2022-01-14 PROCEDURE — G8484 FLU IMMUNIZE NO ADMIN: HCPCS | Performed by: INTERNAL MEDICINE

## 2022-01-14 PROCEDURE — 96375 TX/PRO/DX INJ NEW DRUG ADDON: CPT | Performed by: NURSE PRACTITIONER

## 2022-01-14 PROCEDURE — 4040F PNEUMOC VAC/ADMIN/RCVD: CPT | Performed by: INTERNAL MEDICINE

## 2022-01-14 PROCEDURE — 99211 OFF/OP EST MAY X REQ PHY/QHP: CPT | Performed by: INTERNAL MEDICINE

## 2022-01-14 RX ORDER — SODIUM CHLORIDE 1000 MG
1 TABLET, SOLUBLE MISCELLANEOUS 2 TIMES DAILY
Qty: 60 TABLET | Refills: 1 | Status: SHIPPED | OUTPATIENT
Start: 2022-01-14 | End: 2022-03-18

## 2022-01-14 RX ORDER — SODIUM CHLORIDE 9 MG/ML
25 INJECTION, SOLUTION INTRAVENOUS PRN
Status: CANCELLED | OUTPATIENT
Start: 2022-01-14

## 2022-01-14 RX ORDER — DIPHENHYDRAMINE HYDROCHLORIDE 50 MG/ML
50 INJECTION INTRAMUSCULAR; INTRAVENOUS ONCE
Status: CANCELLED | OUTPATIENT
Start: 2022-01-14 | End: 2022-01-14

## 2022-01-14 RX ORDER — EPINEPHRINE 1 MG/ML
0.3 INJECTION, SOLUTION, CONCENTRATE INTRAVENOUS PRN
Status: CANCELLED | OUTPATIENT
Start: 2022-02-11

## 2022-01-14 RX ORDER — SODIUM CHLORIDE 9 MG/ML
INJECTION, SOLUTION INTRAVENOUS CONTINUOUS
Status: CANCELLED | OUTPATIENT
Start: 2022-02-11

## 2022-01-14 RX ORDER — EPINEPHRINE 1 MG/ML
0.3 INJECTION, SOLUTION, CONCENTRATE INTRAVENOUS PRN
Status: CANCELLED | OUTPATIENT
Start: 2022-01-14

## 2022-01-14 RX ORDER — HEPARIN SODIUM (PORCINE) LOCK FLUSH IV SOLN 100 UNIT/ML 100 UNIT/ML
500 SOLUTION INTRAVENOUS PRN
Status: DISCONTINUED | OUTPATIENT
Start: 2022-01-14 | End: 2022-01-15 | Stop reason: HOSPADM

## 2022-01-14 RX ORDER — DIPHENHYDRAMINE HYDROCHLORIDE 50 MG/ML
50 INJECTION INTRAMUSCULAR; INTRAVENOUS ONCE
Status: CANCELLED | OUTPATIENT
Start: 2022-02-11 | End: 2022-02-04

## 2022-01-14 RX ORDER — SODIUM CHLORIDE 0.9 % (FLUSH) 0.9 %
5-40 SYRINGE (ML) INJECTION PRN
Status: CANCELLED | OUTPATIENT
Start: 2022-02-11

## 2022-01-14 RX ORDER — SODIUM CHLORIDE 9 MG/ML
20 INJECTION, SOLUTION INTRAVENOUS ONCE
Status: CANCELLED | OUTPATIENT
Start: 2022-02-11 | End: 2022-02-04

## 2022-01-14 RX ORDER — METHYLPREDNISOLONE SODIUM SUCCINATE 125 MG/2ML
125 INJECTION, POWDER, LYOPHILIZED, FOR SOLUTION INTRAMUSCULAR; INTRAVENOUS ONCE
Status: CANCELLED | OUTPATIENT
Start: 2022-01-14 | End: 2022-01-14

## 2022-01-14 RX ORDER — PALONOSETRON 0.05 MG/ML
0.25 INJECTION, SOLUTION INTRAVENOUS ONCE
Status: CANCELLED | OUTPATIENT
Start: 2022-01-14

## 2022-01-14 RX ORDER — SODIUM CHLORIDE 9 MG/ML
INJECTION, SOLUTION INTRAVENOUS CONTINUOUS
Status: CANCELLED | OUTPATIENT
Start: 2022-01-14

## 2022-01-14 RX ORDER — SODIUM CHLORIDE 0.9 % (FLUSH) 0.9 %
5-40 SYRINGE (ML) INJECTION PRN
Status: CANCELLED | OUTPATIENT
Start: 2022-01-14

## 2022-01-14 RX ORDER — HEPARIN SODIUM (PORCINE) LOCK FLUSH IV SOLN 100 UNIT/ML 100 UNIT/ML
500 SOLUTION INTRAVENOUS PRN
Status: CANCELLED | OUTPATIENT
Start: 2022-02-11

## 2022-01-14 RX ORDER — SODIUM CHLORIDE 9 MG/ML
20 INJECTION, SOLUTION INTRAVENOUS ONCE
Status: COMPLETED | OUTPATIENT
Start: 2022-01-14 | End: 2022-01-14

## 2022-01-14 RX ORDER — PALONOSETRON 0.05 MG/ML
0.25 INJECTION, SOLUTION INTRAVENOUS ONCE
Status: COMPLETED | OUTPATIENT
Start: 2022-01-14 | End: 2022-01-14

## 2022-01-14 RX ORDER — PALONOSETRON 0.05 MG/ML
0.25 INJECTION, SOLUTION INTRAVENOUS ONCE
Status: CANCELLED | OUTPATIENT
Start: 2022-02-11

## 2022-01-14 RX ORDER — HEPARIN SODIUM (PORCINE) LOCK FLUSH IV SOLN 100 UNIT/ML 100 UNIT/ML
500 SOLUTION INTRAVENOUS PRN
Status: CANCELLED | OUTPATIENT
Start: 2022-01-14

## 2022-01-14 RX ORDER — SODIUM CHLORIDE 9 MG/ML
25 INJECTION, SOLUTION INTRAVENOUS PRN
Status: CANCELLED | OUTPATIENT
Start: 2022-02-11

## 2022-01-14 RX ORDER — SODIUM CHLORIDE 9 MG/ML
20 INJECTION, SOLUTION INTRAVENOUS ONCE
Status: CANCELLED | OUTPATIENT
Start: 2022-01-14 | End: 2022-01-14

## 2022-01-14 RX ORDER — METHYLPREDNISOLONE SODIUM SUCCINATE 125 MG/2ML
125 INJECTION, POWDER, LYOPHILIZED, FOR SOLUTION INTRAMUSCULAR; INTRAVENOUS ONCE
Status: CANCELLED | OUTPATIENT
Start: 2022-02-11 | End: 2022-02-04

## 2022-01-14 RX ORDER — DEXAMETHASONE SODIUM PHOSPHATE 10 MG/ML
10 INJECTION INTRAMUSCULAR; INTRAVENOUS ONCE
Status: COMPLETED | OUTPATIENT
Start: 2022-01-14 | End: 2022-01-14

## 2022-01-14 RX ORDER — SODIUM CHLORIDE 0.9 % (FLUSH) 0.9 %
5-40 SYRINGE (ML) INJECTION PRN
Status: DISCONTINUED | OUTPATIENT
Start: 2022-01-14 | End: 2022-01-15 | Stop reason: HOSPADM

## 2022-01-14 RX ADMIN — DEXAMETHASONE SODIUM PHOSPHATE 10 MG: 10 INJECTION INTRAMUSCULAR; INTRAVENOUS at 11:34

## 2022-01-14 RX ADMIN — SODIUM CHLORIDE, PRESERVATIVE FREE 10 ML: 5 INJECTION INTRAVENOUS at 13:10

## 2022-01-14 RX ADMIN — PALONOSETRON 0.25 MG: 0.05 INJECTION, SOLUTION INTRAVENOUS at 11:33

## 2022-01-14 RX ADMIN — LURBINECTEDIN 6 MG: 0.5 INJECTION, POWDER, LYOPHILIZED, FOR SOLUTION INTRAVENOUS at 11:52

## 2022-01-14 RX ADMIN — SODIUM CHLORIDE 20 ML/HR: 9 INJECTION, SOLUTION INTRAVENOUS at 11:32

## 2022-01-14 RX ADMIN — HEPARIN 300 UNITS: 100 SYRINGE at 13:10

## 2022-01-14 ASSESSMENT — PAIN DESCRIPTION - LOCATION: LOCATION: ARM;BACK

## 2022-01-14 ASSESSMENT — PAIN SCALES - GENERAL: PAINLEVEL_OUTOF10: 5

## 2022-01-14 ASSESSMENT — PAIN DESCRIPTION - DESCRIPTORS: DESCRIPTORS: ACHING

## 2022-01-14 ASSESSMENT — PAIN DESCRIPTION - PAIN TYPE: TYPE: CHRONIC PAIN

## 2022-01-14 NOTE — PROGRESS NOTES
Michael Peterson                                                                                                                  1/14/2022  MRN:   [id number]  YOB: 1950  PCP:                           Fletcher Crigler, MD  Referring Physician: No ref. provider found  Treating Physician Name: Kailash Cespedes MD      Reason for visit:  Post hospital discharge follow-up. Review treatment plan. 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 Chemotherapy changed to carboplatin and etoposide due to renal insufficiency from cycle #2  PCI 7/2019  SRS to adrenal gland metastasis, completed 07/2020  systemic palliative chemoimmunotherapy with carboplatin etoposide and Tecentriq, 12/2020 x4 cycles. Maintenance Tecentriq, 3/2021  Lurbenectidin7/2/2021      Summary of Case/History:    Michael Peterson a 80 y.o.male is a patient diagnosed with lung cancer with the component of small cell as well as squamous cell carcinoma    Patient has a significant history of tobacco dependence and underwent CT lung screening in December 2018. CT scan was read as lung rads degree 4B. Subsequently she underwent biopsy of right upper lobe lung nodule on 1/16/19. 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 CT PET done showed abnormal FDG uptake in the right upper lobe nodule. There was also abnormal FDG uptake in the right lower lobe nodule. Additionally right hilar lymph node were also FDG avid. No bony lesion was reported. MRI brain for staging workup did not show any evidence of intracranial metastasis.   Tip of the odontoid process was ill-defined and metastasis could not be ruled out. Clinically patient does give history of trauma to the neck area any years ago however denies any surgery history. Bone scan did not reveal any metastasis     Patient continues to smoke but has cut down quite a bit. He works full-time in a [address] Patient has good performance status, ECOG 0. Patient's other medical problems include dyslipidemia and hypertension. He also has arthritis pain    Started patient on concurrent chemoradiation using cisplatin and etoposide with dose adjustment for renal dysfunction. Chemotherapy changed to carboplatin and etoposide from cycle #2 due to renal dysfunction    Received PCI in July 2019. Interim History:    Patient presents to the clinic for follow-up visit and to discuss results of his lab work-up and the relevant clinical data. Patient was hospitalized with the pneumothorax. Patient has a trapped lung. Complains of shortness of breath especially with exertion. Complains of being weak. Weight has been stable. He is disappointed with not able to be treated because of the hospitalization. During this visit patient's allergy, social, medical, surgical history and medications were reviewed and updated. Past Medical History:   Past Medical History:   Diagnosis Date    DDD (degenerative disc disease), cervical     Gout     Heart murmur     hx. of when younger.  Hyperlipidemia     Hypertension     Lung cancer ([address]     right lung    MI (myocardial infarction) ([address]     Skin cancer     face    Wears glasses        Past Surgical History:     Past Surgical History:   Procedure Laterality Date    APPENDECTOMY      CARDIAC CATHETERIZATION      w/stent    COLONOSCOPY      CYST INCISION AND DRAINAGE Right 05/14/2020    rt elbow I and D and left knee aspiration with cultures    FOOT SURGERY Right     2nd toe(bone spur).     FOREARM SURGERY Right 5/14/2020    RIGHT ELBOW IRRIGATION AND DEBRIDEMENT performed by Maria Eugenia Borrego Zelalem Elkins DO at [address]  12/13/2021    IR CHEST TUBE INSERTION 12/13/2021 [facility]    KNEE SURGERY Left 5/14/2020    KNEE ASPIRATION WITH CULTURES SENT performed by Zahira Aguilera DO at [facility]      face under lt. eye.  TONSILLECTOMY         Patient Family Social History:    Family History   Problem Relation Age of Onset    Cancer Father         lung cancer      Social History     Socioeconomic History    Marital status:      Spouse name: Not on file    Number of children: Not on file    Years of education: Not on file    Highest education level: Not on file   Occupational History    Not on file   Tobacco Use    Smoking status: Current Every Day Smoker     Packs/day: 0.50     Types: Cigarettes    Smokeless tobacco: Former User   Vaping Use    Vaping Use: Former   Substance and Sexual Activity    Alcohol use: No    Drug use: Yes     Frequency: 14.0 times per week     Types: Marijuana Sonja Colwyn)    Sexual activity: Not on file   Other Topics Concern    Not on file   Social History Narrative    Not on file     Social Determinants of Health     Financial Resource Strain:     Difficulty of Paying Living Expenses: Not on file   Food Insecurity:     Worried About [address] in the Last Year: Not on file    [address] in the Last Year: Not on file   Transportation Needs:     Lack of Transportation (Medical): Not on file    Lack of Transportation (Non-Medical):  Not on file   Physical Activity:     Days of Exercise per Week: Not on file    Minutes of Exercise per Session: Not on file   Stress:     Feeling of Stress : Not on file   Social Connections:     Frequency of Communication with Friends and Family: Not on file    Frequency of Social Gatherings with Friends and Family: Not on file    Attends [employer] witness Services: Not on file    Active Member of Clubs or Organizations: Not on file    Attends [employer] or Organization Meetings: Not on file    Marital Status: Not on file   Intimate Partner Violence:     Fear of Current or Ex-Partner: Not on file    Emotionally Abused: Not on file    Physically Abused: Not on file    Sexually Abused: Not on file   Housing Stability:     Unable to Pay for Housing in the Last Year: Not on file    Number of Gonz[place] in the Last Year: Not on file    Unstable Housing in the Last Year: Not on file      Current Medications:     Current Outpatient Medications   Medication Sig Dispense Refill    midodrine (PROAMATINE) 5 MG tablet Take 1 tablet by mouth 3 times daily 90 tablet 3    docusate sodium (COLACE) 100 MG capsule Take 100 mg by mouth 2 times daily      B Complex-C-Folic Acid (ANGELICA-HUGO) TABS TAKE 1 TABLET BY MOUTH DAILY. HEMATINIC VIT MINERALS      traZODone (DESYREL) 100 MG tablet Take 100 mg by mouth nightly       ipratropium-albuterol (DUONEB) 0.5-2.5 (3) MG/3ML SOLN nebulizer solution Inhale 3 mLs into the lungs 4 times daily      potassium chloride (MICRO-K) 10 MEQ extended release capsule Take 20 mEq by mouth daily       Ferrous Fumarate (FERROCITE) 324 (106 Fe) MG TABS Take 324 mg by mouth daily       apixaban (ELIQUIS) 5 MG TABS tablet Take 1 tablet by mouth 2 times daily 60 tablet 1    tamsulosin (FLOMAX) 0.4 MG capsule TAKE 1 CAPSULE BY MOUTH EVERY DAY 30 capsule 5    Handicap Placard MISC by Does not apply route 1 each 0    lidocaine-prilocaine (EMLA) 2.5-2.5 % cream Apply topically as needed. Apply a quarter size amount to port site 1 hour before chemotherapy. Cover with plastic wrap. 30 g 0    acetaminophen (TYLENOL) 325 MG tablet Take 650 mg by mouth every 6 hours as needed for Pain      allopurinol (ZYLOPRIM) 100 MG tablet Take 100 mg by mouth daily      simvastatin (ZOCOR) 40 MG tablet Take 40 mg by mouth daily       No current facility-administered medications for this visit. Allergies:   Patient has no known allergies.     Review of Systems:    Constitutional: No fever or chills. No night sweats. Positive for fatigue. Positive for appetite  Eyes: No eye discharge, double vision, or eye pain   HEENT: negative for sore mouth, sore throat, hoarseness and voice change; Respiratory: negative for cough, sputum, wheezing, hemoptysis, chest pain;  Cardiovascular: negative for chest pain, palpitations, orthopnea, PND   Gastrointestinal: negative for nausea, vomiting, diarrhea, constipation, abdominal pain, Dysphagia, hematemesis and hematochezia   Genitourinary: negative for frequency, dysuria, nocturia, urinary incontinence, and hematuria   Integument: Positive for easy bruising - stable +rasied area to the left of the spine that is tender  Hematologic/Lymphatic: negative for easy bleeding, lymphadenopathy, or petechiae   Endocrine: negative for heat or cold intolerance,weight changes, change in bowel habits and hair loss   Musculoskeletal: Positive joint pain.  +right shoulder pain  Neurological: negative for headaches, dizziness, seizures, weakness; + poor balance; +neuropathy in left hand from shoulder +poor short term memory        Physical Exam:    Vitals: BP 99/68   Pulse 127   Temp 97.4 °F (36.3 °C) (Oral)   Resp 16   Wt 163 lb (73.9 kg)   BMI 22.73 kg/m²   General appearance -patient not in acute distress  Mental status - AAO X3  Eyes - pupils equal and reactive, extraocular eye movements intact  Mouth - mucous membranes moist, pharynx normal without lesions  Neck - supple, no significant adenopathy  Lymphatics - no palpable lymphadenopathy, no hepatosplenomegaly  Chest -decreased breathing sounds bilaterally  Heart - normal rate, regular rhythm, normal S1, S2, no murmurs  Abdomen - soft, nontender, nondistended, no masses or organomegaly  Neurological - alert, oriented, normal speech, no focal findings or movement disorder noted  Extremities -positive lower extremity edema  Skin - normal coloration and turgor, no rashes, no suspicious skin lesions noted; +raised area with tenderness to the left of the spine, no erythema        DATA:    Labs:   Lab Results   Component Value Date    WBC 7.4 01/14/2022    HGB 9.9 (L) 01/14/2022    HCT 28.8 (L) 01/14/2022    .5 (H) 01/14/2022     01/14/2022     Lab Results   Component Value Date    NEUTROABS PENDING 01/14/2022           Chemistry        Component Value Date/Time     (L) 01/14/2022 1004    K 4.3 01/14/2022 1004    CL 93 (L) 01/14/2022 1004    CO2 23 01/14/2022 1004    BUN 11 01/14/2022 1004    CREATININE 0.74 01/14/2022 1004        Component Value Date/Time    CALCIUM 8.9 01/14/2022 1004    ALKPHOS 210 (H) 01/14/2022 1004    AST 73 (H) 01/14/2022 1004    ALT 66 (H) 01/14/2022 1004    BILITOT 0.28 (L) 01/14/2022 1004        Chest x-ray  Impression   Decreased right pneumothorax            Impression:  Limited stage Right lung cancer with small cell and squamous cell component, stage IIIa (T3,N1,M0)  Left adrenal metastasis, CT 2/2020, S/P SBRT 07/2020  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 Patient has a right pneumothorax with trapped lung. He is not interested by intervention by cardiothoracic surgery at this point given recurrent small cell lung cancer I think it is a reasonable decision. Continue symptomatic management of shortness of breath. Reviewed goal expectations  Patient understands his disease not curable goals of treatment are to control symptoms improve quality of life and prolong life. Patient wishes to continue treatment. We will restart patient on treatment. Discussed natural history of small cell lung cancer.   Patient encouraged to continue to follow-up with pulmonologist  Discussed with patient wife  Reviewed goals and expectations. Patient wishes to continue ongoing treatment. NCCN guidelines were reviewed and discussed with the patient. The diagnosis and care plan were discussed with the patient in detail. I discussed the natural history of the disease, prognosis, risks and goals of therapy and answered all the patients questions to the best of my ability. Patient expressed understanding and was in agreement. Gloria Farah MD      I spent more than 40 minutes examining, evaluating, reviewing data, counseling the patient and coordinating care. Greater than 50% of time was spent face-to-face with the patient this note is created with the assistance of a speech recognition program.  While intending to generate a document that actually reflects the content of the visit, the document can still have some errors including those of syntax and sound a like substitutions which may escape proof reading. It such instances, actual meaning can be extrapolated by contextual diversion.

## 2022-01-14 NOTE — TELEPHONE ENCOUNTER
tx today   rv in 3 weeks    Tx today as scheduled    RV scheduled 2/4/22 @ 9:15am    PT was given AVS and an appt schedule    Electronically signed by Kristine Callahan on 1/14/2022 at 11:13 AM

## 2022-01-20 ENCOUNTER — HOSPITAL ENCOUNTER (OUTPATIENT)
Dept: GENERAL RADIOLOGY | Age: 72
Discharge: HOME | End: 2022-01-22
Payer: MEDICARE

## 2022-01-20 ENCOUNTER — HOSPITAL ENCOUNTER (OUTPATIENT)
Age: 72
Discharge: HOME | End: 2022-01-22
Payer: MEDICARE

## 2022-01-20 DIAGNOSIS — J93.9 PNEUMOTHORAX, UNSPECIFIED TYPE: ICD-10-CM

## 2022-01-20 PROCEDURE — 71046 X-RAY EXAM CHEST 2 VIEWS: CPT

## 2022-01-27 DIAGNOSIS — C34.91 PRIMARY MALIGNANT NEOPLASM OF RIGHT LUNG METASTATIC TO OTHER SITE (HCC): ICD-10-CM

## 2022-01-28 RX ORDER — DEXAMETHASONE 2 MG/1
2 TABLET ORAL
Qty: 30 TABLET | Refills: 0 | Status: SHIPPED | OUTPATIENT
Start: 2022-01-28 | End: 2022-02-11 | Stop reason: SDUPTHER

## 2022-01-28 RX ORDER — DEXAMETHASONE 2 MG/1
2 TABLET ORAL 2 TIMES DAILY WITH MEALS
Qty: 30 TABLET | Refills: 0 | Status: SHIPPED | OUTPATIENT
Start: 2022-01-28 | End: 2022-02-12

## 2022-01-28 NOTE — TELEPHONE ENCOUNTER
Pt called in wanting dex refilled  Pt was prescribed dex a long time ago by Dr Estefani Mills but rx was since stopped   Called and spoke to Dr Estefani Mills and he states call in rx for 2mg dex daily and he will see the pt next week to assess  Shanelle Olsen RN

## 2022-01-31 ENCOUNTER — HOSPITAL ENCOUNTER (OUTPATIENT)
Dept: GENERAL RADIOLOGY | Age: 72
Discharge: HOME | End: 2022-02-02
Payer: MEDICARE

## 2022-01-31 ENCOUNTER — HOSPITAL ENCOUNTER (OUTPATIENT)
Age: 72
Discharge: HOME | End: 2022-02-02
Payer: MEDICARE

## 2022-01-31 DIAGNOSIS — J93.9 PNEUMOTHORAX, UNSPECIFIED TYPE: ICD-10-CM

## 2022-01-31 PROCEDURE — 71046 X-RAY EXAM CHEST 2 VIEWS: CPT

## 2022-02-01 ENCOUNTER — TELEPHONE (OUTPATIENT)
Dept: CASE MANAGEMENT | Age: 72
End: 2022-02-01

## 2022-02-01 NOTE — TELEPHONE ENCOUNTER
Name: Luis Miguel Menezes  : 1950  MRN: [id number]    Oncology Navigation Follow-Up Note  Navigator spoke with pt. And CT removed per Dr. Jorge L Franz office , pt. Reports. Pt. Denies needs, plan to follow.    Electronically signed by Laura Cheung RN on 2022 at 1:06 PM

## 2022-02-11 ENCOUNTER — TELEPHONE (OUTPATIENT)
Dept: ONCOLOGY | Age: 72
End: 2022-02-11

## 2022-02-11 ENCOUNTER — OFFICE VISIT (OUTPATIENT)
Dept: ONCOLOGY | Age: 72
End: 2022-02-11
Payer: MEDICARE

## 2022-02-11 ENCOUNTER — HOSPITAL ENCOUNTER (OUTPATIENT)
Dept: INFUSION THERAPY | Age: 72
Discharge: HOME | End: 2022-02-11
Payer: MEDICARE

## 2022-02-11 VITALS
WEIGHT: 159.6 LBS | BODY MASS INDEX: 22.26 KG/M2 | TEMPERATURE: 97.9 F | DIASTOLIC BLOOD PRESSURE: 77 MMHG | SYSTOLIC BLOOD PRESSURE: 128 MMHG | RESPIRATION RATE: 16 BRPM | HEART RATE: 116 BPM

## 2022-02-11 DIAGNOSIS — C34.91 PRIMARY LUNG CANCER WITH METASTASIS FROM LUNG TO OTHER SITE, RIGHT (HCC): Primary | ICD-10-CM

## 2022-02-11 DIAGNOSIS — C34.91 PRIMARY MALIGNANT NEOPLASM OF RIGHT LUNG METASTATIC TO OTHER SITE (HCC): ICD-10-CM

## 2022-02-11 DIAGNOSIS — G47.00 INSOMNIA, UNSPECIFIED TYPE: ICD-10-CM

## 2022-02-11 DIAGNOSIS — D69.6 THROMBOCYTOPENIA (HCC): ICD-10-CM

## 2022-02-11 DIAGNOSIS — E43 SEVERE MALNUTRITION (HCC): ICD-10-CM

## 2022-02-11 LAB
ABSOLUTE EOS #: 0 K/UL (ref 0–0.4)
ABSOLUTE IMMATURE GRANULOCYTE: ABNORMAL K/UL (ref 0–0.3)
ABSOLUTE LYMPH #: 0.36 K/UL (ref 1–4.8)
ABSOLUTE MONO #: 0.71 K/UL (ref 0.1–1.2)
ALBUMIN SERPL-MCNC: 3.1 G/DL (ref 3.5–5.2)
ALBUMIN/GLOBULIN RATIO: 0.7 (ref 1–2.5)
ALP BLD-CCNC: 262 U/L (ref 40–129)
ALT SERPL-CCNC: 53 U/L (ref 5–41)
ANION GAP SERPL CALCULATED.3IONS-SCNC: 10 MMOL/L (ref 9–17)
AST SERPL-CCNC: 32 U/L
BASOPHILS # BLD: 0 % (ref 0–2)
BASOPHILS ABSOLUTE: 0 K/UL (ref 0–0.2)
BILIRUB SERPL-MCNC: 0.2 MG/DL (ref 0.3–1.2)
BUN BLDV-MCNC: 21 MG/DL (ref 8–23)
BUN/CREAT BLD: ABNORMAL (ref 9–20)
CALCIUM SERPL-MCNC: 9.3 MG/DL (ref 8.6–10.4)
CHLORIDE BLD-SCNC: 95 MMOL/L (ref 98–107)
CO2: 24 MMOL/L (ref 20–31)
CREAT SERPL-MCNC: 0.62 MG/DL (ref 0.7–1.2)
DIFFERENTIAL TYPE: ABNORMAL
EOSINOPHILS RELATIVE PERCENT: 0 % (ref 1–4)
GFR AFRICAN AMERICAN: >60 ML/MIN
GFR NON-AFRICAN AMERICAN: >60 ML/MIN
GFR SERPL CREATININE-BSD FRML MDRD: ABNORMAL ML/MIN/{1.73_M2}
GFR SERPL CREATININE-BSD FRML MDRD: ABNORMAL ML/MIN/{1.73_M2}
GLUCOSE BLD-MCNC: 128 MG/DL (ref 70–99)
HCT VFR BLD CALC: 24.1 % (ref 41–53)
HEMOGLOBIN: 7.9 G/DL (ref 13.5–17.5)
IMMATURE GRANULOCYTES: ABNORMAL %
LYMPHOCYTES # BLD: 3 % (ref 24–44)
MCH RBC QN AUTO: 31.4 PG (ref 26–34)
MCHC RBC AUTO-ENTMCNC: 32.6 G/DL (ref 31–37)
MCV RBC AUTO: 96.2 FL (ref 80–100)
MONOCYTES # BLD: 6 % (ref 2–11)
MORPHOLOGY: ABNORMAL
NRBC AUTOMATED: ABNORMAL PER 100 WBC
PDW BLD-RTO: 17.8 % (ref 12.5–15.4)
PLATELET # BLD: 578 K/UL (ref 140–450)
PLATELET ESTIMATE: ABNORMAL
PMV BLD AUTO: 6.7 FL (ref 6–12)
POTASSIUM SERPL-SCNC: 4.8 MMOL/L (ref 3.7–5.3)
RBC # BLD: 2.5 M/UL (ref 4.5–5.9)
RBC # BLD: ABNORMAL 10*6/UL
SEG NEUTROPHILS: 91 % (ref 36–66)
SEGMENTED NEUTROPHILS ABSOLUTE COUNT: 10.83 K/UL (ref 1.8–7.7)
SODIUM BLD-SCNC: 129 MMOL/L (ref 135–144)
TOTAL PROTEIN: 7.4 G/DL (ref 6.4–8.3)
WBC # BLD: 11.9 K/UL (ref 3.5–11)
WBC # BLD: ABNORMAL 10*3/UL

## 2022-02-11 PROCEDURE — G8420 CALC BMI NORM PARAMETERS: HCPCS | Performed by: INTERNAL MEDICINE

## 2022-02-11 PROCEDURE — 6360000002 HC RX W HCPCS: Performed by: INTERNAL MEDICINE

## 2022-02-11 PROCEDURE — 3017F COLORECTAL CA SCREEN DOC REV: CPT | Performed by: INTERNAL MEDICINE

## 2022-02-11 PROCEDURE — 85025 COMPLETE CBC W/AUTO DIFF WBC: CPT

## 2022-02-11 PROCEDURE — 36591 DRAW BLOOD OFF VENOUS DEVICE: CPT

## 2022-02-11 PROCEDURE — 96375 TX/PRO/DX INJ NEW DRUG ADDON: CPT

## 2022-02-11 PROCEDURE — 80053 COMPREHEN METABOLIC PANEL: CPT

## 2022-02-11 PROCEDURE — G8484 FLU IMMUNIZE NO ADMIN: HCPCS | Performed by: INTERNAL MEDICINE

## 2022-02-11 PROCEDURE — 99215 OFFICE O/P EST HI 40 MIN: CPT | Performed by: INTERNAL MEDICINE

## 2022-02-11 PROCEDURE — 1123F ACP DISCUSS/DSCN MKR DOCD: CPT | Performed by: INTERNAL MEDICINE

## 2022-02-11 PROCEDURE — G8428 CUR MEDS NOT DOCUMENT: HCPCS | Performed by: INTERNAL MEDICINE

## 2022-02-11 PROCEDURE — 96413 CHEMO IV INFUSION 1 HR: CPT

## 2022-02-11 PROCEDURE — 2580000003 HC RX 258: Performed by: INTERNAL MEDICINE

## 2022-02-11 PROCEDURE — 4040F PNEUMOC VAC/ADMIN/RCVD: CPT | Performed by: INTERNAL MEDICINE

## 2022-02-11 PROCEDURE — 4004F PT TOBACCO SCREEN RCVD TLK: CPT | Performed by: INTERNAL MEDICINE

## 2022-02-11 RX ORDER — PALONOSETRON 0.05 MG/ML
0.25 INJECTION, SOLUTION INTRAVENOUS ONCE
Status: COMPLETED | OUTPATIENT
Start: 2022-02-11 | End: 2022-02-11

## 2022-02-11 RX ORDER — SODIUM CHLORIDE 9 MG/ML
25 INJECTION, SOLUTION INTRAVENOUS PRN
Status: CANCELLED | OUTPATIENT
Start: 2022-03-18

## 2022-02-11 RX ORDER — DEXAMETHASONE 2 MG/1
2 TABLET ORAL
Qty: 30 TABLET | Refills: 0 | Status: SHIPPED | OUTPATIENT
Start: 2022-02-11 | End: 2022-03-13

## 2022-02-11 RX ORDER — PALONOSETRON 0.05 MG/ML
0.25 INJECTION, SOLUTION INTRAVENOUS ONCE
Status: CANCELLED | OUTPATIENT
Start: 2022-03-18

## 2022-02-11 RX ORDER — HEPARIN SODIUM (PORCINE) LOCK FLUSH IV SOLN 100 UNIT/ML 100 UNIT/ML
500 SOLUTION INTRAVENOUS PRN
Status: CANCELLED | OUTPATIENT
Start: 2022-03-18

## 2022-02-11 RX ORDER — DEXAMETHASONE SODIUM PHOSPHATE 10 MG/ML
10 INJECTION INTRAMUSCULAR; INTRAVENOUS ONCE
Status: COMPLETED | OUTPATIENT
Start: 2022-02-11 | End: 2022-02-11

## 2022-02-11 RX ORDER — HYDROCODONE BITARTRATE AND ACETAMINOPHEN 5; 325 MG/1; MG/1
1 TABLET ORAL EVERY 8 HOURS PRN
Qty: 90 TABLET | Refills: 0 | Status: SHIPPED | OUTPATIENT
Start: 2022-02-11 | End: 2022-04-18 | Stop reason: SDUPTHER

## 2022-02-11 RX ORDER — METHYLPREDNISOLONE SODIUM SUCCINATE 125 MG/2ML
125 INJECTION, POWDER, LYOPHILIZED, FOR SOLUTION INTRAMUSCULAR; INTRAVENOUS ONCE
Status: CANCELLED | OUTPATIENT
Start: 2022-03-18 | End: 2022-03-04

## 2022-02-11 RX ORDER — HEPARIN SODIUM (PORCINE) LOCK FLUSH IV SOLN 100 UNIT/ML 100 UNIT/ML
500 SOLUTION INTRAVENOUS PRN
Status: DISCONTINUED | OUTPATIENT
Start: 2022-02-11 | End: 2022-02-12 | Stop reason: HOSPADM

## 2022-02-11 RX ORDER — EPINEPHRINE 1 MG/ML
0.3 INJECTION, SOLUTION, CONCENTRATE INTRAVENOUS PRN
Status: CANCELLED | OUTPATIENT
Start: 2022-03-18

## 2022-02-11 RX ORDER — SODIUM CHLORIDE 9 MG/ML
20 INJECTION, SOLUTION INTRAVENOUS ONCE
Status: COMPLETED | OUTPATIENT
Start: 2022-02-11 | End: 2022-02-11

## 2022-02-11 RX ORDER — DIPHENHYDRAMINE HYDROCHLORIDE 50 MG/ML
50 INJECTION INTRAMUSCULAR; INTRAVENOUS ONCE
Status: CANCELLED | OUTPATIENT
Start: 2022-03-18 | End: 2022-03-04

## 2022-02-11 RX ORDER — SODIUM CHLORIDE 0.9 % (FLUSH) 0.9 %
5-40 SYRINGE (ML) INJECTION PRN
Status: CANCELLED | OUTPATIENT
Start: 2022-03-18

## 2022-02-11 RX ORDER — SODIUM CHLORIDE 9 MG/ML
20 INJECTION, SOLUTION INTRAVENOUS ONCE
Status: CANCELLED | OUTPATIENT
Start: 2022-03-18 | End: 2022-03-04

## 2022-02-11 RX ORDER — HYDROCODONE BITARTRATE AND ACETAMINOPHEN 5; 325 MG/1; MG/1
1 TABLET ORAL EVERY 8 HOURS PRN
COMMUNITY
End: 2022-02-11 | Stop reason: SDUPTHER

## 2022-02-11 RX ORDER — SODIUM CHLORIDE 0.9 % (FLUSH) 0.9 %
5-40 SYRINGE (ML) INJECTION PRN
Status: DISCONTINUED | OUTPATIENT
Start: 2022-02-11 | End: 2022-02-12 | Stop reason: HOSPADM

## 2022-02-11 RX ORDER — ALPRAZOLAM 0.5 MG/1
0.5 TABLET ORAL NIGHTLY PRN
Qty: 30 TABLET | Refills: 0 | Status: SHIPPED | OUTPATIENT
Start: 2022-02-11 | End: 2022-03-13

## 2022-02-11 RX ORDER — SODIUM CHLORIDE 9 MG/ML
INJECTION, SOLUTION INTRAVENOUS CONTINUOUS
Status: CANCELLED | OUTPATIENT
Start: 2022-03-18

## 2022-02-11 RX ADMIN — SODIUM CHLORIDE, PRESERVATIVE FREE 10 ML: 5 INJECTION INTRAVENOUS at 13:30

## 2022-02-11 RX ADMIN — SODIUM CHLORIDE, PRESERVATIVE FREE 10 ML: 5 INJECTION INTRAVENOUS at 16:09

## 2022-02-11 RX ADMIN — HEPARIN 500 UNITS: 100 SYRINGE at 16:09

## 2022-02-11 RX ADMIN — SODIUM CHLORIDE 20 ML/HR: 9 INJECTION, SOLUTION INTRAVENOUS at 14:34

## 2022-02-11 RX ADMIN — DEXAMETHASONE SODIUM PHOSPHATE 10 MG: 10 INJECTION INTRAMUSCULAR; INTRAVENOUS at 14:37

## 2022-02-11 RX ADMIN — LURBINECTEDIN 6.15 MG: 0.5 INJECTION, POWDER, LYOPHILIZED, FOR SOLUTION INTRAVENOUS at 15:07

## 2022-02-11 RX ADMIN — PALONOSETRON 0.25 MG: 0.05 INJECTION, SOLUTION INTRAVENOUS at 14:36

## 2022-02-11 NOTE — PROGRESS NOTES
Irasema Garcia                                                                                                                  2/11/2022  MRN:   [id number]  YOB: 1950  PCP:                           Rj Paul MD  Referring Physician: No ref. provider found  Treating Physician Name: Nissa Mao MD      Reason for visit:  Chief Complaint   Patient presents with    Follow-up     review status of disease     Chemotherapy    Other     continue steroids? toxicity check. Discussed treatment plan. 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 Maintenance Tecentriq, 3/2021  Lurbenectidin-7/2/2021    Summary of Case/History:    Irasema Garcia a 80 y.o.male is a patient diagnosed with lung cancer with the component of small cell as well as squamous cell carcinoma    Patient has a significant history of tobacco dependence and underwent CT lung screening in December 2018. CT scan was read as lung rads degree 4B. Subsequently she underwent biopsy of right upper lobe lung nodule on 1/16/19. 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 CT PET done showed abnormal FDG uptake in the right upper lobe nodule. There was also abnormal FDG uptake in the right lower lobe nodule. Additionally right hilar lymph node were also FDG avid. No bony lesion was reported.   MRI brain for staging workup did not show any evidence of intracranial metastasis. Tip of the odontoid process was ill-defined and metastasis could not be ruled out. Clinically patient does give history of trauma to the neck area any years ago however denies any surgery history. Bone scan did not reveal any metastasis     Patient continues to smoke but has cut down quite a bit. He works full-time in a [address] Patient has good performance status, ECOG 0. Patient's other medical problems include dyslipidemia and hypertension. He also has arthritis pain    Started patient on concurrent chemoradiation using cisplatin and etoposide with dose adjustment for renal dysfunction. Chemotherapy changed to carboplatin and etoposide from cycle #2 due to renal dysfunction    Received PCI in July 2019. Interim History:    Patient presents to the clinic for a follow-up visit and to discuss further treatment plan. Since last office visit patient had his chest tube removed. He does get short of breath with mild exertion. He has lost about 4 pounds since last office visit. Denies hospitalization or ER visit. Pain is manageable. Complains of difficulty going to sleep. He is scheduled for a follow-up x-ray by his pulmonologist    During this visit patient's allergy, social, medical, surgical history and medications were reviewed and updated. Past Medical History:   Past Medical History:   Diagnosis Date    DDD (degenerative disc disease), cervical     Gout     Heart murmur     hx. of when younger.     Hyperlipidemia     Hypertension     Lung cancer ([address]     right lung    MI (myocardial infarction) ([address]     Skin cancer     face    Wears glasses        Past Surgical History:     Past Surgical History:   Procedure Laterality Date    APPENDECTOMY      CARDIAC CATHETERIZATION      w/stent    COLONOSCOPY      CYST INCISION AND DRAINAGE Right 05/14/2020    rt elbow I and D and left knee aspiration with cultures    FOOT SURGERY Right     2nd toe(bone spur).  FOREARM SURGERY Right 5/14/2020    RIGHT ELBOW IRRIGATION AND DEBRIDEMENT performed by Dolores Regan DO at [id number]  12/13/2021    IR CHEST TUBE INSERTION 12/13/2021 [facility]    KNEE SURGERY Left 5/14/2020    KNEE ASPIRATION WITH CULTURES SENT performed by Dolores Regan DO at [facility]      face under lt. eye.  TONSILLECTOMY         Patient Family Social History:    Family History   Problem Relation Age of Onset    Cancer Father         lung cancer      Social History     Socioeconomic History    Marital status:      Spouse name: Not on file    Number of children: Not on file    Years of education: Not on file    Highest education level: Not on file   Occupational History    Not on file   Tobacco Use    Smoking status: Current Every Day Smoker     Packs/day: 0.50     Types: Cigarettes    Smokeless tobacco: Former User   Vaping Use    Vaping Use: Former   Substance and Sexual Activity    Alcohol use: No    Drug use: Yes     Frequency: 14.0 times per week     Types: Marijuana Alpheus Beth)    Sexual activity: Not on file   Other Topics Concern    Not on file   Social History Narrative    Not on file     Social Determinants of Health     Financial Resource Strain:     Difficulty of Paying Living Expenses: Not on file   Food Insecurity:     Worried About [address] in the Last Year: Not on file    [facility] in the Last Year: Not on file   Transportation Needs:     Lack of Transportation (Medical): Not on file    Lack of Transportation (Non-Medical):  Not on file   Physical Activity:     Days of Exercise per Week: Not on file    Minutes of Exercise per Session: Not on file   Stress:     Feeling of Stress : Not on file   Social Connections:     Frequency of Communication with Friends and Family: Not on file    Frequency of Social Gatherings with Friends and Family: Not on file    Attends Druze Services: Not on file    Active Member of Clubs or Organizations: Not on file    Attends Club or Organization Meetings: Not on file    Marital Status: Not on file   Intimate Partner Violence:     Fear of Current or Ex-Partner: Not on file    Emotionally Abused: Not on file    Physically Abused: Not on file    Sexually Abused: Not on file   Housing Stability:     Unable to Pay for Housing in the Last Year: Not on file    Number of [place] in the Last Year: Not on file    Unstable Housing in the Last Year: Not on file      Current Medications:     Current Outpatient Medications   Medication Sig Dispense Refill    HYDROcodone-acetaminophen (NORCO) 5-325 MG per tablet Take 1 tablet by mouth every 8 hours as needed for Pain.  dexamethasone (DECADRON) 2 MG tablet Take 1 tablet by mouth 2 times daily (with meals) for 15 days 30 tablet 0    dexamethasone (DECADRON) 2 MG tablet Take 1 tablet by mouth daily (with breakfast) 30 tablet 0    sodium chloride 1 g tablet Take 1 tablet by mouth 2 times daily 60 tablet 1    midodrine (PROAMATINE) 5 MG tablet Take 1 tablet by mouth 3 times daily 90 tablet 3    docusate sodium (COLACE) 100 MG capsule Take 100 mg by mouth 2 times daily      B Complex-C-Folic Acid (ANGELICA-HUGO) TABS TAKE 1 TABLET BY MOUTH DAILY.  HEMATINIC VIT MINERALS      traZODone (DESYREL) 100 MG tablet Take 100 mg by mouth nightly       ipratropium-albuterol (DUONEB) 0.5-2.5 (3) MG/3ML SOLN nebulizer solution Inhale 3 mLs into the lungs 4 times daily      potassium chloride (MICRO-K) 10 MEQ extended release capsule Take 20 mEq by mouth daily       Ferrous Fumarate (FERROCITE) 324 (106 Fe) MG TABS Take 324 mg by mouth daily       apixaban (ELIQUIS) 5 MG TABS tablet Take 1 tablet by mouth 2 times daily 60 tablet 1    tamsulosin (FLOMAX) 0.4 MG capsule TAKE 1 CAPSULE BY MOUTH EVERY DAY 30 capsule 5    Handicap Placard MISC by Does not apply route 1 each 0    lidocaine-prilocaine (EMLA) 2.5-2.5 % cream Apply topically as needed. Apply a quarter size amount to port site 1 hour before chemotherapy. Cover with plastic wrap. 30 g 0    acetaminophen (TYLENOL) 325 MG tablet Take 650 mg by mouth every 6 hours as needed for Pain      allopurinol (ZYLOPRIM) 100 MG tablet Take 100 mg by mouth daily      simvastatin (ZOCOR) 40 MG tablet Take 40 mg by mouth daily (Patient not taking: Reported on 2/11/2022)       No current facility-administered medications for this visit. Facility-Administered Medications Ordered in Other Visits   Medication Dose Route Frequency Provider Last Rate Last Admin    0.9 % sodium chloride infusion  20 mL/hr IntraVENous Once [facility], MD        palonosetron (ALOXI) injection 0.25 mg  0.25 mg IntraVENous Once [facility], MD        dexamethasone (DECADRON) injection 10 mg  10 mg IntraVENous Once [facility], MD        heparin flush 100 UNIT/ML injection 500 Units  500 Units IntraCATHeter PRN [facility], MD        sodium chloride flush 0.9 % injection 5-40 mL  5-40 mL IntraVENous PRN [facility], MD           Allergies:   Patient has no known allergies. Review of Systems:    Constitutional: No fever or chills. No night sweats. Positive for fatigue. Positive for loss of appetite. Positive for difficulty going to sleep  Eyes: No eye discharge, double vision, or eye pain   HEENT: negative for sore mouth, sore throat, hoarseness and voice change;    Respiratory: negative for cough, sputum, wheezing, hemoptysis, chest pain;  Cardiovascular: negative for chest pain, palpitations, orthopnea, PND   Gastrointestinal: negative for nausea, vomiting, diarrhea, constipation, abdominal pain, Dysphagia, hematemesis and hematochezia   Genitourinary: negative for frequency, dysuria, nocturia, urinary incontinence, and hematuria   Integument: Positive for easy bruising - stable +rasied area to the left of the spine that is tender  Hematologic/Lymphatic: negative for easy bleeding, lymphadenopathy, or petechiae   Endocrine: negative for heat or cold intolerance,weight changes, change in bowel habits and hair loss   Musculoskeletal: Positive joint pain.  +right shoulder pain  Neurological: negative for headaches, dizziness, seizures, weakness; + poor balance; +neuropathy in left hand from shoulder +poor short term memory        Physical Exam:    Vitals: /77   Pulse 116   Temp 97.9 °F (36.6 °C)   Resp 16   Wt 159 lb 9.6 oz (72.4 kg)   BMI 22.26 kg/m²   General appearance -patient not in acute distress  Mental status - AAO X3  Eyes - pupils equal and reactive, extraocular eye movements intact  Mouth - mucous membranes moist, pharynx normal without lesions  Neck - supple, no significant adenopathy  Lymphatics - no palpable lymphadenopathy, no hepatosplenomegaly  Chest -decreased breathing sounds bilaterally  Heart - normal rate, regular rhythm, normal S1, S2, no murmurs  Abdomen - soft, nontender, nondistended, no masses or organomegaly  Neurological - alert, oriented, normal speech, no focal findings or movement disorder noted  Extremities -positive lower extremity edema  Skin - normal coloration and turgor, no rashes, no suspicious skin lesions noted; +raised area with tenderness to the left of the spine, no erythema        DATA:    Labs:   Lab Results   Component Value Date    WBC 11.9 (H) 02/11/2022    HGB 7.9 (L) 02/11/2022    HCT 24.1 (L) 02/11/2022    MCV 96.2 02/11/2022     (H) 02/11/2022     Lab Results   Component Value Date    NEUTROABS 10.83 (H) 02/11/2022           Chemistry        Component Value Date/Time     (L) 02/11/2022 1346    K 4.8 02/11/2022 1346    CL 95 (L) 02/11/2022 1346    CO2 24 02/11/2022 1346    BUN 21 02/11/2022 1346    CREATININE 0.62 (L) 02/11/2022 1346        Component Value Date/Time    CALCIUM 9.3 02/11/2022 1346    ALKPHOS 262 (H) 02/11/2022 1346    AST 32 02/11/2022 1346    ALT 53 (H) 02/11/2022 1346    BILITOT 0.20 (L) 02/11/2022 1346        Chest x-ray  Impression   Decreased right pneumothorax            Impression:  Limited stage Right lung cancer with small cell and squamous cell component, stage IIIa (T3,N1,M0)  Left adrenal metastasis, CT 2/2020, S/P SBRT 07/2020  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 Toxicity check performed. Patient hemoglobin noted to be on the lower side but I believe it is adequate to proceed with treatment  Reiterated treatment plan  Reviewed goals and expectations. Patient and his wife wish to pursue further treatment. We will proceed with treatment as planned. We will get imaging studies after 1 more cycle to assess response and disease status  Patient given refill on pain medication. Patient also given prescription of Xanax to help him deal with anxiety. He can also take it at night to help him go to sleep. Advised patient not to take Xanax and pain medication together  Continue palliative Decadron at 2 mg daily  Patient encouraged to continue to follow-up with pulmonologist  Discussed with patient wife  NCCN guidelines were reviewed and discussed with the patient. The diagnosis and care plan were discussed with the patient in detail. I discussed the natural history of the disease, prognosis, risks and goals of therapy and answered all the patients questions to the best of my ability. Patient expressed understanding and was in agreement. Sadie Estrada MD      I spent more than 40 minutes examining, evaluating, reviewing data, counseling the patient and coordinating care.   Greater than 50% of time was spent face-to-face with the patient this note is created with the assistance of a speech recognition program.  While intending to generate a document that actually reflects the content of the visit, the document can still have some errors including those of syntax and sound a like substitutions which may escape proof reading. It such instances, actual meaning can be extrapolated by contextual diversion.

## 2022-02-11 NOTE — PROGRESS NOTES
Patient arrived for [facility]  Labs collected and reviewed   Seen by Dr Sameer Gonzalez w/o incident   Return 3/4  and sandra Townsend RN

## 2022-02-18 DIAGNOSIS — C34.91 PRIMARY MALIGNANT NEOPLASM OF RIGHT LUNG METASTATIC TO OTHER SITE (HCC): Primary | ICD-10-CM

## 2022-02-18 RX ORDER — ALPRAZOLAM 1 MG/1
1 TABLET ORAL NIGHTLY PRN
Qty: 15 TABLET | Refills: 0 | Status: SHIPPED | OUTPATIENT
Start: 2022-02-18 | End: 2022-03-05

## 2022-03-04 ENCOUNTER — HOSPITAL ENCOUNTER (OUTPATIENT)
Dept: INFUSION THERAPY | Age: 72
Discharge: HOME | End: 2022-03-04
Payer: MEDICARE

## 2022-03-04 ENCOUNTER — OFFICE VISIT (OUTPATIENT)
Dept: ONCOLOGY | Age: 72
End: 2022-03-04
Payer: MEDICARE

## 2022-03-04 ENCOUNTER — TELEPHONE (OUTPATIENT)
Dept: ONCOLOGY | Age: 72
End: 2022-03-04

## 2022-03-04 VITALS
DIASTOLIC BLOOD PRESSURE: 81 MMHG | SYSTOLIC BLOOD PRESSURE: 123 MMHG | TEMPERATURE: 97.8 F | RESPIRATION RATE: 18 BRPM | HEART RATE: 100 BPM

## 2022-03-04 VITALS
SYSTOLIC BLOOD PRESSURE: 114 MMHG | HEART RATE: 113 BPM | TEMPERATURE: 97.4 F | DIASTOLIC BLOOD PRESSURE: 75 MMHG | RESPIRATION RATE: 20 BRPM | BODY MASS INDEX: 22.26 KG/M2 | WEIGHT: 159.6 LBS

## 2022-03-04 DIAGNOSIS — C34.91 PRIMARY MALIGNANT NEOPLASM OF RIGHT LUNG METASTATIC TO OTHER SITE (HCC): Primary | ICD-10-CM

## 2022-03-04 DIAGNOSIS — G89.3 CANCER ASSOCIATED PAIN: ICD-10-CM

## 2022-03-04 DIAGNOSIS — R63.0 POOR APPETITE: ICD-10-CM

## 2022-03-04 DIAGNOSIS — R53.1 ASTHENIA: ICD-10-CM

## 2022-03-04 DIAGNOSIS — C34.91 PRIMARY LUNG CANCER WITH METASTASIS FROM LUNG TO OTHER SITE, RIGHT (HCC): Primary | ICD-10-CM

## 2022-03-04 LAB
ABSOLUTE EOS #: 0 K/UL (ref 0–0.4)
ABSOLUTE LYMPH #: 0.34 K/UL (ref 1–4.8)
ABSOLUTE MONO #: 1.35 K/UL (ref 0.1–1.2)
ALBUMIN SERPL-MCNC: 2.8 G/DL (ref 3.5–5.2)
ALBUMIN/GLOBULIN RATIO: 0.6 (ref 1–2.5)
ALP BLD-CCNC: 181 U/L (ref 40–129)
ALT SERPL-CCNC: 38 U/L (ref 5–41)
ANION GAP SERPL CALCULATED.3IONS-SCNC: 12 MMOL/L (ref 9–17)
AST SERPL-CCNC: 39 U/L
BASOPHILS # BLD: 0 % (ref 0–2)
BASOPHILS ABSOLUTE: 0 K/UL (ref 0–0.2)
BILIRUB SERPL-MCNC: 0.17 MG/DL (ref 0.3–1.2)
BUN BLDV-MCNC: 23 MG/DL (ref 8–23)
CALCIUM SERPL-MCNC: 9.4 MG/DL (ref 8.6–10.4)
CHLORIDE BLD-SCNC: 97 MMOL/L (ref 98–107)
CO2: 24 MMOL/L (ref 20–31)
CREAT SERPL-MCNC: 0.68 MG/DL (ref 0.7–1.2)
EOSINOPHILS RELATIVE PERCENT: 0 % (ref 1–4)
GFR AFRICAN AMERICAN: >60 ML/MIN
GFR NON-AFRICAN AMERICAN: >60 ML/MIN
GFR SERPL CREATININE-BSD FRML MDRD: ABNORMAL ML/MIN/{1.73_M2}
GLUCOSE BLD-MCNC: 105 MG/DL (ref 70–99)
HCT VFR BLD CALC: 20.4 % (ref 41–53)
HEMOGLOBIN: 6.5 G/DL (ref 13.5–17.5)
LYMPHOCYTES # BLD: 2 % (ref 24–44)
MCH RBC QN AUTO: 29.8 PG (ref 26–34)
MCHC RBC AUTO-ENTMCNC: 31.6 G/DL (ref 31–37)
MCV RBC AUTO: 94.4 FL (ref 80–100)
MONOCYTES # BLD: 8 % (ref 2–11)
MORPHOLOGY: ABNORMAL
MORPHOLOGY: ABNORMAL
PDW BLD-RTO: 19.2 % (ref 12.5–15.4)
PLATELET # BLD: 521 K/UL (ref 140–450)
PMV BLD AUTO: 6.9 FL (ref 6–12)
POTASSIUM SERPL-SCNC: 4.9 MMOL/L (ref 3.7–5.3)
RBC # BLD: 2.16 M/UL (ref 4.5–5.9)
SEG NEUTROPHILS: 90 % (ref 36–66)
SEGMENTED NEUTROPHILS ABSOLUTE COUNT: 15.21 K/UL (ref 1.8–7.7)
SODIUM BLD-SCNC: 133 MMOL/L (ref 135–144)
TOTAL PROTEIN: 7.6 G/DL (ref 6.4–8.3)
WBC # BLD: 16.9 K/UL (ref 3.5–11)

## 2022-03-04 PROCEDURE — G8420 CALC BMI NORM PARAMETERS: HCPCS | Performed by: INTERNAL MEDICINE

## 2022-03-04 PROCEDURE — 86900 BLOOD TYPING SEROLOGIC ABO: CPT

## 2022-03-04 PROCEDURE — 1123F ACP DISCUSS/DSCN MKR DOCD: CPT | Performed by: INTERNAL MEDICINE

## 2022-03-04 PROCEDURE — G8427 DOCREV CUR MEDS BY ELIG CLIN: HCPCS | Performed by: INTERNAL MEDICINE

## 2022-03-04 PROCEDURE — 86920 COMPATIBILITY TEST SPIN: CPT

## 2022-03-04 PROCEDURE — 86850 RBC ANTIBODY SCREEN: CPT

## 2022-03-04 PROCEDURE — 36430 TRANSFUSION BLD/BLD COMPNT: CPT

## 2022-03-04 PROCEDURE — 36591 DRAW BLOOD OFF VENOUS DEVICE: CPT

## 2022-03-04 PROCEDURE — 2580000003 HC RX 258: Performed by: INTERNAL MEDICINE

## 2022-03-04 PROCEDURE — P9016 RBC LEUKOCYTES REDUCED: HCPCS

## 2022-03-04 PROCEDURE — 85025 COMPLETE CBC W/AUTO DIFF WBC: CPT

## 2022-03-04 PROCEDURE — 4004F PT TOBACCO SCREEN RCVD TLK: CPT | Performed by: INTERNAL MEDICINE

## 2022-03-04 PROCEDURE — 6360000002 HC RX W HCPCS: Performed by: INTERNAL MEDICINE

## 2022-03-04 PROCEDURE — 99211 OFF/OP EST MAY X REQ PHY/QHP: CPT | Performed by: INTERNAL MEDICINE

## 2022-03-04 PROCEDURE — 80053 COMPREHEN METABOLIC PANEL: CPT

## 2022-03-04 PROCEDURE — 3017F COLORECTAL CA SCREEN DOC REV: CPT | Performed by: INTERNAL MEDICINE

## 2022-03-04 PROCEDURE — 4040F PNEUMOC VAC/ADMIN/RCVD: CPT | Performed by: INTERNAL MEDICINE

## 2022-03-04 PROCEDURE — 86901 BLOOD TYPING SEROLOGIC RH(D): CPT

## 2022-03-04 PROCEDURE — 99214 OFFICE O/P EST MOD 30 MIN: CPT | Performed by: INTERNAL MEDICINE

## 2022-03-04 PROCEDURE — G8484 FLU IMMUNIZE NO ADMIN: HCPCS | Performed by: INTERNAL MEDICINE

## 2022-03-04 RX ORDER — SODIUM CHLORIDE 9 MG/ML
INJECTION, SOLUTION INTRAVENOUS PRN
Status: COMPLETED | OUTPATIENT
Start: 2022-03-04 | End: 2022-03-04

## 2022-03-04 RX ORDER — SODIUM CHLORIDE 9 MG/ML
INJECTION, SOLUTION INTRAVENOUS PRN
Status: DISCONTINUED | OUTPATIENT
Start: 2022-03-04 | End: 2022-05-07 | Stop reason: HOSPADM

## 2022-03-04 RX ORDER — HEPARIN SODIUM (PORCINE) LOCK FLUSH IV SOLN 100 UNIT/ML 100 UNIT/ML
500 SOLUTION INTRAVENOUS PRN
Status: DISCONTINUED | OUTPATIENT
Start: 2022-03-04 | End: 2022-03-05 | Stop reason: HOSPADM

## 2022-03-04 RX ORDER — SODIUM CHLORIDE 0.9 % (FLUSH) 0.9 %
20 SYRINGE (ML) INJECTION PRN
Status: CANCELLED | OUTPATIENT
Start: 2022-03-04

## 2022-03-04 RX ORDER — HEPARIN SODIUM (PORCINE) LOCK FLUSH IV SOLN 100 UNIT/ML 100 UNIT/ML
500 SOLUTION INTRAVENOUS PRN
Status: CANCELLED | OUTPATIENT
Start: 2022-03-04

## 2022-03-04 RX ORDER — SODIUM CHLORIDE 0.9 % (FLUSH) 0.9 %
10 SYRINGE (ML) INJECTION PRN
Status: DISCONTINUED | OUTPATIENT
Start: 2022-03-04 | End: 2022-03-05 | Stop reason: HOSPADM

## 2022-03-04 RX ORDER — SODIUM CHLORIDE 0.9 % (FLUSH) 0.9 %
10 SYRINGE (ML) INJECTION PRN
Status: CANCELLED | OUTPATIENT
Start: 2022-03-04

## 2022-03-04 RX ADMIN — SODIUM CHLORIDE: 9 INJECTION, SOLUTION INTRAVENOUS at 12:02

## 2022-03-04 RX ADMIN — SODIUM CHLORIDE, PRESERVATIVE FREE 10 ML: 5 INJECTION INTRAVENOUS at 14:31

## 2022-03-04 RX ADMIN — Medication 500 UNITS: at 14:31

## 2022-03-04 RX ADMIN — SODIUM CHLORIDE, PRESERVATIVE FREE 20 ML: 5 INJECTION INTRAVENOUS at 09:40

## 2022-03-04 NOTE — PROGRESS NOTES
Pt here for Dr visit and chemotherapy. Chemo held today. Hgb 6.5. Hold chemo x 2 weeks and give 1 unit of blood today. Pt denies any problems. Will return 3/18 for Dr visit and treatment.

## 2022-03-04 NOTE — PROGRESS NOTES
Michael Peterson                                                                                                                  3/4/2022  MRN:   [id number]  YOB: 1950  PCP:                           Fletcher Crigler, MD  Referring Physician: No ref. provider found  Treating Physician Name: Kailash Cespedes MD      Reason for visit:  Chief Complaint   Patient presents with    Follow-up    Fatigue    Shortness of Breath    Other     poor appetite    Discussed treatment plan. Toxicity check. 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 Maintenance Tecentriq, 3/2021  Lurbenectidin-7/2/2021    Summary of Case/History:    Michael Peterson a 80 y.o.male is a patient diagnosed with lung cancer with the component of small cell as well as squamous cell carcinoma    Patient has a significant history of tobacco dependence and underwent CT lung screening in December 2018. CT scan was read as lung rads degree 4B. Subsequently she underwent biopsy of right upper lobe lung nodule on 1/16/19. 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 CT PET done showed abnormal FDG uptake in the right upper lobe nodule. There was also abnormal FDG uptake in the right lower lobe nodule. Additionally right hilar lymph node were also FDG avid. No bony lesion was reported.   MRI brain for staging workup did not show any evidence of intracranial metastasis. Tip of the odontoid process was ill-defined and metastasis could not be ruled out. Clinically patient does give history of trauma to the neck area any years ago however denies any surgery history. Bone scan did not reveal any metastasis     Patient continues to smoke but has cut down quite a bit. He works full-time in a [address] Patient has good performance status, ECOG 0. Patient's other medical problems include dyslipidemia and hypertension. He also has arthritis pain    Started patient on concurrent chemoradiation using cisplatin and etoposide with dose adjustment for renal dysfunction. Chemotherapy changed to carboplatin and etoposide from cycle #2 due to renal dysfunction    Received PCI in July 2019. Interim History:    Patient presents to the clinic with his wife for a follow-up visit with cycle #11. He reports continuous fatigue. He is taking Eliquis as directed, he denies any bleeding, melena, hematochezia. He had pulmonology follow up this week, next appointment is 05/2022. His pain is well controlled with medication. He is taking oral iron. His wife reports he has had excessive drooling. He reports bed sores, his wife has been managing along with home health nurse. He also received in house physical and occupational therapies. During this visit patient's allergy, social, medical, surgical history and medications were reviewed and updated. Past Medical History:   Past Medical History:   Diagnosis Date    DDD (degenerative disc disease), cervical     Gout     Heart murmur     hx. of when younger.     Hyperlipidemia     Hypertension     Lung cancer ([address]     right lung    MI (myocardial infarction) ([address]     Skin cancer     face    Wears glasses        Past Surgical History:     Past Surgical History:   Procedure Laterality Date    APPENDECTOMY      CARDIAC CATHETERIZATION      w/stent    COLONOSCOPY      CYST INCISION AND DRAINAGE Right 05/14/2020    rt elbow I and D and left knee aspiration with cultures    FOOT SURGERY Right     2nd toe(bone spur).  FOREARM SURGERY Right 5/14/2020    RIGHT ELBOW IRRIGATION AND DEBRIDEMENT performed by Teodora Lan DO at [id number]  12/13/2021    IR CHEST TUBE INSERTION 12/13/2021 [facility]    KNEE SURGERY Left 5/14/2020    KNEE ASPIRATION WITH CULTURES SENT performed by Teodora Lan DO at [address]      face under lt. eye.  TONSILLECTOMY         Patient Family Social History:    Family History   Problem Relation Age of Onset    Cancer Father         lung cancer      Social History     Socioeconomic History    Marital status:      Spouse name: Not on file    Number of children: Not on file    Years of education: Not on file    Highest education level: Not on file   Occupational History    Not on file   Tobacco Use    Smoking status: Current Every Day Smoker     Packs/day: 0.50     Types: Cigarettes    Smokeless tobacco: Former User   Vaping Use    Vaping Use: Former   Substance and Sexual Activity    Alcohol use: No    Drug use: Yes     Frequency: 14.0 times per week     Types: Marijuana Marshall Leyva)    Sexual activity: Not on file   Other Topics Concern    Not on file   Social History Narrative    Not on file     Social Determinants of Health     Financial Resource Strain:     Difficulty of Paying Living Expenses: Not on file   Food Insecurity:     Worried About [address] in the Last Year: Not on file    [facility] in the Last Year: Not on file   Transportation Needs:     Lack of Transportation (Medical): Not on file    Lack of Transportation (Non-Medical):  Not on file   Physical Activity:     Days of Exercise per Week: Not on file    Minutes of Exercise per Session: Not on file   Stress:     Feeling of Stress : Not on file   Social Connections:     Frequency of Communication with Friends and Family: Not on file    Frequency of Social Gatherings with Friends and Family: Not on file    Attends Judaism Services: Not on file    Active Member of Clubs or Organizations: Not on file    Attends Club or Organization Meetings: Not on file    Marital Status: Not on file   Intimate Partner Violence:     Fear of Current or Ex-Partner: Not on file    Emotionally Abused: Not on file    Physically Abused: Not on file    Sexually Abused: Not on file   Housing Stability:     Unable to Pay for Housing in the Last Year: Not on file    Number of [place] in the Last Year: Not on file    Unstable Housing in the Last Year: Not on file      Current Medications:     Current Outpatient Medications   Medication Sig Dispense Refill    ALPRAZolam (XANAX) 1 MG tablet Take 1 tablet by mouth nightly as needed for Sleep for up to 15 days. 15 tablet 0    HYDROcodone-acetaminophen (NORCO) 5-325 MG per tablet Take 1 tablet by mouth every 8 hours as needed for Pain for up to 30 days. 90 tablet 0    dexamethasone (DECADRON) 2 MG tablet Take 1 tablet by mouth daily (with breakfast) 30 tablet 0    sodium chloride 1 g tablet Take 1 tablet by mouth 2 times daily 60 tablet 1    midodrine (PROAMATINE) 5 MG tablet Take 1 tablet by mouth 3 times daily 90 tablet 3    docusate sodium (COLACE) 100 MG capsule Take 100 mg by mouth 2 times daily      B Complex-C-Folic Acid (ANGELICA-HUGO) TABS TAKE 1 TABLET BY MOUTH DAILY.  HEMATINIC VIT MINERALS      ipratropium-albuterol (DUONEB) 0.5-2.5 (3) MG/3ML SOLN nebulizer solution Inhale 3 mLs into the lungs 4 times daily      potassium chloride (MICRO-K) 10 MEQ extended release capsule Take 20 mEq by mouth daily       Ferrous Fumarate (FERROCITE) 324 (106 Fe) MG TABS Take 324 mg by mouth daily       apixaban (ELIQUIS) 5 MG TABS tablet Take 1 tablet by mouth 2 times daily 60 tablet 1    tamsulosin (FLOMAX) 0.4 MG capsule TAKE 1 CAPSULE BY MOUTH EVERY DAY 30 capsule 5    Handicap Placard MISC by Does not apply route 1 each 0    lidocaine-prilocaine (EMLA) 2.5-2.5 % cream Apply topically as needed. Apply a quarter size amount to port site 1 hour before chemotherapy. Cover with plastic wrap. 30 g 0    acetaminophen (TYLENOL) 325 MG tablet Take 650 mg by mouth every 6 hours as needed for Pain      allopurinol (ZYLOPRIM) 100 MG tablet Take 100 mg by mouth daily      ALPRAZolam (XANAX) 0.5 MG tablet Take 1 tablet by mouth nightly as needed for Sleep or Anxiety for up to 30 days. (Patient not taking: Reported on 3/4/2022) 30 tablet 0    traZODone (DESYREL) 100 MG tablet Take 100 mg by mouth nightly  (Patient not taking: Reported on 3/4/2022)      simvastatin (ZOCOR) 40 MG tablet Take 40 mg by mouth daily (Patient not taking: Reported on 2/11/2022)       No current facility-administered medications for this visit. Allergies:   Patient has no known allergies. Review of Systems:    Constitutional: No fever or chills. No night sweats. Positive for fatigue. Positive for loss of appetite and cachexia.   Positive for difficulty going to sleep  Eyes: No eye discharge, double vision, or eye pain   HEENT: negative for sore mouth, sore throat, hoarseness and voice change;  +drooling   Respiratory: negative for cough, sputum, wheezing, hemoptysis, chest pain;  Cardiovascular: negative for chest pain, palpitations, orthopnea, PND   Gastrointestinal: negative for nausea, vomiting, diarrhea, constipation, abdominal pain, Dysphagia, hematemesis and hematochezia   Genitourinary: negative for frequency, dysuria, nocturia, urinary incontinence, and hematuria   Integument: Positive for easy bruising - stable +rasied area to the left of the spine that is tender +bed sores  Hematologic/Lymphatic: negative for easy bleeding, lymphadenopathy, or petechiae   Endocrine: negative for heat or cold intolerance,weight changes, change in bowel habits and hair loss   Musculoskeletal: Positive joint pain.  +right shoulder pain  Neurological: negative for headaches, dizziness, seizures, weakness; + poor balance; +neuropathy in left hand from shoulder +poor short term memory        Physical Exam:    Vitals: /75   Pulse 113   Temp 97.4 °F (36.3 °C) (Oral)   Resp 20   Wt 159 lb 9.6 oz (72.4 kg)   BMI 22.26 kg/m²   General appearance -patient not in acute distress  Mental status - AAO X3  Eyes - pupils equal and reactive, extraocular eye movements intact  Mouth - mucous membranes moist, pharynx normal without lesions  Neck - supple, no significant adenopathy  Lymphatics - no palpable lymphadenopathy, no hepatosplenomegaly  Chest -decreased breathing sounds bilaterally  Heart - normal rate, regular rhythm, normal S1, S2, no murmurs  Abdomen - soft, nontender, nondistended, no masses or organomegaly  Neurological - alert, oriented, normal speech, no focal findings or movement disorder noted  Extremities -positive lower extremity edema  Skin - normal coloration and turgor, no rashes, no suspicious skin lesions noted; +raised area with tenderness to the left of the spine, no erythema +bed sores, broken skin but not deep       DATA:    Labs:   Lab Results   Component Value Date    WBC 16.9 (H) 03/04/2022    HGB 6.5 (LL) 03/04/2022    HCT 20.4 (L) 03/04/2022    MCV 94.4 03/04/2022     (H) 03/04/2022     Lab Results   Component Value Date    NEUTROABS 15.21 (H) 03/04/2022           Chemistry        Component Value Date/Time     (L) 03/04/2022 0930    K 4.9 03/04/2022 0930    CL 97 (L) 03/04/2022 0930    CO2 24 03/04/2022 0930    BUN 23 03/04/2022 0930    CREATININE 0.68 (L) 03/04/2022 0930        Component Value Date/Time    CALCIUM 9.4 03/04/2022 0930    ALKPHOS 181 (H) 03/04/2022 0930    AST 39 03/04/2022 0930    ALT 38 03/04/2022 0930    BILITOT 0.17 (L) 03/04/2022 0930        No results found.         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 I completed toxicity check. I am concerned about his low HGB secondary to Eliquis despite taking oral iron, I recommend he continue with oral iron, discontinue Eliquis, and I am writing for 1 unit of blood, we will hold treatment today. He may take Aspirin but no NSAIDs. I am ordering brain MRI and CT chest, abdomen, and plevis and iron studies. I discussed concerns about his decline with worsening cachexia and development of bed sores. We discussed his current home care situation and putting in referral for palliative care to increase level of care. Return to clinic to review results of scans and disease status    NCCN guidelines were reviewed and discussed with the patient. The diagnosis and care plan were discussed with the patient in detail. I discussed the natural history of the disease, prognosis, risks and goals of therapy and answered all the patients questions to the best of my ability. Patient expressed understanding and was in agreement. Scribe Attestation   This note was created by Karey Weaver acting as scribe for the physician signing this note  Electronically Signed  Karey Weaver, 3/4/2022  Scribe, [employer]. Attending Attestation   Note was reviewed and edited. I am in agreement with the note as entered    Mary Ann Jean-Baptiste MD        This note is created with the assistance of a speech recognition program.  While intending to generate a document that actually reflects the content of the visit, the document can still have some errors including those of syntax and sound a like substitutions which may escape proof reading.   It such instances, actual meaning can be extrapolated by contextual diversion.

## 2022-03-04 NOTE — TELEPHONE ENCOUNTER
Hold tx  Transfuse 1 unit prbc  Scans  rv in 2 week  pallaitve care    Tx held, transfusion given instead    Ct/MRI scheduled 3/15/22 @ 2pm    RV scheduled 3/18/22 @ 9am    Pt established with [facility], nurse Ubaldo Higgins.  Contacted [phone], faxed referral and progress note to [phone], confirmation received    PT was given AVS and an appt schedule    Electronically signed by Briana Christopher on 3/4/2022 at 12:07 PM

## 2022-03-04 NOTE — FLOWSHEET NOTE
SPIRITUAL CARE PROGRESS NOTE: Outpatient Oncology Care at [address] [place] [address] Rd: Writer encountered and visited with Patient and Spouse in the treatment cubicle of the infusion clinic. Patient told writer he would be getting blood today, and Spouse noted that there would not be chemotherapy today. Patient spoke of a medical concern. Spouse shared that Pt was in the hospital two weeks prior to [place]. \"He is a fighter. \" Spouse and Pt shared that their anniversary will be in the middle of this month. They acknowledged that this is an accomplishment. Patient and Spouse were receptive to prayer squares and to prayer. They voiced their prayer requests. Intervention: Writer provided supportive presence and active listening; inquired about Pt's coping and needs; offered words of encouragement and support; affirmed Pt's and Spouse's strengths; prayed for Pt and Spouse; gave Pt a spouse prayer squares made by volunteers. Outcome: Patient and Spouse shared about Pt's health. They expressed gratitude for their relationship. They received prayer and prayer squares. They thanked writer for the visit. Plan: Chaplains will remain available to provide emotional and spiritual support as needed. 03/04/22 1258   Encounter Summary   Services provided to: Patient and family together   Referral/Consult From: [facility] Family members   Continue Visiting   (3/4/22)   Complexity of Encounter Moderate   Length of Encounter 30 minutes   Spiritual Assessment Completed Yes   Routine   Type Follow up   Spiritual/Adventism   Type Spiritual support   Assessment Calm; Approachable; Hopeful;Coping   Intervention Active listening;Explored feelings, thoughts, concerns;Explored coping resources;Sustaining presence/ Ministry of presence; Discussed illness/injury and it's impact; Discussed belief system/Restorationist practices/brennen;Discussed relationship with God;Discussed meaning/purpose;Prayer;Provided reading

## 2022-03-05 LAB
ABO/RH: NORMAL
ANTIBODY SCREEN: NEGATIVE
ARM BAND NUMBER: NORMAL
BLD PROD TYP BPU: NORMAL
CROSSMATCH RESULT: NORMAL
DISPENSE STATUS BLOOD BANK: NORMAL
EXPIRATION DATE: NORMAL
TRANSFUSION STATUS: NORMAL
UNIT DIVISION: 0
UNIT NUMBER: NORMAL

## 2022-03-15 ENCOUNTER — HOSPITAL ENCOUNTER (OUTPATIENT)
Dept: INFUSION THERAPY | Age: 72
Discharge: HOME | End: 2022-03-15
Payer: MEDICARE

## 2022-03-15 ENCOUNTER — HOSPITAL ENCOUNTER (OUTPATIENT)
Dept: MRI IMAGING | Age: 72
Discharge: HOME | End: 2022-03-17
Payer: MEDICARE

## 2022-03-15 ENCOUNTER — HOSPITAL ENCOUNTER (OUTPATIENT)
Dept: CT IMAGING | Age: 72
Discharge: HOME | End: 2022-03-17
Payer: MEDICARE

## 2022-03-15 DIAGNOSIS — R53.1 ASTHENIA: ICD-10-CM

## 2022-03-15 DIAGNOSIS — C34.91 PRIMARY MALIGNANT NEOPLASM OF RIGHT LUNG METASTATIC TO OTHER SITE (HCC): ICD-10-CM

## 2022-03-15 DIAGNOSIS — C34.91 PRIMARY LUNG CANCER WITH METASTASIS FROM LUNG TO OTHER SITE, RIGHT (HCC): Primary | ICD-10-CM

## 2022-03-15 DIAGNOSIS — G89.3 CANCER ASSOCIATED PAIN: ICD-10-CM

## 2022-03-15 DIAGNOSIS — R63.0 POOR APPETITE: ICD-10-CM

## 2022-03-15 PROCEDURE — 2580000003 HC RX 258: Performed by: INTERNAL MEDICINE

## 2022-03-15 PROCEDURE — 74177 CT ABD & PELVIS W/CONTRAST: CPT

## 2022-03-15 PROCEDURE — 6360000004 HC RX CONTRAST MEDICATION: Performed by: INTERNAL MEDICINE

## 2022-03-15 PROCEDURE — A9579 GAD-BASE MR CONTRAST NOS,1ML: HCPCS | Performed by: INTERNAL MEDICINE

## 2022-03-15 PROCEDURE — 6360000002 HC RX W HCPCS: Performed by: INTERNAL MEDICINE

## 2022-03-15 RX ORDER — 0.9 % SODIUM CHLORIDE 0.9 %
80 INTRAVENOUS SOLUTION INTRAVENOUS ONCE
Status: COMPLETED | OUTPATIENT
Start: 2022-03-15 | End: 2022-03-15

## 2022-03-15 RX ORDER — SODIUM CHLORIDE 0.9 % (FLUSH) 0.9 %
10 SYRINGE (ML) INJECTION PRN
Status: DISCONTINUED | OUTPATIENT
Start: 2022-03-15 | End: 2022-03-16 | Stop reason: HOSPADM

## 2022-03-15 RX ORDER — SODIUM CHLORIDE 0.9 % (FLUSH) 0.9 %
10 SYRINGE (ML) INJECTION PRN
Status: CANCELLED | OUTPATIENT
Start: 2022-03-15

## 2022-03-15 RX ORDER — SODIUM CHLORIDE 0.9 % (FLUSH) 0.9 %
20 SYRINGE (ML) INJECTION PRN
Status: CANCELLED | OUTPATIENT
Start: 2022-03-15

## 2022-03-15 RX ORDER — SODIUM CHLORIDE 0.9 % (FLUSH) 0.9 %
10 SYRINGE (ML) INJECTION PRN
Status: DISCONTINUED | OUTPATIENT
Start: 2022-03-15 | End: 2022-03-18 | Stop reason: HOSPADM

## 2022-03-15 RX ORDER — HEPARIN SODIUM (PORCINE) LOCK FLUSH IV SOLN 100 UNIT/ML 100 UNIT/ML
500 SOLUTION INTRAVENOUS PRN
Status: CANCELLED | OUTPATIENT
Start: 2022-03-15

## 2022-03-15 RX ORDER — HEPARIN SODIUM (PORCINE) LOCK FLUSH IV SOLN 100 UNIT/ML 100 UNIT/ML
500 SOLUTION INTRAVENOUS PRN
Status: DISCONTINUED | OUTPATIENT
Start: 2022-03-15 | End: 2022-03-16 | Stop reason: HOSPADM

## 2022-03-15 RX ADMIN — SODIUM CHLORIDE 80 ML: 9 INJECTION, SOLUTION INTRAVENOUS at 14:29

## 2022-03-15 RX ADMIN — SODIUM CHLORIDE, PRESERVATIVE FREE 10 ML: 5 INJECTION INTRAVENOUS at 14:43

## 2022-03-15 RX ADMIN — IOPAMIDOL 100 ML: 755 INJECTION, SOLUTION INTRAVENOUS at 14:29

## 2022-03-15 RX ADMIN — SODIUM CHLORIDE, PRESERVATIVE FREE 10 ML: 5 INJECTION INTRAVENOUS at 13:15

## 2022-03-15 RX ADMIN — SODIUM CHLORIDE, PRESERVATIVE FREE 10 ML: 5 INJECTION INTRAVENOUS at 14:29

## 2022-03-15 RX ADMIN — SODIUM CHLORIDE, PRESERVATIVE FREE 10 ML: 5 INJECTION INTRAVENOUS at 13:16

## 2022-03-15 RX ADMIN — HEPARIN 500 UNITS: 100 SYRINGE at 14:43

## 2022-03-15 NOTE — ONCOLOGY
Patient arrived for port access for CT scan. Pt returned for port de-access following scan. Pt stable at discharge. Scheduled to return 3/18/22 for MD visit and C11D1.

## 2022-03-18 ENCOUNTER — HOSPITAL ENCOUNTER (OUTPATIENT)
Dept: INFUSION THERAPY | Age: 72
Discharge: HOME | End: 2022-03-18
Payer: MEDICARE

## 2022-03-18 ENCOUNTER — TELEPHONE (OUTPATIENT)
Dept: ONCOLOGY | Age: 72
End: 2022-03-18

## 2022-03-18 ENCOUNTER — OFFICE VISIT (OUTPATIENT)
Dept: ONCOLOGY | Age: 72
End: 2022-03-18
Payer: MEDICARE

## 2022-03-18 VITALS
HEART RATE: 120 BPM | WEIGHT: 158 LBS | SYSTOLIC BLOOD PRESSURE: 118 MMHG | BODY MASS INDEX: 22.04 KG/M2 | DIASTOLIC BLOOD PRESSURE: 79 MMHG | TEMPERATURE: 97.9 F

## 2022-03-18 DIAGNOSIS — R53.1 ASTHENIA: ICD-10-CM

## 2022-03-18 DIAGNOSIS — L89.90 PRESSURE INJURY OF SKIN, UNSPECIFIED INJURY STAGE, UNSPECIFIED LOCATION: ICD-10-CM

## 2022-03-18 DIAGNOSIS — C34.91 PRIMARY LUNG CANCER WITH METASTASIS FROM LUNG TO OTHER SITE, RIGHT (HCC): ICD-10-CM

## 2022-03-18 DIAGNOSIS — C34.91 PRIMARY MALIGNANT NEOPLASM OF RIGHT LUNG METASTATIC TO OTHER SITE (HCC): ICD-10-CM

## 2022-03-18 DIAGNOSIS — C34.91 PRIMARY MALIGNANT NEOPLASM OF RIGHT LUNG METASTATIC TO OTHER SITE (HCC): Primary | ICD-10-CM

## 2022-03-18 LAB
ABSOLUTE EOS #: 0.12 K/UL (ref 0–0.4)
ABSOLUTE LYMPH #: 0.85 K/UL (ref 1–4.8)
ABSOLUTE MONO #: 1.22 K/UL (ref 0.1–0.8)
ALBUMIN SERPL-MCNC: 2.9 G/DL (ref 3.5–5.2)
ALBUMIN/GLOBULIN RATIO: 0.6 (ref 1–2.5)
ALP BLD-CCNC: 144 U/L (ref 40–129)
ALT SERPL-CCNC: 20 U/L (ref 5–41)
ANION GAP SERPL CALCULATED.3IONS-SCNC: 10 MMOL/L (ref 9–17)
AST SERPL-CCNC: 29 U/L
BASOPHILS # BLD: 1 % (ref 0–2)
BASOPHILS ABSOLUTE: 0.12 K/UL (ref 0–0.2)
BILIRUB SERPL-MCNC: 0.14 MG/DL (ref 0.3–1.2)
BUN BLDV-MCNC: 15 MG/DL (ref 8–23)
CALCIUM SERPL-MCNC: 8.9 MG/DL (ref 8.6–10.4)
CHLORIDE BLD-SCNC: 98 MMOL/L (ref 98–107)
CO2: 26 MMOL/L (ref 20–31)
CREAT SERPL-MCNC: 0.67 MG/DL (ref 0.7–1.2)
EOSINOPHILS RELATIVE PERCENT: 1 % (ref 1–4)
GFR AFRICAN AMERICAN: >60 ML/MIN
GFR NON-AFRICAN AMERICAN: >60 ML/MIN
GFR SERPL CREATININE-BSD FRML MDRD: ABNORMAL ML/MIN/{1.73_M2}
GLUCOSE BLD-MCNC: 114 MG/DL (ref 70–99)
HCT VFR BLD CALC: 24.9 % (ref 41–53)
HEMOGLOBIN: 8 G/DL (ref 13.5–17.5)
LYMPHOCYTES # BLD: 7 % (ref 24–44)
MCH RBC QN AUTO: 29.5 PG (ref 26–34)
MCHC RBC AUTO-ENTMCNC: 32.1 G/DL (ref 31–37)
MCV RBC AUTO: 91.9 FL (ref 80–100)
MONOCYTES # BLD: 10 % (ref 1–7)
MORPHOLOGY: NORMAL
PDW BLD-RTO: 19.6 % (ref 12.5–15.4)
PLATELET # BLD: 441 K/UL (ref 140–450)
PMV BLD AUTO: 6.2 FL (ref 6–12)
POTASSIUM SERPL-SCNC: 4.5 MMOL/L (ref 3.7–5.3)
RBC # BLD: 2.71 M/UL (ref 4.5–5.9)
SEG NEUTROPHILS: 81 % (ref 36–66)
SEGMENTED NEUTROPHILS ABSOLUTE COUNT: 9.89 K/UL (ref 1.8–7.7)
SODIUM BLD-SCNC: 134 MMOL/L (ref 135–144)
TOTAL PROTEIN: 7.4 G/DL (ref 6.4–8.3)
WBC # BLD: 12.2 K/UL (ref 3.5–11)

## 2022-03-18 PROCEDURE — 85025 COMPLETE CBC W/AUTO DIFF WBC: CPT

## 2022-03-18 PROCEDURE — 80053 COMPREHEN METABOLIC PANEL: CPT

## 2022-03-18 PROCEDURE — 4004F PT TOBACCO SCREEN RCVD TLK: CPT | Performed by: INTERNAL MEDICINE

## 2022-03-18 PROCEDURE — 4040F PNEUMOC VAC/ADMIN/RCVD: CPT | Performed by: INTERNAL MEDICINE

## 2022-03-18 PROCEDURE — 1123F ACP DISCUSS/DSCN MKR DOCD: CPT | Performed by: INTERNAL MEDICINE

## 2022-03-18 PROCEDURE — 99215 OFFICE O/P EST HI 40 MIN: CPT | Performed by: INTERNAL MEDICINE

## 2022-03-18 PROCEDURE — G8420 CALC BMI NORM PARAMETERS: HCPCS | Performed by: INTERNAL MEDICINE

## 2022-03-18 PROCEDURE — G8484 FLU IMMUNIZE NO ADMIN: HCPCS | Performed by: INTERNAL MEDICINE

## 2022-03-18 PROCEDURE — 3017F COLORECTAL CA SCREEN DOC REV: CPT | Performed by: INTERNAL MEDICINE

## 2022-03-18 PROCEDURE — G8428 CUR MEDS NOT DOCUMENT: HCPCS | Performed by: INTERNAL MEDICINE

## 2022-03-18 RX ORDER — OXYCODONE HYDROCHLORIDE AND ACETAMINOPHEN 5; 325 MG/1; MG/1
1 TABLET ORAL EVERY 6 HOURS PRN
Qty: 120 TABLET | Refills: 0 | Status: SHIPPED | OUTPATIENT
Start: 2022-03-18 | End: 2022-04-17

## 2022-03-18 RX ORDER — OXYCODONE HYDROCHLORIDE AND ACETAMINOPHEN 5; 325 MG/1; MG/1
1 TABLET ORAL EVERY 6 HOURS PRN
Qty: 12 TABLET | Refills: 0 | Status: ON HOLD | OUTPATIENT
Start: 2022-03-18 | End: 2022-05-07 | Stop reason: HOSPADM

## 2022-03-18 RX ORDER — SODIUM CHLORIDE 1000 MG
TABLET, SOLUBLE MISCELLANEOUS
Qty: 60 TABLET | Refills: 1 | Status: ON HOLD | OUTPATIENT
Start: 2022-03-18 | End: 2022-05-07 | Stop reason: HOSPADM

## 2022-03-18 NOTE — PROGRESS NOTES
Casandra Lan                                                                                                                  3/18/2022  MRN:   [id number]  YOB: 1950  PCP:                           Phillip Broderick MD  Referring Physician: No ref. provider found  Treating Physician Name: Sadie Estrada MD      Reason for visit:  Chief Complaint   Patient presents with    Follow-up     review status of disease    Results     Ct results    Medication Refill     for pain    Other     has bed sore   Patient presents to the clinic for toxicity check and to discuss results of lab work-up, imaging studies and further treatment plan. 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 Chemotherapy changed to carboplatin and etoposide due to renal insufficiency from cycle #2  PCI 7/2019  SRS to adrenal gland metastasis, completed 07/2020  systemic palliative chemoimmunotherapy with carboplatin etoposide and Tecentriq, 12/2020 x4 cycles. Maintenance Tecentriq, 3/2021  Lurbenectidin7/2/2021    Summary of Case/History:    Casandra Lan a 80 y.o.male is a patient diagnosed with lung cancer with the component of small cell as well as squamous cell carcinoma    Patient has a significant history of tobacco dependence and underwent CT lung screening in December 2018. CT scan was read as lung rads degree 4B. Subsequently she underwent biopsy of right upper lobe lung nodule on 1/16/19. 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   CT PET done showed abnormal FDG uptake in the right upper lobe nodule. There was also abnormal FDG uptake in the right lower lobe nodule. Additionally right hilar lymph node were also FDG avid. No bony lesion was reported. MRI brain for staging workup did not show any evidence of intracranial metastasis. Tip of the odontoid process was ill-defined and metastasis could not be ruled out. Clinically patient does give history of trauma to the neck area any years ago however denies any surgery history. Bone scan did not reveal any metastasis     Patient continues to smoke but has cut down quite a bit. He works full-time in a [address] Patient has good performance status, ECOG 0. Patient's other medical problems include dyslipidemia and hypertension. He also has arthritis pain    Started patient on concurrent chemoradiation using cisplatin and etoposide with dose adjustment for renal dysfunction. Chemotherapy changed to carboplatin and etoposide from cycle #2 due to renal dysfunction    Received PCI in July 2019. Interim History:    Patient presents to the clinic for a follow-up visit accompanied by his wife to discuss results of his lab work-up and CT scan. Patient feels a little better since his hemoglobin is better. Patient also received 1 unit packed RBC with the last visit. Weight is stable. Denies hospitalization or ER visit. Continues to be off Eliquis. CT scans show disease progression. During this visit patient's allergy, social, medical, surgical history and medications were reviewed and updated. Past Medical History:   Past Medical History:   Diagnosis Date    DDD (degenerative disc disease), cervical     Gout     Heart murmur     hx. of when younger.     Hyperlipidemia     Hypertension     Lung cancer ([address]     right lung    MI (myocardial infarction) ([address]     Skin cancer     face    Wears glasses        Past Surgical History:     Past Surgical History:   Procedure Laterality Date    APPENDECTOMY      CARDIAC CATHETERIZATION      w/stent    COLONOSCOPY      CYST INCISION AND DRAINAGE Right 05/14/2020    rt elbow I and D and left knee aspiration with cultures    FOOT SURGERY Right     2nd toe(bone spur).  FOREARM SURGERY Right 5/14/2020    RIGHT ELBOW IRRIGATION AND DEBRIDEMENT performed by Chu Vegas, DO at [id number]  12/13/2021    IR CHEST TUBE INSERTION 12/13/2021 [facility]    KNEE SURGERY Left 5/14/2020    KNEE ASPIRATION WITH CULTURES SENT performed by Chu Vegas, DO at [facility]      face under lt. eye.  TONSILLECTOMY         Patient Family Social History:    Family History   Problem Relation Age of Onset    Cancer Father         lung cancer      Social History     Socioeconomic History    Marital status:      Spouse name: Not on file    Number of children: Not on file    Years of education: Not on file    Highest education level: Not on file   Occupational History    Not on file   Tobacco Use    Smoking status: Current Every Day Smoker     Packs/day: 0.50     Types: Cigarettes    Smokeless tobacco: Former User   Vaping Use    Vaping Use: Former   Substance and Sexual Activity    Alcohol use: No    Drug use: Yes     Frequency: 14.0 times per week     Types: Marijuana [place])    Sexual activity: Not on file   Other Topics Concern    Not on file   Social History Narrative    Not on file     Social Determinants of Health     Financial Resource Strain:     Difficulty of Paying Living Expenses: Not on file   Food Insecurity:     Worried About [address] in the Last Year: Not on file    [facility] in the Last Year: Not on file   Transportation Needs:     Lack of Transportation (Medical): Not on file    Lack of Transportation (Non-Medical):  Not on file   Physical Activity:     Days of Exercise per Week: Not on file    Minutes of Exercise per Session: Not on file   Stress:     Feeling of Stress : Not on file   Social Connections:     Frequency of Communication with Friends and Family: Not on file    Frequency of Social Gatherings with Friends and Family: Not on file    Attends Catholic Services: Not on file    Active Member of Clubs or Organizations: Not on file    Attends Club or Organization Meetings: Not on file    Marital Status: Not on file   Intimate Partner Violence:     Fear of Current or Ex-Partner: Not on file    Emotionally Abused: Not on file    Physically Abused: Not on file    Sexually Abused: Not on file   Housing Stability:     Unable to Pay for Housing in the Last Year: Not on file    Number of [place] in the Last Year: Not on file    Unstable Housing in the Last Year: Not on file      Current Medications:     Current Outpatient Medications   Medication Sig Dispense Refill    sodium chloride 1 g tablet Take 1 tablet by mouth 2 times daily 60 tablet 1    midodrine (PROAMATINE) 5 MG tablet Take 1 tablet by mouth 3 times daily 90 tablet 3    docusate sodium (COLACE) 100 MG capsule Take 100 mg by mouth 2 times daily      B Complex-C-Folic Acid (ANGELICA-HUGO) TABS TAKE 1 TABLET BY MOUTH DAILY. HEMATINIC VIT MINERALS      ipratropium-albuterol (DUONEB) 0.5-2.5 (3) MG/3ML SOLN nebulizer solution Inhale 3 mLs into the lungs 4 times daily      potassium chloride (MICRO-K) 10 MEQ extended release capsule Take 20 mEq by mouth daily       Ferrous Fumarate (FERROCITE) 324 (106 Fe) MG TABS Take 324 mg by mouth daily       tamsulosin (FLOMAX) 0.4 MG capsule TAKE 1 CAPSULE BY MOUTH EVERY DAY 30 capsule 5    Handicap Placard MISC by Does not apply route 1 each 0    lidocaine-prilocaine (EMLA) 2.5-2.5 % cream Apply topically as needed. Apply a quarter size amount to port site 1 hour before chemotherapy. Cover with plastic wrap.  30 g 0    acetaminophen (TYLENOL) 325 MG tablet Take 650 mg by mouth every 6 hours as needed for Pain      allopurinol (ZYLOPRIM) 100 MG tablet Take 100 mg by mouth daily      traZODone (DESYREL) 100 MG tablet Take 100 mg by mouth nightly  (Patient not taking: Reported on 3/18/2022)      apixaban (ELIQUIS) 5 MG TABS tablet Take 1 tablet by mouth 2 times daily (Patient not taking: Reported on 3/18/2022) 60 tablet 1    simvastatin (ZOCOR) 40 MG tablet Take 40 mg by mouth daily  (Patient not taking: Reported on 3/18/2022)       Current Facility-Administered Medications   Medication Dose Route Frequency Provider Last Rate Last Admin    0.9 % sodium chloride infusion   IntraVENous PRN Hector Brenner MD           Allergies:   Patient has no known allergies. Review of Systems:    Constitutional: No fever or chills. No night sweats. Positive for fatigue. Positive for loss of appetite and cachexia. Positive for difficulty going to sleep  Eyes: No eye discharge, double vision, or eye pain   HEENT: negative for sore mouth, sore throat, hoarseness and voice change;  +drooling   Respiratory: negative for cough, sputum, wheezing, hemoptysis, chest pain;  Cardiovascular: negative for chest pain, palpitations, orthopnea, PND   Gastrointestinal: negative for nausea, vomiting, diarrhea, constipation, abdominal pain, Dysphagia, hematemesis and hematochezia   Genitourinary: negative for frequency, dysuria, nocturia, urinary incontinence, and hematuria   Integument: Positive for easy bruising - stable +rasied area to the left of the spine that is tender +bed sores  Hematologic/Lymphatic: negative for easy bleeding, lymphadenopathy, or petechiae   Endocrine: negative for heat or cold intolerance,weight changes, change in bowel habits and hair loss   Musculoskeletal: Positive joint pain.  +right shoulder pain  Neurological: negative for headaches, dizziness, seizures, weakness; + poor balance; +neuropathy in left hand from shoulder +poor short term memory        Physical Exam:    Vitals: /79   Pulse 120   Temp 97.9 °F (36.6 °C) (Temporal)   Wt 158 lb (71.7 kg) BMI 22.04 kg/m²   General appearance -patient not in acute distress  Mental status - AAO X3  Eyes - pupils equal and reactive, extraocular eye movements intact  Mouth - mucous membranes moist, pharynx normal without lesions  Neck - supple, no significant adenopathy  Lymphatics - no palpable lymphadenopathy, no hepatosplenomegaly  Chest -decreased breathing sounds bilaterally  Heart - normal rate, regular rhythm, normal S1, S2, no murmurs  Abdomen - soft, nontender, nondistended, no masses or organomegaly  Neurological - alert, oriented, normal speech, no focal findings or movement disorder noted  Extremities -positive lower extremity edema  Skin - normal coloration and turgor, no rashes, no suspicious skin lesions noted; +raised area with tenderness to the left of the spine, no erythema, back shows improved      DATA:    Labs:   Lab Results   Component Value Date    WBC 12.2 (H) 03/18/2022    HGB 8.0 (L) 03/18/2022    HCT 24.9 (L) 03/18/2022    MCV 91.9 03/18/2022     03/18/2022     Lab Results   Component Value Date    NEUTROABS 9.89 (H) 03/18/2022           Chemistry        Component Value Date/Time     (L) 03/18/2022 0835    K 4.5 03/18/2022 0835    CL 98 03/18/2022 0835    CO2 26 03/18/2022 0835    BUN 15 03/18/2022 0835    CREATININE 0.67 (L) 03/18/2022 0835        Component Value Date/Time    CALCIUM 8.9 03/18/2022 0835    ALKPHOS 144 (H) 03/18/2022 0835    AST 29 03/18/2022 0835    ALT 20 03/18/2022 0835    BILITOT 0.14 (L) 03/18/2022 0835                 Impression:  Limited stage Right lung cancer with small cell and squamous cell component, stage IIIa (T3,N1,M0)  Left adrenal metastasis, CT 2/2020, S/P SBRT 07/2020  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   Reviewed results of patient CT scans  Reviewed images with the patient compared with the prior scans. Unfortunately there is evidence of disease progression. I had a very cr discussion with the patient and his wife. Patient has a aggressive disease treatment options available to not have very high response rates. We also discussed hospice and best supportive care. Patient is very clear about his goals and wants to continue treatment. Patient was not able to get his MRI done for the brain  Patient was given refill on Percocet. Patient counseled on use of pain medication  Patient has palliative care visiting home  We plan to start patient on topotecan with growth factor support reviewed logistics prognostic data and potential side effects. NCCN guidelines were reviewed and discussed with the patient. The diagnosis and care plan were discussed with the patient in detail. I discussed the natural history of the disease, prognosis, risks and goals of therapy and answered all the patients questions to the best of my ability. Patient expressed understanding and was in agreement. Rolando Peraza MD          I spent more than 40 minutes examining, evaluating, reviewing data, counseling the patient and coordinating care. Greater than 50% of time was spent face-to-face with the patient this note is created with the assistance of a speech recognition program.  While intending to generate a document that actually reflects the content of the visit, the document can still have some errors including those of syntax and sound a like substitutions which may escape proof reading. It such instances, actual meaning can be extrapolated by contextual diversion.

## 2022-03-18 NOTE — TELEPHONE ENCOUNTER
No tx today  Will place new orders    Inf run notified of instructions    AVS given to  to follow precert    PT was given AVS and an appt schedule    Electronically signed by Manohar Chauhan on 3/18/2022 at 10:32 AM

## 2022-03-21 ENCOUNTER — TELEPHONE (OUTPATIENT)
Dept: INFUSION THERAPY | Age: 72
End: 2022-03-21

## 2022-03-21 NOTE — TELEPHONE ENCOUNTER
Ht=71 inches  Pq=601 lbs  BSA=1.89  Chemotherapy doses verified:  Topotecan 3 mg/m2 = 5.67 mg  PSamann

## 2022-03-21 NOTE — TELEPHONE ENCOUNTER
Chemotherapy orders received:    Ht=71 inches  He=248 lbs  BSA=1.88  Chemotherapy doses verified:  Topotecan 3 mg/m2 = 5.63 mg  Lizzy Espinal RN

## 2022-03-23 ENCOUNTER — HOSPITAL ENCOUNTER (OUTPATIENT)
Dept: INFUSION THERAPY | Age: 72
Discharge: HOME | End: 2022-03-23
Payer: MEDICARE

## 2022-03-23 ENCOUNTER — OFFICE VISIT (OUTPATIENT)
Dept: ONCOLOGY | Age: 72
End: 2022-03-23
Payer: MEDICARE

## 2022-03-23 ENCOUNTER — TELEPHONE (OUTPATIENT)
Dept: ONCOLOGY | Age: 72
End: 2022-03-23

## 2022-03-23 VITALS
TEMPERATURE: 97.9 F | DIASTOLIC BLOOD PRESSURE: 91 MMHG | RESPIRATION RATE: 20 BRPM | SYSTOLIC BLOOD PRESSURE: 137 MMHG | HEART RATE: 124 BPM

## 2022-03-23 VITALS
HEART RATE: 126 BPM | DIASTOLIC BLOOD PRESSURE: 84 MMHG | BODY MASS INDEX: 21.81 KG/M2 | TEMPERATURE: 97.8 F | SYSTOLIC BLOOD PRESSURE: 124 MMHG | WEIGHT: 156.4 LBS | RESPIRATION RATE: 22 BRPM

## 2022-03-23 DIAGNOSIS — C34.91 PRIMARY LUNG CANCER WITH METASTASIS FROM LUNG TO OTHER SITE, RIGHT (HCC): Primary | ICD-10-CM

## 2022-03-23 DIAGNOSIS — R53.1 ASTHENIA: ICD-10-CM

## 2022-03-23 DIAGNOSIS — T45.1X5A ANEMIA ASSOCIATED WITH CHEMOTHERAPY: ICD-10-CM

## 2022-03-23 DIAGNOSIS — G89.3 CANCER ASSOCIATED PAIN: ICD-10-CM

## 2022-03-23 DIAGNOSIS — D64.81 ANEMIA ASSOCIATED WITH CHEMOTHERAPY: ICD-10-CM

## 2022-03-23 LAB
ABSOLUTE EOS #: 0.13 K/UL (ref 0–0.4)
ABSOLUTE LYMPH #: 1.02 K/UL (ref 1–4.8)
ABSOLUTE MONO #: 0.89 K/UL (ref 0.1–0.8)
ALBUMIN SERPL-MCNC: 2.8 G/DL (ref 3.5–5.2)
ALBUMIN/GLOBULIN RATIO: 0.7 (ref 1–2.5)
ALP BLD-CCNC: 136 U/L (ref 40–129)
ALT SERPL-CCNC: 18 U/L (ref 5–41)
ANION GAP SERPL CALCULATED.3IONS-SCNC: 17 MMOL/L (ref 9–17)
AST SERPL-CCNC: 31 U/L
BASOPHILS # BLD: 0 % (ref 0–2)
BASOPHILS ABSOLUTE: 0 K/UL (ref 0–0.2)
BILIRUB SERPL-MCNC: 0.13 MG/DL (ref 0.3–1.2)
BUN BLDV-MCNC: 17 MG/DL (ref 8–23)
CALCIUM SERPL-MCNC: 8.6 MG/DL (ref 8.6–10.4)
CHLORIDE BLD-SCNC: 94 MMOL/L (ref 98–107)
CO2: 24 MMOL/L (ref 20–31)
CREAT SERPL-MCNC: 0.6 MG/DL (ref 0.7–1.2)
EOSINOPHILS RELATIVE PERCENT: 1 % (ref 1–4)
GFR AFRICAN AMERICAN: >60 ML/MIN
GFR NON-AFRICAN AMERICAN: >60 ML/MIN
GFR SERPL CREATININE-BSD FRML MDRD: ABNORMAL ML/MIN/{1.73_M2}
GLUCOSE BLD-MCNC: 122 MG/DL (ref 70–99)
HCT VFR BLD CALC: 26.7 % (ref 41–53)
HEMOGLOBIN: 7.7 G/DL (ref 13.5–17.5)
LYMPHOCYTES # BLD: 8 % (ref 24–44)
MCH RBC QN AUTO: 28.8 PG (ref 26–34)
MCHC RBC AUTO-ENTMCNC: 28.8 G/DL (ref 31–37)
MCV RBC AUTO: 100 FL (ref 80–100)
MONOCYTES # BLD: 7 % (ref 1–7)
MORPHOLOGY: ABNORMAL
PDW BLD-RTO: 18.2 % (ref 12.5–15.4)
PLATELET # BLD: 398 K/UL (ref 140–450)
PMV BLD AUTO: 8.7 FL (ref 8–14)
POTASSIUM SERPL-SCNC: 4 MMOL/L (ref 3.7–5.3)
RBC # BLD: 2.67 M/UL (ref 4.5–5.9)
SEG NEUTROPHILS: 84 % (ref 36–66)
SEGMENTED NEUTROPHILS ABSOLUTE COUNT: 10.66 K/UL (ref 1.8–7.7)
SODIUM BLD-SCNC: 135 MMOL/L (ref 135–144)
TOTAL PROTEIN: 7 G/DL (ref 6.4–8.3)
WBC # BLD: 12.7 K/UL (ref 3.5–11)

## 2022-03-23 PROCEDURE — 1123F ACP DISCUSS/DSCN MKR DOCD: CPT | Performed by: INTERNAL MEDICINE

## 2022-03-23 PROCEDURE — 99214 OFFICE O/P EST MOD 30 MIN: CPT | Performed by: INTERNAL MEDICINE

## 2022-03-23 PROCEDURE — 86920 COMPATIBILITY TEST SPIN: CPT

## 2022-03-23 PROCEDURE — G8428 CUR MEDS NOT DOCUMENT: HCPCS | Performed by: INTERNAL MEDICINE

## 2022-03-23 PROCEDURE — 3017F COLORECTAL CA SCREEN DOC REV: CPT | Performed by: INTERNAL MEDICINE

## 2022-03-23 PROCEDURE — 96413 CHEMO IV INFUSION 1 HR: CPT

## 2022-03-23 PROCEDURE — 80053 COMPREHEN METABOLIC PANEL: CPT

## 2022-03-23 PROCEDURE — 6360000002 HC RX W HCPCS: Performed by: INTERNAL MEDICINE

## 2022-03-23 PROCEDURE — 2580000003 HC RX 258: Performed by: INTERNAL MEDICINE

## 2022-03-23 PROCEDURE — 36591 DRAW BLOOD OFF VENOUS DEVICE: CPT

## 2022-03-23 PROCEDURE — G8420 CALC BMI NORM PARAMETERS: HCPCS | Performed by: INTERNAL MEDICINE

## 2022-03-23 PROCEDURE — 96375 TX/PRO/DX INJ NEW DRUG ADDON: CPT

## 2022-03-23 PROCEDURE — 4040F PNEUMOC VAC/ADMIN/RCVD: CPT | Performed by: INTERNAL MEDICINE

## 2022-03-23 PROCEDURE — 4004F PT TOBACCO SCREEN RCVD TLK: CPT | Performed by: INTERNAL MEDICINE

## 2022-03-23 PROCEDURE — G8484 FLU IMMUNIZE NO ADMIN: HCPCS | Performed by: INTERNAL MEDICINE

## 2022-03-23 PROCEDURE — P9016 RBC LEUKOCYTES REDUCED: HCPCS

## 2022-03-23 PROCEDURE — 86900 BLOOD TYPING SEROLOGIC ABO: CPT

## 2022-03-23 PROCEDURE — 86901 BLOOD TYPING SEROLOGIC RH(D): CPT

## 2022-03-23 PROCEDURE — 36430 TRANSFUSION BLD/BLD COMPNT: CPT

## 2022-03-23 PROCEDURE — 85025 COMPLETE CBC W/AUTO DIFF WBC: CPT

## 2022-03-23 PROCEDURE — 86850 RBC ANTIBODY SCREEN: CPT

## 2022-03-23 RX ORDER — HEPARIN SODIUM (PORCINE) LOCK FLUSH IV SOLN 100 UNIT/ML 100 UNIT/ML
500 SOLUTION INTRAVENOUS PRN
Status: CANCELLED | OUTPATIENT
Start: 2022-04-06

## 2022-03-23 RX ORDER — SODIUM CHLORIDE 9 MG/ML
25 INJECTION, SOLUTION INTRAVENOUS PRN
Status: CANCELLED | OUTPATIENT
Start: 2022-03-30

## 2022-03-23 RX ORDER — MEPERIDINE HYDROCHLORIDE 50 MG/ML
12.5 INJECTION INTRAMUSCULAR; INTRAVENOUS; SUBCUTANEOUS PRN
Status: CANCELLED | OUTPATIENT
Start: 2022-03-23

## 2022-03-23 RX ORDER — ACETAMINOPHEN 325 MG/1
650 TABLET ORAL
Status: CANCELLED | OUTPATIENT
Start: 2022-03-30

## 2022-03-23 RX ORDER — SODIUM CHLORIDE 9 MG/ML
INJECTION, SOLUTION INTRAVENOUS CONTINUOUS
Status: CANCELLED | OUTPATIENT
Start: 2022-03-30

## 2022-03-23 RX ORDER — HEPARIN SODIUM (PORCINE) LOCK FLUSH IV SOLN 100 UNIT/ML 100 UNIT/ML
500 SOLUTION INTRAVENOUS PRN
Status: CANCELLED | OUTPATIENT
Start: 2022-03-30

## 2022-03-23 RX ORDER — ACETAMINOPHEN 325 MG/1
650 TABLET ORAL
Status: CANCELLED | OUTPATIENT
Start: 2022-04-13

## 2022-03-23 RX ORDER — ONDANSETRON 2 MG/ML
8 INJECTION INTRAMUSCULAR; INTRAVENOUS
Status: CANCELLED | OUTPATIENT
Start: 2022-04-13

## 2022-03-23 RX ORDER — MEPERIDINE HYDROCHLORIDE 50 MG/ML
12.5 INJECTION INTRAMUSCULAR; INTRAVENOUS; SUBCUTANEOUS PRN
Status: CANCELLED | OUTPATIENT
Start: 2022-04-13

## 2022-03-23 RX ORDER — SODIUM CHLORIDE 9 MG/ML
25 INJECTION, SOLUTION INTRAVENOUS PRN
Status: CANCELLED | OUTPATIENT
Start: 2022-04-13

## 2022-03-23 RX ORDER — HEPARIN SODIUM (PORCINE) LOCK FLUSH IV SOLN 100 UNIT/ML 100 UNIT/ML
500 SOLUTION INTRAVENOUS PRN
Status: DISCONTINUED | OUTPATIENT
Start: 2022-03-23 | End: 2022-03-24 | Stop reason: HOSPADM

## 2022-03-23 RX ORDER — SODIUM CHLORIDE 9 MG/ML
INJECTION, SOLUTION INTRAVENOUS PRN
Status: COMPLETED | OUTPATIENT
Start: 2022-03-23 | End: 2022-03-23

## 2022-03-23 RX ORDER — DIPHENHYDRAMINE HYDROCHLORIDE 50 MG/ML
50 INJECTION INTRAMUSCULAR; INTRAVENOUS
Status: CANCELLED | OUTPATIENT
Start: 2022-03-30

## 2022-03-23 RX ORDER — EPINEPHRINE 1 MG/ML
0.3 INJECTION, SOLUTION, CONCENTRATE INTRAVENOUS PRN
Status: CANCELLED | OUTPATIENT
Start: 2022-03-30

## 2022-03-23 RX ORDER — MEPERIDINE HYDROCHLORIDE 50 MG/ML
12.5 INJECTION INTRAMUSCULAR; INTRAVENOUS; SUBCUTANEOUS PRN
Status: CANCELLED | OUTPATIENT
Start: 2022-03-30

## 2022-03-23 RX ORDER — ALBUTEROL SULFATE 90 UG/1
4 AEROSOL, METERED RESPIRATORY (INHALATION) PRN
Status: CANCELLED | OUTPATIENT
Start: 2022-03-23

## 2022-03-23 RX ORDER — SODIUM CHLORIDE 9 MG/ML
20 INJECTION, SOLUTION INTRAVENOUS ONCE
Status: CANCELLED | OUTPATIENT
Start: 2022-04-06 | End: 2022-04-06

## 2022-03-23 RX ORDER — ONDANSETRON 2 MG/ML
8 INJECTION INTRAMUSCULAR; INTRAVENOUS
Status: CANCELLED | OUTPATIENT
Start: 2022-03-30

## 2022-03-23 RX ORDER — EPINEPHRINE 1 MG/ML
0.3 INJECTION, SOLUTION, CONCENTRATE INTRAVENOUS PRN
Status: CANCELLED | OUTPATIENT
Start: 2022-04-13

## 2022-03-23 RX ORDER — SODIUM CHLORIDE 9 MG/ML
20 INJECTION, SOLUTION INTRAVENOUS ONCE
Status: CANCELLED | OUTPATIENT
Start: 2022-03-23 | End: 2022-03-23

## 2022-03-23 RX ORDER — SODIUM CHLORIDE 0.9 % (FLUSH) 0.9 %
5-40 SYRINGE (ML) INJECTION PRN
Status: CANCELLED | OUTPATIENT
Start: 2022-03-23

## 2022-03-23 RX ORDER — HEPARIN SODIUM (PORCINE) LOCK FLUSH IV SOLN 100 UNIT/ML 100 UNIT/ML
500 SOLUTION INTRAVENOUS PRN
Status: CANCELLED | OUTPATIENT
Start: 2022-03-23

## 2022-03-23 RX ORDER — EPINEPHRINE 1 MG/ML
0.3 INJECTION, SOLUTION, CONCENTRATE INTRAVENOUS PRN
Status: CANCELLED | OUTPATIENT
Start: 2022-03-23

## 2022-03-23 RX ORDER — ALBUTEROL SULFATE 90 UG/1
4 AEROSOL, METERED RESPIRATORY (INHALATION) PRN
Status: CANCELLED | OUTPATIENT
Start: 2022-04-13

## 2022-03-23 RX ORDER — DIPHENHYDRAMINE HYDROCHLORIDE 50 MG/ML
50 INJECTION INTRAMUSCULAR; INTRAVENOUS
Status: CANCELLED | OUTPATIENT
Start: 2022-03-23

## 2022-03-23 RX ORDER — SODIUM CHLORIDE 9 MG/ML
25 INJECTION, SOLUTION INTRAVENOUS PRN
Status: CANCELLED | OUTPATIENT
Start: 2022-03-23

## 2022-03-23 RX ORDER — SODIUM CHLORIDE 0.9 % (FLUSH) 0.9 %
5-40 SYRINGE (ML) INJECTION PRN
Status: CANCELLED | OUTPATIENT
Start: 2022-03-30

## 2022-03-23 RX ORDER — DEXAMETHASONE SODIUM PHOSPHATE 4 MG/ML
8 INJECTION, SOLUTION INTRA-ARTICULAR; INTRALESIONAL; INTRAMUSCULAR; INTRAVENOUS; SOFT TISSUE ONCE
Status: COMPLETED | OUTPATIENT
Start: 2022-03-23 | End: 2022-03-23

## 2022-03-23 RX ORDER — SODIUM CHLORIDE 9 MG/ML
INJECTION, SOLUTION INTRAVENOUS CONTINUOUS
Status: CANCELLED | OUTPATIENT
Start: 2022-04-13

## 2022-03-23 RX ORDER — DIPHENHYDRAMINE HYDROCHLORIDE 50 MG/ML
50 INJECTION INTRAMUSCULAR; INTRAVENOUS
Status: CANCELLED | OUTPATIENT
Start: 2022-04-13

## 2022-03-23 RX ORDER — SODIUM CHLORIDE 9 MG/ML
20 INJECTION, SOLUTION INTRAVENOUS ONCE
Status: CANCELLED | OUTPATIENT
Start: 2022-03-30 | End: 2022-03-30

## 2022-03-23 RX ORDER — SODIUM CHLORIDE 9 MG/ML
5-40 INJECTION INTRAVENOUS PRN
Status: CANCELLED | OUTPATIENT
Start: 2022-03-30

## 2022-03-23 RX ORDER — SODIUM CHLORIDE 0.9 % (FLUSH) 0.9 %
5-40 SYRINGE (ML) INJECTION PRN
Status: CANCELLED | OUTPATIENT
Start: 2022-04-06

## 2022-03-23 RX ORDER — ONDANSETRON 2 MG/ML
8 INJECTION INTRAMUSCULAR; INTRAVENOUS
Status: CANCELLED | OUTPATIENT
Start: 2022-03-23

## 2022-03-23 RX ORDER — SODIUM CHLORIDE 9 MG/ML
5-40 INJECTION INTRAVENOUS PRN
Status: CANCELLED | OUTPATIENT
Start: 2022-03-23

## 2022-03-23 RX ORDER — ACETAMINOPHEN 325 MG/1
650 TABLET ORAL
Status: CANCELLED | OUTPATIENT
Start: 2022-03-23

## 2022-03-23 RX ORDER — ALBUTEROL SULFATE 90 UG/1
4 AEROSOL, METERED RESPIRATORY (INHALATION) PRN
Status: CANCELLED | OUTPATIENT
Start: 2022-03-30

## 2022-03-23 RX ORDER — SODIUM CHLORIDE 9 MG/ML
INJECTION, SOLUTION INTRAVENOUS CONTINUOUS
Status: CANCELLED | OUTPATIENT
Start: 2022-03-23

## 2022-03-23 RX ORDER — SODIUM CHLORIDE 9 MG/ML
INJECTION, SOLUTION INTRAVENOUS CONTINUOUS
Status: DISCONTINUED | OUTPATIENT
Start: 2022-03-23 | End: 2022-03-24 | Stop reason: HOSPADM

## 2022-03-23 RX ORDER — SODIUM CHLORIDE 0.9 % (FLUSH) 0.9 %
5-40 SYRINGE (ML) INJECTION PRN
Status: DISCONTINUED | OUTPATIENT
Start: 2022-03-23 | End: 2022-03-24 | Stop reason: HOSPADM

## 2022-03-23 RX ORDER — SODIUM CHLORIDE 9 MG/ML
5-40 INJECTION INTRAVENOUS PRN
Status: CANCELLED | OUTPATIENT
Start: 2022-04-13

## 2022-03-23 RX ADMIN — SODIUM CHLORIDE, PRESERVATIVE FREE 20 ML: 5 INJECTION INTRAVENOUS at 10:00

## 2022-03-23 RX ADMIN — TOPOTECAN 5.7 MG: 1 INJECTION, SOLUTION, CONCENTRATE INTRAVENOUS at 11:42

## 2022-03-23 RX ADMIN — DEXAMETHASONE SODIUM PHOSPHATE 8 MG: 4 INJECTION, SOLUTION INTRAMUSCULAR; INTRAVENOUS at 11:16

## 2022-03-23 RX ADMIN — Medication 500 UNITS: at 14:01

## 2022-03-23 RX ADMIN — SODIUM CHLORIDE, PRESERVATIVE FREE 10 ML: 5 INJECTION INTRAVENOUS at 14:01

## 2022-03-23 RX ADMIN — SODIUM CHLORIDE: 9 INJECTION, SOLUTION INTRAVENOUS at 11:16

## 2022-03-23 RX ADMIN — SODIUM CHLORIDE: 9 INJECTION, SOLUTION INTRAVENOUS at 12:16

## 2022-03-23 NOTE — PROGRESS NOTES
Treatment plan reviewed for chemotherapy medications     BSA 1.9 m2     21 day cycle - Topotecan on days 1,8,15     Topotecan IV 3mg/m2 = 5.7mg   Neulasta OBI on day 15 of each cycle     Bea Mistry,   PharmD  3/23/2022 7:46 AM

## 2022-03-23 NOTE — TELEPHONE ENCOUNTER
tx today  rv in 2 weeks with tx  prbc x 1 unit transfusion today or Friday    Tx today as scheduled    RV scheduled 4/6/22 @ 9:45am w/ tx to follow    PT was given AVS and an appt schedule    Electronically signed by Anisa North on 3/23/2022 at 12:25 PM

## 2022-03-23 NOTE — PROGRESS NOTES
Roseann Pandey                                                                                                                  3/23/2022  MRN:   [id number]  YOB: 1950  PCP:                           Ana Rosa Washington MD  Referring Physician: No ref. provider found  Treating Physician Name: Zahida Padilla MD      Reason for visit:  Chief Complaint   Patient presents with    Other     review status of disease     Chemotherapy     C1 topocean    Discussed treatment plan. Toxicity check. 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 Maintenance Tecentriq, 3/2021  Lurbenectidin-7/2/2021  Topotecan-3/2022    Summary of Case/History:    Roseann Pandey a 80 y.o.male is a patient diagnosed with lung cancer with the component of small cell as well as squamous cell carcinoma    Patient has a significant history of tobacco dependence and underwent CT lung screening in December 2018. CT scan was read as lung rads degree 4B. Subsequently she underwent biopsy of right upper lobe lung nodule on 1/16/19. 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 CT PET done showed abnormal FDG uptake in the right upper lobe nodule. There was also abnormal FDG uptake in the right lower lobe nodule.   Additionally right hilar lymph node were also FDG avid. No bony lesion was reported. MRI brain for staging workup did not show any evidence of intracranial metastasis. Tip of the odontoid process was ill-defined and metastasis could not be ruled out. Clinically patient does give history of trauma to the neck area any years ago however denies any surgery history. Bone scan did not reveal any metastasis     Patient continues to smoke but has cut down quite a bit. He works full-time in a [address] Patient has good performance status, ECOG 0. Patient's other medical problems include dyslipidemia and hypertension. He also has arthritis pain    Started patient on concurrent chemoradiation using cisplatin and etoposide with dose adjustment for renal dysfunction. Chemotherapy changed to carboplatin and etoposide from cycle #2 due to renal dysfunction    Received PCI in July 2019. Interim History:    Patient presents to the clinic with his wife for a follow-up visit with cycle #1 and to review treatment plan and goals of therapy. He is undergoing physical therapy at home. His fatigue is unchanged. He is off anti-coagulation, continues with sodium twice daily. He denies any bleeding. Patient is very clear in his goals that he wants to proceed with chemotherapy. During this visit patient's allergy, social, medical, surgical history and medications were reviewed and updated. Past Medical History:   Past Medical History:   Diagnosis Date    DDD (degenerative disc disease), cervical     Gout     Heart murmur     hx. of when younger.     Hyperlipidemia     Hypertension     Lung cancer ([address]     right lung    MI (myocardial infarction) ([address]     Skin cancer     face    Wears glasses        Past Surgical History:     Past Surgical History:   Procedure Laterality Date    APPENDECTOMY      CARDIAC CATHETERIZATION      w/stent    COLONOSCOPY      CYST INCISION AND DRAINAGE Right 05/14/2020    rt elbow I and D and left knee aspiration with cultures    FOOT SURGERY Right     2nd toe(bone spur).  FOREARM SURGERY Right 5/14/2020    RIGHT ELBOW IRRIGATION AND DEBRIDEMENT performed by Gaye Cam DO at [facility]  12/13/2021    IR CHEST TUBE INSERTION 12/13/2021 [facility]    KNEE SURGERY Left 5/14/2020    KNEE ASPIRATION WITH CULTURES SENT performed by Gaye Cam DO at [facility]      face under lt. eye.  TONSILLECTOMY         Patient Family Social History:    Family History   Problem Relation Age of Onset    Cancer Father         lung cancer      Social History     Socioeconomic History    Marital status:      Spouse name: Not on file    Number of children: Not on file    Years of education: Not on file    Highest education level: Not on file   Occupational History    Not on file   Tobacco Use    Smoking status: Current Every Day Smoker     Packs/day: 0.50     Types: Cigarettes    Smokeless tobacco: Former User   Vaping Use    Vaping Use: Former   Substance and Sexual Activity    Alcohol use: No    Drug use: Yes     Frequency: 14.0 times per week     Types: Marijuana [employer])    Sexual activity: Not on file   Other Topics Concern    Not on file   Social History Narrative    Not on file     Social Determinants of Health     Financial Resource Strain:     Difficulty of Paying Living Expenses: Not on file   Food Insecurity:     Worried About [facility] in the Last Year: Not on file    [facility] in the Last Year: Not on file   Transportation Needs:     Lack of Transportation (Medical): Not on file    Lack of Transportation (Non-Medical):  Not on file   Physical Activity:     Days of Exercise per Week: Not on file    Minutes of Exercise per Session: Not on file   Stress:     Feeling of Stress : Not on file   Social Connections:     Frequency of Communication with Friends and Family: Not on file    Frequency of Social Gatherings with Friends and Family: Not on file    Attends Catholic Services: Not on file    Active Member of Clubs or Organizations: Not on file    Attends Club or Organization Meetings: Not on file    Marital Status: Not on file   Intimate Partner Violence:     Fear of Current or Ex-Partner: Not on file    Emotionally Abused: Not on file    Physically Abused: Not on file    Sexually Abused: Not on file   Housing Stability:     Unable to Pay for Housing in the Last Year: Not on file    Number of [place] in the Last Year: Not on file    Unstable Housing in the Last Year: Not on file      Current Medications:     Current Outpatient Medications   Medication Sig Dispense Refill    oxyCODONE-acetaminophen (PERCOCET) 5-325 MG per tablet Take 1 tablet by mouth every 6 hours as needed for Pain for up to 120 days. 12 tablet 0    sodium chloride 1 g tablet TAKE 1 TABLET BY MOUTH TWICE A DAY 60 tablet 1    oxyCODONE-acetaminophen (PERCOCET) 5-325 MG per tablet Take 1 tablet by mouth every 6 hours as needed for Pain for up to 30 days. 120 tablet 0    midodrine (PROAMATINE) 5 MG tablet Take 1 tablet by mouth 3 times daily 90 tablet 3    docusate sodium (COLACE) 100 MG capsule Take 100 mg by mouth 2 times daily      B Complex-C-Folic Acid (ANGELICA-HUGO) TABS TAKE 1 TABLET BY MOUTH DAILY.  HEMATINIC VIT MINERALS      traZODone (DESYREL) 100 MG tablet Take 100 mg by mouth nightly       ipratropium-albuterol (DUONEB) 0.5-2.5 (3) MG/3ML SOLN nebulizer solution Inhale 3 mLs into the lungs 4 times daily      potassium chloride (MICRO-K) 10 MEQ extended release capsule Take 20 mEq by mouth daily       Ferrous Fumarate (FERROCITE) 324 (106 Fe) MG TABS Take 324 mg by mouth daily       apixaban (ELIQUIS) 5 MG TABS tablet Take 1 tablet by mouth 2 times daily 60 tablet 1    tamsulosin (FLOMAX) 0.4 MG capsule TAKE 1 CAPSULE BY MOUTH EVERY DAY 30 capsule 5    Handicap Placard MISC by Does not apply route 1 each 0    lidocaine-prilocaine (EMLA) 2.5-2.5 % cream Apply topically as needed. Apply a quarter size amount to port site 1 hour before chemotherapy. Cover with plastic wrap. 30 g 0    acetaminophen (TYLENOL) 325 MG tablet Take 650 mg by mouth every 6 hours as needed for Pain      allopurinol (ZYLOPRIM) 100 MG tablet Take 100 mg by mouth daily      simvastatin (ZOCOR) 40 MG tablet Take 40 mg by mouth daily        Current Facility-Administered Medications   Medication Dose Route Frequency Provider Last Rate Last Admin    0.9 % sodium chloride infusion   IntraVENous PRN Vikki Hendricks MD           Allergies:   Patient has no known allergies. Review of Systems:    Constitutional: No fever or chills. No night sweats. Positive for fatigue. Positive for loss of appetite and cachexia. Positive for difficulty going to sleep  Eyes: No eye discharge, double vision, or eye pain   HEENT: negative for sore mouth, sore throat, hoarseness and voice change;  +drooling   Respiratory: negative for cough, sputum, wheezing, hemoptysis, chest pain;  Cardiovascular: negative for chest pain, palpitations, orthopnea, PND   Gastrointestinal: negative for nausea, vomiting, diarrhea, constipation, abdominal pain, Dysphagia, hematemesis and hematochezia   Genitourinary: negative for frequency, dysuria, nocturia, urinary incontinence, and hematuria   Integument: Positive for easy bruising - stable +rasied area to the left of the spine that is tender +bed sores  Hematologic/Lymphatic: negative for easy bleeding, lymphadenopathy, or petechiae   Endocrine: negative for heat or cold intolerance,weight changes, change in bowel habits and hair loss   Musculoskeletal: Positive joint pain.  +right shoulder pain  Neurological: negative for headaches, dizziness, seizures, weakness; + poor balance; +neuropathy in left hand from shoulder +poor short term memory        Physical Exam:    Vitals: /84   Pulse 126   Temp 97.8 °F (36.6 °C) (Oral) Resp 22   Wt 156 lb 6.4 oz (70.9 kg)   BMI 21.81 kg/m²   General appearance -patient not in acute distress  Mental status - AAO X3  Eyes - pupils equal and reactive, extraocular eye movements intact  Mouth - mucous membranes moist, pharynx normal without lesions  Neck - supple, no significant adenopathy  Lymphatics - no palpable lymphadenopathy, no hepatosplenomegaly  Chest -decreased breathing sounds bilaterally  Heart - normal rate, regular rhythm, normal S1, S2, no murmurs  Abdomen - soft, nontender, nondistended, no masses or organomegaly  Neurological - alert, oriented, normal speech, no focal findings or movement disorder noted  Extremities -positive lower extremity edema  Skin - normal coloration and turgor, no rashes, no suspicious skin lesions noted; +raised area with tenderness to the left of the spine, no erythema, back shows improved      DATA:    Labs:   Lab Results   Component Value Date    WBC 12.7 (H) 03/23/2022    HGB 7.7 (L) 03/23/2022    HCT 26.7 (L) 03/23/2022    .0 03/23/2022     03/23/2022     Lab Results   Component Value Date    NEUTROABS PENDING 03/23/2022           Chemistry        Component Value Date/Time     (L) 03/23/2022 0953    K 4.0 03/23/2022 0953    CL 94 (L) 03/23/2022 0953    CO2 24 03/23/2022 0953    BUN 17 03/23/2022 0953    CREATININE 0.60 (L) 03/23/2022 0953        Component Value Date/Time    CALCIUM 8.6 03/23/2022 0953    ALKPHOS 136 (H) 03/23/2022 0953    AST 31 03/23/2022 0953    ALT 18 03/23/2022 0953    BILITOT 0.13 (L) 03/23/2022 0953           Impression:  Limited stage Right lung cancer with small cell and squamous cell component, stage IIIa (T3,N1,M0)  Left adrenal metastasis, CT 2/2020, S/P SBRT 07/2020  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   I had discussion with the patient about current treatment plan including agents, dosing schedule, potential side effects, and goals of therapy and monitoring for response. His lab work was reviewed, anemia persists, we are planning on transfusion, today with treatment if possible, benefits and risks were discussed. Patient continues to be off anticoagulation. Denies any obvious bleeding causing anemia. Could be due to myelosuppression. Revisited goals and expectation. Patient is very clear that he wants to pursue further treatment  With use topotecan with weekly schedule as opposed to day 1 through 5 schedule for better tolerance. We will assess response after 2-3 cycles. Patient was not able to complete MRI brain earlier. We will treat today as per orders. Return in 1 week. NCCN guidelines were reviewed and discussed with the patient. The diagnosis and care plan were discussed with the patient in detail. I discussed the natural history of the disease, prognosis, risks and goals of therapy and answered all the patients questions to the best of my ability. Patient expressed understanding and was in agreement. Scribe Attestation   This note was created by Huan Lemons acting as scribe for the physician signing this note  Electronically Signed  Cheri Giraldo, 3/23/2022  Scribe, [employer]. Attending Attestation   Note was reviewed and edited. I am in agreement with the note as entered    Heather Brenner MD          This note is created with the assistance of a speech recognition program.  While intending to generate a document that actually reflects the content of the visit, the document can still have some errors including those of syntax and sound a like substitutions which may escape proof reading. It such instances, actual meaning can be extrapolated by contextual diversion.

## 2022-03-23 NOTE — FLOWSHEET NOTE
SPIRITUAL CARE PROGRESS NOTE: Outpatient Oncology Care at [facility]    Spiritual Assessment: Patient and Spouse were in the treatment cubicle of the infusion clinic. They smiled and greeted writer. Patient shared that he was starting a new chemotherapy today. Spouse noted that the previous one stopped working. Patient expressed hopes that the medicine will work. Patient and Spouse accessed their sense of humor. They noted that they have been  for 38 years. Pt affirmed Spouse for being there for him. Intervention: Writer provided supportive presence and active listening; inquired about Pt's and Spouse's coping and needs; offered words of support and encouragement; affirmed Pt's and Spouse's strengths. Outcome: Patient shared how he was doing. Patient and Spouse expressed hopes that the medicine will work. Pt and Spouse accessed their sense of humor. They thanked writer. Plan: Chaplains will remain available to provide emotional and spiritual support as needed. 03/23/22 1318   Encounter Summary   Services provided to: Patient and family together   Referral/Consult From: [address] [facility] Family members; Children;Spouse   Complexity of Encounter   (3/23/22)   Length of Encounter 15 minutes   Spiritual Assessment Completed Yes   Routine   Type Follow up   Spiritual/Scientologist   Type Spiritual support   Assessment Calm; Approachable;Coping; Hopeful   Intervention Active listening;Explored feelings, thoughts, concerns;Explored coping resources;Sustaining presence/ Ministry of presence; Discussed relationship with God;Discussed belief system/Yazdanism practices/brennen;Discussed illness/injury and it's impact; Discussed meaning/purpose   Outcome Coping;Expressed feelings/needs/concerns;Engaged in conversation;Expressed gratitude;Receptive; Hopeful;Grieving     Electronically signed by Mckenzie Morales, Oncology Outpatient [address] [facility] Oncology  3/23/2022  1:20 PM

## 2022-03-23 NOTE — PROGRESS NOTES
Patient arrived for C1D1 topotecan   Labs collected and reviewed   Sen by Dr Kary Nix in tx   HGB 7.7 - 1 unit PRBC given w/o incident   [facility] infor and new consent obtained   Tolerated w/o incident   Return 3/30 tx 4/6 dr Gideon Johnson, RN

## 2022-03-25 ENCOUNTER — TELEPHONE (OUTPATIENT)
Dept: INTERVENTIONAL RADIOLOGY/VASCULAR | Age: 72
End: 2022-03-25

## 2022-03-25 ENCOUNTER — TELEPHONE (OUTPATIENT)
Dept: INFUSION THERAPY | Age: 72
End: 2022-03-25

## 2022-03-25 NOTE — TELEPHONE ENCOUNTER
Attempted to schedule pt for biopsy/thoracentesis. Pt stated that he was unaware that this was being ordered and would like to speak to ordering physician first. Call back number given.

## 2022-03-25 NOTE — TELEPHONE ENCOUNTER
Pt and pt's wife called stating Dr. Sherron Godinez ordered a liver bx, and they are unsure why. Unable to access [facility] [address]Alex Dunner advised for pt to call Dr. Keturah Lucas office; they state they are closed on Friday's. They are concerned and do not want to proceed with bx if it is not necessary. Writer discussed with Dr. Paras Griffiths; he states he is unsure why this would need to be ordered. He states he does not need it on his end, because he knows it is cancer. He states pt should call Dr. Andrzej Puga office to see why the bx is ordered. Writer notified pt and pt's wife of this. They will call Dr. Sherron Godinez on Monday and will call our office back to let Dr. Paras Griffiths know why it is ordered and if he feels it is necessary.

## 2022-03-30 ENCOUNTER — HOSPITAL ENCOUNTER (OUTPATIENT)
Dept: INFUSION THERAPY | Age: 72
Discharge: HOME | End: 2022-03-30
Payer: MEDICARE

## 2022-03-30 VITALS
WEIGHT: 157.2 LBS | SYSTOLIC BLOOD PRESSURE: 109 MMHG | RESPIRATION RATE: 20 BRPM | HEART RATE: 114 BPM | BODY MASS INDEX: 21.92 KG/M2 | TEMPERATURE: 97.7 F | DIASTOLIC BLOOD PRESSURE: 73 MMHG

## 2022-03-30 DIAGNOSIS — C34.91 PRIMARY LUNG CANCER WITH METASTASIS FROM LUNG TO OTHER SITE, RIGHT (HCC): Primary | ICD-10-CM

## 2022-03-30 LAB
ABSOLUTE EOS #: 0 K/UL (ref 0–0.4)
ABSOLUTE LYMPH #: 0.2 K/UL (ref 1–4.8)
ABSOLUTE MONO #: 0.2 K/UL (ref 0.1–0.8)
ALBUMIN SERPL-MCNC: 3 G/DL (ref 3.5–5.2)
ALBUMIN/GLOBULIN RATIO: 0.7 (ref 1–2.5)
ALP BLD-CCNC: 158 U/L (ref 40–129)
ALT SERPL-CCNC: 26 U/L (ref 5–41)
ANION GAP SERPL CALCULATED.3IONS-SCNC: 9 MMOL/L (ref 9–17)
AST SERPL-CCNC: 40 U/L
BASOPHILS # BLD: 0 % (ref 0–2)
BASOPHILS ABSOLUTE: 0 K/UL (ref 0–0.2)
BILIRUB SERPL-MCNC: 0.22 MG/DL (ref 0.3–1.2)
BUN BLDV-MCNC: 16 MG/DL (ref 8–23)
CALCIUM SERPL-MCNC: 8.7 MG/DL (ref 8.6–10.4)
CHLORIDE BLD-SCNC: 96 MMOL/L (ref 98–107)
CO2: 27 MMOL/L (ref 20–31)
CREAT SERPL-MCNC: 0.53 MG/DL (ref 0.7–1.2)
EOSINOPHILS RELATIVE PERCENT: 0 % (ref 1–4)
GFR AFRICAN AMERICAN: >60 ML/MIN
GFR NON-AFRICAN AMERICAN: >60 ML/MIN
GFR SERPL CREATININE-BSD FRML MDRD: ABNORMAL ML/MIN/{1.73_M2}
GLUCOSE BLD-MCNC: 113 MG/DL (ref 70–99)
HCT VFR BLD CALC: 26.4 % (ref 41–53)
HEMOGLOBIN: 8.7 G/DL (ref 13.5–17.5)
LYMPHOCYTES # BLD: 3 % (ref 24–44)
MCH RBC QN AUTO: 29.5 PG (ref 26–34)
MCHC RBC AUTO-ENTMCNC: 32.8 G/DL (ref 31–37)
MCV RBC AUTO: 89.8 FL (ref 80–100)
MONOCYTES # BLD: 3 % (ref 1–7)
MORPHOLOGY: ABNORMAL
PDW BLD-RTO: 18.5 % (ref 12.5–15.4)
PLATELET # BLD: 220 K/UL (ref 140–450)
PMV BLD AUTO: 6.4 FL (ref 6–12)
POTASSIUM SERPL-SCNC: 4.9 MMOL/L (ref 3.7–5.3)
RBC # BLD: 2.94 M/UL (ref 4.5–5.9)
SEG NEUTROPHILS: 94 % (ref 36–66)
SEGMENTED NEUTROPHILS ABSOLUTE COUNT: 6.4 K/UL (ref 1.8–7.7)
SODIUM BLD-SCNC: 132 MMOL/L (ref 135–144)
TOTAL PROTEIN: 7.3 G/DL (ref 6.4–8.3)
WBC # BLD: 6.8 K/UL (ref 3.5–11)

## 2022-03-30 PROCEDURE — 36591 DRAW BLOOD OFF VENOUS DEVICE: CPT

## 2022-03-30 PROCEDURE — 2580000003 HC RX 258: Performed by: INTERNAL MEDICINE

## 2022-03-30 PROCEDURE — 96413 CHEMO IV INFUSION 1 HR: CPT

## 2022-03-30 PROCEDURE — 80053 COMPREHEN METABOLIC PANEL: CPT

## 2022-03-30 PROCEDURE — 6360000002 HC RX W HCPCS: Performed by: INTERNAL MEDICINE

## 2022-03-30 PROCEDURE — 85025 COMPLETE CBC W/AUTO DIFF WBC: CPT

## 2022-03-30 PROCEDURE — 36415 COLL VENOUS BLD VENIPUNCTURE: CPT

## 2022-03-30 PROCEDURE — 96375 TX/PRO/DX INJ NEW DRUG ADDON: CPT

## 2022-03-30 RX ORDER — HEPARIN SODIUM (PORCINE) LOCK FLUSH IV SOLN 100 UNIT/ML 100 UNIT/ML
500 SOLUTION INTRAVENOUS PRN
Status: DISCONTINUED | OUTPATIENT
Start: 2022-03-30 | End: 2022-03-31 | Stop reason: HOSPADM

## 2022-03-30 RX ORDER — SODIUM CHLORIDE 9 MG/ML
25 INJECTION, SOLUTION INTRAVENOUS PRN
Status: DISCONTINUED | OUTPATIENT
Start: 2022-03-30 | End: 2022-03-31 | Stop reason: HOSPADM

## 2022-03-30 RX ORDER — SODIUM CHLORIDE 0.9 % (FLUSH) 0.9 %
5-40 SYRINGE (ML) INJECTION PRN
Status: DISCONTINUED | OUTPATIENT
Start: 2022-03-30 | End: 2022-03-31 | Stop reason: HOSPADM

## 2022-03-30 RX ORDER — DEXAMETHASONE SODIUM PHOSPHATE 4 MG/ML
8 INJECTION, SOLUTION INTRA-ARTICULAR; INTRALESIONAL; INTRAMUSCULAR; INTRAVENOUS; SOFT TISSUE ONCE
Status: COMPLETED | OUTPATIENT
Start: 2022-03-30 | End: 2022-03-30

## 2022-03-30 RX ADMIN — SODIUM CHLORIDE 250 ML: 9 INJECTION, SOLUTION INTRAVENOUS at 14:39

## 2022-03-30 RX ADMIN — TOPOTECAN 5.7 MG: 1 INJECTION, SOLUTION, CONCENTRATE INTRAVENOUS at 15:00

## 2022-03-30 RX ADMIN — HEPARIN 500 UNITS: 100 SYRINGE at 15:43

## 2022-03-30 RX ADMIN — SODIUM CHLORIDE, PRESERVATIVE FREE 10 ML: 5 INJECTION INTRAVENOUS at 15:43

## 2022-03-30 RX ADMIN — DEXAMETHASONE SODIUM PHOSPHATE 8 MG: 4 INJECTION, SOLUTION INTRAMUSCULAR; INTRAVENOUS at 14:42

## 2022-03-30 NOTE — ONCOLOGY
Patient arrived for C1D8 topotecan. Labs reviewed and within treatable limits. Treatment completed without issue. Pt stable at discharge. Scheduled to return 4/6/22 for MD visit and C1D15.

## 2022-04-06 ENCOUNTER — HOSPITAL ENCOUNTER (OUTPATIENT)
Dept: INFUSION THERAPY | Age: 72
Discharge: HOME | End: 2022-04-06
Payer: MEDICARE

## 2022-04-06 ENCOUNTER — OFFICE VISIT (OUTPATIENT)
Dept: ONCOLOGY | Age: 72
End: 2022-04-06
Payer: MEDICARE

## 2022-04-06 ENCOUNTER — TELEPHONE (OUTPATIENT)
Dept: ONCOLOGY | Age: 72
End: 2022-04-06

## 2022-04-06 VITALS
RESPIRATION RATE: 16 BRPM | TEMPERATURE: 97.4 F | HEART RATE: 103 BPM | SYSTOLIC BLOOD PRESSURE: 110 MMHG | DIASTOLIC BLOOD PRESSURE: 69 MMHG

## 2022-04-06 VITALS
TEMPERATURE: 97.1 F | BODY MASS INDEX: 22.04 KG/M2 | WEIGHT: 158 LBS | HEART RATE: 123 BPM | SYSTOLIC BLOOD PRESSURE: 107 MMHG | DIASTOLIC BLOOD PRESSURE: 68 MMHG

## 2022-04-06 DIAGNOSIS — T45.1X5A CHEMOTHERAPY-INDUCED THROMBOCYTOPENIA: ICD-10-CM

## 2022-04-06 DIAGNOSIS — R53.1 ASTHENIA: ICD-10-CM

## 2022-04-06 DIAGNOSIS — C34.91 PRIMARY LUNG CANCER WITH METASTASIS FROM LUNG TO OTHER SITE, RIGHT (HCC): Primary | ICD-10-CM

## 2022-04-06 DIAGNOSIS — D69.59 CHEMOTHERAPY-INDUCED THROMBOCYTOPENIA: ICD-10-CM

## 2022-04-06 DIAGNOSIS — G89.3 CANCER ASSOCIATED PAIN: ICD-10-CM

## 2022-04-06 DIAGNOSIS — Z71.89 GOALS OF CARE, COUNSELING/DISCUSSION: ICD-10-CM

## 2022-04-06 DIAGNOSIS — T45.1X5A ANEMIA ASSOCIATED WITH CHEMOTHERAPY: ICD-10-CM

## 2022-04-06 DIAGNOSIS — D64.81 ANEMIA ASSOCIATED WITH CHEMOTHERAPY: ICD-10-CM

## 2022-04-06 LAB
ABSOLUTE EOS #: 0.04 K/UL (ref 0–0.4)
ABSOLUTE LYMPH #: 0.21 K/UL (ref 1–4.8)
ABSOLUTE MONO #: 0.29 K/UL (ref 0.1–0.8)
ALBUMIN SERPL-MCNC: 2.9 G/DL (ref 3.5–5.2)
ALBUMIN/GLOBULIN RATIO: 0.7 (ref 1–2.5)
ALP BLD-CCNC: 168 U/L (ref 40–129)
ALT SERPL-CCNC: 29 U/L (ref 5–41)
ANION GAP SERPL CALCULATED.3IONS-SCNC: 9 MMOL/L (ref 9–17)
AST SERPL-CCNC: 32 U/L
BASOPHILS # BLD: 0 % (ref 0–2)
BASOPHILS ABSOLUTE: 0 K/UL (ref 0–0.2)
BILIRUB SERPL-MCNC: 0.15 MG/DL (ref 0.3–1.2)
BUN BLDV-MCNC: 18 MG/DL (ref 8–23)
CALCIUM SERPL-MCNC: 8.9 MG/DL (ref 8.6–10.4)
CHLORIDE BLD-SCNC: 99 MMOL/L (ref 98–107)
CO2: 26 MMOL/L (ref 20–31)
CREAT SERPL-MCNC: 0.55 MG/DL (ref 0.7–1.2)
EOSINOPHILS RELATIVE PERCENT: 1 % (ref 1–4)
FERRITIN: 1140 NG/ML (ref 30–400)
GFR AFRICAN AMERICAN: >60 ML/MIN
GFR NON-AFRICAN AMERICAN: >60 ML/MIN
GFR SERPL CREATININE-BSD FRML MDRD: ABNORMAL ML/MIN/{1.73_M2}
GLUCOSE BLD-MCNC: 111 MG/DL (ref 70–99)
HCT VFR BLD CALC: 23.1 % (ref 41–53)
HEMOGLOBIN: 7.5 G/DL (ref 13.5–17.5)
IRON SATURATION: 20 % (ref 20–55)
IRON: 42 UG/DL (ref 59–158)
LYMPHOCYTES # BLD: 5 % (ref 24–44)
MCH RBC QN AUTO: 28.9 PG (ref 26–34)
MCHC RBC AUTO-ENTMCNC: 32.4 G/DL (ref 31–37)
MCV RBC AUTO: 89.2 FL (ref 80–100)
MONOCYTES # BLD: 7 % (ref 1–7)
MORPHOLOGY: ABNORMAL
PDW BLD-RTO: 18.7 % (ref 12.5–15.4)
PLATELET # BLD: 52 K/UL (ref 140–450)
PMV BLD AUTO: 6.9 FL (ref 6–12)
POTASSIUM SERPL-SCNC: 4.3 MMOL/L (ref 3.7–5.3)
RBC # BLD: 2.59 M/UL (ref 4.5–5.9)
SEG NEUTROPHILS: 87 % (ref 36–66)
SEGMENTED NEUTROPHILS ABSOLUTE COUNT: 3.56 K/UL (ref 1.8–7.7)
SODIUM BLD-SCNC: 134 MMOL/L (ref 135–144)
TOTAL IRON BINDING CAPACITY: 206 UG/DL (ref 250–450)
TOTAL PROTEIN: 6.8 G/DL (ref 6.4–8.3)
UNSATURATED IRON BINDING CAPACITY: 164 UG/DL (ref 112–347)
WBC # BLD: 4.1 K/UL (ref 3.5–11)

## 2022-04-06 PROCEDURE — 6360000002 HC RX W HCPCS: Performed by: INTERNAL MEDICINE

## 2022-04-06 PROCEDURE — 1123F ACP DISCUSS/DSCN MKR DOCD: CPT | Performed by: INTERNAL MEDICINE

## 2022-04-06 PROCEDURE — 86901 BLOOD TYPING SEROLOGIC RH(D): CPT

## 2022-04-06 PROCEDURE — 86850 RBC ANTIBODY SCREEN: CPT

## 2022-04-06 PROCEDURE — 85025 COMPLETE CBC W/AUTO DIFF WBC: CPT

## 2022-04-06 PROCEDURE — 4004F PT TOBACCO SCREEN RCVD TLK: CPT | Performed by: INTERNAL MEDICINE

## 2022-04-06 PROCEDURE — 86920 COMPATIBILITY TEST SPIN: CPT

## 2022-04-06 PROCEDURE — P9016 RBC LEUKOCYTES REDUCED: HCPCS

## 2022-04-06 PROCEDURE — 83540 ASSAY OF IRON: CPT

## 2022-04-06 PROCEDURE — 82728 ASSAY OF FERRITIN: CPT

## 2022-04-06 PROCEDURE — 80053 COMPREHEN METABOLIC PANEL: CPT

## 2022-04-06 PROCEDURE — 3017F COLORECTAL CA SCREEN DOC REV: CPT | Performed by: INTERNAL MEDICINE

## 2022-04-06 PROCEDURE — 2580000003 HC RX 258: Performed by: INTERNAL MEDICINE

## 2022-04-06 PROCEDURE — 99214 OFFICE O/P EST MOD 30 MIN: CPT | Performed by: INTERNAL MEDICINE

## 2022-04-06 PROCEDURE — 36430 TRANSFUSION BLD/BLD COMPNT: CPT

## 2022-04-06 PROCEDURE — G8427 DOCREV CUR MEDS BY ELIG CLIN: HCPCS | Performed by: INTERNAL MEDICINE

## 2022-04-06 PROCEDURE — 36415 COLL VENOUS BLD VENIPUNCTURE: CPT

## 2022-04-06 PROCEDURE — G8420 CALC BMI NORM PARAMETERS: HCPCS | Performed by: INTERNAL MEDICINE

## 2022-04-06 PROCEDURE — 83550 IRON BINDING TEST: CPT

## 2022-04-06 PROCEDURE — 86900 BLOOD TYPING SEROLOGIC ABO: CPT

## 2022-04-06 PROCEDURE — 36591 DRAW BLOOD OFF VENOUS DEVICE: CPT

## 2022-04-06 PROCEDURE — 4040F PNEUMOC VAC/ADMIN/RCVD: CPT | Performed by: INTERNAL MEDICINE

## 2022-04-06 RX ORDER — SODIUM CHLORIDE 0.9 % (FLUSH) 0.9 %
5-40 SYRINGE (ML) INJECTION PRN
Status: CANCELLED | OUTPATIENT
Start: 2022-04-13

## 2022-04-06 RX ORDER — SODIUM CHLORIDE 9 MG/ML
25 INJECTION, SOLUTION INTRAVENOUS PRN
Status: CANCELLED | OUTPATIENT
Start: 2022-04-27

## 2022-04-06 RX ORDER — SODIUM CHLORIDE 9 MG/ML
INJECTION, SOLUTION INTRAVENOUS CONTINUOUS
Status: CANCELLED | OUTPATIENT
Start: 2022-05-04

## 2022-04-06 RX ORDER — SODIUM CHLORIDE 9 MG/ML
20 INJECTION, SOLUTION INTRAVENOUS ONCE
Status: CANCELLED | OUTPATIENT
Start: 2022-05-04 | End: 2022-04-27

## 2022-04-06 RX ORDER — HEPARIN SODIUM (PORCINE) LOCK FLUSH IV SOLN 100 UNIT/ML 100 UNIT/ML
500 SOLUTION INTRAVENOUS PRN
Status: CANCELLED | OUTPATIENT
Start: 2022-05-04

## 2022-04-06 RX ORDER — DEXAMETHASONE SODIUM PHOSPHATE 4 MG/ML
8 INJECTION, SOLUTION INTRA-ARTICULAR; INTRALESIONAL; INTRAMUSCULAR; INTRAVENOUS; SOFT TISSUE ONCE
Status: CANCELLED | OUTPATIENT
Start: 2022-04-13

## 2022-04-06 RX ORDER — SODIUM CHLORIDE 0.9 % (FLUSH) 0.9 %
5-40 SYRINGE (ML) INJECTION PRN
Status: CANCELLED | OUTPATIENT
Start: 2022-05-11

## 2022-04-06 RX ORDER — SODIUM CHLORIDE 9 MG/ML
25 INJECTION, SOLUTION INTRAVENOUS PRN
Status: CANCELLED | OUTPATIENT
Start: 2022-05-11

## 2022-04-06 RX ORDER — ACETAMINOPHEN 325 MG/1
650 TABLET ORAL
Status: CANCELLED | OUTPATIENT
Start: 2022-05-04

## 2022-04-06 RX ORDER — DIPHENHYDRAMINE HYDROCHLORIDE 50 MG/ML
50 INJECTION INTRAMUSCULAR; INTRAVENOUS
Status: CANCELLED | OUTPATIENT
Start: 2022-05-11

## 2022-04-06 RX ORDER — EPINEPHRINE 1 MG/ML
0.3 INJECTION, SOLUTION, CONCENTRATE INTRAVENOUS PRN
Status: CANCELLED | OUTPATIENT
Start: 2022-04-27

## 2022-04-06 RX ORDER — SODIUM CHLORIDE 9 MG/ML
25 INJECTION, SOLUTION INTRAVENOUS PRN
Status: CANCELLED | OUTPATIENT
Start: 2022-05-04

## 2022-04-06 RX ORDER — ALBUTEROL SULFATE 90 UG/1
4 AEROSOL, METERED RESPIRATORY (INHALATION) PRN
Status: CANCELLED | OUTPATIENT
Start: 2022-05-11

## 2022-04-06 RX ORDER — EPINEPHRINE 1 MG/ML
0.3 INJECTION, SOLUTION, CONCENTRATE INTRAVENOUS PRN
Status: CANCELLED | OUTPATIENT
Start: 2022-05-11

## 2022-04-06 RX ORDER — ACETAMINOPHEN 325 MG/1
650 TABLET ORAL
Status: CANCELLED | OUTPATIENT
Start: 2022-04-27

## 2022-04-06 RX ORDER — DEXAMETHASONE SODIUM PHOSPHATE 4 MG/ML
8 INJECTION, SOLUTION INTRA-ARTICULAR; INTRALESIONAL; INTRAMUSCULAR; INTRAVENOUS; SOFT TISSUE ONCE
Status: DISCONTINUED | OUTPATIENT
Start: 2022-04-06 | End: 2022-04-06

## 2022-04-06 RX ORDER — SODIUM CHLORIDE 9 MG/ML
20 INJECTION, SOLUTION INTRAVENOUS ONCE
Status: CANCELLED | OUTPATIENT
Start: 2022-05-11 | End: 2022-05-04

## 2022-04-06 RX ORDER — MEPERIDINE HYDROCHLORIDE 50 MG/ML
12.5 INJECTION INTRAMUSCULAR; INTRAVENOUS; SUBCUTANEOUS PRN
Status: CANCELLED | OUTPATIENT
Start: 2022-04-27

## 2022-04-06 RX ORDER — SODIUM CHLORIDE 9 MG/ML
5-40 INJECTION INTRAVENOUS PRN
Status: CANCELLED | OUTPATIENT
Start: 2022-05-11

## 2022-04-06 RX ORDER — SODIUM CHLORIDE 9 MG/ML
20 INJECTION, SOLUTION INTRAVENOUS ONCE
Status: CANCELLED | OUTPATIENT
Start: 2022-04-13

## 2022-04-06 RX ORDER — ALBUTEROL SULFATE 90 UG/1
4 AEROSOL, METERED RESPIRATORY (INHALATION) PRN
Status: CANCELLED | OUTPATIENT
Start: 2022-05-04

## 2022-04-06 RX ORDER — SODIUM CHLORIDE 0.9 % (FLUSH) 0.9 %
5-40 SYRINGE (ML) INJECTION PRN
Status: CANCELLED | OUTPATIENT
Start: 2022-04-27

## 2022-04-06 RX ORDER — ONDANSETRON 2 MG/ML
8 INJECTION INTRAMUSCULAR; INTRAVENOUS
Status: CANCELLED | OUTPATIENT
Start: 2022-04-27

## 2022-04-06 RX ORDER — HEPARIN SODIUM (PORCINE) LOCK FLUSH IV SOLN 100 UNIT/ML 100 UNIT/ML
500 SOLUTION INTRAVENOUS PRN
Status: CANCELLED | OUTPATIENT
Start: 2022-05-11

## 2022-04-06 RX ORDER — SODIUM CHLORIDE 9 MG/ML
INJECTION, SOLUTION INTRAVENOUS CONTINUOUS
Status: CANCELLED | OUTPATIENT
Start: 2022-04-27

## 2022-04-06 RX ORDER — ACETAMINOPHEN 325 MG/1
650 TABLET ORAL
Status: CANCELLED | OUTPATIENT
Start: 2022-05-11

## 2022-04-06 RX ORDER — DIPHENHYDRAMINE HYDROCHLORIDE 50 MG/ML
50 INJECTION INTRAMUSCULAR; INTRAVENOUS
Status: CANCELLED | OUTPATIENT
Start: 2022-05-04

## 2022-04-06 RX ORDER — HEPARIN SODIUM (PORCINE) LOCK FLUSH IV SOLN 100 UNIT/ML 100 UNIT/ML
500 SOLUTION INTRAVENOUS PRN
Status: CANCELLED | OUTPATIENT
Start: 2022-04-13

## 2022-04-06 RX ORDER — SODIUM CHLORIDE 9 MG/ML
20 INJECTION, SOLUTION INTRAVENOUS ONCE
Status: CANCELLED | OUTPATIENT
Start: 2022-04-27 | End: 2022-04-20

## 2022-04-06 RX ORDER — HEPARIN SODIUM (PORCINE) LOCK FLUSH IV SOLN 100 UNIT/ML 100 UNIT/ML
500 SOLUTION INTRAVENOUS PRN
Status: DISCONTINUED | OUTPATIENT
Start: 2022-04-06 | End: 2022-04-07 | Stop reason: HOSPADM

## 2022-04-06 RX ORDER — MEPERIDINE HYDROCHLORIDE 50 MG/ML
12.5 INJECTION INTRAMUSCULAR; INTRAVENOUS; SUBCUTANEOUS PRN
Status: CANCELLED | OUTPATIENT
Start: 2022-05-11

## 2022-04-06 RX ORDER — ALBUTEROL SULFATE 90 UG/1
4 AEROSOL, METERED RESPIRATORY (INHALATION) PRN
Status: CANCELLED | OUTPATIENT
Start: 2022-04-27

## 2022-04-06 RX ORDER — SODIUM CHLORIDE 9 MG/ML
5-40 INJECTION INTRAVENOUS PRN
Status: CANCELLED | OUTPATIENT
Start: 2022-04-27

## 2022-04-06 RX ORDER — DIPHENHYDRAMINE HYDROCHLORIDE 50 MG/ML
50 INJECTION INTRAMUSCULAR; INTRAVENOUS
Status: CANCELLED | OUTPATIENT
Start: 2022-04-27

## 2022-04-06 RX ORDER — SODIUM CHLORIDE 0.9 % (FLUSH) 0.9 %
5-40 SYRINGE (ML) INJECTION PRN
Status: CANCELLED | OUTPATIENT
Start: 2022-05-04

## 2022-04-06 RX ORDER — ONDANSETRON 2 MG/ML
8 INJECTION INTRAMUSCULAR; INTRAVENOUS
Status: CANCELLED | OUTPATIENT
Start: 2022-05-11

## 2022-04-06 RX ORDER — SODIUM CHLORIDE 9 MG/ML
INJECTION, SOLUTION INTRAVENOUS PRN
Status: DISCONTINUED | OUTPATIENT
Start: 2022-04-06 | End: 2022-04-07 | Stop reason: HOSPADM

## 2022-04-06 RX ORDER — HEPARIN SODIUM (PORCINE) LOCK FLUSH IV SOLN 100 UNIT/ML 100 UNIT/ML
500 SOLUTION INTRAVENOUS PRN
Status: CANCELLED | OUTPATIENT
Start: 2022-04-27

## 2022-04-06 RX ORDER — SODIUM CHLORIDE 9 MG/ML
20 INJECTION, SOLUTION INTRAVENOUS ONCE
Status: COMPLETED | OUTPATIENT
Start: 2022-04-06 | End: 2022-04-06

## 2022-04-06 RX ORDER — MEPERIDINE HYDROCHLORIDE 50 MG/ML
12.5 INJECTION INTRAMUSCULAR; INTRAVENOUS; SUBCUTANEOUS PRN
Status: CANCELLED | OUTPATIENT
Start: 2022-05-04

## 2022-04-06 RX ORDER — ONDANSETRON 2 MG/ML
8 INJECTION INTRAMUSCULAR; INTRAVENOUS
Status: CANCELLED | OUTPATIENT
Start: 2022-05-04

## 2022-04-06 RX ORDER — SODIUM CHLORIDE 9 MG/ML
5-40 INJECTION INTRAVENOUS PRN
Status: CANCELLED | OUTPATIENT
Start: 2022-05-04

## 2022-04-06 RX ORDER — SODIUM CHLORIDE 0.9 % (FLUSH) 0.9 %
5-40 SYRINGE (ML) INJECTION PRN
Status: DISCONTINUED | OUTPATIENT
Start: 2022-04-06 | End: 2022-04-07 | Stop reason: HOSPADM

## 2022-04-06 RX ORDER — EPINEPHRINE 1 MG/ML
0.3 INJECTION, SOLUTION, CONCENTRATE INTRAVENOUS PRN
Status: CANCELLED | OUTPATIENT
Start: 2022-05-04

## 2022-04-06 RX ORDER — SODIUM CHLORIDE 9 MG/ML
INJECTION, SOLUTION INTRAVENOUS CONTINUOUS
Status: CANCELLED | OUTPATIENT
Start: 2022-05-11

## 2022-04-06 RX ADMIN — Medication 500 UNITS: at 14:46

## 2022-04-06 RX ADMIN — SODIUM CHLORIDE, PRESERVATIVE FREE 10 ML: 5 INJECTION INTRAVENOUS at 14:46

## 2022-04-06 RX ADMIN — SODIUM CHLORIDE 50 ML/HR: 9 INJECTION, SOLUTION INTRAVENOUS at 12:13

## 2022-04-06 RX ADMIN — SODIUM CHLORIDE, PRESERVATIVE FREE 10 ML: 5 INJECTION INTRAVENOUS at 11:09

## 2022-04-06 NOTE — PROGRESS NOTES
Patient here for topotecan C1D15. Labs reviewed. Plt 52 Hgb 7.5  He saw Dr. Fatuma Haines today see his dictation. Treatment held 1 week and 1 unit PRBC ordered and given. He tolerated transfusion well and was discharged home with spouse in stable condition. He is due to return 4/13 for C1D15.

## 2022-04-06 NOTE — PROGRESS NOTES
Simi [place]                                                                                                                  4/6/2022  MRN:   [id number]  YOB: 1950  PCP:                           Alia Belcher MD  Referring Physician: No ref. provider found  Treating Physician Name: Abdulkadir Cook MD      Reason for visit:  Chief Complaint   Patient presents with    Follow-up     review status of disease    Fatigue     overly tired   Discussed treatment plan. Toxicity check. 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 Maintenance Tecentriq, 3/2021  Lurbenectidin-7/2/2021  Topotecan-3/2022    Summary of Case/History:    Simi France a 80 y.o.male is a patient diagnosed with lung cancer with the component of small cell as well as squamous cell carcinoma    Patient has a significant history of tobacco dependence and underwent CT lung screening in December 2018. CT scan was read as lung rads degree 4B. Subsequently she underwent biopsy of right upper lobe lung nodule on 1/16/19. 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 CT PET done showed abnormal FDG uptake in the right upper lobe nodule. There was also abnormal FDG uptake in the right lower lobe nodule.   Additionally right hilar lymph node were also FDG avid. No bony lesion was reported. MRI brain for staging workup did not show any evidence of intracranial metastasis. Tip of the odontoid process was ill-defined and metastasis could not be ruled out. Clinically patient does give history of trauma to the neck area any years ago however denies any surgery history. Bone scan did not reveal any metastasis     Patient continues to smoke but has cut down quite a bit. He works full-time in a [address] Patient has good performance status, ECOG 0. Patient's other medical problems include dyslipidemia and hypertension. He also has arthritis pain    Started patient on concurrent chemoradiation using cisplatin and etoposide with dose adjustment for renal dysfunction. Chemotherapy changed to carboplatin and etoposide from cycle #2 due to renal dysfunction    Received PCI in July 2019. Interim History:    Patient presents to the clinic with his wife for a follow-up visit with day #15, cycle #1 and toxicity check. He feels stable since starting new treatment, his appetite and weight are stable overall. His wife reports he is starting to walk around the house more but his stamina remains poor. He has thoracentesis planned for next week based on fluid seen on last imaging per pulmonology. Liver biopsy was not done based on known metastases. He has home health nurse visit weekly. He is taking oral iron daily. He has some bruising on the left side of head around his ear but denies any fall. During this visit patient's allergy, social, medical, surgical history and medications were reviewed and updated. Past Medical History:   Past Medical History:   Diagnosis Date    DDD (degenerative disc disease), cervical     Gout     Heart murmur     hx. of when younger.     Hyperlipidemia     Hypertension     Lung cancer ([address]     right lung    MI (myocardial infarction) ([address]     Skin cancer     face    Wears glasses        Past Surgical History:     Past Surgical History:   Procedure Laterality Date    APPENDECTOMY      CARDIAC CATHETERIZATION      w/stent    COLONOSCOPY      CYST INCISION AND DRAINAGE Right 05/14/2020    rt elbow I and D and left knee aspiration with cultures    FOOT SURGERY Right     2nd toe(bone spur).  FOREARM SURGERY Right 5/14/2020    RIGHT ELBOW IRRIGATION AND DEBRIDEMENT performed by Carlos Jaramillo DO at [facility]  12/13/2021    IR CHEST TUBE INSERTION 12/13/2021 [facility]    KNEE SURGERY Left 5/14/2020    KNEE ASPIRATION WITH CULTURES SENT performed by Carlos Jaramillo DO at [address]      face under lt. eye.  TONSILLECTOMY         Patient Family Social History:    Family History   Problem Relation Age of Onset    Cancer Father         lung cancer      Social History     Socioeconomic History    Marital status:      Spouse name: Not on file    Number of children: Not on file    Years of education: Not on file    Highest education level: Not on file   Occupational History    Not on file   Tobacco Use    Smoking status: Current Every Day Smoker     Packs/day: 0.50     Types: Cigarettes    Smokeless tobacco: Former User   Vaping Use    Vaping Use: Former   Substance and Sexual Activity    Alcohol use: No    Drug use: Yes     Frequency: 14.0 times per week     Types: Marijuana [address])    Sexual activity: Not on file   Other Topics Concern    Not on file   Social History Narrative    Not on file     Social Determinants of Health     Financial Resource Strain:     Difficulty of Paying Living Expenses: Not on file   Food Insecurity:     Worried About [address] in the Last Year: Not on file    [address] in the Last Year: Not on file   Transportation Needs:     Lack of Transportation (Medical): Not on file    Lack of Transportation (Non-Medical):  Not on file   Physical Activity:     Days of Exercise per Week: Not on file  Minutes of Exercise per Session: Not on file   Stress:     Feeling of Stress : Not on file   Social Connections:     Frequency of Communication with Friends and Family: Not on file    Frequency of Social Gatherings with Friends and Family: Not on file    Attends Orthodox Services: Not on file    Active Member of [address] [place] [address] or Organizations: Not on file    Attends Club or Organization Meetings: Not on file    Marital Status: Not on file   Intimate Partner Violence:     Fear of Current or Ex-Partner: Not on file    Emotionally Abused: Not on file    Physically Abused: Not on file    Sexually Abused: Not on file   Housing Stability:     Unable to Pay for Housing in the Last Year: Not on file    Number of [place] in the Last Year: Not on file    Unstable Housing in the Last Year: Not on file      Current Medications:     Current Outpatient Medications   Medication Sig Dispense Refill    oxyCODONE-acetaminophen (PERCOCET) 5-325 MG per tablet Take 1 tablet by mouth every 6 hours as needed for Pain for up to 120 days. 12 tablet 0    sodium chloride 1 g tablet TAKE 1 TABLET BY MOUTH TWICE A DAY 60 tablet 1    oxyCODONE-acetaminophen (PERCOCET) 5-325 MG per tablet Take 1 tablet by mouth every 6 hours as needed for Pain for up to 30 days. 120 tablet 0    midodrine (PROAMATINE) 5 MG tablet Take 1 tablet by mouth 3 times daily 90 tablet 3    docusate sodium (COLACE) 100 MG capsule Take 100 mg by mouth 2 times daily      B Complex-C-Folic Acid (ANGELICA-HUGO) TABS TAKE 1 TABLET BY MOUTH DAILY.  HEMATINIC VIT MINERALS      traZODone (DESYREL) 100 MG tablet Take 100 mg by mouth nightly       ipratropium-albuterol (DUONEB) 0.5-2.5 (3) MG/3ML SOLN nebulizer solution Inhale 3 mLs into the lungs 4 times daily      potassium chloride (MICRO-K) 10 MEQ extended release capsule Take 20 mEq by mouth daily       Ferrous Fumarate (FERROCITE) 324 (106 Fe) MG TABS Take 324 mg by mouth daily       apixaban (ELIQUIS) 5 MG TABS tablet Take 1 tablet by mouth 2 times daily 60 tablet 1    tamsulosin (FLOMAX) 0.4 MG capsule TAKE 1 CAPSULE BY MOUTH EVERY DAY 30 capsule 5    Handicap Placard MISC by Does not apply route 1 each 0    lidocaine-prilocaine (EMLA) 2.5-2.5 % cream Apply topically as needed. Apply a quarter size amount to port site 1 hour before chemotherapy. Cover with plastic wrap. 30 g 0    acetaminophen (TYLENOL) 325 MG tablet Take 650 mg by mouth every 6 hours as needed for Pain      allopurinol (ZYLOPRIM) 100 MG tablet Take 100 mg by mouth daily      simvastatin (ZOCOR) 40 MG tablet Take 40 mg by mouth daily        Current Facility-Administered Medications   Medication Dose Route Frequency Provider Last Rate Last Admin    0.9 % sodium chloride infusion   IntraVENous PRN Maria Isabel Condon MD           Allergies:   Patient has no known allergies. Review of Systems:    Constitutional: No fever or chills. No night sweats. Positive for fatigue. Positive for loss of appetite and cachexia. Positive for difficulty going to sleep  Eyes: No eye discharge, double vision, or eye pain   HEENT: negative for sore mouth, sore throat, hoarseness and voice change;  +drooling   Respiratory: negative for cough, sputum, wheezing, hemoptysis, chest pain;  Cardiovascular: negative for chest pain, palpitations, orthopnea, PND   Gastrointestinal: negative for nausea, vomiting, diarrhea, constipation, abdominal pain, Dysphagia, hematemesis and hematochezia   Genitourinary: negative for frequency, dysuria, nocturia, urinary incontinence, and hematuria   Integument: Positive for easy bruising - stable +rasied area to the left of the spine that is tender +bed sores  Hematologic/Lymphatic: negative for easy bleeding, lymphadenopathy, or petechiae   Endocrine: negative for heat or cold intolerance,weight changes, change in bowel habits and hair loss   Musculoskeletal: Positive joint pain.  +right shoulder pain  Neurological: negative for headaches, dizziness, seizures, weakness; + poor balance; +neuropathy in left hand from shoulder +poor short term memory        Physical Exam:    Vitals: /68   Pulse 123   Temp 97.1 °F (36.2 °C) (Temporal)   Wt 158 lb (71.7 kg)   BMI 22.04 kg/m²   General appearance -patient not in acute distress  Mental status - AAO X3  Eyes - pupils equal and reactive, extraocular eye movements intact  Mouth - mucous membranes moist, pharynx normal without lesions  Neck - supple, no significant adenopathy  Lymphatics - no palpable lymphadenopathy, no hepatosplenomegaly  Chest -decreased breathing sounds bilaterally  Heart - normal rate, regular rhythm, normal S1, S2, no murmurs  Abdomen - soft, nontender, nondistended, no masses or organomegaly  Neurological - alert, oriented, normal speech, no focal findings or movement disorder noted  Extremities -positive lower extremity edema  Skin - normal coloration and turgor, no rashes, no suspicious skin lesions noted; +raised area with tenderness to the left of the spine, no erythema, back shows improved      DATA:    Labs:   Lab Results   Component Value Date    WBC 4.1 04/06/2022    HGB 7.5 (L) 04/06/2022    HCT 23.1 (L) 04/06/2022    MCV 89.2 04/06/2022    PLT 52 (L) 04/06/2022     Lab Results   Component Value Date    NEUTROABS 3.56 04/06/2022           Chemistry        Component Value Date/Time     (L) 04/06/2022 0940    K 4.3 04/06/2022 0940    CL 99 04/06/2022 0940    CO2 26 04/06/2022 0940    BUN 18 04/06/2022 0940    CREATININE 0.55 (L) 04/06/2022 0940        Component Value Date/Time    CALCIUM 8.9 04/06/2022 0940    ALKPHOS 168 (H) 04/06/2022 0940    AST 32 04/06/2022 0940    ALT 29 04/06/2022 0940    BILITOT 0.15 (L) 04/06/2022 0940           Impression:  Limited stage Right lung cancer with small cell and squamous cell component, stage IIIa (T3,N1,M0)  Left adrenal metastasis, CT 2/2020, S/P SBRT 07/2020  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 Patient hemoglobin is 7.5. Platelet count 16,013. We will add iron stores to the blood draw. We will transfuse 1 unit of packed RBC. Given cytopenias we will hold treatment postponed by a week. If cytopenias persist we will likely omit day #8 and proceed with treatment only on day 1 and 15. I revisited patient goals and expectations. Patient has borderline performance status. Previously we have discussed hospice but patient states he wants to keep fighting the cancer. This was discussed again today and he is not ready for hospice. Patient does have palliative care at home. We will postpone patient's treatment by 1 week and proceed with the 15th then. We will see patient back in office on day 1 cycle 2. We will plan to assess response after conclusion of cycle 2  Patient is also scheduled to have a thoracentesis done through his pulmonologist  Continue symptomatic supportive care  NCCN guidelines were reviewed and discussed with the patient. The diagnosis and care plan were discussed with the patient in detail. I discussed the natural history of the disease, prognosis, risks and goals of therapy and answered all the patients questions to the best of my ability. Patient expressed understanding and was in agreement. Scribe Attestation   This note was created by Meghan Mancera acting as scribe for the physician signing this note  Electronically Signed  Meghan Mancera, 4/6/2022  Scribe, [employer]. Attending Attestation   Note was reviewed and edited.   I am in agreement with the note as entered    Helene Blakely MD          This note is created with the assistance of a speech recognition program.  While intending to generate a document that actually reflects the content of the visit, the document can still have some errors including those of syntax and sound a like substitutions which may escape proof reading. It such instances, actual meaning can be extrapolated by contextual diversion.

## 2022-04-06 NOTE — TELEPHONE ENCOUNTER
tx today  Add labs today  transufse 1 unit prbc today  rv in 2 weeks    tx held one week per md after labs resulted    Labs added per instructions    Transfusion done     RV scheduled 4/27/22 @ 9:15 w/ tx to follow per md    PT was given AVS and an appt schedule    Electronically signed by Bg Briseno on 4/6/2022 at 12:27 PM

## 2022-04-07 LAB
ABO/RH: NORMAL
ANTIBODY SCREEN: NEGATIVE
ARM BAND NUMBER: NORMAL
BLD PROD TYP BPU: NORMAL
BLOOD BANK BLOOD PRODUCT EXPIRATION DATE: NORMAL
BLOOD BANK ISBT PRODUCT BLOOD TYPE: 6200
BLOOD BANK PRODUCT CODE: NORMAL
BLOOD BANK UNIT TYPE AND RH: NORMAL
BPU ID: NORMAL
CROSSMATCH RESULT: NORMAL
DISPENSE STATUS BLOOD BANK: NORMAL
EXPIRATION DATE: NORMAL
TRANSFUSION STATUS: NORMAL
UNIT DIVISION: 0
UNIT ISSUE DATE/TIME: NORMAL

## 2022-04-13 ENCOUNTER — HOSPITAL ENCOUNTER (OUTPATIENT)
Dept: INFUSION THERAPY | Age: 72
Discharge: HOME | End: 2022-04-13
Payer: MEDICARE

## 2022-04-13 VITALS
WEIGHT: 159.6 LBS | HEART RATE: 113 BPM | TEMPERATURE: 97.8 F | DIASTOLIC BLOOD PRESSURE: 74 MMHG | SYSTOLIC BLOOD PRESSURE: 113 MMHG | RESPIRATION RATE: 18 BRPM | BODY MASS INDEX: 22.26 KG/M2

## 2022-04-13 DIAGNOSIS — C34.91 PRIMARY LUNG CANCER WITH METASTASIS FROM LUNG TO OTHER SITE, RIGHT (HCC): Primary | ICD-10-CM

## 2022-04-13 LAB
ABSOLUTE EOS #: 0 K/UL (ref 0–0.4)
ABSOLUTE LYMPH #: 0.1 K/UL (ref 1–4.8)
ABSOLUTE MONO #: 0.42 K/UL (ref 0.1–0.8)
ALBUMIN SERPL-MCNC: 3.2 G/DL (ref 3.5–5.2)
ALBUMIN/GLOBULIN RATIO: 0.8 (ref 1–2.5)
ALP BLD-CCNC: 190 U/L (ref 40–129)
ALT SERPL-CCNC: 25 U/L (ref 5–41)
ANION GAP SERPL CALCULATED.3IONS-SCNC: 9 MMOL/L (ref 9–17)
AST SERPL-CCNC: 37 U/L
BASOPHILS # BLD: 0 % (ref 0–2)
BASOPHILS ABSOLUTE: 0 K/UL (ref 0–0.2)
BILIRUB SERPL-MCNC: 0.27 MG/DL (ref 0.3–1.2)
BUN BLDV-MCNC: 16 MG/DL (ref 8–23)
CALCIUM SERPL-MCNC: 8.7 MG/DL (ref 8.6–10.4)
CHLORIDE BLD-SCNC: 96 MMOL/L (ref 98–107)
CO2: 26 MMOL/L (ref 20–31)
CREAT SERPL-MCNC: 0.55 MG/DL (ref 0.7–1.2)
EOSINOPHILS RELATIVE PERCENT: 0 % (ref 1–4)
GFR AFRICAN AMERICAN: >60 ML/MIN
GFR NON-AFRICAN AMERICAN: >60 ML/MIN
GFR SERPL CREATININE-BSD FRML MDRD: ABNORMAL ML/MIN/{1.73_M2}
GLUCOSE BLD-MCNC: 106 MG/DL (ref 70–99)
HCT VFR BLD CALC: 27.9 % (ref 41–53)
HEMOGLOBIN: 8.6 G/DL (ref 13.5–17.5)
LYMPHOCYTES # BLD: 2 % (ref 24–44)
MCH RBC QN AUTO: 29.1 PG (ref 26–34)
MCHC RBC AUTO-ENTMCNC: 30.8 G/DL (ref 31–37)
MCV RBC AUTO: 94.3 FL (ref 80–100)
METAMYELOCYTES ABSOLUTE COUNT: 0.16 K/UL
METAMYELOCYTES: 3 %
MONOCYTES # BLD: 8 % (ref 1–7)
MORPHOLOGY: ABNORMAL
PDW BLD-RTO: 18.3 % (ref 12.5–15.4)
PLATELET # BLD: 117 K/UL (ref 140–450)
PMV BLD AUTO: 9.1 FL (ref 8–14)
POTASSIUM SERPL-SCNC: 5 MMOL/L (ref 3.7–5.3)
RBC # BLD: 2.96 M/UL (ref 4.5–5.9)
SEG NEUTROPHILS: 87 % (ref 36–66)
SEGMENTED NEUTROPHILS ABSOLUTE COUNT: 4.52 K/UL (ref 1.8–7.7)
SODIUM BLD-SCNC: 131 MMOL/L (ref 135–144)
TOTAL PROTEIN: 7 G/DL (ref 6.4–8.3)
WBC # BLD: 5.2 K/UL (ref 3.5–11)

## 2022-04-13 PROCEDURE — 80053 COMPREHEN METABOLIC PANEL: CPT

## 2022-04-13 PROCEDURE — 85025 COMPLETE CBC W/AUTO DIFF WBC: CPT

## 2022-04-13 PROCEDURE — 36591 DRAW BLOOD OFF VENOUS DEVICE: CPT

## 2022-04-13 PROCEDURE — 2580000003 HC RX 258: Performed by: INTERNAL MEDICINE

## 2022-04-13 PROCEDURE — 6360000002 HC RX W HCPCS: Performed by: INTERNAL MEDICINE

## 2022-04-13 PROCEDURE — 96377 APPLICATON ON-BODY INJECTOR: CPT

## 2022-04-13 PROCEDURE — 96375 TX/PRO/DX INJ NEW DRUG ADDON: CPT

## 2022-04-13 PROCEDURE — 96413 CHEMO IV INFUSION 1 HR: CPT

## 2022-04-13 RX ORDER — SODIUM CHLORIDE 9 MG/ML
INJECTION, SOLUTION INTRAVENOUS CONTINUOUS
Status: DISCONTINUED | OUTPATIENT
Start: 2022-04-13 | End: 2022-04-14 | Stop reason: HOSPADM

## 2022-04-13 RX ORDER — HEPARIN SODIUM (PORCINE) LOCK FLUSH IV SOLN 100 UNIT/ML 100 UNIT/ML
500 SOLUTION INTRAVENOUS PRN
Status: DISCONTINUED | OUTPATIENT
Start: 2022-04-13 | End: 2022-04-14 | Stop reason: HOSPADM

## 2022-04-13 RX ORDER — SODIUM CHLORIDE 0.9 % (FLUSH) 0.9 %
5-40 SYRINGE (ML) INJECTION PRN
Status: DISCONTINUED | OUTPATIENT
Start: 2022-04-13 | End: 2022-04-14 | Stop reason: HOSPADM

## 2022-04-13 RX ORDER — DEXAMETHASONE SODIUM PHOSPHATE 10 MG/ML
8 INJECTION INTRAMUSCULAR; INTRAVENOUS ONCE
Status: COMPLETED | OUTPATIENT
Start: 2022-04-13 | End: 2022-04-13

## 2022-04-13 RX ADMIN — Medication 500 UNITS: at 14:56

## 2022-04-13 RX ADMIN — PEGFILGRASTIM 6 MG: KIT SUBCUTANEOUS at 14:15

## 2022-04-13 RX ADMIN — SODIUM CHLORIDE, PRESERVATIVE FREE 20 ML: 5 INJECTION INTRAVENOUS at 13:00

## 2022-04-13 RX ADMIN — SODIUM CHLORIDE, PRESERVATIVE FREE 10 ML: 5 INJECTION INTRAVENOUS at 14:56

## 2022-04-13 RX ADMIN — SODIUM CHLORIDE: 9 INJECTION, SOLUTION INTRAVENOUS at 13:42

## 2022-04-13 RX ADMIN — DEXAMETHASONE SODIUM PHOSPHATE 8 MG: 10 INJECTION INTRAMUSCULAR; INTRAVENOUS at 13:43

## 2022-04-13 RX ADMIN — TOPOTECAN 5.7 MG: 1 INJECTION, SOLUTION, CONCENTRATE INTRAVENOUS at 14:14

## 2022-04-13 NOTE — PROGRESS NOTES
Patient arrived for C1D15 topotecan and neulasta OBI   Labs collected and reviewed   Tolerated w/o incident  Return 4/27  and tx  Bon Terry RN

## 2022-04-15 ENCOUNTER — HOSPITAL ENCOUNTER (OUTPATIENT)
Dept: INTERVENTIONAL RADIOLOGY/VASCULAR | Age: 72
Discharge: HOME | End: 2022-04-17
Payer: MEDICARE

## 2022-04-15 ENCOUNTER — HOSPITAL ENCOUNTER (OUTPATIENT)
Dept: GENERAL RADIOLOGY | Age: 72
Discharge: HOME | End: 2022-04-17
Payer: MEDICARE

## 2022-04-15 VITALS
WEIGHT: 158 LBS | HEART RATE: 100 BPM | SYSTOLIC BLOOD PRESSURE: 117 MMHG | DIASTOLIC BLOOD PRESSURE: 75 MMHG | BODY MASS INDEX: 22.12 KG/M2 | HEIGHT: 71 IN | RESPIRATION RATE: 14 BRPM | OXYGEN SATURATION: 96 % | TEMPERATURE: 96.3 F

## 2022-04-15 DIAGNOSIS — C34.11 MALIGNANT NEOPLASM OF RIGHT UPPER LOBE OF LUNG (HCC): ICD-10-CM

## 2022-04-15 LAB
INR BLD: 1.2
LACTATE DEHYDROGENASE, FLUID: 8370 U/L
PARTIAL THROMBOPLASTIN TIME: 28.5 SEC (ref 24–36)
PLATELET # BLD: 158 K/UL (ref 150–450)
PROTHROMBIN TIME: 15.1 SEC (ref 11.8–14.6)
SPECIMEN TYPE: NORMAL

## 2022-04-15 PROCEDURE — 2709999900 IR GUIDED THORACENTESIS PLEURAL

## 2022-04-15 PROCEDURE — 87205 SMEAR GRAM STAIN: CPT

## 2022-04-15 PROCEDURE — 83615 LACTATE (LD) (LDH) ENZYME: CPT

## 2022-04-15 PROCEDURE — 85049 AUTOMATED PLATELET COUNT: CPT

## 2022-04-15 PROCEDURE — 7100000010 HC PHASE II RECOVERY - FIRST 15 MIN

## 2022-04-15 PROCEDURE — 85730 THROMBOPLASTIN TIME PARTIAL: CPT

## 2022-04-15 PROCEDURE — 32555 ASPIRATE PLEURA W/ IMAGING: CPT

## 2022-04-15 PROCEDURE — 88112 CYTOPATH CELL ENHANCE TECH: CPT

## 2022-04-15 PROCEDURE — 7100000011 HC PHASE II RECOVERY - ADDTL 15 MIN

## 2022-04-15 PROCEDURE — 88305 TISSUE EXAM BY PATHOLOGIST: CPT

## 2022-04-15 PROCEDURE — 36415 COLL VENOUS BLD VENIPUNCTURE: CPT

## 2022-04-15 PROCEDURE — 85610 PROTHROMBIN TIME: CPT

## 2022-04-15 PROCEDURE — 87075 CULTR BACTERIA EXCEPT BLOOD: CPT

## 2022-04-15 PROCEDURE — 87070 CULTURE OTHR SPECIMN AEROBIC: CPT

## 2022-04-15 PROCEDURE — 71045 X-RAY EXAM CHEST 1 VIEW: CPT

## 2022-04-15 RX ORDER — ACETAMINOPHEN 325 MG/1
650 TABLET ORAL EVERY 4 HOURS PRN
Status: DISCONTINUED | OUTPATIENT
Start: 2022-04-15 | End: 2022-04-18 | Stop reason: HOSPADM

## 2022-04-15 ASSESSMENT — PAIN - FUNCTIONAL ASSESSMENT: PAIN_FUNCTIONAL_ASSESSMENT: 0-10

## 2022-04-15 NOTE — OP NOTE
Brief Postoperative Note    Maribel [facility]  YOB: 1950  [id number]    Pre-operative Diagnosis: lung ca    Post-operative Diagnosis: Same    Procedure: R thoracentesis    Anesthesia: Local   Surgeons/Assistants: Hamlet Moise MD     Estimated Blood Loss: minimal    Complications: none immediate    Specimens: were obtained      Electronically signed by Hamlet Moise MD on 4/15/2022 at 10:11 AM

## 2022-04-15 NOTE — H&P
HISTORY and [id number]       NAME:  Lester Sam  MRN: [id number]   YOB: 1950   Date: 4/15/2022   Age: 80 y.o. Gender: male       COMPLAINT AND PRESENT HISTORY:     Lester Sam is 80 y.o.,  male, here for IR [address],    Patient has had previous thoracentesis before while hospitalized. He has hx of Primary Lung cancer and is seeing Dr. Stone oRdrigez, Oncology   and Dr. Mayank Pinzon Pulmonology. 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  CT scan was read as lung rads degree 4B.  Subsequently she underwent biopsy of right upper lobe lung nodule on 1/16/19. 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  CT PET done showed abnormal FDG uptake in the right upper lobe nodule.  There was also abnormal FDG uptake in the right lower lobe nodule.  Additionally right hilar lymph node were also FDG avid.  No bony lesion was reported.  MRI brain for staging workup did not show any evidence of intracranial metastasis.  Tip of the odontoid process was ill-defined and metastasis could not be ruled out.  Clinically patient does give history of trauma to the neck area any years ago however denies any surgery history. Bone scan did not reveal any metastasis  Patient continues to smoke but has cut down quite a bit.  He works full-time in a [address] Patient has good performance status, ECOG 0.  Patient's other medical problems include dyslipidemia and hypertension.  He also has arthritis pain     Started patient on concurrent chemoradiation using cisplatin and etoposide with dose adjustment for renal dysfunction.  Chemotherapy changed to carboplatin and etoposide from cycle #2 due to renal dysfunction  Received PCI in July 2019.     Interim History:    Patient presents to the clinic with his wife for a follow-up visit with cycle #1 and to review treatment plan and goals of therapy. He is undergoing physical therapy at home. His fatigue is unchanged. He is off anti-coagulation, continues with sodium twice daily. He denies any bleeding. Patient is very clear in his goals that he wants to proceed with chemotherapy. Significant medical history  Heart murmur, HTN, HLD, Lung Cancer    Patient complains of current symptoms of :  SOB , some coughing. Denies any nausea, vomiting. Pt has poor appetite with complains of early satiety. Pt has hx of wt loss large amount. Pt is currently in chemotherapy, denies any neuropathy. Patient  Continues to smoke cigarettes. Pt has generalized joint pain and is taking Percocet. Denies current chest pain, palpitations,, dizziness, leg swelling, headache. No recent URI, fever or chills. Patient has been NPO since midnight. Pt took all his pills today. patient is here with his wife who is supportive with the questions. Pt was recommended Thoracentesis per Pulmonology, Dr. Minnie Gutierrez. PAST MEDICAL HISTORY     Past Medical History:   Diagnosis Date    CAD (coronary artery disease)     DDD (degenerative disc disease), cervical     Gout     Heart murmur     hx. of when younger.  Hyperlipidemia     Hypertension     Lung cancer ([address]     right lung    MI (myocardial infarction) ([address]     Skin cancer     face    Wears glasses        SURGICAL HISTORY       Past Surgical History:   Procedure Laterality Date    APPENDECTOMY      CARDIAC CATHETERIZATION      w/stent    COLONOSCOPY      CYST INCISION AND DRAINAGE Right 05/14/2020    rt elbow I and D and left knee aspiration with cultures    FOOT SURGERY Right     2nd toe(bone spur).     FOREARM SURGERY Right 5/14/2020    RIGHT ELBOW IRRIGATION AND DEBRIDEMENT performed by Hamlet Thompson DO at [id number]  12/13/2021    IR CHEST TUBE INSERTION 12/13/2021 [facility]    KNEE SURGERY Left 5/14/2020    KNEE ASPIRATION WITH CULTURES SENT performed by Hamlet Thompson DO at [address]      face under lt. eye.  TONSILLECTOMY         FAMILY HISTORY       Family History   Problem Relation Age of Onset    Cancer Father         lung cancer       SOCIAL HISTORY       Social History     Socioeconomic History    Marital status:      Spouse name: None    Number of children: None    Years of education: None    Highest education level: None   Occupational History    None   Tobacco Use    Smoking status: Current Every Day Smoker     Packs/day: 0.50     Types: Cigarettes    Smokeless tobacco: Former User   Vaping Use    Vaping Use: Former   Substance and Sexual Activity    Alcohol use: No    Drug use: Yes     Frequency: 14.0 times per week     Types: Marijuana (Weed)     Comment: hasn't smoked in 1 month    Sexual activity: None   Other Topics Concern    None   Social History Narrative    None     Social Determinants of Health     Financial Resource Strain:     Difficulty of Paying Living Expenses: Not on file   Food Insecurity:     Worried About Running Out of Food in the Last Year: Not on file    Dandre of Food in the Last Year: Not on file   Transportation Needs:     Lack of Transportation (Medical): Not on file    Lack of Transportation (Non-Medical):  Not on file   Physical Activity:     Days of Exercise per Week: Not on file    Minutes of Exercise per Session: Not on file   Stress:     Feeling of Stress : Not on file   Social Connections:     Frequency of Communication with Friends and Family: Not on file    Frequency of Social Gatherings with Friends and Family: Not on file    Attends Buddhism Services: Not on file    Active Member of Clubs or Organizations: Not on file   [facility] Attends Club or Organization Meetings: Not on file    Marital Status: Not on file   Intimate Partner Violence:     Fear of Current or Ex-Partner: Not on file    Emotionally Abused: Not on file    Physically Abused: Not on file    Sexually Abused: Not on file   Housing Stability:     Unable to Pay for Housing in the Last Year: Not on file    Number of González in the Last Year: Not on file    Unstable Housing in the Last Year: Not on file           REVIEW OF SYSTEMS      No Known Allergies    Current Outpatient Medications on File Prior to Encounter   Medication Sig Dispense Refill    oxyCODONE-acetaminophen (PERCOCET) 5-325 MG per tablet Take 1 tablet by mouth every 6 hours as needed for Pain for up to 120 days. 12 tablet 0    sodium chloride 1 g tablet TAKE 1 TABLET BY MOUTH TWICE A DAY 60 tablet 1    oxyCODONE-acetaminophen (PERCOCET) 5-325 MG per tablet Take 1 tablet by mouth every 6 hours as needed for Pain for up to 30 days. 120 tablet 0    midodrine (PROAMATINE) 5 MG tablet Take 1 tablet by mouth 3 times daily 90 tablet 3    docusate sodium (COLACE) 100 MG capsule Take 100 mg by mouth 2 times daily      B Complex-C-Folic Acid (ANGELICA-HUGO) TABS TAKE 1 TABLET BY MOUTH DAILY. HEMATINIC VIT MINERALS (Patient not taking: Reported on 4/15/2022)      traZODone (DESYREL) 100 MG tablet Take 100 mg by mouth nightly  (Patient not taking: Reported on 4/15/2022)      ipratropium-albuterol (DUONEB) 0.5-2.5 (3) MG/3ML SOLN nebulizer solution Inhale 3 mLs into the lungs 4 times daily      potassium chloride (MICRO-K) 10 MEQ extended release capsule Take 20 mEq by mouth daily       Ferrous Fumarate (FERROCITE) 324 (106 Fe) MG TABS Take 324 mg by mouth daily       tamsulosin (FLOMAX) 0.4 MG capsule TAKE 1 CAPSULE BY MOUTH EVERY DAY 30 capsule 5    Handicap Placard MISC by Does not apply route 1 each 0    lidocaine-prilocaine (EMLA) 2.5-2.5 % cream Apply topically as needed.   Apply a quarter size amount to port site 1 hour before chemotherapy. Cover with plastic wrap. 30 g 0    acetaminophen (TYLENOL) 325 MG tablet Take 650 mg by mouth every 6 hours as needed for Pain      allopurinol (ZYLOPRIM) 100 MG tablet Take 100 mg by mouth daily      simvastatin (ZOCOR) 40 MG tablet Take 40 mg by mouth daily  (Patient not taking: Reported on 4/15/2022)       Current Facility-Administered Medications on File Prior to Encounter   Medication Dose Route Frequency Provider Last Rate Last Admin    0.9 % sodium chloride infusion   IntraVENous PRN Meme Chaudhari MD           Negative except for what is mentioned in the HPI. GENERAL PHYSICAL EXAM     Vitals: /73   Pulse 113   Temp 97.8 °F (36.6 °C) (Temporal)   Resp 18   Ht 5' 11\" (1.803 m)   Wt 158 lb (71.7 kg)   SpO2 96%   BMI 22.04 kg/m²  Body mass index is 22.04 kg/m². GENERAL APPEARANCE:   Akosua Londono is 80 y.o., male, appears mal nourished, conscious, alert. Does not appear to be distress or pain at this time. SKIN:  Warm, dry, no cyanosis or jaundice. HEAD:  Normocephalic, atraumatic, no swelling or tenderness. EYES:  Pupils equal, reactive to light. EARS:  No discharge, no marked hearing loss. NOSE:  No rhinorrhea, epistaxis or septal deformity. THROAT:  Not congested. No ulceration bleeding or discharge. NECK:  No stiffness, trachea central.  No palpable masses or L.N.                 CHEST:  Symmetrical and equal on expansion. mediport on left chest.               HEART:  RRR . No audible murmurs or gallops. LUNGS:  Equal on expansion, normal breath sounds. No adventitious sounds. ABDOMEN:  Flat,  on palpation. No localized tenderness. No guarding or rigidity. LYMPHATICS:  No palpable cervical lymphadenopathy. LOCOMOTOR, BACK AND SPINE:  No tenderness or deformities. EXTREMITIES:  Symmetrical, pretibial edema, olga. No discoloration or ulcerations. NEUROLOGIC:  The patient is conscious, alert, oriented,Cranial nerve II-XII intact, taste and smell were not examined. No apparent focal sensory or motor deficits.              PROVISIONAL DIAGNOSES / SURGERY:      PRIMARY LUNG CANCER    IR THORACENTESIS PLEURAL    Patient Active Problem List    Diagnosis Date Noted    Severe malnutrition ([address] 12/15/2021    Thrombocytopenia ([address] 08/28/2021    Leukopenia 08/28/2021    Pneumothorax on right 08/27/2021    PAF (paroxysmal atrial fibrillation) ([address] 08/27/2021    Tobacco dependence 08/27/2021    COPD (chronic obstructive pulmonary disease) ([address] 08/27/2021    Iron deficiency anemia 08/27/2021    Pneumothorax 08/27/2021    Pyogenic arthritis of right elbow (HCC)     Atrial fibrillation with RVR ([address] 05/10/2020    Gout 05/10/2020    Sepsis ([address] 05/07/2020    Hyperlipidemia     Hypertension     Lung cancer, primary, with metastasis from lung to other site [facility])     Primary lung cancer with metastasis from lung to other site, right ([address] 02/22/2019           ROSEMARY Llanes, AZEB - CNP on 4/15/2022 at 8:50 AM

## 2022-04-15 NOTE — PROGRESS NOTES
Patient tolerated right thoracentesis without distress. 200 ml of tan/ dean fluid removed. Dry dressing to site. Patient returned to room. Nurse updated.

## 2022-04-18 DIAGNOSIS — C34.91 PRIMARY MALIGNANT NEOPLASM OF RIGHT LUNG METASTATIC TO OTHER SITE (HCC): ICD-10-CM

## 2022-04-18 DIAGNOSIS — L89.90 PRESSURE INJURY OF SKIN, UNSPECIFIED INJURY STAGE, UNSPECIFIED LOCATION: ICD-10-CM

## 2022-04-18 LAB — SURGICAL PATHOLOGY REPORT: NORMAL

## 2022-04-18 RX ORDER — OXYCODONE HYDROCHLORIDE AND ACETAMINOPHEN 5; 325 MG/1; MG/1
1 TABLET ORAL EVERY 6 HOURS PRN
Qty: 12 TABLET | Refills: 0 | Status: CANCELLED | OUTPATIENT
Start: 2022-04-18 | End: 2022-08-16

## 2022-04-18 RX ORDER — HYDROCODONE BITARTRATE AND ACETAMINOPHEN 5; 325 MG/1; MG/1
1 TABLET ORAL EVERY 8 HOURS PRN
Qty: 90 TABLET | Refills: 0 | Status: ON HOLD | OUTPATIENT
Start: 2022-04-18 | End: 2022-05-07 | Stop reason: HOSPADM

## 2022-04-21 LAB
CULTURE: ABNORMAL
DIRECT EXAM: ABNORMAL
SPECIMEN DESCRIPTION: ABNORMAL

## 2022-04-27 ENCOUNTER — OFFICE VISIT (OUTPATIENT)
Dept: ONCOLOGY | Age: 72
End: 2022-04-27
Payer: MEDICARE

## 2022-04-27 ENCOUNTER — TELEPHONE (OUTPATIENT)
Dept: ONCOLOGY | Age: 72
End: 2022-04-27

## 2022-04-27 ENCOUNTER — TELEPHONE (OUTPATIENT)
Dept: INFUSION THERAPY | Age: 72
End: 2022-04-27

## 2022-04-27 ENCOUNTER — HOSPITAL ENCOUNTER (OUTPATIENT)
Dept: INFUSION THERAPY | Age: 72
Discharge: HOME | End: 2022-04-27
Payer: MEDICARE

## 2022-04-27 VITALS
OXYGEN SATURATION: 95 % | HEART RATE: 128 BPM | DIASTOLIC BLOOD PRESSURE: 81 MMHG | TEMPERATURE: 97.3 F | BODY MASS INDEX: 22.18 KG/M2 | SYSTOLIC BLOOD PRESSURE: 119 MMHG | WEIGHT: 159 LBS

## 2022-04-27 DIAGNOSIS — C34.91 PRIMARY LUNG CANCER WITH METASTASIS FROM LUNG TO OTHER SITE, RIGHT (HCC): ICD-10-CM

## 2022-04-27 DIAGNOSIS — R53.1 ASTHENIA: ICD-10-CM

## 2022-04-27 DIAGNOSIS — C34.91 PRIMARY LUNG CANCER WITH METASTASIS FROM LUNG TO OTHER SITE, RIGHT (HCC): Primary | ICD-10-CM

## 2022-04-27 DIAGNOSIS — Z71.89 GOALS OF CARE, COUNSELING/DISCUSSION: ICD-10-CM

## 2022-04-27 DIAGNOSIS — G89.3 CANCER ASSOCIATED PAIN: ICD-10-CM

## 2022-04-27 LAB
ABSOLUTE EOS #: 0 K/UL (ref 0–0.4)
ABSOLUTE LYMPH #: 0.31 K/UL (ref 1–4.8)
ABSOLUTE MONO #: 1.4 K/UL (ref 0.1–0.8)
ALBUMIN SERPL-MCNC: 3.4 G/DL (ref 3.5–5.2)
ALBUMIN/GLOBULIN RATIO: 0.9 (ref 1–2.5)
ALP BLD-CCNC: 215 U/L (ref 40–129)
ALT SERPL-CCNC: 19 U/L (ref 5–41)
ANION GAP SERPL CALCULATED.3IONS-SCNC: 11 MMOL/L (ref 9–17)
AST SERPL-CCNC: 36 U/L
BASOPHILS # BLD: 0 % (ref 0–2)
BASOPHILS ABSOLUTE: 0 K/UL (ref 0–0.2)
BILIRUB SERPL-MCNC: 0.2 MG/DL (ref 0.3–1.2)
BUN BLDV-MCNC: 15 MG/DL (ref 8–23)
CALCIUM SERPL-MCNC: 9.7 MG/DL (ref 8.6–10.4)
CHLORIDE BLD-SCNC: 96 MMOL/L (ref 98–107)
CO2: 27 MMOL/L (ref 20–31)
CREAT SERPL-MCNC: 0.66 MG/DL (ref 0.7–1.2)
EOSINOPHILS RELATIVE PERCENT: 0 % (ref 1–4)
GFR AFRICAN AMERICAN: >60 ML/MIN
GFR NON-AFRICAN AMERICAN: >60 ML/MIN
GFR SERPL CREATININE-BSD FRML MDRD: ABNORMAL ML/MIN/{1.73_M2}
GLUCOSE BLD-MCNC: 121 MG/DL (ref 70–99)
HCT VFR BLD CALC: 27.4 % (ref 41–53)
HEMOGLOBIN: 8.5 G/DL (ref 13.5–17.5)
LYMPHOCYTES # BLD: 2 % (ref 24–44)
MCH RBC QN AUTO: 28.6 PG (ref 26–34)
MCHC RBC AUTO-ENTMCNC: 30.9 G/DL (ref 31–37)
MCV RBC AUTO: 92.6 FL (ref 80–100)
MONOCYTES # BLD: 9 % (ref 1–7)
MORPHOLOGY: NORMAL
PDW BLD-RTO: 21.2 % (ref 12.5–15.4)
PLATELET # BLD: 252 K/UL (ref 140–450)
PMV BLD AUTO: 6.8 FL (ref 6–12)
POTASSIUM SERPL-SCNC: 4.7 MMOL/L (ref 3.7–5.3)
RBC # BLD: 2.96 M/UL (ref 4.5–5.9)
SEG NEUTROPHILS: 89 % (ref 36–66)
SEGMENTED NEUTROPHILS ABSOLUTE COUNT: 13.79 K/UL (ref 1.8–7.7)
SODIUM BLD-SCNC: 134 MMOL/L (ref 135–144)
TOTAL PROTEIN: 7.1 G/DL (ref 6.4–8.3)
WBC # BLD: 15.5 K/UL (ref 3.5–11)

## 2022-04-27 PROCEDURE — 96413 CHEMO IV INFUSION 1 HR: CPT

## 2022-04-27 PROCEDURE — 36591 DRAW BLOOD OFF VENOUS DEVICE: CPT

## 2022-04-27 PROCEDURE — 6360000002 HC RX W HCPCS: Performed by: INTERNAL MEDICINE

## 2022-04-27 PROCEDURE — 1123F ACP DISCUSS/DSCN MKR DOCD: CPT | Performed by: INTERNAL MEDICINE

## 2022-04-27 PROCEDURE — 85025 COMPLETE CBC W/AUTO DIFF WBC: CPT

## 2022-04-27 PROCEDURE — 3017F COLORECTAL CA SCREEN DOC REV: CPT | Performed by: INTERNAL MEDICINE

## 2022-04-27 PROCEDURE — 4004F PT TOBACCO SCREEN RCVD TLK: CPT | Performed by: INTERNAL MEDICINE

## 2022-04-27 PROCEDURE — 80053 COMPREHEN METABOLIC PANEL: CPT

## 2022-04-27 PROCEDURE — G8427 DOCREV CUR MEDS BY ELIG CLIN: HCPCS | Performed by: INTERNAL MEDICINE

## 2022-04-27 PROCEDURE — 4040F PNEUMOC VAC/ADMIN/RCVD: CPT | Performed by: INTERNAL MEDICINE

## 2022-04-27 PROCEDURE — 99215 OFFICE O/P EST HI 40 MIN: CPT | Performed by: INTERNAL MEDICINE

## 2022-04-27 PROCEDURE — G8420 CALC BMI NORM PARAMETERS: HCPCS | Performed by: INTERNAL MEDICINE

## 2022-04-27 PROCEDURE — 2580000003 HC RX 258: Performed by: INTERNAL MEDICINE

## 2022-04-27 PROCEDURE — 96375 TX/PRO/DX INJ NEW DRUG ADDON: CPT

## 2022-04-27 RX ORDER — DEXAMETHASONE 4 MG/1
2 TABLET ORAL
Qty: 60 TABLET | Refills: 0 | Status: ON HOLD | OUTPATIENT
Start: 2022-04-27 | End: 2022-05-07 | Stop reason: HOSPADM

## 2022-04-27 RX ORDER — DEXAMETHASONE 4 MG/1
2 TABLET ORAL
Qty: 60 TABLET | Refills: 0 | Status: SHIPPED | OUTPATIENT
Start: 2022-04-27 | End: 2022-04-27 | Stop reason: SDUPTHER

## 2022-04-27 RX ORDER — SODIUM CHLORIDE 9 MG/ML
20 INJECTION, SOLUTION INTRAVENOUS ONCE
Status: COMPLETED | OUTPATIENT
Start: 2022-04-27 | End: 2022-04-27

## 2022-04-27 RX ORDER — DEXAMETHASONE SODIUM PHOSPHATE 4 MG/ML
8 INJECTION, SOLUTION INTRA-ARTICULAR; INTRALESIONAL; INTRAMUSCULAR; INTRAVENOUS; SOFT TISSUE ONCE
Status: COMPLETED | OUTPATIENT
Start: 2022-04-27 | End: 2022-04-27

## 2022-04-27 RX ORDER — SODIUM CHLORIDE 0.9 % (FLUSH) 0.9 %
5-40 SYRINGE (ML) INJECTION PRN
Status: DISCONTINUED | OUTPATIENT
Start: 2022-04-27 | End: 2022-04-28 | Stop reason: HOSPADM

## 2022-04-27 RX ORDER — HEPARIN SODIUM (PORCINE) LOCK FLUSH IV SOLN 100 UNIT/ML 100 UNIT/ML
500 SOLUTION INTRAVENOUS PRN
Status: DISCONTINUED | OUTPATIENT
Start: 2022-04-27 | End: 2022-04-28 | Stop reason: HOSPADM

## 2022-04-27 RX ADMIN — SODIUM CHLORIDE, PRESERVATIVE FREE 10 ML: 5 INJECTION INTRAVENOUS at 09:19

## 2022-04-27 RX ADMIN — TOPOTECAN 5.7 MG: 1 INJECTION, SOLUTION, CONCENTRATE INTRAVENOUS at 11:09

## 2022-04-27 RX ADMIN — SODIUM CHLORIDE, PRESERVATIVE FREE 10 ML: 5 INJECTION INTRAVENOUS at 11:59

## 2022-04-27 RX ADMIN — HEPARIN 500 UNITS: 100 SYRINGE at 11:59

## 2022-04-27 RX ADMIN — SODIUM CHLORIDE 20 ML/HR: 9 INJECTION, SOLUTION INTRAVENOUS at 10:47

## 2022-04-27 RX ADMIN — DEXAMETHASONE SODIUM PHOSPHATE 8 MG: 4 INJECTION, SOLUTION INTRAMUSCULAR; INTRAVENOUS at 10:47

## 2022-04-27 NOTE — PROGRESS NOTES
Pt here for C.2D. 1. topotecan  Arrives via wheelchair. Denies any new complaints. Labs drawn from port, results reviewed. Pt was seen by Dr. John Canada, order rec'd to proceed with tx. Tx complete without incident. Pt d/c'd in stable condition. Returns 5/4/2022 for C2D8.

## 2022-04-27 NOTE — TELEPHONE ENCOUNTER
DME orders for wheelchair and seat cushion obtained in triage   [facility]ax[facility] order, progress note and demographics [facility] [id number] [facility]

## 2022-04-27 NOTE — TELEPHONE ENCOUNTER
Received call back from Gerald Ewing Dr progress note needs to state, needs wheelchair for ADLs at home and that he can not use a cane or walker.  And that the pt must be able to propel the wheelchair on his own   Will address with Dr John Canada to addend progress note   Saratha Osgood, RN

## 2022-04-27 NOTE — TELEPHONE ENCOUNTER
AVS from 4/27/22     Chemo today and as planned    rv in 2 weeks     Chemo today as scheduled  RV on 5/11/22 @ 12 with tx to follow.      Pt was given AVS and appt schedule    Electronically signed by Esteban Ortega on 4/27/2022 at 10:23 AM weight-bearing as tolerated

## 2022-04-27 NOTE — PROGRESS NOTES
Praveena Mathur                                                                                                                  4/27/2022  MRN:   [id number]  YOB: 1950  PCP:                           Eulalia Lorenzana MD  Referring Physician: No ref. provider found  Treating Physician Name: Sharon Adler MD      Reason for visit:  Chief Complaint   Patient presents with    Follow-up     review status of disease    Other     pt wondering if he should have refill on steriod    Pain     medication doesnt seem to be working in there all the time   Discussed treatment plan. Toxicity check. 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 Maintenance Tecentriq, 3/2021  Lurbenectidin-7/2/2021  Topotecan-3/2022    Summary of Case/History:    Praveena Mathur a 80 y.o.male is a patient diagnosed with lung cancer with the component of small cell as well as squamous cell carcinoma    Patient has a significant history of tobacco dependence and underwent CT lung screening in December 2018. CT scan was read as lung rads degree 4B. Subsequently she underwent biopsy of right upper lobe lung nodule on 1/16/19. 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 CT PET done showed abnormal FDG uptake in the right upper lobe nodule. There was also abnormal FDG uptake in the right lower lobe nodule. Additionally right hilar lymph node were also FDG avid. No bony lesion was reported. MRI brain for staging workup did not show any evidence of intracranial metastasis. Tip of the odontoid process was ill-defined and metastasis could not be ruled out. Clinically patient does give history of trauma to the neck area any years ago however denies any surgery history. Bone scan did not reveal any metastasis     Patient continues to smoke but has cut down quite a bit. He works full-time in a [address] Patient has good performance status, ECOG 0. Patient's other medical problems include dyslipidemia and hypertension. He also has arthritis pain    Started patient on concurrent chemoradiation using cisplatin and etoposide with dose adjustment for renal dysfunction. Chemotherapy changed to carboplatin and etoposide from cycle #2 due to renal dysfunction    Received PCI in July 2019. Interim History:    Patient presents to the clinic with his wife for a follow-up visit with day #1, cycle #2 and toxicity check. He reports his pain isn't fully controlled with medication, usually works for 4-5 hours and is taking 2-3 times daily. He also has new pain in right arm above the elbow to the shoulder. He remains unable to walk and would like wheelchair for home. He is sleeping a lot with virtually no activity. His pressure ulcers have resolved, area continues to be treated with moisturizer to prevent. He has visiting nurses weekly. His weight and appetite are stable. During this visit patient's allergy, social, medical, surgical history and medications were reviewed and updated. Past Medical History:   Past Medical History:   Diagnosis Date    CAD (coronary artery disease)     DDD (degenerative disc disease), cervical     Gout     Heart murmur     hx. of when younger.     Hyperlipidemia     Hypertension     Lung cancer ([facility] [address]     right lung    MI [facility]ocardial infarction) ([facility] [address]     Skin cancer     face    Wears glasses        Past Surgical History:     Past Surgical History:   Procedure Laterality Date    APPENDECTOMY      CARDIAC CATHETERIZATION      w/stent    COLONOSCOPY      CYST INCISION AND DRAINAGE Right 05/14/2020    rt elbow I and D and left knee aspiration with cultures    FOOT SURGERY Right     2nd toe(bone spur).  FOREARM SURGERY Right 5/14/2020    RIGHT ELBOW IRRIGATION AND DEBRIDEMENT performed by Sury Sanchez DO at [address]  12/13/2021    IR CHEST TUBE INSERTION 12/13/2021 [facility]    KNEE SURGERY Left 5/14/2020    KNEE ASPIRATION WITH CULTURES SENT performed by Sury Sanchez DO at [address]      face under lt. eye.     TONSILLECTOMY         Patient Family Social History:    Family History   Problem Relation Age of Onset    Cancer Father         lung cancer      Social History     Socioeconomic History    Marital status:      Spouse name: Not on file    Number of children: Not on file    Years of education: Not on file    Highest education level: Not on file   Occupational History    Not on file   Tobacco Use    Smoking status: Current Every Day Smoker     Packs/day: 0.50     Types: Cigarettes    Smokeless tobacco: Former User   Vaping Use    Vaping Use: Former   Substance and Sexual Activity    Alcohol use: No    Drug use: Yes     Frequency: 14.0 times per week     Types: Marijuana (Weed)     Comment: hasn't smoked in 1 month    Sexual activity: Not on file   Other Topics Concern    Not on file   Social History Narrative    Not on file     Social Determinants of Health     Financial Resource Strain:     Difficulty of Paying Living Expenses: Not on file   Food Insecurity:     Worried About [address] in the Last Year: Not on file    [address] in the Last Year: Not on file   Transportation Needs:     Lack of Transportation (Medical): Not on file    Lack of Transportation (Non-Medical): Not on file   Physical Activity:     Days of Exercise per Week: Not on file    Minutes of Exercise per Session: Not on file   Stress:     Feeling of Stress : Not on file   Social Connections:     Frequency of Communication with Friends and Family: Not on file    Frequency of Social Gatherings with Friends and Family: Not on file    Attends Orthodox Services: Not on file    Active Member of [address] [place] [address] or Organizations: Not on file    Attends Club or Organization Meetings: Not on file    Marital Status: Not on file   Intimate Partner Violence:     Fear of Current or Ex-Partner: Not on file    Emotionally Abused: Not on file    Physically Abused: Not on file    Sexually Abused: Not on file   Housing Stability:     Unable to Pay for Housing in the Last Year: Not on file    Number of [place] in the Last Year: Not on file    Unstable Housing in the Last Year: Not on file      Current Medications:     Current Outpatient Medications   Medication Sig Dispense Refill    HYDROcodone-acetaminophen (NORCO) 5-325 MG per tablet Take 1 tablet by mouth every 8 hours as needed for Pain for up to 30 days. 90 tablet 0    oxyCODONE-acetaminophen (PERCOCET) 5-325 MG per tablet Take 1 tablet by mouth every 6 hours as needed for Pain for up to 120 days.  12 tablet 0    sodium chloride 1 g tablet TAKE 1 TABLET BY MOUTH TWICE A DAY 60 tablet 1    midodrine (PROAMATINE) 5 MG tablet Take 1 tablet by mouth 3 times daily 90 tablet 3    docusate sodium (COLACE) 100 MG capsule Take 100 mg by mouth 2 times daily      traZODone (DESYREL) 100 MG tablet Take 100 mg by mouth nightly       ipratropium-albuterol (DUONEB) 0.5-2.5 (3) MG/3ML SOLN nebulizer solution Inhale 3 mLs into the lungs 4 times daily      potassium chloride (MICRO-K) 10 MEQ extended release capsule Take 20 mEq by mouth daily       Ferrous Fumarate (FERROCITE) 324 (106 Fe) MG TABS Take 324 mg by mouth daily       tamsulosin (FLOMAX) 0.4 MG capsule TAKE 1 CAPSULE BY MOUTH EVERY DAY 30 capsule 5    Handicap Placard MISC by Does not apply route 1 each 0    lidocaine-prilocaine (EMLA) 2.5-2.5 % cream Apply topically as needed. Apply a quarter size amount to port site 1 hour before chemotherapy. Cover with plastic wrap. 30 g 0    acetaminophen (TYLENOL) 325 MG tablet Take 650 mg by mouth every 6 hours as needed for Pain      allopurinol (ZYLOPRIM) 100 MG tablet Take 100 mg by mouth daily      B Complex-C-Folic Acid (ANGELICA-HUGO) TABS TAKE 1 TABLET BY MOUTH DAILY. HEMATINIC VIT MINERALS (Patient not taking: Reported on 4/15/2022)      simvastatin (ZOCOR) 40 MG tablet Take 40 mg by mouth daily  (Patient not taking: Reported on 4/15/2022)       Current Facility-Administered Medications   Medication Dose Route Frequency Provider Last Rate Last Admin    0.9 % sodium chloride infusion   IntraVENous PRN Mateus Zeng MD         Facility-Administered Medications Ordered in Other Visits   Medication Dose Route Frequency Provider Last Rate Last Admin    sodium chloride flush 0.9 % injection 5-40 mL  5-40 mL IntraVENous PRN Mateus Zeng MD   10 mL at 04/27/22 0919    heparin flush 100 UNIT/ML injection 500 Units  500 Units IntraCATHeter PRN Mateus Zeng MD           Allergies:   Patient has no known allergies. Review of Systems:    Constitutional: No fever or chills. No night sweats. Positive for fatigue. Positive for loss of appetite and cachexia.   Positive for difficulty going to sleep  Eyes: No eye discharge, double vision, or eye pain   HEENT: negative for sore mouth, sore throat, hoarseness and voice change;  +drooling   Respiratory: negative for cough, sputum, wheezing, hemoptysis, chest pain;  Cardiovascular: negative for chest pain, palpitations, orthopnea, PND   Gastrointestinal: negative for nausea, vomiting, diarrhea, constipation, abdominal pain, Dysphagia, hematemesis and hematochezia   Genitourinary: negative for frequency, dysuria, nocturia, urinary incontinence, and hematuria   Integument: Positive for easy bruising - stable +rasied area to the left of the spine that is tender +bed sores -resolved   Hematologic/Lymphatic: negative for easy bleeding, lymphadenopathy, or petechiae   Endocrine: negative for heat or cold intolerance,weight changes, change in bowel habits and hair loss   Musculoskeletal: Positive joint pain.  +right shoulder pain worse  Neurological: negative for headaches, dizziness, seizures, weakness; + poor balance; +neuropathy in left hand from shoulder +poor short term memory        Physical Exam:    Vitals: /81   Pulse 128   Temp 97.3 °F (36.3 °C) (Temporal)   Wt 159 lb (72.1 kg)   SpO2 95%   BMI 22.18 kg/m²   General appearance -patient not in acute distress  Mental status - AAO X3  Eyes - pupils equal and reactive, extraocular eye movements intact  Mouth - mucous membranes moist, pharynx normal without lesions  Neck - supple, no significant adenopathy  Lymphatics - no palpable lymphadenopathy, no hepatosplenomegaly  Chest -decreased breathing sounds bilaterally  Heart - normal rate, regular rhythm, normal S1, S2, no murmurs  Abdomen - soft, nontender, nondistended, no masses or organomegaly  Neurological - alert, oriented, normal speech, no focal findings or movement disorder noted  Extremities -positive lower extremity edema  Skin - normal coloration and turgor, no rashes, no suspicious skin lesions noted; +raised area with tenderness to the left of the spine, no erythema, back shows improved      DATA:    Labs:   Lab Results   Component Value Date    WBC 15.5 (H) 04/27/2022    HGB 8.5 (L) 04/27/2022    HCT 27.4 (L) 04/27/2022    MCV 92.6 04/27/2022     04/27/2022     Lab Results   Component Value Date    NEUTROABS 13.79 (H) 04/27/2022           Chemistry        Component Value Date/Time     (L) 04/27/2022 0905    K 4.7 04/27/2022 4226 CL 96 (L) 04/27/2022 0905    CO2 27 04/27/2022 0905    BUN 15 04/27/2022 0905    CREATININE 0.66 (L) 04/27/2022 0905        Component Value Date/Time    CALCIUM 9.7 04/27/2022 0905    ALKPHOS 215 (H) 04/27/2022 0905    AST 36 04/27/2022 0905    ALT 19 04/27/2022 0905    BILITOT 0.20 (L) 04/27/2022 0905           Impression:  Limited stage Right lung cancer with small cell and squamous cell component, stage IIIa (T3,N1,M0)  Left adrenal metastasis, CT 2/2020, S/P SBRT 07/2020  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 We discussed his worsening symptoms and overall decline, I shared my concerns regarding continuing treatment with progression and his poor performance status. Quality of life issues were discussed and I recommended hospice for comfort care and management. He remains resistant to stopping treatment and insists we continue. We will treat today as per orders. I am writing for wheelchair and cushion. We discussed managing his pain, we will continue with Norco, no additional refills on the Percocet. We reviewed current steroid dosing and I am refilling. Turn to clinic with next cycle. We will plan to obtain scans to assess response after cycle #3 or sooner if needed  Patient needs wheelchair for activities of daily life at home that he cannot use a cane or walker for. Patient is able to propel the wheelchair on his own. NCCN guidelines were reviewed and discussed with the patient. The diagnosis and care plan were discussed with the patient in detail. I discussed the natural history of the disease, prognosis, risks and goals of therapy and answered all the patients questions to the best of my ability. Patient expressed understanding and was in agreement.     Scrp[employer] Attestation   This note was created by Jaxon Jacobs acting as scribe for the physician signing this note  Electronically Signed  Cheri Gallego Cindy, 4/27/2022  Scribe, [employer]. Attending Attestation   Note was reviewed and edited. I am in agreement with the note as entered    Abdulkadir Cook MD          I spent more than  40minutes examining, evaluating, reviewing data, counseling the patient and coordinating care. Greater than 50% of time was spent face-to-face with the patient this note is created with the assistance of a speech recognition program.  While intending to generate a document that actually reflects the content of the visit, the document can still have some errors including those of syntax and sound a like substitutions which may escape proof reading. It such instances, actual meaning can be extrapolated by contextual diversion.

## 2022-05-04 ENCOUNTER — HOSPITAL ENCOUNTER (INPATIENT)
Age: 72
LOS: 2 days | Discharge: HOSPICE | DRG: 180 | End: 2022-05-07
Attending: STUDENT IN AN ORGANIZED HEALTH CARE EDUCATION/TRAINING PROGRAM | Admitting: FAMILY MEDICINE
Payer: MEDICARE

## 2022-05-04 ENCOUNTER — HOSPITAL ENCOUNTER (OUTPATIENT)
Dept: INFUSION THERAPY | Age: 72
Discharge: HOME | DRG: 180 | End: 2022-05-04
Payer: MEDICARE

## 2022-05-04 VITALS
BODY MASS INDEX: 21.56 KG/M2 | WEIGHT: 154.6 LBS | TEMPERATURE: 97.6 F | DIASTOLIC BLOOD PRESSURE: 82 MMHG | HEART RATE: 129 BPM | SYSTOLIC BLOOD PRESSURE: 137 MMHG | RESPIRATION RATE: 20 BRPM

## 2022-05-04 DIAGNOSIS — J96.21 ACUTE ON CHRONIC RESPIRATORY FAILURE WITH HYPOXIA (HCC): Primary | ICD-10-CM

## 2022-05-04 DIAGNOSIS — J18.9 PNEUMONIA DUE TO INFECTIOUS ORGANISM, UNSPECIFIED LATERALITY, UNSPECIFIED PART OF LUNG: ICD-10-CM

## 2022-05-04 DIAGNOSIS — C34.91 PRIMARY LUNG CANCER WITH METASTASIS FROM LUNG TO OTHER SITE, RIGHT (HCC): Primary | ICD-10-CM

## 2022-05-04 LAB
ABSOLUTE EOS #: 0 K/UL (ref 0–0.4)
ABSOLUTE LYMPH #: 0.09 K/UL (ref 1–4.8)
ABSOLUTE MONO #: 0.54 K/UL (ref 0.1–0.8)
ALBUMIN SERPL-MCNC: 3.5 G/DL (ref 3.5–5.2)
ALBUMIN/GLOBULIN RATIO: 1 (ref 1–2.5)
ALP BLD-CCNC: 186 U/L (ref 40–129)
ALT SERPL-CCNC: 22 U/L (ref 5–41)
ANION GAP SERPL CALCULATED.3IONS-SCNC: 11 MMOL/L (ref 9–17)
AST SERPL-CCNC: 41 U/L
BASOPHILS # BLD: 0 % (ref 0–2)
BASOPHILS ABSOLUTE: 0 K/UL (ref 0–0.2)
BILIRUB SERPL-MCNC: 0.22 MG/DL (ref 0.3–1.2)
BUN BLDV-MCNC: 15 MG/DL (ref 8–23)
CALCIUM SERPL-MCNC: 9.5 MG/DL (ref 8.6–10.4)
CHLORIDE BLD-SCNC: 94 MMOL/L (ref 98–107)
CO2: 25 MMOL/L (ref 20–31)
CREAT SERPL-MCNC: 0.54 MG/DL (ref 0.7–1.2)
EOSINOPHILS RELATIVE PERCENT: 0 % (ref 1–4)
GFR AFRICAN AMERICAN: >60 ML/MIN
GFR NON-AFRICAN AMERICAN: >60 ML/MIN
GFR SERPL CREATININE-BSD FRML MDRD: ABNORMAL ML/MIN/{1.73_M2}
GLUCOSE BLD-MCNC: 138 MG/DL (ref 70–99)
HCT VFR BLD CALC: 25.5 % (ref 41–53)
HEMOGLOBIN: 8.2 G/DL (ref 13.5–17.5)
LYMPHOCYTES # BLD: 1 % (ref 24–44)
MCH RBC QN AUTO: 29.4 PG (ref 26–34)
MCHC RBC AUTO-ENTMCNC: 32.2 G/DL (ref 31–37)
MCV RBC AUTO: 91.2 FL (ref 80–100)
MONOCYTES # BLD: 6 % (ref 1–7)
MORPHOLOGY: ABNORMAL
MORPHOLOGY: ABNORMAL
PDW BLD-RTO: 21.1 % (ref 12.5–15.4)
PLATELET # BLD: 273 K/UL (ref 140–450)
PMV BLD AUTO: 6.4 FL (ref 6–12)
POTASSIUM SERPL-SCNC: 4.8 MMOL/L (ref 3.7–5.3)
RBC # BLD: 2.8 M/UL (ref 4.5–5.9)
SEG NEUTROPHILS: 93 % (ref 36–66)
SEGMENTED NEUTROPHILS ABSOLUTE COUNT: 8.37 K/UL (ref 1.8–7.7)
SODIUM BLD-SCNC: 130 MMOL/L (ref 135–144)
TOTAL PROTEIN: 7.1 G/DL (ref 6.4–8.3)
WBC # BLD: 9 K/UL (ref 3.5–11)

## 2022-05-04 PROCEDURE — 85025 COMPLETE CBC W/AUTO DIFF WBC: CPT

## 2022-05-04 PROCEDURE — 99285 EMERGENCY DEPT VISIT HI MDM: CPT

## 2022-05-04 PROCEDURE — 96375 TX/PRO/DX INJ NEW DRUG ADDON: CPT

## 2022-05-04 PROCEDURE — 6360000002 HC RX W HCPCS: Performed by: INTERNAL MEDICINE

## 2022-05-04 PROCEDURE — 36591 DRAW BLOOD OFF VENOUS DEVICE: CPT

## 2022-05-04 PROCEDURE — 80053 COMPREHEN METABOLIC PANEL: CPT

## 2022-05-04 PROCEDURE — 2580000003 HC RX 258: Performed by: INTERNAL MEDICINE

## 2022-05-04 PROCEDURE — 96413 CHEMO IV INFUSION 1 HR: CPT

## 2022-05-04 RX ORDER — HEPARIN SODIUM (PORCINE) LOCK FLUSH IV SOLN 100 UNIT/ML 100 UNIT/ML
500 SOLUTION INTRAVENOUS PRN
Status: DISCONTINUED | OUTPATIENT
Start: 2022-05-04 | End: 2022-05-05 | Stop reason: HOSPADM

## 2022-05-04 RX ORDER — SODIUM CHLORIDE 9 MG/ML
INJECTION, SOLUTION INTRAVENOUS CONTINUOUS
Status: DISCONTINUED | OUTPATIENT
Start: 2022-05-04 | End: 2022-05-05 | Stop reason: HOSPADM

## 2022-05-04 RX ORDER — SODIUM CHLORIDE 0.9 % (FLUSH) 0.9 %
5-40 SYRINGE (ML) INJECTION PRN
Status: DISCONTINUED | OUTPATIENT
Start: 2022-05-04 | End: 2022-05-05 | Stop reason: HOSPADM

## 2022-05-04 RX ORDER — DEXAMETHASONE SODIUM PHOSPHATE 4 MG/ML
8 INJECTION, SOLUTION INTRA-ARTICULAR; INTRALESIONAL; INTRAMUSCULAR; INTRAVENOUS; SOFT TISSUE ONCE
Status: COMPLETED | OUTPATIENT
Start: 2022-05-04 | End: 2022-05-04

## 2022-05-04 RX ADMIN — HEPARIN 500 UNITS: 100 SYRINGE at 14:55

## 2022-05-04 RX ADMIN — DEXAMETHASONE SODIUM PHOSPHATE 8 MG: 4 INJECTION, SOLUTION INTRAMUSCULAR; INTRAVENOUS at 14:02

## 2022-05-04 RX ADMIN — TOPOTECAN 5.7 MG: 1 INJECTION, SOLUTION, CONCENTRATE INTRAVENOUS at 14:19

## 2022-05-04 RX ADMIN — SODIUM CHLORIDE, PRESERVATIVE FREE 20 ML: 5 INJECTION INTRAVENOUS at 13:02

## 2022-05-04 RX ADMIN — SODIUM CHLORIDE, PRESERVATIVE FREE 10 ML: 5 INJECTION INTRAVENOUS at 14:55

## 2022-05-04 RX ADMIN — SODIUM CHLORIDE: 9 INJECTION, SOLUTION INTRAVENOUS at 14:02

## 2022-05-04 ASSESSMENT — LIFESTYLE VARIABLES: HOW OFTEN DO YOU HAVE A DRINK CONTAINING ALCOHOL: NEVER

## 2022-05-04 ASSESSMENT — PAIN - FUNCTIONAL ASSESSMENT: PAIN_FUNCTIONAL_ASSESSMENT: 0-10

## 2022-05-04 NOTE — PROGRESS NOTES
Patient arrived for C2D8 topotecan   Labs collected and reviewed   Tolerated w/o incident   Return 5/15  and sandra Pham RN

## 2022-05-05 ENCOUNTER — APPOINTMENT (OUTPATIENT)
Dept: CT IMAGING | Age: 72
DRG: 180 | End: 2022-05-05
Payer: MEDICARE

## 2022-05-05 ENCOUNTER — APPOINTMENT (OUTPATIENT)
Dept: GENERAL RADIOLOGY | Age: 72
DRG: 180 | End: 2022-05-05
Payer: MEDICARE

## 2022-05-05 ENCOUNTER — APPOINTMENT (OUTPATIENT)
Dept: INTERVENTIONAL RADIOLOGY/VASCULAR | Age: 72
DRG: 180 | End: 2022-05-05
Payer: MEDICARE

## 2022-05-05 ENCOUNTER — APPOINTMENT (OUTPATIENT)
Dept: NON INVASIVE DIAGNOSTICS | Age: 72
DRG: 180 | End: 2022-05-05
Payer: MEDICARE

## 2022-05-05 PROBLEM — E87.1 HYPONATREMIA: Status: ACTIVE | Noted: 2022-05-05

## 2022-05-05 PROBLEM — E43 SEVERE MALNUTRITION (HCC): Chronic | Status: ACTIVE | Noted: 2022-05-05

## 2022-05-05 PROBLEM — J96.90 RESPIRATORY FAILURE (HCC): Status: ACTIVE | Noted: 2022-05-05

## 2022-05-05 LAB
ABSOLUTE EOS #: 0 K/UL (ref 0–0.4)
ABSOLUTE LYMPH #: 0.37 K/UL (ref 1–4.8)
ABSOLUTE MONO #: 1.48 K/UL (ref 0.1–1.3)
ALBUMIN SERPL-MCNC: 3.3 G/DL (ref 3.5–5.2)
ALP BLD-CCNC: 179 U/L (ref 40–129)
ALT SERPL-CCNC: 20 U/L (ref 5–41)
ANION GAP SERPL CALCULATED.3IONS-SCNC: 10 MMOL/L (ref 9–17)
AST SERPL-CCNC: 38 U/L
BASOPHILS # BLD: 0 % (ref 0–2)
BASOPHILS ABSOLUTE: 0 K/UL (ref 0–0.2)
BILIRUB SERPL-MCNC: 0.35 MG/DL (ref 0.3–1.2)
BILIRUBIN URINE: NEGATIVE
BUN BLDV-MCNC: 21 MG/DL (ref 8–23)
CALCIUM SERPL-MCNC: 9.2 MG/DL (ref 8.6–10.4)
CHLORIDE BLD-SCNC: 94 MMOL/L (ref 98–107)
CO2: 24 MMOL/L (ref 20–31)
COLOR: YELLOW
CREAT SERPL-MCNC: 0.6 MG/DL (ref 0.7–1.2)
EKG ATRIAL RATE: 127 BPM
EKG P AXIS: 53 DEGREES
EKG P-R INTERVAL: 156 MS
EKG Q-T INTERVAL: 288 MS
EKG QRS DURATION: 76 MS
EKG QTC CALCULATION (BAZETT): 418 MS
EKG R AXIS: 62 DEGREES
EKG T AXIS: 20 DEGREES
EKG VENTRICULAR RATE: 127 BPM
EOSINOPHILS RELATIVE PERCENT: 0 % (ref 0–4)
EPITHELIAL CELLS UA: ABNORMAL /HPF
GFR AFRICAN AMERICAN: >60 ML/MIN
GFR NON-AFRICAN AMERICAN: >60 ML/MIN
GFR SERPL CREATININE-BSD FRML MDRD: ABNORMAL ML/MIN/{1.73_M2}
GLUCOSE BLD-MCNC: 131 MG/DL (ref 70–99)
GLUCOSE URINE: NEGATIVE
HCT VFR BLD CALC: 23.8 % (ref 41–53)
HEMOGLOBIN: 7.6 G/DL (ref 13.5–17.5)
INFLUENZA A: NOT DETECTED
INFLUENZA B: NOT DETECTED
KETONES, URINE: NEGATIVE
LACTIC ACID, SEPSIS: 1.1 MMOL/L (ref 0.5–1.9)
LEUKOCYTE ESTERASE, URINE: NEGATIVE
LV EF: 55 %
LVEF MODALITY: NORMAL
LYMPHOCYTES # BLD: 3 % (ref 24–44)
MCH RBC QN AUTO: 28.9 PG (ref 26–34)
MCHC RBC AUTO-ENTMCNC: 31.9 G/DL (ref 31–37)
MCV RBC AUTO: 90.4 FL (ref 80–100)
MONOCYTES # BLD: 12 % (ref 1–7)
MORPHOLOGY: ABNORMAL
MORPHOLOGY: ABNORMAL
NITRITE, URINE: NEGATIVE
PDW BLD-RTO: 21.6 % (ref 11.5–14.9)
PH UA: 5 (ref 5–8)
PLATELET # BLD: 255 K/UL (ref 150–450)
PMV BLD AUTO: 6.2 FL (ref 6–12)
POTASSIUM SERPL-SCNC: 4.8 MMOL/L (ref 3.7–5.3)
PRO-BNP: 1129 PG/ML
PROTEIN UA: ABNORMAL
RBC # BLD: 2.64 M/UL (ref 4.5–5.9)
RBC UA: ABNORMAL /HPF
SARS-COV-2 RNA, RT PCR: NOT DETECTED
SEG NEUTROPHILS: 85 % (ref 36–66)
SEGMENTED NEUTROPHILS ABSOLUTE COUNT: 10.45 K/UL (ref 1.3–9.1)
SODIUM BLD-SCNC: 128 MMOL/L (ref 135–144)
SOURCE: NORMAL
SPECIFIC GRAVITY UA: 1.02 (ref 1–1.03)
SPECIMEN DESCRIPTION: NORMAL
TOTAL PROTEIN: 6.8 G/DL (ref 6.4–8.3)
TROPONIN, HIGH SENSITIVITY: 33 NG/L (ref 0–22)
TROPONIN, HIGH SENSITIVITY: 34 NG/L (ref 0–22)
TROPONIN, HIGH SENSITIVITY: 36 NG/L (ref 0–22)
TURBIDITY: CLEAR
URINE HGB: NEGATIVE
UROBILINOGEN, URINE: NORMAL
WBC # BLD: 12.3 K/UL (ref 3.5–11)
WBC UA: ABNORMAL /HPF

## 2022-05-05 PROCEDURE — 71260 CT THORAX DX C+: CPT

## 2022-05-05 PROCEDURE — 6370000000 HC RX 637 (ALT 250 FOR IP): Performed by: FAMILY MEDICINE

## 2022-05-05 PROCEDURE — 87040 BLOOD CULTURE FOR BACTERIA: CPT

## 2022-05-05 PROCEDURE — 83880 ASSAY OF NATRIURETIC PEPTIDE: CPT

## 2022-05-05 PROCEDURE — 32555 ASPIRATE PLEURA W/ IMAGING: CPT

## 2022-05-05 PROCEDURE — 6360000002 HC RX W HCPCS: Performed by: INTERNAL MEDICINE

## 2022-05-05 PROCEDURE — 99223 1ST HOSP IP/OBS HIGH 75: CPT | Performed by: INTERNAL MEDICINE

## 2022-05-05 PROCEDURE — 93306 TTE W/DOPPLER COMPLETE: CPT

## 2022-05-05 PROCEDURE — 2709999900 IR GUIDED THORACENTESIS PLEURAL

## 2022-05-05 PROCEDURE — 0W993ZZ DRAINAGE OF RIGHT PLEURAL CAVITY, PERCUTANEOUS APPROACH: ICD-10-PCS | Performed by: RADIOLOGY

## 2022-05-05 PROCEDURE — 71045 X-RAY EXAM CHEST 1 VIEW: CPT

## 2022-05-05 PROCEDURE — 87636 SARSCOV2 & INF A&B AMP PRB: CPT

## 2022-05-05 PROCEDURE — 84484 ASSAY OF TROPONIN QUANT: CPT

## 2022-05-05 PROCEDURE — 94667 MNPJ CHEST WALL 1ST: CPT

## 2022-05-05 PROCEDURE — 80053 COMPREHEN METABOLIC PANEL: CPT

## 2022-05-05 PROCEDURE — 2060000000 HC ICU INTERMEDIATE R&B

## 2022-05-05 PROCEDURE — 87641 MR-STAPH DNA AMP PROBE: CPT

## 2022-05-05 PROCEDURE — 96365 THER/PROPH/DIAG IV INF INIT: CPT

## 2022-05-05 PROCEDURE — 2580000003 HC RX 258: Performed by: STUDENT IN AN ORGANIZED HEALTH CARE EDUCATION/TRAINING PROGRAM

## 2022-05-05 PROCEDURE — 93005 ELECTROCARDIOGRAM TRACING: CPT | Performed by: STUDENT IN AN ORGANIZED HEALTH CARE EDUCATION/TRAINING PROGRAM

## 2022-05-05 PROCEDURE — 81001 URINALYSIS AUTO W/SCOPE: CPT

## 2022-05-05 PROCEDURE — 94761 N-INVAS EAR/PLS OXIMETRY MLT: CPT

## 2022-05-05 PROCEDURE — 6360000002 HC RX W HCPCS: Performed by: STUDENT IN AN ORGANIZED HEALTH CARE EDUCATION/TRAINING PROGRAM

## 2022-05-05 PROCEDURE — 94640 AIRWAY INHALATION TREATMENT: CPT

## 2022-05-05 PROCEDURE — 2700000000 HC OXYGEN THERAPY PER DAY

## 2022-05-05 PROCEDURE — 2580000003 HC RX 258: Performed by: FAMILY MEDICINE

## 2022-05-05 PROCEDURE — 87205 SMEAR GRAM STAIN: CPT

## 2022-05-05 PROCEDURE — 93010 ELECTROCARDIOGRAM REPORT: CPT | Performed by: INTERNAL MEDICINE

## 2022-05-05 PROCEDURE — 36415 COLL VENOUS BLD VENIPUNCTURE: CPT

## 2022-05-05 PROCEDURE — 6360000004 HC RX CONTRAST MEDICATION: Performed by: FAMILY MEDICINE

## 2022-05-05 PROCEDURE — 6360000002 HC RX W HCPCS: Performed by: FAMILY MEDICINE

## 2022-05-05 PROCEDURE — 85025 COMPLETE CBC W/AUTO DIFF WBC: CPT

## 2022-05-05 PROCEDURE — 83605 ASSAY OF LACTIC ACID: CPT

## 2022-05-05 PROCEDURE — 6370000000 HC RX 637 (ALT 250 FOR IP): Performed by: STUDENT IN AN ORGANIZED HEALTH CARE EDUCATION/TRAINING PROGRAM

## 2022-05-05 PROCEDURE — 87150 DNA/RNA AMPLIFIED PROBE: CPT

## 2022-05-05 RX ORDER — SODIUM CHLORIDE 0.9 % (FLUSH) 0.9 %
10 SYRINGE (ML) INJECTION PRN
Status: DISCONTINUED | OUTPATIENT
Start: 2022-05-05 | End: 2022-05-07 | Stop reason: HOSPADM

## 2022-05-05 RX ORDER — SODIUM CHLORIDE 0.9 % (FLUSH) 0.9 %
5-40 SYRINGE (ML) INJECTION PRN
Status: DISCONTINUED | OUTPATIENT
Start: 2022-05-05 | End: 2022-05-07 | Stop reason: HOSPADM

## 2022-05-05 RX ORDER — SODIUM CHLORIDE 0.9 % (FLUSH) 0.9 %
5-40 SYRINGE (ML) INJECTION EVERY 12 HOURS SCHEDULED
Status: DISCONTINUED | OUTPATIENT
Start: 2022-05-05 | End: 2022-05-07 | Stop reason: HOSPADM

## 2022-05-05 RX ORDER — ENOXAPARIN SODIUM 100 MG/ML
40 INJECTION SUBCUTANEOUS DAILY
Status: DISCONTINUED | OUTPATIENT
Start: 2022-05-05 | End: 2022-05-05

## 2022-05-05 RX ORDER — GUAIFENESIN 600 MG/1
600 TABLET, EXTENDED RELEASE ORAL 2 TIMES DAILY
Status: DISCONTINUED | OUTPATIENT
Start: 2022-05-05 | End: 2022-05-07 | Stop reason: HOSPADM

## 2022-05-05 RX ORDER — 0.9 % SODIUM CHLORIDE 0.9 %
80 INTRAVENOUS SOLUTION INTRAVENOUS ONCE
Status: COMPLETED | OUTPATIENT
Start: 2022-05-05 | End: 2022-05-05

## 2022-05-05 RX ORDER — ALLOPURINOL 100 MG/1
100 TABLET ORAL DAILY
Status: DISCONTINUED | OUTPATIENT
Start: 2022-05-05 | End: 2022-05-07 | Stop reason: HOSPADM

## 2022-05-05 RX ORDER — HYDROCODONE BITARTRATE AND ACETAMINOPHEN 5; 325 MG/1; MG/1
1 TABLET ORAL ONCE
Status: COMPLETED | OUTPATIENT
Start: 2022-05-05 | End: 2022-05-05

## 2022-05-05 RX ORDER — ONDANSETRON 4 MG/1
4 TABLET, ORALLY DISINTEGRATING ORAL EVERY 8 HOURS PRN
Status: DISCONTINUED | OUTPATIENT
Start: 2022-05-05 | End: 2022-05-07 | Stop reason: HOSPADM

## 2022-05-05 RX ORDER — DEXAMETHASONE 2 MG/1
2 TABLET ORAL
Status: DISCONTINUED | OUTPATIENT
Start: 2022-05-05 | End: 2022-05-07 | Stop reason: HOSPADM

## 2022-05-05 RX ORDER — FERROUS SULFATE 325(65) MG
325 TABLET ORAL 2 TIMES DAILY WITH MEALS
Status: DISCONTINUED | OUTPATIENT
Start: 2022-05-05 | End: 2022-05-07 | Stop reason: HOSPADM

## 2022-05-05 RX ORDER — ALPRAZOLAM 0.5 MG/1
0.5 TABLET ORAL NIGHTLY PRN
Status: ON HOLD | COMMUNITY
End: 2022-05-07 | Stop reason: HOSPADM

## 2022-05-05 RX ORDER — SODIUM CHLORIDE 9 MG/ML
INJECTION, SOLUTION INTRAVENOUS PRN
Status: DISCONTINUED | OUTPATIENT
Start: 2022-05-05 | End: 2022-05-07 | Stop reason: HOSPADM

## 2022-05-05 RX ORDER — ACETAMINOPHEN 325 MG/1
650 TABLET ORAL EVERY 4 HOURS PRN
Status: DISCONTINUED | OUTPATIENT
Start: 2022-05-05 | End: 2022-05-07 | Stop reason: HOSPADM

## 2022-05-05 RX ORDER — FERROUS FUMARATE 324(106)MG
324 TABLET ORAL DAILY
Status: DISCONTINUED | OUTPATIENT
Start: 2022-05-05 | End: 2022-05-05

## 2022-05-05 RX ORDER — IPRATROPIUM BROMIDE AND ALBUTEROL SULFATE 2.5; .5 MG/3ML; MG/3ML
3 SOLUTION RESPIRATORY (INHALATION)
Status: DISCONTINUED | OUTPATIENT
Start: 2022-05-05 | End: 2022-05-07 | Stop reason: HOSPADM

## 2022-05-05 RX ORDER — DOCUSATE SODIUM 100 MG/1
100 CAPSULE, LIQUID FILLED ORAL 2 TIMES DAILY
Status: DISCONTINUED | OUTPATIENT
Start: 2022-05-05 | End: 2022-05-07 | Stop reason: HOSPADM

## 2022-05-05 RX ORDER — SODIUM CHLORIDE 1000 MG
1 TABLET, SOLUBLE MISCELLANEOUS 2 TIMES DAILY
Status: DISCONTINUED | OUTPATIENT
Start: 2022-05-05 | End: 2022-05-07 | Stop reason: HOSPADM

## 2022-05-05 RX ORDER — HYDROCODONE BITARTRATE AND ACETAMINOPHEN 5; 325 MG/1; MG/1
1 TABLET ORAL EVERY 4 HOURS PRN
Status: DISCONTINUED | OUTPATIENT
Start: 2022-05-05 | End: 2022-05-07 | Stop reason: HOSPADM

## 2022-05-05 RX ORDER — MIDODRINE HYDROCHLORIDE 5 MG/1
5 TABLET ORAL 3 TIMES DAILY
Status: DISCONTINUED | OUTPATIENT
Start: 2022-05-05 | End: 2022-05-05

## 2022-05-05 RX ORDER — MIDODRINE HYDROCHLORIDE 5 MG/1
5 TABLET ORAL 3 TIMES DAILY PRN
Status: DISCONTINUED | OUTPATIENT
Start: 2022-05-05 | End: 2022-05-06

## 2022-05-05 RX ORDER — 0.9 % SODIUM CHLORIDE 0.9 %
1000 INTRAVENOUS SOLUTION INTRAVENOUS ONCE
Status: COMPLETED | OUTPATIENT
Start: 2022-05-05 | End: 2022-05-05

## 2022-05-05 RX ORDER — ONDANSETRON 2 MG/ML
4 INJECTION INTRAMUSCULAR; INTRAVENOUS EVERY 6 HOURS PRN
Status: DISCONTINUED | OUTPATIENT
Start: 2022-05-05 | End: 2022-05-07 | Stop reason: HOSPADM

## 2022-05-05 RX ORDER — TAMSULOSIN HYDROCHLORIDE 0.4 MG/1
0.4 CAPSULE ORAL DAILY
Status: DISCONTINUED | OUTPATIENT
Start: 2022-05-05 | End: 2022-05-07 | Stop reason: HOSPADM

## 2022-05-05 RX ORDER — POTASSIUM CHLORIDE 750 MG/1
20 CAPSULE, EXTENDED RELEASE ORAL DAILY
Status: DISCONTINUED | OUTPATIENT
Start: 2022-05-05 | End: 2022-05-07 | Stop reason: HOSPADM

## 2022-05-05 RX ORDER — MORPHINE SULFATE 4 MG/ML
4 INJECTION, SOLUTION INTRAMUSCULAR; INTRAVENOUS ONCE
Status: COMPLETED | OUTPATIENT
Start: 2022-05-05 | End: 2022-05-05

## 2022-05-05 RX ORDER — FUROSEMIDE 10 MG/ML
40 INJECTION INTRAMUSCULAR; INTRAVENOUS DAILY
Status: DISCONTINUED | OUTPATIENT
Start: 2022-05-05 | End: 2022-05-07 | Stop reason: HOSPADM

## 2022-05-05 RX ORDER — ALPRAZOLAM 0.5 MG/1
0.5 TABLET ORAL NIGHTLY PRN
Status: DISCONTINUED | OUTPATIENT
Start: 2022-05-05 | End: 2022-05-07 | Stop reason: HOSPADM

## 2022-05-05 RX ADMIN — METOPROLOL TARTRATE 25 MG: 25 TABLET, FILM COATED ORAL at 08:38

## 2022-05-05 RX ADMIN — METOPROLOL TARTRATE 25 MG: 25 TABLET, FILM COATED ORAL at 20:48

## 2022-05-05 RX ADMIN — MORPHINE SULFATE 4 MG: 4 INJECTION, SOLUTION INTRAMUSCULAR; INTRAVENOUS at 16:53

## 2022-05-05 RX ADMIN — GUAIFENESIN 600 MG: 600 TABLET, EXTENDED RELEASE ORAL at 20:48

## 2022-05-05 RX ADMIN — ALLOPURINOL 100 MG: 100 TABLET ORAL at 08:38

## 2022-05-05 RX ADMIN — DEXAMETHASONE 2 MG: 2 TABLET ORAL at 09:17

## 2022-05-05 RX ADMIN — FERROUS SULFATE TAB 325 MG (65 MG ELEMENTAL FE) 325 MG: 325 (65 FE) TAB at 16:53

## 2022-05-05 RX ADMIN — POTASSIUM CHLORIDE 20 MEQ: 10 CAPSULE, COATED, EXTENDED RELEASE ORAL at 08:38

## 2022-05-05 RX ADMIN — SODIUM CHLORIDE 80 ML: 9 INJECTION, SOLUTION INTRAVENOUS at 19:31

## 2022-05-05 RX ADMIN — SODIUM CHLORIDE 1 G: 1 TABLET ORAL at 20:47

## 2022-05-05 RX ADMIN — HYDROCODONE BITARTRATE AND ACETAMINOPHEN 1 TABLET: 5; 325 TABLET ORAL at 12:57

## 2022-05-05 RX ADMIN — HYDROCODONE BITARTRATE AND ACETAMINOPHEN 1 TABLET: 5; 325 TABLET ORAL at 01:32

## 2022-05-05 RX ADMIN — SODIUM CHLORIDE 1 G: 1 TABLET ORAL at 08:38

## 2022-05-05 RX ADMIN — VANCOMYCIN HYDROCHLORIDE 1750 MG: 1 INJECTION, POWDER, LYOPHILIZED, FOR SOLUTION INTRAVENOUS at 02:02

## 2022-05-05 RX ADMIN — FERROUS SULFATE TAB 325 MG (65 MG ELEMENTAL FE) 325 MG: 325 (65 FE) TAB at 11:57

## 2022-05-05 RX ADMIN — HYDROCODONE BITARTRATE AND ACETAMINOPHEN 1 TABLET: 5; 325 TABLET ORAL at 21:34

## 2022-05-05 RX ADMIN — TAMSULOSIN HYDROCHLORIDE 0.4 MG: 0.4 CAPSULE ORAL at 08:39

## 2022-05-05 RX ADMIN — IOPAMIDOL 75 ML: 755 INJECTION, SOLUTION INTRAVENOUS at 19:30

## 2022-05-05 RX ADMIN — SODIUM CHLORIDE, PRESERVATIVE FREE 10 ML: 5 INJECTION INTRAVENOUS at 09:15

## 2022-05-05 RX ADMIN — DOCUSATE SODIUM 100 MG: 100 CAPSULE, LIQUID FILLED ORAL at 08:39

## 2022-05-05 RX ADMIN — SODIUM CHLORIDE, PRESERVATIVE FREE 10 ML: 5 INJECTION INTRAVENOUS at 20:56

## 2022-05-05 RX ADMIN — IPRATROPIUM BROMIDE AND ALBUTEROL SULFATE 3 ML: .5; 2.5 SOLUTION RESPIRATORY (INHALATION) at 20:11

## 2022-05-05 RX ADMIN — SODIUM CHLORIDE 1000 ML: 9 INJECTION, SOLUTION INTRAVENOUS at 01:04

## 2022-05-05 RX ADMIN — GUAIFENESIN 600 MG: 600 TABLET, EXTENDED RELEASE ORAL at 16:53

## 2022-05-05 RX ADMIN — CEFEPIME HYDROCHLORIDE 2000 MG: 2 INJECTION, POWDER, FOR SOLUTION INTRAVENOUS at 01:24

## 2022-05-05 RX ADMIN — IPRATROPIUM BROMIDE AND ALBUTEROL SULFATE 3 ML: .5; 2.5 SOLUTION RESPIRATORY (INHALATION) at 08:14

## 2022-05-05 RX ADMIN — IPRATROPIUM BROMIDE AND ALBUTEROL SULFATE 3 ML: .5; 2.5 SOLUTION RESPIRATORY (INHALATION) at 11:00

## 2022-05-05 RX ADMIN — SODIUM CHLORIDE, PRESERVATIVE FREE 10 ML: 5 INJECTION INTRAVENOUS at 19:30

## 2022-05-05 RX ADMIN — HYDROCODONE BITARTRATE AND ACETAMINOPHEN 1 TABLET: 5; 325 TABLET ORAL at 08:38

## 2022-05-05 RX ADMIN — VANCOMYCIN HYDROCHLORIDE 1500 MG: 1.5 INJECTION, POWDER, LYOPHILIZED, FOR SOLUTION INTRAVENOUS at 20:55

## 2022-05-05 RX ADMIN — APIXABAN 5 MG: 5 TABLET, FILM COATED ORAL at 08:39

## 2022-05-05 RX ADMIN — FUROSEMIDE 40 MG: 10 INJECTION, SOLUTION INTRAMUSCULAR; INTRAVENOUS at 08:38

## 2022-05-05 ASSESSMENT — ENCOUNTER SYMPTOMS
PHOTOPHOBIA: 0
COLOR CHANGE: 0
SORE THROAT: 0
BLOOD IN STOOL: 0
COUGH: 1
VOMITING: 0
EYE REDNESS: 0
FACIAL SWELLING: 0
CHOKING: 0
VOICE CHANGE: 0
ABDOMINAL DISTENTION: 0
DIARRHEA: 0
NAUSEA: 0
SHORTNESS OF BREATH: 1
TROUBLE SWALLOWING: 0
RECTAL PAIN: 0
EYE PAIN: 0
STRIDOR: 0
ABDOMINAL PAIN: 0
BACK PAIN: 0
ANAL BLEEDING: 0
RHINORRHEA: 0

## 2022-05-05 ASSESSMENT — PAIN DESCRIPTION - LOCATION
LOCATION: ELBOW;SHOULDER;ARM
LOCATION: ELBOW;SHOULDER
LOCATION: ARM
LOCATION: ELBOW

## 2022-05-05 ASSESSMENT — PAIN SCALES - GENERAL
PAINLEVEL_OUTOF10: 7
PAINLEVEL_OUTOF10: 6
PAINLEVEL_OUTOF10: 4
PAINLEVEL_OUTOF10: 5
PAINLEVEL_OUTOF10: 7
PAINLEVEL_OUTOF10: 6

## 2022-05-05 ASSESSMENT — PAIN - FUNCTIONAL ASSESSMENT: PAIN_FUNCTIONAL_ASSESSMENT: NONE - DENIES PAIN

## 2022-05-05 ASSESSMENT — LIFESTYLE VARIABLES: HOW OFTEN DO YOU HAVE A DRINK CONTAINING ALCOHOL: NEVER

## 2022-05-05 ASSESSMENT — PAIN DESCRIPTION - DESCRIPTORS
DESCRIPTORS: DULL;ACHING
DESCRIPTORS: ACHING;DULL

## 2022-05-05 ASSESSMENT — PAIN DESCRIPTION - ORIENTATION
ORIENTATION: RIGHT
ORIENTATION: RIGHT

## 2022-05-05 NOTE — FLOWSHEET NOTE
05/05/22 1543   Encounter Summary   Encounter Overview/Reason  Volunteer Encounter   Service Provided For: Patient   Referral/Consult From: Rounding   Last Encounter  05/05/22  (V)   Complexity of Encounter Low   Begin Time 1000   End Time  1015   Total Time Calculated 15 min   Encounter    Type Follow up   Spiritual/Emotional needs   Type Spiritual Support   Rituals, Rites and [address]

## 2022-05-05 NOTE — PROGRESS NOTES
Vancomycin Dosing by Pharmacy - Initial Note   TODAY'S DATE:  5/5/2022  Patient name, age:  Chantelle Ayala, 80 y.o. Indication: Respiratory infection, MRSA suspected  Additional antimicrobials:  cefepime    Allergies:  Patient has no known allergies. Actual Weight:    Wt Readings from Last 1 Encounters:   05/04/22 154 lb (69.9 kg)     Labs/Ancillary Data  Estimated Creatinine Clearance: 113 mL/min (A) (based on SCr of 0.6 mg/dL (L)). Recent Labs     05/04/22  1259 05/05/22  0026   CREATININE 0.54* 0.60*   BUN 15 21   WBC 9.0 12.3*     Procalcitonin   Date Value Ref Range Status   05/10/2020 14.89 (H) <0.09 ng/mL Final     Comment:           Suspected Sepsis:  <0.50 ng/mL     Low likelihood of sepsis. 0.50-2.00 ng/mL     Increased likelihood of sepsis. Antibiotics encouraged. >2.00 ng/mL     High risk of sepsis/shock. Antibiotics strongly encouraged. Suspected Lower Resp Tract Infections:  <0.24 ng/mL     Low likelihood of bacterial infection. >0.24 ng/mL     Increased likelihood of bacterial infection. Antibiotics encouraged. With successful antibiotic therapy, PCT levels should decrease rapidly. (Half-life of 24 to   36 hours.)        Procalcitonin values from samples collected within the first 6 hours of systemic infection   may still be low. Retesting may be indicated. Values from day 1 and day 4 can be entered into the Change in Procalcitonin Calculator   ([email or web address] [email or web address]-pct-[email or web address] com) to determine the patient's Mortality Risk Prognosis        In healthy neonates, plasma Procalcitonin (PCT) concentrations increase gradually after   birth, reaching peak values at about 24 hours of age then decrease to normal values below   0.5 ng/mL by 48-72 hours of age.          Intake/Output Summary (Last 24 hours) at 5/5/2022 0135  Last data filed at 5/5/2022 0131  Gross per 24 hour   Intake 1000 ml   Output --   Net 1000 ml     Temp: 97.6    Recent vancomycin administrations        No vancomycin

## 2022-05-05 NOTE — PROGRESS NOTES
Notified IR of chest tube insertion order. They stated it would be this later this afternoon.    Reviewed pt had received his eliquis dose this am

## 2022-05-05 NOTE — CONSULTS
Patient not taking: Reported on 5/5/2022 7/20/20   Historical Provider, MD   ipratropium-albuterol (DUONEB) 0.5-2.5 (3) MG/3ML SOLN nebulizer solution Inhale 3 mLs into the lungs 4 times daily    Historical Provider, MD   potassium chloride (MICRO-K) 10 MEQ extended release capsule Take 20 mEq by mouth daily  11/19/19   Historical Provider, MD   Ferrous Fumarate (FERROCITE) 324 (106 Fe) MG TABS Take 324 mg by mouth daily     Historical Provider, MD   tamsulosin (FLOMAX) 0.4 MG capsule TAKE 1 CAPSULE BY MOUTH EVERY DAY 5/4/20   Wyatt Chow MD   Handicap [facility] by Does not apply route 7/30/19   Wyatt Chow MD   lidocaine-prilocaine (EMLA) 2.5-2.5 % cream Apply topically as needed. Apply a quarter size amount to port site 1 hour before chemotherapy. Cover with plastic wrap.  3/7/19   Guillermo Wharton MD   acetaminophen (TYLENOL) 325 MG tablet Take 650 mg by mouth every 6 hours as needed for Pain    Historical Provider, MD   allopurinol (ZYLOPRIM) 100 MG tablet Take 100 mg by mouth daily 12/4/18   Historical Provider, MD   simvastatin (ZOCOR) 40 MG tablet Take 40 mg by mouth daily   Patient not taking: Reported on 4/15/2022 11/27/18   Historical Provider, MD     Current Facility-Administered Medications   Medication Dose Route Frequency Provider Last Rate Last Admin    vancomycin (VANCOCIN) intermittent dosing (placeholder)   Other RX Debbie Douglass MD        vancomycin (VANCOCIN) 1,500 mg in dextrose 5 % 250 mL IVPB (ADDAVIAL)  1,500 mg IntraVENous Q18H Cristiana Jordan MD        sodium chloride flush 0.9 % injection 5-40 mL  5-40 mL IntraVENous 2 times per day Sunny Heredia MD   10 mL at 05/05/22 0915    sodium chloride flush 0.9 % injection 5-40 mL  5-40 mL IntraVENous PRN Sunny Heredia MD        0.9 % sodium chloride infusion   IntraVENous PRN Sunny Heredia MD        acetaminophen (TYLENOL) tablet 650 mg  650 mg Oral Q4H PRN Sunny Heredia MD        ondansetron (ZOFRAN-ODT) disintegrating tablet 4 mg  4 mg Oral Q8H PRN Nori Mcburney, MD        Or    [facility] 4 mg  4 mg IntraVENous Q6H PRN Nori Mcburney, MD        allopurinol (ZYLOPRIM) tablet 100 mg  100 mg Oral Daily Segundo Messer MD   100 mg at 05/05/22 9017    ALPRAZolam (XANAX) tablet 0.5 mg  0.5 mg Oral Nightly PRN Segundo Messer MD        dexamethasone (DECADRON) tablet 2 mg  2 mg Oral Daily with breakfast Segundo Messer MD   2 mg at 05/05/22 3226    docusate sodium (COLACE) capsule 100 mg  100 mg Oral BID Segundo Messer MD   100 mg at 05/05/22 0839    HYDROcodone-acetaminophen ([facility]) 5-325 MG per tablet 1 tablet  1 tablet Oral Q4H PRN Segundo Messer MD   1 tablet at 05/05/22 1257    ipratropium-albuterol (DUONEB) nebulizer solution 3 mL  3 mL Inhalation Q4H WA Segundo Messer MD   3 mL at 05/05/22 1100    potassium chloride (MICRO-K) extended release capsule 20 mEq  20 mEq Oral Daily Segundo Messer MD   20 mEq at 05/05/22 3972    sodium chloride tablet 1 g  1 g Oral BID Segundo Messer MD   1 g at 05/05/22 0838    tamsulosin (FLOMAX) capsule 0.4 mg  0.4 mg Oral Daily Segundo Messer MD   0.4 mg at 05/05/22 0839    midodrine (PROAMATINE) tablet 5 mg  5 mg Oral TID PRN Segundo Messer MD        furosemide (LASIX) injection 40 mg  40 mg IntraVENous Daily Segundo Messer MD   40 mg at 05/05/22 0838    metoprolol tartrate (LOPRESSOR) tablet 25 mg  25 mg Oral BID Segundo Messer MD   25 mg at 05/05/22 6523    apixaban (ELIQUIS) tablet 5 mg  5 mg Oral BID Segundo Messer MD   5 mg at 05/05/22 8859    perflutren lipid microspheres (DEFINITY) injection 1.65 mg  1.5 mL IntraVENous ONCE PRN Fern Garcia MD        ferrous sulfate (IRON 325) tablet 325 mg  325 mg Oral BID  Segundo Messer MD   325 mg at 05/05/22 1653    guaiFENesin (MUCINEX) extended release tablet 600 mg  600 mg Oral BID Segundo Messer MD   600 mg at 05/05/22 1653       Allergies:  Patient has no known allergies. Social History:   reports that he has been smoking cigarettes. He has been smoking about 0.50 packs per day. He has quit using smokeless tobacco. He reports current drug use. Frequency: 14.00 times per week. Drug: Marijuana Simmie Lang). He reports that he does not drink alcohol. Family History: family history includes Cancer in his father. REVIEW OF SYSTEMS:      Constitutional: No fever or chills. No night sweats, positive weight loss. Positive fatigue. Eyes: No eye discharge, double vision, or eye pain   HEENT: negative for sore mouth, sore throat, hoarseness and voice change   Respiratory: Positive shortness of breath  Cardiovascular: negative for chest pain, dyspnea, palpitations, orthopnea, PND   Gastrointestinal: negative for nausea, vomiting, diarrhea, constipation, abdominal pain, Dysphagia, hematemesis and hematochezia   Genitourinary: negative for frequency, dysuria, nocturia, urinary incontinence, and hematuria   Integument: negative for rash, skin lesions, bruises. Hematologic/Lymphatic: negative for easy bruising, bleeding, lymphadenopathy, or petechiae   Endocrine: negative for heat or cold intolerance,weight changes, change in bowel habits and hair loss   Musculoskeletal: negative for myalgias, arthralgias, pain, joint swelling,and bone pain   Neurological: negative for headaches, dizziness, seizures, weakness, numbness    PHYSICAL EXAM:        /66   Pulse 110   Temp 97.6 °F (36.4 °C) (Oral)   Resp 22   Ht 5' 11.5\" (1.816 m)   Wt 154 lb 15.7 oz (70.3 kg)   SpO2 96%   BMI 21.31 kg/m²    Temp (24hrs), Av.6 °F (36.4 °C), Min:97.4 °F (36.3 °C), Max:97.8 °F (36.6 °C)      General appearance -frail appearing, no in pain or distress.   Temporal wasting noted  Mental status - alert and cooperative   Eyes - pupils equal and reactive, extraocular eye movements intact   Ears - bilateral TM's and external ear canals normal   Mouth - mucous membranes moist, pharynx normal without lesions   Neck - supple, no significant adenopathy   Lymphatics - no palpable lymphadenopathy, no hepatosplenomegaly   Chest -bilateral Rales noted  Heart - normal rate, regular rhythm, normal S1, S2, no murmurs  Abdomen - soft, nontender, nondistended, no masses or organomegaly   Neurological - alert, oriented, normal speech, no focal findings or movement disorder noted   Musculoskeletal - no joint tenderness, deformity or swelling   Extremities - peripheral pulses normal, no pedal edema, no clubbing or cyanosis   Skin - normal coloration and turgor, no rashes, no suspicious skin lesions noted ,      DATA:      Labs:     Results for orders placed or performed during the hospital encounter of 05/04/22   Culture, Blood 1    Specimen: Blood   Result Value Ref Range    Specimen Description . BLOOD     Special Requests R AC 10CC EACH     Culture NO GROWTH <24 HRS    Culture, Blood 2    Specimen: Blood   Result Value Ref Range    Specimen Description . BLOOD     Special Requests L AC 5CC EACH     Culture NO GROWTH <24 HRS    COVID-19 & Influenza Combo    Specimen: Nasopharyngeal Swab   Result Value Ref Range    Specimen Description . NASOPHARYNGEAL SWAB     Source . NASOPHARYNGEAL SWAB     SARS-CoV-2 RNA, RT PCR Not Detected Not Detected    INFLUENZA A Not Detected Not Detected    INFLUENZA B Not Detected Not Detected   CBC with Auto Differential   Result Value Ref Range    WBC 12.3 (H) 3.5 - 11.0 k/uL    RBC 2.64 (L) 4.5 - 5.9 m/uL    Hemoglobin 7.6 (L) 13.5 - 17.5 g/dL    Hematocrit 23.8 (L) 41 - 53 %    MCV 90.4 80 - 100 fL    MCH 28.9 26 - 34 pg    MCHC 31.9 31 - 37 g/dL    RDW 21.6 (H) 11.5 - 14.9 %    Platelets 359 487 - 049 k/uL    MPV 6.2 6.0 - 12.0 fL    Seg Neutrophils 85 (H) 36 - 66 %    Lymphocytes 3 (L) 24 - 44 %    Monocytes 12 (H) 1 - 7 %    Eosinophils % 0 0 - 4 %    Basophils 0 0 - 2 %    Segs Absolute 10.45 (H) 1.3 - 9.1 k/uL    Absolute Lymph # 0.37 (L) 1.0 - 4.8 k/uL    Absolute Mono # 1.48 (H) 0.1 - 1.3 k/uL    Absolute Eos # 0.00 0.0 - 0.4 k/uL    Basophils Absolute 0.00 0.0 - 0.2 k/uL    Morphology ANISOCYTOSIS PRESENT     Morphology HYPOCHROMIA PRESENT    Comprehensive Metabolic Panel w/ Reflex to MG   Result Value Ref Range    Glucose 131 (H) 70 - 99 mg/dL    BUN 21 8 - 23 mg/dL    CREATININE 0.60 (L) 0.70 - 1.20 mg/dL    Calcium 9.2 8.6 - 10.4 mg/dL    Sodium 128 (L) 135 - 144 mmol/L    Potassium 4.8 3.7 - 5.3 mmol/L    Chloride 94 (L) 98 - 107 mmol/L    CO2 24 20 - 31 mmol/L    Anion Gap 10 9 - 17 mmol/L    Alkaline Phosphatase 179 (H) 40 - 129 U/L    ALT 20 5 - 41 U/L    AST 38 <40 U/L    Total Bilirubin 0.35 0.3 - 1.2 mg/dL    Total Protein 6.8 6.4 - 8.3 g/dL    Albumin 3.3 (L) 3.5 - 5.2 g/dL    GFR Non-African American >60 >60 mL/min    GFR African American >60 >60 mL/min    GFR Comment         Troponin   Result Value Ref Range    Troponin, High Sensitivity 33 (H) 0 - 22 ng/L   Urinalysis with Microscopic   Result Value Ref Range    Color, UA Yellow Yellow    Turbidity UA Clear Clear    Glucose, Ur NEGATIVE NEGATIVE    Bilirubin Urine NEGATIVE NEGATIVE    Ketones, Urine NEGATIVE NEGATIVE    Specific Ramsay, UA 1.021 1.000 - 1.030    Urine Hgb NEGATIVE NEGATIVE    pH, UA 5.0 5.0 - 8.0    Protein, UA TRACE (A) NEGATIVE    Urobilinogen, Urine Normal Normal    Nitrite, Urine NEGATIVE NEGATIVE    Leukocyte Esterase, Urine NEGATIVE NEGATIVE    WBC, UA 0 TO 2 /HPF    RBC, UA 0 TO 2 /HPF    Epithelial Cells UA 0 TO 2 /HPF   Lactate, Sepsis   Result Value Ref Range    Lactic Acid, Sepsis 1.1 0.5 - 1.9 mmol/L   Brain Natriuretic Peptide   Result Value Ref Range    Pro-BNP 1,129 (H) <300 pg/mL   Troponin   Result Value Ref Range    Troponin, High Sensitivity 36 (H) 0 - 22 ng/L   Troponin   Result Value Ref Range    Troponin, High Sensitivity 34 (H) 0 - 22 ng/L   EKG 12 Lead   Result Value Ref Range    Ventricular Rate 127 BPM    Atrial Rate 127 BPM    P-R Interval 156 ms    QRS Duration 76 ms    Q-T turbid dean colored fluid was removed. Successful ultrasound guided right thoracentesis. IR GUIDED THORACENTESIS PLEURAL    Result Date: 4/15/2022  PROCEDURE: ULTRASOUNDGUIDED RIGHT THORACENTESIS 4/15/2022 HISTORY: ORDERING SYSTEM PROVIDED HISTORY: Malignant neoplasm of right upper lobe of lung [facility]) TECHNOLOGIST PROVIDED HISTORY: Which side should the procedure be performed?->Radiologist Recommendation TECHNIQUE: Informed consent was obtained after a detailed explanation of the procedure including risks, benefits, and alternatives. Universal protocol was performed. The right chest was prepped and draped in sterile fashion and local anesthesia was achieved with lidocaine. Ultrasound shows a complex loculated right pleural effusion corresponding to the prior CT. A 5 Bhutanese needle sheath was advanced under ultrasound guidance into pleural effusion and thoracentesis was performed. The patient tolerated the procedure well. EBL: None FINDINGS: A total of 250 mL of cloudy dean colored fluid was removed and sent for the requested studies. The patient requested termination of the procedure at this point due to some discomfort; additional loculated fluid remains. Successful ultrasound guided right thoracentesis with a loculated right pleural effusion.          IMPRESSION:   Primary Problem  Respiratory failure [facility])    Active Hospital Problems    Diagnosis Date Noted    Respiratory failure ([facility] [J96.90] 05/05/2022     Priority: Medium    Hyponatremia [E87.1] 05/05/2022     Priority: Medium    [facility] [E43] 05/05/2022    COPD (chronic obstructive pulmonary disease) [facility] [J44.9] 08/27/2021    Iron deficiency anemia [D50.9] 08/27/2021    PAF (paroxysmal atrial fibrillation) ([facility] [I48.0] 08/27/2021    Lung cancer, primary, with metastasis from lung to other site [facility]) [C34.90]        Recurrent small cell carcinoma of lung  Recurrent pleural effusion malignant  Failure to thrive  Hyponatremia      RECOMMENDATIONS:  1. I personally reviewed results of lab work-up imaging studies and other relevant clinical data. 2. Reviewed hospitalization record  3. Reviewed previous treatment record  4. Reviewed goals and expectations. patient has refused hospice. He wanted to pursue treatment. I expressed my concern that given patient's worsening performance status and recurrent effusion which represents disease progression prognosis is poor. Patient wants to remain a full code at this time. I will obtain CT scans to  get a more objective assessment of patient's response to treatment and disease status which I hope will help the patient  make a decision towards goals of his care  5. I will consult palliative care  6. Continue symptomatic supportive care  7. We will follow      Discussed with patient and Nurse. Thank you for asking us to see this patient. Coretta Wharton MD          This note is created with the assistance of a speech recognition program.  While intending to generate a document that actually reflects the content of the visit, the document can still have some errors including those of syntax and sound a like substitutions which may escape proof reading. It such instances, actual meaning can be extrapolated by contextual diversion.

## 2022-05-05 NOTE — ACP (ADVANCE CARE PLANNING)
Advance Care Planning     Advance Care Planning Activator (Inpatient)  Conversation Note      Date of ACP Conversation: 5/5/2022     Conversation Conducted with: Patient with Decision Making Capacity    ACP Activator: Corin Joseph RN        Health Care Decision Maker:     Current Designated Health Care Decision Maker:     Primary Decision Maker: Alvarez Fletcher (Hipaa),Juan M Coyne - [phone]  Click here to complete Healthcare Decision Makers including section of the Healthcare Decision Maker Relationship (ie \"Primary\")      Care Preferences    Ventilation: \"If you were in your present state of health and suddenly became very ill and were unable to breathe on your own, what would your preference be about the use of a ventilator (breathing machine) if it were available to you? \"      Would the patient desire the use of ventilator (breathing machine)?: yes    \"If your health worsens and it becomes clear that your chance of recovery is unlikely, what would your preference be about the use of a ventilator (breathing machine) if it were available to you? \"     Would the patient desire the use of ventilator (breathing machine)?: Yes      Resuscitation  \"CPR works best to restart the heart when there is a sudden event, like a heart attack, in someone who is otherwise healthy. Unfortunately, CPR does not typically restart the heart for people who have serious health conditions or who are very sick. \"    \"In the event your heart stopped as a result of an underlying serious health condition, would you want attempts to be made to restart your heart (answer \"yes\" for attempt to resuscitate) or would you prefer a natural death (answer \"no\" for do not attempt to resuscitate)? \" yes       [] Yes   [] No   Educated Patient / Lesly Curry regarding differences between Advance Directives and portable DNR orders.     Length of ACP Conversation in minutes:      Conversation Outcomes:  [x] ACP discussion completed  [] Existing advance directive reviewed with patient; no changes to patient's previously recorded wishes  [] New Advance Directive completed  [] Portable Do Not Rescitate prepared for Provider review and signature  [] POLST/POST/MOLST/MOST prepared for Provider review and signature      Follow-up plan:    [] Schedule follow-up conversation to continue planning  [] Referred individual to Provider for additional questions/concerns   [] Advised patient/agent/surrogate to review completed ACP document and update if needed with changes in condition, patient preferences or care setting    [x] This note routed to one or more involved healthcare providers

## 2022-05-05 NOTE — ED NOTES
The following labs labeled with pt sticker and tubed to lab:     [x] MRSA swab  [] Pelvic Cultures  [] Blood Cultures        Jorge Schwartz RN  05/05/22 1113

## 2022-05-05 NOTE — PROGRESS NOTES
Alert and oriented. US in use. Rt posterior chest prepped draped. Numbed and accessed by Dr Ruben Barba. Obtained 700 M L of cloudy dean fluid.  Tolerated well Report called to [facility] RN CXR completed

## 2022-05-05 NOTE — H&P
History and Physical      Name: Lainey Black  MRN: [id number]     Acct: [de-identified]  Room: [facility]212-    Admit Date: 5/4/2022  PCP: Franck Cutler MD      Chief Complaint:     Chief Complaint   Patient presents with    Shortness of Breath       History Obtained From:     patient, electronic medical record    History of Present Illness:      Lainey Black is a  80 y.o.  male with PAF on Eliquis, right metastatic lung cancer presents with Shortness of Breath   patient states that he started experiencing progressive shortness of breath for the last 7 days. Patient has cough, unable to expectorate sputum. Bilateral lower extremity swelling. Patient denies chest pain palpitations nausea vomiting diaphoresis abdominal pain dysuria flank pain sore throat lightheadedness visual change fever or chills    Past Medical History:     Past Medical History:   Diagnosis Date    CAD (coronary artery disease)     DDD (degenerative disc disease), cervical     Gout     Heart murmur     hx. of when younger.  Hyperlipidemia     Hypertension     Lung cancer ([facility] [address]     right lung    MI (myocardial infarction) ([facility] [address]     Skin cancer     face    Wears glasses         Past Surgical History:     Past Surgical History:   Procedure Laterality Date    APPENDECTOMY      CARDIAC CATHETERIZATION      w/stent    COLONOSCOPY      CYST INCISION AND DRAINAGE Right 05/14/2020    rt elbow I and D and left knee aspiration with cultures    FOOT SURGERY Right     2nd toe(bone spur).  FOREARM SURGERY Right 5/14/2020    RIGHT ELBOW IRRIGATION AND DEBRIDEMENT performed by Nancy Salinas DO at [place] [address]  12/13/2021    IR CHEST TUBE INSERTION 12/13/2021 [facility]    KNEE SURGERY Left 5/14/2020    KNEE ASPIRATION WITH CULTURES SENT performed by Nancy Salinas DO at [address] [place] [address] under lt. eye.     TONSILLECTOMY          Medications Prior to Admission:       Prior to Admission medications    Medication Sig Start Date End Date Taking? Authorizing Provider   ALPRAZolam Daryel Noriega) 0.5 MG tablet Take 0.5 mg by mouth nightly as needed for Sleep. Yes Historical Provider, MD   dexamethasone (DECADRON) 4 MG tablet Take 0.5 tablets by mouth daily (with breakfast) 4/27/22   [facility], MD   HYDROcodone-acetaminophen [facility]) 5-325 MG per tablet Take 1 tablet by mouth every 8 hours as needed for Pain for up to 30 days. 4/18/22 5/18/22  [facility], MD   oxyCODONE-acetaminophen (PERCOCET) 5-325 MG per tablet Take 1 tablet by mouth every 6 hours as needed for Pain for up to 120 days. 3/18/22 7/16/22  Guillermo Wharton MD   sodium chloride 1 g tablet TAKE 1 TABLET BY MOUTH TWICE A DAY 3/18/22   [facility], MD   midodrine (PROAMATINE) 5 MG tablet Take 1 tablet by mouth 3 times daily 12/17/21   Rah Arnett MD   docusate sodium (COLACE) 100 MG capsule Take 100 mg by mouth 2 times daily    Historical Provider, MD   B Complex-C-Folic Acid (ANGELICA-HUGO) TABS TAKE 1 TABLET BY MOUTH DAILY. HEMATINIC VIT MINERALS 7/21/20   Historical Provider, MD   traZODone (DESYREL) 100 MG tablet Take 100 mg by mouth nightly   Patient not taking: Reported on 5/5/2022 7/20/20   Historical Provider, MD   ipratropium-albuterol (DUONEB) 0.5-2.5 (3) MG/3ML SOLN nebulizer solution Inhale 3 mLs into the lungs 4 times daily    Historical Provider, MD   potassium chloride (MICRO-K) 10 MEQ extended release capsule Take 20 mEq by mouth daily  11/19/19   Historical Provider, MD   Ferrous Fumarate (FERROCITE) 324 (106 Fe) MG TABS Take 324 mg by mouth daily     Historical Provider, MD   tamsulosin (FLOMAX) 0.4 MG capsule TAKE 1 CAPSULE BY MOUTH EVERY DAY 5/4/20   [facility], MD   Handicap Placard [address] [facility] by Does not apply route 7/30/19   [facility], MD   lidocaine-prilocaine (EMLA) 2.5-2.5 % cream Apply topically as needed. Apply a quarter size amount to port site 1 hour before chemotherapy. Cover with plastic wrap. 3/7/19   Guillermo Wharton MD   acetaminophen (TYLENOL) 325 MG tablet Take 650 mg by mouth every 6 hours as needed for Pain    Historical Provider, MD   allopurinol (ZYLOPRIM) 100 MG tablet Take 100 mg by mouth daily 12/4/18   Historical Provider, MD   simvastatin (ZOCOR) 40 MG tablet Take 40 mg by mouth daily   Patient not taking: Reported on 4/15/2022 11/27/18   Historical Provider, MD        Allergies:       Patient has no known allergies. Social History:     Tobacco:    reports that he has been smoking cigarettes. He has been smoking about 0.50 packs per day. He has quit using smokeless tobacco.  Alcohol:      reports no history of alcohol use. Drug Use:  reports current drug use. Frequency: 14.00 times per week. Drug: Marijuana Francia Lux). Family History:     Family History   Problem Relation Age of Onset    Cancer Father         lung cancer       Review of Systems:     Positive and Negative as described in HPI   all 10 systems are reviewed and negative except as Noted      Review of Systems   Constitutional: Negative for appetite change, chills and diaphoresis. HENT: Negative for drooling, ear pain, trouble swallowing and voice change. Eyes: Negative for photophobia and visual disturbance. Respiratory: Positive for cough and shortness of breath. Negative for choking and stridor. Cardiovascular: Positive for leg swelling. Negative for chest pain and palpitations. Gastrointestinal: Negative for abdominal distention, anal bleeding, blood in stool and rectal pain. Endocrine: Negative for polyphagia and polyuria. Genitourinary: Negative for dysuria, flank pain, hematuria and urgency. Musculoskeletal: Negative for back pain, myalgias and neck stiffness. Skin: Negative for color change, pallor and rash. Allergic/Immunologic: Negative for environmental allergies and food allergies. Neurological: Negative for tremors, seizures, facial asymmetry and numbness. Hematological: Negative for adenopathy. Does not bruise/bleed easily. Psychiatric/Behavioral: Negative for agitation, behavioral problems, hallucinations, self-injury and suicidal ideas. Code Status:  Full Code    Physical Exam:     Vitals:  /74   Pulse 100   Temp 97.6 °F (36.4 °C) (Oral)   Resp 23   Ht 5' 11.5\" (1.816 m)   Wt 154 lb 15.7 oz (70.3 kg)   SpO2 97%   BMI 21.31 kg/m²   Temp (24hrs), Av.6 °F (36.4 °C), Min:97.4 °F (36.3 °C), Max:97.8 °F (36.6 °C)        Physical Exam  Vitals reviewed. Constitutional:       Appearance: Normal appearance. He is not diaphoretic. HENT:      Head: Normocephalic and atraumatic. Right Ear: External ear normal.      Left Ear: External ear normal.      Nose: Nose normal.      Mouth/Throat:      Mouth: Mucous membranes are moist.      Pharynx: Oropharynx is clear. Eyes:      Conjunctiva/sclera: Conjunctivae normal.   Cardiovascular:      Rate and Rhythm: Normal rate and regular rhythm. Pulses: Normal pulses. Heart sounds: Normal heart sounds. Pulmonary:      Effort: Pulmonary effort is normal.      Breath sounds: Examination of the right-lower field reveals decreased breath sounds and rhonchi. Examination of the left-lower field reveals decreased breath sounds. Decreased breath sounds and rhonchi present. No wheezing. Abdominal:      General: Bowel sounds are normal. There is no distension. Palpations: Abdomen is soft. Tenderness: There is no abdominal tenderness. There is no right CVA tenderness or left CVA tenderness. Musculoskeletal:         General: No tenderness or deformity. Normal range of motion. Cervical back: Normal range of motion and neck supple. No rigidity. Right lower leg: Edema ( 1+) present. Left lower leg: Edema (1+) present. Skin:     General: Skin is warm and dry. Capillary Refill: Capillary refill takes less than 2 seconds. Coloration: Skin is not jaundiced. Neurological:      General: No focal deficit present. Mental Status: Mental status is at baseline.    Psychiatric:         Mood and Affect: Mood normal.         Behavior: Behavior normal.               Data:     Recent Results (from the past 24 hour(s))   CBC with Auto Differential    Collection Time: 05/05/22 12:26 AM   Result Value Ref Range    WBC 12.3 (H) 3.5 - 11.0 k/uL    RBC 2.64 (L) 4.5 - 5.9 m/uL    Hemoglobin 7.6 (L) 13.5 - 17.5 g/dL    Hematocrit 23.8 (L) 41 - 53 %    MCV 90.4 80 - 100 fL    MCH 28.9 26 - 34 pg    MCHC 31.9 31 - 37 g/dL    RDW 21.6 (H) 11.5 - 14.9 %    Platelets 617 659 - 570 k/uL    MPV 6.2 6.0 - 12.0 fL    Seg Neutrophils 85 (H) 36 - 66 %    Lymphocytes 3 (L) 24 - 44 %    Monocytes 12 (H) 1 - 7 %    Eosinophils % 0 0 - 4 %    Basophils 0 0 - 2 %    Segs Absolute 10.45 (H) 1.3 - 9.1 k/uL    Absolute Lymph # 0.37 (L) 1.0 - 4.8 k/uL    Absolute Mono # 1.48 (H) 0.1 - 1.3 k/uL    Absolute Eos # 0.00 0.0 - 0.4 k/uL    Basophils Absolute 0.00 0.0 - 0.2 k/uL    Morphology ANISOCYTOSIS PRESENT     Morphology HYPOCHROMIA PRESENT    Comprehensive Metabolic Panel w/ Reflex to MG    Collection Time: 05/05/22 12:26 AM   Result Value Ref Range    Glucose 131 (H) 70 - 99 mg/dL    BUN 21 8 - 23 mg/dL    CREATININE 0.60 (L) 0.70 - 1.20 mg/dL    Calcium 9.2 8.6 - 10.4 mg/dL    Sodium 128 (L) 135 - 144 mmol/L    Potassium 4.8 3.7 - 5.3 mmol/L    Chloride 94 (L) 98 - 107 mmol/L    CO2 24 20 - 31 mmol/L    Anion Gap 10 9 - 17 mmol/L    Alkaline Phosphatase 179 (H) 40 - 129 U/L    ALT 20 5 - 41 U/L    AST 38 <40 U/L    Total Bilirubin 0.35 0.3 - 1.2 mg/dL    Total Protein 6.8 6.4 - 8.3 g/dL    Albumin 3.3 (L) 3.5 - 5.2 g/dL    GFR Non-African American >60 >60 mL/min    GFR African American >60 >60 mL/min    GFR Comment         Troponin    Collection Time: 05/05/22 12:26 AM   Result Value Ref Range    Troponin, High Sensitivity 33 (H) 0 - 22 ng/L   Lactate, Sepsis    Collection Time: 05/05/22 12:26 AM Result Value Ref Range    Lactic Acid, Sepsis 1.1 0.5 - 1.9 mmol/L   Brain Natriuretic Peptide    Collection Time: 05/05/22 12:26 AM   Result Value Ref Range    Pro-BNP 1,129 (H) <300 pg/mL   COVID-19 & Influenza Combo    Collection Time: 05/05/22 12:29 AM    Specimen: Nasopharyngeal Swab   Result Value Ref Range    Specimen Description . NASOPHARYNGEAL SWAB     Source . NASOPHARYNGEAL SWAB     SARS-CoV-2 RNA, RT PCR Not Detected Not Detected    INFLUENZA A Not Detected Not Detected    INFLUENZA B Not Detected Not Detected   EKG 12 Lead    Collection Time: 05/05/22 12:38 AM   Result Value Ref Range    Ventricular Rate 127 BPM    Atrial Rate 127 BPM    P-R Interval 156 ms    QRS Duration 76 ms    Q-T Interval 288 ms    QTc Calculation (Bazett) 418 ms    P Axis 53 degrees    R Axis 62 degrees    T Axis 20 degrees   Culture, Blood 1    Collection Time: 05/05/22 12:57 AM    Specimen: Blood   Result Value Ref Range    Specimen Description . BLOOD     Special Requests R AC 10CC EACH     Culture NO GROWTH <24 HRS    Culture, Blood 2    Collection Time: 05/05/22 12:57 AM    Specimen: Blood   Result Value Ref Range    Specimen Description . BLOOD     Special Requests L AC 5CC EACH     Culture NO GROWTH <24 HRS    Urinalysis with Microscopic    Collection Time: 05/05/22  2:28 AM   Result Value Ref Range    Color, UA Yellow Yellow    Turbidity UA Clear Clear    Glucose, Ur NEGATIVE NEGATIVE    Bilirubin Urine NEGATIVE NEGATIVE    Ketones, Urine NEGATIVE NEGATIVE    Specific [place], UA 1.021 1.000 - 1.030    Urine Hgb NEGATIVE NEGATIVE    pH, UA 5.0 5.0 - 8.0    Protein, UA TRACE (A) NEGATIVE    Urobilinogen, Urine Normal Normal    Nitrite, Urine NEGATIVE NEGATIVE    Leukocyte Esterase, Urine NEGATIVE NEGATIVE    WBC, UA 0 TO 2 /HPF    RBC, UA 0 TO 2 /HPF    Epithelial Cells UA 0 TO 2 /HPF   Troponin    Collection Time: 05/05/22  7:07 AM   Result Value Ref Range    Troponin, High Sensitivity 36 (H) 0 - 22 ng/L   Troponin Collection Time: 05/05/22 12:52 PM   Result Value Ref Range    Troponin, High Sensitivity 34 (H) 0 - 22 ng/L       Assesment:     Primary Problem  Respiratory failure (HCC)    Principal Problem:    Respiratory failure (HCC)  Active Problems:    Hyponatremia    Lung cancer, primary, with metastasis from lung to other site (HCC)    PAF (paroxysmal atrial fibrillation) (HCC)    COPD (chronic obstructive pulmonary disease) (HCC)    Iron deficiency anemia  Resolved Problems:    * No resolved hospital problems. *      Plan:     1. IV vancomycin  2. IV cefepime  3.  blood cultures x2  4. Lasix 40 mg IV daily  5. Metoprolol 25 mg p.o. twice daily  6. Mucinex 600 mg p.o. twice daily  7. Discussed with pulmonologist Dr. Magalie Gardner on the floor  8. MRSA nasal swab  9. TSH  10.  DuoNeb nebulizer treatment every 4 hours while awake  11.  2D echo  12. sodium chloride 1 g tab twice daily  13.  chest x-ray report shows near complete opacification of right hemithorax. 14.  consult oncology  15. CBC, CMP  16.  12 lead EKG shows sinus tachycardia  17. DVT prophylaxis  Eliquis  18. EPCs  19.  check and replace electrolytes per sliding scale  20.   restart home medications        Electronically signed by Martine Foss MD     Copy sent to Dr. Mercedez Streeter MD

## 2022-05-05 NOTE — PROGRESS NOTES
Dr Aminta Hair notified of trop increase to 36 from 35. Reviewed lung sounds and breathing issues.      No new orders at this time

## 2022-05-05 NOTE — PLAN OF CARE
Problem: Discharge Planning  Goal: Discharge to home or other facility with appropriate resources  Outcome: Progressing  Flowsheets  Taken 5/5/2022 0315 by Olinda Duane, RN  Discharge to home or other facility with appropriate resources:   Identify barriers to discharge with patient and caregiver   Arrange for needed discharge resources and transportation as appropriate   Identify discharge learning needs (meds, wound care, etc)   Refer to discharge planning if patient needs post-hospital services based on physician order or complex needs related to functional status, cognitive ability or social support system  Taken 5/5/2022 0311 by Olinda Duane, RN  Discharge to home or other facility with appropriate resources:   Identify barriers to discharge with patient and caregiver   Identify discharge learning needs (meds, wound care, etc)   Refer to discharge planning if patient needs post-hospital services based on physician order or complex needs related to functional status, cognitive ability or social support system   Arrange for needed discharge resources and transportation as appropriate   Ongoing plan. Pt from home with wife    Problem: Pain  Goal: Verbalizes/displays adequate comfort level or baseline comfort level  Outcome: Progressing   Non pharm and meds utilized    Problem: Safety - Adult  Goal: Free from fall injury  Outcome: Progressing   Up with assist d/t weakness. Precautions in place      Problem: Skin/Tissue Integrity  Goal: Absence of new skin breakdown  Description: 1. Monitor for areas of redness and/or skin breakdown  2. Assess vascular access sites hourly  3. Every 4-6 hours minimum:  Change oxygen saturation probe site  4. Every 4-6 hours:  If on nasal continuous positive airway pressure, respiratory therapy assess nares and determine need for appliance change or resting period. Outcome: Progressing   Skin assessment documented.  Moisture associated breakdown on buttocks

## 2022-05-05 NOTE — ED PROVIDER NOTES
EMERGENCY DEPARTMENT ENCOUNTER    Pt Name: Sagrario Mott  MRN: [id number]  [place] 1950  Date of evaluation: 5/5/22  CHIEF COMPLAINT       Chief Complaint   Patient presents with    Shortness of Breath     HISTORY OF PRESENT ILLNESS   HPI  80-year-old male history of coronary artery disease, right-sided lung cancer metastatic, A. janene presents for evaluation with shortness of breath. Symptoms worsening over the past several days. Patient has associated cough and lower extremity swelling. Symptoms are moderate to severe and progressive. No other associated symptoms. Patient has a known right-sided pleural effusion and he had thoracentesis done about a month ago. No known alleviating or exacerbating factors. No other associated symptoms. REVIEW OF SYSTEMS     Review of Systems   Constitutional: Negative for chills and fatigue. HENT: Negative for facial swelling, nosebleeds, rhinorrhea and sore throat. Eyes: Negative for pain and redness. Respiratory: Positive for cough and shortness of breath. Cardiovascular: Positive for leg swelling. Negative for chest pain. Gastrointestinal: Negative for abdominal pain, diarrhea, nausea and vomiting. Genitourinary: Negative for flank pain and hematuria. Musculoskeletal: Negative for arthralgias and back pain. Skin: Negative for color change and rash. Neurological: Negative for dizziness, tremors, facial asymmetry, speech difficulty, weakness and numbness. PASTMEDICAL HISTORY     Past Medical History:   Diagnosis Date    CAD (coronary artery disease)     DDD (degenerative disc disease), cervical     Gout     Heart murmur     hx. of when younger.     Hyperlipidemia     Hypertension     Lung cancer ([address]     right lung    MI (myocardial infarction) ([address]     Skin cancer     face    Wears glasses      Past Problem List  Patient Active Problem List   Diagnosis Code    Primary lung cancer with metastasis from lung to other site, right ([facility] C34.91    Hyperlipidemia E78.5    Hypertension I10    Lung cancer, primary, with metastasis from lung to other site [facility]) C34.90    Sepsis ([facility] A41.9    Atrial fibrillation with RVR (HCC) I48.91    Gout M10.9    Pyogenic arthritis of right elbow (HCC) M00.9    Pneumothorax on right J93.9    PAF (paroxysmal atrial fibrillation) ([facility]) I48.0    Tobacco dependence F17.200    COPD (chronic obstructive pulmonary disease) (HCC) J44.9    Iron deficiency anemia D50.9    Pneumothorax J93.9    Thrombocytopenia (HCC) D69.6    Leukopenia D72.819    Severe malnutrition ([facility]) E43    Respiratory failure ([facility]) J96.90     SURGICAL HISTORY       Past Surgical History:   Procedure Laterality Date    APPENDECTOMY      CARDIAC CATHETERIZATION      w/stent    COLONOSCOPY      CYST INCISION AND DRAINAGE Right 05/14/2020    rt elbow I and D and left knee aspiration with cultures    FOOT SURGERY Right     2nd toe(bone spur).  FOREARM SURGERY Right 5/14/2020    RIGHT ELBOW IRRIGATION AND DEBRIDEMENT performed by Molly Augustine DO at [place]  12/13/2021    IR CHEST TUBE INSERTION 12/13/2021 [facility] PROCEDURES    KNEE SURGERY Left 5/14/2020    KNEE ASPIRATION WITH CULTURES SENT performed by Molly Augustine DO at [address] [place] [address] under lt. eye.  TONSILLECTOMY       CURRENT MEDICATIONS       Current Discharge Medication List      CONTINUE these medications which have NOT CHANGED    Details   ALPRAZolam (XANAX) 0.5 MG tablet Take 0.5 mg by mouth nightly as needed for Sleep. dexamethasone (DECADRON) 4 MG tablet Take 0.5 tablets by mouth daily (with breakfast)  Qty: 60 tablet, Refills: 0    Associated Diagnoses: Primary lung cancer with metastasis from lung to other site, right ([facility] Asthenia;  Cancer associated pain; Goals of care, counseling/discussion      HYDROcodone-acetaminophen (NORCO) 5-325 MG per tablet Take 1 tablet by mouth every 8 hours as needed for Pain for up to 30 days. Qty: 90 tablet, Refills: 0    Comments: Reduce doses taken as pain becomes manageable  Associated Diagnoses: Primary malignant neoplasm of right lung metastatic to other site (HCC)      oxyCODONE-acetaminophen (PERCOCET) 5-325 MG per tablet Take 1 tablet by mouth every 6 hours as needed for Pain for up to 120 days. Qty: 12 tablet, Refills: 0    Comments: Reduce doses taken as pain becomes manageable  Associated Diagnoses: Primary malignant neoplasm of right lung metastatic to other site [facility]); Pressure injury of skin, unspecified injury stage, unspecified location      sodium chloride 1 g tablet TAKE 1 TABLET BY MOUTH TWICE A DAY  Qty: 60 tablet, Refills: 1    Comments: DX Code Needed  C. Associated Diagnoses: Primary malignant neoplasm of right lung metastatic to other site [facility]); Primary lung cancer with metastasis from lung to other site, right (HCC)      midodrine (PROAMATINE) 5 MG tablet Take 1 tablet by mouth 3 times daily  Qty: 90 tablet, Refills: 3      docusate sodium (COLACE) 100 MG capsule Take 100 mg by mouth 2 times daily      B Complex-C-Folic Acid (ANGELICA-HUGO) TABS TAKE 1 TABLET BY MOUTH DAILY. HEMATINIC VIT MINERALS      traZODone (DESYREL) 100 MG tablet Take 100 mg by mouth nightly       ipratropium-albuterol (DUONEB) 0.5-2.5 (3) MG/3ML SOLN nebulizer solution Inhale 3 mLs into the lungs 4 times daily      potassium chloride (MICRO-K) 10 MEQ extended release capsule Take 20 mEq by mouth daily       Ferrous Fumarate (FERROCITE) 324 (106 Fe) MG TABS Take 324 mg by mouth daily       tamsulosin (FLOMAX) 0.4 MG capsule TAKE 1 CAPSULE BY MOUTH EVERY DAY  Qty: 30 capsule, Refills: 5    Associated Diagnoses: Primary lung cancer with metastasis from lung to other site, right ([address]  Polyuria      Handicap Placard MISC by Does not apply route  Qty: 1 each, Refills: 0    Associated Diagnoses: Primary lung cancer with metastasis from lung to other site, right (HCC)      lidocaine-prilocaine (EMLA) 2.5-2.5 % cream Apply topically as needed. Apply a quarter size amount to port site 1 hour before chemotherapy. Cover with plastic wrap. Qty: 30 g, Refills: 0      acetaminophen (TYLENOL) 325 MG tablet Take 650 mg by mouth every 6 hours as needed for Pain      allopurinol (ZYLOPRIM) 100 MG tablet Take 100 mg by mouth daily      simvastatin (ZOCOR) 40 MG tablet Take 40 mg by mouth daily            ALLERGIES     has No Known Allergies. FAMILY HISTORY     He indicated that his mother is . He indicated that his father is . SOCIAL HISTORY       Social History     Tobacco Use    Smoking status: Current Every Day Smoker     Packs/day: 0.50     Types: Cigarettes    Smokeless tobacco: Former User   Vaping Use    Vaping Use: Former   Substance Use Topics    Alcohol use: No    Drug use: Yes     Frequency: 14.0 times per week     Types: Marijuana (Weed)     Comment: hasn't smoked in 1 month     PHYSICAL EXAM     INITIAL VITALS: BP (!) 132/92   Pulse 121   Temp 97.5 °F (36.4 °C) (Oral)   Resp 22   Ht 5' 11.5\" (1.816 m)   Wt 154 lb 15.7 oz (70.3 kg)   SpO2 97%   BMI 21.31 kg/m²    Physical Exam  Vitals and nursing note reviewed. Constitutional:       Appearance: Normal appearance. HENT:      Head: Normocephalic and atraumatic. Nose: Nose normal.   Eyes:      Extraocular Movements: Extraocular movements intact. Pupils: Pupils are equal, round, and reactive to light. Cardiovascular:      Rate and Rhythm: Regular rhythm. Tachycardia present. Pulmonary:      Effort: Tachypnea present. No respiratory distress. Breath sounds: Normal breath sounds. Comments: Very diminished breath sounds on the right   Abdominal:      General: Abdomen is flat. There is no distension. Palpations: Abdomen is soft. There is no mass. Musculoskeletal:         General: No swelling. Normal range of motion.       Cervical back: Normal range of motion. No rigidity. Comments: Bilateral lower extremity edema    Skin:     General: Skin is warm and dry. Neurological:      General: No focal deficit present. Mental Status: He is alert. Mental status is at baseline. Cranial Nerves: No cranial nerve deficit. MEDICAL DECISION MAKIN-year-old male history of right-sided lung cancer presents for evaluation with worsening shortness of breath. Tachycardic and tachypneic on exam, signs of lower extremity edema, work-up for infection, heart failure, pneumothorax, pleural effusion. Work-up shows near complete opacification of the right hemithorax which is similar to prior x-ray about 3 weeks ago. There is some interstitial prominence on the left side patient has an elevated white blood cell count and a new oxygen requirement will cover with antibiotics, send blood cultures. COVID swab is negative. Discussed with internal medicine and pulmonary will admit to the hospital.         CRITICAL CARE:       PROCEDURES:    Procedures    DIAGNOSTIC RESULTS   EKG:All EKG's are interpreted by the Emergency Department Physician who either signs or Co-signs this chart in the absence of a cardiologist.        RADIOLOGY:All plain film, CT, MRI, and formal ultrasound images (except ED bedside ultrasound) are read by the radiologist, see reports below, unless otherwisenoted in MDM or here. XR CHEST PORTABLE   Final Result   1. Minimal interstitial prominence in the left lung, which could be seen with   edema. 2. Near complete opacification the right hemithorax is similar to slightly   progressed from prior study. LABS: All lab results were reviewed by myself, and all abnormals are listed below.   Labs Reviewed   CBC WITH AUTO DIFFERENTIAL - Abnormal; Notable for the following components:       Result Value    WBC 12.3 (*)     RBC 2.64 (*)     Hemoglobin 7.6 (*)     Hematocrit 23.8 (*)     RDW 21.6 (*)     Seg Neutrophils 85 (*) Lymphocytes 3 (*)     Monocytes 12 (*)     Segs Absolute 10.45 (*)     Absolute Lymph # 0.37 (*)     Absolute Mono # 1.48 (*)     All other components within normal limits   COMPREHENSIVE METABOLIC PANEL W/ REFLEX TO MG FOR LOW K - Abnormal; Notable for the following components:    Glucose 131 (*)     CREATININE 0.60 (*)     Sodium 128 (*)     Chloride 94 (*)     Alkaline Phosphatase 179 (*)     Albumin 3.3 (*)     All other components within normal limits   TROPONIN - Abnormal; Notable for the following components:    Troponin, High Sensitivity 33 (*)     All other components within normal limits   URINALYSIS WITH MICROSCOPIC - Abnormal; Notable for the following components:    Protein, UA TRACE (*)     All other components within normal limits   BRAIN NATRIURETIC PEPTIDE - Abnormal; Notable for the following components:    Pro-BNP 1,129 (*)     All other components within normal limits   COVID-19 & INFLUENZA COMBO   CULTURE, BLOOD 1   CULTURE, BLOOD 2   MRSA DNA PROBE, NASAL   LACTATE, SEPSIS   LACTATE, SEPSIS       EMERGENCY DEPARTMENTCOURSE:         Vitals:    Vitals:    05/05/22 0132 05/05/22 0200 05/05/22 0245 05/05/22 0251   BP:  134/87 (!) 132/92    Pulse:  123 121    Resp: 29 23 22    Temp:   97.5 °F (36.4 °C)    TempSrc:   Oral Oral   SpO2:  96% 97%    Weight:   154 lb 15.7 oz (70.3 kg)    Height:           The patient was given the following medications while in the emergency department:  Orders Placed This Encounter   Medications    0.9 % sodium chloride IV bolus 1,000 mL    cefepime (MAXIPIME) 2000 mg IVPB minibag     Order Specific Question:   Antimicrobial Indications     Answer:   Pneumonia (HAP)    HYDROcodone-acetaminophen (NORCO) 5-325 MG per tablet 1 tablet    vancomycin (VANCOCIN) intermittent dosing (placeholder)     Order Specific Question:   Antimicrobial Indications     Answer:   Pneumonia (HAP)    vancomycin (VANCOCIN) 1,750 mg in dextrose 5 % 500 mL IVPB     Order Specific Question: Antimicrobial Indications     Answer:   Pneumonia (HAP)    vancomycin (VANCOCIN) 1,500 mg in dextrose 5 % 250 mL IVPB (ADDAVIAL)     Order Specific Question:   Antimicrobial Indications     Answer:   Pneumonia (HAP)    sodium chloride flush 0.9 % injection 5-40 mL    sodium chloride flush 0.9 % injection 5-40 mL    0.9 % sodium chloride infusion    enoxaparin (LOVENOX) injection 40 mg     Order Specific Question:   Indication of Use     Answer:   Prophylaxis-DVT/PE    acetaminophen (TYLENOL) tablet 650 mg    OR Linked Order Group     ondansetron (ZOFRAN-ODT) disintegrating tablet 4 mg     ondansetron (ZOFRAN) injection 4 mg     CONSULTS:  PHARMACY TO DOSE VANCOMYCIN  IP CONSULT TO INTERNAL MEDICINE  IP CONSULT TO PULMONOLOGY    FINAL IMPRESSION      1. Acute on chronic respiratory failure with hypoxia (HCC)    2. Pneumonia due to infectious organism, unspecified laterality, unspecified part of lung          DISPOSITION/PLAN   DISPOSITION Admitted 05/05/2022 01:54:46 AM      PATIENT REFERRED TO:  No follow-up provider specified. DISCHARGE MEDICATIONS:  Current Discharge Medication List        The care is provided during an unprecedented national emergency due to the novel coronavirus, COVID 19.   MD Kary Urias MD  05/05/22 7954

## 2022-05-05 NOTE — PROGRESS NOTES
Spoke to dr Semaj Oneal regarding pt request for pain med with his pain being worse post thoracentesis. New order for one time dose of morphine 4 mg IV.

## 2022-05-05 NOTE — PROGRESS NOTES
Dr Ashwin Rebollar contacted regarding patient status since there was not any notes regarding if someone had contacted him yet. He was notified pt has had ongoing labored breathing. on 3L NC (baseline 0). Reviewed he was admitted with respiratory failure and pneumonia. he has very loud rhonchi on the left side. he is tachypneic. Reviewed chest xray results:    1. Minimal interstitial prominence in the left lung, which could be seen with   edema.    2. Near complete opacification the right hemithorax is similar to slightly   progressed from prior study.           He stated he will be in to see patient soon

## 2022-05-05 NOTE — PROGRESS NOTES
Comprehensive Nutrition Assessment    Type and Reason for Visit:  Positive Nutrition Screen (weight loss and poor intake)    Nutrition Recommendations/Plan:   1. Continue diet and supplements as ordered. Malnutrition Assessment:  Malnutrition Status:  Severe malnutrition (05/05/22 1656)    Context:  Chronic Illness     Findings of the 6 clinical characteristics of malnutrition:  Energy Intake:  75% or less estimated energy requirements for 1 month or longer  Weight Loss:   (18.9 % lost since Feb 2121)     Body Fat Loss:  Severe body fat loss Orbital,Fat Overlying Ribs   Muscle Mass Loss:  Severe muscle mass loss Temples (temporalis),Clavicles (pectoralis & deltoids),Hand (interosseous),Scapula (trapezius)  Fluid Accumulation:  Mild Extremities   Strength:  Not Performed    Nutrition Assessment:    Pt to ED with progressively worse SOB over 7 days; hx right sided lung cancer metastatic. Writer asked if he has been losing weight, he said \"not so much now\". Noted pt only selected soup for lunch with writer adding chocolate Ensure Enlive each meal. Pt said he uses Boost at home but will take Ensure as long as it's chocolate. ''    Nutrition Related Findings:    Edema: +1 pitting BLE's, Pro-BNP-1,129, Na-128 to get 40 mg Lasix IV daily. Hx: Lung Cancer with mets-oncologist consulted. Hx right pleural effusion with thoacentesis done 1 month ago. Plan to place chest tube. Wound Type: None       Current Nutrition Intake & Therapies:    Average Meal Intake: % (only had chicken noodle soup)  Average Supplements Intake: %  ADULT DIET; Regular  ADULT ORAL NUTRITION SUPPLEMENT; Breakfast, Lunch, Dinner; Standard High Calorie/High Protein Oral Supplement    Anthropometric Measures:  Height: 5' 11.5\" (181.6 cm)  Ideal Body Weight (IBW): 175 lbs (80 kg)    Admission Body Weight: 154 lb (69.9 kg)  Current Body Weight: 154 lb (69.9 kg), 88 % IBW.  Weight Source: Standing Scale  Current BMI (kg/m2): 21.2  Usual Body Weight: 190 lb (86.2 kg) (2-17-21)  % Weight Change (Calculated): -18.9                    BMI Categories: Underweight (BMI less than 22) age over 72    Estimated Daily Nutrient Needs:  Energy Requirements Based On: Kcal/kg  Weight Used for Energy Requirements: Admission  Energy (kcal/day): 0345-2483 kcals based on 30-32 kcals/kg using adm wt 70.3 kg  Weight Used for Protein Requirements: Admission  Protein (g/day):  gm protein based on 1.3-1.5 gm/kg using adm wt       Nutrition Diagnosis:   · Severe malnutrition related to catabolic illness,inadequate protein-energy intake as evidenced by weight loss,severe loss of subcutaneous fat,severe muscle loss      Nutrition Interventions:   Food and/or Nutrient Delivery: Continue Current Diet,Continue Oral Nutrition Supplement  Nutrition Education/Counseling: Education not indicated  Coordination of Nutrition Care: Continue to monitor while inpatient       Goals:     Goals: Meet at least 75% of estimated needs       Nutrition Monitoring and Evaluation:   Behavioral-Environmental Outcomes: None Identified  Food/Nutrient Intake Outcomes: Food and Nutrient Intake,Supplement Intake  Physical Signs/Symptoms Outcomes: Biochemical Data,GI Status,Fluid Status or Edema,Nutrition Focused Physical Findings,Skin,Weight    Discharge Planning:    Continue current diet,Continue Oral Nutrition [facility] [address] [facility]

## 2022-05-05 NOTE — CARE COORDINATION
CASE MANAGEMENT NOTE:    Admission Date:  5/4/2022 Caty Gr is a 80 y.o.  male    Admitted for : Respiratory failure ([facility] [J96.90]  Acute on chronic respiratory failure with hypoxia ([facility] [J96.21]  Pneumonia due to infectious organism, unspecified laterality, unspecified part of lung [J18.9]    Met with:  Patient    PCP:  Dr. Wendy Lozoya:  Medicare      Is patient alert and oriented at time of discussion:  Yes    Current Residence/ Living Arrangements:  independently at home             Current Services PTA:  No    Does patient go to outpatient dialysis: No  If yes, location and chair time:     Is patient agreeable to VNS: Yes    Freedom of choice provided:  Yes    List of [address] provided: Yes    VNS chosen:  Yes    DME:  straight cane and walker    Home Oxygen: No    Nebulizer: No    CPAP/BIPAP: No    Supplier: N/A    Potential Assistance Needed: NA    SNF needed: No    Freedom of choice and list provided: No    Pharmacy:         Is patient currently receiving oral anticoagulation therapy? No    Is the Patient an [facility] with Readmission Risk Score greater than 14%? No  If yes, pt needs a follow up appointment made within 7 days. Family Members/Caregivers that pt would like involved in their care:    Yes    If yes, list name here:  [facility]    Transportation Provider:  Family             Discharge Plan:  5/5/22 Medicare Pt is from home in a two story home DME walker and cane VNS [facility] is to discharge to home with no needs will continue to follow Pt is current with the [facility] will follow .//tv                  Electronically signed by:  Darin Garsia RN on 5/5/2022 at 1:06 PM

## 2022-05-05 NOTE — FLOWSHEET NOTE
05/05/22 1254   Encounter Summary   Encounter Overview/Reason  [address] [facility] Encounter   Service Provided For: Patient   Referral/Consult From: Patient   Last Encounter  05/05/22   Complexity of Encounter Low   Spiritual/Emotional needs   Type Spiritual Support   Rituals, Rites and Sacraments   Type Anointing  (Carlos Salcido 5/5/22)

## 2022-05-06 ENCOUNTER — APPOINTMENT (OUTPATIENT)
Dept: GENERAL RADIOLOGY | Age: 72
DRG: 180 | End: 2022-05-06
Payer: MEDICARE

## 2022-05-06 PROBLEM — J96.01 ACUTE RESPIRATORY FAILURE WITH HYPOXIA (HCC): Status: ACTIVE | Noted: 2022-05-05

## 2022-05-06 LAB
ABSOLUTE BANDS #: 0.16 K/UL (ref 0–1)
ABSOLUTE EOS #: 0 K/UL (ref 0–0.4)
ABSOLUTE LYMPH #: 0.25 K/UL (ref 1–4.8)
ABSOLUTE MONO #: 0.66 K/UL (ref 0.1–1.3)
ALBUMIN SERPL-MCNC: 3 G/DL (ref 3.5–5.2)
ALP BLD-CCNC: 146 U/L (ref 40–129)
ALT SERPL-CCNC: 17 U/L (ref 5–41)
ANION GAP SERPL CALCULATED.3IONS-SCNC: 9 MMOL/L (ref 9–17)
AST SERPL-CCNC: 37 U/L
BANDS: 2 % (ref 0–10)
BASOPHILS # BLD: 0 % (ref 0–2)
BASOPHILS ABSOLUTE: 0 K/UL (ref 0–0.2)
BILIRUB SERPL-MCNC: 0.27 MG/DL (ref 0.3–1.2)
BUN BLDV-MCNC: 17 MG/DL (ref 8–23)
CALCIUM SERPL-MCNC: 8.8 MG/DL (ref 8.6–10.4)
CHLORIDE BLD-SCNC: 90 MMOL/L (ref 98–107)
CO2: 28 MMOL/L (ref 20–31)
CREAT SERPL-MCNC: 0.45 MG/DL (ref 0.7–1.2)
EOSINOPHILS RELATIVE PERCENT: 0 % (ref 0–4)
GFR AFRICAN AMERICAN: >60 ML/MIN
GFR NON-AFRICAN AMERICAN: >60 ML/MIN
GFR SERPL CREATININE-BSD FRML MDRD: ABNORMAL ML/MIN/{1.73_M2}
GLUCOSE BLD-MCNC: 82 MG/DL (ref 70–99)
HCT VFR BLD CALC: 22.5 % (ref 41–53)
HEMOGLOBIN: 7.4 G/DL (ref 13.5–17.5)
LYMPHOCYTES # BLD: 3 % (ref 24–44)
MCH RBC QN AUTO: 29.7 PG (ref 26–34)
MCHC RBC AUTO-ENTMCNC: 33 G/DL (ref 31–37)
MCV RBC AUTO: 89.8 FL (ref 80–100)
MONOCYTES # BLD: 8 % (ref 1–7)
MORPHOLOGY: ABNORMAL
MRSA, DNA, NASAL: NEGATIVE
PDW BLD-RTO: 21.2 % (ref 11.5–14.9)
PLATELET # BLD: 173 K/UL (ref 150–450)
PMV BLD AUTO: 6.4 FL (ref 6–12)
POTASSIUM SERPL-SCNC: 4.1 MMOL/L (ref 3.7–5.3)
RBC # BLD: 2.5 M/UL (ref 4.5–5.9)
SEG NEUTROPHILS: 87 % (ref 36–66)
SEGMENTED NEUTROPHILS ABSOLUTE COUNT: 7.13 K/UL (ref 1.3–9.1)
SODIUM BLD-SCNC: 127 MMOL/L (ref 135–144)
SPECIMEN DESCRIPTION: NORMAL
TOTAL PROTEIN: 6 G/DL (ref 6.4–8.3)
TSH SERPL DL<=0.05 MIU/L-ACNC: 1.41 UIU/ML (ref 0.3–5)
VANCOMYCIN RANDOM DATE LAST DOSE: NORMAL
VANCOMYCIN RANDOM DOSE AMOUNT: NORMAL
VANCOMYCIN RANDOM TIME LAST DOSE: 2055
VANCOMYCIN RANDOM: 17.2 UG/ML
WBC # BLD: 8.2 K/UL (ref 3.5–11)

## 2022-05-06 PROCEDURE — 87040 BLOOD CULTURE FOR BACTERIA: CPT

## 2022-05-06 PROCEDURE — 2580000003 HC RX 258: Performed by: FAMILY MEDICINE

## 2022-05-06 PROCEDURE — 2700000000 HC OXYGEN THERAPY PER DAY

## 2022-05-06 PROCEDURE — 97116 GAIT TRAINING THERAPY: CPT

## 2022-05-06 PROCEDURE — 94761 N-INVAS EAR/PLS OXIMETRY MLT: CPT

## 2022-05-06 PROCEDURE — 6360000002 HC RX W HCPCS: Performed by: FAMILY MEDICINE

## 2022-05-06 PROCEDURE — 99222 1ST HOSP IP/OBS MODERATE 55: CPT | Performed by: INTERNAL MEDICINE

## 2022-05-06 PROCEDURE — 36415 COLL VENOUS BLD VENIPUNCTURE: CPT

## 2022-05-06 PROCEDURE — 80202 ASSAY OF VANCOMYCIN: CPT

## 2022-05-06 PROCEDURE — 6370000000 HC RX 637 (ALT 250 FOR IP): Performed by: FAMILY MEDICINE

## 2022-05-06 PROCEDURE — 97166 OT EVAL MOD COMPLEX 45 MIN: CPT

## 2022-05-06 PROCEDURE — 2060000000 HC ICU INTERMEDIATE R&B

## 2022-05-06 PROCEDURE — 85025 COMPLETE CBC W/AUTO DIFF WBC: CPT

## 2022-05-06 PROCEDURE — 97530 THERAPEUTIC ACTIVITIES: CPT

## 2022-05-06 PROCEDURE — 94640 AIRWAY INHALATION TREATMENT: CPT

## 2022-05-06 PROCEDURE — 99232 SBSQ HOSP IP/OBS MODERATE 35: CPT | Performed by: INTERNAL MEDICINE

## 2022-05-06 PROCEDURE — 71045 X-RAY EXAM CHEST 1 VIEW: CPT

## 2022-05-06 PROCEDURE — 84443 ASSAY THYROID STIM HORMONE: CPT

## 2022-05-06 PROCEDURE — 97162 PT EVAL MOD COMPLEX 30 MIN: CPT

## 2022-05-06 PROCEDURE — 80053 COMPREHEN METABOLIC PANEL: CPT

## 2022-05-06 RX ORDER — MIDODRINE HYDROCHLORIDE 5 MG/1
5 TABLET ORAL 3 TIMES DAILY
Status: DISCONTINUED | OUTPATIENT
Start: 2022-05-06 | End: 2022-05-07 | Stop reason: HOSPADM

## 2022-05-06 RX ADMIN — SODIUM CHLORIDE: 9 INJECTION, SOLUTION INTRAVENOUS at 17:19

## 2022-05-06 RX ADMIN — SODIUM CHLORIDE 1 G: 1 TABLET ORAL at 07:51

## 2022-05-06 RX ADMIN — IPRATROPIUM BROMIDE AND ALBUTEROL SULFATE 3 ML: .5; 2.5 SOLUTION RESPIRATORY (INHALATION) at 15:24

## 2022-05-06 RX ADMIN — DEXAMETHASONE 2 MG: 2 TABLET ORAL at 07:53

## 2022-05-06 RX ADMIN — SODIUM CHLORIDE, PRESERVATIVE FREE 10 ML: 5 INJECTION INTRAVENOUS at 20:26

## 2022-05-06 RX ADMIN — ALLOPURINOL 100 MG: 100 TABLET ORAL at 07:51

## 2022-05-06 RX ADMIN — DOCUSATE SODIUM 100 MG: 100 CAPSULE, LIQUID FILLED ORAL at 07:51

## 2022-05-06 RX ADMIN — MIDODRINE HYDROCHLORIDE 5 MG: 5 TABLET ORAL at 20:22

## 2022-05-06 RX ADMIN — PIPERACILLIN AND TAZOBACTAM 4500 MG: 4; .5 INJECTION, POWDER, LYOPHILIZED, FOR SOLUTION INTRAVENOUS; PARENTERAL at 17:21

## 2022-05-06 RX ADMIN — HYDROCODONE BITARTRATE AND ACETAMINOPHEN 1 TABLET: 5; 325 TABLET ORAL at 04:25

## 2022-05-06 RX ADMIN — POTASSIUM CHLORIDE 20 MEQ: 10 CAPSULE, COATED, EXTENDED RELEASE ORAL at 07:51

## 2022-05-06 RX ADMIN — IPRATROPIUM BROMIDE AND ALBUTEROL SULFATE 3 ML: .5; 2.5 SOLUTION RESPIRATORY (INHALATION) at 19:34

## 2022-05-06 RX ADMIN — HYDROCODONE BITARTRATE AND ACETAMINOPHEN 1 TABLET: 5; 325 TABLET ORAL at 10:32

## 2022-05-06 RX ADMIN — SODIUM CHLORIDE, PRESERVATIVE FREE 10 ML: 5 INJECTION INTRAVENOUS at 07:51

## 2022-05-06 RX ADMIN — HYDROCODONE BITARTRATE AND ACETAMINOPHEN 1 TABLET: 5; 325 TABLET ORAL at 16:41

## 2022-05-06 RX ADMIN — GUAIFENESIN 600 MG: 600 TABLET, EXTENDED RELEASE ORAL at 20:21

## 2022-05-06 RX ADMIN — VANCOMYCIN HYDROCHLORIDE 1250 MG: 1.25 INJECTION, POWDER, LYOPHILIZED, FOR SOLUTION INTRAVENOUS at 10:30

## 2022-05-06 RX ADMIN — TAMSULOSIN HYDROCHLORIDE 0.4 MG: 0.4 CAPSULE ORAL at 07:51

## 2022-05-06 RX ADMIN — FUROSEMIDE 40 MG: 10 INJECTION, SOLUTION INTRAMUSCULAR; INTRAVENOUS at 07:52

## 2022-05-06 RX ADMIN — METOPROLOL TARTRATE 25 MG: 25 TABLET, FILM COATED ORAL at 20:21

## 2022-05-06 RX ADMIN — HYDROCODONE BITARTRATE AND ACETAMINOPHEN 1 TABLET: 5; 325 TABLET ORAL at 20:23

## 2022-05-06 RX ADMIN — SODIUM CHLORIDE 1 G: 1 TABLET ORAL at 20:21

## 2022-05-06 RX ADMIN — GUAIFENESIN 600 MG: 600 TABLET, EXTENDED RELEASE ORAL at 07:51

## 2022-05-06 RX ADMIN — MIDODRINE HYDROCHLORIDE 5 MG: 5 TABLET ORAL at 16:41

## 2022-05-06 RX ADMIN — SODIUM CHLORIDE: 9 INJECTION, SOLUTION INTRAVENOUS at 10:29

## 2022-05-06 RX ADMIN — FERROUS SULFATE TAB 325 MG (65 MG ELEMENTAL FE) 325 MG: 325 (65 FE) TAB at 07:51

## 2022-05-06 RX ADMIN — FERROUS SULFATE TAB 325 MG (65 MG ELEMENTAL FE) 325 MG: 325 (65 FE) TAB at 16:41

## 2022-05-06 RX ADMIN — IPRATROPIUM BROMIDE AND ALBUTEROL SULFATE 3 ML: .5; 2.5 SOLUTION RESPIRATORY (INHALATION) at 11:24

## 2022-05-06 ASSESSMENT — ENCOUNTER SYMPTOMS
VOICE CHANGE: 0
ABDOMINAL DISTENTION: 0
BLOOD IN STOOL: 0
BACK PAIN: 0
TROUBLE SWALLOWING: 0
SHORTNESS OF BREATH: 1
COUGH: 1
STRIDOR: 0
PHOTOPHOBIA: 0
ANAL BLEEDING: 0
COLOR CHANGE: 0
RECTAL PAIN: 0
CHOKING: 0

## 2022-05-06 ASSESSMENT — PAIN SCALES - GENERAL
PAINLEVEL_OUTOF10: 2
PAINLEVEL_OUTOF10: 6
PAINLEVEL_OUTOF10: 5
PAINLEVEL_OUTOF10: 5

## 2022-05-06 ASSESSMENT — PAIN DESCRIPTION - LOCATION
LOCATION: ELBOW;SHOULDER;ARM
LOCATION: SHOULDER;ELBOW

## 2022-05-06 ASSESSMENT — PAIN DESCRIPTION - DESCRIPTORS
DESCRIPTORS: ACHING;DULL
DESCRIPTORS: ACHING;DISCOMFORT

## 2022-05-06 ASSESSMENT — PAIN DESCRIPTION - ORIENTATION
ORIENTATION: RIGHT
ORIENTATION: RIGHT

## 2022-05-06 NOTE — PROGRESS NOTES
Rn notified Dr. Heena Guerra of pt blood culture 1 of 2 sets gram + cocci in clusters, new orders to consult Gaby ID, an new order of blood cultures x2.

## 2022-05-06 NOTE — PROGRESS NOTES
Physician Progress Note      Marco Whyte  [facility] #:                  [id number]  :                       1950  ADMIT DATE:       2022 11:44 PM  [address] [place] DATE:  RESPONDING  PROVIDER #:        Martine Foss MD          QUERY TEXT:    Pt admitted with shortness of breathe and has respiratory failure documented. If possible, please document in progress notes and discharge summary further   specificity regarding the type and acuity of respiratory failure: The medical record reflects the following:  Risk Factors: Hx lung CA with mets, no home O2 noted, Hx COPD  Clinical Indicators: RR 20-23; pt satting 92-98% on 2-4L O2 via NC; per ED   notes--> tachypnea present, no respiratory distress, normal breath sounds,   very diminished breath sounds on R side, acute on chronic resp failure with   hypoxia; Treatment: 2-4L O2 via NC currently on 3L, pulm consult, monitoring, hospital   admission, monitoring. IV Lasix x2, IR for R thoracentesis  Options provided:  -- Acute respiratory failure with hypoxia  -- Chronic respiratory failure with hypoxia  -- Acute on chronic respiratory failure with hypoxia  -- Other - I will add my own diagnosis  -- Disagree - Not applicable / Not valid  -- Disagree - Clinically unable to determine / Unknown  -- Refer to Clinical Documentation Reviewer    PROVIDER RESPONSE TEXT:    This patient is in acute respiratory failure with hypoxia. Query created by: Bertha Tam on 2022 9:44 AM      QUERY TEXT:    Pt admitted with SOB. Noted documentation of severe malnutrition by dietitian   on  consultant note.  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 Query created by: Mika Heck on 5/6/2022 9:56 AM      QUERY TEXT:    Patient admitted with SOB. Documentation reflects pneumonia in ED notes from   5/4. If possible, please document in the progress notes and discharge summary   if pneumonia was: The medical record reflects the following:  Risk Factors: Hx lung CA with mets, on chemo, COPD  Clinical Indicators: CT CHEST-->  Interval progression of adenopathy and   diffuse disease in the right lung. 2.Postobstructive pneumonia right lung,   presumed malignant pleural effusion, with only a tiny focus of pneumatized   right upper lobe. 3.Right bronchus intermedius appears and obscured by 1.1 cm   mass. 4.Patchy left upper lobe pneumonia and lateral left lower lobe   pneumonia. 5.Trace pneumothorax right lung. This may be related to   instrumentation or  development of abscess or bronchopleural fistula.  6.Indeterminate appearance   of the central and left lateral portions of the T12 vertebral body with height   loss and mixed lytic and sclerotic appearance unchanged compared to previous   exam.This may simply be remote posttraumatic or could reflect metastatic   disease.; per ED notes--> Work-up shows near complete opacification of the   right hemithorax which is similar to prior x-ray about 3 weeks ago. There is   some interstitial prominence on the left side patient has an elevated white   blood cell count and a new oxygen requirement   Treatment: IV Vanco Q12H, IV Cefepime x1, monitoring, CT CHEST, pulm consult,  Options provided:  -- Pneumonia confirmed after study  -- Pneumonia ruled out after study  -- Other - I will add my own diagnosis  -- Disagree - Not applicable / Not valid  -- Disagree - Clinically unable to determine / Unknown  -- Refer to Clinical Documentation Reviewer    PROVIDER RESPONSE TEXT:    Pneumonia confirmed after study.     Query created by: Trevin Doan on 5/6/2022 10:10 AM      Electronically signed by:  Jaya Hollingsworth MD 5/6/2022 12:38 PM

## 2022-05-06 NOTE — CARE COORDINATION
SW sent referrals to pt's SNF choices. [facility], [address] [place] [address], and [facility].

## 2022-05-06 NOTE — PROGRESS NOTES
Vancomycin Dosing by Pharmacy - Daily Note   Vancomycin Therapy Day:  2  Indication: pneumonia     Allergies:  Patient has no known allergies. Actual Weight:    Wt Readings from Last 1 Encounters:   05/06/22 150 lb 12.7 oz (68.4 kg)       Labs/Ancillary Data  Estimated Creatinine Clearance: 148 mL/min (A) (based on SCr of 0.45 mg/dL (L)). Recent Labs     05/04/22  1259 05/05/22  0026 05/06/22  0537   CREATININE 0.54* 0.60* 0.45*   BUN 15 21 17   WBC 9.0 12.3* 8.2     Procalcitonin   Date Value Ref Range Status   05/10/2020 14.89 (H) <0.09 ng/mL Final     Comment:           Suspected Sepsis:  <0.50 ng/mL     Low likelihood of sepsis. 0.50-2.00 ng/mL     Increased likelihood of sepsis. Antibiotics encouraged. >2.00 ng/mL     High risk of sepsis/shock. Antibiotics strongly encouraged. Suspected Lower Resp Tract Infections:  <0.24 ng/mL     Low likelihood of bacterial infection. >0.24 ng/mL     Increased likelihood of bacterial infection. Antibiotics encouraged. With successful antibiotic therapy, PCT levels should decrease rapidly. (Half-life of 24 to   36 hours.)        Procalcitonin values from samples collected within the first 6 hours of systemic infection   may still be low. Retesting may be indicated. Values from day 1 and day 4 can be entered into the Change in Procalcitonin Calculator   (www.[employer]-pct-calculator. [employer]) to determine the patient's Mortality Risk Prognosis        In healthy neonates, plasma Procalcitonin (PCT) concentrations increase gradually after   birth, reaching peak values at about 24 hours of age then decrease to normal values below   0.5 ng/mL by 48-72 hours of age.          Intake/Output Summary (Last 24 hours) at 5/6/2022 0783  Last data filed at 5/6/2022 0741  Gross per 24 hour   Intake 390 ml   Output 1600 ml   Net -1210 ml     Temp: 98.2    Culture Date / Source  /  Results  See micro   Recent vancomycin administrations                     vancomycin (VANCOCIN)

## 2022-05-06 NOTE — PROGRESS NOTES
chemoradiation using cisplatin and etoposide. 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 Maintenance Tecentriq, 3/2021  Lurbenectidin-7/2/2021  Topotecan-3/2022      Past Medical History:   has a past medical history of CAD (coronary artery disease), DDD (degenerative disc disease), cervical, Gout, Heart murmur, Hyperlipidemia, Hypertension, Lung cancer ([address] MI (myocardial infarction) ([facility] [address] Skin cancer, and Wears glasses. Past Surgical History:   has a past surgical history that includes Appendectomy; Tonsillectomy; skin biopsy; skin biopsy; Colonoscopy; Foot surgery (Right); Cardiac catheterization; cyst incision and drainage (Right, 05/14/2020); Forearm surgery (Right, 5/14/2020); knee surgery (Left, 5/14/2020); and IR CHEST TUBE INSERTION (12/13/2021). Medications:    Prior to Admission medications    Medication Sig Start Date End Date Taking? Authorizing Provider   ALPRAZolam Margarita Lima) 0.5 MG tablet Take 0.5 mg by mouth nightly as needed for Sleep. Yes Historical Provider, MD   dexamethasone (DECADRON) 4 MG tablet Take 0.5 tablets by mouth daily (with breakfast) 4/27/22   Petty Gar MD   HYDROcodone-acetaminophen [facility]) 5-325 MG per tablet Take 1 tablet by mouth every 8 hours as needed for Pain for up to 30 days. 4/18/22 5/18/22  Petty Gar MD   oxyCODONE-acetaminophen (PERCOCET) 5-325 MG per tablet Take 1 tablet by mouth every 6 hours as needed for Pain for up to 120 days.  3/18/22 7/16/22  Guillermo Wharton MD   sodium chloride 1 g tablet TAKE 1 TABLET BY MOUTH TWICE A DAY 3/18/22   Petty Gar MD   midodrine (PROAMATINE) 5 MG tablet Take 1 tablet by mouth 3 times daily 12/17/21   Kali Hightower MD   docusate sodium (COLACE) 100 MG capsule Take 100 mg by mouth 2 times daily Historical Provider, MD   B Complex-C-Folic Acid (ANGELICA-HUGO) TABS TAKE 1 TABLET BY MOUTH DAILY. HEMATINIC VIT MINERALS 7/21/20   Historical Provider, MD   traZODone (DESYREL) 100 MG tablet Take 100 mg by mouth nightly   Patient not taking: Reported on 5/5/2022 7/20/20   Historical Provider, MD   ipratropium-albuterol (DUONEB) 0.5-2.5 (3) MG/3ML SOLN nebulizer solution Inhale 3 mLs into the lungs 4 times daily    Historical Provider, MD   potassium chloride (MICRO-K) 10 MEQ extended release capsule Take 20 mEq by mouth daily  11/19/19   Historical Provider, MD   Ferrous Fumarate (FERROCITE) 324 (106 Fe) MG TABS Take 324 mg by mouth daily     Historical Provider, MD   tamsulosin (FLOMAX) 0.4 MG capsule TAKE 1 CAPSULE BY MOUTH EVERY DAY 5/4/20   Cherri Padilla MD   Handicap [facility] [address] [facility] by Does not apply route 7/30/19   Cherri Padilla MD   lidocaine-prilocaine (EMLA) 2.5-2.5 % cream Apply topically as needed. Apply a quarter size amount to port site 1 hour before chemotherapy. Cover with plastic wrap.  3/7/19   Guillermo Wharton MD   acetaminophen (TYLENOL) 325 MG tablet Take 650 mg by mouth every 6 hours as needed for Pain    Historical Provider, MD   allopurinol (ZYLOPRIM) 100 MG tablet Take 100 mg by mouth daily 12/4/18   Historical Provider, MD   simvastatin (ZOCOR) 40 MG tablet Take 40 mg by mouth daily   Patient not taking: Reported on 4/15/2022 11/27/18   Historical Provider, MD     Current Facility-Administered Medications   Medication Dose Route Frequency Provider Last Rate Last Admin    midodrine (PROAMATINE) tablet 5 mg  5 mg Oral TID Evaristo Giraldo MD   5 mg at 05/06/22 1641    [START ON 5/7/2022] piperacillin-tazobactam (ZOSYN) 3,375 mg in dextrose 5 % 50 mL IVPB (mini-bag)  3,375 mg IntraVENous Brant Sher MD        sodium chloride flush 0.9 % injection 5-40 mL  5-40 mL IntraVENous 2 times per day Aldo Pham MD   10 mL at 05/06/22 0751    sodium chloride flush 0.9 % injection 5-40 mL 5-40 mL IntraVENous PRN Gm Poon MD        0.9 % sodium chloride infusion   IntraVENous PRN Gm Poon MD 15 mL/hr at 05/06/22 1719 New Bag at 05/06/22 1719    acetaminophen (TYLENOL) tablet 650 mg  650 mg Oral Q4H PRN Gm Poon MD        ondansetron (ZOFRAN-ODT) disintegrating tablet 4 mg  4 mg Oral Q8H PRN Gm Poon MD        Or    ondansetron [facility]) injection 4 mg  4 mg IntraVENous Q6H PRN Gm Poon MD        allopurinol (ZYLOPRIM) tablet 100 mg  100 mg Oral Daily Grace De La Rosa MD   100 mg at 05/06/22 0751    ALPRAZolam (XANAX) tablet 0.5 mg  0.5 mg Oral Nightly PRN Grace De La Rosa MD        dexamethasone (DECADRON) tablet 2 mg  2 mg Oral Daily with breakfast Grace De La Rosa MD   2 mg at 05/06/22 0753    docusate sodium (COLACE) capsule 100 mg  100 mg Oral BID Grace De La Rosa MD   100 mg at 05/06/22 0751    HYDROcodone-acetaminophen (NORCO) 5-325 MG per tablet 1 tablet  1 tablet Oral Q4H PRN Grace De La Rosa MD   1 tablet at 05/06/22 1641    ipratropium-albuterol (DUONEB) nebulizer solution 3 mL  3 mL Inhalation Q4H WA Grace De La Rosa MD   3 mL at 05/06/22 1524    potassium chloride (MICRO-K) extended release capsule 20 mEq  20 mEq Oral Daily Grace De La Rosa MD   20 mEq at 05/06/22 0751    sodium chloride tablet 1 g  1 g Oral BID Grace De La Rosa MD   1 g at 05/06/22 0751    tamsulosin (FLOMAX) capsule 0.4 mg  0.4 mg Oral Daily Grace De La Rosa MD   0.4 mg at 05/06/22 0751    furosemide (LASIX) injection 40 mg  40 mg IntraVENous Daily Grace De La Rosa MD   40 mg at 05/06/22 0752    metoprolol tartrate (LOPRESSOR) tablet 25 mg  25 mg Oral BID Grace De La Rosa MD   25 mg at 05/05/22 2048    apixaban (ELIQUIS) tablet 5 mg  5 mg Oral BID Grace De La Rosa MD   5 mg at 05/05/22 3909    perflutren lipid microspheres (DEFINITY) injection 1.65 mg  1.5 mL IntraVENous ONCE PRN Lindy Castañeda MD        ferrous sulfate (IRON 325) tablet 325 mg  325 mg Oral BID WC Efren Baird MD   325 mg at 22 1641    guaiFENesin [facility]) extended release tablet 600 mg  600 mg Oral BID Efren Baird MD   600 mg at 22 0751    sodium chloride flush 0.9 % injection 10 mL  10 mL IntraVENous PRN Coretta Recio MD   10 mL at 22 1930       Allergies:  Patient has no known allergies. Social History:   reports that he has been smoking cigarettes. He has been smoking about 0.50 packs per day. He has quit using smokeless tobacco. He reports current drug use. Frequency: 14.00 times per week. Drug: Marijuana Heidy Kugel). He reports that he does not drink alcohol. Family History: family history includes Cancer in his father. REVIEW OF SYSTEMS:      Constitutional: No fever or chills. No night sweats, positive weight loss. Positive fatigue. Eyes: No eye discharge, double vision, or eye pain   HEENT: negative for sore mouth, sore throat, hoarseness and voice change   Respiratory: Positive shortness of breath  Cardiovascular: negative for chest pain, dyspnea, palpitations, orthopnea, PND   Gastrointestinal: negative for nausea, vomiting, diarrhea, constipation, abdominal pain, Dysphagia, hematemesis and hematochezia   Genitourinary: negative for frequency, dysuria, nocturia, urinary incontinence, and hematuria   Integument: negative for rash, skin lesions, bruises.    Hematologic/Lymphatic: negative for easy bruising, bleeding, lymphadenopathy, or petechiae   Endocrine: negative for heat or cold intolerance,weight changes, change in bowel habits and hair loss   Musculoskeletal: negative for myalgias, arthralgias, pain, joint swelling,and bone pain   Neurological: negative for headaches, dizziness, seizures, weakness, numbness    PHYSICAL EXAM:        /70   Pulse 109   Temp 97.7 °F (36.5 °C) (Oral)   Resp 25   Ht 5' 11.5\" (1.816 m)   Wt 150 lb 12.7 oz (68.4 kg)   SpO2 96%   BMI 20.74 kg/m²    Temp (24hrs), Av.8 °F (36.6 °C), Min:97.3 °F (36.3 °C), Max:98.7 Detected Not Detected    INFLUENZA B Not Detected Not Detected   MRSA DNA Probe, Nasal    Specimen: Nasal   Result Value Ref Range    Specimen Description . NASAL SWAB     MRSA, DNA, Nasal NEGATIVE NEGATIVE   Culture, Blood 1    Specimen: Blood   Result Value Ref Range    Specimen Description . BLOOD RIGHT ARM     Culture NO GROWTH <24 HRS    Culture, Blood 1    Specimen: Blood   Result Value Ref Range    Specimen Description . BLOOD RIGHT HAND     Culture NO GROWTH <24 HRS    CBC with Auto Differential   Result Value Ref Range    WBC 12.3 (H) 3.5 - 11.0 k/uL    RBC 2.64 (L) 4.5 - 5.9 m/uL    Hemoglobin 7.6 (L) 13.5 - 17.5 g/dL    Hematocrit 23.8 (L) 41 - 53 %    MCV 90.4 80 - 100 fL    MCH 28.9 26 - 34 pg    MCHC 31.9 31 - 37 g/dL    RDW 21.6 (H) 11.5 - 14.9 %    Platelets 087 196 - 619 k/uL    MPV 6.2 6.0 - 12.0 fL    Seg Neutrophils 85 (H) 36 - 66 %    Lymphocytes 3 (L) 24 - 44 %    Monocytes 12 (H) 1 - 7 %    Eosinophils % 0 0 - 4 %    Basophils 0 0 - 2 %    Segs Absolute 10.45 (H) 1.3 - 9.1 k/uL    Absolute Lymph # 0.37 (L) 1.0 - 4.8 k/uL    Absolute Mono # 1.48 (H) 0.1 - 1.3 k/uL    Absolute Eos # 0.00 0.0 - 0.4 k/uL    Basophils Absolute 0.00 0.0 - 0.2 k/uL    Morphology ANISOCYTOSIS PRESENT     Morphology HYPOCHROMIA PRESENT    Comprehensive Metabolic Panel w/ Reflex to MG   Result Value Ref Range    Glucose 131 (H) 70 - 99 mg/dL    BUN 21 8 - 23 mg/dL    CREATININE 0.60 (L) 0.70 - 1.20 mg/dL    Calcium 9.2 8.6 - 10.4 mg/dL    Sodium 128 (L) 135 - 144 mmol/L    Potassium 4.8 3.7 - 5.3 mmol/L    Chloride 94 (L) 98 - 107 mmol/L    CO2 24 20 - 31 mmol/L    Anion Gap 10 9 - 17 mmol/L    Alkaline Phosphatase 179 (H) 40 - 129 U/L    ALT 20 5 - 41 U/L    AST 38 <40 U/L    Total Bilirubin 0.35 0.3 - 1.2 mg/dL    Total Protein 6.8 6.4 - 8.3 g/dL    Albumin 3.3 (L) 3.5 - 5.2 g/dL    GFR Non-African American >60 >60 mL/min    GFR African American >60 >60 mL/min    GFR Comment         Troponin   Result Value Ref Range Troponin, High Sensitivity 33 (H) 0 - 22 ng/L   Urinalysis with Microscopic   Result Value Ref Range    Color, UA Yellow Yellow    Turbidity UA Clear Clear    Glucose, Ur NEGATIVE NEGATIVE    Bilirubin Urine NEGATIVE NEGATIVE    Ketones, Urine NEGATIVE NEGATIVE    Specific [place], UA 1.021 1.000 - 1.030    Urine Hgb NEGATIVE NEGATIVE    pH, UA 5.0 5.0 - 8.0    Protein, UA TRACE (A) NEGATIVE    Urobilinogen, Urine Normal Normal    Nitrite, Urine NEGATIVE NEGATIVE    Leukocyte Esterase, Urine NEGATIVE NEGATIVE    WBC, UA 0 TO 2 /HPF    RBC, UA 0 TO 2 /HPF    Epithelial Cells UA 0 TO 2 /HPF   Lactate, Sepsis   Result Value Ref Range    Lactic Acid, Sepsis 1.1 0.5 - 1.9 mmol/L   Brain Natriuretic Peptide   Result Value Ref Range    Pro-BNP 1,129 (H) <300 pg/mL   Troponin   Result Value Ref Range    Troponin, High Sensitivity 36 (H) 0 - 22 ng/L   Troponin   Result Value Ref Range    Troponin, High Sensitivity 34 (H) 0 - 22 ng/L   Vancomycin Level, Random   Result Value Ref Range    Vancomycin Rm 17.2 ug/mL    Vancomycin Random Dose amount 1500 MG     Vancomycin Random Date last dose 9,838,192     Vancomycin Random Time last dose 2,055    CBC with Auto Differential   Result Value Ref Range    WBC 8.2 3.5 - 11.0 k/uL    RBC 2.50 (L) 4.5 - 5.9 m/uL    Hemoglobin 7.4 (L) 13.5 - 17.5 g/dL    Hematocrit 22.5 (L) 41 - 53 %    MCV 89.8 80 - 100 fL    MCH 29.7 26 - 34 pg    MCHC 33.0 31 - 37 g/dL    RDW 21.2 (H) 11.5 - 14.9 %    Platelets 379 761 - 604 k/uL    MPV 6.4 6.0 - 12.0 fL    Seg Neutrophils 87 (H) 36 - 66 %    Lymphocytes 3 (L) 24 - 44 %    Monocytes 8 (H) 1 - 7 %    Eosinophils % 0 0 - 4 %    Basophils 0 0 - 2 %    Bands 2 0 - 10 %    Segs Absolute 7.13 1.3 - 9.1 k/uL    Absolute Lymph # 0.25 (L) 1.0 - 4.8 k/uL    Absolute Mono # 0.66 0.1 - 1.3 k/uL    Absolute Eos # 0.00 0.0 - 0.4 k/uL    Basophils Absolute 0.00 0.0 - 0.2 k/uL    Absolute Bands # 0.16 0.0 - 1.0 k/uL    Morphology ANISOCYTOSIS PRESENT     Morphology HYPOCHROMIA PRESENT     Morphology 1+ ELLIPTOCYTES     Morphology 1+ TEARDROPS    Comprehensive Metabolic Panel w/ Reflex to MG   Result Value Ref Range    Glucose 82 70 - 99 mg/dL    BUN 17 8 - 23 mg/dL    CREATININE 0.45 (L) 0.70 - 1.20 mg/dL    Calcium 8.8 8.6 - 10.4 mg/dL    Sodium 127 (L) 135 - 144 mmol/L    Potassium 4.1 3.7 - 5.3 mmol/L    Chloride 90 (L) 98 - 107 mmol/L    CO2 28 20 - 31 mmol/L    Anion Gap 9 9 - 17 mmol/L    Alkaline Phosphatase 146 (H) 40 - 129 U/L    ALT 17 5 - 41 U/L    AST 37 <40 U/L    Total Bilirubin 0.27 (L) 0.3 - 1.2 mg/dL    Total Protein 6.0 (L) 6.4 - 8.3 g/dL    Albumin 3.0 (L) 3.5 - 5.2 g/dL    GFR Non-African American >60 >60 mL/min    GFR African American >60 >60 mL/min    GFR Comment         TSH w/reflex to FT4   Result Value Ref Range    TSH 1.41 0.30 - 5.00 uIU/mL   EKG 12 Lead   Result Value Ref Range    Ventricular Rate 127 BPM    Atrial Rate 127 BPM    P-R Interval 156 ms    QRS Duration 76 ms    Q-T Interval 288 ms    QTc Calculation (Bazett) 418 ms    P Axis 53 degrees    R Axis 62 degrees    T Axis 20 degrees         IMAGING DATA:    XR CHEST (SINGLE VIEW FRONTAL)    Result Date: 4/15/2022  EXAMINATION: ONE XRAY VIEW OF THE CHEST 4/15/2022 9:58 am COMPARISON: 01/31/2022, CT 03/15/2022 HISTORY: ORDERING SYSTEM PROVIDED HISTORY: s/p right thoracentesis TECHNOLOGIST PROVIDED HISTORY: s/p right thoracentesis Reason for Exam: s/p right thoracentesis Additional signs and symptoms: s/p right thoracentesis FINDINGS: No evidence of pneumothorax status post right thoracentesis. There is near complete opacification of the right hemithorax with a small amount of aerated lung in the right perihilar/suprahilar region. This is due to combination of the patient's known extensive/progressive malignancy, atelectasis, and loculated pleural fluid. Pleural metastases were noted on the prior CT. Left jugular chest port catheter tip remains in the superior vena cava.   The left lung is grossly clear. Heart size within normal limits without evidence of vascular congestion. No evidence of pneumothorax status post right thoracentesis. Near complete opacification right hemithorax. XR CHEST PORTABLE    Result Date: 5/5/2022  EXAMINATION: ONE XRAY VIEW OF THE CHEST 5/5/2022 3:14 pm COMPARISON: 05/04/2022, CT 03/15/2022 HISTORY: ORDERING SYSTEM PROVIDED HISTORY: s/p thora TECHNOLOGIST PROVIDED HISTORY: s/p thora FINDINGS: No evidence of pneumothorax status post right thoracentesis. Continued virtually complete opacification of the right hemithorax likely related to progressive pleural and parenchymal mass/consolidation related to patient's history of lung cancer as well as some residual loculated fluid. Left lung shows minimal perihilar atelectatic changes without lobar consolidation. Left jugular chest port remains in place. Heart size is within normal limits. There is no acute osseous abnormality. Gas distended bowel loops beneath the left hemidiaphragm. Continued near complete opacification of the right hemithorax status post right thoracentesis. XR CHEST PORTABLE    Result Date: 5/5/2022  EXAMINATION: ONE XRAY VIEW OF THE CHEST 5/5/2022 12:12 am COMPARISON: 04/15/2022 HISTORY: ORDERING SYSTEM PROVIDED HISTORY: shortness of breath TECHNOLOGIST PROVIDED HISTORY: shortness of breath Reason for Exam: Shortness of breath, hx of pneumothorax Additional signs and symptoms: Shortness of breath, hx of pneumothorax Relevant Medical/Surgical History: Shortness of breath, hx of pneumothorax FINDINGS: There is near complete opacification of the right hemithorax, stable to slightly increased. MediPort is unchanged in position. Cardiomediastinal silhouette is unchanged. There is no left pleural effusion or pneumothorax. Interstitial prominence in the left lung. No acute osseous abnormality. 1. Minimal interstitial prominence in the left lung, which could be seen with edema.  2. Near complete opacification the right hemithorax is similar to slightly progressed from prior study. IR GUIDED THORACENTESIS PLEURAL    Result Date: 5/5/2022  PROCEDURE: ULTRASOUNDGUIDED RIGHT THORACENTESIS 5/5/2022 HISTORY: ORDERING SYSTEM PROVIDED HISTORY: pl effusion TECHNOLOGIST PROVIDED HISTORY: pl effusion Which side should the procedure be performed?->Right TECHNIQUE: Informed consent was obtained after a detailed explanation of the procedure including risks, benefits, and alternatives. Universal protocol was performed. The right chest was prepped and draped in sterile fashion and local anesthesia was achieved with lidocaine. A 5 Pashto needle sheath was advanced under ultrasound guidance into pleural effusion and thoracentesis was performed. Ultrasound shows a complex loculated right pleural effusion with low level internal echoes and pleural thickening. The patient tolerated the procedure well. EBL: None FINDINGS: A total of 700 mL of turbid dean colored fluid was removed. Successful ultrasound guided right thoracentesis. IR GUIDED THORACENTESIS PLEURAL    Result Date: 4/15/2022  PROCEDURE: ULTRASOUNDGUIDED RIGHT THORACENTESIS 4/15/2022 HISTORY: ORDERING SYSTEM PROVIDED HISTORY: Malignant neoplasm of right upper lobe of lung [facility]) TECHNOLOGIST PROVIDED HISTORY: Which side should the procedure be performed?->Radiologist Recommendation TECHNIQUE: Informed consent was obtained after a detailed explanation of the procedure including risks, benefits, and alternatives. Universal protocol was performed. The right chest was prepped and draped in sterile fashion and local anesthesia was achieved with lidocaine. Ultrasound shows a complex loculated right pleural effusion corresponding to the prior CT. A 5 Pashto needle sheath was advanced under ultrasound guidance into pleural effusion and thoracentesis was performed. The patient tolerated the procedure well.  EBL: None FINDINGS: A total of 250 mL of cloudy dean colored fluid was removed and sent for the requested studies. The patient requested termination of the procedure at this point due to some discomfort; additional loculated fluid remains. Successful ultrasound guided right thoracentesis with a loculated right pleural effusion. IMPRESSION:   Primary Problem  Acute respiratory failure with hypoxia [facility])    Active Hospital Problems    Diagnosis Date Noted    Acute respiratory failure with hypoxia ([facility] [J96.01] 05/05/2022     Priority: Medium    Hyponatremia [E87.1] 05/05/2022     Priority: Medium    Severe malnutrition ([facility] [E43] 05/05/2022    COPD (chronic obstructive pulmonary disease) ([facility] [J44.9] 08/27/2021    Iron deficiency anemia [D50.9] 08/27/2021    PAF (paroxysmal atrial fibrillation) ([facility] [I48.0] 08/27/2021    Lung cancer, primary, with metastasis from lung to other site [facility]) [C34.90]        Recurrent small cell carcinoma of lung  Recurrent pleural effusion malignant  Failure to thrive  Hyponatremia      RECOMMENDATIONS:  1. I personally reviewed results of lab work-up imaging studies and other relevant clinical data. 2. Reviewed hospitalization record  3. Discussed with palliative care over the phone. Patient has expressed wishes to palliative care that he wishes to pursue treatment  4. Discussed scans with the patient. There is disease progression. Previously we have recommended hospice to the patient but he has refused. 5. I again expressed my concerns about proceeding with further treatment. Unfortunately patient's condition has been deteriorating and at this point he is very weak and I do not believe we will be able to get strong enough to go back on treatment. 6. I again recommended hospice to the patient. He is more receptive now. He has asked me to discuss with his wife [address] [facility]. I will reach out to patient's wife as well. 7. Continue symptomatic supportive care in the meanwhile.   Patient is an appropriate candidate for hospice      Discussed with patient and Nurse. Thank you for asking us to see this patient. Cathy Wharton MD          This note is created with the assistance of a speech recognition program.  While intending to generate a document that actually reflects the content of the visit, the document can still have some errors including those of syntax and sound a like substitutions which may escape proof reading. It such instances, actual meaning can be extrapolated by contextual diversion.

## 2022-05-06 NOTE — PROGRESS NOTES
[address]   Occupational Therapy Evaluation  Date: 22  Patient Name: Malia Rodriguez       Room: 1158/8805-93  MRN: [id number]  Account: [de-identified]   : 1950  (80 y.o.) Gender: male     Discharge Recommendations:  Further Occupational Therapy is recommended upon facility discharge. OT Equipment Recommendations  Other: TBD    Referring Practitioner: Bladimir Gagnon MD  Diagnosis: Respiratory failure       Treatment Diagnosis: Impaired self care status  Past Medical History:  has a past medical history of CAD (coronary artery disease), DDD (degenerative disc disease), cervical, Gout, Heart murmur, Hyperlipidemia, Hypertension, Lung cancer ([address] MI (myocardial infarction) ([facility] Skin cancer, and Wears glasses. Past Surgical History:   has a past surgical history that includes Appendectomy; Tonsillectomy; skin biopsy; skin biopsy; Colonoscopy; Foot surgery (Right); Cardiac catheterization; cyst incision and drainage (Right, 2020); Forearm surgery (Right, 2020); knee surgery (Left, 2020); and IR CHEST TUBE INSERTION (2021).     Restrictions  Restrictions/Precautions: Fall Risk (O2 at 3 L, port left chest, peripheral IV left forearm, troponins 36 on 2022)  Implants present? : Metal implants (dental implants)  Other position/activity restrictions: up w/ assist  Required Braces or Orthoses?: No     Vitals  Temp: 97.7 °F (36.5 °C)  Pulse: 106  Resp: 20  BP: 106/70  Height: 5' 11.5\" (181.6 cm)  Weight: 150 lb 12.7 oz (68.4 kg)  BMI (Calculated): 20.8  Oxygen Therapy  SpO2: 93 %  Pulse Oximeter Device Mode: Intermittent  Pulse Oximeter Device Location: Finger  O2 Device: None (Room air)  O2 Flow Rate (L/min): 3 L/min  Blood Gas  Performed?: No  Level of Consciousness: Alert (0)    Subjective  Subjective: Pt resting in bed upon arrival. Pt was agreeable to OT/PT eval.  Comments: ok per RN for OT/PT eval     Hearing  Hearing: Within functional limits  Social/Functional History  Lives With: Spouse  Type of Home: House  Home Layout: Two level,Performs ADL's on one level,Able to Live on Main level with bedroom/bathroom  Home Access: Stairs to enter without rails  Entrance Stairs - Number of Steps: 2 small steps w/ o rail- uses door knob  Bathroom Shower/Tub: Tub/Shower unit,Curtain  Bathroom Toilet: Handicap height (has sink next to it)  Bathroom Equipment: Grab bars in shower,Hand-held shower  Home Equipment: Allen Trell, rolling,Rollator,Cane  Has the patient had two or more falls in the past year or any fall with injury in the past year?: No  ADL Assistance: [facility] [address]: Needs assistance (spouse is primary)  Homemaking Responsibilities: No (spouse is primary)  Ambulation Assistance: Independent (uses 2 wheeled walker)  Transfer Assistance: Independent  Active : No  Patient's  Info: spouse is primary  IADL Comments: sleeps in a recliner chair  Additional Comments: spouse is able to assist patient, states he can also call his dtr       Objective      Cognition  Overall Cognitive Status: Exceptions  Safety Judgement: Decreased awareness of need for assistance  Insights: Decreased awareness of deficits   Sensation  Overall Sensation Status: Impaired (R hand)   ADL  Feeding: Setup  Grooming: Setup  UE Bathing: Stand by assistance  LE Bathing: Moderate assistance  UE Dressing: Stand by assistance  LE Dressing: Moderate assistance  Toileting: Moderate assistance  Additional Comments: ADL scores based on clinical reasoning and skilled observation unless otherwise noted.  Pt currently limited due to decreased strength, balance, activity tolerance limited safety and independence with self care tasks    UE Function           LUE Strength  L Hand General: 4-/5     LUE Tone: Normotonic     LUE AROM (degrees)  LUE AROM : WFL     Left Hand AROM (degrees)  Left Hand AROM: WFL  RUE Strength  R Hand General: 4-/5      RUE Tone: Normotonic RUE AROM (degrees)  RUE AROM : WFL     Right Hand AROM (degrees)  Right Hand AROM: WFL    Fine Motor Skills  Coordination  Movements Are Fluid And Coordinated: No  Coordination and Movement Description: Decreased speed,Decreased accuracy,Right UE,Left UE                           Mobility  Supine to Sit: Moderate assistance  Sit to Supine: Minimal assistance     Balance  Sitting Balance: Stand by assistance  Standing Balance: Moderate assistance (Mod A x2)     Functional Mobility  Functional - Mobility Device: Rolling Walker  Activity: Other (Small side steps towards HOB)  Assist Level: Moderate assistance (mod A x 2)  Functional Mobility Comments: Verbal cues for hand placement and safety with increased time. pt shaky during mobility. Fall risk  Bed mobility  Rolling to Left: Minimal assistance  Supine to Sit: Moderate assistance  Sit to Supine: Minimal assistance  Bed Mobility Comments: Bed mobility completed with HOB slightly elevated.      Transfers  Sit to stand: 2 Person assistance,Moderate assistance  Stand to sit: 2 Person assistance,Moderate assistance  Transfer Comments: Verbal cues for hand placement and safety with increased time        Assessment  Assessment  Performance deficits / Impairments: Decreased ADL status,Decreased functional mobility ,Decreased strength,Decreased safe awareness,Decreased endurance,Decreased balance,Decreased high-level IADLs  Treatment Diagnosis: Impaired self care status  Prognosis: Fair  Decision Making: Medium Complexity  Discharge Recommendations: Patient would benefit from continued therapy after discharge  Activity Tolerance: Patient limited by fatigue         Functional Outcome Measures  AM-PAC Daily Activity Inpatient   How much help for putting on and taking off regular lower body clothing?: A Lot  How much help for Bathing?: A Lot  How much help for Toileting?: A Lot  How much help for putting on and taking off regular upper body clothing?: A Little  How much help for taking care of personal grooming?: A Little  How much help for eating meals?: A Little  AM-PAC Inpatient Daily Activity Raw Score: 15  AM-PAC Inpatient ADL T-Scale Score : 34.69  ADL Inpatient CMS 0-100% Score: 56.46  ADL Inpatient CMS G-Code Modifier : CK       Goals  Short Term Goals  Time Frame for Short term goals: By discharge  Short Term Goal 1: Pt will complete lower body dressing/bathing with CGA and Good safety with use of AE as needed  Short Term Goal 2: Pt will complete fuctional transfers/mobility during self care tasks with CGA and Good safety with use of least restrictive device  Short Term Goal 3: Pt will tolerate standing 3+ mintues during functional activity of choice while maintaining SpO2 above 90%  Short Term Goal 4: Pt will verablize/demonstrate Good understanding of energy conservation strategies to increase safety and independence with self care and mobility  Short Term Goal 5: Pt will participate in 15+ minutes of therapeutic exercises/functional activities to increase safety and independence with self care and mobilit    Plan        Plan  Times per Week: 4-6  Current Treatment Recommendations: Self-Care / ADL,Strengthening,Balance training,Functional mobility training,Endurance training,Pain management,Safety education & training,Patient/Caregiver education & training,Equipment evaluation, education, & procurement       OT Equipment Recommendations  Other: TBD  OT Individual Minutes  Time In: 7990  Time Out: 44 [facility]  Minutes: 24    Electronically signed by Romeo Houser OT on 5/6/22 at 4:50 PM EDT

## 2022-05-06 NOTE — PROGRESS NOTES
Progress Note    5/6/2022   4:03 PM    Name:  Marsha Platt  MRN:    [id number]     Acct:     [de-identified]   Room:  2124/2124-01  IP Day: 1     Admit Date: 5/4/2022 11:44 PM  PCP: Deanne Flores MD    Subjective:     C/C:   Chief Complaint   Patient presents with    Shortness of Breath       Interval History: Status: not changed. Patient had right-sided thoracocentesis with 700 mL fluid drainage. Patient has shortness of breath with cough unable to expectorate sputum. Patient oxygen saturation 93% on 3 L of oxygen via nasal cannula blood pressure and heart rate stable afebrile. Recent labs reviewed sodium 127, hemoglobin 7.4, platelet count 629    ROS:   all 10 systems reviewed and are negative except as noted    Review of Systems   Constitutional: Negative for appetite change, chills and diaphoresis. HENT: Negative for drooling, ear pain, trouble swallowing and voice change. Eyes: Negative for photophobia and visual disturbance. Respiratory: Positive for cough and shortness of breath. Negative for choking and stridor. Cardiovascular: Negative for chest pain and palpitations. Gastrointestinal: Negative for abdominal distention, anal bleeding, blood in stool and rectal pain. Endocrine: Negative for polyphagia and polyuria. Genitourinary: Negative for dysuria, flank pain, hematuria and urgency. Musculoskeletal: Negative for back pain, myalgias and neck stiffness. Skin: Negative for color change, pallor and rash. Allergic/Immunologic: Negative for environmental allergies and food allergies. Neurological: Negative for tremors, seizures, facial asymmetry and numbness. Hematological: Negative for adenopathy. Does not bruise/bleed easily. Psychiatric/Behavioral: Negative for agitation, behavioral problems, hallucinations, self-injury and suicidal ideas. Medications:      Allergies: No Known Allergies    Current Meds: piperacillin-tazobactam (ZOSYN) 3,375 mg in dextrose 5 % 50 mL IVPB extended infusion (mini-bag), Q8H  sodium chloride flush 0.9 % injection 5-40 mL, 2 times per day  sodium chloride flush 0.9 % injection 5-40 mL, PRN  0.9 % sodium chloride infusion, PRN  acetaminophen (TYLENOL) tablet 650 mg, Q4H PRN  ondansetron (ZOFRAN-ODT) disintegrating tablet 4 mg, Q8H PRN   Or  ondansetron (ZOFRAN) injection 4 mg, Q6H PRN  allopurinol (ZYLOPRIM) tablet 100 mg, Daily  ALPRAZolam (XANAX) tablet 0.5 mg, Nightly PRN  dexamethasone (DECADRON) tablet 2 mg, Daily with breakfast  docusate sodium (COLACE) capsule 100 mg, BID  HYDROcodone-acetaminophen (NORCO) 5-325 MG per tablet 1 tablet, Q4H PRN  ipratropium-albuterol (DUONEB) nebulizer solution 3 mL, Q4H WA  potassium chloride (MICRO-K) extended release capsule 20 mEq, Daily  sodium chloride tablet 1 g, BID  tamsulosin (FLOMAX) capsule 0.4 mg, Daily  midodrine (PROAMATINE) tablet 5 mg, TID PRN  furosemide (LASIX) injection 40 mg, Daily  metoprolol tartrate (LOPRESSOR) tablet 25 mg, BID  apixaban (ELIQUIS) tablet 5 mg, BID  perflutren lipid microspheres (DEFINITY) injection 1.65 mg, ONCE PRN  ferrous sulfate (IRON 325) tablet 325 mg, BID WC  guaiFENesin (MUCINEX) extended release tablet 600 mg, BID  sodium chloride flush 0.9 % injection 10 mL, PRN        Data:     Code Status:  Full Code    Family History   Problem Relation Age of Onset    Cancer Father         lung cancer       Social History     Socioeconomic History    Marital status:      Spouse name: Not on file    Number of children: Not on file    Years of education: Not on file    Highest education level: Not on file   Occupational History    Not on file   Tobacco Use    Smoking status: Current Every Day Smoker     Packs/day: 0.50     Types: Cigarettes    Smokeless tobacco: Former User   Vaping Use    Vaping Use: Former   Substance and Sexual Activity    Alcohol use: No    Drug use: Yes     Frequency: 14.0 times per week     Types: Marijuana (Weed)     Comment: hasn't smoked in 1 month    Sexual activity: Not on file   Other Topics Concern    Not on file   Social History Narrative    Not on file     Social Determinants of Health     Financial Resource Strain:     Difficulty of Paying Living Expenses: Not on file   Food Insecurity:     Worried About Running Out of Food in the Last Year: Not on file    Dandre of Food in the Last Year: Not on file   Transportation Needs:     Lack of Transportation (Medical): Not on file    Lack of Transportation (Non-Medical): Not on file   Physical Activity:     Days of Exercise per Week: Not on file    Minutes of Exercise per Session: Not on file   Stress:     Feeling of Stress : Not on file   Social Connections:     Frequency of Communication with Friends and Family: Not on file    Frequency of Social Gatherings with Friends and Family: Not on file    Attends Orthodox Services: Not on file    Active Member of [address] [place] [address] [employer] or Organizations: Not on file    Attends Club or Organization Meetings: Not on file    Marital Status: Not on file   Intimate Partner Violence:     Fear of Current or Ex-Partner: Not on file    Emotionally Abused: Not on file    Physically Abused: Not on file    Sexually Abused: Not on file   Housing Stability:     Unable to Pay for Housing in the Last Year: Not on file    Number of [place] in the Last Year: Not on file    Unstable Housing in the Last Year: Not on file       I/O (24Hr): Intake/Output Summary (Last 24 hours) at 5/6/2022 1603  Last data filed at 5/6/2022 1524  Gross per 24 hour   Intake 490 ml   Output 1200 ml   Net -710 ml     Radiology:  XR CHEST PORTABLE    Result Date: 5/5/2022  Continued near complete opacification of the right hemithorax status post right thoracentesis. XR CHEST PORTABLE    Result Date: 5/5/2022  1. Minimal interstitial prominence in the left lung, which could be seen with edema.  2. Near complete opacification the right hemithorax is similar to slightly progressed from prior study. CT CHEST ABDOMEN PELVIS W CONTRAST    Result Date: 5/5/2022  1. CT CHEST: Interval progression of adenopathy and diffuse disease in the right lung. 2. Postobstructive pneumonia right lung, presumed malignant pleural effusion, with only a tiny focus of pneumatized right upper lobe. 3. Right bronchus intermedius appears and obscured by 1.1 cm mass. 4. Patchy left upper lobe pneumonia and lateral left lower lobe pneumonia. 5. Trace pneumothorax right lung. This may be related to instrumentation or development of abscess or bronchopleural fistula. 6. Indeterminate appearance of the central and left lateral portions of the T12 vertebral body with height loss and mixed lytic and sclerotic appearance unchanged compared to previous exam.  This may simply be remote posttraumatic or could reflect metastatic disease. 7. CT ABDOMEN/PELVIS: Interval progression of hepatic metastatic disease and metastatic adenopathy. 8. Possible renal metastatic lesions. 9. Large volume fecal debris in the colon may reflect constipation. 10. 2.8 cm abdominal aortic aneurysm. Recommend follow-up every 5 years. RECOMMENDATIONS:     IR GUIDED THORACENTESIS PLEURAL    Result Date: 5/5/2022  Successful ultrasound guided right thoracentesis.        Labs:  Recent Results (from the past 24 hour(s))   Vancomycin Level, Random    Collection Time: 05/06/22  5:37 AM   Result Value Ref Range    Vancomycin Rm 17.2 ug/mL    Vancomycin Random Dose amount 1500 MG     Vancomycin Random Date last dose 5,052,022     Vancomycin Random Time last dose 2,055    CBC with Auto Differential    Collection Time: 05/06/22  5:37 AM   Result Value Ref Range    WBC 8.2 3.5 - 11.0 k/uL    RBC 2.50 (L) 4.5 - 5.9 m/uL    Hemoglobin 7.4 (L) 13.5 - 17.5 g/dL    Hematocrit 22.5 (L) 41 - 53 %    MCV 89.8 80 - 100 fL    MCH 29.7 26 - 34 pg    MCHC 33.0 31 - 37 g/dL    RDW 21.2 (H) 11.5 - 14.9 %    Platelets 421 782 - 247 k/uL    MPV 6.4 6.0 - 12.0 fL Seg Neutrophils 87 (H) 36 - 66 %    Lymphocytes 3 (L) 24 - 44 %    Monocytes 8 (H) 1 - 7 %    Eosinophils % 0 0 - 4 %    Basophils 0 0 - 2 %    Bands 2 0 - 10 %    Segs Absolute 7.13 1.3 - 9.1 k/uL    Absolute Lymph # 0.25 (L) 1.0 - 4.8 k/uL    Absolute Mono # 0.66 0.1 - 1.3 k/uL    Absolute Eos # 0.00 0.0 - 0.4 k/uL    Basophils Absolute 0.00 0.0 - 0.2 k/uL    Absolute Bands # 0.16 0.0 - 1.0 k/uL    Morphology ANISOCYTOSIS PRESENT     Morphology HYPOCHROMIA PRESENT     Morphology 1+ ELLIPTOCYTES     Morphology 1+ TEARDROPS    Comprehensive Metabolic Panel w/ Reflex to MG    Collection Time: 22  5:37 AM   Result Value Ref Range    Glucose 82 70 - 99 mg/dL    BUN 17 8 - 23 mg/dL    CREATININE 0.45 (L) 0.70 - 1.20 mg/dL    Calcium 8.8 8.6 - 10.4 mg/dL    Sodium 127 (L) 135 - 144 mmol/L    Potassium 4.1 3.7 - 5.3 mmol/L    Chloride 90 (L) 98 - 107 mmol/L    CO2 28 20 - 31 mmol/L    Anion Gap 9 9 - 17 mmol/L    Alkaline Phosphatase 146 (H) 40 - 129 U/L    ALT 17 5 - 41 U/L    AST 37 <40 U/L    Total Bilirubin 0.27 (L) 0.3 - 1.2 mg/dL    Total Protein 6.0 (L) 6.4 - 8.3 g/dL    Albumin 3.0 (L) 3.5 - 5.2 g/dL    GFR Non-African American >60 >60 mL/min    GFR African American >60 >60 mL/min    GFR Comment         TSH w/reflex to FT4    Collection Time: 22  5:37 AM   Result Value Ref Range    TSH 1.41 0.30 - 5.00 uIU/mL   Culture, Blood 1    Collection Time: 22 11:45 AM    Specimen: Blood   Result Value Ref Range    Specimen Description . BLOOD RIGHT HAND     Culture NO GROWTH <24 HRS    Culture, Blood 1    Collection Time: 22 11:48 AM    Specimen: Blood   Result Value Ref Range    Specimen Description . BLOOD RIGHT ARM     Culture NO GROWTH <24 HRS        Physical Examination:        Vitals:  /70   Pulse 106   Temp 97.7 °F (36.5 °C) (Oral)   Resp 20   Ht 5' 11.5\" (1.816 m)   Wt 150 lb 12.7 oz (68.4 kg)   SpO2 93%   BMI 20.74 kg/m²   Temp (24hrs), Av.8 °F (36.6 °C), Min:97.3 °F (36.3 °C), Max:98.7 °F (37.1 °C)    No results for input(s): POCGLU in the last 72 hours. Physical Exam  Vitals reviewed. Constitutional:       Appearance: Normal appearance. He is not diaphoretic. HENT:      Head: Normocephalic and atraumatic. Right Ear: External ear normal.      Left Ear: External ear normal.      Nose: Nose normal.      Mouth/Throat:      Mouth: Mucous membranes are moist.      Pharynx: Oropharynx is clear. Eyes:      Conjunctiva/sclera: Conjunctivae normal.   Cardiovascular:      Rate and Rhythm: Normal rate and regular rhythm. Pulses: Normal pulses. Heart sounds: Normal heart sounds. Pulmonary:      Effort: Pulmonary effort is normal.      Breath sounds: Examination of the right-lower field reveals decreased breath sounds. Examination of the left-lower field reveals decreased breath sounds. Decreased breath sounds present. Abdominal:      General: Bowel sounds are normal. There is no distension. Palpations: Abdomen is soft. Tenderness: There is no abdominal tenderness. Musculoskeletal:         General: No tenderness or deformity. Normal range of motion. Cervical back: Normal range of motion and neck supple. No rigidity. Right lower leg: No edema. Left lower leg: No edema. Skin:     General: Skin is warm and dry. Capillary Refill: Capillary refill takes less than 2 seconds. Coloration: Skin is not jaundiced. Neurological:      General: No focal deficit present. Mental Status: Mental status is at baseline.    Psychiatric:         Mood and Affect: Mood normal.         Behavior: Behavior normal.         Assessment:        Primary Problem  Acute respiratory failure with hypoxia (HCC)     Principal Problem:    Acute respiratory failure with hypoxia (HCC)  Active Problems:    Hyponatremia    Lung cancer, primary, with metastasis from lung to other site [facility])    PAF (paroxysmal atrial fibrillation) (HCC)    COPD (chronic obstructive pulmonary disease) ([facility]    Iron deficiency anemia    Severe malnutrition (HCC)  Resolved Problems:    * No resolved hospital problems. *      Past Medical History:   Diagnosis Date    CAD (coronary artery disease)     DDD (degenerative disc disease), cervical     Gout     Heart murmur     hx. of when younger.  Hyperlipidemia     Hypertension     Lung cancer ([facility]     right lung    [facility]) ([facility]     Skin cancer     face    Wears glasses         Plan:        1. IV Zosyn  2. One of the blood culture shows gram-positive cocci in clusters  3. Consult nephrology for hyponatremia  4. Lasix 40 mg IV daily  5. Metoprolol 25 mg p.o. twice daily  6. Mucinex 600 mg p.o. twice daily  7. MRSA nasal swab is negative  8. Nutritional supplement 3 times a day  9. DuoNeb nebulizer treatment every 4 hours while awake  10.  2D echo with an ejection fraction of 55%  11. sodium chloride 1 g tab twice daily  12.  chest x-ray report shows near complete opacification of right hemithorax. 13.  consult oncology  14. CBC, CMP  15.  12 lead EKG shows sinus tachycardia  16. DVT prophylaxis  Eliquis with holding parameters  17. EPCs  18.  check and replace electrolytes per sliding scale  19.   restart home medications      Electronically signed by Romana Longo MD

## 2022-05-06 NOTE — PROGRESS NOTES
Physical Therapy  Facility/Department: [address] [place] [address] CARE  Physical Therapy Initial Assessment    Name: Marsha Platt  : 1950  MRN: [id number]  Date of Service: 2022    Discharge Recommendations:  Patient would benefit from continued therapy after discharge   PT Equipment Recommendations  Equipment Needed:  (TBD)      Patient Diagnosis(es): The primary encounter diagnosis was Acute on chronic respiratory failure with hypoxia ([address] A diagnosis of Pneumonia due to infectious organism, unspecified laterality, unspecified part of lung was also pertinent to this visit. Past Medical History:  has a past medical history of CAD (coronary artery disease), DDD (degenerative disc disease), cervical, Gout, Heart murmur, Hyperlipidemia, Hypertension, Lung cancer ([address] MI (myocardial infarction) ([address] Skin cancer, and Wears glasses. Past Surgical History:  has a past surgical history that includes Appendectomy; Tonsillectomy; skin biopsy; skin biopsy; Colonoscopy; Foot surgery (Right); Cardiac catheterization; cyst incision and drainage (Right, 2020); Forearm surgery (Right, 2020); knee surgery (Left, 2020); and IR CHEST TUBE INSERTION (2021). Assessment   Body Structures, Functions, Activity Limitations Requiring Skilled Therapeutic Intervention: Decreased functional mobility ; Decreased endurance;Decreased balance;Decreased strength  Assessment: pt is very weak and remains a HIGH FALL RISK. Pt currently requiring mod x 2 for transfers and 6 side steps towards the [facility]. Therapist would recommend further therapy at D/C.   Treatment Diagnosis: impaired mobility due to debilitation  Specific Instructions for Next Treatment: advance as tolerated- monitor O2 sat and HR w/ treatment, progress to pivot transfers to chair and increase gait distance as tolerated using wheeled walker and O2, instruct in exercise  Therapy Prognosis: Fair  Decision Making: Medium Complexity  History: pt admitted due to respiratory failure  Exam: ROM< MMT, balance and mobility assessments  Clinical Presentation: gait w/ wheeled walker 6 side steps w/ mod x 2, sit <> stand w/ mod x 2, rolling w/ min x 1 and mod x 1 supine <> sit, FALL RISK- very weak, uses O2 at 3 L  Barriers to Learning: none  Requires PT Follow-Up: Yes  Activity Tolerance  Activity Tolerance: Patient limited by fatigue;Patient limited by endurance     Plan   Plan  Plan:  (5-7 treatments/ week)  Specific Instructions for Next Treatment: advance as tolerated- monitor O2 sat and HR w/ treatment, progress to pivot transfers to chair and increase gait distance as tolerated using wheeled walker and O2, instruct in exercise  Current Treatment Recommendations: Strengthening,Equipment evaluation, education, & procurement,Gait training,Balance training,Functional mobility training,Transfer training,Safety education & training,Therapeutic activities,Patient/Caregiver education & training,Endurance training  Safety Devices  Type of Devices: Patient at risk for falls,All fall risk precautions in place,Bed alarm in place,Call light within reach,Nurse notified,Gait belt (nurse Griselda Kava)     Restrictions  Restrictions/Precautions  Restrictions/Precautions: Fall Risk (O2 at 3 L, port left chest, peripheral IV left forearm, troponins 36 on 5-5-2022)  Required Braces or Orthoses?: No  Implants present? : Metal implants (dental implants)  Position Activity Restriction  Other position/activity restrictions: up w/ assist     Subjective   Pain: 5/10 right arm- described as discomfort  General  Patient assessed for rehabilitation services?: Yes  Additional Pertinent Hx: hx right lung CA  Response To Previous Treatment: Not applicable  Family / Caregiver Present: No  Referring Practitioner: Dr. Andrew Mccray  Referral Date : 05/05/22  Diagnosis: respiratory failure  Follows Commands: Within Functional Limits  Other (Comment): OK per nurse  General Comment  Comments: CT scan of the abdomen shows numerous right hepatic lesions, left and right kidney lesions, and 2.8 cm abdominal aortic aneurysm. Hx right lung CA. Pt had a thoracentesis on 5-5-2022 w/ 700mL removed. Subjective  Subjective: pt reports that he was admitted due to increased SOB.          Social/Functional History  Social/Functional History  Lives With: Spouse  Type of Home: House  Home Layout: Two level,Performs ADL's on one level,Able to Live on Main level with bedroom/bathroom  Home Access: Stairs to enter without rails  Entrance Stairs - Number of Steps: 2 small steps w/ o rail- uses door knob  Bathroom Shower/Tub: Tub/Shower unit,Curtain  Bathroom Toilet: Handicap height (has sink next to it)  Bathroom Equipment: Grab bars in shower,Hand-held shower  Home Equipment: Roge Rhein, rolling,Rollator,Cane  Has the patient had two or more falls in the past year or any fall with injury in the past year?: No  ADL Assistance: [address] [place] [address] Avenue: Needs assistance (spouse is primary)  Homemaking Responsibilities: No (spouse is primary)  Ambulation Assistance: Independent (uses 2 wheeled walker)  Transfer Assistance: Independent  Active : No  Patient's  Info: spouse is primary  IADL Comments: sleeps in a recliner chair  Additional Comments: spouse is able to assist patient, states he can also call his dtr  Vision/Hearing  Hearing: Within functional limits    Cognition   Orientation  Overall Orientation Status: Within Functional Limits  Orientation Level: Oriented X4     Objective   SpO2: 90 %  O2 Device: Nasal cannula     Observation/Palpation  Observation: O2 at 3 L, port left chest, peripheral IV left forearm, cachetic appearance  Edema: 1 + edema bilateral LEs  Gross Assessment  Sensation: Impaired (C/O numbness and tingling in the right hand)     AROM RLE (degrees)  RLE AROM: WFL  AROM LLE (degrees)  LLE AROM : WFL  AROM RUE (degrees)  RUE General AROM: see OT for UE assessment  AROM LUE (degrees)  LUE General AROM: see OT for UE assessment  Strength RLE  Comment: grossly 3+/5  Strength LLE  Comment: grossly 3+/5  Strength RUE  Comment: see OT for UE assessment  Strength LUE  Comment: see OT for UE assessment           Bed mobility  Rolling to Left: Minimal assistance (performed 2 X)  Supine to Sit: Moderate assistance  Sit to Supine: Minimal assistance  Bed Mobility Comments: dangled at the EOB w/ SBA x 1, pt used the urinal twice while positioned supine in bed. Transfers  Sit to Stand: Moderate Assistance;2 Person Assistance  Stand to sit: Moderate Assistance;2 Person Assistance  Comment: verbal cuing for hand placement for sit <> stand transfers, pt refused bedside chair and requested to return to bed at the end of treatment  Ambulation  Surface: level tile  Device: Rolling Walker  Other Apparatus: O2 (3 L)  Assistance:  Moderate assistance;2 Person assistance  Distance: 6 lateral side steps towards the [facility]  Comments: very shaky and weak- FALL RISK; O2 sats post gait  90%,  while on O2 at 3 L  Stairs/Curb  Stairs?: No     Balance  Sitting - Static: Fair  Sitting - Dynamic: Fair  Standing - Static: Fair (used wheeled walker)  Standing - Dynamic: Fair;- (used wheeled walker)           OutComes Score                                                  AM-PAC Score  AM-PAC Inpatient Mobility Raw Score : 9 (05/06/22 0922)  AM-PAC Inpatient T-Scale Score : 30.55 (05/06/22 0922)  Mobility Inpatient CMS 0-100% Score: 81.38 (05/06/22 0143)  Mobility Inpatient CMS G-Code Modifier : CM (05/06/22 0973)          Goals  Short Term Goals  Time Frame for Short term goals: 5-7 treatments/ week  Short term goal 1: pt to tolerate 1/2 hour of therapuetic exercise and activity keeping O2 sats above 90%  Short term goal 2: pt to demonstrate good technique for LE strengthening, balance activities and energy conservation techniques  Short term goal 3: pt to demonstrate rolling in bed w/ CGA x 1 using the rail and min x 1 supine <>

## 2022-05-06 NOTE — CARE COORDINATION
SW contacted PT about patient needing continuum of care after discharge. PT explained to SW that patient is extremely weak and can only take five steps and requires 2 assistance. SW then met with patient to inform him that a skilled nursing facility was recommended to help him continue to build strength. SW provided patient and spouse with a list of local facilities and will check with patients at the end of day for a decision. Electronically signed by Dara Miller on 5/6/2022 at 2:28 PM     Pt and spouse were able to come to a decision about three places pt would be willing to go. First choice would be [facility], second, [address] [place] [address], and third, [facility]. Spouse wanted information about facility visiting hours, due to her working full-time, and SW contacted facilities for times visitors could come. SW provided spouse and patient with a list of visiting times. SW will continue to follow.      Electronically signed by Dara Miller on 5/6/2022 at 4:05 PM

## 2022-05-06 NOTE — PLAN OF CARE
Problem: Discharge Planning  Goal: Discharge to home or other facility with appropriate resources  5/6/2022 0450 by Janie Goldberg RN  Outcome: Not Progressing  5/5/2022 1641 by Shyann Oreilly RN  Outcome: Progressing  Flowsheets  Taken 5/5/2022 0315 by Austyn Levine RN  Discharge to home or other facility with appropriate resources:   Identify barriers to discharge with patient and caregiver   Arrange for needed discharge resources and transportation as appropriate   Identify discharge learning needs (meds, wound care, etc)   Refer to discharge planning if patient needs post-hospital services based on physician order or complex needs related to functional status, cognitive ability or social support system  Taken 5/5/2022 0311 by Austyn Levine RN  Discharge to home or other facility with appropriate resources:   Identify barriers to discharge with patient and caregiver   Identify discharge learning needs (meds, wound care, etc)   Refer to discharge planning if patient needs post-hospital services based on physician order or complex needs related to functional status, cognitive ability or social support system   Arrange for needed discharge resources and transportation as appropriate     Problem: Pain  Goal: Verbalizes/displays adequate comfort level or baseline comfort level  5/6/2022 0450 by Janie Goldberg RN  Outcome: Progressing  5/5/2022 1641 by Shyann Oreilly RN  Outcome: Progressing     Problem: Safety - Adult  Goal: Free from fall injury  5/6/2022 0450 by Janie Goldberg RN  Outcome: Progressing  5/5/2022 1641 by Shyann Oreilly RN  Outcome: Progressing     Problem: Skin/Tissue Integrity  Goal: Absence of new skin breakdown  Description: 1. Monitor for areas of redness and/or skin breakdown  2. Assess vascular access sites hourly  3. Every 4-6 hours minimum:  Change oxygen saturation probe site  4.   Every 4-6 hours:  If on nasal continuous positive airway pressure, respiratory therapy assess nares and determine need for appliance change or resting period.   5/6/2022 0450 by Genia Wilkinson RN  Outcome: Progressing  5/5/2022 1641 by Chelsy Marie RN  Outcome: Progressing     Problem: ABCDS Injury Assessment  Goal: Absence of physical injury  5/6/2022 0450 by Genia Wilkinson RN  Outcome: Progressing  5/5/2022 1641 by Chelsy Marie RN  Outcome: Progressing     Problem: Nutrition Deficit:  Goal: Optimize nutritional status  Outcome: Progressing

## 2022-05-06 NOTE — PLAN OF CARE
Problem: Discharge Planning  Goal: Discharge to home or other facility with appropriate resources  5/6/2022 1757 by Alma Ramos RN  Outcome: Progressing     Problem: Pain  Goal: Verbalizes/displays adequate comfort level or baseline comfort level  5/6/2022 1757 by Alma Ramos RN  Outcome: Progressing     Problem: Safety - Adult  Goal: Free from fall injury  5/6/2022 1757 by Alma Ramos RN  Outcome: Progressing     Problem: Skin/Tissue Integrity  Goal: Absence of new skin breakdown  Description: 1. Monitor for areas of redness and/or skin breakdown  2. Assess vascular access sites hourly  3. Every 4-6 hours minimum:  Change oxygen saturation probe site  4. Every 4-6 hours:  If on nasal continuous positive airway pressure, respiratory therapy assess nares and determine need for appliance change or resting period.   5/6/2022 1757 by Alma Ramos RN  Outcome: Progressing     Problem: ABCDS Injury Assessment  Goal: Absence of physical injury  5/6/2022 1757 by Alma Ramos RN  Outcome: Progressing

## 2022-05-06 NOTE — CONSULTS
[facility] -   Infectious diseases evaluation  admission date 5/4/2022    reason for consultation:   Positive blood culture  Impression :   Current:  · Suspected postobstructive pneumonia   · Single positive blood culture for staph epi likely contamination    Other:  · Lung cancer with mets  · Paroxysmal atrial fibrillation  · History of gout  · Coronary artery disease  · Hypertension  · Hyperlipidemia    Recommendations   · Discontinue IV vancomycin and cefepime   · IV Zosyn   · Repeat blood cultures from today pending  · Follow CBC and renal function  · Supportive care        History of Present Illness:   Initial history:  Emery Peralta is a 80y.o.-year-old male with history of lung cancer with mets presented to the hospital with a worsening shortness of breath associated with nonproductive cough and lower extremity edema for several days. Symptoms moderate to severe, no alleviating or aggravating factors. He denied fever or chills, no nausea or vomiting, no diarrhea or urinary symptoms, no other complaints. Initial labs showed WBC of 9 increased to 12.3 on 5/5/2022  COVID-19 /influenza combo PCR negative  1 of 2 blood cultures on admission grew staph epi. Nasal swab for MRSA was negative  5/5/2022 CT chest reviewed suggestive of right postobstructive pneumonia/pleural effusio and lung mass/left lower lobe patchy infiltrate, trace pneumothorax of the right lung, 2.8 cm abdominal aortic aneurysm. Urinalysis unremarkable  Status post paracentesis 5/5/22 with a 700 mL of turbid dean-colored fluid removed. Interval changes  5/6/2022   The patient has been afebrile since admission. Chest x-ray from earlier today reviewed complete opacification of right hemithorax.   WBC 8.2 today  Patient Vitals for the past 8 hrs:   BP Temp Temp src Pulse Resp SpO2   05/06/22 1215 99/67 97.6 °F (36.4 °C) Oral 106 21 96 %   05/06/22 1124 -- -- -- -- 20 95 %   05/06/22 1045 109/79 97.7 °F (36.5 °C) Oral 108 21 94 %   05/06/22 1032 -- -- -- -- 21 --   05/06/22 0845 (!) 96/96 97.6 °F (36.4 °C) Oral 108 20 96 %   05/06/22 0700 99/69 97.3 °F (36.3 °C) Oral 104 22 97 %             I have personally reviewed the past medical history, past surgical history, medications, social history, and family history, and I haveupdated the database accordingly. Allergies:   Patient has no known allergies. Review of Systems:     Review of Systems  As per history recent illness, other than above 12 system review was negative  Physical Examination :       Physical Exam  Constitutional:       General: He is not in acute distress. Appearance: He is ill-appearing. HENT:      Head: Normocephalic and atraumatic. Right Ear: External ear normal.      Left Ear: External ear normal.   Eyes:      General: No scleral icterus. Conjunctiva/sclera: Conjunctivae normal.   Cardiovascular:      Rate and Rhythm: Normal rate and regular rhythm. Heart sounds: Normal heart sounds. Pulmonary:      Effort: No respiratory distress. Breath sounds: No wheezing. Comments: Bilateral crackles  Abdominal:      General: There is no distension. Palpations: Abdomen is soft. Tenderness: There is no abdominal tenderness. Musculoskeletal:      Cervical back: Neck supple. No rigidity. Right lower leg: Edema present. Left lower leg: Edema present. Skin:     General: Skin is warm. Findings: No rash. Neurological:      General: No focal deficit present. Mental Status: He is oriented to person, place, and time. Past Medical History:     Past Medical History:   Diagnosis Date    CAD (coronary artery disease)     DDD (degenerative disc disease), cervical     Gout     Heart murmur     hx. of when younger.     Hyperlipidemia     Hypertension     Lung cancer ([address]     right lung    MI (myocardial infarction) ([address]     Skin cancer     face    Wears glasses Past Surgical  History:     Past Surgical History:   Procedure Laterality Date    APPENDECTOMY      CARDIAC CATHETERIZATION      w/stent    COLONOSCOPY      CYST INCISION AND DRAINAGE Right 05/14/2020    rt elbow I and D and left knee aspiration with cultures    FOOT SURGERY Right     2nd toe(bone spur).  FOREARM SURGERY Right 5/14/2020    RIGHT ELBOW IRRIGATION AND DEBRIDEMENT performed by Feli Davila DO at [address]  12/13/2021    IR CHEST TUBE INSERTION 12/13/2021 [facility]ED[facility]    KNEE SURGERY Left 5/14/2020    KNEE ASPIRATION WITH CULTURES SENT performed by Feli Davila DO at [facility] under lt. eye.     TONSILLECTOMY         Medications:      vancomycin  1,250 mg IntraVENous Q12H    vancomycin (VANCOCIN) intermittent dosing (placeholder)   Other RX Placeholder    sodium chloride flush  5-40 mL IntraVENous 2 times per day    allopurinol  100 mg Oral Daily    dexamethasone  2 mg Oral Daily with breakfast    docusate sodium  100 mg Oral BID    ipratropium-albuterol  3 mL Inhalation Q4H WA    potassium chloride  20 mEq Oral Daily    sodium chloride  1 g Oral BID    tamsulosin  0.4 mg Oral Daily    furosemide  40 mg IntraVENous Daily    metoprolol tartrate  25 mg Oral BID    apixaban  5 mg Oral BID    ferrous sulfate  325 mg Oral BID WC    guaiFENesin  600 mg Oral BID       Social History:     Social History     Socioeconomic History    Marital status:      Spouse name: Not on file    Number of children: Not on file    Years of education: Not on file    Highest education level: Not on file   Occupational History    Not on file   Tobacco Use    Smoking status: Current Every Day Smoker     Packs/day: 0.50     Types: Cigarettes    Smokeless tobacco: Former User   Vaping Use    Vaping Use: Former   Substance and Sexual Activity    Alcohol use: No    Drug use: Yes     Frequency: 14.0 times per week     Types: Marijuana [place])     Comment: hasn't smoked in 1 month    Sexual activity: Not on file   Other Topics Concern    Not on file   Social History Narrative    Not on file     Social Determinants of Health     Financial Resource Strain:     Difficulty of Paying Living Expenses: Not on file   Food Insecurity:     Worried About Running Out of Food in the Last Year: Not on file    Dandre of Food in the Last Year: Not on file   Transportation Needs:     Lack of Transportation (Medical): Not on file    Lack of Transportation (Non-Medical):  Not on file   Physical Activity:     Days of Exercise per Week: Not on file    Minutes of Exercise per Session: Not on file   Stress:     Feeling of Stress : Not on file   Social Connections:     Frequency of Communication with Friends and Family: Not on file    Frequency of Social Gatherings with Friends and Family: Not on file    Attends Buddhism Services: Not on file    Active Member of [address] [place] [address] or Organizations: Not on file    Attends Club or Organization Meetings: Not on file    Marital Status: Not on file   Intimate Partner Violence:     Fear of Current or Ex-Partner: Not on file    Emotionally Abused: Not on file    Physically Abused: Not on file    Sexually Abused: Not on file   Housing Stability:     Unable to Pay for Housing in the Last Year: Not on file    Number of [place] in the Last Year: Not on file    Unstable Housing in the Last Year: Not on file       Family History:     Family History   Problem Relation Age of Onset    Cancer Father         lung cancer      Medical Decision Making:   I have independently reviewed/ordered the following labs:    CBC with Differential:   Recent Labs     05/05/22  0026 05/06/22  0537   WBC 12.3* 8.2   HGB 7.6* 7.4*   HCT 23.8* 22.5*    173   LYMPHOPCT 3* 3*   MONOPCT 12* 8*     BMP:  Recent Labs     05/05/22  0026 05/06/22  0537   * 127*   K 4.8 4.1   CL 94* 90*   CO2 24 28   BUN 21 17   CREATININE 0.60* 0.45*     Hepatic Function Panel:   Recent Labs     05/05/22  0026 05/06/22  0537   PROT 6.8 6.0*   LABALBU 3.3* 3.0*   BILITOT 0.35 0.27*   ALKPHOS 179* 146*   ALT 20 17   AST 38 37     No results for input(s): RPR in the last 72 hours. No results for input(s): HIV in the last 72 hours. No results for input(s): BC in the last 72 hours. Lab Results   Component Value Date    CREATININE 0.45 05/06/2022    GLUCOSE 82 05/06/2022       Detailed results: Thank you for allowing us to participate in the care of this patient. Please call with questions. This note is created with the assistance of a speech recognition program.  While intending to generate adocument that actually reflects the content of the visit, the document can still have some errors including those of syntax and sound a like substitutions which may escape proof reading. It such instances, actual meaningcan be extrapolated by contextual diversion.     Arnie Decker MD  Office: [phone]  Perfect serve / office [phone]

## 2022-05-06 NOTE — PROGRESS NOTES
PULMONARY PROGRESS NOTE:    REASON FOR VISIT: Pl effusion, lung cancer  Interval History:    Shortness of Breath: +++  Cough: ++  Sputum: no          Hemoptysis: no  Chest Pain: no  Fever: no                   Swelling Feet: no  Headache: no                                           Nausea, Emesis, Abdominal Pain: no  Diarrhea: no         Constipation: no    Events since last visit: had thoracentesis - 700ml    PAST MEDICAL HISTORY:      Scheduled Meds:   vancomycin  1,250 mg IntraVENous Q12H    vancomycin (VANCOCIN) intermittent dosing (placeholder)   Other RX Placeholder    sodium chloride flush  5-40 mL IntraVENous 2 times per day    allopurinol  100 mg Oral Daily    dexamethasone  2 mg Oral Daily with breakfast    docusate sodium  100 mg Oral BID    ipratropium-albuterol  3 mL Inhalation Q4H WA    potassium chloride  20 mEq Oral Daily    sodium chloride  1 g Oral BID    tamsulosin  0.4 mg Oral Daily    furosemide  40 mg IntraVENous Daily    metoprolol tartrate  25 mg Oral BID    apixaban  5 mg Oral BID    ferrous sulfate  325 mg Oral BID WC    guaiFENesin  600 mg Oral BID     Continuous Infusions:   sodium chloride 15 mL/hr at 05/06/22 1029     PRN Meds:sodium chloride flush, sodium chloride, acetaminophen, ondansetron **OR** ondansetron, ALPRAZolam, HYDROcodone-acetaminophen, midodrine, perflutren lipid microspheres, sodium chloride flush        PHYSICAL EXAMINATION:  /79   Pulse 108   Temp 97.7 °F (36.5 °C) (Oral)   Resp 20   Ht 5' 11.5\" (1.816 m)   Wt 150 lb 12.7 oz (68.4 kg)   SpO2 95%   BMI 20.74 kg/m²     General : Awake, alert, mild distress; rattle + in chest  Neck - supple, no lymphadenopathy, JVD not raised  Heart - regular rhythm, S1 and S2 normal; no additional sounds heard  Lungs - Air Entry- fair bilaterally; breath sounds : vesicular;   rales/crackles - absent  Abdomen - soft, no tenderness  Upper Extremities  - no cyanosis, mottling; edema : absent  Lower Extremities: no cyanosis, mottling; edema : absent    Current Laboratory, Radiologic, Microbiologic, and Diagnostic studies reviewed  Data ReviewCBC:   Recent Labs     05/04/22  1259 05/05/22  0026 05/06/22  0537   WBC 9.0 12.3* 8.2   RBC 2.80* 2.64* 2.50*   HGB 8.2* 7.6* 7.4*   HCT 25.5* 23.8* 22.5*    255 173     BMP:   Recent Labs     05/04/22  1259 05/05/22  0026 05/06/22  0537   GLUCOSE 138* 131* 82   * 128* 127*   K 4.8 4.8 4.1   BUN 15 21 17   CREATININE 0.54* 0.60* 0.45*   CALCIUM 9.5 9.2 8.8     ABGs: No results for input(s): PHART, PO2ART, SAA7FRX, RMC8YUJ, BEART, Q0QCPSGD, UMM0OKJ in the last 72 hours.    PT/INR:  No results found for: PTINR    ASSESSMENT / PLAN:    Lung cancer/ Pl effusion -on chemo  Rec pl effusion - DW patient- chest tube right for symptomatic relief  Right lung loculated effusion  SP thoracentesis right 5/5/23  Does not want any more procedures  Check CXR      Electronically signed by Meryle Bi, MD on 05/06/22 at 11:45 AM.

## 2022-05-06 NOTE — FLOWSHEET NOTE
05/06/22 1231   Encounter Summary   Encounter Overview/Reason  Volunteer Encounter   Service Provided For: Patient   Referral/Consult From: Charissa   Last Encounter  05/06/22  (V)   Complexity of Encounter Low   Spiritual/Emotional needs   Type Spiritual Support   Rituals, Rites and Sacraments   Type Baptism Communion   Assessment/Intervention/Outcome   Intervention Prayer (assurance of)/[place]

## 2022-05-06 NOTE — CONSULTS
Social, and Family History  Marital Status:   Living situation:with family:  spouse  Kavya/Spiritual History:   Not asked  Psychological Distress: moderate  Does patient understand diagnosis/treatment? yes  Does family/caregiver understand diagnosis/treatment? yes    Assessment        Palliative Performance Scale:    ___100% Full ambulation; normal activity and work; no evidence of disease; able to do own self care; normal intake; fully conscious  ___90% Full ambulation; normal activity and work; some evidence of disease; able to do own self care; normal intake; fully conscious  ___80% Full ambulation; normal activity with effort; some evidence of disease; able to do own self care; normal or reduced intake; fully conscious  ___70% Ambulation reduced; unable to perform normal job/work; significant disease; able to do own self care; normal or reduced intake; fully conscious  ___60%  Ambulation reduced; cannot do hobbies/housework; significant disease; occasional assist; intake normal or reduced; fully conscious/some confusion  ___50%  Mainly sit/lie; can't do any work; extensive disease; considerable assist; intake normal or reduced; fully conscious/some confusion  ___40%  Mainly in bed; extensive disease; mainly assist; intake normal or reduced; fully conscious/ some confusion   _x__30%  Bed bound; extensive disease; total care; intake reduced; fully conscious/some confusion  ___20%  Bed bound; extensive disease; total care; intake minimal; drowsy/coma  ___10%  Bed bound; extensive disease; total care; mouth care only; drowsy/coma  ___0       Death       Risk Assessments:    Mihai Risk Score: [unfilled]    Readmission Risk Score: 22.6 ( )        1 Year Mortality Risk Score: @1YEARMORTALITYRISK@     Plan        Palliative Interaction: Pt resting in bed. He has audible rhonchi and appears SOB at rest. He does not appear in distress. He is on O2 n/c. Pt prefer his wife talk with me r/t his SOB.  Pt is alert and oriented and is actively listening to our conversation. Pt's wife Jose Jean-Baptiste is at bedside. I introduce myself and the role of palliative care. She states, \"yes, we are interested in palliative care, what is it? \" I explain briefly what PC is and how it differs from hospice care. I also explain the similarities. Pt lives at home with his wife and has [facility]. Wife states patient can walk short distances within the house with a walker. She states his lung \"keeps filling up with fluid\" and his last thoracentesis was in April. Pt had thoracentesis yesterday (this admission). Wife states patient has lung cancer that has spread to the liver and lymph nodes. She discussed his chemo/immuno regimen to me. She states patient was recently switched to a new regimen. I asked her if the treatment was working. She states, \"Well, we don't know yet until he gets another scan\". Pt states he had a CT scan this admission. I did pull the CT report up and briefly explained that it does not look like the cancer has shrunk, possibly worse, but explained to them the oncologist will go over the results with them when he comes in. Wife states that oncologist has talked with them about hospice recently but \"he wants everything done. He doesn't want to give up\". I talked with them about the natural progression of metastatic lung cancer and the poor quality of life that can accompany it. Pt states he wants to go home and not go to a facility. Wife states hospital wants him to go to a facility at discharge. I explain the reasoning behind this. I had a long discussion with patient and his wife about hospice care. I told them the oncologist will talk to him about whether he will be able to get any more chemo at this time. I explained that if no more chemo is offered, hospice would be the most optimal option for comfort and quality of life. I attempted to dispell any myths about hospice care. Wife tearful and emotional support offered. Emotional support offered to patient as well. I did discuss with patient his wishes regarding intubation. He would like to think about it and talk with his doctor about it. I explained the code classifications to him. Pt asks for pain medication. Nurse made aware. Pt becoming anxious talking about this situation. Support offered and will follow up on Monday. I called Dr. Brigette Sánchez to let him know of my conversation with patient and his wife. He will talk with them when he comes in tonight. If patient decides on hospice care, would recommend [facility] as they are already established in the home. They are happy with their services. Education/support to family  Education/support to patient  Discharge planning/helping to coordinate care  Communications with primary service  Providing support for coping/adaptation/distress of family  Providing support for coping/adaptation/distress of patient  Discussing meaning/purpose   Continue with current plan of care  Clarification of medical condition to patient and family  Code status clarified: Full Code  Provided information about hospice  Primary service notified of clinical update  Validating patient/family distress  Continued communication updates  Recognizing, reflecting, and empathizing with family members' anticipatory grief  Long discussion with patient and his wife about treatment, progression, quality of life and hospice care. We touched on code status and he would like to think about whether he wants intubation. Pt and wife would like to discuss further with oncologist. I notified Dr. Brigette Sánchez of my conversation with patient. He will see today on rounds. Support offered to patient and his wife. Will follow up on Monday. If patient decides on hospice meeting, would consider calling [facility] as they are already established in the home with home care.     Principle Problem/Diagnosis:  Respiratory failure ([facility] [address] [J96.90]  Acute on chronic

## 2022-05-06 NOTE — CONSULTS
Current Facility-Administered Medications   Medication Dose Route Frequency Provider Last Rate Last Admin    vancomycin (VANCOCIN) intermittent dosing (placeholder)   Other RX Placeholder Marie Leija MD        vancomycin (VANCOCIN) 1,500 mg in dextrose 5 % 250 mL IVPB (ADDAVIAL)  1,500 mg IntraVENous Q18H Marie Leija .7 mL/hr at 05/05/22 2055 1,500 mg at 05/05/22 2055    sodium chloride flush 0.9 % injection 5-40 mL  5-40 mL IntraVENous 2 times per day Kareen Reyes MD   10 mL at 05/05/22 2056    sodium chloride flush 0.9 % injection 5-40 mL  5-40 mL IntraVENous PRN Kareen Reyes MD        0.9 % sodium chloride infusion   IntraVENous PRN Kareen Reyes MD        acetaminophen (TYLENOL) tablet 650 mg  650 mg Oral Q4H PRN Kareen Reyes MD        ondansetron (ZOFRAN-ODT) disintegrating tablet 4 mg  4 mg Oral Q8H PRN Kareen Reyes MD        Or    ondansetron [facility]) injection 4 mg  4 mg IntraVENous Q6H PRN Kareen Reyes MD        allopurinol (ZYLOPRIM) tablet 100 mg  100 mg Oral Daily Melanie Fernandez MD   100 mg at 05/05/22 2553    ALPRAZolam (XANAX) tablet 0.5 mg  0.5 mg Oral Nightly PRN Melanie Fernandez MD        dexamethasone (DECADRON) tablet 2 mg  2 mg Oral Daily with breakfast Melanie Fernandez MD   2 mg at 05/05/22 4888    docusate sodium (COLACE) capsule 100 mg  100 mg Oral BID Melanie Fernandez MD   100 mg at 05/05/22 0839    HYDROcodone-acetaminophen (NORCO) 5-325 MG per tablet 1 tablet  1 tablet Oral Q4H PRN Melanie Fernandez MD   1 tablet at 05/05/22 1257    ipratropium-albuterol (DUONEB) nebulizer solution 3 mL  3 mL Inhalation Q4H WA Melanie Fernandez MD   3 mL at 05/05/22 2011    potassium chloride (MICRO-K) extended release capsule 20 mEq  20 mEq Oral Daily Melanie Fernandez MD   20 mEq at 05/05/22 7528    sodium chloride tablet 1 g  1 g Oral BID Melanie Fernandez MD   1 g at 05/05/22 2047    tamsulosin (FLOMAX) capsule 0.4 mg  0.4 mg Oral Daily Kostas Joyce Castañeda MD   0.4 mg at 05/05/22 1448    midodrine (PROAMATINE) tablet 5 mg  5 mg Oral TID PRN Grace De La Rosa MD        furosemide (LASIX) injection 40 mg  40 mg IntraVENous Daily Grace De La Rosa MD   40 mg at 05/05/22 6069    metoprolol tartrate (LOPRESSOR) tablet 25 mg  25 mg Oral BID Grace De La Rosa MD   25 mg at 05/05/22 2048    apixaban (ELIQUIS) tablet 5 mg  5 mg Oral BID Grace De La Rosa MD   5 mg at 05/05/22 1125    perflutren lipid microspheres (DEFINITY) injection 1.65 mg  1.5 mL IntraVENous ONCE PRN Lindy Castañeda MD        ferrous sulfate (IRON 325) tablet 325 mg  325 mg Oral BID WC Grace De La Rosa MD   325 mg at 05/05/22 1653    guaiFENesin (MUCINEX) extended release tablet 600 mg  600 mg Oral BID Grace De La Rosa MD   600 mg at 05/05/22 2048    sodium chloride flush 0.9 % injection 10 mL  10 mL IntraVENous PRN Gm Poon MD   10 mL at 05/05/22 1930      PULMONARY  CONSULT NOTE      Date of Admission: 5/4/2022 11:44 PM    Reason for Consult: PL effusion, Lung cnacer    Referring Physician: DR Joyce Castañeda  PCP: Gm Poon MD     History of Present Illness:     78-year-old male history of coronary artery disease, right-sided lung cancer metastatic, A. fib presents for evaluation with shortness of breath. Symptoms worsening over the past several days. Patient has associated cough and lower extremity swelling. Symptoms are moderate to severe and progressive. No other associated symptoms. Patient has a known right-sided pleural effusion and he had thoracentesis done about a month ago. No known alleviating or exacerbating factors. No other associated symptoms. Has had multiple thoracenteses and chest tube in past; last tap 2 weeks ago  Has cough++poor effort    Problem:  Principal Problem:     PMH:   Past Medical History:   Diagnosis Date    CAD (coronary artery disease)     DDD (degenerative disc disease), cervical     Gout     Heart murmur     hx. of when younger.     Hyperlipidemia     Hypertension     Lung cancer ([facility] [address]     right lung    MI (myocardial infarction) ([facility] [address]     Skin cancer     face    Wears glasses        PSH:   Past Surgical History:   Procedure Laterality Date    APPENDECTOMY      CARDIAC CATHETERIZATION      w/stent    COLONOSCOPY      CYST INCISION AND DRAINAGE Right 05/14/2020    rt elbow I and D and left knee aspiration with cultures    FOOT SURGERY Right     2nd toe(bone spur).  FOREARM SURGERY Right 5/14/2020    RIGHT ELBOW IRRIGATION AND DEBRIDEMENT performed by Celi Howell DO at [address]  12/13/2021    IR CHEST TUBE INSERTION 12/13/2021 [facility]    KNEE SURGERY Left 5/14/2020    KNEE ASPIRATION WITH CULTURES SENT performed by Celi Howell DO at [facility] under lt. eye.  TONSILLECTOMY         Allergies: No Known Allergies    Home Meds:  Medications Prior to Admission: ALPRAZolam (XANAX) 0.5 MG tablet, Take 0.5 mg by mouth nightly as needed for Sleep. dexamethasone (DECADRON) 4 MG tablet, Take 0.5 tablets by mouth daily (with breakfast)  HYDROcodone-acetaminophen (NORCO) 5-325 MG per tablet, Take 1 tablet by mouth every 8 hours as needed for Pain for up to 30 days. oxyCODONE-acetaminophen (PERCOCET) 5-325 MG per tablet, Take 1 tablet by mouth every 6 hours as needed for Pain for up to 120 days. sodium chloride 1 g tablet, TAKE 1 TABLET BY MOUTH TWICE A DAY  midodrine (PROAMATINE) 5 MG tablet, Take 1 tablet by mouth 3 times daily  docusate sodium (COLACE) 100 MG capsule, Take 100 mg by mouth 2 times daily  B Complex-C-Folic Acid (ANGELICA-HUGO) TABS, TAKE 1 TABLET BY MOUTH DAILY.  HEMATINIC VIT MINERALS  traZODone (DESYREL) 100 MG tablet, Take 100 mg by mouth nightly  (Patient not taking: Reported on 5/5/2022)  ipratropium-albuterol (DUONEB) 0.5-2.5 (3) MG/3ML SOLN nebulizer solution, Inhale 3 mLs into the lungs 4 times daily  potassium chloride (MICRO-K) 10 MEQ extended release capsule, Take 20 mEq by mouth daily   Ferrous Fumarate (FERROCITE) 324 (106 Fe) MG TABS, Take 324 mg by mouth daily   tamsulosin (FLOMAX) 0.4 MG capsule, TAKE 1 CAPSULE BY MOUTH EVERY DAY  Handicap Placard MISC, by Does not apply route  lidocaine-prilocaine (EMLA) 2.5-2.5 % cream, Apply topically as needed. Apply a quarter size amount to port site 1 hour before chemotherapy. Cover with plastic wrap.  acetaminophen (TYLENOL) 325 MG tablet, Take 650 mg by mouth every 6 hours as needed for Pain  allopurinol (ZYLOPRIM) 100 MG tablet, Take 100 mg by mouth daily  simvastatin (ZOCOR) 40 MG tablet, Take 40 mg by mouth daily  (Patient not taking: Reported on 4/15/2022)    Social History:   Social History     Socioeconomic History    Marital status:      Spouse name: Not on file    Number of children: Not on file    Years of education: Not on file    Highest education level: Not on file   Occupational History    Not on file   Tobacco Use    Smoking status: Current Every Day Smoker     Packs/day: 0.50     Types: Cigarettes    Smokeless tobacco: Former User   Vaping Use    Vaping Use: Former   Substance and Sexual Activity    Alcohol use: No    Drug use: Yes     Frequency: 14.0 times per week     Types: Marijuana (Weed)     Comment: hasn't smoked in 1 month    Sexual activity: Not on file   Other Topics Concern    Not on file   Social History Narrative    Not on file     Social Determinants of Health     Financial Resource Strain:     Difficulty of Paying Living Expenses: Not on file   Food Insecurity:     Worried About [address] in the Last Year: Not on file    [address] in the Last Year: Not on file   Transportation Needs:     Lack of Transportation (Medical): Not on file    Lack of Transportation (Non-Medical):  Not on file   Physical Activity:     Days of Exercise per Week: Not on file    Minutes of Exercise per Session: Not on file   Stress:     Feeling of Stress : Not on file   Social Connections:     Frequency of Communication with Friends and Family: Not on file    Frequency of Social Gatherings with Friends and Family: Not on file    Attends [employer] Services: Not on file    Active Member of Clubs or Organizations: Not on file    Attends Club or Organization Meetings: Not on file    Marital Status: Not on file   Intimate Partner Violence:     Fear of Current or Ex-Partner: Not on file    Emotionally Abused: Not on file    Physically Abused: Not on file    Sexually Abused: Not on file   Housing Stability:     Unable to Pay for Housing in the Last Year: Not on file    Number of [place] in the Last Year: Not on file    Unstable Housing in the Last Year: Not on file       Family History:   Family History   Problem Relation Age of Onset    Cancer Father         lung cancer       Review of Systems  Fever/ chills - no  Chest pain - no  Cough - ++, cannot expectorate  Expectoration / hemoptysis - no  shortness of breath - +++  Headache - no  Sinus drainage/ sore throat - no  abdominal pain - no  Swelling feet - +  Nausea/ vomiting/ diarrhea/ constipation - no    Physical Exam  Vital Signs: /86   Pulse 115   Temp 97.9 °F (36.6 °C) (Oral)   Resp 21   Ht 5' 11.5\" (1.816 m)   Wt 154 lb 15.7 oz (70.3 kg)   SpO2 98%   BMI 21.31 kg/m²       Admission Weight: Weight: 154 lb (69.9 kg)    General Appearance: Healthy, alert, active, cooperative, and in mild distress  Head: Normocephalic, without obvious abnormality, atraumatic  Neck: no adenopathy, no JVD, supple, symmetrical, trachea midline\"thyroid not enlarged, symmetric, no tenderness/mass/nodule  Lungs: fair air entry bilaterally; breath sounds- vesicular; rhonchi- absent; rales/ crackles- absent  Heart: : regular rate and rhythm, S1, S2 normal, no murmur, click, rub or gallop  Abdomen: soft, non-tender; bowel sounds normal; no masses,  no organomegaly  Extremities: extremities normal, atraumatic, no cyanosis or edema  Skin: skin color, texture, turgor normal. No rashes or lesions  Neurologic: Grossly normal    [unfilled]    Recent labs, Imaging studies reviewed      Data ReviewCBC:   Recent Labs     05/04/22  1259 05/05/22  0026   WBC 9.0 12.3*   RBC 2.80* 2.64*   HGB 8.2* 7.6*   HCT 25.5* 23.8*    255     BMP:   Recent Labs     05/04/22  1259 05/05/22  0026   GLUCOSE 138* 131*   * 128*   K 4.8 4.8   BUN 15 21   CREATININE 0.54* 0.60*   CALCIUM 9.5 9.2     ABGs: No results for input(s): PHART, PO2ART, CJF1XXK, RFZ1RPQ, BEART, M7DYZIPZ, ZVZ7UZN in the last 72 hours.    PT/INR:  No results found for: PTINR      ASSESSMENT / PLAN:    Lung cancer/ Pl effusion -on chemo  Rec pl effusion - DW patient- chest tube right for symptomatic relief  Right lung loculated effusion  DW Dr Cecily Spatz  [address] [facility] [place]    Electronically signed by Jose Cedillo MD on 05/05/22 at 9:10 PM.

## 2022-05-07 VITALS
TEMPERATURE: 97.6 F | HEART RATE: 106 BPM | WEIGHT: 150.79 LBS | DIASTOLIC BLOOD PRESSURE: 64 MMHG | OXYGEN SATURATION: 98 % | BODY MASS INDEX: 20.42 KG/M2 | SYSTOLIC BLOOD PRESSURE: 88 MMHG | RESPIRATION RATE: 18 BRPM | HEIGHT: 72 IN

## 2022-05-07 LAB
ABSOLUTE EOS #: 0.08 K/UL (ref 0–0.4)
ABSOLUTE LYMPH #: 0 K/UL (ref 1–4.8)
ABSOLUTE MONO #: 0.3 K/UL (ref 0.1–1.3)
ALBUMIN SERPL-MCNC: 3.1 G/DL (ref 3.5–5.2)
ALP BLD-CCNC: 143 U/L (ref 40–129)
ALT SERPL-CCNC: 16 U/L (ref 5–41)
ANION GAP SERPL CALCULATED.3IONS-SCNC: 6 MMOL/L (ref 9–17)
AST SERPL-CCNC: 35 U/L
BASOPHILS # BLD: 0 % (ref 0–2)
BASOPHILS ABSOLUTE: 0 K/UL (ref 0–0.2)
BILIRUB SERPL-MCNC: 0.3 MG/DL (ref 0.3–1.2)
BUN BLDV-MCNC: 16 MG/DL (ref 8–23)
CALCIUM SERPL-MCNC: 8.8 MG/DL (ref 8.6–10.4)
CHLORIDE BLD-SCNC: 87 MMOL/L (ref 98–107)
CO2: 32 MMOL/L (ref 20–31)
CREAT SERPL-MCNC: 0.49 MG/DL (ref 0.7–1.2)
CULTURE: ABNORMAL
EOSINOPHILS RELATIVE PERCENT: 1 % (ref 0–4)
GFR AFRICAN AMERICAN: >60 ML/MIN
GFR NON-AFRICAN AMERICAN: >60 ML/MIN
GFR SERPL CREATININE-BSD FRML MDRD: ABNORMAL ML/MIN/{1.73_M2}
GLUCOSE BLD-MCNC: 104 MG/DL (ref 70–99)
HCT VFR BLD CALC: 21.5 % (ref 41–53)
HEMOGLOBIN: 7.1 G/DL (ref 13.5–17.5)
LYMPHOCYTES # BLD: 0 % (ref 24–44)
Lab: ABNORMAL
MCH RBC QN AUTO: 30.2 PG (ref 26–34)
MCHC RBC AUTO-ENTMCNC: 33.2 G/DL (ref 31–37)
MCV RBC AUTO: 90.7 FL (ref 80–100)
MONOCYTES # BLD: 4 % (ref 1–7)
MORPHOLOGY: ABNORMAL
MORPHOLOGY: ABNORMAL
PDW BLD-RTO: 21.4 % (ref 11.5–14.9)
PLATELET # BLD: 141 K/UL (ref 150–450)
PMV BLD AUTO: 6.8 FL (ref 6–12)
POTASSIUM SERPL-SCNC: 3.9 MMOL/L (ref 3.7–5.3)
RBC # BLD: 2.37 M/UL (ref 4.5–5.9)
SEG NEUTROPHILS: 95 % (ref 36–66)
SEGMENTED NEUTROPHILS ABSOLUTE COUNT: 7.22 K/UL (ref 1.3–9.1)
SODIUM BLD-SCNC: 125 MMOL/L (ref 135–144)
SPECIMEN DESCRIPTION: ABNORMAL
TOTAL PROTEIN: 5.8 G/DL (ref 6.4–8.3)
WBC # BLD: 7.6 K/UL (ref 3.5–11)

## 2022-05-07 PROCEDURE — 2700000000 HC OXYGEN THERAPY PER DAY

## 2022-05-07 PROCEDURE — 6370000000 HC RX 637 (ALT 250 FOR IP): Performed by: FAMILY MEDICINE

## 2022-05-07 PROCEDURE — 2580000003 HC RX 258: Performed by: FAMILY MEDICINE

## 2022-05-07 PROCEDURE — 94761 N-INVAS EAR/PLS OXIMETRY MLT: CPT

## 2022-05-07 PROCEDURE — 80053 COMPREHEN METABOLIC PANEL: CPT

## 2022-05-07 PROCEDURE — 99222 1ST HOSP IP/OBS MODERATE 55: CPT | Performed by: NURSE PRACTITIONER

## 2022-05-07 PROCEDURE — 6360000002 HC RX W HCPCS: Performed by: FAMILY MEDICINE

## 2022-05-07 PROCEDURE — 36415 COLL VENOUS BLD VENIPUNCTURE: CPT

## 2022-05-07 PROCEDURE — 99231 SBSQ HOSP IP/OBS SF/LOW 25: CPT | Performed by: INTERNAL MEDICINE

## 2022-05-07 PROCEDURE — 94640 AIRWAY INHALATION TREATMENT: CPT

## 2022-05-07 PROCEDURE — 94669 MECHANICAL CHEST WALL OSCILL: CPT

## 2022-05-07 PROCEDURE — 85025 COMPLETE CBC W/AUTO DIFF WBC: CPT

## 2022-05-07 PROCEDURE — 2500000003 HC RX 250 WO HCPCS: Performed by: NURSE PRACTITIONER

## 2022-05-07 RX ORDER — GLYCOPYRROLATE 0.2 MG/ML
0.1 INJECTION INTRAMUSCULAR; INTRAVENOUS ONCE
Status: COMPLETED | OUTPATIENT
Start: 2022-05-07 | End: 2022-05-07

## 2022-05-07 RX ADMIN — SODIUM CHLORIDE, PRESERVATIVE FREE 10 ML: 5 INJECTION INTRAVENOUS at 08:18

## 2022-05-07 RX ADMIN — POTASSIUM CHLORIDE 20 MEQ: 10 CAPSULE, COATED, EXTENDED RELEASE ORAL at 08:15

## 2022-05-07 RX ADMIN — APIXABAN 5 MG: 5 TABLET, FILM COATED ORAL at 08:15

## 2022-05-07 RX ADMIN — ALLOPURINOL 100 MG: 100 TABLET ORAL at 08:16

## 2022-05-07 RX ADMIN — DEXAMETHASONE 2 MG: 2 TABLET ORAL at 08:17

## 2022-05-07 RX ADMIN — HYDROCODONE BITARTRATE AND ACETAMINOPHEN 1 TABLET: 5; 325 TABLET ORAL at 08:16

## 2022-05-07 RX ADMIN — SODIUM CHLORIDE 1 G: 1 TABLET ORAL at 08:16

## 2022-05-07 RX ADMIN — IPRATROPIUM BROMIDE AND ALBUTEROL SULFATE 3 ML: .5; 2.5 SOLUTION RESPIRATORY (INHALATION) at 10:58

## 2022-05-07 RX ADMIN — MIDODRINE HYDROCHLORIDE 5 MG: 5 TABLET ORAL at 13:19

## 2022-05-07 RX ADMIN — GUAIFENESIN 600 MG: 600 TABLET, EXTENDED RELEASE ORAL at 08:16

## 2022-05-07 RX ADMIN — DOCUSATE SODIUM 100 MG: 100 CAPSULE, LIQUID FILLED ORAL at 08:16

## 2022-05-07 RX ADMIN — GLYCOPYRROLATE 0.1 MG: 0.2 INJECTION INTRAMUSCULAR; INTRAVENOUS at 12:25

## 2022-05-07 RX ADMIN — IPRATROPIUM BROMIDE AND ALBUTEROL SULFATE 3 ML: .5; 2.5 SOLUTION RESPIRATORY (INHALATION) at 07:38

## 2022-05-07 RX ADMIN — HYDROCODONE BITARTRATE AND ACETAMINOPHEN 1 TABLET: 5; 325 TABLET ORAL at 01:12

## 2022-05-07 RX ADMIN — TAMSULOSIN HYDROCHLORIDE 0.4 MG: 0.4 CAPSULE ORAL at 08:15

## 2022-05-07 RX ADMIN — METOPROLOL TARTRATE 25 MG: 25 TABLET, FILM COATED ORAL at 08:16

## 2022-05-07 RX ADMIN — HYDROCODONE BITARTRATE AND ACETAMINOPHEN 1 TABLET: 5; 325 TABLET ORAL at 13:20

## 2022-05-07 RX ADMIN — PIPERACILLIN AND TAZOBACTAM 3375 MG: 3; .375 INJECTION, POWDER, FOR SOLUTION INTRAVENOUS at 01:07

## 2022-05-07 RX ADMIN — PIPERACILLIN AND TAZOBACTAM 3375 MG: 3; .375 INJECTION, POWDER, FOR SOLUTION INTRAVENOUS at 08:17

## 2022-05-07 RX ADMIN — FUROSEMIDE 40 MG: 10 INJECTION, SOLUTION INTRAMUSCULAR; INTRAVENOUS at 08:16

## 2022-05-07 RX ADMIN — FERROUS SULFATE TAB 325 MG (65 MG ELEMENTAL FE) 325 MG: 325 (65 FE) TAB at 08:16

## 2022-05-07 RX ADMIN — HYDROCODONE BITARTRATE AND ACETAMINOPHEN 1 TABLET: 5; 325 TABLET ORAL at 18:15

## 2022-05-07 RX ADMIN — MIDODRINE HYDROCHLORIDE 5 MG: 5 TABLET ORAL at 08:15

## 2022-05-07 ASSESSMENT — PAIN SCALES - GENERAL: PAINLEVEL_OUTOF10: 5

## 2022-05-07 ASSESSMENT — PAIN DESCRIPTION - ORIENTATION: ORIENTATION: RIGHT

## 2022-05-07 ASSESSMENT — PAIN DESCRIPTION - LOCATION: LOCATION: ELBOW;ARM;SHOULDER

## 2022-05-07 ASSESSMENT — PAIN DESCRIPTION - DESCRIPTORS: DESCRIPTORS: ACHING;DISCOMFORT

## 2022-05-07 NOTE — PROGRESS NOTES
Today's Date: 5/7/2022  Patient Name: Shelton Gilman  Date of admission: 5/4/2022 11:44 PM  Patient's age: 80 y.o., 1950  Admission Dx: Respiratory failure ([facility] [address] [J96.90]  Acute on chronic respiratory failure with hypoxia ([address] [J96.21]  Pneumonia due to infectious organism, unspecified laterality, unspecified part of lung [J18.9]    Reason for Consult: management recommendations  Requesting Physician: Rodolfo Carr MD    CHIEF COMPLAINT: Shortness of breath weakness failure to thrive    History Obtained From:  patient, electronic medical record    Interval history:    Patient seen and examined  Labs and vitals reviewed  Patient remains very weak. Agreed to hospice care, plan to discharge later today    HISTORY OF PRESENT ILLNESS:      The patient is a 80 y.o.  male who is admitted to the hospital for chief complaint of shortness of breath weakness and failure to thrive. Patient is well-known to our practice. Patient's oncologic history as below. Patient has progressed on multiple lines of treatment. However he still wants to pursue treatment. Patient received chemotherapy yesterday. He states that he was feeling better yesterday however today he could not breathe and presented to the ER. Patient's chest x-ray shows opacification of the right side. Patient underwent Thoracentesis with drainage of 700 of fluid but still feels very short of breath. Patient wishes to remain full code at this time. Patient is having difficulty clearing secretions. 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 Maintenance Tecentriq, 3/2021  Lurbenectidin-7/2/2021  Topotecan-3/2022      Past Medical History:   has a past medical history of CAD (coronary artery disease), DDD (degenerative disc disease), cervical, Gout, Heart murmur, Hyperlipidemia, Hypertension, Lung cancer ([address] MI (myocardial infarction) ([address] Skin cancer, and Wears glasses. Past Surgical History:   has a past surgical history that includes Appendectomy; Tonsillectomy; skin biopsy; skin biopsy; Colonoscopy; Foot surgery (Right); Cardiac catheterization; cyst incision and drainage (Right, 05/14/2020); Forearm surgery (Right, 5/14/2020); knee surgery (Left, 5/14/2020); and IR CHEST TUBE INSERTION (12/13/2021). Medications:    Prior to Admission medications    Medication Sig Start Date End Date Taking?  Authorizing Provider   docusate sodium (COLACE) 100 MG capsule Take 100 mg by mouth 2 times daily    Historical Provider, MD   Handicap Placard MISC by Does not apply route 7/30/19   Rehan Chaudhary MD   allopurinol (ZYLOPRIM) 100 MG tablet Take 100 mg by mouth daily 12/4/18   Historical Provider, MD     Current Facility-Administered Medications   Medication Dose Route Frequency Provider Last Rate Last Admin    midodrine (PROAMATINE) tablet 5 mg  5 mg Oral TID Chris Moran MD   5 mg at 05/07/22 1319    piperacillin-tazobactam (ZOSYN) 3,375 mg in dextrose 5 % 50 mL IVPB (mini-bag)  3,375 mg IntraVENous Jim Gray MD   Stopped at 05/07/22 1228    sodium chloride flush 0.9 % injection 5-40 mL  5-40 mL IntraVENous 2 times per day Devi Murrell MD   10 mL at 05/07/22 0818    sodium chloride flush 0.9 % injection 5-40 mL  5-40 mL IntraVENous PRN Devi Murrell MD        0.9 % sodium chloride infusion IntraVENous PRN Aldo Pham MD 15 mL/hr at 05/06/22 1719 New Bag at 05/06/22 1719    acetaminophen (TYLENOL) tablet 650 mg  650 mg Oral Q4H PRN Aldo Pham MD        ondansetron (ZOFRAN-ODT) disintegrating tablet 4 mg  4 mg Oral Q8H PRN Aldo Pham MD        Or    ondansetron [facility]) injection 4 mg  4 mg IntraVENous Q6H PRN Aldo Pham MD        allopurinol (ZYLOPRIM) tablet 100 mg  100 mg Oral Daily Evaristo Giraldo MD   100 mg at 05/07/22 0816    ALPRAZolam (XANAX) tablet 0.5 mg  0.5 mg Oral Nightly PRN Evaristo Giraldo MD        dexamethasone (DECADRON) tablet 2 mg  2 mg Oral Daily with breakfast Evaristo Giraldo MD   2 mg at 05/07/22 0817    docusate sodium (COLACE) capsule 100 mg  100 mg Oral BID Evaristo Giraldo MD   100 mg at 05/07/22 0816    HYDROcodone-acetaminophen (NORCO) 5-325 MG per tablet 1 tablet  1 tablet Oral Q4H PRN Evaristo Giraldo MD   1 tablet at 05/07/22 1815    ipratropium-albuterol (DUONEB) nebulizer solution 3 mL  3 mL Inhalation Q4H WA Evaristo Giraldo MD   3 mL at 05/07/22 1058    potassium chloride (MICRO-K) extended release capsule 20 mEq  20 mEq Oral Daily Evaristo Giraldo MD   20 mEq at 05/07/22 0815    sodium chloride tablet 1 g  1 g Oral BID Evaristo Giraldo MD   1 g at 05/07/22 0816    tamsulosin (FLOMAX) capsule 0.4 mg  0.4 mg Oral Daily Evaristo Giraldo MD   0.4 mg at 05/07/22 0815    furosemide (LASIX) injection 40 mg  40 mg IntraVENous Daily Evaristo Giraldo MD   40 mg at 05/07/22 0816    metoprolol tartrate (LOPRESSOR) tablet 25 mg  25 mg Oral BID Evaristo Giraldo MD   25 mg at 05/07/22 0816    apixaban (ELIQUIS) tablet 5 mg  5 mg Oral BID Evaristo Giraldo MD   5 mg at 05/07/22 0815    perflutren lipid microspheres (DEFINITY) injection 1.65 mg  1.5 mL IntraVENous ONCE PRN Alaina Pires MD        ferrous sulfate (IRON 325) tablet 325 mg  325 mg Oral BID  Evaristo Giraldo MD   325 mg at 05/07/22 0816    guaiFENesin ([address]) extended release tablet 600 mg  600 mg Oral BID Robert Altamirano MD   600 mg at 22 0816    sodium chloride flush 0.9 % injection 10 mL  10 mL IntraVENous PRN Gisell Pagan MD   10 mL at 22 1930       Allergies:  Patient has no known allergies. Social History:   reports that he has been smoking cigarettes. He has been smoking about 0.50 packs per day. He has quit using smokeless tobacco. He reports current drug use. Frequency: 14.00 times per week. Drug: Marijuana [place]). He reports that he does not drink alcohol. Family History: family history includes Cancer in his father. REVIEW OF SYSTEMS:      Constitutional: No fever or chills. No night sweats, positive weight loss. Positive fatigue. Eyes: No eye discharge, double vision, or eye pain   HEENT: negative for sore mouth, sore throat, hoarseness and voice change   Respiratory: Positive shortness of breath  Cardiovascular: negative for chest pain, dyspnea, palpitations, orthopnea, PND   Gastrointestinal: negative for nausea, vomiting, diarrhea, constipation, abdominal pain, Dysphagia, hematemesis and hematochezia   Genitourinary: negative for frequency, dysuria, nocturia, urinary incontinence, and hematuria   Integument: negative for rash, skin lesions, bruises. Hematologic/Lymphatic: negative for easy bruising, bleeding, lymphadenopathy, or petechiae   Endocrine: negative for heat or cold intolerance,weight changes, change in bowel habits and hair loss   Musculoskeletal: negative for myalgias, arthralgias, pain, joint swelling,and bone pain   Neurological: negative for headaches, dizziness, seizures, weakness, numbness    PHYSICAL EXAM:        BP 88/64   Pulse 106   Temp 97.6 °F (36.4 °C) (Oral)   Resp 18   Ht 5' 11.5\" (1.816 m)   Wt 150 lb 12.7 oz (68.4 kg)   SpO2 98%   BMI 20.74 kg/m²    Temp (24hrs), Av.6 °F (36.4 °C), Min:97.3 °F (36.3 °C), Max:97.7 °F (36.5 °C)      General appearance -frail appearing, no in pain or distress. Temporal wasting noted  Mental status - alert and cooperative   Eyes - pupils equal and reactive, extraocular eye movements intact   Ears - bilateral TM's and external ear canals normal   Mouth - mucous membranes moist, pharynx normal without lesions   Neck - supple, no significant adenopathy   Lymphatics - no palpable lymphadenopathy, no hepatosplenomegaly   Chest -bilateral Rales noted  Heart - normal rate, regular rhythm, normal S1, S2, no murmurs  Abdomen - soft, nontender, nondistended, no masses or organomegaly   Neurological - alert, oriented, normal speech, no focal findings or movement disorder noted   Musculoskeletal - no joint tenderness, deformity or swelling   Extremities - peripheral pulses normal, no pedal edema, no clubbing or cyanosis   Skin - normal coloration and turgor, no rashes, no suspicious skin lesions noted ,      DATA:      Labs:     Results for orders placed or performed during the hospital encounter of 05/04/22   Culture, Blood 1    Specimen: Blood   Result Value Ref Range    Specimen Description . BLOOD     Special Requests R AC 10CC EACH     Culture NO GROWTH 2 DAYS    Culture, Blood 2    Specimen: Blood   Result Value Ref Range    Specimen Description . BLOOD     Special Requests L AC 5CC EACH     Culture POSITIVE Blood Culture (A)     Culture       DIRECT GRAM STAIN FROM BOTTLE: GRAM POSITIVE COCCI IN CLUSTERS    Culture       Staphylococcus epidermidis Detected: Methodology- Polymerase Chain Reaction (PCR)    Culture (A)      STAPHYLOCOCCUS SPECIES, COAGULASE NEGATIVE A single positive blood culture of coagulase negative Staphylocci, diphtheroids,micrococci, Cutibacterium, viridans Streptocci, Bacillus, or Lactobacillus species should be interpreted with caution and viewed as a likely skin contaminant.     Culture       (NOTE) Direct Gram Stain from bottle and Polymerase Chain Reaction (PCR) results called to and read back by:GREGOR Coleman [id number] ON 5/6/22   COVID-19 & mg/dL    Total Protein 6.8 6.4 - 8.3 g/dL    Albumin 3.3 (L) 3.5 - 5.2 g/dL    GFR Non-African American >60 >60 mL/min    GFR African American >60 >60 mL/min    GFR Comment         Troponin   Result Value Ref Range    Troponin, High Sensitivity 33 (H) 0 - 22 ng/L   Urinalysis with Microscopic   Result Value Ref Range    Color, UA Yellow Yellow    Turbidity UA Clear Clear    Glucose, Ur NEGATIVE NEGATIVE    Bilirubin Urine NEGATIVE NEGATIVE    Ketones, Urine NEGATIVE NEGATIVE    Specific [place], UA 1.021 1.000 - 1.030    Urine Hgb NEGATIVE NEGATIVE    pH, UA 5.0 5.0 - 8.0    Protein, UA TRACE (A) NEGATIVE    Urobilinogen, Urine Normal Normal    Nitrite, Urine NEGATIVE NEGATIVE    Leukocyte Esterase, Urine NEGATIVE NEGATIVE    WBC, UA 0 TO 2 /HPF    RBC, UA 0 TO 2 /HPF    Epithelial Cells UA 0 TO 2 /HPF   Lactate, Sepsis   Result Value Ref Range    Lactic Acid, Sepsis 1.1 0.5 - 1.9 mmol/L   Brain Natriuretic Peptide   Result Value Ref Range    Pro-BNP 1,129 (H) <300 pg/mL   Troponin   Result Value Ref Range    Troponin, High Sensitivity 36 (H) 0 - 22 ng/L   Troponin   Result Value Ref Range    Troponin, High Sensitivity 34 (H) 0 - 22 ng/L   Vancomycin Level, Random   Result Value Ref Range    Vancomycin Rm 17.2 ug/mL    Vancomycin Random Dose amount 1500 MG     Vancomycin Random Date last dose 0,895,591     Vancomycin Random Time last dose 2,055    CBC with Auto Differential   Result Value Ref Range    WBC 8.2 3.5 - 11.0 k/uL    RBC 2.50 (L) 4.5 - 5.9 m/uL    Hemoglobin 7.4 (L) 13.5 - 17.5 g/dL    Hematocrit 22.5 (L) 41 - 53 %    MCV 89.8 80 - 100 fL    MCH 29.7 26 - 34 pg    MCHC 33.0 31 - 37 g/dL    RDW 21.2 (H) 11.5 - 14.9 %    Platelets 965 727 - 161 k/uL    MPV 6.4 6.0 - 12.0 fL    Seg Neutrophils 87 (H) 36 - 66 %    Lymphocytes 3 (L) 24 - 44 %    Monocytes 8 (H) 1 - 7 %    Eosinophils % 0 0 - 4 %    Basophils 0 0 - 2 %    Bands 2 0 - 10 %    Segs Absolute 7.13 1.3 - 9.1 k/uL    Absolute Lymph # 0.25 (L) 1.0 - 4.8 k/uL    Absolute Mono # 0.66 0.1 - 1.3 k/uL    Absolute Eos # 0.00 0.0 - 0.4 k/uL    Basophils Absolute 0.00 0.0 - 0.2 k/uL    Absolute Bands # 0.16 0.0 - 1.0 k/uL    Morphology ANISOCYTOSIS PRESENT     Morphology HYPOCHROMIA PRESENT     Morphology 1+ ELLIPTOCYTES     Morphology 1+ TEARDROPS    Comprehensive Metabolic Panel w/ Reflex to MG   Result Value Ref Range    Glucose 82 70 - 99 mg/dL    BUN 17 8 - 23 mg/dL    CREATININE 0.45 (L) 0.70 - 1.20 mg/dL    Calcium 8.8 8.6 - 10.4 mg/dL    Sodium 127 (L) 135 - 144 mmol/L    Potassium 4.1 3.7 - 5.3 mmol/L    Chloride 90 (L) 98 - 107 mmol/L    CO2 28 20 - 31 mmol/L    Anion Gap 9 9 - 17 mmol/L    Alkaline Phosphatase 146 (H) 40 - 129 U/L    ALT 17 5 - 41 U/L    AST 37 <40 U/L    Total Bilirubin 0.27 (L) 0.3 - 1.2 mg/dL    Total Protein 6.0 (L) 6.4 - 8.3 g/dL    Albumin 3.0 (L) 3.5 - 5.2 g/dL    GFR Non-African American >60 >60 mL/min    GFR African American >60 >60 mL/min    GFR Comment         TSH w/reflex to FT4   Result Value Ref Range    TSH 1.41 0.30 - 5.00 uIU/mL   CBC with Auto Differential   Result Value Ref Range    WBC 7.6 3.5 - 11.0 k/uL    RBC 2.37 (L) 4.5 - 5.9 m/uL    Hemoglobin 7.1 (L) 13.5 - 17.5 g/dL    Hematocrit 21.5 (L) 41 - 53 %    MCV 90.7 80 - 100 fL    MCH 30.2 26 - 34 pg    MCHC 33.2 31 - 37 g/dL    RDW 21.4 (H) 11.5 - 14.9 %    Platelets 560 (L) 770 - 450 k/uL    MPV 6.8 6.0 - 12.0 fL    Seg Neutrophils 95 (H) 36 - 66 %    Lymphocytes 0 (L) 24 - 44 %    Monocytes 4 1 - 7 %    Eosinophils % 1 0 - 4 %    Basophils 0 0 - 2 %    Segs Absolute 7.22 1.3 - 9.1 k/uL    Absolute Lymph # 0.00 (L) 1.0 - 4.8 k/uL    Absolute Mono # 0.30 0.1 - 1.3 k/uL    Absolute Eos # 0.08 0.0 - 0.4 k/uL    Basophils Absolute 0.00 0.0 - 0.2 k/uL    Morphology ANISOCYTOSIS PRESENT     Morphology BASOPHILIC STIPPLING PRESENT    Comprehensive Metabolic Panel w/ Reflex to MG   Result Value Ref Range    Glucose 104 (H) 70 - 99 mg/dL    BUN 16 8 - 23 mg/dL    CREATININE 0.49 (L) 0.70 - 1.20 mg/dL    Calcium 8.8 8.6 - 10.4 mg/dL    Sodium 125 (L) 135 - 144 mmol/L    Potassium 3.9 3.7 - 5.3 mmol/L    Chloride 87 (L) 98 - 107 mmol/L    CO2 32 (H) 20 - 31 mmol/L    Anion Gap 6 (L) 9 - 17 mmol/L    Alkaline Phosphatase 143 (H) 40 - 129 U/L    ALT 16 5 - 41 U/L    AST 35 <40 U/L    Total Bilirubin 0.30 0.3 - 1.2 mg/dL    Total Protein 5.8 (L) 6.4 - 8.3 g/dL    Albumin 3.1 (L) 3.5 - 5.2 g/dL    GFR Non-African American >60 >60 mL/min    GFR African American >60 >60 mL/min    GFR Comment         EKG 12 Lead   Result Value Ref Range    Ventricular Rate 127 BPM    Atrial Rate 127 BPM    P-R Interval 156 ms    QRS Duration 76 ms    Q-T Interval 288 ms    QTc Calculation (Bazett) 418 ms    P Axis 53 degrees    R Axis 62 degrees    T Axis 20 degrees         IMAGING DATA:    XR CHEST (SINGLE VIEW FRONTAL)    Result Date: 4/15/2022  EXAMINATION: ONE XRAY VIEW OF THE CHEST 4/15/2022 9:58 am COMPARISON: 01/31/2022, CT 03/15/2022 HISTORY: ORDERING SYSTEM PROVIDED HISTORY: s/p right thoracentesis TECHNOLOGIST PROVIDED HISTORY: s/p right thoracentesis Reason for Exam: s/p right thoracentesis Additional signs and symptoms: s/p right thoracentesis FINDINGS: No evidence of pneumothorax status post right thoracentesis. There is near complete opacification of the right hemithorax with a small amount of aerated lung in the right perihilar/suprahilar region. This is due to combination of the patient's known extensive/progressive malignancy, atelectasis, and loculated pleural fluid. Pleural metastases were noted on the prior CT. Left jugular chest port catheter tip remains in the superior vena cava. The left lung is grossly clear. Heart size within normal limits without evidence of vascular congestion. No evidence of pneumothorax status post right thoracentesis. Near complete opacification right hemithorax.      XR CHEST PORTABLE    Result Date: 5/5/2022  EXAMINATION: ONE XRAY VIEW OF THE CHEST 5/5/2022 3:14 pm COMPARISON: 05/04/2022, CT 03/15/2022 HISTORY: ORDERING SYSTEM PROVIDED HISTORY: s/p thora TECHNOLOGIST PROVIDED HISTORY: s/p thora FINDINGS: No evidence of pneumothorax status post right thoracentesis. Continued virtually complete opacification of the right hemithorax likely related to progressive pleural and parenchymal mass/consolidation related to patient's history of lung cancer as well as some residual loculated fluid. Left lung shows minimal perihilar atelectatic changes without lobar consolidation. Left jugular chest port remains in place. Heart size is within normal limits. There is no acute osseous abnormality. Gas distended bowel loops beneath the left hemidiaphragm. Continued near complete opacification of the right hemithorax status post right thoracentesis. XR CHEST PORTABLE    Result Date: 5/5/2022  EXAMINATION: ONE XRAY VIEW OF THE CHEST 5/5/2022 12:12 am COMPARISON: 04/15/2022 HISTORY: ORDERING SYSTEM PROVIDED HISTORY: shortness of breath TECHNOLOGIST PROVIDED HISTORY: shortness of breath Reason for Exam: Shortness of breath, hx of pneumothorax Additional signs and symptoms: Shortness of breath, hx of pneumothorax Relevant Medical/Surgical History: Shortness of breath, hx of pneumothorax FINDINGS: There is near complete opacification of the right hemithorax, stable to slightly increased. MediPort is unchanged in position. Cardiomediastinal silhouette is unchanged. There is no left pleural effusion or pneumothorax. Interstitial prominence in the left lung. No acute osseous abnormality. 1. Minimal interstitial prominence in the left lung, which could be seen with edema. 2. Near complete opacification the right hemithorax is similar to slightly progressed from prior study.      IR GUIDED THORACENTESIS PLEURAL    Result Date: 5/5/2022  PROCEDURE: ULTRASOUNDGUIDED RIGHT THORACENTESIS 5/5/2022 HISTORY: ORDERING SYSTEM PROVIDED HISTORY: pl effusion TECHNOLOGIST PROVIDED HISTORY: pl effusion Which side should the procedure be performed?->Right TECHNIQUE: Informed consent was obtained after a detailed explanation of the procedure including risks, benefits, and alternatives. Universal protocol was performed. The right chest was prepped and draped in sterile fashion and local anesthesia was achieved with lidocaine. A 5 Norwegian needle sheath was advanced under ultrasound guidance into pleural effusion and thoracentesis was performed. Ultrasound shows a complex loculated right pleural effusion with low level internal echoes and pleural thickening. The patient tolerated the procedure well. EBL: None FINDINGS: A total of 700 mL of turbid dean colored fluid was removed. Successful ultrasound guided right thoracentesis. IR GUIDED THORACENTESIS PLEURAL    Result Date: 4/15/2022  PROCEDURE: ULTRASOUNDGUIDED RIGHT THORACENTESIS 4/15/2022 HISTORY: ORDERING SYSTEM PROVIDED HISTORY: Malignant neoplasm of right upper lobe of lung [facility]) TECHNOLOGIST PROVIDED HISTORY: Which side should the procedure be performed?->Radiologist Recommendation TECHNIQUE: Informed consent was obtained after a detailed explanation of the procedure including risks, benefits, and alternatives. Universal protocol was performed. The right chest was prepped and draped in sterile fashion and local anesthesia was achieved with lidocaine. Ultrasound shows a complex loculated right pleural effusion corresponding to the prior CT. A 5 Norwegian needle sheath was advanced under ultrasound guidance into pleural effusion and thoracentesis was performed. The patient tolerated the procedure well. EBL: None FINDINGS: A total of 250 mL of cloudy dean colored fluid was removed and sent for the requested studies. The patient requested termination of the procedure at this point due to some discomfort; additional loculated fluid remains. Successful ultrasound guided right thoracentesis with a loculated right pleural effusion.

## 2022-05-07 NOTE — CONSULTS
Palliative Care Inpatient Consult    NAME:  Jan Cordero  MEDICAL RECORD NUMBER:  [id number]  AGE: 80 y.o. GENDER: male  : 1950  TODAY'S DATE:  2022    Reasons for Consultation:    Symptom and/or pain management  Provision of information regarding PC and/or hospice philosophies  Complex, time-intensive communication and interdisciplinary psychosocial support  Clarification of goals of care and/or assistance with difficult decision-making  Guidance in regards to resources and transition(s)    Members of PC team contributing to this consultation are :  Inessa Jaeger, Palliative Care APRN-CNP  History of Present Illness     The patient is a 80 y.o. Non- / non  male who presents with Shortness of Breath      Referred to Palliative Care by   [x] Physician   [] Nursing  [] Family Request   [] Other:       He was admitted to the primary service for Respiratory failure ([address] [J96.90]  Acute on chronic respiratory failure with hypoxia ([address] [J96.21]  Pneumonia due to infectious organism, unspecified laterality, unspecified part of lung [J18.9]. His hospital course has been associated with Acute respiratory failure with hypoxia ([address] pneumonia, right lung loculated effusion, and stage IV lung cancer. The patient has a complicated medical history and has been hospitalized since 2022 11:44 PM. I reviewed patients EMR, spoke to RN, and patient to collect history. Patient has history of HTN, HLD, Heart murmur, MI, CAD with PCI, and lung cancer. Patient was diagnosed with stage IIIa lung cancer on 2019. 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 MRI of the brain did not show any evidence of intracranial metastasis. Bone scan did not reveal any metastasis as well. Patient was started on chemoradiation therapy with cisplatin and etoposide.  Chemo was changed to carboplatin and etoposide d/t renal insufficiency from mouth 3 times daily 90 tablet 3    docusate sodium (COLACE) 100 MG capsule Take 100 mg by mouth 2 times daily      B Complex-C-Folic Acid (ANGELICA-HUGO) TABS TAKE 1 TABLET BY MOUTH DAILY. HEMATINIC VIT MINERALS      traZODone (DESYREL) 100 MG tablet Take 100 mg by mouth nightly  (Patient not taking: Reported on 5/5/2022)      ipratropium-albuterol (DUONEB) 0.5-2.5 (3) MG/3ML SOLN nebulizer solution Inhale 3 mLs into the lungs 4 times daily      potassium chloride (MICRO-K) 10 MEQ extended release capsule Take 20 mEq by mouth daily       Ferrous Fumarate (FERROCITE) 324 (106 Fe) MG TABS Take 324 mg by mouth daily       tamsulosin (FLOMAX) 0.4 MG capsule TAKE 1 CAPSULE BY MOUTH EVERY DAY 30 capsule 5    Handicap Placard MISC by Does not apply route 1 each 0    lidocaine-prilocaine (EMLA) 2.5-2.5 % cream Apply topically as needed. Apply a quarter size amount to port site 1 hour before chemotherapy. Cover with plastic wrap.  30 g 0    acetaminophen (TYLENOL) 325 MG tablet Take 650 mg by mouth every 6 hours as needed for Pain      allopurinol (ZYLOPRIM) 100 MG tablet Take 100 mg by mouth daily      simvastatin (ZOCOR) 40 MG tablet Take 40 mg by mouth daily  (Patient not taking: Reported on 4/15/2022)         Data         /70   Pulse 106   Temp 97.7 °F (36.5 °C) (Oral)   Resp 18   Ht 5' 11.5\" (1.816 m)   Wt 150 lb 12.7 oz (68.4 kg)   SpO2 93%   BMI 20.74 kg/m²     Wt Readings from Last 3 Encounters:   05/06/22 150 lb 12.7 oz (68.4 kg)   05/04/22 154 lb 9.6 oz (70.1 kg)   04/27/22 159 lb (72.1 kg)        Code Status: Full Code     ADVANCED CARE PLANNING:  Patient has capacity for medical decisions: yes  Health Care Power of : yes  Living Will: not asked     Personal, Social, and Family History  Marital Status:   Living situation:with family:  spouse  Importance of brennen/Anabaptist/spiritual beliefs: [] Very [x] Somewhat [] Not   Psychological Distress: mild  Does patient understand diagnosis/treatment? yes  Does caregiver understand diagnosis/treatment? yes      Assessment        REVIEW OF SYSTEMS  Constitutional: no fever, no chills or weight loss  Eyes: no eye pain or blurred vision  ENT: no hearing loss, congestion, or difficulty swallowing   Respiratory: no wheezing, chest tightness, or shortness of breath   Cardiovascular: no chest pain or pressure, no palpitations, no diaphoresis   Gastrointestinal: no nausea, vomiting,abdominal pain, diarrhea or no melena +constipation/poor appetite  Genitourinary: no dysuria, frequency,hematuria , or nocturia   Musculoskeletal: no myalgias or arthralgias, no back pain +generalized weakness  Skin: no rashes or sores   Neurological: no focal weakness, numbness, tingling, or headache, no seizures    PHYSICAL ASSESSMENT:  Constitutional: Alert and oriented to person, place, and time. Disoriented to circumstances  Head: Normocephalic and atraumatic. Eyes: EOM are normal. Pupils are equal, round   Neck: Normal range of motion. Neck supple. No tracheal deviation present. Cardiovascular: Normal rate and regular rhythm, S1, S2, no murmur   Pulmonary/Chest: Effort normal and breath sounds rhonchi/diminihsed. No rales or wheezes. +NC  Abdomen: Soft. No tenderness, not distended, no ascites, no organomegaly   Musculoskeletal: Normal range of motion. No edema lower ext. Neurological: CN II-XII grossly intact, no focal neurological deficits    Skin: Normal turgor, no bleeding, no bruising     Palliative Performance Scale:  ___60%  Ambulation reduced; Significant disease; Can't do hobbies/housework; intake normal or reduced; occasional assist; LOC full/confusion  ___50%  Mainly sit/lie; Extensive disease; Can't do any work; Considerable assist; intake normal or reduced; LOC full/confusion  _x__40%  Mainly in bed; Extensive disease; Mainly assist; intake normal or reduced; LOC full/confusion   ___30%  Bed Bound; Extensive disease;  Total care; intake reduced; LOCfull/confusion  ___20%  Bed Bound; Extensive disease; Total care; intake minimal; Drowsy/coma  ___10%  Bed Bound; Extensive disease; Total care; Mouth care only; Drowsy/coma  ___0       Death      Plan      Palliative Interaction: I went to see patient, and he was sitting in bed with daughter at bedside. I introduced myself to them, and the palliative role. Daughter reports that plan is to go home with hospice care. We discussed previous [facility] for home care and they are agreeable with continuing with them for hospice care. I discussed patients chronic history including stage IV lung cancer, and current hospitalized problems with them. I discussed how treatment has not been working and scans continue to show progression. I discussed how with hospice care patient would be focusing on comfort and not aggressive measure no longer. We discussed how this was also advised by Oncologist based on patients present condition. Daughter speaks to patient about home hospice, and how he reported that he wanted to die at home. DC planner to room to discuss referral to [place] [address] and how they can have equipment and hospice there today for patient to be discharged home today. Daughter placed her mother/patents wife on phone, and she spoke to DC planner about arrangements. All are agreeable with this plan. I discussed patients DC plan with him, and he reports wanting to go home to smoke. I offered Nicotine patch but he declined. Daughter reports that he will smoke. I instructed them on no smoking while 02 on him or tank on. They verbalized understanding. I discussed patients symptoms and he reports pain being controlled. He reports SOB, and constipation. Last BM was 2 days ago and hard. He reports no appetite and daughter reports that he is now only taking in Ensure. I discussed patients code status, and how [facility] code status aligns with hospice care.  I explained [facility] and patient reports that he does not understands. Daughter then explained to patient that they would be taking him home under hospice care and that if his heart stops they would let him pass away. I included that if patient declined more that hospice would manage symptoms at home and patient wouldn't come to the hospital under hospice care. Daughter reports that she doesn't believe patient understands fully, and informed her that I would call his wife too. I offered them support at this time. I reached out to wife, and updated on the above. She agrees with current plan, and we discussed how patient may not be understanding. I discussed code classifications in detail, and she is agreeable to [facility] code status change. She confirms that patient wants to go home on hospice and die at home. I asked about spiritual needs, and wife reports patient to be Quaker. She would like patient to be anointed if not done, and reports that he received communion the other day. I placed spiritual are consult. I asked wife about HCPOA, and she reports to be primary agent. She is Next of Kin as well. Daughter reported that patient has 3 bio children but 1 is estranged. She reports that they will be helping mother with patients care at home.      Education/support to staff  Education/support to family  Education/support to patient  Discharge planning/helping to coordinate care  Communications with primary service  Providing support for coping/adaptation/distress of family  Providing support for coping/adaptation/distress of patient  Discussing meaning/purpose   Specific spiritual beliefs/practices  Decision making regarding life prolonging treatment  Decisional capacity assessed  Continue with current plan of care  Clarification of medical condition to patient and family  Code status clarified: Full Code  Code status clarified: [facility]  Palliative care orders introduced  Provided information about hospice  Validating patient/family distress  Recognizing, reflecting, and empathizing with family members' anticipatory grief  Principle Problem/Diagnosis:  Acute respiratory failure with hypoxia ([facility]    Additional Assessments:   Principal Problem:    Acute respiratory failure with hypoxia ([facility]  Active Problems:    Hyponatremia    Lung cancer, primary, with metastasis from lung to other site ([facility]    PAF (paroxysmal atrial fibrillation) (HCC)    COPD (chronic obstructive pulmonary disease) ([facility]    Iron deficiency anemia    Severe malnutrition ([facility]  Resolved Problems:    * No resolved hospital problems. *    1- Symptom management/ pain control     Pain Assessment:  Pain is controlled with current analgesics. Medication(s) being used: narcotic analgesics including Norco PRN. Anxiety:  none                          Dyspnea:  acute dyspnea and chronic dyspnea - on NC now. Fatigue:  genralized weakness    Other: Malnutrition- taking Ensure only    We feel the patient symptoms are being controlled. his current regimen is reviewed by myself and discussed with the staff. 2- Goals of care evaluation   The patient goals of care are provide comfort care/support/palliation/relieve suffering, achievement of a peaceful death, remain at home and support for family/caregiver   Goals of care discussed with:    [] Patient independently    [x] Patient and Family    [] Family or Healthcare DPOA independently    [] Unable to discuss with patient, family/DPOA not present    3- Code Status  Full Code    4- Other recommendations   - We will continue to provide comfort and support to the patient and the family  Please call with any palliative questions or concerns. Palliative Care Team is available via perfect serve or via phone. Palliative Care will continue to follow Mr. Granville Eisenmenger care as needed. Thank you for allowing Palliative Care to participate in the care of Mr. Prince Escobar .     This note has been dictated by vic, typing errors may be a possibility. The total time I spent in seeing the patient, discussing goals of care, advanced directives, code status and other major issues was more than 60 minutes      Electronically signed by   AZEB Rojas CNP  Palliative Care Team  on 5/7/2022 at 10:22 AM    Palliative Care can be reached via perfect serve.

## 2022-05-07 NOTE — PROGRESS NOTES
PULMONARY PROGRESS NOTE:    REASON FOR VISIT:Pl effusion, lung cancer  Interval History:    Shortness of Breath: +++  Cough: ++  Sputum: no          Hemoptysis: no  Chest Pain: no  Fever: no                   Swelling Feet: no  Headache: no                                           Nausea, Emesis, Abdominal Pain: no  Diarrhea: no         Constipation: no    Events since last visit: Hospice consult pending.  Wants to go home    PAST MEDICAL HISTORY:      Scheduled Meds:   midodrine  5 mg Oral TID    piperacillin-tazobactam  3,375 mg IntraVENous Q8H    sodium chloride flush  5-40 mL IntraVENous 2 times per day    allopurinol  100 mg Oral Daily    dexamethasone  2 mg Oral Daily with breakfast    docusate sodium  100 mg Oral BID    ipratropium-albuterol  3 mL Inhalation Q4H WA    potassium chloride  20 mEq Oral Daily    sodium chloride  1 g Oral BID    tamsulosin  0.4 mg Oral Daily    furosemide  40 mg IntraVENous Daily    metoprolol tartrate  25 mg Oral BID    apixaban  5 mg Oral BID    ferrous sulfate  325 mg Oral BID WC    guaiFENesin  600 mg Oral BID     Continuous Infusions:   sodium chloride 15 mL/hr at 05/06/22 1719     PRN Meds:sodium chloride flush, sodium chloride, acetaminophen, ondansetron **OR** ondansetron, ALPRAZolam, HYDROcodone-acetaminophen, perflutren lipid microspheres, sodium chloride flush        PHYSICAL EXAMINATION:  /70   Pulse 106   Temp 97.7 °F (36.5 °C) (Oral)   Resp 18   Ht 5' 11.5\" (1.816 m)   Wt 150 lb 12.7 oz (68.4 kg)   SpO2 96%   BMI 20.74 kg/m²     General  Sleeping, responds to voice, rattle in chest   Neck - supple, no lymphadenopathy, JVD not raised  Heart - regular rhythm, S1 and S2 normal; no additional sounds heard  Lungs - Air Entry- fair bilaterally; breath sounds : vesicular;   rales/crackles - absent  Abdomen - soft, no tenderness  Upper Extremities  - no cyanosis, mottling; edema : absent  Lower Extremities: no cyanosis, mottling; edema : absent    Current Laboratory, Radiologic, Microbiologic, and Diagnostic studies reviewed  Data ReviewCBC:   Recent Labs     05/05/22  0026 05/06/22  0537 05/07/22  0526   WBC 12.3* 8.2 7.6   RBC 2.64* 2.50* 2.37*   HGB 7.6* 7.4* 7.1*   HCT 23.8* 22.5* 21.5*    173 141*     BMP:   Recent Labs     05/05/22  0026 05/06/22  0537 05/07/22  0526   GLUCOSE 131* 82 104*   * 127* 125*   K 4.8 4.1 3.9   BUN 21 17 16   CREATININE 0.60* 0.45* 0.49*   CALCIUM 9.2 8.8 8.8     ABGs: No results for input(s): PHART, PO2ART, VBJ1ZKE, RFR0GBK, BEART, C6QPIKFU, QRK5YWW in the last 72 hours.    PT/INR:  No results found for: PTINR    ASSESSMENT / PLAN:  Lung cancer/ Pl effusion -on chemo  Rec pl effusion - DW patient- chest tube right for symptomatic relief  Right lung loculated effusion  SP thoracentesis right 5/5/23  Does not want any more procedures  Check CXR- complete opacification R thorax   Hospice consult pending   Discussed with Dr. Lion Adorno       Electronically signed by AZEB Schmid - JOANNE on 05/07/22

## 2022-05-07 NOTE — PROGRESS NOTES
[facility] -   Infectious diseases evaluation  admission date 5/4/2022    reason for consultation:   Positive blood culture  Impression :   Current:  · Suspected postobstructive pneumonia   · Single positive blood culture for staph epi likely contamination    Other:  · Lung cancer with mets  · Paroxysmal atrial fibrillation  · History of gout  · Coronary artery disease  · Hypertension  · Hyperlipidemia    Recommendations   · Discontinue IV vancomycin and cefepime   · Zosyn 3,375 mg IV q6 hours  · Follow repeat blood cultures from 5/6 - no growth to date  · Follow CBC and renal function  · Supportive care        History of Present Illness:   Initial history:  Ramiro Peterson is a 80y.o.-year-old male with history of lung cancer with mets presented to the hospital with a worsening shortness of breath associated with nonproductive cough and lower extremity edema for several days. Symptoms moderate to severe, no alleviating or aggravating factors. He denied fever or chills, no nausea or vomiting, no diarrhea or urinary symptoms, no other complaints. Initial labs showed WBC of 9 increased to 12.3 on 5/5/2022  COVID-19 /influenza combo PCR negative  1 of 2 blood cultures on admission grew staph epi. Nasal swab for MRSA was negative  5/5/2022 CT chest reviewed suggestive of right postobstructive pneumonia/pleural effusio and lung mass/left lower lobe patchy infiltrate, trace pneumothorax of the right lung, 2.8 cm abdominal aortic aneurysm. Urinalysis unremarkable  Status post paracentesis 5/5/22 with a 700 mL of turbid dean-colored fluid removed. Interval changes  5/7/2022   Afebrile  SpO2 98% 3L/nc  WBC 7.6 today  5/6 CXR - Stable appearance of complete opacification of the right hemithorax.  The left lung is essentially clear.   Going home with hospice today per daughter and 2 grand-daughters      Labs  Creat 0.60-0.45-0.49  WBC 12.3-8.2-7.6    Micro  5/6 than above 12 system review was negative  Physical Examination :       Physical Exam  Constitutional:       General: He is not in acute distress. Appearance: He is ill-appearing. HENT:      Head: Normocephalic and atraumatic. Right Ear: External ear normal.      Left Ear: External ear normal.   Eyes:      General: No scleral icterus. Conjunctiva/sclera: Conjunctivae normal.   Cardiovascular:      Rate and Rhythm: Normal rate and regular rhythm. Heart sounds: Normal heart sounds. Pulmonary:      Effort: No respiratory distress. Breath sounds: No wheezing. Comments: Bilateral crackles  Abdominal:      General: There is no distension. Palpations: Abdomen is soft. Tenderness: There is no abdominal tenderness. Musculoskeletal:      Cervical back: Neck supple. No rigidity. Right lower leg: Edema present. Left lower leg: Edema present. Skin:     General: Skin is warm. Findings: No rash. Neurological:      General: No focal deficit present. Mental Status: He is oriented to person, place, and time. Past Medical History:     Past Medical History:   Diagnosis Date    CAD (coronary artery disease)     DDD (degenerative disc disease), cervical     Gout     Heart murmur     hx. of when younger.  Hyperlipidemia     Hypertension     Lung cancer ([address]     right lung    MI (myocardial infarction) ([address]     Skin cancer     face    Wears glasses        Past Surgical  History:     Past Surgical History:   Procedure Laterality Date    APPENDECTOMY      CARDIAC CATHETERIZATION      w/stent    COLONOSCOPY      CYST INCISION AND DRAINAGE Right 05/14/2020    rt elbow I and D and left knee aspiration with cultures    FOOT SURGERY Right     2nd toe(bone spur).     FOREARM SURGERY Right 5/14/2020    RIGHT ELBOW IRRIGATION AND DEBRIDEMENT performed by Bhavani Lizarraga DO at [address]  12/13/2021    IR CHEST TUBE INSERTION 12/13/2021 [facility] PROCEDURES    KNEE SURGERY Left 5/14/2020    KNEE ASPIRATION WITH CULTURES SENT performed by Pablito Lares DO at [address]      face under lt. eye.  TONSILLECTOMY         Medications:      midodrine  5 mg Oral TID    piperacillin-tazobactam  3,375 mg IntraVENous Q8H    sodium chloride flush  5-40 mL IntraVENous 2 times per day    allopurinol  100 mg Oral Daily    dexamethasone  2 mg Oral Daily with breakfast    docusate sodium  100 mg Oral BID    ipratropium-albuterol  3 mL Inhalation Q4H WA    potassium chloride  20 mEq Oral Daily    sodium chloride  1 g Oral BID    tamsulosin  0.4 mg Oral Daily    furosemide  40 mg IntraVENous Daily    metoprolol tartrate  25 mg Oral BID    apixaban  5 mg Oral BID    ferrous sulfate  325 mg Oral BID WC    guaiFENesin  600 mg Oral BID       Social History:     Social History     Socioeconomic History    Marital status:      Spouse name: Not on file    Number of children: Not on file    Years of education: Not on file    Highest education level: Not on file   Occupational History    Not on file   Tobacco Use    Smoking status: Current Every Day Smoker     Packs/day: 0.50     Types: Cigarettes    Smokeless tobacco: Former User   Vaping Use    Vaping Use: Former   Substance and Sexual Activity    Alcohol use: No    Drug use: Yes     Frequency: 14.0 times per week     Types: Marijuana (Weed)     Comment: hasn't smoked in 1 month    Sexual activity: Not on file   Other Topics Concern    Not on file   Social History Narrative    Not on file     Social Determinants of Health     Financial Resource Strain:     Difficulty of Paying Living Expenses: Not on file   Food Insecurity:     Worried About Running Out of Food in the Last Year: Not on file    Dandre of Food in the Last Year: Not on file   Transportation Needs:     Lack of Transportation (Medical):  Not on file    Lack of Transportation (Non-Medical): Not on file   Physical Activity:     Days of Exercise per Week: Not on file    Minutes of Exercise per Session: Not on file   Stress:     Feeling of Stress : Not on file   Social Connections:     Frequency of Communication with Friends and Family: Not on file    Frequency of Social Gatherings with Friends and Family: Not on file    Attends Scientologist Services: Not on file    Active Member of Clubs or Organizations: Not on file    Attends Club or Organization Meetings: Not on file    Marital Status: Not on file   Intimate Partner Violence:     Fear of Current or Ex-Partner: Not on file    Emotionally Abused: Not on file    Physically Abused: Not on file    Sexually Abused: Not on file   Housing Stability:     Unable to Pay for Housing in the Last Year: Not on file    Number of [place] in the Last Year: Not on file    Unstable Housing in the Last Year: Not on file       Family History:     Family History   Problem Relation Age of Onset    Cancer Father         lung cancer      Medical Decision Making:   I have independently reviewed/ordered the following labs:    CBC with Differential:   Recent Labs     05/06/22  0537 05/07/22  0526   WBC 8.2 7.6   HGB 7.4* 7.1*   HCT 22.5* 21.5*    141*   LYMPHOPCT 3* 0*   MONOPCT 8* 4     BMP:  Recent Labs     05/06/22  0537 05/07/22  0526   * 125*   K 4.1 3.9   CL 90* 87*   CO2 28 32*   BUN 17 16   CREATININE 0.45* 0.49*     Hepatic Function Panel:   Recent Labs     05/06/22  0537 05/07/22  0526   PROT 6.0* 5.8*   LABALBU 3.0* 3.1*   BILITOT 0.27* 0.30   ALKPHOS 146* 143*   ALT 17 16   AST 37 35     No results for input(s): RPR in the last 72 hours. No results for input(s): HIV in the last 72 hours. No results for input(s): BC in the last 72 hours. Lab Results   Component Value Date    CREATININE 0.49 05/07/2022    GLUCOSE 104 05/07/2022       Detailed results:         Thank you for allowing us to participate in the care of this patient. Please call with questions. This note is created with the assistance of a speech recognition program.  While intending to generate adocument that actually reflects the content of the visit, the document can still have some errors including those of syntax and sound a like substitutions which may escape proof reading. It such instances, actual meaningcan be extrapolated by contextual diversion.     Gwynne Snellen, APRN - CNP  Office: [phone]  Perfect serve / office [phone]

## 2022-05-07 NOTE — CARE COORDINATION
ONGOING DISCHARGE PLAN:    Patient is alert and oriented x4. Spoke with patient regarding discharge plan and patient confirms that plan is to discharge to home with Hospice today   Patient will discharge to home with Hospice [facility] up time is at 1600  All equipment ordered for pts home     Will continue to follow for additional discharge needs.     Electronically signed by Raj Hughes RN on 5/7/2022 at 1:48 PM

## 2022-05-07 NOTE — CARE COORDINATION
[address] Encounter Date/Time: 2022 2344    Hospital Account: [de-identified]    MRN: [id number]    Patient: Annia Pierre    Contact Serial #: [id number]      ENCOUNTER          Patient Class: I Private Enc? No Unit RM BD: [facility] PRO    Hospital Service: MED   Encounter DX: Respiratory failure ([address]   ADM Provider: Mariela Garcia MD   Procedure:     ATT Provider: Mariela Garcia MD   REF Provider:        Admission DX: Respiratory failure ([address] Acute on chronic respiratory failure with hypoxia ([address] Pneumonia due to infectious organism, unspecified laterality, unspecified part of lung and DX codes: J96.90, J96.21, J18.9      PATIENT                 Name: Annia Pierre : 1950 (71 yrs)   Address: [address] [place] [address] Sex: Male   [facility] [id number]         Marital Status:    Employer: RETIRED         Episcopal: [employer]   Primary Care Provider: Mariela Garcia MD         Primary Phone: [phone]   EMERGENCY CONTACT   Contact Name Legal Guardian? Relationship to Patient Home Phone Work Phone   1. Deborah (Hipaa),Juan M  2. Rose Bell    Spouse  Child [phone]         GUARANTOR            Guarantor: Annia Pierre     : 1950   Address: [address] [place] [address] Sex: Male     [place] [address]     Relation to Patient: Self       Home Phone: [phone]   Guarantor ID: [id number]       Work Phone: [phone]   Guarantor Employer: RETIRED         Status: RETIRED      COVERAGE        PRIMARY INSURANCE   Payor: MEDICARE Plan: MEDICARE PART A AND B   Payor Address: [facility] [id number], [place] [address]       Group Number:   Insurance Type: INDEMNITY   Subscriber Name: Jovita Caal : 1950   Subscriber ID: [id number] Pat.  Rel. to Sub: Self   SECONDARY INSURANCE   Payor: M[employer]UAL [facility] Plan: MUTUAL [place] MEDICARE VARGHESE*   Payor Address:  ATTN INDIVIDUAL CLAIMS,  Child [address]          Group Number: Plan F Insurance Type: INDEMNITY   Subscriber Name: Zainab Hamilton : 1950   Subscriber ID: [id number] Pat. Rel. to Sub: SELF           CSN: [id number]        Order Requisition for Lakeisha Harman  CSN: [id number]   Order Date:  May 7, 2022             Patient Information: Lakeisha Harman       :  1950  Age:  80 y.o. Sex:  M  Home Phone: [phone]  Work Phone:  [phone] SSN: [id number]  Address:  [address] [place] [address] [address] [place], [place] MRN:  [id number]  Facility MRN:  [id number]  PCP: Mercedez Streeter MD  PCP Phone: [phone]           Ordering Dept: [facility]     Site: [address] [place] [address]  Ph: [phone] Fax: Address: [address] Ordering User: Gris Soto RN  Provider ID: [id number]  NPI:              Test Ordered: Inpatient consult to Hospice [facility]ette [facility]   Code: Brittany Mercer   ORD #: [id number]  Associated Diagnosis:   Comments: Hospice consult asap for patient to go home today. Reason for Consult?  Cancer Priority  STAT Class  Hospital Performed      Order Status    Expected Date    Specimen Source    Collection Date    Collection Time    Occurrences Remaining    Interval  ONE TIME         Electronically Signed By  Isaac Boss MD Date  May 7, 2022  10:52 AM              Responsible Party Dominique Cooper     Guar-ActID   Relationship Account Type Home Phone   JAIR BUTTS D - 1* [address] Self P/F [phone]   Employer   Work Phone   RETIRED   [phone]          27 [address]     Primary Insurance  Insurance/Subscriber ID:  [id number]  Subscriber Name:  Claudetta Love              Relationship to Patient: Self     Secondary Insurance  Insurance/Subscriber ID: [id number]  Subscriber Name: Debra CH  Relationship to Patient: Self  Signed ABN: N    Payor Name: MEDICARE   Plan:  MEDICARE PART A AND B   Group:         Payor Name: EDWINA  Plan:  JENIFFER ARIAS MEDICARE SUPP  Group: Plan F  Worker's Comp Date of Injury:      Britton Vu MD   Physician   Oncology   Progress Notes      Signed   Date of Service:  5/6/2022  7:02 PM                 Signed        Expand All Collapse All          Show:Clear all  [x]Manual[x]Template[x]Copied    Added by:  Gabriella Del Castillo MD      []Cresenciover for details         Today's Date:  5/6/2022  Patient Name: De Hughes  Date of admission:      5/4/2022 11:44 PM  Patient's age:  80 y.o., 1950  Admission Dx: Respiratory failure ([facility] [J96.90]  Acute on chronic respiratory failure with hypoxia ([address] [J96.21]  Pneumonia due to infectious organism, unspecified laterality, unspecified part of lung [J18.9]     Reason for Consult: management recommendations  Requesting Physician: Chastity Nj MD     CHIEF COMPLAINT: Shortness of breath weakness failure to thrive     History Obtained From:  patient, electronic medical record     Interval history:     Patient seen and examined  Labs and vitals reviewed  Patient remains very weak. Gets short of breath with minimal exertion. Poor appetite. Pain is manageable. CT scan showed progressive disease.     HISTORY OF PRESENT ILLNESS:       The patient is a 80 y.o.  male who is admitted to the hospital for chief complaint of shortness of breath weakness and failure to thrive. Patient is well-known to our practice. Patient's oncologic history as below. Patient has progressed on multiple lines of treatment. However he still wants to pursue treatment. Patient received chemotherapy yesterday. He states that he was feeling better yesterday however today he could not breathe and presented to the ER. Patient's chest x-ray shows opacification of the right side. Patient underwent Thoracentesis with drainage of 700 of fluid but still feels very short of breath.   Patient wishes to remain full code at this time. Patient is having difficulty clearing secretions.           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 Maintenance Tecentriq, 3/2021  Lurbenectidin-7/2/2021  Topotecan-3/2022        Past Medical History:   has a past medical history of CAD (coronary artery disease), DDD (degenerative disc disease), cervical, Gout, Heart murmur, Hyperlipidemia, Hypertension, Lung cancer ([address] MI (myocardial infarction) ([address] Skin cancer, and Wears glasses.     Past Surgical History:   has a past surgical history that includes Appendectomy; Tonsillectomy; skin biopsy; skin biopsy; Colonoscopy; Foot surgery (Right); Cardiac catheterization; cyst incision and drainage (Right, 05/14/2020); Forearm surgery (Right, 5/14/2020); knee surgery (Left, 5/14/2020); and IR CHEST TUBE INSERTION (12/13/2021).    Medications:    Home Medications           Prior to Admission medications    Medication Sig Start Date End Date Taking?  Authorizing Provider   ALPFiliberto Sprague) 0.5 MG tablet Take 0.5 mg by mouth nightly as needed for Sleep.     Yes Historical Provider, MD   dexamethasone (DECADRON) 4 MG tablet Take 0.5 tablets by mouth daily (with breakfast) 4/27/22     Cherri Padilla MD   HYDROcodone-acetaminophen [facility]) 5-325 MG per tablet Take 1 tablet by mouth every 8 hours as needed for Pain    Current Facility-Administered Medications             Current Facility-Administered Medications   Medication Dose Route Frequency Provider Last Rate Last Admin    midodrine (PROAMATINE) tablet 5 mg  5 mg Oral TID Terrie Alvarado MD   5 mg at 05/06/22 1641    [START ON 5/7/2022] piperacillin-tazobactam (ZOSYN) 3,375 mg in dextrose 5 % 50 mL IVPB (mini-bag)  3,375 mg IntraVENous Kamala Jorge MD        sodium chloride flush 0.9 % injection 5-40 mL  5-40 mL IntraVENous 2 times per day Amaya Dueñas MD   10 mL at 05/06/22 0751    sodium chloride flush 0.9 % injection 5-40 mL  5-40 mL IntraVENous PRN Amaya Dueñas MD        0.9 % sodium chloride infusion   IntraVENous PRN Amaya Dueñas MD 15 mL/hr at 05/06/22 1719 New Bag at 05/06/22 1719    acetaminophen (TYLENOL) tablet 650 mg  650 mg Oral Q4H PRN Amaya Dueñas MD        ondansetron (ZOFRAN-ODT) disintegrating tablet 4 mg  4 mg Oral Q8H PRN Amaya Dueñas MD         Or    ondansetron (ZOFRAN) injection 4 mg  4 mg IntraVENous Q6H PRN Amaya Dueñas MD        allopurinol (ZYLOPRIM) tablet 100 mg  100 mg Oral Daily Terrie Alvarado MD   100 mg at 05/06/22 0751    ALPRAZolam (XANAX) tablet 0.5 mg  0.5 mg Oral Nightly PRN Terrie Alvarado MD        dexamethasone (DECADRON) tablet 2 mg  2 mg Oral Daily with breakfast Terrie Alvarado MD   2 mg at 05/06/22 0753    docusate sodium (COLACE) capsule 100 mg  100 mg Oral BID Terrie Alvarado MD   100 mg at 05/06/22 0751    HYDROcodone-acetaminophen (NORCO) 5-325 MG per tablet 1 tablet  1 tablet Oral Q4H PRN Terrie Alvarado MD   1 tablet at 05/06/22 1641    ipratropium-albuterol (DUONEB) nebulizer solution 3 mL  3 mL Inhalation Q4H WA Terrie Alvarado MD   3 mL at 05/06/22 1524    potassium chloride (MICRO-K) extended release capsule 20 mEq  20 mEq Oral Daily Terrie Alvarado MD   20 mEq at 05/06/22 0751    sodium chloride tablet 1 g  1 g Oral BID Terrie Alvarado MD   1 g at 05/06/22 3898    tamsulosin (FLOMAX) capsule 0.4 mg  0.4 mg Oral Daily Naeem Sanchez MD   0.4 mg at 05/06/22 0751    furosemide (LASIX) injection 40 mg  40 mg IntraVENous Daily Naeem Sanchez MD   40 mg at 05/06/22 0752    metoprolol tartrate (LOPRESSOR) tablet 25 mg  25 mg Oral BID Naeem Sanchez MD   25 mg at 05/05/22 2048    apixaban (ELIQUIS) tablet 5 mg  5 mg Oral BID Naeem Sanchez MD   5 mg at 05/05/22 1980    perflutren lipid microspheres (DEFINITY) injection 1.65 mg  1.5 mL IntraVENous ONCE PRN Luigi Braun MD        ferrous sulfate (IRON 325) tablet 325 mg  325 mg Oral BID WC Naeem Sanchez MD   325 mg at 05/06/22 1641    guaiFENesin (MUCINEX) extended release tablet 600 mg  600 mg Oral BID Naeem Sanchez MD   600 mg at 05/06/22 0751    sodium chloride flush 0.9 % injection 10 mL  10 mL IntraVENous PRN Candida Talamantes MD   10 mL at 05/05/22 1930            Allergies:  Patient has no known allergies.     Social History:   reports that he has been smoking cigarettes. He has been smoking about 0.50 packs per day. He has quit using smokeless tobacco. He reports current drug use. Frequency: 14.00 times per week. Drug: Marijuana Saud Blow). He reports that he does not drink alcohol.      Family History: family history includes Cancer in his father.     REVIEW OF SYSTEMS:       Constitutional: No fever or chills. No night sweats, positive weight loss. Positive fatigue. Eyes: No eye discharge, double vision, or eye pain   HEENT: negative for sore mouth, sore throat, hoarseness and voice change   Respiratory: Positive shortness of breath  Cardiovascular: negative for chest pain, dyspnea, palpitations, orthopnea, PND   Gastrointestinal: negative for nausea, vomiting, diarrhea, constipation, abdominal pain, Dysphagia, hematemesis and hematochezia   Genitourinary: negative for frequency, dysuria, nocturia, urinary incontinence, and hematuria   Integument: negative for rash, skin lesions, bruises. Requests L AC 5CC EACH       Culture POSITIVE Blood Culture (A)       Culture           DIRECT GRAM STAIN FROM BOTTLE: GRAM POSITIVE COCCI IN CLUSTERS     Culture           Staphylococcus epidermidis Detected: Methodology- Polymerase Chain Reaction (PCR)     Culture           (NOTE) Direct Gram Stain from bottle and Polymerase Chain Reaction (PCR) results called to and read back by:GREGOR MILLER AT [facility]  ON 5/6/22   COVID-19 & Influenza Combo     Specimen: Nasopharyngeal Swab   Result Value Ref Range     Specimen Description . NASOPHARYNGEAL SWAB       Source . NASOPHARYNGEAL SWAB       SARS-CoV-2 RNA, RT PCR Not Detected Not Detected     INFLUENZA A Not Detected Not Detected     INFLUENZA B Not Detected Not Detected   MRSA DNA Probe, Nasal     Specimen: Nasal   Result Value Ref Range     Specimen Description . NASAL SWAB       MRSA, DNA, Nasal NEGATIVE NEGATIVE   Culture, Blood 1     Specimen: Blood   Result Value Ref Range     Specimen Description . BLOOD RIGHT ARM       Culture NO GROWTH <24 HRS     Culture, Blood 1     Specimen: Blood   Result Value Ref Range     Specimen Description . BLOOD RIGHT HAND       Culture NO GROWTH <24 HRS     CBC with Auto Differential   Result Value Ref Range     WBC 12.3 (H) 3.5 - 11.0 k/uL     RBC 2.64 (L) 4.5 - 5.9 m/uL     Hemoglobin 7.6 (L) 13.5 - 17.5 g/dL     Hematocrit 23.8 (L) 41 - 53 %     MCV 90.4 80 - 100 fL     MCH 28.9 26 - 34 pg     MCHC 31.9 31 - 37 g/dL     RDW 21.6 (H) 11.5 - 14.9 %     Platelets 802 620 - 318 k/uL     MPV 6.2 6.0 - 12.0 fL     Seg Neutrophils 85 (H) 36 - 66 %     Lymphocytes 3 (L) 24 - 44 %     Monocytes 12 (H) 1 - 7 %     Eosinophils % 0 0 - 4 %     Basophils 0 0 - 2 %     Segs Absolute 10.45 (H) 1.3 - 9.1 k/uL     Absolute Lymph # 0.37 (L) 1.0 - 4.8 k/uL     Absolute Mono # 1.48 (H) 0.1 - 1.3 k/uL     Absolute Eos # 0.00 0.0 - 0.4 k/uL     Basophils Absolute 0.00 0.0 - 0.2 k/uL     Morphology ANISOCYTOSIS PRESENT       Morphology HYPOCHROMIA PRESENT     Comprehensive Metabolic Panel w/ Reflex to MG   Result Value Ref Range     Glucose 131 (H) 70 - 99 mg/dL     BUN 21 8 - 23 mg/dL     CREATININE 0.60 (L) 0.70 - 1.20 mg/dL     Calcium 9.2 8.6 - 10.4 mg/dL     Sodium 128 (L) 135 - 144 mmol/L     Potassium 4.8 3.7 - 5.3 mmol/L     Chloride 94 (L) 98 - 107 mmol/L     CO2 24 20 - 31 mmol/L     Anion Gap 10 9 - 17 mmol/L     Alkaline Phosphatase 179 (H) 40 - 129 U/L     ALT 20 5 - 41 U/L     AST 38 <40 U/L     Total Bilirubin 0.35 0.3 - 1.2 mg/dL     Total Protein 6.8 6.4 - 8.3 g/dL     Albumin 3.3 (L) 3.5 - 5.2 g/dL     GFR Non-African American >60 >60 mL/min     GFR African American >60 >60 mL/min     GFR Comment          Troponin   Result Value Ref Range     Troponin, High Sensitivity 33 (H) 0 - 22 ng/L   Urinalysis with Microscopic   Result Value Ref Range     Color, UA Yellow Yellow     Turbidity UA Clear Clear     Glucose, Ur NEGATIVE NEGATIVE     Bilirubin Urine NEGATIVE NEGATIVE     Ketones, Urine NEGATIVE NEGATIVE     Specific [place], UA 1.021 1.000 - 1.030     Urine Hgb NEGATIVE NEGATIVE     pH, UA 5.0 5.0 - 8.0     Protein, UA TRACE (A) NEGATIVE     Urobilinogen, Urine Normal Normal     Nitrite, Urine NEGATIVE NEGATIVE     Leukocyte Esterase, Urine NEGATIVE NEGATIVE     WBC, UA 0 TO 2 /HPF     RBC, UA 0 TO 2 /HPF     Epithelial Cells UA 0 TO 2 /HPF   Lactate, Sepsis   Result Value Ref Range     Lactic Acid, Sepsis 1.1 0.5 - 1.9 mmol/L   Brain Natriuretic Peptide   Result Value Ref Range     Pro-BNP 1,129 (H) <300 pg/mL   Troponin   Result Value Ref Range     Troponin, High Sensitivity 36 (H) 0 - 22 ng/L   Troponin   Result Value Ref Range     Troponin, High Sensitivity 34 (H) 0 - 22 ng/L   Vancomycin Level, Random   Result Value Ref Range     Vancomycin Rm 17.2 ug/mL     Vancomycin Random Dose amount 1500 MG       Vancomycin Random Date last dose 9,923,144       Vancomycin Random Time last dose 2,055     CBC with Auto Differential   Result Value FRONTAL)     Result Date: 4/15/2022  EXAMINATION: ONE XRAY VIEW OF THE CHEST 4/15/2022 9:58 am COMPARISON: 01/31/2022, CT 03/15/2022 HISTORY: ORDERING SYSTEM PROVIDED HISTORY: s/p right thoracentesis TECHNOLOGIST PROVIDED HISTORY: s/p right thoracentesis Reason for Exam: s/p right thoracentesis Additional signs and symptoms: s/p right thoracentesis FINDINGS: No evidence of pneumothorax status post right thoracentesis. There is near complete opacification of the right hemithorax with a small amount of aerated lung in the right perihilar/suprahilar region. This is due to combination of the patient's known extensive/progressive malignancy, atelectasis, and loculated pleural fluid. Pleural metastases were noted on the prior CT. Left jugular chest port catheter tip remains in the superior vena cava. The left lung is grossly clear. Heart size within normal limits without evidence of vascular congestion.      No evidence of pneumothorax status post right thoracentesis. Near complete opacification right hemithorax.      XR CHEST PORTABLE     Result Date: 5/5/2022  EXAMINATION: ONE XRAY VIEW OF THE CHEST 5/5/2022 3:14 pm COMPARISON: 05/04/2022, CT 03/15/2022 HISTORY: ORDERING SYSTEM PROVIDED HISTORY: s/p thora TECHNOLOGIST PROVIDED HISTORY: s/p thora FINDINGS: No evidence of pneumothorax status post right thoracentesis. Continued virtually complete opacification of the right hemithorax likely related to progressive pleural and parenchymal mass/consolidation related to patient's history of lung cancer as well as some residual loculated fluid. Left lung shows minimal perihilar atelectatic changes without lobar consolidation. Left jugular chest port remains in place. Heart size is within normal limits. There is no acute osseous abnormality.   Gas distended bowel loops beneath the left hemidiaphragm.      Continued near complete opacification of the right hemithorax status post right thoracentesis.      XR CHEST PORTABLE     Result Date: 5/5/2022  EXAMINATION: ONE XRAY VIEW OF THE CHEST 5/5/2022 12:12 am COMPARISON: 04/15/2022 HISTORY: ORDERING SYSTEM PROVIDED HISTORY: shortness of breath TECHNOLOGIST PROVIDED HISTORY: shortness of breath Reason for Exam: Shortness of breath, hx of pneumothorax Additional signs and symptoms: Shortness of breath, hx of pneumothorax Relevant Medical/Surgical History: Shortness of breath, hx of pneumothorax FINDINGS: There is near complete opacification of the right hemithorax, stable to slightly increased. MediPort is unchanged in position. Cardiomediastinal silhouette is unchanged. There is no left pleural effusion or pneumothorax. Interstitial prominence in the left lung. No acute osseous abnormality.      1. Minimal interstitial prominence in the left lung, which could be seen with edema. 2. Near complete opacification the right hemithorax is similar to slightly progressed from prior study.      IR GUIDED THORACENTESIS PLEURAL     Result Date: 5/5/2022  PROCEDURE: ULTRASOUNDGUIDED RIGHT THORACENTESIS 5/5/2022 HISTORY: ORDERING SYSTEM PROVIDED HISTORY: pl effusion TECHNOLOGIST PROVIDED HISTORY: pl effusion Which side should the procedure be performed?->Right TECHNIQUE: Informed consent was obtained after a detailed explanation of the procedure including risks, benefits, and alternatives. Universal protocol was performed. The right chest was prepped and draped in sterile fashion and local anesthesia was achieved with lidocaine. A 5 Kazakh needle sheath was advanced under ultrasound guidance into pleural effusion and thoracentesis was performed. Ultrasound shows a complex loculated right pleural effusion with low level internal echoes and pleural thickening. The patient tolerated the procedure well.  EBL: None FINDINGS: A total of 700 mL of turbid dean colored fluid was removed.      Successful ultrasound guided right thoracentesis.      IR GUIDED THORACENTESIS PLEURAL     Result Date: 4/15/2022  PROCEDURE: ULTRASOUNDGUIDED RIGHT THORACENTESIS 4/15/2022 HISTORY: ORDERING SYSTEM PROVIDED HISTORY: Malignant neoplasm of right upper lobe of lung [facility]) TECHNOLOGIST PROVIDED HISTORY: Which side should the procedure be performed?->Radiologist Recommendation TECHNIQUE: Informed consent was obtained after a detailed explanation of the procedure including risks, benefits, and alternatives. Universal protocol was performed. The right chest was prepped and draped in sterile fashion and local anesthesia was achieved with lidocaine. Ultrasound shows a complex loculated right pleural effusion corresponding to the prior CT. A 5 Gabonese needle sheath was advanced under ultrasound guidance into pleural effusion and thoracentesis was performed. The patient tolerated the procedure well. EBL: None FINDINGS: A total of 250 mL of cloudy dean colored fluid was removed and sent for the requested studies.   The patient requested termination of the procedure at this point due to some discomfort; additional loculated fluid remains.      Successful ultrasound guided right thoracentesis with a loculated right pleural effusion.            IMPRESSION:   Primary Problem  Acute respiratory failure with hypoxia [facility])           Active Hospital Problems     Diagnosis Date Noted    Acute respiratory failure with hypoxia ([facility] [J96.01] 05/05/2022       Priority: Medium    Hyponatremia [E87.1] 05/05/2022       Priority: Medium    Severe malnutrition ([facility] [E43] 05/05/2022    COPD (chronic obstructive pulmonary disease) ([facility] [J44.9] 08/27/2021    Iron deficiency anemia [D50.9] 08/27/2021    PAF (paroxysmal atrial fibrillation) ([facility] [I48.0] 08/27/2021    Lung cancer, primary, with metastasis from lung to other site ([facility] [C34.90]           Recurrent small cell carcinoma of lung  Recurrent pleural effusion malignant  Failure to thrive  Hyponatremia        RECOMMENDATIONS:  1. I personally reviewed results of lab work-up imaging studies and other relevant clinical data. 2. Reviewed hospitalization record  3. Discussed with palliative care over the phone. Patient has expressed wishes to palliative care that he wishes to pursue treatment  4. Discussed scans with the patient. There is disease progression. Previously we have recommended hospice to the patient but he has refused. 5. I again expressed my concerns about proceeding with further treatment. Unfortunately patient's condition has been deteriorating and at this point he is very weak and I do not believe we will be able to get strong enough to go back on treatment. 6. I again recommended hospice to the patient. He is more receptive now. He has asked me to discuss with his wife Tone Carrington. I will reach out to patient's wife as well. 7. Continue symptomatic supportive care in the meanwhile. Patient is an appropriate candidate for hospice        Discussed with patient and Nurse.        Thank you for asking us to see this patient.              Guillermo Wharton MD             This note is created with the assistance of a speech recognition program.  While intending to generate a document that actually reflects the content of the visit, the document can still have some errors including those of syntax and sound a like substitutions which may escape proof reading.   It such instances, actual meaning can be extrapolated by contextual diversion.  Virgie Crisostomo MD   Physician   Family Medicine   H&P      Signed   Date of Service:  5/5/2022  7:46 AM                 Signed        Expand All Collapse All          Show:Clear all  [x]Manual[x]Template[]Copied    Added by:  Ata Reese MD      []Marlin for details           History and Physical        Name: Jamaica Grossman  MRN:   [id number]                                      Acct:   [de-identified]  Room: [facility]/2124-     Admit Date: 5/4/2022  PCP: Esdras Mccormack MD        Chief Complaint:          Chief Complaint Patient presents with    Shortness of Breath         History Obtained From:      patient, electronic medical record     History of Present Illness:      Emery Peralta is a  80 y.o.  male with PAF on Eliquis, right metastatic lung cancer presents with Shortness of Breath   patient states that he started experiencing progressive shortness of breath for the last 7 days. Patient has cough, unable to expectorate sputum. Bilateral lower extremity swelling. Patient denies chest pain palpitations nausea vomiting diaphoresis abdominal pain dysuria flank pain sore throat lightheadedness visual change fever or chills     Past Medical History:      Past Medical History        Past Medical History:   Diagnosis Date    CAD (coronary artery disease)      DDD (degenerative disc disease), cervical      Gout      Heart murmur       hx. of when younger.  Hyperlipidemia      Hypertension      Lung [facility] [address]       right lung    MI (myocardial infarction) ([facility] [address]      Skin cancer       face    Wears glasses              Past Surgical History:      Past Surgical History         Past Surgical History:   Procedure Laterality Date    APPENDECTOMY        CARDIAC CATHETERIZATION         w/stent    COLONOSCOPY        CYST INCISION AND DRAINAGE Right 05/14/2020     rt elbow I and D and left knee aspiration with cultures    FOOT SURGERY Right       2nd toe(bone spur).  FOREARM SURGERY Right 5/14/2020     RIGHT ELBOW IRRIGATION AND DEBRIDEMENT performed by Kelly Armenta DO at [address]   12/13/2021     IR CHEST TUBE INSERTION 12/13/2021 [facility]    KNEE SURGERY Left 5/14/2020     KNEE ASPIRATION WITH CULTURES SENT performed by Kelly Armenta DO at [address]         face under lt. eye.     TONSILLECTOMY                Medications Prior to Admission:       Home Medications           Prior to Admission medications    Medication Sig Start Date End Date Taking? Authorizing Provider   ALPRAZolam Rockearnest Gaviria) 0.5 MG tablet Take 0.5 mg by mouth nightly as needed for Sleep.     Yes Historical Provider, MD   dexamethasone (DECADRON) 4 MG tablet Take 0.5 tablets by mouth daily (with breakfast) 4/27/22     Modesta Valverde MD   HYDROcodone-acetaminophen (NORCO) 5-325 MG per tablet Take 1 tablet by mouth every 8 hours as needed for Pain for up to 30 days. 4/18/22 5/18/22   Modesta Valverde MD   oxyCODONE-acetaminophen (PERCOCET) 5-325 MG per tablet Take 1 tablet by mouth every 6 hours as needed for Pain for up to 120 days. 3/18/22 7/16/22   Modesta Valverde MD   sodium chloride 1 g tablet TAKE 1 TABLET BY MOUTH TWICE A DAY 3/18/22     Modesta Valverde MD   midodrine (PROAMATINE) 5 MG tablet Take 1 tablet by mouth 3 times daily 12/17/21     Gwen Ray MD   docusate sodium (COLACE) 100 MG capsule Take 100 mg by mouth 2 times daily       Historical Provider, MD JOHNSON Complex-C-Folic Acid (ANGELICA-HUGO) TABS TAKE 1 TABLET BY MOUTH DAILY. HEMATINIC VIT MINERALS 7/21/20     Historical Provider, MD   traZODone (DESYREL) 100 MG tablet Take 100 mg by mouth nightly   Patient not taking: Reported on 5/5/2022 7/20/20     Historical Provider, MD   ipratropium-albuterol (DUONEB) 0.5-2.5 (3) MG/3ML SOLN nebulizer solution Inhale 3 mLs into the lungs 4 times daily       Historical Provider, MD   potassium chloride (MICRO-K) 10 MEQ extended release capsule Take 20 mEq by mouth daily  11/19/19     Historical Provider, MD   Ferrous Fumarate (FERROCITE) 324 (106 Fe) MG TABS Take 324 mg by mouth daily        Historical Provider, MD   tamsulosin (FLOMAX) 0.4 MG capsule TAKE 1 CAPSULE BY MOUTH EVERY DAY 5/4/20     Modesta Valverde MD   Handicap [address] [facility] by Does not apply route 7/30/19     Modesta Valverde MD   lidocaine-prilocaine (EMLA) 2.5-2.5 % cream Apply topically as needed. Apply a quarter size amount to port site 1 hour before chemotherapy. Cover with plastic wrap.  3/7/19     Guillermo MD Dio   acetaminophen (TYLENOL) 325 MG tablet Take 650 mg by mouth every 6 hours as needed for Pain       Historical Provider, MD   allopurinol (ZYLOPRIM) 100 MG tablet Take 100 mg by mouth daily 12/4/18     Historical Provider, MD   simvastatin (ZOCOR) 40 MG tablet Take 40 mg by mouth daily   Patient not taking: Reported on 4/15/2022 11/27/18     Historical Provider, MD            Allergies:       Patient has no known allergies.     Social History:      Tobacco:    reports that he has been smoking cigarettes. He has been smoking about 0.50 packs per day. He has quit using smokeless tobacco.  Alcohol:      reports no history of alcohol use. Drug Use:  reports current drug use. Frequency: 14.00 times per week. Drug: Marijuana Hollie Dodge).     Family History:      Family History         Family History   Problem Relation Age of Onset    Cancer Father           lung cancer            Review of Systems:      Positive and Negative as described in HPI   all 10 systems are reviewed and negative except as Noted        Review of Systems   Constitutional: Negative for appetite change, chills and diaphoresis. HENT: Negative for drooling, ear pain, trouble swallowing and voice change. Eyes: Negative for photophobia and visual disturbance. Respiratory: Positive for cough and shortness of breath. Negative for choking and stridor. Cardiovascular: Positive for leg swelling. Negative for chest pain and palpitations. Gastrointestinal: Negative for abdominal distention, anal bleeding, blood in stool and rectal pain. Endocrine: Negative for polyphagia and polyuria. Genitourinary: Negative for dysuria, flank pain, hematuria and urgency. Musculoskeletal: Negative for back pain, myalgias and neck stiffness. Skin: Negative for color change, pallor and rash. Allergic/Immunologic: Negative for environmental allergies and food allergies. Neurological: Negative for tremors, seizures, facial asymmetry and numbness. Neurological:      General: No focal deficit present. Mental Status: Mental status is at baseline.    Psychiatric:         Mood and Affect: Mood normal.         Behavior: Behavior normal.                     Data:      Recent Results         Recent Results (from the past 24 hour(s))   CBC with Auto Differential     Collection Time: 05/05/22 12:26 AM   Result Value Ref Range     WBC 12.3 (H) 3.5 - 11.0 k/uL     RBC 2.64 (L) 4.5 - 5.9 m/uL     Hemoglobin 7.6 (L) 13.5 - 17.5 g/dL     Hematocrit 23.8 (L) 41 - 53 %     MCV 90.4 80 - 100 fL     MCH 28.9 26 - 34 pg     MCHC 31.9 31 - 37 g/dL     RDW 21.6 (H) 11.5 - 14.9 %     Platelets 726 379 - 008 k/uL     MPV 6.2 6.0 - 12.0 fL     Seg Neutrophils 85 (H) 36 - 66 %     Lymphocytes 3 (L) 24 - 44 %     Monocytes 12 (H) 1 - 7 %     Eosinophils % 0 0 - 4 %     Basophils 0 0 - 2 %     Segs Absolute 10.45 (H) 1.3 - 9.1 k/uL     Absolute Lymph # 0.37 (L) 1.0 - 4.8 k/uL     Absolute Mono # 1.48 (H) 0.1 - 1.3 k/uL     Absolute Eos # 0.00 0.0 - 0.4 k/uL     Basophils Absolute 0.00 0.0 - 0.2 k/uL     Morphology ANISOCYTOSIS PRESENT       Morphology HYPOCHROMIA PRESENT     Comprehensive Metabolic Panel w/ Reflex to MG     Collection Time: 05/05/22 12:26 AM   Result Value Ref Range     Glucose 131 (H) 70 - 99 mg/dL     BUN 21 8 - 23 mg/dL     CREATININE 0.60 (L) 0.70 - 1.20 mg/dL     Calcium 9.2 8.6 - 10.4 mg/dL     Sodium 128 (L) 135 - 144 mmol/L     Potassium 4.8 3.7 - 5.3 mmol/L     Chloride 94 (L) 98 - 107 mmol/L     CO2 24 20 - 31 mmol/L     Anion Gap 10 9 - 17 mmol/L     Alkaline Phosphatase 179 (H) 40 - 129 U/L     ALT 20 5 - 41 U/L     AST 38 <40 U/L     Total Bilirubin 0.35 0.3 - 1.2 mg/dL     Total Protein 6.8 6.4 - 8.3 g/dL     Albumin 3.3 (L) 3.5 - 5.2 g/dL     GFR Non-African American >60 >60 mL/min     GFR African American >60 >60 mL/min     GFR Comment          Troponin     Collection Time: 05/05/22 12:26 AM   Result Value Ref Range     Troponin, High Sensitivity 33 (H) 0 - 22 ng/L   Lactate, Sepsis     Collection Time: 05/05/22 12:26 AM   Result Value Ref Range     Lactic Acid, Sepsis 1.1 0.5 - 1.9 mmol/L   Brain Natriuretic Peptide     Collection Time: 05/05/22 12:26 AM   Result Value Ref Range     Pro-BNP 1,129 (H) <300 pg/mL   COVID-19 & Influenza Combo     Collection Time: 05/05/22 12:29 AM     Specimen: Nasopharyngeal Swab   Result Value Ref Range     Specimen Description . NASOPHARYNGEAL SWAB       Source . NASOPHARYNGEAL SWAB       SARS-CoV-2 RNA, RT PCR Not Detected Not Detected     INFLUENZA A Not Detected Not Detected     INFLUENZA B Not Detected Not Detected   EKG 12 Lead     Collection Time: 05/05/22 12:38 AM   Result Value Ref Range     Ventricular Rate 127 BPM     Atrial Rate 127 BPM     P-R Interval 156 ms     QRS Duration 76 ms     Q-T Interval 288 ms     QTc Calculation (Bazett) 418 ms     P Axis 53 degrees     R Axis 62 degrees     T Axis 20 degrees   Culture, Blood 1     Collection Time: 05/05/22 12:57 AM     Specimen: Blood   Result Value Ref Range     Specimen Description . BLOOD       Special Requests R AC 10CC EACH       Culture NO GROWTH <24 HRS     Culture, Blood 2     Collection Time: 05/05/22 12:57 AM     Specimen: Blood   Result Value Ref Range     Specimen Description . BLOOD       Special Requests L AC 5CC EACH       Culture NO GROWTH <24 HRS     Urinalysis with Microscopic     Collection Time: 05/05/22  2:28 AM   Result Value Ref Range     Color, UA Yellow Yellow     Turbidity UA Clear Clear     Glucose, Ur NEGATIVE NEGATIVE     Bilirubin Urine NEGATIVE NEGATIVE     Ketones, Urine NEGATIVE NEGATIVE     Specific [place], UA 1.021 1.000 - 1.030     Urine Hgb NEGATIVE NEGATIVE     pH, UA 5.0 5.0 - 8.0     Protein, UA TRACE (A) NEGATIVE     Urobilinogen, Urine Normal Normal     Nitrite, Urine NEGATIVE NEGATIVE     Leukocyte Esterase, Urine NEGATIVE NEGATIVE     WBC, UA 0 TO 2 /HPF     RBC, UA 0 TO 2 /HPF     Epithelial Cells UA 0 TO 2 /HPF   Troponin   Collection Time: 05/05/22  7:07 AM   Result Value Ref Range     Troponin, High Sensitivity 36 (H) 0 - 22 ng/L   Troponin     Collection Time: 05/05/22 12:52 PM   Result Value Ref Range     Troponin, High Sensitivity 34 (H) 0 - 22 ng/L            Assesment:      Primary Problem  Respiratory failure (HCC)     Principal Problem:    Respiratory failure (HCC)  Active Problems:    Hyponatremia    Lung cancer, primary, with metastasis from lung to other site ([facility] [address]    PAF (paroxysmal atrial fibrillation) (HCC)    COPD (chronic obstructive pulmonary disease) (HCC)    Iron deficiency anemia  Resolved Problems:    * No resolved hospital problems. *        Plan:      1.  IV vancomycin  2. IV cefepime  3.  blood cultures x2  4. Lasix 40 mg IV daily  5. Metoprolol 25 mg p.o. twice daily  6. Mucinex 600 mg p.o. twice daily  7. Discussed with pulmonologist Dr. Copeland Prior on the floor  8. MRSA nasal swab  9. TSH  10.  DuoNeb nebulizer treatment every 4 hours while awake  11.  2D echo  12. sodium chloride 1 g tab twice daily  13.  chest x-ray report shows near complete opacification of right hemithorax. 14.  consult oncology  15. CBC, CMP  16.  12 lead EKG shows sinus tachycardia  17. DVT prophylaxis  Eliquis  18. EPCs  19.  check and replace electrolytes per sliding scale  20.   restart home medications           Electronically signed by Keely Harris MD      Copy sent to Dr. Chantal Ramires MD                           Routing History                        [id number] - MAR Late Documentation  (last 72 hrs)    Stacey Reese RN    Order ID Medication Name Scheduled Time Admin At [facility] - [facility]REAT   [id number] vancomycin (VANCOCIN) 1,500 mg in dextrose 5 % 250 mL IVPB (ADDAVIAL) 05/05/22 2225 05/05/22 2225 05/05/22 2335     Aakash Costa RN    Order ID Medication Name Scheduled Time Admin At [facility] - RETREAT   [id number] piperacillin-tazobactam (ZOSYN) 3,375 mg in dextrose 5 % 50 mL IVPB (mini-bag) (Followed by Linked Group #1)

## 2022-05-07 NOTE — DISCHARGE SUMMARY
Pt will be discharged to hospice     Lung cancer/ Pl effusion -on chemo  Rec pl effusion - DW patient- chest tube right for symptomatic relief  Right lung loculated effusion  SP thoracentesis right 5/5/23  Does not want any more procedures  Comfort care

## 2022-05-07 NOTE — PROGRESS NOTES
Pt refusing oral or NT sx at this time. Ed Paulino placed at bedside for pt to use and educated on importance of deep breathing and coughing.

## 2022-05-07 NOTE — ACP (ADVANCE CARE PLANNING)
Advance Care Planning     Advance Care Planning (ACP) Physician/NP/PA Conversation    Date of Conversation: 5/4/2022  Conducted with: Patient with Decision Making Capacity   Healthcare Decision Maker: Named in Advance Directive or Healthcare Power of  (name) wife    Healthcare Decision Maker:      Primary Decision Maker: Yvette Mathur (Hipaa)Juan M - [phone]    Click here to [address] including selection of the Healthcare Decision Maker Relationship (ie \"Primary\")  Today we documented Decision Maker(s). The patient will provide ACP documents. Care Preferences:    Hospitalization: \"If your health worsens and it becomes clear that your chance of recovery is unlikely, what would be your preference regarding hospitalization? \"  The patient would prefer hospitalization. Ventilation: \"If you were unable to breath on your own and your chance of recovery was unlikely, what would be your preference about the use of a ventilator (breathing machine) if it was available to you? \"  The patient would NOT desire the use of a ventilator. Resuscitation: \"In the event your heart stopped as a result of an underlying serious health condition, would you want attempts made to restart your heart, or would you prefer a natural death? \"  No, do NOT attempt to resuscitate.     benefit/burden of treatment options, ventilation preferences, hospitalization preferences, resuscitation preferences and hospice care    Conversation Outcomes / Follow-Up Plan:  ACP complete - no further action today  Reviewed DNR/DNI and patient elects DNR order - completed portable DNR form & placed order    Length of Voluntary ACP Conversation in minutes:  <16 minutes (Non-Billable)    Rikcy Greenfield, AZEB - CNP

## 2022-05-07 NOTE — FLOWSHEET NOTE
Writer responded to consult regarding anointing of the sick. Writer called Tavo Cowart and let her know that pt was anointed on 5/5 by Sharon Mo. Tavo Cowart was grateful. She shared her emotions about pt coming home under hospice care and not knowing what this time would be like. Writer provided listening presence and spiritual care, telling Tavo Cowart she could call and speak to a  even after pt was d/c.      05/07/22 1219   Encounter Summary   Encounter Overview/Reason  Spiritual/Emotional Needs   Service Provided For: Family   Referral/Consult From: Palliative Care   Support System Spouse; Children   Last Encounter  05/07/22   Complexity of Encounter Moderate   Spiritual/Emotional needs   Type Spiritual Support   Grief, Loss, and Adjustments   Type Hospice   Palliative Care   Type Palliative Care, Family Care   Assessment/Intervention/Outcome   Assessment Anxious; Concerns with suffering; Sad   Intervention Active listening;Discussed illness injury and its impact; Explored/Affirmed feelings, thoughts, concerns;Explored Coping Skills/Resources;Prayer (assurance of)/[place];Sustaining Presence/Ministry of presence   Outcome Comfort;Engaged in conversation;Expressed feelings, needs, and concerns;Expressed Gratitude;New perspective/awareness;Receptive

## 2022-05-07 NOTE — FLOWSHEET NOTE
Patient was on bedside commode per his daughter, who stepped outside to talk with writer. Writer let daughter know Michael Karel was following up with patient to be sure he was sent from a spiritual care perspective and that writer had called Juan M to let her know pt had been anointed. Daughter was glad to hear that as well, and asked her dad if he had any needs, to which he answered no. Plans are for d/c today.       05/07/22 1536   Encounter Summary   Encounter Overview/Reason  Palliative Care   Service Provided For: Family   Referral/Consult From: Palliative Care   Last Encounter  05/07/22   Complexity of Encounter Low   Palliative Care   Type Palliative Care, Follow-up   Assessment/Intervention/Outcome   Assessment Calm   Intervention Active listening;Sustaining Presence/Ministry of presence   Outcome Engaged in conversation;Expressed Gratitude

## 2022-05-08 NOTE — PROGRESS NOTES
Patient discharged to home per [id number] [facility] ambulance [facility]. Hospice nurse to meet patient at home. Daughters present. Caren Rodriguez and belongings taken with daughters.

## 2022-05-09 NOTE — DISCHARGE INSTR - COC
Continuity of Care Form    Patient Name: Artemio Edmonds   :  1950  MRN:  [id number]    Admit date:  2022  Discharge date:  ***    Code Status Order: Prior   Advance Directives:      Admitting Physician:  Gm Poon MD  PCP: Gm Poon MD    Discharging Nurse: [facility] Unit/Room#: 2124/2124-01  Discharging Unit Phone Number: ***    Emergency Contact:   Extended Emergency Contact Information  Primary Emergency Contact: Jones De Luna (Hipaa)Juan M   [address] Phone: [phone]  Work Phone: [phone]  Mobile Phone: [phone]  Relation: Spouse  Secondary Emergency Contact: Belem Vásquez  Home Phone: [phone]  Work Phone: [phone]  Mobile Phone: [phone]  Relation: Child    Past Surgical History:  Past Surgical History:   Procedure Laterality Date    APPENDECTOMY      CARDIAC CATHETERIZATION      w/stent    COLONOSCOPY      CYST INCISION AND DRAINAGE Right 2020    rt elbow I and D and left knee aspiration with cultures    FOOT SURGERY Right     2nd toe(bone spur).     FOREARM SURGERY Right 2020    RIGHT ELBOW IRRIGATION AND DEBRIDEMENT performed by Irwin High DO at [address]  2021    IR CHEST TUBE INSERTION 2021 [facility]    KNEE SURGERY Left 2020    KNEE ASPIRATION WITH CULTURES SENT performed by Irwin High DO at [facility]      face under lt. eye.    TONSILLECTOMY         Immunization History:   Immunization History   Administered Date(s) Administered    COVID-19, Ana Cristina Divine, Primary or Immunocompromised, PF, 100mcg/0.5mL 2021    Influenza, High Dose (Fluzone 65 yrs and older) 10/07/2019    Influenza, Quadv, adjuvanted, 65 yrs +, IM, PF (Fluad) 2020       Active Problems:  Patient Active Problem List   Diagnosis Code    Primary lung cancer with metastasis from lung to other site, right [facility]) C34.91    Hyperlipidemia E78.5 Hypertension I10    Lung cancer, primary, with metastasis from lung to other site [facility]) C34.90    Sepsis ([address] A41.9    Atrial fibrillation with RVR (HCC) I48.91    Gout M10.9    Pyogenic arthritis of right elbow (HCC) M00.9    Pneumothorax on right J93.9    PAF (paroxysmal atrial fibrillation) (HCC) I48.0    Tobacco dependence F17.200    COPD (chronic obstructive pulmonary disease) (HCC) J44.9    Iron deficiency anemia D50.9    Pneumothorax J93.9    Thrombocytopenia (HCC) D69.6    Leukopenia D72.819    Severe malnutrition (HCC) E43    Acute respiratory failure with hypoxia (HCC) J96.01    Hyponatremia E87.1       Isolation/Infection:   Isolation            No Isolation          Patient Infection Status       Infection Onset Added Last Indicated Last Indicated By Review Planned Expiration Resolved Resolved By    None active    Resolved    COVID-19 (Rule Out) 22 COVID-19 & Influenza Combo (Ordered)   22 Rule-Out Test Resulted    COVID-19 (Rule Out) 20 COVID-19 (Ordered)   20 Rule-Out Test Resulted    COVID-19 (Rule Out) 20 COVID-19 (Ordered)   20 Rule-Out Test Resulted            Nurse Assessment:  Last Vital Signs: BP 88/64   Pulse 106   Temp 97.6 °F (36.4 °C) (Oral)   Resp 18   Ht 5' 11.5\" (1.816 m)   Wt 150 lb 12.7 oz (68.4 kg)   SpO2 98%   BMI 20.74 kg/m²     Last documented pain score (0-10 scale): Pain Level: 5  Last Weight:   Wt Readings from Last 1 Encounters:   22 150 lb 12.7 oz (68.4 kg)     Mental Status:  {IP PT MENTAL STATUS:}    IV Access:  {[facility] IV ACCESS:804511140}    Nursing Mobility/ADLs:  Walking   {P DME SWB}  Transfer  {P DME MUJY:726787173}  Bathing  {P DME UTUX:206217497}  Dressing  {CHP DME XGZV:688034239}  Toileting  {[facility] CZQF:687118358}  Feeding  {[facility] EIYJ:410498623}  Med Admin  {[facility] PYPR:351820179}  Med Delivery   {[facility] MED Delivery:039761654}    Wound Care Documentation and Therapy:  Incision 20 Elbow Right;Posterior (Active)   Number of days: 013       Incision 20 Knee Anterior; Left (Active)   Number of days: 724        Elimination:  Continence: Bowel: {YES / SE:65995}  Bladder: {YES / NE:72907}  Urinary Catheter: {Urinary Catheter:834121260}   Colostomy/Ileostomy/Ileal Conduit: {YES / VD:42118}       Date of Last BM: ***  No intake or output data in the 24 hours ending 22 1016  I/O last 3 completed shifts:   In: 65 [P.O.:360; IV Piggyback:300]  Out: 4915 [Urine:1550]    Safety Concerns:     [address] Safety Concerns:429069536}    Impairments/Disabilities:      [address] Impairments/Disabilities:586193296}    Nutrition Therapy:  Current Nutrition Therapy:   [address] Diet List:720216695}    Routes of Feeding: {CHP DME Other Feedings:971885941}  Liquids: {Slp liquid thickness:39133}  Daily Fluid Restriction: {CHP DME Yes amt example:510038219}  Last Modified Barium Swallow with Video (Video Swallowing Test): {Done Not Done WYAV:175596671}    Treatments at the Time of Hospital Discharge:   Respiratory Treatments: ***  Oxygen Therapy:  {Therapy; copd oxygen:58516}  Ventilator:    {MH CC Vent FNKQ:418201867}    Rehab Therapies: {THERAPEUTIC INTERVENTION:6854403216}  Weight Bearing Status/Restrictions: [address] [employer]  Weight Bearin}  Other Medical Equipment (for information only, NOT a DME order):  {EQUIPMENT:663899549}  Other Treatments: ***    Patient's personal belongings (please select all that are sent with patient):  {P DME Belongings:431816442}    RN SIGNATURE:  {Esignature:763287716}    CASE MANAGEMENT/SOCIAL WORK SECTION    Inpatient Status Date: ***    Readmission Risk Assessment Score:  Readmission Risk              Risk of Unplanned Readmission:  0           Discharging to Facility/ Agency   Name:   Address:  Phone:  Fax:    Dialysis Facility (if applicable)   Name:  Address:  Dialysis Schedule:  Phone:  Fax:    /Social Worker signature: {Esignature:876137332}    PHYSICIAN SECTION    Prognosis: {Prognosis:4064361358}    Condition at Discharge: [place] Ofelia Sullivan Patient Condition:500412943}    Rehab Potential (if transferring to Rehab): {Prognosis:1502761871}    Recommended Labs or Other Treatments After Discharge: ***    Physician Certification: I certify the above information and transfer of Federica Gray  is necessary for the continuing treatment of the diagnosis listed and that he requires {Admit to Appropriate Level of Care:87447} for {GREATER/LESS:082155047} 30 days.      Update Admission H&P: {CHP DME Changes in MLBOD:295964254}    PHYSICIAN SIGNATURE:  {Esignature:543007937}

## 2022-05-10 LAB
CULTURE: NORMAL
Lab: NORMAL
SPECIMEN DESCRIPTION: NORMAL

## 2022-05-11 LAB
CULTURE: NORMAL
CULTURE: NORMAL
SPECIMEN DESCRIPTION: NORMAL
SPECIMEN DESCRIPTION: NORMAL

## 2022-06-03 NOTE — DISCHARGE SUMMARY
Internal Medicine   Discharge Summary         Patient Identification:  Jaylin Rahman is a 80 y.o. male.   :  1950  MRN: [id number]     Acct: [de-identified]   Admit Date:  2022  Discharge date and time: 2022  9:03 PM     Attending Provider: No att. providers found                                       Reason for Admission: resp failure     Discharge Diagnoses:   Patient Active Problem List   Diagnosis    Primary lung cancer with metastasis from lung to other site, right ([address]    Hyperlipidemia    Hypertension    Lung cancer, primary, with metastasis from lung to other site ([address]    Sepsis ([address]    Atrial fibrillation with RVR ([address]    Gout    Pyogenic arthritis of right elbow ([address]    Pneumothorax on right    PAF (paroxysmal atrial fibrillation) ([address]    Tobacco dependence    COPD (chronic obstructive pulmonary disease) (HCC)    Iron deficiency anemia    Pneumothorax    Thrombocytopenia ([address]    Leukopenia    Severe malnutrition ([address]    Acute respiratory failure with hypoxia ([address]    Hyponatremia          Consults:  Pulmonary       Hospital Course:  Lung cancer/ Pl effusion -on chemo  Rec pl effusion - DW patient- chest tube right for symptomatic relief  Right lung loculated effusion  SP thoracentesis right 23  Does not want any more procedures  Comfort care     Disposition:   Death     Mathtew Hernandez MD  IM attending\

## 2022-06-23 NOTE — FLOWSHEET NOTE
12/19/21 1342   Encounter Summary   Services provided to: Patient   Referral/Consult From: Charissa   Continue Visiting   (12/19/21V)   Volunteer Visit Yes   Complexity of Encounter Low   Length of Encounter 15 minutes   Spiritual/Nondenominational   Type Spiritual support   Intervention Communion;Prayer   Sacraments   Communion Patient received communion Patient states there was a problem with translation and interpretor services call yesterday.  Patient states she did not get her results from her ultrasound.  Patient states she received a call from surgery but declined to schedule an appointment until she knows what is going on. Please contact patient to explain US of soft tissue of upper extremity and reason for referral.  Thanks Gurpreet Allen RN    Today's call was performed with the assistance of  Services.   Name of : Anthony #[id number]  Service used: Phone : Third Party

## 2023-01-09 NOTE — PROGRESS NOTES
Today's Date: 12/12/2021  Patient Name: Maged Soliman  Date of admission: 12/8/2021  7:03 PM  Patient's age: 80 y.o., 1950  Admission Dx: Pneumothorax [J93.9]  Chronic pneumothorax [J93.81]    Reason for Consult: Known lung cancer patient  Requesting Physician: Konstantin Robert MD    CHIEF COMPLAINT:    Chief Complaint   Patient presents with    Shortness of Breath       INTERVAL HISTORY:    Patient seen and examined  Labs and vitals reviewed  Resting comfortably  Denies any pain  Chest tube to waterseal.  X-ray does not show any worsening. Chest x-ray does not show any improvement of right pneumothorax  Cycle 9 scheduled for 12/24/2021. HISTORY OF PRESENT ILLNESS:    Maged Soliman is a 80 y.o. male who is admitted to the hospital on 12/8/2021  for SOB. He had a restaging scan with oncology and his CT scan showed worsening pneumothorax and mediastinal shift. Therefore he was sent to ER for further management. He denies any fever chills. He had chest tube in place. He is currently receiving Lurbinectedin for his recurrent small cell lung cancer and most recent treatment was on 12/1/2021. He has a diagnosis of small cell lung cancer with squamous component and has been following with Dr. Justin Roper as outpatient and his brief oncologic history as follows. 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 Chemotherapy changed to carboplatin and etoposide due to renal insufficiency from cycle #2  PCI 7/2019  SRS to adrenal gland metastasis, completed 07/2020  systemic palliative chemoimmunotherapy with carboplatin etoposide and Tecentriq, 12/2020 x4 cycles.   Maintenance Tecentriq, 3/2021  Lurbenectidin7/2/2021      Past Medical History: has a past medical history of DDD (degenerative disc disease), cervical, Gout, Heart murmur, Hyperlipidemia, Hypertension, Lung cancer ([facility] MI (myocardial infarction) ([facility] Skin cancer, and Wears glasses. Past Surgical History:   has a past surgical history that includes Appendectomy; Tonsillectomy; skin biopsy; skin biopsy; Colonoscopy; Foot surgery (Right); Cardiac catheterization; cyst incision and drainage (Right, 05/14/2020); Forearm surgery (Right, 5/14/2020); and knee surgery (Left, 5/14/2020). Medications:    Prior to Admission medications    Medication Sig Start Date End Date Taking? Authorizing Provider   oxyCODONE-acetaminophen (PERCOCET) 5-325 MG per tablet Take 1 tablet by mouth every 6 hours as needed for Pain for up to 15 days. 12/1/21 12/16/21 Yes Kristy Crockett MD   dexamethasone (DECADRON) 2 MG tablet Take 1 tablet by mouth 2 times daily (with meals) for 15 days 12/1/21 12/16/21 Yes Kristy Crockett MD   docusate sodium (COLACE) 100 MG capsule Take 100 mg by mouth 2 times daily   Yes Historical Provider, MD   B Complex-C-Folic Acid (ANGELICA-HUGO) TABS TAKE 1 TABLET BY MOUTH DAILY.  HEMATINIC VIT MINERALS 7/21/20  Yes Historical Provider, MD   traZODone (DESYREL) 100 MG tablet Take 100 mg by mouth nightly  7/20/20  Yes Historical Provider, MD   ipratropium-albuterol (DUONEB) 0.5-2.5 (3) MG/3ML SOLN nebulizer solution Inhale 3 mLs into the lungs 4 times daily   Yes Historical Provider, MD   potassium chloride (MICRO-K) 10 MEQ extended release capsule Take 20 mEq by mouth daily  11/19/19  Yes Historical Provider, MD   Ferrous Fumarate (FERROCITE) 324 (106 Fe) MG TABS Take 324 mg by mouth daily    Yes Historical Provider, MD   apixaban (ELIQUIS) 5 MG TABS tablet Take 1 tablet by mouth 2 times daily 5/21/20  Yes Georgia Parks MD   tamsulosin (FLOMAX) 0.4 MG capsule TAKE 1 CAPSULE BY MOUTH EVERY DAY 5/4/20  Yes Kristy Crockett MD   lidocaine-prilocaine (EMLA) 2.5-2.5 % cream Apply topically as needed. Apply a quarter size amount to port site 1 hour before chemotherapy. Cover with plastic wrap.  3/7/19  Yes Alanna Rose MD   acetaminophen (TYLENOL) 325 MG tablet Take 650 mg by mouth every 6 hours as needed for Pain   Yes Historical Provider, MD   allopurinol (ZYLOPRIM) 100 MG tablet Take 100 mg by mouth daily 12/4/18  Yes Historical Provider, MD   simvastatin (ZOCOR) 40 MG tablet Take 40 mg by mouth daily 11/27/18  Yes Historical Provider, MD   Handicap [facility] [address] [facility] by Does not apply route 7/30/19   Alanna Rose MD     Current Facility-Administered Medications   Medication Dose Route Frequency Provider Last Rate Last Admin    senna (SENOKOT) tablet 8.6 mg  1 tablet Oral Nightly Bladimir Gagnon MD   8.6 mg at 12/11/21 2153    docusate sodium (COLACE) capsule 100 mg  100 mg Oral Daily Bladimir Gagnon MD   100 mg at 12/12/21 7756    guaiFENesin (MUCINEX) extended release tablet 600 mg  600 mg Oral BID Bladimir Gagnon MD   600 mg at 12/12/21 0853    midodrine (PROAMATINE) tablet 7.5 mg  7.5 mg Oral TID WC Bladimir Gagnon MD   7.5 mg at 12/12/21 0853    ipratropium-albuterol (DUONEB) nebulizer solution 3 mL  3 mL Inhalation Q4H WA Bladimir Gagnon MD   3 mL at 12/12/21 0757    sodium chloride flush 0.9 % injection 5-40 mL  5-40 mL IntraVENous 2 times per day Sunita Marrero MD   10 mL at 12/11/21 2153    sodium chloride flush 0.9 % injection 5-40 mL  5-40 mL IntraVENous PRN Sunita Marrero MD        0.9 % sodium chloride infusion  25 mL IntraVENous PRN Sunita Marrero MD        acetaminophen (TYLENOL) tablet 650 mg  650 mg Oral Q4H PRN Sunita Marrero MD   650 mg at 12/12/21 0902    ondansetron (ZOFRAN-ODT) disintegrating tablet 4 mg  4 mg Oral Q8H PRN Sunita Marrero MD        Or    ondansetron (ZOFRAN) injection 4 mg  4 mg IntraVENous Q6H PRN Sunita Marrero MD        potassium chloride (KLOR-CON M) extended release tablet 40 mEq  40 mEq Oral PRN Kostas Karina Alvarez MD        Or    potassium bicarb-citric acid (EFFER-K) effervescent tablet 40 mEq  40 mEq Oral PRN Evaristo Giraldo MD        Or    potassium chloride 10 mEq/100 mL IVPB (Peripheral Line)  10 mEq IntraVENous PRN Evaristo Giraldo MD        ferrous sulfate (IRON 325) tablet 325 mg  325 mg Oral Daily Evaristo Giraldo MD   325 mg at 12/12/21 0853    atorvastatin (LIPITOR) tablet 10 mg  10 mg Oral Daily Evaristo Giraldo MD   10 mg at 12/12/21 0853    cefTRIAXone (ROCEPHIN) 1000 mg IVPB in 50 mL D5W minibag  1,000 mg IntraVENous Q24H Evaristo Giraldo  mL/hr at 12/12/21 0853 1,000 mg at 12/12/21 0853    doxycycline monohydrate (MONODOX) capsule 100 mg  100 mg Oral 2 times per day Evaristo Giraldo MD   100 mg at 12/12/21 2528    apixaban (ELIQUIS) tablet 5 mg  5 mg Oral BID Evaristo Giraldo MD   5 mg at 12/12/21 0853    lidocaine 1 % injection 20 mL  20 mL IntraDERmal Once Theo Weaver MD           Allergies:  Patient has no known allergies. Social History:   reports that he has been smoking cigarettes. He has been smoking about 0.50 packs per day. He has quit using smokeless tobacco. He reports current drug use. Frequency: 14.00 times per week. Drug: Marijuana Katie Eglin). He reports that he does not drink alcohol. Family History: family history includes Cancer in his father. REVIEW OF SYSTEMS:    Constitutional: No fever or chills.  No night sweats, no weight loss   Eyes: No eye discharge, double vision, or eye pain   HEENT: negative for sore mouth, sore throat, hoarseness and voice change   Respiratory: negative for cough , sputum, ++dyspnea, wheezing, hemoptysis, chest pain   Cardiovascular: negative for chest pain, ++dyspnea, palpitations, orthopnea, PND   Gastrointestinal: negative for nausea, vomiting, diarrhea, constipation, abdominal pain, Dysphagia, hematemesis and hematochezia   Genitourinary: negative for frequency, dysuria, nocturia, urinary incontinence, and hematuria Integument: negative for rash, skin lesions, bruises.    Hematologic/Lymphatic: negative for easy bruising, bleeding, lymphadenopathy, or petechiae   Endocrine: negative for heat or cold intolerance,weight changes, change in bowel habits and hair loss   Musculoskeletal: negative for myalgias, arthralgias, pain, joint swelling,and bone pain   Neurological: negative for headaches, dizziness, seizures, weakness, numbness    PHYSICAL EXAM:      /66   Pulse 89   Temp 97.5 °F (36.4 °C) (Oral)   Resp 18   Ht 5' 11\" (1.803 m)   Wt 165 lb (74.8 kg)   SpO2 97%   BMI 23.01 kg/m²    Temp (24hrs), Av.1 °F (36.7 °C), Min:97.5 °F (36.4 °C), Max:98.4 °F (36.9 °C)    General appearance - well appearing, no in pain or distress   Mental status - alert and cooperative   Eyes - pupils equal and reactive, extraocular eye movements intact   Ears - bilateral TM's and external ear canals normal   Mouth - mucous membranes moist, pharynx normal without lesions   Neck - supple, no significant adenopathy   Lymphatics - no palpable lymphadenopathy, no hepatosplenomegaly   Chest - clear to auscultation, no wheezes, rales or rhonchi, hest tube in place  Heart - normal rate, regular rhythm, normal S1, S2, no murmurs  Abdomen - soft, nontender, nondistended, no masses or organomegaly   Neurological - alert, oriented, normal speech, no focal findings or movement disorder noted   Musculoskeletal - no joint tenderness, deformity or swelling   Extremities - peripheral pulses normal, no pedal edema, no clubbing or cyanosis   Skin - normal coloration and turgor, no rashes, no suspicious skin lesions noted ,    DATA:    Labs:   CBC:   Recent Labs     21  0635 21  0702   WBC 3.2* 3.1*   HGB 9.5* 9.8*   HCT 27.6* 28.3*   * 128*     BMP:   Recent Labs     21  0635 21  0702   * 135   K 3.9 4.0   CO2 21 23   BUN 8 10   CREATININE 0.79 0.77   LABGLOM >60 >60   GLUCOSE 92 88     PT/INR:   No results for input(s): PROTIME, INR in the last 72 hours. IMAGING DATA:  XR CHEST PORTABLE   Final Result   Stable appearance of right-sided large pneumothorax with chest tube stable in   position. XR CHEST PORTABLE   Final Result   Slight improvement of large right pneumothorax with chest tube in place. XR CHEST PORTABLE   Final Result   Stable examination. XR CHEST PORTABLE   Final Result   Addendum 1 of 1   ADDENDUM:   Compared with the chest x-ray there is been significant interval    improvement. Please disregard original impression. Discussed with Dr. Vicente Roldan at 10:20    p.m. Final   Increased right pneumothorax despite new chest tube. Increased airspace   disease on the right. RECOMMENDATION:   The findings were sent to the Radiology Results Communication Center at 10:19   pm on 12/8/2021to be communicated to a licensed caregiver. XR CHEST PORTABLE    (Results Pending)       Primary Problem  Pneumothorax    Active Hospital Problems    Diagnosis Date Noted    Pneumothorax [J93.9] 08/27/2021    COPD (chronic obstructive pulmonary disease) ([facility] [J44.9] 08/27/2021    Iron deficiency anemia [D50.9] 08/27/2021    PAF (paroxysmal atrial fibrillation) ([facility] [I48.0] 08/27/2021    Lung cancer, primary, with metastasis from lung to other site [facility]) [C34.90]      IMPRESSION:   1. Metastatic small cell cancer currently on Lurbinectedin  2. Left adrenal gland metastasis status post SBRT  3. Large right pneumothorax, with recent CT scan showing increase compared to the prior scan, s/p chest tube placed  4. COPD  5. Anemia    RECOMMENDATIONS:  1. I reviewed laboratory data, imaging studies, diagnosis, treatment recommendations  2. Discussed results of x-ray with do not show any worsening with chest tube to waterseal.  3. Follow-up on pulmonary team evaluation for pneumothorax  4. No plans for in-house chemotherapy.   5. CT scans show stable status of patient small cell lung cancer  6. Patient encouraged to work with PT OT  7. We will follow    Discussed with patient and Nurse. Thank you for asking us to see this patient. Deysi Teixeira MD      This note is created with the assistance of a speech recognition program.  While intending to generate a document that actually reflects the content of the visit, the document can still have some errors including those of syntax and sound a like substitutions which may escape proof reading. It such instances, actual meaning can be extrapolated by contextual diversion. \ Oral Minoxidil Pregnancy And Lactation Text: This medication should only be used when clearly needed if you are pregnant, attempting to become pregnant or breast feeding.

## 2023-11-09 NOTE — PROGRESS NOTES
[facility]    INPATIENT OCCUPATIONAL THERAPY  PROGRESS NOTE  Date: 2021  Patient Name: Ruthie Farris      Room: 2321/7150-73  MRN: [id number]    : 1950  (80 y.o.) Gender: male     Discharge Recommendations:  Further Occupational Therapy is recommended upon facility discharge.     OT Equipment Recommendations  Equipment Needed: Yes  Mobility Devices: ADL Assistive Devices    Referring Practitioner: Dr Joe Shrestha  Diagnosis: Pnuemothorax with chest tube  General  Chart Reviewed: Yes, Orders, Progress Notes, History and Physical, Imaging, Other (comment), Previous Admission  Patient assessed for rehabilitation services?: Yes  Additional Pertinent Hx: Current with [place]  Referring Practitioner: Dr Joe Shrestha  Diagnosis: Pnuemothorax with chest tube    Restrictions  Restrictions/Precautions: General Precautions, Surgical Protocols  Implants present? : Metal implants (Cardiac stent)  Other position/activity restrictions: Chest Tube  Required Braces or Orthoses?: No      Subjective  Subjective: \"I guess I can wash up a little\"   Comments: Pt pleasant, agreeable, and cooperative for OT tx this date   Patient Currently in Pain: Yes  Pain Level: 6  Pain Location: Shoulder  Pain Orientation: Right  Overall Orientation Status: Within Functional Limits     Pain Assessment  Pain Assessment: 0-10  Pain Level: 6  Pain Type: Chronic pain  Pain Location: Shoulder  Pain Orientation: Right    Objective  Cognition  Overall Cognitive Status: WFL  Bed mobility  Rolling to Right: Stand by assistance  Supine to Sit: Modified independent  Sit to Supine: Modified independent  Scooting: Modified independent (scooting to EOB seated)  Comment: Bed rails utilized PRN and HOB slightly elevated   Balance  Sitting Balance: Independent (tolerated ~9-10 mins EOB unsupported before req to go to chair for support)  Standing Balance: Stand by assistance  Standing Balance  Time: <1 min x2  Activity: Functional mobility/transfer ie EOB<>chair   Comment: F safety and mgt of chest tube req 1 vc for tubing mgt, 1 LOB however able to self correct req vc for pacing  Functional Mobility  Functional - Mobility Device: No device  Activity: Other (for functional transfers, EOB <> chair)  Assist Level: Stand by assistance  Functional Mobility Comments: Slightly impulsive for transfers, no LOB noted   ADL  Feeding: Increased time to complete; Setup  Grooming: Setup (oral/hair care, face washing and deoderant application, EOB )  UE Bathing: Setup  LE Bathing: Stand by assistance; Setup  UE Dressing: Minimal assistance (A with gown ties )  LE Dressing: Stand by assistance; Setup (donned/doffed  socks, figure 4 technique in chair)  Toileting: Setup  Additional Comments: ADL scores based on clincal observation and clinical reasoning unless otherwise noted. precatuions related to chest tube limits activity level. Grooming tasks, upper body sponge bathing and dressing facilitated at EOB  (unsupported) this date to increase ADL participation and independence. Transfers  Sit to stand: Stand by assistance  Stand to sit: Stand by assistance  Transfer Comments: pt demo G hand placement           Additional Activities: Education on energy conservation strategies and use of AE for simplification of tasks provided to increase overall safety and independence in ADLs and IADLs. Pt verbalized G undertsanding of education provided                 Assessment  Performance deficits / Impairments: Decreased ADL status; Decreased safe awareness; Decreased endurance; Decreased functional mobility   Prognosis: Good; Fair  Discharge Recommendations: Patient would benefit from continued therapy after discharge  Activity Tolerance: Patient Tolerated treatment well  Safety Devices in place: Yes  Type of devices: Call light within reach; Left in bed;  All fall risk precautions in place             Patient Education: AE and strategies for energy conservation, seated ADL tasks ie grooming upper body sponge bathing and dressing for increased participation and independence, safety during functional mobility/transfers,      Learner:patient  Method: explanation       Outcome: needs reinforcement     Plan  Safety Devices  Safety Devices in place: Yes  Type of devices: Call light within reach, Left in bed, All fall risk precautions in place  Plan  Times per week: 2-4 sessions  Times per day: Daily  Current Treatment Recommendations: Endurance Training, Functional Mobility Training, Safety Education & Training, Patient/Caregiver Education & Training, Self-Care / ADL      Goals  Short term goals  Time Frame for Short term goals: by discharge  Short term goal 1: Tolerate seated self care tasks with infrequent rest breaks, using safe techniques for care task performance  Short term goal 2: D/V understanding of Modified care techiques including use of Adpative devices and energy conservatoin strategies  Short term goal 3: D/V understanding of Pacing / phasing techniques and matching breathing with the activity being performed  Short term goal 4: Participate in care using safe techiques for self caer and functional mobility tasks    OT Individual Minutes  Time In: 1008  Time Out:  1046  Minutes: 38      Electronically signed by COLLEEN Ramirez on 12/17/21 at 11:08 AM EST You can access the FollowMyHealth Patient Portal offered by [facility] by registering at the following website: [email or web address]. By joining [facility]’s FollowMyHealth portal, you will also be able to view your health information using other applications (apps) compatible with our system.

## (undated) DEVICE — STRIP,CLOSURE,WOUND,MEDI-STRIP,1/2X4: Brand: MEDLINE

## (undated) DEVICE — CYSTO/BLADDER IRRIGATION SET, REGULATING CLAMP

## (undated) DEVICE — GOWN,AURORA,NONRNF,XL,30/CS: Brand: MEDLINE

## (undated) DEVICE — 1016 S-DRAPE IRRIG POUCH 10/BOX: Brand: STERI-DRAPE™

## (undated) DEVICE — BANDAGE COBAN 4 IN COMPR W4INXL5YD FOAM COHESIVE QUIK STK SELF ADH SFT

## (undated) DEVICE — SUTURE VCRL SZ 0 L18IN ABSRB UD L36MM CT-1 1/2 CIR J840D

## (undated) DEVICE — SVMMC ORTH SPL DRP PK

## (undated) DEVICE — APPLICATOR MEDICATED 26 CC SOLUTION HI LT ORNG CHLORAPREP

## (undated) DEVICE — SOLUTION SCRB 4OZ 4% CHG H2O AIDED FOR PREOPERATIVE SKIN

## (undated) DEVICE — SUTURE MCRYL SZ 0 L36IN ABSRB VLT CT-1 L36MM 1/2 CIR SGL Y346H

## (undated) DEVICE — GLOVE ORANGE PI 8   MSG9080

## (undated) DEVICE — TOURNIQUET CUF BLD PRESSURE 4X18 IN 2 PRT SINGLE BLDR RED

## (undated) DEVICE — GAUZE,SPONGE,FLUFF,6"X6.75",STRL,5/TRAY: Brand: MEDLINE

## (undated) DEVICE — BANDAGE COBAN 6 IN WND 6INX5YD FOAM

## (undated) DEVICE — BNDG,ELSTC,MATRIX,STRL,4"X5YD,LF,HOOK&LP: Brand: MEDLINE

## (undated) DEVICE — PADDING,UNDERCAST,COTTON, 4"X4YD STERILE: Brand: MEDLINE

## (undated) DEVICE — SHEET DRAPE FULL 70X100

## (undated) DEVICE — CULTURETTE AERO/ANEROBIC RED/BLUE BUNDLE

## (undated) DEVICE — DRAPE,U/ SHT,SPLIT,PLAS,STERIL: Brand: MEDLINE

## (undated) DEVICE — TUBING AMB

## (undated) DEVICE — SUTURE MCRYL SZ 3-0 L27IN ABSRB UD L24MM PS-1 3/8 CIR PRIM Y936H

## (undated) DEVICE — SUTURE VCRL SZ 2-0 L18IN ABSRB UD CT-1 L36MM 1/2 CIR J839D

## (undated) DEVICE — STOCKINETTE,IMPERVIOUS,12X48,STERILE: Brand: MEDLINE

## (undated) DEVICE — SHEET, ORTHO, SPLIT, STERILE: Brand: MEDLINE

## (undated) DEVICE — GLOVE ORANGE PI 7 1/2   MSG9075

## (undated) DEVICE — GOWN,AURORA,NON-REINFORCED,2XL: Brand: MEDLINE

## (undated) DEVICE — CONTAINER,SPECIMEN,4OZ,OR STRL: Brand: MEDLINE

## (undated) DEVICE — GLOVE ORTHO 8   MSG9480